# Patient Record
Sex: FEMALE | Race: WHITE | NOT HISPANIC OR LATINO | Employment: OTHER | URBAN - METROPOLITAN AREA
[De-identification: names, ages, dates, MRNs, and addresses within clinical notes are randomized per-mention and may not be internally consistent; named-entity substitution may affect disease eponyms.]

---

## 2012-04-03 LAB
HPV ABSTRACT: NORMAL
PAP-ABSTRACT: NORMAL

## 2014-05-19 LAB
HPV ABSTRACT: NORMAL
PAP-ABSTRACT: NORMAL

## 2017-03-31 ENCOUNTER — TRANSFERRED RECORDS (OUTPATIENT)
Dept: HEALTH INFORMATION MANAGEMENT | Facility: CLINIC | Age: 52
End: 2017-03-31

## 2017-04-10 ENCOUNTER — TELEPHONE (OUTPATIENT)
Dept: FAMILY MEDICINE | Facility: CLINIC | Age: 52
End: 2017-04-10

## 2017-04-10 NOTE — TELEPHONE ENCOUNTER
Reason for Call:  Orders    Detailed comments: Patient wants Order in for Labs, then Please Call  To Schedule & discuss    Phone Number Patient can be reached at: Cell number on file:    Telephone Information:   Mobile [phone]       Best Time: anytime    Can we leave a detailed message on this number? YES    Call taken on 4/10/2017 at 12:00 PM by Ruperto Hurd

## 2017-04-11 NOTE — TELEPHONE ENCOUNTER
Patient called back.  Will be scheduling her physical. Will need to be after 6/28.  Last year 6/28/16.    Wants labs done before appointment.  Patient advised to call back end of June regarding labs.  LS may want to see her first. Will check with LS then  Mikaela Niño RN

## 2017-04-12 ENCOUNTER — MYC MEDICAL ADVICE (OUTPATIENT)
Dept: FAMILY MEDICINE | Facility: CLINIC | Age: 52
End: 2017-04-12

## 2017-04-12 NOTE — TELEPHONE ENCOUNTER
Pt is scheduled for preop in June  Sent [employer] message to further see what she is wanting.  Caroline COLLINS RN

## 2017-05-02 ENCOUNTER — TELEPHONE (OUTPATIENT)
Dept: FAMILY MEDICINE | Facility: CLINIC | Age: 52
End: 2017-05-02

## 2017-05-02 NOTE — TELEPHONE ENCOUNTER
5/2/2017    Call Regarding Preventive Health Screening Colonoscopy and Cervical/PAP    Attempt 1    Message on voicemail     Comments:       Outreach   WILLARD

## 2017-05-03 NOTE — TELEPHONE ENCOUNTER
"Patient has appointment schedule else where for HM screening  Will have record transfer when complete      Canh \"Rafaela\" Jose  Central Scheduler    "

## 2017-06-21 ENCOUNTER — TRANSFERRED RECORDS (OUTPATIENT)
Dept: HEALTH INFORMATION MANAGEMENT | Facility: CLINIC | Age: 52
End: 2017-06-21

## 2017-06-22 ENCOUNTER — TRANSFERRED RECORDS (OUTPATIENT)
Dept: HEALTH INFORMATION MANAGEMENT | Facility: CLINIC | Age: 52
End: 2017-06-22

## 2017-06-22 LAB
HPV ABSTRACT: NORMAL
PAP SMEAR - HIM PATIENT REPORTED: NEGATIVE

## 2017-06-27 ENCOUNTER — OFFICE VISIT (OUTPATIENT)
Dept: FAMILY MEDICINE | Facility: CLINIC | Age: 52
End: 2017-06-27
Payer: COMMERCIAL

## 2017-06-27 VITALS
BODY MASS INDEX: 36.12 KG/M2 | TEMPERATURE: 98.9 F | HEART RATE: 92 BPM | OXYGEN SATURATION: 94 % | SYSTOLIC BLOOD PRESSURE: 114 MMHG | WEIGHT: 211.6 LBS | DIASTOLIC BLOOD PRESSURE: 78 MMHG | HEIGHT: 64 IN

## 2017-06-27 DIAGNOSIS — H02.403 PTOSIS OF EYELID, BILATERAL: ICD-10-CM

## 2017-06-27 DIAGNOSIS — Z01.818 PREOP GENERAL PHYSICAL EXAM: Primary | ICD-10-CM

## 2017-06-27 PROCEDURE — 99214 OFFICE O/P EST MOD 30 MIN: CPT | Performed by: FAMILY MEDICINE

## 2017-06-27 NOTE — MR AVS SNAPSHOT
After Visit Summary   6/27/2017    Marbella Hendrix    MRN: [id number]           Patient Information     Date Of Birth          1965        Visit Information        Provider Department      6/27/2017 1:00 PM Vanessa Ladd MD [facility]        Today's Diagnoses     Preop general physical exam    -  1    Ptosis of eyelid, bilateral          Care Instructions      Before Your Surgery      Call your surgeon if there is any change in your health. This includes signs of a cold or flu (such as a sore throat, runny nose, cough, rash or fever).    Do not smoke, drink alcohol or take over the counter medicine (unless your surgeon or primary care doctor tells you to) for the 24 hours before and after surgery.    If you take prescribed drugs: Follow your doctor s orders about which medicines to take and which to stop until after surgery.    Eating and drinking prior to surgery: follow the instructions from your surgeon    Take a shower or bath the night before surgery. Use the soap your surgeon gave you to gently clean your skin. If you do not have soap from your surgeon, use your regular soap. Do not shave or scrub the surgery site.  Wear clean pajamas and have clean sheets on your bed.           Follow-ups after your visit        Who to contact     If you have questions or need follow up information about today's clinic visit or your schedule please contact [facility] [place] directly at [phone].  Normal or non-critical lab and imaging results will be communicated to you by MyChart, letter or phone within 4 business days after the clinic has received the results. If you do not hear from us within 7 days, please contact the clinic through MyChart or phone. If you have a critical or abnormal lab result, we will notify you by phone as soon as possible.  Submit refill requests through [employer] or call your pharmacy and they will forward the refill request to us. Please allow 3 business  "days for your refill to be completed.          Additional Information About Your Visit        MyChart Information     [employer] gives you secure access to your electronic health record. If you see a primary care provider, you can also send messages to your care team and make appointments. If you have questions, please call your primary care clinic.  If you do not have a primary care provider, please call [phone] and they will assist you.        Care EveryWhere ID     This is your Care EveryWhere ID. This could be used by other organizations to access your [facility] medical records  [id number]        Your Vitals Were     Pulse Temperature Height Last Period Pulse Oximetry BMI (Body Mass Index)    92 98.9  F (37.2  C) (Oral) 5' 3.5\" (1.613 m) 08/28/2015 94% 36.9 kg/m2       Blood Pressure from Last 3 Encounters:   06/27/17 114/78   06/28/16 114/72   06/12/16 120/80    Weight from Last 3 Encounters:   06/27/17 211 lb 9.6 oz (96 kg)   06/28/16 206 lb (93.4 kg)   06/12/16 203 lb (92.1 kg)              Today, you had the following     No orders found for display       Primary Care Provider Office Phone # Fax #    Vanessa Ladd [phone]       [facility] [address] [address]        Equal Access to Services     [facility]: Hadii aad ku hadasho Soomaali, waaxda luqadaha, qaybta kaalmada adeegyada, luis m sutton haybarbra toth . So [facility] [phone].    ATENCIÓN: Si habla español, tiene a garcia disposición servicios gratuitos de asistencia lingüística. Llame al [phone].    We comply with applicable federal civil rights laws and [place] laws. We do not discriminate on the basis of race, color, national origin, age, disability sex, sexual orientation or gender identity.            Thank you!     Thank you for choosing [facility]  for your care. Our goal is always to provide you with excellent care. Hearing back from our patients is one " way we can continue to improve our services. Please take a few minutes to complete the written survey that you may receive in the mail after your visit with us. Thank you!             Your Updated Medication List - Protect others around you: Learn how to safely use, store and throw away your medicines at www.disposemymeds.org.          This list is accurate as of: 6/27/17  3:08 PM.  Always use your most recent med list.                   Brand Name Dispense Instructions for use Diagnosis    CALCIUM PO      Take 1,000 mg by mouth daily        FLAX SEED OIL PO      Take by mouth daily        levothyroxine 137 MCG tablet    SYNTHROID/LEVOTHROID    90 tablet    Take 1 tablet (137 mcg) by mouth daily    Hypothyroidism, unspecified type       OMEGA-3 FISH OIL PO      Take 1 g by mouth daily        PARoxetine 20 MG tablet    PAXIL    90 tablet    Take 0.5 tablets (10 mg) by mouth 2 times daily    Generalized anxiety disorder

## 2017-06-27 NOTE — PROGRESS NOTES
[facility]  [address] [place]  [facility] [address] [phone]  Dept: [phone]    PRE-OP EVALUATION:  Today's date: 2017    Marbella Hendrix (: 1965) presents for pre-operative evaluation assessment as requested by Dr. Litzy Lanza.  She requires evaluation and anesthesia risk assessment prior to undergoing surgery/procedure for treatment of L eyelid .  Proposed procedure: eyelid surgery    Date of Surgery/ Procedure: 2017  Time of Surgery/ Procedure: [facility]  Hospital/Surgical Facility: [place], [place] [facility]  Fax number for surgical facility: [phone]  Primary Physician: Vanessa Ladd  Type of Anesthesia Anticipated: to be determined    Patient has a Health Care Directive or Living Will:  YES in chart    1. NO - Do you have a history of heart attack, stroke, stent, bypass or surgery on an artery in the head, neck, heart or legs?  2. NO - Do you ever have any pain or discomfort in your chest?  3. NO - Do you have a history of  Heart Failure?  4. NO - Are you troubled by shortness of breath when: walking on the level, up a slight hill or at night?  5. NO - Do you currently have a cold, bronchitis or other respiratory infection?  6. NO - Do you have a cough, shortness of breath or wheezing?  7. NO - Do you sometimes get pains in the calves of your legs when you walk?  8. NO - Do you or anyone in your family have previous history of blood clots?  9. NO - Do you or does anyone in your family have a serious bleeding problem such as prolonged bleeding following surgeries or cuts?  10. NO - Have you ever had problems with anemia or been told to take iron pills?  11. NO - Have you had any abnormal blood loss such as black, tarry or bloody stools, or abnormal vaginal bleeding?  12. NO - Have you ever had a blood transfusion?  13. NO - Have you or any of your relatives ever had problems with anesthesia?  14. NO - Do you have sleep apnea, excessive  snoring or daytime drowsiness?  15. NO - Do you have any prosthetic heart valves?  16. NO - Do you have prosthetic joints?  17. NO - Is there any chance that you may be pregnant?      HPI:                                                      Brief HPI related to upcoming procedure:       See problem list for active medical problems.  Problems all longstanding and stable, except as noted/documented.  See ROS for pertinent symptoms related to these conditions.                                                                                                  .    MEDICAL HISTORY:                                                      Patient Active Problem List    Diagnosis Date Noted     Acute post-operative pain 2015     Priority: Medium     Preventative health care 10/30/2012     Priority: Medium     Last pap NIL ; mammogram nl ; lipids abnl, needs annual f/u       Health Care Home 2012     Priority: Medium     Tier 1    DX V65.8 REPLACED WITH 12775 HEALTH CARE HOME (2013)       Generalized anxiety disorder 2012     Priority: Medium     H/o panic attacks; currently controlled with paxil       Hyperlipidemia LDL goal <160 2012     Priority: Medium     Behavioral measures - avoiding statins while trying to conceive; not a candidate for red yeast rice (interactions with thyroid meds)       Hypothyroidism 2012     Priority: Medium     Overweight 2012     Priority: Medium     On exercise plan, has been in weight watchers  Problem list name updated by automated process. Provider to review        Past Medical History:   Diagnosis Date     Heavy menstrual period      Leiomyoma of uterus     s/p myomectomy     PONV (postoperative nausea and vomiting)      Vesico-ureteral reflux     s/p repair     Past Surgical History:   Procedure Laterality Date     ABDOMEN SURGERY      exploratory after C section for sepsis      SECTION      x 2     HYSTERECTOMY SUPRACERVICAL N/A  "8/28/2015    Procedure: HYSTERECTOMY SUPRACERVICAL;  Surgeon: Kelle Wasserman MD;  Location: [facility] OR     LUMPECTOMY BREAST       MYOMECTOMY UTERUS       Current Outpatient Prescriptions   Medication Sig Dispense Refill     PARoxetine (PAXIL) 20 MG tablet Take 0.5 tablets (10 mg) by mouth 2 times daily 90 tablet 3     levothyroxine (SYNTHROID, LEVOTHROID) 137 MCG tablet Take 1 tablet (137 mcg) by mouth daily 90 tablet 3     Omega-3 Fatty Acids (OMEGA-3 FISH OIL PO) Take 1 g by mouth daily       Flaxseed, Linseed, (FLAX SEED OIL PO) Take by mouth daily       CALCIUM PO Take 1,000 mg by mouth daily       OTC products: None, except as noted above    No Known Allergies   Latex Allergy: NO    Social History   Substance Use Topics     Smoking status: Former Smoker     Types: Cigarettes     Quit date: 1/1/1990     Smokeless tobacco: Never Used     Alcohol use Yes      Comment: 1-2 drinks a month     History   Drug Use No       REVIEW OF SYSTEMS:                                                    C: NEGATIVE for fever, chills, change in weight  EYES: NEGATIVE for vision changes or irritation  E/M: NEGATIVE for ear, mouth and throat problems  R: NEGATIVE for significant cough or SOB  RESP:NEGATIVE for significant cough or SOB  CV: NEGATIVE for chest pain, palpitations or peripheral edema  CV: NEGATIVE for chest pain, palpitations or peripheral edema  MUSCULOSKELETAL: NEGATIVE for significant arthralgias or myalgia  NEURO: NEGATIVE for weakness, dizziness or paresthesias  PSYCHIATRIC: NEGATIVE for changes in mood or affect  ROS otherwise negative    EXAM:                                                    /78  Pulse 92  Temp 98.9  F (37.2  C) (Oral)  Ht 5' 3.5\" (1.613 m)  Wt 211 lb 9.6 oz (96 kg)  LMP 08/28/2015  SpO2 94%  BMI 36.9 kg/m2    GENERAL APPEARANCE: healthy, alert and no distress     EYES: EOMI, PERRL     HENT: ear canals and TM's normal and nose and mouth without ulcers or lesions     NECK: " no adenopathy, no asymmetry, masses, or scars and thyroid normal to palpation     RESP: lungs clear to auscultation - no rales, rhonchi or wheezes     CV: regular rates and rhythm, normal S1 S2, no S3 or S4 and no murmur, click or rub     ABDOMEN:  soft, nontender, no HSM or masses and bowel sounds normal     MS: extremities normal- no gross deformities noted, no evidence of inflammation in joints, FROM in all extremities.     SKIN: no suspicious lesions or rashes     NEURO: Normal strength and tone, sensory exam grossly normal, mentation intact and speech normal     PSYCH: mentation appears normal. and affect normal/bright     LYMPHATICS: No axillary, cervical, or supraclavicular nodes    DIAGNOSTICS:                                                    No labs or EKG required for low risk surgery (cataract, skin procedure, breast biopsy, etc)    Recent Labs   Lab Test  08/29/15   0651  08/28/15   1925  08/28/15   0850   10/22/12   2341   HGB  13.0  14.3  15.1   < >  11.6*   PLT   --    --    --    --   314   NA   --    --    --    --   141   POTASSIUM   --    --    --    --   3.8   CR   --    --   0.65   --   0.77    < > = values in this interval not displayed.        IMPRESSION:                                                    Reason for surgery/procedure: upper eyelids ptosis, surgical correction   Diagnosis/reason for consult: preoperative clearing     The proposed surgical procedure is considered LOW risk.    REVISED CARDIAC RISK INDEX  The patient has the following serious cardiovascular risks for perioperative complications such as (MI, PE, VFib and 3  AV Block):  No serious cardiac risks      The patient has the following additional risks for perioperative complications:  No identified additional risks      ICD-10-CM    1. Preop general physical exam Z01.818        RECOMMENDATIONS:                                                          --Patient is to take all scheduled medications on the day of surgery  EXCEPT for modifications listed below.    APPROVAL GIVEN to proceed with proposed procedure, without further diagnostic evaluation       Signed Electronically by: Vanessa Ladd MD    Copy of this evaluation report is provided to requesting physician.    [facility] Preop Guidelines

## 2017-06-28 ENCOUNTER — TELEPHONE (OUTPATIENT)
Dept: FAMILY MEDICINE | Facility: CLINIC | Age: 52
End: 2017-06-28

## 2017-06-28 DIAGNOSIS — E78.5 HYPERLIPIDEMIA LDL GOAL <160: ICD-10-CM

## 2017-06-28 DIAGNOSIS — Z00.00 PREVENTATIVE HEALTH CARE: Primary | ICD-10-CM

## 2017-06-28 DIAGNOSIS — E03.9 ACQUIRED HYPOTHYROIDISM: ICD-10-CM

## 2017-06-28 NOTE — TELEPHONE ENCOUNTER
Reason for Call: Request for an order or referral:    Order or referral being requested: Labs   Cholesterol and Thyroid    Date needed: before my next appointment  7/21 Annual Physical    Has the patient been seen by the PCP for this problem? YES    Additional comments: The patient would like to get her labs drawn first soi she will be coming in at 8:15  For labs and she would like the above 2 checked and then she said whatever the Physician wants     Phone number Patient can be reached at:  Home number on file [phone] (home)    Best Time:  anytime    Can we leave a detailed message on this number?  YES    Call taken on 6/28/2017 at 9:56 AM by Miguelina Mcdaniel

## 2017-07-08 ENCOUNTER — HEALTH MAINTENANCE LETTER (OUTPATIENT)
Age: 52
End: 2017-07-08

## 2017-08-02 ENCOUNTER — OFFICE VISIT (OUTPATIENT)
Dept: FAMILY MEDICINE | Facility: CLINIC | Age: 52
End: 2017-08-02
Payer: COMMERCIAL

## 2017-08-02 VITALS
SYSTOLIC BLOOD PRESSURE: 126 MMHG | DIASTOLIC BLOOD PRESSURE: 76 MMHG | BODY MASS INDEX: 36.02 KG/M2 | HEIGHT: 64 IN | OXYGEN SATURATION: 98 % | WEIGHT: 211 LBS | HEART RATE: 86 BPM | TEMPERATURE: 96.8 F | RESPIRATION RATE: 16 BRPM

## 2017-08-02 DIAGNOSIS — Z83.3 FAMILY HISTORY OF DIABETES MELLITUS: ICD-10-CM

## 2017-08-02 DIAGNOSIS — Z00.00 ENCOUNTER FOR ROUTINE ADULT HEALTH EXAMINATION WITHOUT ABNORMAL FINDINGS: Primary | ICD-10-CM

## 2017-08-02 DIAGNOSIS — Z90.710 STATUS POST HYSTERECTOMY: ICD-10-CM

## 2017-08-02 DIAGNOSIS — F41.1 GENERALIZED ANXIETY DISORDER: ICD-10-CM

## 2017-08-02 LAB
CHOLEST SERPL-MCNC: 223 MG/DL
GLUCOSE SERPL-MCNC: 96 MG/DL (ref 70–99)
HBA1C MFR BLD: 5.5 % (ref 4.3–6)
HDLC SERPL-MCNC: 36 MG/DL
LDLC SERPL CALC-MCNC: 163 MG/DL
NONHDLC SERPL-MCNC: 187 MG/DL
TRIGL SERPL-MCNC: 118 MG/DL
TSH SERPL DL<=0.005 MIU/L-ACNC: 1.04 MU/L (ref 0.4–4)

## 2017-08-02 PROCEDURE — 80061 LIPID PANEL: CPT | Performed by: FAMILY MEDICINE

## 2017-08-02 PROCEDURE — 36415 COLL VENOUS BLD VENIPUNCTURE: CPT | Performed by: FAMILY MEDICINE

## 2017-08-02 PROCEDURE — 99396 PREV VISIT EST AGE 40-64: CPT | Performed by: FAMILY MEDICINE

## 2017-08-02 PROCEDURE — 83036 HEMOGLOBIN GLYCOSYLATED A1C: CPT | Performed by: FAMILY MEDICINE

## 2017-08-02 PROCEDURE — 84443 ASSAY THYROID STIM HORMONE: CPT | Performed by: FAMILY MEDICINE

## 2017-08-02 PROCEDURE — 82947 ASSAY GLUCOSE BLOOD QUANT: CPT | Performed by: FAMILY MEDICINE

## 2017-08-02 RX ORDER — PAROXETINE 20 MG/1
10 TABLET, FILM COATED ORAL 2 TIMES DAILY
Qty: 90 TABLET | Refills: 3 | Status: SHIPPED | OUTPATIENT
Start: 2017-08-02 | End: 2018-06-20

## 2017-08-02 NOTE — NURSING NOTE
"Chief Complaint   Patient presents with     Physical     /76  Pulse 86  Temp 96.8  F (36  C) (Oral)  Resp 16  Ht 5' 3.5\" (1.613 m)  Wt 211 lb (95.7 kg)  LMP 08/28/2015  SpO2 98%  BMI 36.79 kg/m2 Estimated body mass index is 36.79 kg/(m^2) as calculated from the following:    Height as of this encounter: 5' 3.5\" (1.613 m).    Weight as of this encounter: 211 lb (95.7 kg).  bp completed using cuff size: regular       Health Maintenance addressed:  Pap Smear    Possibly completing today per provider review.    Yovana Freire MA     "

## 2017-08-02 NOTE — PROGRESS NOTES
SUBJECTIVE:   CC: Marbella Hendrix is an 51 year old woman who presents for preventive health visit.     Healthy Habits:    Do you get at least three servings of calcium containing foods daily (dairy, green leafy vegetables, etc.)? yes    Amount of exercise or daily activities, outside of work: 7 day(s) per week    Problems taking medications regularly No    Medication side effects: No    Have you had an eye exam in the past two years? yes    Do you see a dentist twice per year? yes    Do you have sleep apnea, excessive snoring or daytime drowsiness?no              Today's PHQ-2 Score: PHQ-2 (  [employer]) 2017 10/23/2015   Q1: Little interest or pleasure in doing things 0 0   Q2: Feeling down, depressed or hopeless 0 0   PHQ-2 Score 0 0         Abuse: Current or Past(Physical, Sexual or Emotional)- No  Do you feel safe in your environment - Yes  Social History   Substance Use Topics     Smoking status: Former Smoker     Types: Cigarettes     Quit date: 1990     Smokeless tobacco: Never Used     Alcohol use Yes      Comment: 1-2 drinks a month     The patient does not drink >3 drinks per day nor >7 drinks per week.    Reviewed orders with patient.  Reviewed health maintenance and updated orders accordingly - Yes  BP Readings from Last 3 Encounters:   17 126/76   17 114/78   16 114/72    Wt Readings from Last 3 Encounters:   17 211 lb (95.7 kg)   17 211 lb 9.6 oz (96 kg)   16 206 lb (93.4 kg)                  Patient Active Problem List   Diagnosis     Health Care Home     Generalized anxiety disorder     Hyperlipidemia LDL goal <160     Acquired hypothyroidism     Overweight     Preventative health care     Acute post-operative pain     Past Surgical History:   Procedure Laterality Date     ABDOMEN SURGERY      exploratory after C section for sepsis      SECTION      x 2     HYSTERECTOMY SUPRACERVICAL N/A 2015    Procedure: HYSTERECTOMY SUPRACERVICAL;   Surgeon: Kelle Wasserman MD;  Location: [facility] OR     LUMPECTOMY BREAST       MYOMECTOMY UTERUS         Social History   Substance Use Topics     Smoking status: Former Smoker     Types: Cigarettes     Quit date: 1/1/1990     Smokeless tobacco: Never Used     Alcohol use Yes      Comment: 1-2 drinks a month     Family History   Problem Relation Age of Onset     High cholesterol Father      CANCER Father      CLL     Blood Disease Father      CLL     Breast Cancer Maternal Grandmother      in 80s     Depression Mother      DIABETES Mother      Ulcerative Colitis Mother      Lipids Mother      hypertriglyceridemia         Current Outpatient Prescriptions   Medication Sig Dispense Refill     PARoxetine (PAXIL) 20 MG tablet Take 0.5 tablets (10 mg) by mouth 2 times daily 90 tablet 3     levothyroxine (SYNTHROID, LEVOTHROID) 137 MCG tablet Take 1 tablet (137 mcg) by mouth daily 90 tablet 3     Omega-3 Fatty Acids (OMEGA-3 FISH OIL PO) Take 1 g by mouth daily       Flaxseed, Linseed, (FLAX SEED OIL PO) Take by mouth daily       CALCIUM PO Take 1,000 mg by mouth daily       No Known Allergies  Recent Labs   Lab Test  08/02/17   1013  07/13/16   0743  06/22/16   0734  08/28/15   0850   06/24/15   0804  07/23/14   0754   11/20/12   0855  10/22/12   2341  07/11/12   0834   A1C  5.5   --    --    --    --    --    --    --    --    --    --    LDL   --   158*   --    --    --   159*  135*   < >  161.0*   --   190*   HDL   --   33*   --    --    --   38*  29*   < >  33.0*   --   33*   TRIG   --   162*   --    --    --   91  125   < >  143.0   --   118   ALT   --    --    --    --    --    --    --    --   18.0   --   20   CR   --    --    --   0.65   --    --    --    --    --   0.77   --    GFRESTIMATED   --    --    --   >90  Non  GFR Calc     --    --    --    --    --   81   --    GFRESTBLACK   --    --    --   >90   GFR Calc     --    --    --    --    --   >90   --    POTASSIUM    "--    --    --    --    --    --    --    --    --   3.8   --    TSH   --   1.14  1.18   --    < >  5.63*  4.48   < >   --   10.70*  3.13    < > = values in this interval not displayed.              Patient over age 50, mutual decision to screen reflected in health maintenance.      Pertinent mammograms are reviewed under the imaging tab.  History of abnormal Pap smear: NO - age 30- 65 PAP every 3 years recommended    Reviewed and updated as needed this visit by clinical staffTobacco  Allergies  Meds  Med Hx  Surg Hx  Fam Hx  Soc Hx        Reviewed and updated as needed this visit by Provider        Past Medical History:   Diagnosis Date     Heavy menstrual period      Leiomyoma of uterus     s/p myomectomy     PONV (postoperative nausea and vomiting)      Vesico-ureteral reflux     s/p repair      Past Surgical History:   Procedure Laterality Date     ABDOMEN SURGERY      exploratory after C section for sepsis      SECTION      x 2     HYSTERECTOMY SUPRACERVICAL N/A 2015    Procedure: HYSTERECTOMY SUPRACERVICAL;  Surgeon: Kelle Wasserman MD;  Location: [facility] OR     LUMPECTOMY BREAST       MYOMECTOMY UTERUS         ROS:  C: NEGATIVE for fever, chills, change in weight  I: NEGATIVE for worrisome rashes, moles or lesions  E: NEGATIVE for vision changes or irritation  ENT: NEGATIVE for ear, mouth and throat problems  R: NEGATIVE for significant cough or SOB  B: NEGATIVE for masses, tenderness or discharge  CV: NEGATIVE for chest pain, palpitations or peripheral edema  GI: NEGATIVE for nausea, abdominal pain, heartburn, or change in bowel habits  : NEGATIVE for unusual urinary or vaginal symptoms. No vaginal bleeding.  M: NEGATIVE for significant arthralgias or myalgia  N: NEGATIVE for weakness, dizziness or paresthesias  P: NEGATIVE for changes in mood or affect     OBJECTIVE:   /76  Pulse 86  Temp 96.8  F (36  C) (Oral)  Resp 16  Ht 5' 3.5\" (1.613 m)  Wt 211 lb (95.7 kg)  " LMP 08/28/2015  SpO2 98%  BMI 36.79 kg/m2  EXAM:  GENERAL: healthy, alert and no distress  EYES: Eyes grossly normal to inspection, PERRL and conjunctivae and sclerae normal  HENT: ear canals and TM's normal, nose and mouth without ulcers or lesions  NECK: no adenopathy, no asymmetry, masses, or scars and thyroid normal to palpation  RESP: lungs clear to auscultation - no rales, rhonchi or wheezes  BREAST: normal without masses, tenderness or nipple discharge and no palpable axillary masses or adenopathy  CV: regular rate and rhythm, normal S1 S2, no S3 or S4, no murmur, click or rub, no peripheral edema and peripheral pulses strong  ABDOMEN: soft, nontender, no hepatosplenomegaly, no masses and bowel sounds normal  MS: no gross musculoskeletal defects noted, no edema  SKIN: no suspicious lesions or rashes  NEURO: Normal strength and tone, mentation intact and speech normal  PSYCH: mentation appears normal, affect normal/bright    ASSESSMENT/PLAN:   1. Encounter for routine adult health examination without abnormal findings  Discussed diet,calcium,exercise.Went over self breast exam.Thin prep was NOT  done.Eyes and teeth UTD.No immunizations needed today.See orders below for tests ordered and screening needed.    - TSH with free T4 reflex  - Lipid Profile (Chol, Trig, HDL, LDL calc)  - Glucose    2. Family history of diabetes mellitus  Will check hgba 1 c   - Hemoglobin A1c    3. Generalized anxiety disorder  Refilled , doing well on it , for anxiety   - PARoxetine (PAXIL) 20 MG tablet; Take 0.5 tablets (10 mg) by mouth 2 times daily  Dispense: 90 tablet; Refill: 3    COUNSELING:   Reviewed preventive health counseling, as reflected in patient instructions       Regular exercise       Healthy diet/nutrition       Vision screening       Hearing screening       Osteoporosis Prevention/Bone Health         reports that she quit smoking about 27 years ago. Her smoking use included Cigarettes. She has never used  "smokeless tobacco.    Estimated body mass index is 36.79 kg/(m^2) as calculated from the following:    Height as of this encounter: 5' 3.5\" (1.613 m).    Weight as of this encounter: 211 lb (95.7 kg).       Counseling Resources:  ATP IV Guidelines  Pooled Cohorts Equation Calculator  Breast Cancer Risk Calculator  FRAX Risk Assessment  ICSI Preventive Guidelines  Dietary Guidelines for Americans, 2010  USDA's MyPlate  ASA Prophylaxis  Lung CA Screening    Vanessa Ladd MD  [facility]  "

## 2017-08-02 NOTE — MR AVS SNAPSHOT
After Visit Summary   8/2/2017    Marbella Hendrix    MRN: [id number]           Patient Information     Date Of Birth          1965        Visit Information        Provider Department      8/2/2017 9:30 AM Vanessa Ladd MD [facility]        Today's Diagnoses     Encounter for routine adult health examination without abnormal findings    -  1    Family history of diabetes mellitus        Generalized anxiety disorder        Status post hysterectomy          Care Instructions      Preventive Health Recommendations  Female Ages 50 - 64    Yearly exam: See your health care provider every year in order to  o Review health changes.   o Discuss preventive care.    o Review your medicines if your doctor has prescribed any.      Get a Pap test every three years (unless you have an abnormal result and your provider advises testing more often).    If you get Pap tests with HPV test, you only need to test every 5 years, unless you have an abnormal result.     You do not need a Pap test if your uterus was removed (hysterectomy) and you have not had cancer.    You should be tested each year for STDs (sexually transmitted diseases) if you're at risk.     Have a mammogram every 1 to 2 years.    Have a colonoscopy at age 50, or have a yearly FIT test (stool test). These exams screen for colon cancer.      Have a cholesterol test every 5 years, or more often if advised.    Have a diabetes test (fasting glucose) every three years. If you are at risk for diabetes, you should have this test more often.     If you are at risk for osteoporosis (brittle bone disease), think about having a bone density scan (DEXA).    Shots: Get a flu shot each year. Get a tetanus shot every 10 years.    Nutrition:     Eat at least 5 servings of fruits and vegetables each day.    Eat whole-grain bread, whole-wheat pasta and brown rice instead of white grains and rice.    Talk to your provider about Calcium and Vitamin D.  "    Lifestyle    Exercise at least 150 minutes a week (30 minutes a day, 5 days a week). This will help you control your weight and prevent disease.    Limit alcohol to one drink per day.    No smoking.     Wear sunscreen to prevent skin cancer.     See your dentist every six months for an exam and cleaning.    See your eye doctor every 1 to 2 years.            Follow-ups after your visit        Who to contact     If you have questions or need follow up information about today's clinic visit or your schedule please contact [facility] directly at [phone].  Normal or non-critical lab and imaging results will be communicated to you by PeerTraderhart, letter or phone within 4 business days after the clinic has received the results. If you do not hear from us within 7 days, please contact the clinic through Streamezzot or phone. If you have a critical or abnormal lab result, we will notify you by phone as soon as possible.  Submit refill requests through [employer] or call your pharmacy and they will forward the refill request to us. Please allow 3 business days for your refill to be completed.          Additional Information About Your Visit        MyChart Information     [employer] gives you secure access to your electronic health record. If you see a primary care provider, you can also send messages to your care team and make appointments. If you have questions, please call your primary care clinic.  If you do not have a primary care provider, please call [phone] and they will assist you.        Care EveryWhere ID     This is your Care EveryWhere ID. This could be used by other organizations to access your [facility] medical records  [id number]        Your Vitals Were     Pulse Temperature Respirations Height Last Period Pulse Oximetry    86 96.8  F (36  C) (Oral) 16 5' 3.5\" (1.613 m) 08/28/2015 98%    BMI (Body Mass Index)                   36.79 kg/m2            Blood Pressure from Last 3 Encounters:   08/02/17 " 126/76   06/27/17 114/78   06/28/16 114/72    Weight from Last 3 Encounters:   08/02/17 211 lb (95.7 kg)   06/27/17 211 lb 9.6 oz (96 kg)   06/28/16 206 lb (93.4 kg)              We Performed the Following     Glucose     Hemoglobin A1c     Lipid Profile (Chol, Trig, HDL, LDL calc)     TSH with free T4 reflex          Where to get your medicines      These medications were sent to [facility] - [place], [place] - [address]  [address] [address]    Hours:  Mon-Fri 8:30am-5:00pm Toll Free [phone] Phone:  [phone]     PARoxetine 20 MG tablet          Primary Care Provider Office Phone # Fax #    Vanessa Ladd [phone]       [facility] [address]   [facility] [place] [facility] [address]        Equal Access to Services     СЕРГЕЙ SAENZ : Hadii aad ku hadasho Soomaali, waaxda luqadaha, qaybta kaalmada adeegyada, waxay idiin haybarbra toth . So [facility] [phone].    ATENCIÓN: Si habla español, tiene a garcia disposición servicios gratuitos de asistencia lingüística. Chetnaame al [phone].    We comply with applicable federal civil rights laws and [place] laws. We do not discriminate on the basis of race, color, national origin, age, disability sex, sexual orientation or gender identity.            Thank you!     Thank you for choosing [facility]  for your care. Our goal is always to provide you with excellent care. Hearing back from our patients is one way we can continue to improve our services. Please take a few minutes to complete the written survey that you may receive in the mail after your visit with us. Thank you!             Your Updated Medication List - Protect others around you: Learn how to safely use, store and throw away your medicines at www.disposemymeds.org.          This list is accurate as of: 8/2/17  1:05 PM.  Always use your most recent med list.                   Brand Name Dispense  Instructions for use Diagnosis    CALCIUM PO      Take 1,000 mg by mouth daily        FLAX SEED OIL PO      Take by mouth daily        levothyroxine 137 MCG tablet    SYNTHROID/LEVOTHROID    90 tablet    Take 1 tablet (137 mcg) by mouth daily    Hypothyroidism, unspecified type       OMEGA-3 FISH OIL PO      Take 1 g by mouth daily        PARoxetine 20 MG tablet    PAXIL    90 tablet    Take 0.5 tablets (10 mg) by mouth 2 times daily    Generalized anxiety disorder

## 2017-08-28 DIAGNOSIS — E03.9 HYPOTHYROIDISM, UNSPECIFIED TYPE: ICD-10-CM

## 2017-08-28 NOTE — TELEPHONE ENCOUNTER
Pending Prescriptions:                       Disp   Refills    levothyroxine (SYNTHROID/LEVOTHROID) 137 M*90 tab*3        Sig: TAKE ONE TABLET BY MOUTH EVERY DAY         Last Written Prescription Date: 6/28/2017  Last Quantity: 90, # refills: 3  Last Office Visit with [facility], [facility] or [facility] prescribing provider: 8/2/2017        TSH   Date Value Ref Range Status   08/02/2017 1.04 0.40 - 4.00 mU/L Final

## 2017-08-29 DIAGNOSIS — E03.9 HYPOTHYROIDISM, UNSPECIFIED TYPE: ICD-10-CM

## 2017-08-29 RX ORDER — LEVOTHYROXINE SODIUM 137 UG/1
TABLET ORAL
Refills: 0
Start: 2017-08-29

## 2017-08-29 RX ORDER — LEVOTHYROXINE SODIUM 137 UG/1
TABLET ORAL
Qty: 90 TABLET | Refills: 2 | Status: SHIPPED | OUTPATIENT
Start: 2017-08-29 | End: 2018-06-20

## 2017-08-29 NOTE — TELEPHONE ENCOUNTER
Prescription approved per [facility] Refill Protocol.  Mikaela Niño RN

## 2017-08-29 NOTE — TELEPHONE ENCOUNTER
Levothyroxine   (Filled Today)  Last Written Prescription Date: 8-29-17  Last Quantity: 90, # refills: 2  Last Office Visit with [facility], [facility] or [facility] prescribing provider: 8-2-17        TSH   Date Value Ref Range Status   08/02/2017 1.04 0.40 - 4.00 mU/L Final

## 2017-11-02 ENCOUNTER — ALLIED HEALTH/NURSE VISIT (OUTPATIENT)
Dept: NURSING | Facility: CLINIC | Age: 52
End: 2017-11-02
Payer: COMMERCIAL

## 2017-11-02 DIAGNOSIS — Z23 NEED FOR PROPHYLACTIC VACCINATION AND INOCULATION AGAINST INFLUENZA: Primary | ICD-10-CM

## 2017-11-02 PROCEDURE — 90686 IIV4 VACC NO PRSV 0.5 ML IM: CPT

## 2017-11-02 PROCEDURE — 90471 IMMUNIZATION ADMIN: CPT

## 2017-11-02 PROCEDURE — 99207 ZZC NO CHARGE NURSE ONLY: CPT

## 2017-11-02 NOTE — MR AVS SNAPSHOT
After Visit Summary   11/2/2017    Marbella Hendrix    MRN: [id number]           Patient Information     Date Of Birth          1965        Visit Information        Provider Department      11/2/2017 2:30 PM [facility] NURSE [facility]        Today's Diagnoses     Need for prophylactic vaccination and inoculation against influenza    -  1       Follow-ups after your visit        Who to contact     If you have questions or need follow up information about today's clinic visit or your schedule please contact [facility] directly at [phone].  Normal or non-critical lab and imaging results will be communicated to you by Education.comhart, letter or phone within 4 business days after the clinic has received the results. If you do not hear from us within 7 days, please contact the clinic through [employer] Glass[employer] or phone. If you have a critical or abnormal lab result, we will notify you by phone as soon as possible.  Submit refill requests through [employer] or call your pharmacy and they will forward the refill request to us. Please allow 3 business days for your refill to be completed.          Additional Information About Your Visit        MyChart Information     [employer] gives you secure access to your electronic health record. If you see a primary care provider, you can also send messages to your care team and make appointments. If you have questions, please call your primary care clinic.  If you do not have a primary care provider, please call [phone] and they will assist you.        Care EveryWhere ID     This is your Care EveryWhere ID. This could be used by other organizations to access your [facility] medical records  [id number]        Your Vitals Were     Last Period                   08/28/2015            Blood Pressure from Last 3 Encounters:   08/02/17 126/76   06/27/17 114/78   06/28/16 114/72    Weight from Last 3 Encounters:   08/02/17 211 lb (95.7 kg)   06/27/17 211 lb 9.6 oz  (96 kg)   06/28/16 206 lb (93.4 kg)              We Performed the Following     FLU VAC, SPLIT VIRUS IM > 3 YO (QUADRIVALENT) [37190]     Vaccine Administration, Initial [67136]        Primary Care Provider Office Phone # Fax #    Vanessa Ladd [phone] [address]   [place] [address]        Equal Access to Services     BRAYAN SAENZ : Hadii aad ku hadasho Soomaali, waaxda luqadaha, qaybta kaalmada adeegyada, waxay idiin hayaan adeeg isaelorlinselina layuri . So [facility] [phone].    ATENCIÓN: Si zackeryla ethan, tiene a garcia disposición servicios gratuitos de asistencia lingüística. Llame al [phone].    We comply with applicable federal civil rights laws and [place] laws. We do not discriminate on the basis of race, color, national origin, age, disability, sex, sexual orientation, or gender identity.            Thank you!     Thank you for choosing [facility]  for your care. Our goal is always to provide you with excellent care. Hearing back from our patients is one way we can continue to improve our services. Please take a few minutes to complete the written survey that you may receive in the mail after your visit with us. Thank you!             Your Updated Medication List - Protect others around you: Learn how to safely use, store and throw away your medicines at www.disposemymeds.org.          This list is accurate as of: 11/2/17  2:35 PM.  Always use your most recent med list.                   Brand Name Dispense Instructions for use Diagnosis    CALCIUM PO      Take 1,000 mg by mouth daily        FLAX SEED OIL PO      Take by mouth daily        levothyroxine 137 MCG tablet    SYNTHROID/LEVOTHROID    90 tablet    TAKE ONE TABLET BY MOUTH EVERY DAY    Hypothyroidism, unspecified type       OMEGA-3 FISH OIL PO      Take 1 g by mouth daily        PARoxetine 20 MG tablet    PAXIL    90 tablet    Take 0.5 tablets (10 mg) by mouth 2 times daily    Generalized anxiety  disorder

## 2017-11-02 NOTE — PROGRESS NOTES

## 2017-12-07 ENCOUNTER — RADIANT APPOINTMENT (OUTPATIENT)
Dept: MAMMOGRAPHY | Facility: CLINIC | Age: 52
End: 2017-12-07
Attending: FAMILY MEDICINE

## 2017-12-07 DIAGNOSIS — Z12.31 VISIT FOR SCREENING MAMMOGRAM: ICD-10-CM

## 2018-06-03 DIAGNOSIS — E03.9 HYPOTHYROIDISM, UNSPECIFIED TYPE: ICD-10-CM

## 2018-06-04 ENCOUNTER — MYC MEDICAL ADVICE (OUTPATIENT)
Dept: FAMILY MEDICINE | Facility: CLINIC | Age: 53
End: 2018-06-04

## 2018-06-04 RX ORDER — LEVOTHYROXINE SODIUM 137 UG/1
TABLET ORAL
Qty: 90 TABLET | Refills: 0 | Status: SHIPPED | OUTPATIENT
Start: 2018-06-04 | End: 2018-07-02

## 2018-06-04 NOTE — TELEPHONE ENCOUNTER
"Prescription approved per [facility] Refill Protocol.  Due for physical August 2018  Sent MyChart to pt - last physical 8/2/2017 - is scheduled for physical 6/20/2018 though  Told pt might be \"too soon\" per insurance  Caroline COLLINS RN    Requested Prescriptions   Pending Prescriptions Disp Refills     levothyroxine (SYNTHROID/LEVOTHROID) 137 MCG tablet [Pharmacy Med Name: LEVOTHYROXINE SODIUM 137MCG TABS] 90 tablet 2     Sig: TAKE ONE TABLET BY MOUTH EVERY DAY    Thyroid Protocol Passed    6/3/2018  5:38 PM       Passed - Patient is 12 years or older       Passed - Recent (12 mo) or future (30 days) visit within the authorizing provider's specialty    Patient had office visit in the last 12 months or has a visit in the next 30 days with authorizing provider or within the authorizing provider's specialty.  See \"Patient Info\" tab in inbasket, or \"Choose Columns\" in Meds & Orders section of the refill encounter.           Passed - Normal TSH on file in past 12 months    Recent Labs   Lab Test  08/02/17   1013   TSH  1.04             Passed - No active pregnancy on record    If patient is pregnant or has had a positive pregnancy test, please check TSH.         Passed - No positive pregnancy test in past 12 months    If patient is pregnant or has had a positive pregnancy test, please check TSH.          Next 5 appointments (look out 90 days)     Jun 20, 2018 11:30 AM CDT   PHYSICAL with Vanessa Ladd MD   [facility] ([facility])    [address] [id number] [phone]                  "

## 2018-06-20 ENCOUNTER — OFFICE VISIT (OUTPATIENT)
Dept: FAMILY MEDICINE | Facility: CLINIC | Age: 53
End: 2018-06-20
Payer: COMMERCIAL

## 2018-06-20 VITALS
WEIGHT: 213.5 LBS | TEMPERATURE: 98.4 F | OXYGEN SATURATION: 100 % | HEIGHT: 64 IN | HEART RATE: 88 BPM | BODY MASS INDEX: 36.45 KG/M2 | DIASTOLIC BLOOD PRESSURE: 75 MMHG | SYSTOLIC BLOOD PRESSURE: 123 MMHG | RESPIRATION RATE: 16 BRPM

## 2018-06-20 DIAGNOSIS — F41.1 GENERALIZED ANXIETY DISORDER: ICD-10-CM

## 2018-06-20 DIAGNOSIS — E03.9 HYPOTHYROIDISM, UNSPECIFIED TYPE: ICD-10-CM

## 2018-06-20 DIAGNOSIS — M25.551 HIP PAIN, RIGHT: ICD-10-CM

## 2018-06-20 DIAGNOSIS — E66.01 MORBID OBESITY (H): ICD-10-CM

## 2018-06-20 DIAGNOSIS — E78.5 HYPERLIPIDEMIA LDL GOAL <160: ICD-10-CM

## 2018-06-20 DIAGNOSIS — E03.9 ACQUIRED HYPOTHYROIDISM: Primary | ICD-10-CM

## 2018-06-20 PROCEDURE — 99214 OFFICE O/P EST MOD 30 MIN: CPT | Performed by: FAMILY MEDICINE

## 2018-06-20 ASSESSMENT — ANXIETY QUESTIONNAIRES
1. FEELING NERVOUS, ANXIOUS, OR ON EDGE: MORE THAN HALF THE DAYS
7. FEELING AFRAID AS IF SOMETHING AWFUL MIGHT HAPPEN: SEVERAL DAYS
IF YOU CHECKED OFF ANY PROBLEMS ON THIS QUESTIONNAIRE, HOW DIFFICULT HAVE THESE PROBLEMS MADE IT FOR YOU TO DO YOUR WORK, TAKE CARE OF THINGS AT HOME, OR GET ALONG WITH OTHER PEOPLE: NOT DIFFICULT AT ALL
2. NOT BEING ABLE TO STOP OR CONTROL WORRYING: MORE THAN HALF THE DAYS
3. WORRYING TOO MUCH ABOUT DIFFERENT THINGS: MORE THAN HALF THE DAYS
7. FEELING AFRAID AS IF SOMETHING AWFUL MIGHT HAPPEN: SEVERAL DAYS
6. BECOMING EASILY ANNOYED OR IRRITABLE: NEARLY EVERY DAY
6. BECOMING EASILY ANNOYED OR IRRITABLE: NEARLY EVERY DAY
2. NOT BEING ABLE TO STOP OR CONTROL WORRYING: MORE THAN HALF THE DAYS
GAD7 TOTAL SCORE: 13
5. BEING SO RESTLESS THAT IT IS HARD TO SIT STILL: MORE THAN HALF THE DAYS
3. WORRYING TOO MUCH ABOUT DIFFERENT THINGS: MORE THAN HALF THE DAYS
GAD7 TOTAL SCORE: 13
5. BEING SO RESTLESS THAT IT IS HARD TO SIT STILL: MORE THAN HALF THE DAYS
IF YOU CHECKED OFF ANY PROBLEMS ON THIS QUESTIONNAIRE, HOW DIFFICULT HAVE THESE PROBLEMS MADE IT FOR YOU TO DO YOUR WORK, TAKE CARE OF THINGS AT HOME, OR GET ALONG WITH OTHER PEOPLE: NOT DIFFICULT AT ALL
1. FEELING NERVOUS, ANXIOUS, OR ON EDGE: MORE THAN HALF THE DAYS

## 2018-06-20 ASSESSMENT — PATIENT HEALTH QUESTIONNAIRE - PHQ9
5. POOR APPETITE OR OVEREATING: SEVERAL DAYS
5. POOR APPETITE OR OVEREATING: SEVERAL DAYS

## 2018-06-20 NOTE — PROGRESS NOTES
SUBJECTIVE:   Marbella Hendrix is a 52 year old female who presents to clinic today for the following health issues:      Anxiety Follow-Up- she has been on the Paxil for yrs , she takes 10mg twi e a day and seems to be working well for her anxiety , she does not want to get off it now.    Status since last visit: No change    Other associated symptoms:None    Complicating factors:   Significant life event: No   Current substance abuse: None  Depression symptoms: No  BERTHA-7 SCORE 2018   Total Score 13       BERTHA-7    Amount of exercise or physical activity: 6-7 days/week for an average of 30-45 minutes    Problems taking medications regularly: No    Medication side effects: none    Diet: regular (no restrictions)    2) high cholesterol - LDL was over 166 and she will recheck , she has been doing healthy diet and exercise and she will come back fasting to see results   3) right hip pain , recently worse , she does yoga and Pillates but is not able to move in the right hip much without a lot of pain , no injuries , no traumas     Also THYROID FOLLOW UP- she is on 137 mcg of synthroid , will check lab - thyroid today    Problem list and histories reviewed & adjusted, as indicated.  Additional history: as documented    Patient Active Problem List   Diagnosis     Health Care Home     Generalized anxiety disorder     Hyperlipidemia LDL goal <160     Acquired hypothyroidism     Overweight     Preventative health care     Acute post-operative pain     Status post hysterectomy     Morbid obesity (H)     Past Surgical History:   Procedure Laterality Date     ABDOMEN SURGERY      exploratory after C section for sepsis      SECTION      x 2     HYSTERECTOMY RADICAL  2015     HYSTERECTOMY SUPRACERVICAL N/A 2015    Procedure: HYSTERECTOMY SUPRACERVICAL;  Surgeon: Kelle Wasserman MD;  Location: [facility] OR     LUMPECTOMY BREAST       MYOMECTOMY UTERUS         Social History   Substance Use Topics      Smoking status: Former Smoker     Types: Cigarettes     Quit date: 1/1/1990     Smokeless tobacco: Never Used     Alcohol use Yes      Comment: 1-2 drinks a month     Family History   Problem Relation Age of Onset     High cholesterol Father      Cancer Father      CLL     Blood Disease Father      CLL     Breast Cancer Maternal Grandmother      in 80s     Depression Mother      Diabetes Mother      Ulcerative Colitis Mother      Lipids Mother      hypertriglyceridemia         Current Outpatient Prescriptions   Medication Sig Dispense Refill     CALCIUM PO Take 1,000 mg by mouth daily       Flaxseed, Linseed, (FLAX SEED OIL PO) Take by mouth daily       levothyroxine (SYNTHROID/LEVOTHROID) 137 MCG tablet TAKE ONE TABLET BY MOUTH EVERY DAY 90 tablet 0     levothyroxine (SYNTHROID/LEVOTHROID) 137 MCG tablet TAKE ONE TABLET BY MOUTH EVERY DAY 90 tablet 2     Omega-3 Fatty Acids (OMEGA-3 FISH OIL PO) Take 1 g by mouth daily       PARoxetine (PAXIL) 20 MG tablet Take 0.5 tablets (10 mg) by mouth 2 times daily 90 tablet 3     No Known Allergies  Recent Labs   Lab Test  08/02/17   1013  07/13/16   0743   08/28/15   0850   06/24/15   0804   11/20/12   0855  10/22/12   2341  07/11/12   0834   A1C  5.5   --    --    --    --    --    --    --    --    --    LDL  163*  158*   --    --    --   159*   < >  161.0*   --   190*   HDL  36*  33*   --    --    --   38*   < >  33.0*   --   33*   TRIG  118  162*   --    --    --   91   < >  143.0   --   118   ALT   --    --    --    --    --    --    --   18.0   --   20   CR   --    --    --   0.65   --    --    --    --   0.77   --    GFRESTIMATED   --    --    --   >90  Non  GFR Calc     --    --    --    --   81   --    GFRESTBLACK   --    --    --   >90   GFR Calc     --    --    --    --   >90   --    POTASSIUM   --    --    --    --    --    --    --    --   3.8   --    TSH  1.04  1.14   < >   --    < >  5.63*   < >   --   10.70*  3.13    < > =  "values in this interval not displayed.      BP Readings from Last 3 Encounters:   06/20/18 123/75   08/02/17 126/76   06/27/17 114/78    Wt Readings from Last 3 Encounters:   06/20/18 213 lb 8 oz (96.8 kg)   08/02/17 211 lb (95.7 kg)   06/27/17 211 lb 9.6 oz (96 kg)                  Labs reviewed in EPIC    Reviewed and updated as needed this visit by clinical staff  Meds       Reviewed and updated as needed this visit by Provider         ROS:  Constitutional, HEENT, cardiovascular, pulmonary, GI, , musculoskeletal, neuro, skin, endocrine and psych systems are negative, except as otherwise noted.    OBJECTIVE:     /75  Pulse 88  Temp 98.4  F (36.9  C) (Oral)  Resp 16  Ht 5' 3.5\" (1.613 m)  Wt 213 lb 8 oz (96.8 kg)  LMP 08/28/2015  SpO2 100%  Breastfeeding? No  BMI 37.23 kg/m2  Body mass index is 37.23 kg/(m^2).  GENERAL: healthy, alert and no distress  EYES: Eyes grossly normal to inspection, PERRL and conjunctivae and sclerae normal  NECK: no adenopathy, no asymmetry, masses, or scars and thyroid normal to palpation  RESP: lungs clear to auscultation - no rales, rhonchi or wheezes  CV: regular rate and rhythm, normal S1 S2, no S3 or S4, no murmur, click or rub, no peripheral edema and peripheral pulses strong  ABDOMEN: soft, nontender, no hepatosplenomegaly, no masses and bowel sounds normal  MS: no gross musculoskeletal defects noted, no edema EXCEPT there is some pain with internal and external rotation in the right hip , able to bear weight and straight leg is negative   NEURO: Normal strength and tone, mentation intact and speech normal    Diagnostic Test Results:  No results found for this or any previous visit (from the past 24 hour(s)).    ASSESSMENT/PLAN:         1. Acquired hypothyroidism  Will check today and see if she needs any dose adjustments , she denies any weight changes , although it is hard to lose weight and she does exercise a lot .  - TSH with free T4 reflex; Future  - " Glucose; Future  - Hemoglobin A1c; Future    2. Morbid obesity (H)  She is doing pilates , yoga , aerobic exercise , eat healthy foods. RTC if no improving or worsening.      3. Generalized anxiety disorder  We will continue with the same meds and doses for now .  - PARoxetine (PAXIL) 20 MG tablet; Take 0.5 tablets (10 mg) by mouth 2 times daily  Dispense: 90 tablet; Refill: 3    4. Hyperlipidemia LDL goal <160  She will come back fasting for lipids check as LDL has been high in the past   - Lipid Profile (Chol, Trig, HDL, LDL calc); Future  - Hemoglobin A1c; Future    5. Hip pain, right  We discussed NSAIDS, gentle stretches and I have given her a referal for PT   - [facility], HAND, AND CHIROPRACTIC REFERRAL    6. Generalized anxiety disorder  As above   - PARoxetine (PAXIL) 20 MG tablet; Take 0.5 tablets (10 mg) by mouth 2 times daily  Dispense: 90 tablet; Refill: 3    7. Hypothyroidism, unspecified type  Will check TSh and she is on Synthroid   - levothyroxine (SYNTHROID/LEVOTHROID) 137 MCG tablet; Take 1 tablet (137 mcg) by mouth daily  Dispense: 90 tablet; Refill: 2    RTC if no improving or worsening.  Pt is aware  and comfortable with the current plan.      Vanessa Ladd MD  [facility]

## 2018-06-20 NOTE — MR AVS SNAPSHOT
After Visit Summary   6/20/2018    Marbella Hendrix    MRN: [id number]           Patient Information     Date Of Birth          1965        Visit Information        Provider Department      6/20/2018 11:30 AM Vanessa Ladd MD [facility]        Today's Diagnoses     Acquired hypothyroidism    -  1    Morbid obesity (H)        Generalized anxiety disorder        Hyperlipidemia LDL goal <160        Hip pain, right           Follow-ups after your visit        Additional Services     J[facility] PT, HAND, AND CHIROPRACTIC REFERRAL       **This order will print in the [facility] Scheduling Office**    Physical Therapy, Hand Therapy and Chiropractic Care are available through:    *[facility]  *[facility]  *[facility]    Call one number to schedule at any of the above locations: [phone].    Your provider has referred you to: Physical Therapy at [facility] or [facility]    Indication/Reason for Referral: right hip pain   Onset of Illness: few months now worse   Therapy Orders: Evaluate and Treat  Special Programs: None  Special Request: None    Ronald Cardenas      Additional Comments for the Therapist or Chiropractor:     Please be aware that coverage of these services is subject to the terms and limitations of your health insurance plan.  Call member services at your health plan with any benefit or coverage questions.      Please bring the following to your appointment:    *Your personal calendar for scheduling future appointments  *Comfortable clothing                  Future tests that were ordered for you today     Open Future Orders        Priority Expected Expires Ordered    Lipid Profile (Chol, Trig, HDL, LDL calc) Routine  10/20/2018 6/20/2018    TSH with free T4 reflex Routine  10/20/2018 6/20/2018    Glucose Routine  10/20/2018 6/20/2018    Hemoglobin A1c Routine  9/20/2018 6/20/2018            Who to contact     If you have questions or need  "follow up information about today's clinic visit or your schedule please contact [facility] directly at [phone].  Normal or non-critical lab and imaging results will be communicated to you by MyChart, letter or phone within 4 business days after the clinic has received the results. If you do not hear from us within 7 days, please contact the clinic through [employer] or phone. If you have a critical or abnormal lab result, we will notify you by phone as soon as possible.  Submit refill requests through [employer] or call your pharmacy and they will forward the refill request to us. Please allow 3 business days for your refill to be completed.          Additional Information About Your Visit        [employer] Information     [employer] gives you secure access to your electronic health record. If you see a primary care provider, you can also send messages to your care team and make appointments. If you have questions, please call your primary care clinic.  If you do not have a primary care provider, please call [phone] and they will assist you.        Care EveryWhere ID     This is your Care EveryWhere ID. This could be used by other organizations to access your [place] medical records  [id number]        Your Vitals Were     Pulse Temperature Respirations Height Last Period Pulse Oximetry    88 98.4  F (36.9  C) (Oral) 16 5' 3.5\" (1.613 m) 08/28/2015 100%    Breastfeeding? BMI (Body Mass Index)                No 37.23 kg/m2           Blood Pressure from Last 3 Encounters:   06/20/18 123/75   08/02/17 126/76   06/27/17 114/78    Weight from Last 3 Encounters:   06/20/18 213 lb 8 oz (96.8 kg)   08/02/17 211 lb (95.7 kg)   06/27/17 211 lb 9.6 oz (96 kg)              We Performed the Following     JODY PT, HAND, AND CHIROPRACTIC REFERRAL        Primary Care Provider Office Phone # Fax #    Vanessa Ladd [phone] [address] [address]        Equal Access to " Services     [facility] : Hadii aad ku hadmarleeai Stern, wasamantada luqadaha, qaybta kaalmaethel hawthorne, luis m toth . So [facility] [phone].    ATENCIÓN: Si crow long, tiene a garcia disposición servicios gratuitos de asistencia lingüística. Llame al [phone].    We comply with applicable federal civil rights laws and [place] laws. We do not discriminate on the basis of race, color, national origin, age, disability, sex, sexual orientation, or gender identity.            Thank you!     Thank you for choosing [facility]  for your care. Our goal is always to provide you with excellent care. Hearing back from our patients is one way we can continue to improve our services. Please take a few minutes to complete the written survey that you may receive in the mail after your visit with us. Thank you!             Your Updated Medication List - Protect others around you: Learn how to safely use, store and throw away your medicines at www.disposemymeds.org.          This list is accurate as of 6/20/18 12:23 PM.  Always use your most recent med list.                   Brand Name Dispense Instructions for use Diagnosis    CALCIUM PO      Take 1,000 mg by mouth daily        FLAX SEED OIL PO      Take by mouth daily        * levothyroxine 137 MCG tablet    SYNTHROID/LEVOTHROID    90 tablet    TAKE ONE TABLET BY MOUTH EVERY DAY    Hypothyroidism, unspecified type       * levothyroxine 137 MCG tablet    SYNTHROID/LEVOTHROID    90 tablet    TAKE ONE TABLET BY MOUTH EVERY DAY    Hypothyroidism, unspecified type       OMEGA-3 FISH OIL PO      Take 1 g by mouth daily        PARoxetine 20 MG tablet    PAXIL    90 tablet    Take 0.5 tablets (10 mg) by mouth 2 times daily    Generalized anxiety disorder       * Notice:  This list has 2 medication(s) that are the same as other medications prescribed for you. Read the directions carefully, and ask your doctor or other care provider to review  them with you.

## 2018-06-21 RX ORDER — LEVOTHYROXINE SODIUM 137 UG/1
137 TABLET ORAL DAILY
Qty: 90 TABLET | Refills: 2 | Status: SHIPPED | OUTPATIENT
Start: 2018-06-21 | End: 2019-06-02

## 2018-06-21 RX ORDER — PAROXETINE 20 MG/1
10 TABLET, FILM COATED ORAL 2 TIMES DAILY
Qty: 90 TABLET | Refills: 3 | Status: SHIPPED | OUTPATIENT
Start: 2018-06-21 | End: 2019-09-05

## 2018-06-21 ASSESSMENT — ANXIETY QUESTIONNAIRES: GAD7 TOTAL SCORE: 13

## 2018-06-27 DIAGNOSIS — E78.5 HYPERLIPIDEMIA LDL GOAL <160: ICD-10-CM

## 2018-06-27 DIAGNOSIS — E03.9 ACQUIRED HYPOTHYROIDISM: ICD-10-CM

## 2018-06-27 LAB
CHOLEST SERPL-MCNC: 237 MG/DL
GLUCOSE SERPL-MCNC: 95 MG/DL (ref 70–99)
HBA1C MFR BLD: 5.5 % (ref 0–5.6)
HDLC SERPL-MCNC: 29 MG/DL
LDLC SERPL CALC-MCNC: 159 MG/DL
NONHDLC SERPL-MCNC: 208 MG/DL
TRIGL SERPL-MCNC: 243 MG/DL
TSH SERPL DL<=0.005 MIU/L-ACNC: 1.05 MU/L (ref 0.4–4)

## 2018-06-27 PROCEDURE — 36415 COLL VENOUS BLD VENIPUNCTURE: CPT | Performed by: FAMILY MEDICINE

## 2018-06-27 PROCEDURE — 82947 ASSAY GLUCOSE BLOOD QUANT: CPT | Performed by: FAMILY MEDICINE

## 2018-06-27 PROCEDURE — 84443 ASSAY THYROID STIM HORMONE: CPT | Performed by: FAMILY MEDICINE

## 2018-06-27 PROCEDURE — 83036 HEMOGLOBIN GLYCOSYLATED A1C: CPT | Performed by: FAMILY MEDICINE

## 2018-06-27 PROCEDURE — 80061 LIPID PANEL: CPT | Performed by: FAMILY MEDICINE

## 2018-07-02 ENCOUNTER — THERAPY VISIT (OUTPATIENT)
Dept: PHYSICAL THERAPY | Facility: CLINIC | Age: 53
End: 2018-07-02
Payer: COMMERCIAL

## 2018-07-02 ENCOUNTER — OFFICE VISIT (OUTPATIENT)
Dept: FAMILY MEDICINE | Facility: CLINIC | Age: 53
End: 2018-07-02
Payer: COMMERCIAL

## 2018-07-02 VITALS
HEART RATE: 85 BPM | TEMPERATURE: 98.5 F | WEIGHT: 210 LBS | HEIGHT: 64 IN | DIASTOLIC BLOOD PRESSURE: 76 MMHG | BODY MASS INDEX: 35.85 KG/M2 | OXYGEN SATURATION: 99 % | SYSTOLIC BLOOD PRESSURE: 116 MMHG | RESPIRATION RATE: 16 BRPM

## 2018-07-02 DIAGNOSIS — J20.9 ACUTE BRONCHITIS, UNSPECIFIED ORGANISM: Primary | ICD-10-CM

## 2018-07-02 DIAGNOSIS — M25.551 HIP PAIN, RIGHT: Primary | ICD-10-CM

## 2018-07-02 DIAGNOSIS — Z20.818 EXPOSURE TO STREPTOCOCCAL PHARYNGITIS: ICD-10-CM

## 2018-07-02 LAB
DEPRECATED S PYO AG THROAT QL EIA: NORMAL
SPECIMEN SOURCE: NORMAL

## 2018-07-02 PROCEDURE — 97161 PT EVAL LOW COMPLEX 20 MIN: CPT | Mod: GP | Performed by: PHYSICAL THERAPIST

## 2018-07-02 PROCEDURE — 99213 OFFICE O/P EST LOW 20 MIN: CPT | Performed by: FAMILY MEDICINE

## 2018-07-02 PROCEDURE — 87880 STREP A ASSAY W/OPTIC: CPT | Performed by: FAMILY MEDICINE

## 2018-07-02 PROCEDURE — 97112 NEUROMUSCULAR REEDUCATION: CPT | Mod: GP | Performed by: PHYSICAL THERAPIST

## 2018-07-02 PROCEDURE — 97530 THERAPEUTIC ACTIVITIES: CPT | Mod: GP | Performed by: PHYSICAL THERAPIST

## 2018-07-02 PROCEDURE — 87081 CULTURE SCREEN ONLY: CPT | Performed by: FAMILY MEDICINE

## 2018-07-02 RX ORDER — METHYLPREDNISOLONE 4 MG
TABLET, DOSE PACK ORAL
Qty: 21 TABLET | Refills: 0 | Status: SHIPPED | OUTPATIENT
Start: 2018-07-02 | End: 2018-10-10

## 2018-07-02 RX ORDER — AZITHROMYCIN 250 MG/1
TABLET, FILM COATED ORAL
Qty: 6 TABLET | Refills: 0 | Status: SHIPPED | OUTPATIENT
Start: 2018-07-02 | End: 2018-10-10

## 2018-07-02 ASSESSMENT — ACTIVITIES OF DAILY LIVING (ADL)
STEPPING_UP_AND_DOWN_CURBS: NO DIFFICULTY AT ALL
TWISTING/PIVOTING_ON_INVOLVED_LEG: SLIGHT DIFFICULTY
ROLLING_OVER_IN_BED: SLIGHT DIFFICULTY
HOS_ADL_COUNT: 17
GETTING_INTO_AND_OUT_OF_A_BATHTUB: NO DIFFICULTY AT ALL
PUTTING_ON_SOCKS_AND_SHOES: MODERATE DIFFICULTY
HOS_ADL_ITEM_SCORE_TOTAL: 49
WALKING_APPROXIMATELY_10_MINUTES: SLIGHT DIFFICULTY
LIGHT_TO_MODERATE_WORK: MODERATE DIFFICULTY
WALKING_DOWN_STEEP_HILLS: SLIGHT DIFFICULTY
WALKING_UP_STEEP_HILLS: SLIGHT DIFFICULTY
GOING_UP_1_FLIGHT_OF_STAIRS: SLIGHT DIFFICULTY
GETTING_INTO_AND_OUT_OF_AN_AVERAGE_CAR: SLIGHT DIFFICULTY
HOS_ADL_SCORE(%): 72.06
HOW_WOULD_YOU_RATE_YOUR_CURRENT_LEVEL_OF_FUNCTION_DURING_YOUR_USUAL_ACTIVITIES_OF_DAILY_LIVING_FROM_0_TO_100_WITH_100_BEING_YOUR_LEVEL_OF_FUNCTION_PRIOR_TO_YOUR_HIP_PROBLEM_AND_0_BEING_THE_INABILITY_TO_PERFORM_ANY_OF_YOUR_USUAL_DAILY_ACTIVITIES?: 70
RECREATIONAL_ACTIVITIES: MODERATE DIFFICULTY
WALKING_INITIALLY: SLIGHT DIFFICULTY
SITTING_FOR_15_MINUTES: SLIGHT DIFFICULTY
HOS_ADL_HIGHEST_POTENTIAL_SCORE: 68
HEAVY_WORK: MODERATE DIFFICULTY
GOING_DOWN_1_FLIGHT_OF_STAIRS: SLIGHT DIFFICULTY
STANDING_FOR_15_MINUTES: MODERATE DIFFICULTY
WALKING_15_MINUTES_OR_GREATER: SLIGHT DIFFICULTY
DEEP_SQUATTING: SLIGHT DIFFICULTY

## 2018-07-02 NOTE — MR AVS SNAPSHOT
After Visit Summary   7/2/2018    Marbella Hendrix    MRN: [id number]           Patient Information     Date Of Birth          1965        Visit Information        Provider Department      7/2/2018 12:00 PM Oleg Tomlin MD [facility]        Today's Diagnoses     Exposure to Streptococcal pharyngitis    -  1    Acute bronchitis, unspecified organism          Care Instructions      Bronchitis, Antibiotic Treatment (Adult)    Bronchitis is an infection of the air passages (bronchial tubes) in your lungs. It often occurs when you have a cold. This illness is contagious during the first few days and is spread through the air by coughing and sneezing, or by direct contact (touching the sick person and then touching your own eyes, nose, or mouth).  Symptoms of bronchitis include cough with mucus (phlegm) and low-grade fever. Bronchitis usually lasts 7 to 14 days. Mild cases can be treated with simple home remedies. More severe infection is treated with an antibiotic.  Home care  Follow these guidelines when caring for yourself at home:    If your symptoms are severe, rest at home for the first 2 to 3 days. When you go back to your usual activities, don't let yourself get too tired.    Do not smoke. Also avoid being exposed to secondhand smoke.    You may use over-the-counter medicines to control fever or pain, unless another medicine was prescribed. If you have chronic liver or kidney disease or have ever had a stomach ulcer or gastrointestinal bleeding, talk with your healthcare provider before using these medicines. Also talk to your provider if you are taking medicine to prevent blood clots. Aspirin should never be given to anyone younger than 18 years of age who is ill with a viral infection or fever. It may cause severe liver or brain damage.    Your appetite may be poor, so a light diet is fine. Avoid dehydration by drinking 6 to 8 glasses of fluids per day (such as water,  soft drinks, sports drinks, juices, tea, or soup). Extra fluids will help loosen secretions in the nose and lungs.    Over-the-counter cough, cold, and sore-throat medicines will not shorten the length of the illness, but they may be helpful to reduce symptoms. (Note: Do not use decongestants if you have high blood pressure.)    Finish all antibiotic medicine. Do this even if you are feeling better after only a few days.  Follow-up care  Follow up with your healthcare provider, or as advised. If you had an X-ray or ECG (electrocardiogram), a specialist will review it. You will be notified of any new findings that may affect your care.  If you are age 65 or older, or if you have a chronic lung disease or condition that affects your immune system, or you smoke, ask your healthcare provider about getting a pneumococcal vaccine and a yearly flu shot (influenza vaccine).  When to seek medical advice  Call your healthcare provider right away if any of these occur:    Fever of 100.4 F (38 C) or higher, or as directed by your healthcare provider    Coughing up increased amounts of colored sputum    Weakness, drowsiness, headache, facial pain, ear pain, or a stiff neck  Call 911  Call 911 if any of these occur.    Coughing up blood    Worsening weakness, drowsiness, headache, or stiff neck    Trouble breathing, wheezing, or pain with breathing  Date Last Reviewed: 9/13/2015 2000-2017 [employer]. [address] [place] [address]. All rights reserved. This information is not intended as a substitute for professional medical care. Always follow your healthcare professional's instructions.                Follow-ups after your visit        Your next 10 appointments already scheduled     Jul 13, 2018 11:50 AM CDT   JODY Extremity with Shane Lerma Pt, PT   [facility] ([place]  )    [address]  [facility] [address] [phone]         "      Who to contact     If you have questions or need follow up information about today's clinic visit or your schedule please contact [facility] [place] [facility] directly at [phone].  Normal or non-critical lab and imaging results will be communicated to you by MyChart, letter or phone within 4 business days after the clinic has received the results. If you do not hear from us within 7 days, please contact the clinic through [employer] or phone. If you have a critical or abnormal lab result, we will notify you by phone as soon as possible.  Submit refill requests through [employer] or call your pharmacy and they will forward the refill request to us. Please allow 3 business days for your refill to be completed.          Additional Information About Your Visit        [email or web address] Information     [employer] gives you secure access to your electronic health record. If you see a primary care provider, you can also send messages to your care team and make appointments. If you have questions, please call your primary care clinic.  If you do not have a primary care provider, please call [phone] and they will assist you.        Care EveryWhere ID     This is your Care EveryWhere ID. This could be used by other organizations to access your [place] medical records  [id number]        Your Vitals Were     Pulse Temperature Respirations Height Last Period Pulse Oximetry    85 98.5  F (36.9  C) (Oral) 16 5' 3.5\" (1.613 m) 08/28/2015 99%    Breastfeeding? BMI (Body Mass Index)                No 36.62 kg/m2           Blood Pressure from Last 3 Encounters:   07/02/18 116/76   06/20/18 123/75   08/02/17 126/76    Weight from Last 3 Encounters:   07/02/18 210 lb (95.3 kg)   06/20/18 213 lb 8 oz (96.8 kg)   08/02/17 211 lb (95.7 kg)              We Performed the Following     Strep, Rapid Screen          Today's Medication Changes          These changes are accurate as of 7/2/18 12:39 PM.  If you have any questions, ask your nurse or " doctor.               Start taking these medicines.        Dose/Directions    azithromycin 250 MG tablet   Commonly known as:  ZITHROMAX   Used for:  Acute bronchitis, unspecified organism   Started by:  Oleg Tomlin MD        Two tablets first day, then one tablet daily for four days.   Quantity:  6 tablet   Refills:  0       methylPREDNISolone 4 MG tablet   Commonly known as:  MEDROL DOSEPAK   Used for:  Acute bronchitis, unspecified organism   Started by:  Oleg Tomlin MD        Follow package instructions   Quantity:  21 tablet   Refills:  0            Where to get your medicines      These medications were sent to [id number] - [place], [place] - [address] [place] [address]  [address], [place] [address]     Phone:  [phone]     azithromycin 250 MG tablet    methylPREDNISolone 4 MG tablet                Primary Care Provider Office Phone # Fax #    Vanessa Ladd [phone] [address] [id number]        Equal Access to Services     [facility] AH: Hadii vanessa ku hadasho Soomaali, waaxda luqadaha, qaybta kaalmada adeegyada, waxay cleoin hayemekan rebeka toth . So [facility] [phone].    ATENCIÓN: Si habla español, tiene a garcia disposición servicios gratuitos de asistencia lingüística. Llame al [phone].    We comply with applicable federal civil rights laws and [place] laws. We do not discriminate on the basis of race, color, national origin, age, disability, sex, sexual orientation, or gender identity.            Thank you!     Thank you for choosing [facility]  for your care. Our goal is always to provide you with excellent care. Hearing back from our patients is one way we can continue to improve our services. Please take a few minutes to complete the written survey that you may receive in the mail after your visit with us. Thank you!             Your Updated Medication List - Protect others around you: Learn  how to safely use, store and throw away your medicines at www.disposemymeds.org.          This list is accurate as of 7/2/18 12:39 PM.  Always use your most recent med list.                   Brand Name Dispense Instructions for use Diagnosis    azithromycin 250 MG tablet    ZITHROMAX    6 tablet    Two tablets first day, then one tablet daily for four days.    Acute bronchitis, unspecified organism       CALCIUM PO      Take 1,000 mg by mouth daily        FLAX SEED OIL PO      Take by mouth daily        levothyroxine 137 MCG tablet    SYNTHROID/LEVOTHROID    90 tablet    Take 1 tablet (137 mcg) by mouth daily    Hypothyroidism, unspecified type, Acquired hypothyroidism       methylPREDNISolone 4 MG tablet    MEDROL DOSEPAK    21 tablet    Follow package instructions    Acute bronchitis, unspecified organism       OMEGA-3 FISH OIL PO      Take 1 g by mouth daily        PARoxetine 20 MG tablet    PAXIL    90 tablet    Take 0.5 tablets (10 mg) by mouth 2 times daily    Generalized anxiety disorder

## 2018-07-02 NOTE — PROGRESS NOTES
[facility] Initial Evaluation    Precautions/Restrictions/MD instructions  R hip pain, ongoing x 1 year    Therapist Impression:   Marbella is a friendly 52 yr old female with ongoing R hip pain that has gotten worse in the past several months insidiously.  She complains of anterior R groin/hip pain which wraps around to R SIJ.  Glute weakness evident but good control with step and squat noted.  Is quite active with variety of exercises.  Limited R hip rotational mobility with posterior tightness noted.     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  Job duties: standing, computer work, repetitive tasks  Current HEP/exercise regimen:  Cardio, weight lifting free weights, yoga, pilates  Patient's goals are standing for more than a few min without pain, restore mobility    Other pertinent PMH/Red Flags: thyroid, menopausal, elevated cholestrol  Barriers at home/work:   Pertinent Surgical History:  Ob/gyn related  Medications: sleep, thyroid, anxiety  General health as reported by patient: good  Return to MD: prn  HPI                    Objective:  System                                           Hip Evaluation  Hip PROM:      Extension: Left: 20   Right: 15      Internal Rotation: Left: 35    Right: 25  External Rotation: Left: 40    Right: 20                Hip Special Testing:       Right hip positive for the following special tests:  Jakob  and Fadir/LabrumRight hip negative for the following special tests:  Piriformis    Hip Palpation:      Right hip tenderness present at:  PSIS; Gluteus Medius and Bursa  Functional Testing:  Functional test hip: Squat with good form, SLS  and heel raises symmetrical no pain, no incr pain with LB screen into flexion/extension repeated motions.                       General     ROS    Assessment/Plan:    Patient is a 52 year old female with sacral and right side hip complaints.    Patient has the following significant findings with corresponding treatment plan.                Diagnosis 1:  R hip pain, anterior hip impingement (and SI instability?)  Pain -  hot/cold therapy, manual therapy, splint/taping/bracing/orthotics, self management, education, directional preference exercise and home program  Decreased ROM/flexibility - manual therapy and therapeutic exercise  Decreased joint mobility - manual therapy and therapeutic exercise  Decreased strength - therapeutic exercise and therapeutic activities  Decreased proprioception - neuro re-education and therapeutic activities  Impaired muscle performance - neuro re-education  Decreased function - therapeutic activities    Therapy Evaluation Codes:   1) History comprised of:   Personal factors that impact the plan of care:      None.    Comorbidity factors that impact the plan of care are:      Menopausal.     Medications impacting care: Sleep and thyroid meds, anxiety meds.  2) Examination of Body Systems comprised of:   Body structures and functions that impact the plan of care:      Hip and Sacral illiac joint.   Activity limitations that impact the plan of care are:      Jumping, Lifting, Sitting, Sports and Standing.  3) Clinical presentation characteristics are:   Stable/Uncomplicated.  4) Decision-Making    Low complexity using standardized patient assessment instrument and/or measureable assessment of functional outcome.  Cumulative Therapy Evaluation is: Low  complexity.    Previous and current functional limitations:  (See Goal Flow Sheet for this information)    Short term and Long term goals: (See Goal Flow Sheet for this information)     Communication ability:  Patient appears to be able to clearly communicate and understand verbal and written communication and follow directions correctly.  Treatment Explanation - The following has been discussed with the patient:   RX ordered/plan of care  Anticipated outcomes  Possible risks and side effects  This patient would benefit from PT intervention to resume normal activities.   Rehab potential is excellent.    Frequency:  1 X week, once daily  Duration:  for 6 weeks tapering as appropriate  Discharge Plan:  Achieve all LTG.  Independent in home treatment program.  Reach maximal therapeutic benefit.    Please refer to the daily flowsheet for treatment today, total treatment time and time spent performing 1:1 timed codes.

## 2018-07-02 NOTE — NURSING NOTE
"No chief complaint on file.      Initial /76  Pulse 85  Temp 98.5  F (36.9  C) (Oral)  Resp 16  Ht 5' 3.5\" (1.613 m)  Wt 210 lb (95.3 kg)  LMP 08/28/2015  SpO2 99%  Breastfeeding? No  BMI 36.62 kg/m2 Estimated body mass index is 36.62 kg/(m^2) as calculated from the following:    Height as of this encounter: 5' 3.5\" (1.613 m).    Weight as of this encounter: 210 lb (95.3 kg).  BP completed using cuff size: large    Health Maintenance that is potentially due pending provider review:  Health Maintenance Due   Topic Date Due     HIV SCREEN (SYSTEM ASSIGNED)  10/12/1984     PHQ-2 Q1 YR  08/02/2018         Per provider    "

## 2018-07-02 NOTE — PATIENT INSTRUCTIONS

## 2018-07-02 NOTE — MR AVS SNAPSHOT
After Visit Summary   7/2/2018    Marbella Hendrix    MRN: [id number]           Patient Information     Date Of Birth          1965        Visit Information        Provider Department      7/2/2018 11:20 AM Katie Macdonald, PT [facility]        Today's Diagnoses     Hip pain, right    -  1       Follow-ups after your visit        Your next 10 appointments already scheduled     Jul 13, 2018 11:50 AM CDT   JODY Extremity with Shane Lerma Pt, PT   [facility] ([facility] [place]  )    [address]  [facility] [address] [phone]              Who to contact     If you have questions or need follow up information about today's clinic visit or your schedule please contact [place] [facility] THERAP[facility] directly at [phone].  Normal or non-critical lab and imaging results will be communicated to you by Your Office Agenthart, letter or phone within 4 business days after the clinic has received the results. If you do not hear from us within 7 days, please contact the clinic through Your Office Agenthart or phone. If you have a critical or abnormal lab result, we will notify you by phone as soon as possible.  Submit refill requests through MaxVision or call your pharmacy and they will forward the refill request to us. Please allow 3 business days for your refill to be completed.          Additional Information About Your Visit        MyChart Information     MaxVision gives you secure access to your electronic health record. If you see a primary care provider, you can also send messages to your care team and make appointments. If you have questions, please call your primary care clinic.  If you do not have a primary care provider, please call [phone] and they will assist you.        Care EveryWhere ID     This is your Care EveryWhere ID. This could be used by other organizations to access  your [place] medical records  [id number]        Your Vitals Were     Last Period                   08/28/2015            Blood Pressure from Last 3 Encounters:   07/02/18 116/76   06/20/18 123/75   08/02/17 126/76    Weight from Last 3 Encounters:   07/02/18 95.3 kg (210 lb)   06/20/18 96.8 kg (213 lb 8 oz)   08/02/17 95.7 kg (211 lb)              We Performed the Following     HC PT EVAL, LOW COMPLEXITY     JODY INITIAL EVAL REPORT     NEUROMUSCULAR RE-EDUCATION     THERAPEUTIC ACTIVITIES          Today's Medication Changes          These changes are accurate as of 7/2/18  4:25 PM.  If you have any questions, ask your nurse or doctor.               Start taking these medicines.        Dose/Directions    azithromycin 250 MG tablet   Commonly known as:  ZITHROMAX   Used for:  Acute bronchitis, unspecified organism, Exposure to Streptococcal pharyngitis   Started by:  Oleg Tomlin MD        Two tablets first day, then one tablet daily for four days.   Quantity:  6 tablet   Refills:  0       methylPREDNISolone 4 MG tablet   Commonly known as:  MEDROL DOSEPAK   Used for:  Acute bronchitis, unspecified organism, Exposure to Streptococcal pharyngitis   Started by:  Oleg Tomlin MD        Follow package instructions   Quantity:  21 tablet   Refills:  0            Where to get your medicines      These medications were sent to [id number] - [place], [place] - [address], [place] [address]     Phone:  [phone]     azithromycin 250 MG tablet    methylPREDNISolone 4 MG tablet                Primary Care Provider Office Phone # Fax #    Vanessa Ladd [phone]       [facility] [id number]        Equal Access to Services     [facility] Medical CenterTOMASZ : leela Davidson, luis m batista. So [facility] [phone].    ATENCIÓN: Si roxana ramires garcia  disposición servicios gratuitos de asistencia lingüística. Solis vaughn [phone].    We comply with applicable federal civil rights laws and [place] laws. We do not discriminate on the basis of race, color, national origin, age, disability, sex, sexual orientation, or gender identity.            Thank you!     Thank you for choosing [place] [facility] PH[facility] THERAPY  for your care. Our goal is always to provide you with excellent care. Hearing back from our patients is one way we can continue to improve our services. Please take a few minutes to complete the written survey that you may receive in the mail after your visit with us. Thank you!             Your Updated Medication List - Protect others around you: Learn how to safely use, store and throw away your medicines at www.disposemymeds.org.          This list is accurate as of 7/2/18  4:25 PM.  Always use your most recent med list.                   Brand Name Dispense Instructions for use Diagnosis    azithromycin 250 MG tablet    ZITHROMAX    6 tablet    Two tablets first day, then one tablet daily for four days.    Acute bronchitis, unspecified organism, Exposure to Streptococcal pharyngitis       CALCIUM PO      Take 1,000 mg by mouth daily        FLAX SEED OIL PO      Take by mouth daily        levothyroxine 137 MCG tablet    SYNTHROID/LEVOTHROID    90 tablet    Take 1 tablet (137 mcg) by mouth daily    Hypothyroidism, unspecified type, Acquired hypothyroidism       methylPREDNISolone 4 MG tablet    MEDROL DOSEPAK    21 tablet    Follow package instructions    Acute bronchitis, unspecified organism, Exposure to Streptococcal pharyngitis       OMEGA-3 FISH OIL PO      Take 1 g by mouth daily        PARoxetine 20 MG tablet    PAXIL    90 tablet    Take 0.5 tablets (10 mg) by mouth 2 times daily    Generalized anxiety disorder

## 2018-07-02 NOTE — PROGRESS NOTES
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  Denies any facial pressure despite being severely congested.  Reports worsening chest symptoms as well as yellowish thick sputum production.    Problem list and histories reviewed & adjusted, as indicated.  Additional history: as documented    Patient Active Problem List   Diagnosis     Health Care Home     Generalized anxiety disorder     Hyperlipidemia LDL goal <160     Acquired hypothyroidism     Overweight     Preventative health care     Acute post-operative pain     Status post hysterectomy     Morbid obesity (H)     Hip pain, right     Past Surgical History:   Procedure Laterality Date     ABDOMEN SURGERY      exploratory after C section for sepsis      SECTION      x 2     HYSTERECTOMY RADICAL  2015     HYSTERECTOMY SUPRACERVICAL N/A 2015    Procedure: HYSTERECTOMY SUPRACERVICAL;  Surgeon: Kelle Wasserman MD;  Location: [facility] OR     LUMPECTOMY BREAST       MYOMECTOMY UTERUS         Social History   Substance Use Topics     Smoking status: Former Smoker     Types: Cigarettes     Quit date: 1990     Smokeless tobacco: Never Used     Alcohol use Yes      Comment: 1-2 drinks a month     Family History   Problem Relation Age of Onset     High  "cholesterol Father      Cancer Father      CLL     Blood Disease Father      CLL     Breast Cancer Maternal Grandmother      in 80s     Depression Mother      Diabetes Mother      Ulcerative Colitis Mother      Lipids Mother      hypertriglyceridemia           Reviewed and updated as needed this visit by clinical staff       Reviewed and updated as needed this visit by Provider         ROS:  Constitutional, HEENT, cardiovascular, pulmonary, gi and gu systems are negative, except as otherwise noted.    OBJECTIVE:     /76  Pulse 85  Temp 98.5  F (36.9  C) (Oral)  Resp 16  Ht 5' 3.5\" (1.613 m)  Wt 210 lb (95.3 kg)  LMP 08/28/2015  SpO2 99%  Breastfeeding? No  BMI 36.62 kg/m2  Body mass index is 36.62 kg/(m^2).  GENERAL: h ealthy, alert and no distress  HENT: ear canals and TM's normal, nose and mouth without ulcers or lesions. clear bilateral fluid effusion.  NECK: no adenopathy, no asymmetry, masses, or scars and thyroid normal to palpation  RESP: Coarse breath sounds bilaterally.  CV: regular rate and rhythm, normal S1 S2, no S3 or S4, no murmur, click or rub, no peripheral edema and peripheral pulses strong    Diagnostic Test Results:  Results for orders placed or performed in visit on 07/02/18 (from the past 24 hour(s))   Strep, Rapid Screen   Result Value Ref Range    Specimen Description Throat     Rapid Strep A Screen       NEGATIVE: No Group A streptococcal antigen detected by immunoassay, await culture report.       ASSESSMENT/PLAN:   1. Acute bronchitis, unspecified organism  Several days of acute bronchitis symptoms with productive cough and no fevers.  Plan: I have started the patient on meds   - azithromycin (ZITHROMAX) 250 MG tablet; Two tablets first day, then one tablet daily for four days.  Dispense: 6 tablet; Refill: 0  - methylPREDNISolone (MEDROL DOSEPAK) 4 MG tablet; Follow package instructions  Dispense: 21 tablet; Refill: 0  - Beta strep group A culture    2. Exposure to " Streptococcal pharyngitis  - Strep, Rapid Screen    Patient was advised to return to clinic for chest x-ray if symptoms fails to improve or she spikes a fever.    Oleg Tomlin MD  [facility]

## 2018-07-03 LAB
BACTERIA SPEC CULT: NORMAL
SPECIMEN SOURCE: NORMAL

## 2018-07-17 ENCOUNTER — THERAPY VISIT (OUTPATIENT)
Dept: PHYSICAL THERAPY | Facility: CLINIC | Age: 53
End: 2018-07-17
Payer: COMMERCIAL

## 2018-07-17 DIAGNOSIS — M25.551 HIP PAIN, RIGHT: ICD-10-CM

## 2018-07-17 PROCEDURE — 97112 NEUROMUSCULAR REEDUCATION: CPT | Mod: GP | Performed by: PHYSICAL THERAPIST

## 2018-07-17 PROCEDURE — 97110 THERAPEUTIC EXERCISES: CPT | Mod: GP | Performed by: PHYSICAL THERAPIST

## 2018-07-17 PROCEDURE — 97140 MANUAL THERAPY 1/> REGIONS: CPT | Mod: GP | Performed by: PHYSICAL THERAPIST

## 2018-08-01 ENCOUNTER — THERAPY VISIT (OUTPATIENT)
Dept: PHYSICAL THERAPY | Facility: CLINIC | Age: 53
End: 2018-08-01
Payer: COMMERCIAL

## 2018-08-01 DIAGNOSIS — M25.551 HIP PAIN, RIGHT: ICD-10-CM

## 2018-08-01 PROCEDURE — 97110 THERAPEUTIC EXERCISES: CPT | Mod: GP | Performed by: PHYSICAL THERAPIST

## 2018-08-01 PROCEDURE — 97112 NEUROMUSCULAR REEDUCATION: CPT | Mod: GP | Performed by: PHYSICAL THERAPIST

## 2018-08-14 ENCOUNTER — THERAPY VISIT (OUTPATIENT)
Dept: PHYSICAL THERAPY | Facility: CLINIC | Age: 53
End: 2018-08-14
Payer: COMMERCIAL

## 2018-08-14 DIAGNOSIS — M25.551 HIP PAIN, RIGHT: ICD-10-CM

## 2018-08-14 PROCEDURE — 97110 THERAPEUTIC EXERCISES: CPT | Mod: GP | Performed by: PHYSICAL THERAPIST

## 2018-08-14 PROCEDURE — 97112 NEUROMUSCULAR REEDUCATION: CPT | Mod: GP | Performed by: PHYSICAL THERAPIST

## 2018-09-04 ENCOUNTER — THERAPY VISIT (OUTPATIENT)
Dept: PHYSICAL THERAPY | Facility: CLINIC | Age: 53
End: 2018-09-04
Payer: COMMERCIAL

## 2018-09-04 DIAGNOSIS — M25.551 HIP PAIN, RIGHT: ICD-10-CM

## 2018-09-04 PROCEDURE — 97112 NEUROMUSCULAR REEDUCATION: CPT | Mod: GP | Performed by: PHYSICAL THERAPIST

## 2018-09-04 PROCEDURE — 97110 THERAPEUTIC EXERCISES: CPT | Mod: GP | Performed by: PHYSICAL THERAPIST

## 2018-09-25 ENCOUNTER — THERAPY VISIT (OUTPATIENT)
Dept: PHYSICAL THERAPY | Facility: CLINIC | Age: 53
End: 2018-09-25
Payer: COMMERCIAL

## 2018-09-25 DIAGNOSIS — M25.551 HIP PAIN, RIGHT: ICD-10-CM

## 2018-09-25 PROCEDURE — 97112 NEUROMUSCULAR REEDUCATION: CPT | Mod: GP | Performed by: PHYSICAL THERAPIST

## 2018-09-25 PROCEDURE — 97110 THERAPEUTIC EXERCISES: CPT | Mod: GP | Performed by: PHYSICAL THERAPIST

## 2018-09-25 NOTE — MR AVS SNAPSHOT
After Visit Summary   9/25/2018    Marbella Hendrix    MRN: [id number]           Patient Information     Date Of Birth          1965        Visit Information        Provider Department      9/25/2018 10:40 AM Keiko Kraus, PT [facility] - [facility]        Today's Diagnoses     Hip pain, right           Follow-ups after your visit        Your next 10 appointments already scheduled     Oct 17, 2018  9:30 AM CDT   JODY Extremity with Keiko Kraus PT   [place] [facility] ([facility] [place]  )    [address]  [facility] [address] [phone]              Who to contact     If you have questions or need follow up information about today's clinic visit or your schedule please contact [place] [facility] PHYSICAL THERAPY directly at [phone].  Normal or non-critical lab and imaging results will be communicated to you by [employer] Pharmaceuticalshart, letter or phone within 4 business days after the clinic has received the results. If you do not hear from us within 7 days, please contact the clinic through [employer]sh[employer] or phone. If you have a critical or abnormal lab result, we will notify you by phone as soon as possible.  Submit refill requests through Upworthy or call your pharmacy and they will forward the refill request to us. Please allow 3 business days for your refill to be completed.          Additional Information About Your Visit        MyChart Information     Upworthy gives you secure access to your electronic health record. If you see a primary care provider, you can also send messages to your care team and make appointments. If you have questions, please call your primary care clinic.  If you do not have a primary care provider, please call [phone] and they will assist you.        Care EveryWhere ID     This is your Care EveryWhere ID. This could be used by other organizations to access your  [facility] medical records  [id number]        Your Vitals Were     Last Period                   08/28/2015            Blood Pressure from Last 3 Encounters:   07/02/18 116/76   06/20/18 123/75   08/02/17 126/76    Weight from Last 3 Encounters:   07/02/18 95.3 kg (210 lb)   06/20/18 96.8 kg (213 lb 8 oz)   08/02/17 95.7 kg (211 lb)              We Performed the Following     JODY PROGRESS NOTES REPORT     NEUROMUSCULAR RE-EDUCATION     THERAPEUTIC EXERCISES        Primary Care Provider Office Phone # Fax #    Vanessa Ladd [phone]       [address] [facility] [place] [address]        Equal Access to Services     BRAYAN SAENZ : Hadii aad sena hadasho Solila, waaxda luqadaha, qaybta kaalmada adeegyada, luis m toth . So [facility] [phone].    ATENCIÓN: Si habla español, tiene a garcia disposición servicios gratuitos de asistencia lingüística. LlSelect [facility]ty [facility] - [facility] [phone].    We comply with applicable federal civil rights laws and [place] laws. We do not discriminate on the basis of race, color, national origin, age, disability, sex, sexual orientation, or gender identity.            Thank you!     Thank you for choosing [facility] THERAP[facility]  for your care. Our goal is always to provide you with excellent care. Hearing back from our patients is one way we can continue to improve our services. Please take a few minutes to complete the written survey that you may receive in the mail after your visit with us. Thank you!             Your Updated Medication List - Protect others around you: Learn how to safely use, store and throw away your medicines at www.disposemymeds.org.          This list is accurate as of 9/25/18 11:31 AM.  Always use your most recent med list.                   Brand Name Dispense Instructions for use Diagnosis    azithromycin 250 MG tablet    ZITHROMAX    6 tablet    Two tablets first day, then one tablet daily for  four days.    Acute bronchitis, unspecified organism, Exposure to Streptococcal pharyngitis       CALCIUM PO      Take 1,000 mg by mouth daily        FLAX SEED OIL PO      Take by mouth daily        levothyroxine 137 MCG tablet    SYNTHROID/LEVOTHROID    90 tablet    Take 1 tablet (137 mcg) by mouth daily    Hypothyroidism, unspecified type, Acquired hypothyroidism       methylPREDNISolone 4 MG tablet    MEDROL DOSEPAK    21 tablet    Follow package instructions    Acute bronchitis, unspecified organism, Exposure to Streptococcal pharyngitis       OMEGA-3 FISH OIL PO      Take 1 g by mouth daily        PARoxetine 20 MG tablet    PAXIL    90 tablet    Take 0.5 tablets (10 mg) by mouth 2 times daily    Generalized anxiety disorder

## 2018-09-25 NOTE — PROGRESS NOTES
Subjective:  HPI                    Objective:  System    Physical Exam    General     ROS    Assessment/Plan:    PROGRESS  REPORT    Progress reporting period is from 7-2-18 to 9-25-18.       SUBJECTIVE    Subjective: Reports she is feeling a little better     Current Pain level: 3/10.      Initial Pain level: 5/10.   Changes in function:  Yes (See Goal flowsheet attached for changes in current functional level)  Adverse reaction to treatment or activity: None    OBJECTIVE  Changes noted in objective findings:  The objective findings below are from DOS 9--25-18.  Objective: Did eliptical 20-30 minand did get sore. Her  retired and she is taking a more gentle yoga class and thinks she is tolerating. Is taking pilates but not using her R LE so she is tolerating that.Walking is still painful in the posterior hip PROM less pain but still + FADIR LEONA . Added fighur 4 and she will try some hip abd being sure not to hurt. Discussed she is still doing things in her workout class that bother hip such as ball slams possily boxing. She needs to pay attention to all things that hurt and try to dc them. see in 3 weeks     ASSESSMENT/PLAN  Updated problem list and treatment plan: Diagnosis 1:  R hip pain  Pain -  manual therapy  Decreased ROM/flexibility - manual therapy and therapeutic exercise  Decreased joint mobility - manual therapy and therapeutic exercise  Decreased strength - therapeutic exercise and therapeutic activities  Decreased function - therapeutic activities  STG/LTGs have been met or progress has been made towards goals:  Yes (See Goal flow sheet completed today.)  Assessment of Progress: The patient's condition is improving.  Self Management Plans:  Patient has been instructed in a home treatment program.  I have re-evaluated this patient and find that the nature, scope, duration and intensity of the therapy is appropriate for the medical condition of the patient.  Marbella continues to require the  following intervention to meet STG and LTG's:  PT    Recommendations:  This patient would benefit from continued therapy.     Frequency:  1 X week,q 3  once daily  Duration:  for 2 months        Please refer to the daily flowsheet for treatment today, total treatment time and time spent performing 1:1 timed codes.

## 2018-10-01 ENCOUNTER — MYC MEDICAL ADVICE (OUTPATIENT)
Dept: FAMILY MEDICINE | Facility: CLINIC | Age: 53
End: 2018-10-01

## 2018-10-01 NOTE — TELEPHONE ENCOUNTER
LS,   Please see below MyChart message   Ok to do via phone visit?  Please advise.  Thanks,  Caroline COLLINS RN

## 2018-10-09 ENCOUNTER — OFFICE VISIT (OUTPATIENT)
Dept: FAMILY MEDICINE | Facility: CLINIC | Age: 53
End: 2018-10-09
Payer: COMMERCIAL

## 2018-10-09 VITALS
BODY MASS INDEX: 36.02 KG/M2 | HEART RATE: 73 BPM | OXYGEN SATURATION: 98 % | DIASTOLIC BLOOD PRESSURE: 75 MMHG | WEIGHT: 211 LBS | SYSTOLIC BLOOD PRESSURE: 137 MMHG | RESPIRATION RATE: 16 BRPM | TEMPERATURE: 97.8 F | HEIGHT: 64 IN

## 2018-10-09 DIAGNOSIS — F40.243 FLYING PHOBIA: Primary | ICD-10-CM

## 2018-10-09 DIAGNOSIS — Z23 ENCOUNTER FOR IMMUNIZATION: ICD-10-CM

## 2018-10-09 PROCEDURE — 99213 OFFICE O/P EST LOW 20 MIN: CPT | Mod: 25 | Performed by: FAMILY MEDICINE

## 2018-10-09 PROCEDURE — 90686 IIV4 VACC NO PRSV 0.5 ML IM: CPT | Performed by: FAMILY MEDICINE

## 2018-10-09 PROCEDURE — 90471 IMMUNIZATION ADMIN: CPT | Performed by: FAMILY MEDICINE

## 2018-10-09 RX ORDER — LORAZEPAM 0.5 MG/1
TABLET ORAL
Qty: 8 TABLET | Refills: 0 | Status: SHIPPED | OUTPATIENT
Start: 2018-10-09 | End: 2020-02-20

## 2018-10-09 NOTE — NURSING NOTE
"Chief Complaint   Patient presents with     Anxiety     flight anxiety     initial /75 (BP Location: Left arm, Cuff Size: Adult Large)  Pulse 73  Temp 97.8  F (36.6  C) (Tympanic)  Resp 16  Ht 5' 3.5\" (1.613 m)  Wt 211 lb (95.7 kg)  LMP 08/28/2015  SpO2 98%  BMI 36.79 kg/m2 Estimated body mass index is 36.79 kg/(m^2) as calculated from the following:    Height as of this encounter: 5' 3.5\" (1.613 m).    Weight as of this encounter: 211 lb (95.7 kg).  BP completed using cuff size: large.  L   arm      Health Maintenance that is potentially due pending provider review:  NONE    n/a    Anthony Barba ma  "

## 2018-10-09 NOTE — PROGRESS NOTES
SUBJECTIVE:   Marbella Hendrix is a 52 year old female who presents to clinic today for the following health issues:      Flight anxiety- her overall anxiety is controlled on the Paxil and she only has issues when traveling by plane , she does semi ok when the fligt is domestic and short but she will be flying to [place] with her  in November and needs something to calm her down during the flight as she gets anxious .Also in spring they will be flying to [place]   She needs a flu shot today         Problem list and histories reviewed & adjusted, as indicated.  Additional history: as documented    Patient Active Problem List   Diagnosis     Health Care Home     Generalized anxiety disorder     Hyperlipidemia LDL goal <160     Acquired hypothyroidism     Overweight     Preventative health care     Acute post-operative pain     Status post hysterectomy     Morbid obesity (H)     Hip pain, right     Past Surgical History:   Procedure Laterality Date     ABDOMEN SURGERY      exploratory after C section for sepsis      SECTION      x 2     HYSTERECTOMY RADICAL  2015     HYSTERECTOMY SUPRACERVICAL N/A 2015    Procedure: HYSTERECTOMY SUPRACERVICAL;  Surgeon: Kelle Wasserman MD;  Location: [facility] OR     LUMPECTOMY BREAST       MYOMECTOMY UTERUS         Social History   Substance Use Topics     Smoking status: Former Smoker     Types: Cigarettes     Quit date: 1990     Smokeless tobacco: Never Used     Alcohol use Yes      Comment: 1-2 drinks a month     Family History   Problem Relation Age of Onset     High cholesterol Father      Cancer Father      CLL     Blood Disease Father      CLL     Breast Cancer Maternal Grandmother      in 80s     Depression Mother      Diabetes Mother      Ulcerative Colitis Mother      Lipids Mother      hypertriglyceridemia         Current Outpatient Prescriptions   Medication Sig Dispense Refill     LORazepam (ATIVAN) 0.5 MG tablet Use one tablet as  needed for flying 8 tablet 0     CALCIUM PO Take 1,000 mg by mouth daily       levothyroxine (SYNTHROID/LEVOTHROID) 137 MCG tablet Take 1 tablet (137 mcg) by mouth daily 90 tablet 2     Omega-3 Fatty Acids (OMEGA-3 FISH OIL PO) Take 1 g by mouth daily       PARoxetine (PAXIL) 20 MG tablet Take 0.5 tablets (10 mg) by mouth 2 times daily 90 tablet 3     No Known Allergies  Recent Labs   Lab Test  06/27/18   0727  08/02/17   1013  07/13/16   0743   08/28/15   0850   11/20/12   0855  10/22/12   2341  07/11/12   0834   A1C  5.5  5.5   --    --    --    --    --    --    --    LDL  159*  163*  158*   --    --    < >  161.0*   --   190*   HDL  29*  36*  33*   --    --    < >  33.0*   --   33*   TRIG  243*  118  162*   --    --    < >  143.0   --   118   ALT   --    --    --    --    --    --   18.0   --   20   CR   --    --    --    --   0.65   --    --   0.77   --    GFRESTIMATED   --    --    --    --   >90  Non  GFR Calc     --    --   81   --    GFRESTBLACK   --    --    --    --   >90   GFR Calc     --    --   >90   --    POTASSIUM   --    --    --    --    --    --    --   3.8   --    TSH  1.05  1.04  1.14   < >   --    < >   --   10.70*  3.13    < > = values in this interval not displayed.      BP Readings from Last 3 Encounters:   10/09/18 137/75   07/02/18 116/76   06/20/18 123/75    Wt Readings from Last 3 Encounters:   10/09/18 211 lb (95.7 kg)   07/02/18 210 lb (95.3 kg)   06/20/18 213 lb 8 oz (96.8 kg)                  Labs reviewed in EPIC    Reviewed and updated as needed this visit by clinical staff  Tobacco       Reviewed and updated as needed this visit by Provider         ROS:  Constitutional, HEENT, cardiovascular, pulmonary, GI, , musculoskeletal, neuro, skin, endocrine and psych systems are negative, except as otherwise noted.    OBJECTIVE:     /75 (BP Location: Left arm, Cuff Size: Adult Large)  Pulse 73  Temp 97.8  F (36.6  C) (Tympanic)  Resp 16  Ht 5'  "3.5\" (1.613 m)  Wt 211 lb (95.7 kg)  LMP 08/28/2015  SpO2 98%  BMI 36.79 kg/m2  Body mass index is 36.79 kg/(m^2).  GENERAL: healthy, alert and no distress  EYES: Eyes grossly normal to inspection, PERRL and conjunctivae and sclerae normal  NECK: no adenopathy, no asymmetry, masses, or scars and thyroid normal to palpation  RESP: lungs clear to auscultation - no rales, rhonchi or wheezes  CV: regular rate and rhythm, normal S1 S2, no S3 or S4, no murmur, click or rub, no peripheral edema and peripheral pulses strong  MS: no gross musculoskeletal defects noted, no edema  NEURO: Normal strength and tone, mentation intact and speech normal    Diagnostic Test Results:  none     ASSESSMENT/PLAN:             1. Flying phobia  We discussed the side effects and risks with this medication, the sedation , so she cannot drive a car , also she potential for addiction, she is aware , she will be using only one pill each flight , total of 8 given for the two different trips , RTC if no improving or worsening.    - LORazepam (ATIVAN) 0.5 MG tablet; Use one tablet as needed for flying  Dispense: 8 tablet; Refill: 0    2. Encounter for immunization  Got her flu shot today   - FLU VAC PRESRV FREE QUAD SPLIT VIR, IM (3+ YRS)  - ADMIN 1st VACCINE    RTC if no improving or worsening.  Pt is aware  and comfortable with the current plan.      Vanessa Ladd MD  [facility]    "

## 2018-10-09 NOTE — MR AVS SNAPSHOT
After Visit Summary   10/9/2018    Marbella Hendrix    MRN: [id number]           Patient Information     Date Of Birth          1965        Visit Information        Provider Department      10/9/2018 2:20 PM Vanessa Ladd MD [facility]        Today's Diagnoses     Flying phobia    -  1    Encounter for immunization           Follow-ups after your visit        Your next 10 appointments already scheduled     Oct 17, 2018  9:30 AM CDT   JODY Extremity with Keiko Kraus PT   [place] [facility] ([facility]  )    [address]  [facility] [facility] [address] [phone]              Who to contact     If you have questions or need follow up information about today's clinic visit or your schedule please contact [facility] directly at [phone].  Normal or non-critical lab and imaging results will be communicated to you by MyChart, letter or phone within 4 business days after the clinic has received the results. If you do not hear from us within 7 days, please contact the clinic through MyChart or phone. If you have a critical or abnormal lab result, we will notify you by phone as soon as possible.  Submit refill requests through [employer] or call your pharmacy and they will forward the refill request to us. Please allow 3 business days for your refill to be completed.          Additional Information About Your Visit        MyChart Information     [employer] gives you secure access to your electronic health record. If you see a primary care provider, you can also send messages to your care team and make appointments. If you have questions, please call your primary care clinic.  If you do not have a primary care provider, please call [phone] and they will assist you.        Care EveryWhere ID     This is your Care EveryWhere ID. This could be used by other organizations to access your [facility]  "records  [id number]        Your Vitals Were     Pulse Temperature Respirations Height Last Period Pulse Oximetry    73 97.8  F (36.6  C) (Tympanic) 16 5' 3.5\" (1.613 m) 08/28/2015 98%    BMI (Body Mass Index)                   36.79 kg/m2            Blood Pressure from Last 3 Encounters:   10/09/18 137/75   07/02/18 116/76   06/20/18 123/75    Weight from Last 3 Encounters:   10/09/18 211 lb (95.7 kg)   07/02/18 210 lb (95.3 kg)   06/20/18 213 lb 8 oz (96.8 kg)              We Performed the Following     ADMIN 1st VACCINE     FLU VAC PRESRV FREE QUAD SPLIT VIR, IM (3+ YRS)          Today's Medication Changes          These changes are accurate as of 10/9/18 11:59 PM.  If you have any questions, ask your nurse or doctor.               Start taking these medicines.        Dose/Directions    LORazepam 0.5 MG tablet   Commonly known as:  ATIVAN   Used for:  Flying phobia   Started by:  Vanessa Ladd MD        Use one tablet as needed for flying   Quantity:  8 tablet   Refills:  0            Where to get your medicines      Some of these will need a paper prescription and others can be bought over the counter.  Ask your nurse if you have questions.     Bring a paper prescription for each of these medications     LORazepam 0.5 MG tablet                Primary Care Provider Office Phone # Fax #    Vanessa Ladd [phone] [address] [address]        Equal Access to Services     [facility] DANY AH: Hadii vanessa ku hadasho Soomaali, waaxda luqadaha, qaybta kaalmada adeegyada, waxay lesley mendiola. So [facility] [phone].    ATENCIÓN: Si habla español, tiene a garcia disposición servicios gratuitos de asistencia lingüística. Llame al [phone].    We comply with applicable federal civil rights laws and [place] laws. We do not discriminate on the basis of race, color, national origin, age, disability, sex, sexual orientation, or gender identity.            Thank " you!     Thank you for choosing [facility]  for your care. Our goal is always to provide you with excellent care. Hearing back from our patients is one way we can continue to improve our services. Please take a few minutes to complete the written survey that you may receive in the mail after your visit with us. Thank you!             Your Updated Medication List - Protect others around you: Learn how to safely use, store and throw away your medicines at www.disposemymeds.org.          This list is accurate as of 10/9/18 11:59 PM.  Always use your most recent med list.                   Brand Name Dispense Instructions for use Diagnosis    CALCIUM PO      Take 1,000 mg by mouth daily        levothyroxine 137 MCG tablet    SYNTHROID/LEVOTHROID    90 tablet    Take 1 tablet (137 mcg) by mouth daily    Hypothyroidism, unspecified type, Acquired hypothyroidism       LORazepam 0.5 MG tablet    ATIVAN    8 tablet    Use one tablet as needed for flying    Flying phobia       OMEGA-3 FISH OIL PO      Take 1 g by mouth daily        PARoxetine 20 MG tablet    PAXIL    90 tablet    Take 0.5 tablets (10 mg) by mouth 2 times daily    Generalized anxiety disorder

## 2018-10-10 NOTE — TELEPHONE ENCOUNTER
LS,  Please see latest message below- ok to update med list/dc items below?  Please advise.  Thanks,  Analia Banks RN

## 2018-10-17 ENCOUNTER — THERAPY VISIT (OUTPATIENT)
Dept: PHYSICAL THERAPY | Facility: CLINIC | Age: 53
End: 2018-10-17
Payer: COMMERCIAL

## 2018-10-17 DIAGNOSIS — M25.551 HIP PAIN, RIGHT: ICD-10-CM

## 2018-10-17 PROCEDURE — 97110 THERAPEUTIC EXERCISES: CPT | Mod: GP | Performed by: PHYSICAL THERAPIST

## 2018-10-17 ASSESSMENT — ACTIVITIES OF DAILY LIVING (ADL)
HOS_ADL_HIGHEST_POTENTIAL_SCORE: 68
WALKING_UP_STEEP_HILLS: SLIGHT DIFFICULTY
WALKING_DOWN_STEEP_HILLS: SLIGHT DIFFICULTY
HEAVY_WORK: MODERATE DIFFICULTY
STEPPING_UP_AND_DOWN_CURBS: NO DIFFICULTY AT ALL
WALKING_INITIALLY: SLIGHT DIFFICULTY
GETTING_INTO_AND_OUT_OF_AN_AVERAGE_CAR: MODERATE DIFFICULTY
LIGHT_TO_MODERATE_WORK: MODERATE DIFFICULTY
PUTTING_ON_SOCKS_AND_SHOES: MODERATE DIFFICULTY
TWISTING/PIVOTING_ON_INVOLVED_LEG: MODERATE DIFFICULTY
HOS_ADL_SCORE(%): 63.24
DEEP_SQUATTING: SLIGHT DIFFICULTY
GOING_UP_1_FLIGHT_OF_STAIRS: SLIGHT DIFFICULTY
HOW_WOULD_YOU_RATE_YOUR_CURRENT_LEVEL_OF_FUNCTION_DURING_YOUR_USUAL_ACTIVITIES_OF_DAILY_LIVING_FROM_0_TO_100_WITH_100_BEING_YOUR_LEVEL_OF_FUNCTION_PRIOR_TO_YOUR_HIP_PROBLEM_AND_0_BEING_THE_INABILITY_TO_PERFORM_ANY_OF_YOUR_USUAL_DAILY_ACTIVITIES?: 45
GETTING_INTO_AND_OUT_OF_A_BATHTUB: SLIGHT DIFFICULTY
SITTING_FOR_15_MINUTES: SLIGHT DIFFICULTY
RECREATIONAL_ACTIVITIES: MODERATE DIFFICULTY
WALKING_APPROXIMATELY_10_MINUTES: SLIGHT DIFFICULTY
STANDING_FOR_15_MINUTES: EXTREME DIFFICULTY
HOS_ADL_COUNT: 17
HOS_ADL_ITEM_SCORE_TOTAL: 43
GOING_DOWN_1_FLIGHT_OF_STAIRS: SLIGHT DIFFICULTY
ROLLING_OVER_IN_BED: MODERATE DIFFICULTY
WALKING_15_MINUTES_OR_GREATER: MODERATE DIFFICULTY

## 2018-10-19 ENCOUNTER — NURSE TRIAGE (OUTPATIENT)
Dept: NURSING | Facility: CLINIC | Age: 53
End: 2018-10-19

## 2018-10-19 NOTE — TELEPHONE ENCOUNTER
Patient says she got Flu shot on Tuesday and now feels sick.  Patient says she has the following symptoms: sore throat, stuffed up, feel ache, no fever.  Reviewed guideline and care advice with caller.  FNA advised to call back with questions or worsening symptoms.  Caller verbalizes understanding.            Additional Information    Negative: [1] Difficulty with breathing or swallowing AND [2] starts within 2 hours after injection    Negative: Difficult to awaken or acting confused (disoriented, slurred speech)    Negative: Unresponsive, passed out, or very weak    Negative: Sounds like a life-threatening emergency to the triager    Negative: Fever > 104 F (40 C)    Negative: [1] Fever > 101 F (38.3 C) AND [2] age > 60    Negative: [1] Fever > 101 F (38.3 C) AND [2] bedridden (e.g., nursing home patient, CVA, chronic illness, recovering from surgery)    Negative: [1] Fever > 100.5 F (38.1 C) AND [2] diabetes mellitus or weak immune system (e.g., HIV positive, cancer chemo, splenectomy, organ transplant, chronic steroids)    Negative: [1] Measles vaccine rash (onset day 6-12) AND [2] purple or blood-colored    Negative: Sounds like a severe, unusual reaction to the triager    Negative: [1] Redness or red streak around the injection site AND [2] begins > 48 hours after shot AND [3] fever    Negative: [1] Redness or red streak around the injection site AND [2] begins > 48 hours after shot AND [3] no fever  (Exception: red area < 1 inch or 2.5 cm wide)    Negative: Fever present > 3 days (72 hours)    Negative: [1] Over 3 days (72 hours) since shot AND [2] redness, swelling or pain getting worse    Negative: [1] Smallpox vaccine and [2] eye pain, eye redness, or rash on eyelids    Negative: [1] Pain, tenderness, or swelling at the injection site AND [2] persists > 3 days    Negative: [1] Measles vaccine rash (onset day 6-12) AND [2] persists > 3 days    Negative: [1] Deep lump follows (in 2 to 8 weeks) Td or TDaP   shot AND [2] becomes tender to the touch    Negative: Immunization needed, questions about    Negative: Injection site reaction to any vaccine  (all triage questions negative)    Negative: [1] Vaccines for travel, questions about AND [2] no current symptoms    Negative: Anthrax vaccine reactions (all triage questions negative)    Negative: Chickenpox (varicella) vaccine reactions  (all triage questions negative)    Negative: Hepatitis A (HAV) vaccine reactions (all triage questions negative)    Negative: Hepatitis B (HBV) vaccine reactions (all triage questions negative)    Negative: Human Papilloma Virus (HPV) vaccine reactions (all triage questions negative)    Negative: H1N1 Influenza (inactivated) injected vaccine reactions  (all triage questions negative)    Negative: H1N1 Influenza (LAIV) intranasal vaccine reactions (all triage questions negative)    Influenza (TIV; Injection) injected vaccine reactions  (all triage questions negative)    Protocols used: IMMUNIZATION REACTIONS-ADULT-AH

## 2018-11-21 ENCOUNTER — THERAPY VISIT (OUTPATIENT)
Dept: PHYSICAL THERAPY | Facility: CLINIC | Age: 53
End: 2018-11-21
Payer: COMMERCIAL

## 2018-11-21 DIAGNOSIS — M25.551 HIP PAIN, RIGHT: ICD-10-CM

## 2018-11-21 PROCEDURE — 97110 THERAPEUTIC EXERCISES: CPT | Mod: GP | Performed by: PHYSICAL THERAPIST

## 2018-11-21 ASSESSMENT — ACTIVITIES OF DAILY LIVING (ADL)
PUTTING_ON_SOCKS_AND_SHOES: MODERATE DIFFICULTY
GOING_DOWN_1_FLIGHT_OF_STAIRS: SLIGHT DIFFICULTY
WALKING_15_MINUTES_OR_GREATER: SLIGHT DIFFICULTY
HOW_WOULD_YOU_RATE_YOUR_CURRENT_LEVEL_OF_FUNCTION_DURING_YOUR_USUAL_ACTIVITIES_OF_DAILY_LIVING_FROM_0_TO_100_WITH_100_BEING_YOUR_LEVEL_OF_FUNCTION_PRIOR_TO_YOUR_HIP_PROBLEM_AND_0_BEING_THE_INABILITY_TO_PERFORM_ANY_OF_YOUR_USUAL_DAILY_ACTIVITIES?: 60
ROLLING_OVER_IN_BED: SLIGHT DIFFICULTY
TWISTING/PIVOTING_ON_INVOLVED_LEG: SLIGHT DIFFICULTY
DEEP_SQUATTING: MODERATE DIFFICULTY
WALKING_INITIALLY: SLIGHT DIFFICULTY
HEAVY_WORK: SLIGHT DIFFICULTY
HOS_ADL_ITEM_SCORE_TOTAL: 48
HOS_ADL_COUNT: 16
GETTING_INTO_AND_OUT_OF_AN_AVERAGE_CAR: SLIGHT DIFFICULTY
WALKING_APPROXIMATELY_10_MINUTES: SLIGHT DIFFICULTY
WALKING_DOWN_STEEP_HILLS: SLIGHT DIFFICULTY
SITTING_FOR_15_MINUTES: SLIGHT DIFFICULTY
STEPPING_UP_AND_DOWN_CURBS: NO DIFFICULTY AT ALL
RECREATIONAL_ACTIVITIES: SLIGHT DIFFICULTY
HOS_ADL_HIGHEST_POTENTIAL_SCORE: 64
WALKING_UP_STEEP_HILLS: SLIGHT DIFFICULTY
LIGHT_TO_MODERATE_WORK: SLIGHT DIFFICULTY
HOS_ADL_SCORE(%): 75
GOING_UP_1_FLIGHT_OF_STAIRS: SLIGHT DIFFICULTY
STANDING_FOR_15_MINUTES: SLIGHT DIFFICULTY

## 2018-11-21 NOTE — LETTER
[facility] ATHLETIC [facility] PHYSICAL [facility]  [address]  [facility] [id number] [phone]    2018    Re: Marbella Hendrix   :  1965  MRN:  [id number]   REFERRING PHYSICIAN:   Vanessa Ladd    [facility]    Date of Initial Evaluation:  18  Visits:  Rxs Used: 8  Reason for Referral:  Hip pain, right      DISCHARGE REPORT  Progress reporting period is from 18 to 18.     SUBJECTIVE  Subjective: Reports she has been traveling and did not work out and hurts less    Current pain level is 2/10.     Initial Pain level: 4/10.   Changes in function:  Yes (See Goal flowsheet attached for changes in current functional level)  Adverse reaction to treatment or activity: None  OBJECTIVE  Changes noted in objective findings:  The objective findings below are from DOS 18.  Objective: Discussed trying swimming  or water aerobics. Since she has been back she went to Yoga and rode stat bike does not think it makes her sore.Worked out with wts on Monday but did not doing anything that irritates her hip.She thinks the work outs she has done since she has been home have not been hurtful. Hip rom still paingul with flexion ER and IR. Pts exs program is going very well and she is having less pain. Discussed returning to [place] for f/u.She will work on her own for now.   ASSESSMENT/PLAN  Updated problem list and treatment plan: Diagnosis 1:  R hip pain    STG/LTGs have been met or progress has been made towards goals:  Yes (See Goal flow sheet completed today.)  Assessment of Progress: The patient's condition is improving.  Self Management Plans:  Patient has been instructed in a home treatment program.  Marbella continues to require the following intervention to meet STG and LTG's:  PT intervention is no longer required to meet STG/LTG.  Recommendations:  This patient is ready to be discharged from therapy and continue their  home treatment program.    Thank you for your referral.    INQUIRIES  Therapist: Keiko Kraus PT, [facility]   [facility] PHYSICAL THERAPY  [address]  [facility] [address]  Phone: [phone]  Fax: [phone]

## 2018-11-21 NOTE — MR AVS SNAPSHOT
After Visit Summary   11/21/2018    Marbella Hendrix    MRN: [id number]           Patient Information     Date Of Birth          1965        Visit Information        Provider Department      11/21/2018 9:30 AM Keiko Kraus PT [place] [facility]        Today's Diagnoses     Hip pain, right           Follow-ups after your visit        Who to contact     If you have questions or need follow up information about today's clinic visit or your schedule please contact [place] [facility] PHYSICAL THERAPY directly at [phone].  Normal or non-critical lab and imaging results will be communicated to you by [employer]com[employer], letter or phone within 4 business days after the clinic has received the results. If you do not hear from us within 7 days, please contact the clinic through [employer] or phone. If you have a critical or abnormal lab result, we will notify you by phone as soon as possible.  Submit refill requests through [employer] or call your pharmacy and they will forward the refill request to us. Please allow 3 business days for your refill to be completed.          Additional Information About Your Visit        MyChart Information     [employer] gives you secure access to your electronic health record. If you see a primary care provider, you can also send messages to your care team and make appointments. If you have questions, please call your primary care clinic.  If you do not have a primary care provider, please call [phone] and they will assist you.        Care EveryWhere ID     This is your Care EveryWhere ID. This could be used by other organizations to access your [facility] medical records  [id number]        Your Vitals Were     Last Period                   08/28/2015            Blood Pressure from Last 3 Encounters:   10/09/18 137/75   07/02/18 116/76   06/20/18 123/75    Weight from Last 3 Encounters:   10/09/18 95.7 kg (211 lb)    07/02/18 95.3 kg (210 lb)   06/20/18 96.8 kg (213 lb 8 oz)              We Performed the Following     JODY PROGRESS NOTES REPORT     THERAPEUTIC EXERCISES        Primary Care Provider Office Phone # Fax #    Vanessa Ladd [phone] [address]   [facility] [address]        Equal Access to Services     [facility] : Hadii aad ku hadasho Soomaali, waaxda luqadaha, qaybta kaalmada adeegyada, waxay idiin hayaan adeeg khorlinsh la'aan . So [facility] [phone].    ATENCIÓN: Si habla esppriyanka, tiene a garcia disposición servicios gratuitos de asistencia lingüística. Solis al [phone].    We comply with applicable federal civil rights laws and [place] laws. We do not discriminate on the basis of race, color, national origin, age, disability, sex, sexual orientation, or gender identity.            Thank you!     Thank you for [facility] PHYSICAL THERAPY  for your care. Our goal is always to provide you with excellent care. Hearing back from our patients is one way we can continue to improve our services. Please take a few minutes to complete the written survey that you may receive in the mail after your visit with us. Thank you!             Your Updated Medication List - Protect others around you: Learn how to safely use, store and throw away your medicines at www.disposemymeds.org.          This list is accurate as of 11/21/18 12:44 PM.  Always use your most recent med list.                   Brand Name Dispense Instructions for use Diagnosis    CALCIUM PO      Take 1,000 mg by mouth daily        levothyroxine 137 MCG tablet    SYNTHROID/LEVOTHROID    90 tablet    Take 1 tablet (137 mcg) by mouth daily    Hypothyroidism, unspecified type, Acquired hypothyroidism       LORazepam 0.5 MG tablet    ATIVAN    8 tablet    Use one tablet as needed for flying    Flying phobia       OMEGA-3 FISH OIL PO      Take 1 g by mouth daily        PARoxetine 20 MG  tablet    PAXIL    90 tablet    Take 0.5 tablets (10 mg) by mouth 2 times daily    Generalized anxiety disorder

## 2018-11-21 NOTE — PROGRESS NOTES
Subjective:  HPI                    Objective:  System    Physical Exam    General     ROS    Assessment/Plan:    DISCHARGE REPORT    Progress reporting period is from 7-2-18 to 11-21-18.       SUBJECTIVE    Subjective: Reports she has been traveling and did not work out and hurts less    Current pain level is 2/10  .      Initial Pain level: 4/10.   Changes in function:  Yes (See Goal flowsheet attached for changes in current functional level)  Adverse reaction to treatment or activity: None    OBJECTIVE  Changes noted in objective findings:  The objective findings below are from DOS 11-21-18.  Objective: Discussed trying swimming  or water aerobics. Since she has been back she went to Yoga and rode stat bike does not think it makes her sore.Worked out with wts on Monday but did not doing anything that irritates her hip.She thinks the work outs she has done since she has been home have not been hurtful. Hip rom still paingul with flexion ER and IR. Pts exs program is going very well and she is having less pain. Discussed returning to [place] for f/u.She will work on her own for now.     ASSESSMENT/PLAN  Updated problem list and treatment plan: Diagnosis 1:  R hip pain    STG/LTGs have been met or progress has been made towards goals:  Yes (See Goal flow sheet completed today.)  Assessment of Progress: The patient's condition is improving.  Self Management Plans:  Patient has been instructed in a home treatment program.    Marbella continues to require the following intervention to meet STG and LTG's:  PT intervention is no longer required to meet STG/LTG.    Recommendations:  This patient is ready to be discharged from therapy and continue their home treatment program.    Please refer to the daily flowsheet for treatment today, total treatment time and time spent performing 1:1 timed codes.

## 2018-12-10 ENCOUNTER — MYC MEDICAL ADVICE (OUTPATIENT)
Dept: FAMILY MEDICINE | Facility: CLINIC | Age: 53
End: 2018-12-10

## 2018-12-10 DIAGNOSIS — M25.551 HIP PAIN, RIGHT: Primary | ICD-10-CM

## 2018-12-11 ENCOUNTER — MYC MEDICAL ADVICE (OUTPATIENT)
Dept: FAMILY MEDICINE | Facility: CLINIC | Age: 53
End: 2018-12-11

## 2018-12-11 NOTE — TELEPHONE ENCOUNTER
FUTURE VISIT INFORMATION      FUTURE VISIT INFORMATION:    Date: 12/12/18    Time:     Location: [facility]  REFERRAL INFORMATION:    Referring provider:  Dr. Ladd    Referring providers clinic:      Reason for visit/diagnosis  Right hip pain // per pt // Dr. Ladd at  referring // recds internal    RECORDS REQUESTED FROM:       Clinic name Comments Records Status Imaging Status     internal

## 2018-12-11 NOTE — TELEPHONE ENCOUNTER
LS  Please see [employer] message below.  Do you want me to cancel patient's appointment for tomorrow?  Mikaela Niño RN

## 2018-12-12 ENCOUNTER — ANCILLARY PROCEDURE (OUTPATIENT)
Dept: MAMMOGRAPHY | Facility: CLINIC | Age: 53
End: 2018-12-12
Attending: FAMILY MEDICINE
Payer: COMMERCIAL

## 2018-12-12 ENCOUNTER — PRE VISIT (OUTPATIENT)
Dept: ORTHOPEDICS | Facility: CLINIC | Age: 53
End: 2018-12-12

## 2018-12-12 DIAGNOSIS — Z12.31 VISIT FOR SCREENING MAMMOGRAM: ICD-10-CM

## 2018-12-18 ENCOUNTER — ANCILLARY PROCEDURE (OUTPATIENT)
Dept: GENERAL RADIOLOGY | Facility: CLINIC | Age: 53
End: 2018-12-18
Attending: ORTHOPAEDIC SURGERY
Payer: COMMERCIAL

## 2018-12-18 ENCOUNTER — OFFICE VISIT (OUTPATIENT)
Dept: ORTHOPEDICS | Facility: CLINIC | Age: 53
End: 2018-12-18
Payer: COMMERCIAL

## 2018-12-18 VITALS
HEIGHT: 64 IN | BODY MASS INDEX: 36.02 KG/M2 | OXYGEN SATURATION: 98 % | SYSTOLIC BLOOD PRESSURE: 133 MMHG | DIASTOLIC BLOOD PRESSURE: 85 MMHG | WEIGHT: 211 LBS | HEART RATE: 87 BPM

## 2018-12-18 DIAGNOSIS — M25.551 RIGHT HIP PAIN: ICD-10-CM

## 2018-12-18 DIAGNOSIS — M16.11 PRIMARY OSTEOARTHRITIS OF RIGHT HIP: Primary | ICD-10-CM

## 2018-12-18 PROCEDURE — 99203 OFFICE O/P NEW LOW 30 MIN: CPT | Performed by: ORTHOPAEDIC SURGERY

## 2018-12-18 PROCEDURE — 73502 X-RAY EXAM HIP UNI 2-3 VIEWS: CPT

## 2018-12-18 ASSESSMENT — MIFFLIN-ST. JEOR: SCORE: 1544.15

## 2018-12-18 NOTE — LETTER
"    12/18/2018         RE: Marbella Hendrix  [address]  [facility] [address]        Dear Colleague,    Thank you for referring your patient, Marbella Hendrix, to the [facility]. Please see a copy of my visit note below.    SUBJECTIVE:   Marbella Hendrix is a 53 year old female who is seen in consult at the request of Dr. Vanessa Ladd, for evaluation of right hip pain that has been present approximately 1.5 years. No known injury. She has an MRI scheduled for next week.     Present symptoms: Right groin pain, radiating around to the back, difficulty doing figure 4 position.   Symptoms occur when: Constant, worse with walking, exercising, sometimes keeps her awake at night    Treatments tried to this point: NSAIDs, Physical Therapy and accupuncture. PT exercises helped it feel better, \"chill it out\".     Orthopedic PMH: History of back problems    Review of Systems:  Constitutional:  NEGATIVE for fever, chills, change in weight  Integumentary/Skin:  NEGATIVE for worrisome rashes, moles or lesions  Eyes:  NEGATIVE for vision changes or irritation  ENT/Mouth:  NEGATIVE for ear, mouth and throat problems  Resp:  NEGATIVE for significant cough or SOB  Breast:  NEGATIVE for masses, tenderness or discharge  CV:  NEGATIVE for chest pain, palpitations or peripheral edema  GI:  NEGATIVE for nausea, abdominal pain, heartburn, or change in bowel habits  :  Negative   Musculoskeletal:  See HPI above  Neuro:  NEGATIVE for weakness, dizziness or paresthesias  Endocrine:  NEGATIVE for temperature intolerance, skin/hair changes  Heme/allergy/immune:  NEGATIVE for bleeding problems  Psychiatric:  NEGATIVE for changes in mood or affect    Past Medical History:   Past Medical History:   Diagnosis Date     Heavy menstrual period      Leiomyoma of uterus     s/p myomectomy     PONV (postoperative nausea and vomiting)      Vesico-ureteral reflux     s/p repair     Past Surgical History:   Past Surgical " "History:   Procedure Laterality Date     ABDOMEN SURGERY      exploratory after C section for sepsis      SECTION      x 2     HYSTERECTOMY RADICAL  2015     HYSTERECTOMY SUPRACERVICAL N/A 2015    Procedure: HYSTERECTOMY SUPRACERVICAL;  Surgeon: Kelle Wasserman MD;  Location: [facility] OR     LUMPECTOMY BREAST       MYOMECTOMY UTERUS       Family History:   Family History   Problem Relation Age of Onset     High cholesterol Father      Cancer Father         CLL     Blood Disease Father         CLL     Breast Cancer Maternal Grandmother         in 80s     Depression Mother      Diabetes Mother      Ulcerative Colitis Mother      Lipids Mother         hypertriglyceridemia     Social History:   Social History     Tobacco Use     Smoking status: Former Smoker     Types: Cigarettes     Last attempt to quit: 1990     Years since quittin.9     Smokeless tobacco: Never Used   Substance Use Topics     Alcohol use: Yes     Comment: 1-2 drinks a month     OBJECTIVE:  Physical Exam:  /85 (BP Location: Left arm, Patient Position: Sitting, Cuff Size: Adult Regular)   Pulse 87   Ht 1.613 m (5' 3.5\")   Wt 95.7 kg (211 lb)   LMP 2015   SpO2 98%   BMI 36.79 kg/m     General Appearance: healthy, alert and no distress, overweight  Skin: no suspicious lesions or rashes  Neuro: Normal strength and tone, mentation intact and speech normal  Vascular: good pulses, and cappillary refill   Lymph: no lymphadenopathy   Psych:  mentation appears normal and affect normal/bright  Resp: no increased work of breathing     Right Hip Exam:  Lumbar range of motion:    Flexion: to touch the floor   Extension: Full   Side bending: Full  Gait: Normal  Supine SLR: negative     Tender: anterior hip, greater trochanter, sciatic notch without radiation  Hip range of motion:    Flexion: Full degrees, with slight flexion contracture on right side   Internal Rotation: 25* degrees   External Rotation: 25* " degrees   Abduction: 30 degrees  Strength: flexion: good, abduction: good    X-rays:  Obtained 12/18/18 of the RIGHT HIP: 3-views, reviewed in the office with the patient by myself today and show moderate hip joint space narrowing, possible subchondral cyst in the acetabulum.       ASSESSMENT:   1. Moderate OA of RIGHT hip    PLAN:   Discussed conservative measures, steroid injections, stronger anti-inflammatory medication, joint supplements, dietary changes, low impact exercises. Discussed hip arthroplasty as a future option, but her OA is not severe enough at this time to warrant. Possible that she will need in the future.     Return to clinic: as needed.     AYDIN Meyers MD  Dept. Orthopedic Surgery  [facility]     This document serves as a record of the services and decisions personally performed and made by Dr. AYDIN Meyers MD. It was created on his behalf by Michelle Wood, a trained medical scribe. The creation of this record is based on the provider's personal observations and the statements of the patient. This document has been checked and approved by the attending provider.   Michelle Wood December 18, 2018 4:55 PM      Again, thank you for allowing me to participate in the care of your patient.        Sincerely,        Miki Meyers MD

## 2018-12-18 NOTE — PROGRESS NOTES
"SUBJECTIVE:   Marbella Hendrix is a 53 year old female who is seen in consult at the request of Dr. Vanessa Ladd, for evaluation of right hip pain that has been present approximately 1.5 years. No known injury. She has an MRI scheduled for next week.     Present symptoms: Right groin pain, radiating around to the back, difficulty doing figure 4 position.   Symptoms occur when: Constant, worse with walking, exercising, sometimes keeps her awake at night    Treatments tried to this point: NSAIDs, Physical Therapy and accupuncture. PT exercises helped it feel better, \"chill it out\".     Orthopedic PMH: History of back problems    Review of Systems:  Constitutional:  NEGATIVE for fever, chills, change in weight  Integumentary/Skin:  NEGATIVE for worrisome rashes, moles or lesions  Eyes:  NEGATIVE for vision changes or irritation  ENT/Mouth:  NEGATIVE for ear, mouth and throat problems  Resp:  NEGATIVE for significant cough or SOB  Breast:  NEGATIVE for masses, tenderness or discharge  CV:  NEGATIVE for chest pain, palpitations or peripheral edema  GI:  NEGATIVE for nausea, abdominal pain, heartburn, or change in bowel habits  :  Negative   Musculoskeletal:  See HPI above  Neuro:  NEGATIVE for weakness, dizziness or paresthesias  Endocrine:  NEGATIVE for temperature intolerance, skin/hair changes  Heme/allergy/immune:  NEGATIVE for bleeding problems  Psychiatric:  NEGATIVE for changes in mood or affect    Past Medical History:   Past Medical History:   Diagnosis Date     Heavy menstrual period      Leiomyoma of uterus     s/p myomectomy     PONV (postoperative nausea and vomiting)      Vesico-ureteral reflux     s/p repair     Past Surgical History:   Past Surgical History:   Procedure Laterality Date     ABDOMEN SURGERY      exploratory after C section for sepsis      SECTION      x 2     HYSTERECTOMY RADICAL  2015     HYSTERECTOMY SUPRACERVICAL N/A 2015    Procedure: HYSTERECTOMY SUPRACERVICAL;  " "Surgeon: Kelle Wasserman MD;  Location: [facility]     LUMPECTOMY BREAST       MYOMECTOMY UTERUS       Family History:   Family History   Problem Relation Age of Onset     High cholesterol Father      Cancer Father         CLL     Blood Disease Father         CLL     Breast Cancer Maternal Grandmother         in 80s     Depression Mother      Diabetes Mother      Ulcerative Colitis Mother      Lipids Mother         hypertriglyceridemia     Social History:   Social History     Tobacco Use     Smoking status: Former Smoker     Types: Cigarettes     Last attempt to quit: 1990     Years since quittin.9     Smokeless tobacco: Never Used   Substance Use Topics     Alcohol use: Yes     Comment: 1-2 drinks a month     OBJECTIVE:  Physical Exam:  /85 (BP Location: Left arm, Patient Position: Sitting, Cuff Size: Adult Regular)   Pulse 87   Ht 1.613 m (5' 3.5\")   Wt 95.7 kg (211 lb)   LMP 2015   SpO2 98%   BMI 36.79 kg/m    General Appearance: healthy, alert and no distress, overweight  Skin: no suspicious lesions or rashes  Neuro: Normal strength and tone, mentation intact and speech normal  Vascular: good pulses, and cappillary refill   Lymph: no lymphadenopathy   Psych:  mentation appears normal and affect normal/bright  Resp: no increased work of breathing     Right Hip Exam:  Lumbar range of motion:    Flexion: to touch the floor   Extension: Full   Side bending: Full  Gait: Normal  Supine SLR: negative     Tender: anterior hip, greater trochanter, sciatic notch without radiation  Hip range of motion:    Flexion: Full degrees, with slight flexion contracture on right side   Internal Rotation: 25* degrees   External Rotation: 25* degrees   Abduction: 30 degrees  Strength: flexion: good, abduction: good    X-rays:  Obtained 18 of the RIGHT HIP: 3-views, reviewed in the office with the patient by myself today and show moderate hip joint space narrowing, possible subchondral cyst in " the acetabulum.       ASSESSMENT:   1. Moderate OA of RIGHT hip    PLAN:   Discussed conservative measures, steroid injections, stronger anti-inflammatory medication, joint supplements, dietary changes, low impact exercises. Discussed hip arthroplasty as a future option, but her OA is not severe enough at this time to warrant. Possible that she will need in the future.     Return to clinic: as needed.     AYDIN Meyers MD  Dept. Orthopedic Surgery  [facility]     This document serves as a record of the services and decisions personally performed and made by Dr. AYDIN Meyers MD. It was created on his behalf by Michelle Wood, a trained medical scribe. The creation of this record is based on the provider's personal observations and the statements of the patient. This document has been checked and approved by the attending provider.   Michelle Wood December 18, 2018 4:55 PM

## 2018-12-18 NOTE — PATIENT INSTRUCTIONS
Non-surgical treatment for Hip arthritis includes:    * rest.  For acute flares. (Periodic).    * Activity modification - avoid impact activities or activities that aggravate symptoms.   Keeping joints moving may be the most important aspect of joint health.    * Tylenol as needed for pain, consider Tylenol arthritis or similar.  (no more than 3000mg of acetaminophen in a 24 hour period)    * NSAIDS (non-steroidal anti-inflammatory medications; e.g. Aleve, advil, motrin, ibuprofen, etc) - regular use for inflammation ( twice daily or three times daily), with food, as long as there are no contra-indications to using these medications. (if you have stomach ulcers, reflux, or kidney dysfunction, you should talk to your primary care provider to discuss whether these medications are right for your).  Please discuss other concerns with your primary care doctor if needed.    *Glucosamine-Chondroitin (1500 mg per day. Available over the counter)  has not been proven to help arthritis, yet some patients claim that it does help them with their arthritis pain.    * Physical Therapy for strengthening, stretching and range of motion exercises of legs    * Weight loss: Weight loss:  Your body mass index is Body mass index is 36.79 kg/m .. A healthy BMI is below 25.  Weight loss benefits, not only the current pain symptoms, but also overall health. Recommend a good diet plan that works for the patient, with the assistance of a dietician or primary care doctor as needed. Also, a good, low-impact exercise program for at least 20 minutes per day, 3 times per week, such as exercise bike, elliptical , or pool.    *DIET:  Although there is no set diet that will eliminate/cure arthritis, there are some foods that may help inflammation, which is the cause of the pain in arthritis, such as concentrated cherry juice, Tumeric, and Fish Oil.      Check out the Arthritis Foundation website for further diet suggestions to potentially  reduce inflammation and pain:    http://www.arthritis.org/living-with-arthritis/arthritis-diet/best-foods-for-arthritis/    * Exercise: low impact such as stationary bike, elliptical, pool.  Some Knee exercises can be found at the Orthoinfo website:  https://orthoinfo.aaos.org/en/staying-healthy/knee-exercises/     * Injections: cortisone,(steroid)  *  * over the counter supplements such as glucosamine-chondroitin sulfate may help with joint pain.  * topical ointments may help as well with pain    *Accupuncture may be helpful to control the pain.    *Surgical options include arthroscopy, osteotomy (correcting the alignment of the bone by cutting it), biologic resurfacing, cadaver partial transplant, partial or total knee replacement.  This should be discussed with Dr. Meyers.                Non-surgical treatment for knee arthritis includes:    * rest.  For acute flares. (Periodic).    * Activity modification - avoid impact activities or activities that aggravate symptoms.   Keeping joints moving may be the most important aspect of joint health.    * Tylenol as needed for pain, consider Tylenol arthritis or similar.  (no more than 3000mg of acetaminophen in a 24 hour period)    * NSAIDS (non-steroidal anti-inflammatory medications; e.g. Aleve, advil, motrin, ibuprofen, etc) - regular use for inflammation ( twice daily or three times daily), with food, as long as there are no contra-indications to using these medications. (if you have stomach ulcers, reflux, or kidney dysfunction, you should talk to your primary care provider to discuss whether these medications are right for your).  Please discuss other concerns with your primary care doctor if needed.    *Glucosamine-Chondroitin (1500 mg per day. Available over the counter)  has not been proven to help arthritis, yet some patients claim that it does help them with their arthritis pain.    * ice, 15-20 minutes at a time several times a day or as needed if there is  "swelling, or after an acute injury.  * Strengthening of quadriceps muscles  * Physical Therapy for strengthening, stretching and range of motion exercises of legs      * Weight loss: Weight loss:  Your body mass index is Body mass index is 36.79 kg/m .. A healthy BMI is below 25.  Weight loss benefits, not only the current pain symptoms, but also overall health. Recommend a good diet plan that works for the patient, with the assistance of a dietician or primary care doctor as needed. Also, a good, low-impact exercise program for at least 20 minutes per day, 3 times per week, such as exercise bike, elliptical , or pool.    *DIET:  Although there is no set diet that will eliminate/cure arthritis, there are some foods that may help inflammation, which is the cause of the pain in arthritis, such as concentrated cherry juice, Tumeric, and Fish Oil.      Check out the Arthritis Foundation website for further diet suggestions to potentially reduce inflammation and pain:    http://www.arthritis.org/living-with-arthritis/arthritis-diet/best-foods-for-arthritis/    * Exercise: low impact such as stationary bike, elliptical, pool.  Some Knee exercises can be found at the Orthoinfo website:  https://orthoinfo.aaos.org/en/staying-healthy/knee-exercises/     * Injections: cortisone, versus viscosupplementation (hyaluronic acid, \"rooster comb\", \"gel shots\")  * Bracing: bracing the knee may offer some relief of symptoms when worn and provide some stability.  These can be over the counter, or custom-made \"\" braces that take the pressure off of the worn portion of the knee.  * over the counter supplements such as glucosamine-chondroitin sulfate may help with joint pain.  * topical ointments may help as well with pain    *Accupuncture may be helpful to control the pain.    *Surgical options include arthroscopy, osteotomy (correcting the alignment of the bone by cutting it), biologic resurfacing, cadaver partial " transplant, partial or total knee replacement.  This should be discussed with Dr. Meyers.

## 2018-12-26 ENCOUNTER — ANCILLARY PROCEDURE (OUTPATIENT)
Dept: MRI IMAGING | Facility: CLINIC | Age: 53
End: 2018-12-26
Attending: FAMILY MEDICINE
Payer: COMMERCIAL

## 2018-12-26 DIAGNOSIS — M25.551 HIP PAIN, RIGHT: ICD-10-CM

## 2019-04-17 ENCOUNTER — TELEPHONE (OUTPATIENT)
Dept: FAMILY MEDICINE | Facility: CLINIC | Age: 54
End: 2019-04-17

## 2019-04-17 DIAGNOSIS — Z83.3 FAMILY HISTORY OF DIABETES MELLITUS: ICD-10-CM

## 2019-04-17 DIAGNOSIS — Z00.00 ENCOUNTER FOR ROUTINE ADULT HEALTH EXAMINATION WITHOUT ABNORMAL FINDINGS: Primary | ICD-10-CM

## 2019-04-17 NOTE — TELEPHONE ENCOUNTER
I have placed the order for screening labs , can you tell her those are fasting but we might need to add other labs at the time of the appointment   Thanks

## 2019-04-17 NOTE — TELEPHONE ENCOUNTER
Reason for Call: Request for an order or referral:    Order or referral being requested:   Orders for annual lab work    Date needed: before my next appointment    Has the patient been seen by the PCP for this problem? NO    Additional comments: Pt know that since she hasn't has a physical since 2017 that the doctor might not put these in beforehand. She would like a call either way so she is aware.     Phone number Patient can be reached at:  Cell number on file:    Telephone Information:   Mobile [phone]     Best Time:  Any    Can we leave a detailed message on this number?  YES    Call taken on 4/17/2019 at 10:00 AM by Carina Magana

## 2019-04-17 NOTE — TELEPHONE ENCOUNTER
LVM - labs were ordered, come fasting 10-12 hours black coffee and water ok.    [facility] back [facility] lab only appointment.    Nidia Juarez RN

## 2019-04-17 NOTE — TELEPHONE ENCOUNTER
LS,   Patient has future physical scheduled with you  Would like pre appt labs  Please advise  Thanks,  Caroline COLLINS RN    Next 5 appointments (look out 90 days)    Jun 12, 2019  9:40 AM CDT  PHYSICAL with Vanessa Ladd MD  [facility] ([facility]) [address] [address]  [phone]

## 2019-06-02 DIAGNOSIS — E03.9 ACQUIRED HYPOTHYROIDISM: ICD-10-CM

## 2019-06-02 DIAGNOSIS — E03.9 HYPOTHYROIDISM, UNSPECIFIED TYPE: ICD-10-CM

## 2019-06-03 RX ORDER — LEVOTHYROXINE SODIUM 137 UG/1
TABLET ORAL
Qty: 30 TABLET | Refills: 0 | Status: SHIPPED | OUTPATIENT
Start: 2019-06-03 | End: 2019-09-05 | Stop reason: DRUGHIGH

## 2019-06-03 NOTE — TELEPHONE ENCOUNTER
"Medication is being filled for 1 time refill only due to:  upcoming appt   Caroline COLLINS RN    Last Written Prescription Date:  6/21/2018  Last Fill Quantity: 90,  # refills: 2   Last office visit: 10/9/2018 with prescribing provider:     Future Office Visit:   Next 5 appointments (look out 90 days)    Jun 11, 2019 11:00 AM CDT  Return Visit with Miki Meyers MD  [facility] ([facility] [address] [facility] [address]  [phone]   Jun 12, 2019  9:40 AM CDT  PHYSICAL with Vanessa Ladd MD  [facility] ([facility] [address] [facility] [address] [phone]         Requested Prescriptions   Pending Prescriptions Disp Refills     levothyroxine (SYNTHROID/LEVOTHROID) 137 MCG tablet [Pharmacy Med Name: LEVOTHYROXINE SODIUM 137MCG TABS] 90 tablet 2     Sig: TAKE ONE TABLET BY MOUTH EVERY DAY       Thyroid Protocol Passed - 6/2/2019  8:29 AM        Passed - Patient is 12 years or older        Passed - Recent (12 mo) or future (30 days) visit within the authorizing provider's specialty     Patient had office visit in the last 12 months or has a visit in the next 30 days with authorizing provider or within the authorizing provider's specialty.  See \"Patient Info\" tab in inbasket, or \"Choose Columns\" in Meds & Orders section of the refill encounter.              Passed - Medication is active on med list        Passed - Normal TSH on file in past 12 months     Recent Labs   Lab Test 06/27/18  0727   TSH 1.05              Passed - No active pregnancy on record     If patient is pregnant or has had a positive pregnancy test, please check TSH.          Passed - No positive pregnancy test in past 12 months     If patient is pregnant or has had a positive pregnancy test, please check TSH.            "

## 2019-06-11 ENCOUNTER — OFFICE VISIT (OUTPATIENT)
Dept: ORTHOPEDICS | Facility: CLINIC | Age: 54
End: 2019-06-11
Payer: COMMERCIAL

## 2019-06-11 VITALS
HEIGHT: 64 IN | HEART RATE: 92 BPM | SYSTOLIC BLOOD PRESSURE: 120 MMHG | WEIGHT: 211 LBS | BODY MASS INDEX: 36.02 KG/M2 | DIASTOLIC BLOOD PRESSURE: 71 MMHG | OXYGEN SATURATION: 97 %

## 2019-06-11 DIAGNOSIS — M16.11 PRIMARY OSTEOARTHRITIS OF RIGHT HIP: Primary | ICD-10-CM

## 2019-06-11 PROCEDURE — 99214 OFFICE O/P EST MOD 30 MIN: CPT | Performed by: ORTHOPAEDIC SURGERY

## 2019-06-11 ASSESSMENT — MIFFLIN-ST. JEOR: SCORE: 1544.15

## 2019-06-11 NOTE — LETTER
"    6/11/2019         RE: Marbella Hendrix  [address]  [place] [address]        Dear Colleague,    Thank you for referring your patient, Marbella Hendrix, to the [facility]. Please see a copy of my visit note below.    SUBJECTIVE:   Marbella Hendrix is here in follow up of Right Hip Osteoarthritis. She is accompanied by her ,  Stephania. She would like to discuss having a hip replacement. She lives an active lifestyle, and is unable to do many things that she enjoys, such as running, yoga, exercising. By the end of the day she is in a large amount of pain.     Present symptoms: right hip pain radiating into the groin, worse at the end of the day. The pain in the hip wakes her up from sleep a few times a month. Pain is worse now than it was last year.   Treatments tried to this point: physical therapy, NSAIDs, acupuncture.     Review of Systems:  Constitutional/General: Negative for fever, chills, change in weight  Integumentary/Skin: Negative for worrisome rashes, moles, or lesions  Neuro: Negative for weakness, dizziness, or paresthesias   Psychiatric: negative for changes in mood or affect    This document serves as a record of the services and decisions personally performed and made by Dr. AYDIN Meyers MD. It was created on his behalf by Michelle Wood, a trained medical scribe. The creation of this record is based on the provider's personal observations and the statements of the patient.  Michelle Wood June 11, 2019 2:12 PM    OBJECTIVE:  Physical Exam:  /71 (BP Location: Left arm, Patient Position: Sitting, Cuff Size: Adult Regular)   Pulse 92   Ht 1.613 m (5' 3.5\")   Wt 95.7 kg (211 lb)   LMP 08/28/2015   SpO2 97%   BMI 36.79 kg/m     General Appearance: healthy, alert and no distress   Skin: no suspicious lesions or rashes  Neuro: Normal strength and tone, mentation intact and speech normal  Vascular: good pulses, and capillary refill   Lymph: no " lymphadenopathy   Psych:  mentation appears normal and affect normal/bright  Resp: no increased work of breathing    Right Hip Exam:  Tender: right greater trochanter  Hip ranonge of motion:    Flexion: lacking 20 degrees, with no flexion contracture   Internal Rotation: 20* degrees   External Rotation: 40 degrees   Abduction: 45 degrees  Strength: abduction: 5/5  Leg lengths: roughly equal  Special tests:  not done    Xray:  Obtained 12/18/18 of the Right Hip: 2-3-views, reviewed in the office with the patient by myself today and show:   Moderate loss of joint space. Minimal acetabular spurring.There is no acute fracture. No dislocation. There are no worrisome bony lesions.    MRI:  From 12/26/18 of the Right Hip showed:  1. Severe right hip joint osteoarthrosis with extensive subchondral  cystic changes, anterosuperior labral tearing, area of full-thickness  chondral loss and likely reactive/stress edema of the acetabulum.  2. Query trochanteric bursitis.  3. Nonspecific, subcentimeter cystic nodule deep to IT band. This is  of uncertain etiology. Follow up imaging for stability along with IV  contrast to confirm pure cystic nature of this finding may be helpful.    ASSESSMENT:   1. Right Hip osteoarthritis    PLAN:   We talked about the patient's condition and diagnosis. I discussed the treatment options with the patient and her , Dr. Cat, including steroid injection into the hip joint, and total hip arthroplasty.  I've talked in depth about the procedure and the risks, which include, but are not limited to blood loss requiring transfusion, infection, neurovascular injury, DVT, PE, pain, (both perioperative and persistent post-recovery pain), dislocation, leg length discrepancy, intra-operative fracture, potential anesthetic and perioperative medical complications, and the possibility of needing additional procedures. We also talked about recovery time, which differs from patient to patient.  We  discussed the different surgical approaches, and their pros and cons.     Length of visit was 26 minutes with more than 50 percent of that time used for discussing medical concerns and education    Return to clinic: as needed    The information in this document, created by the medical scribe for me, accurately reflects the services I personally performed and the decisions made by me. I have reviewed and approved this document for accuracy.     AYDIN Meyers MD  Dept. Orthopedic Surgery  [facility]       Again, thank you for allowing me to participate in the care of your patient.        Sincerely,        Miki Meyers MD

## 2019-06-11 NOTE — PROGRESS NOTES
"SUBJECTIVE:   Marbella Hendrix is here in follow up of Right Hip Osteoarthritis. She is accompanied by her , Dr. Cat. She would like to discuss having a hip replacement. She lives an active lifestyle, and is unable to do many things that she enjoys, such as running, yoga, exercising. By the end of the day she is in a large amount of pain.     Present symptoms: right hip pain radiating into the groin, worse at the end of the day. The pain in the hip wakes her up from sleep a few times a month. Pain is worse now than it was last year.   Treatments tried to this point: physical therapy, NSAIDs, acupuncture.     Review of Systems:  Constitutional/General: Negative for fever, chills, change in weight  Integumentary/Skin: Negative for worrisome rashes, moles, or lesions  Neuro: Negative for weakness, dizziness, or paresthesias   Psychiatric: negative for changes in mood or affect    This document serves as a record of the services and decisions personally performed and made by Dr. AYDIN Meyers MD. It was created on his behalf by Michelle Wood, a trained medical scribe. The creation of this record is based on the provider's personal observations and the statements of the patient.  Michelle Wood June 11, 2019 2:12 PM    OBJECTIVE:  Physical Exam:  /71 (BP Location: Left arm, Patient Position: Sitting, Cuff Size: Adult Regular)   Pulse 92   Ht 1.613 m (5' 3.5\")   Wt 95.7 kg (211 lb)   LMP 08/28/2015   SpO2 97%   BMI 36.79 kg/m    General Appearance: healthy, alert and no distress   Skin: no suspicious lesions or rashes  Neuro: Normal strength and tone, mentation intact and speech normal  Vascular: good pulses, and capillary refill   Lymph: no lymphadenopathy   Psych:  mentation appears normal and affect normal/bright  Resp: no increased work of breathing    Right Hip Exam:  Tender: right greater trochanter  Hip ranonge of motion:    Flexion: lacking 20 degrees, with no flexion contracture   Internal " Rotation: 20* degrees   External Rotation: 40 degrees   Abduction: 45 degrees  Strength: abduction: 5/5  Leg lengths: roughly equal  Special tests:  not done    Xray:  Obtained 12/18/18 of the Right Hip: 2-3-views, reviewed in the office with the patient by myself today and show:   Moderate loss of joint space. Minimal acetabular spurring.There is no acute fracture. No dislocation. There are no worrisome bony lesions.    MRI:  From 12/26/18 of the Right Hip showed:  1. Severe right hip joint osteoarthrosis with extensive subchondral  cystic changes, anterosuperior labral tearing, area of full-thickness  chondral loss and likely reactive/stress edema of the acetabulum.  2. Query trochanteric bursitis.  3. Nonspecific, subcentimeter cystic nodule deep to IT band. This is  of uncertain etiology. Follow up imaging for stability along with IV  contrast to confirm pure cystic nature of this finding may be helpful.    ASSESSMENT:   1. Right Hip osteoarthritis    PLAN:   We talked about the patient's condition and diagnosis. I discussed the treatment options with the patient and her , Dr. Cat, including steroid injection into the hip joint, and total hip arthroplasty.  I've talked in depth about the procedure and the risks, which include, but are not limited to blood loss requiring transfusion, infection, neurovascular injury, DVT, PE, pain, (both perioperative and persistent post-recovery pain), dislocation, leg length discrepancy, intra-operative fracture, potential anesthetic and perioperative medical complications, and the possibility of needing additional procedures. We also talked about recovery time, which differs from patient to patient.  We discussed the different surgical approaches, and their pros and cons.     Length of visit was 26 minutes with more than 50 percent of that time used for discussing medical concerns and education    Return to clinic: as needed    The information in this document, created  by the medical scribe for me, accurately reflects the services I personally performed and the decisions made by me. I have reviewed and approved this document for accuracy.     AYDIN Meyers MD  Dept. Orthopedic Surgery  [facility]

## 2019-06-12 ENCOUNTER — APPOINTMENT (OUTPATIENT)
Dept: LAB | Facility: CLINIC | Age: 54
End: 2019-06-12
Payer: COMMERCIAL

## 2019-06-12 ENCOUNTER — OFFICE VISIT (OUTPATIENT)
Dept: FAMILY MEDICINE | Facility: CLINIC | Age: 54
End: 2019-06-12
Payer: COMMERCIAL

## 2019-06-12 VITALS
SYSTOLIC BLOOD PRESSURE: 128 MMHG | BODY MASS INDEX: 34.83 KG/M2 | WEIGHT: 204 LBS | RESPIRATION RATE: 16 BRPM | DIASTOLIC BLOOD PRESSURE: 73 MMHG | HEIGHT: 64 IN | HEART RATE: 72 BPM | OXYGEN SATURATION: 98 % | TEMPERATURE: 98.1 F

## 2019-06-12 DIAGNOSIS — Z83.3 FAMILY HISTORY OF DIABETES MELLITUS: ICD-10-CM

## 2019-06-12 DIAGNOSIS — Z00.00 ENCOUNTER FOR ROUTINE ADULT HEALTH EXAMINATION WITHOUT ABNORMAL FINDINGS: Primary | ICD-10-CM

## 2019-06-12 DIAGNOSIS — Z86.2 HISTORY OF ANEMIA: ICD-10-CM

## 2019-06-12 DIAGNOSIS — D22.9 BENIGN MOLE: ICD-10-CM

## 2019-06-12 DIAGNOSIS — E03.9 ACQUIRED HYPOTHYROIDISM: ICD-10-CM

## 2019-06-12 LAB
CHOLEST SERPL-MCNC: 232 MG/DL
ERYTHROCYTE [DISTWIDTH] IN BLOOD BY AUTOMATED COUNT: 13.6 % (ref 10–15)
HBA1C MFR BLD: 5.6 % (ref 0–5.6)
HCT VFR BLD AUTO: 39.9 % (ref 35–47)
HDLC SERPL-MCNC: 35 MG/DL
HGB BLD-MCNC: 13.3 G/DL (ref 11.7–15.7)
LDLC SERPL CALC-MCNC: 169 MG/DL
MCH RBC QN AUTO: 29 PG (ref 26.5–33)
MCHC RBC AUTO-ENTMCNC: 33.3 G/DL (ref 31.5–36.5)
MCV RBC AUTO: 87 FL (ref 78–100)
NONHDLC SERPL-MCNC: 197 MG/DL
PLATELET # BLD AUTO: 271 10E9/L (ref 150–450)
RBC # BLD AUTO: 4.58 10E12/L (ref 3.8–5.2)
T4 FREE SERPL-MCNC: 1.53 NG/DL (ref 0.76–1.46)
TRIGL SERPL-MCNC: 142 MG/DL
TSH SERPL DL<=0.005 MIU/L-ACNC: 0.26 MU/L (ref 0.4–4)
WBC # BLD AUTO: 5.8 10E9/L (ref 4–11)

## 2019-06-12 PROCEDURE — 90471 IMMUNIZATION ADMIN: CPT | Performed by: FAMILY MEDICINE

## 2019-06-12 PROCEDURE — 83036 HEMOGLOBIN GLYCOSYLATED A1C: CPT | Performed by: FAMILY MEDICINE

## 2019-06-12 PROCEDURE — 85027 COMPLETE CBC AUTOMATED: CPT | Performed by: FAMILY MEDICINE

## 2019-06-12 PROCEDURE — 80061 LIPID PANEL: CPT | Performed by: FAMILY MEDICINE

## 2019-06-12 PROCEDURE — 99396 PREV VISIT EST AGE 40-64: CPT | Mod: 25 | Performed by: FAMILY MEDICINE

## 2019-06-12 PROCEDURE — 99213 OFFICE O/P EST LOW 20 MIN: CPT | Mod: 25 | Performed by: FAMILY MEDICINE

## 2019-06-12 PROCEDURE — 84439 ASSAY OF FREE THYROXINE: CPT | Performed by: FAMILY MEDICINE

## 2019-06-12 PROCEDURE — 90750 HZV VACC RECOMBINANT IM: CPT | Performed by: FAMILY MEDICINE

## 2019-06-12 PROCEDURE — 84443 ASSAY THYROID STIM HORMONE: CPT | Performed by: FAMILY MEDICINE

## 2019-06-12 PROCEDURE — 36415 COLL VENOUS BLD VENIPUNCTURE: CPT | Performed by: FAMILY MEDICINE

## 2019-06-12 RX ORDER — LEVOTHYROXINE SODIUM 125 UG/1
125 TABLET ORAL DAILY
Qty: 90 TABLET | Refills: 0 | Status: SHIPPED | OUTPATIENT
Start: 2019-06-12 | End: 2019-09-05

## 2019-06-12 ASSESSMENT — ANXIETY QUESTIONNAIRES
GAD7 TOTAL SCORE: 8
3. WORRYING TOO MUCH ABOUT DIFFERENT THINGS: SEVERAL DAYS
IF YOU CHECKED OFF ANY PROBLEMS ON THIS QUESTIONNAIRE, HOW DIFFICULT HAVE THESE PROBLEMS MADE IT FOR YOU TO DO YOUR WORK, TAKE CARE OF THINGS AT HOME, OR GET ALONG WITH OTHER PEOPLE: NOT DIFFICULT AT ALL
2. NOT BEING ABLE TO STOP OR CONTROL WORRYING: SEVERAL DAYS
7. FEELING AFRAID AS IF SOMETHING AWFUL MIGHT HAPPEN: MORE THAN HALF THE DAYS
6. BECOMING EASILY ANNOYED OR IRRITABLE: SEVERAL DAYS
5. BEING SO RESTLESS THAT IT IS HARD TO SIT STILL: SEVERAL DAYS
1. FEELING NERVOUS, ANXIOUS, OR ON EDGE: SEVERAL DAYS

## 2019-06-12 ASSESSMENT — PATIENT HEALTH QUESTIONNAIRE - PHQ9
5. POOR APPETITE OR OVEREATING: SEVERAL DAYS
SUM OF ALL RESPONSES TO PHQ QUESTIONS 1-9: 2

## 2019-06-12 ASSESSMENT — MIFFLIN-ST. JEOR: SCORE: 1520.34

## 2019-06-12 NOTE — NURSING NOTE
"Chief Complaint   Patient presents with     Physical     initial /73 (BP Location: Left arm, Cuff Size: Adult Large)   Pulse 72   Temp 98.1  F (36.7  C) (Oral)   Resp 16   Ht 1.626 m (5' 4\")   Wt 92.5 kg (204 lb)   LMP 08/28/2015   SpO2 98%   BMI 35.02 kg/m   Estimated body mass index is 35.02 kg/m  as calculated from the following:    Height as of this encounter: 1.626 m (5' 4\").    Weight as of this encounter: 92.5 kg (204 lb).  BP completed using cuff size: regular.  L   arm      Health Maintenance that is potentially due pending provider review:  NONE    n/a    Anthony Barba ma  "

## 2019-06-12 NOTE — PROGRESS NOTES
SUBJECTIVE:   CC: Marbella Hendrix is an 53 year old woman who presents for preventive health visit.     Healthy Habits:     Getting at least 3 servings of Calcium per day:  Yes    Bi-annual eye exam:  NO    Dental care twice a year:  Yes    Sleep apnea or symptoms of sleep apnea:  None    Diet:  Carbohydrate counting and Vegetarian/vegan    Frequency of exercise:  4-5 days/week    Duration of exercise:  Greater than 60 minutes    Taking medications regularly:  No    Barriers to taking medications:  Not applicable    Medication side effects:  None    PHQ-2 Total Score: 1    Additional concerns today:  Yes          PROBLEMS TO ADD ON...  1) hypothyroidism , she is on Synthroid 137mcg daily dose but thinks something is not right with the dose  ,some palpitation , not diarrhea or constipation but sometimes loose stools .  2) family hx of Dm type 2 , will need to check glucose   3) has a lot of moles on her body that would need a check     Today's PHQ-2 Score:   PHQ-2 (  [employer]) 2019   Q1: Little interest or pleasure in doing things 0   Q2: Feeling down, depressed or hopeless 1   PHQ-2 Score 1   Q1: Little interest or pleasure in doing things Not at all   Q2: Feeling down, depressed or hopeless Several days   PHQ-2 Score 1       Abuse: Current or Past(Physical, Sexual or Emotional)- No  Do you feel safe in your environment? Yes    Social History     Tobacco Use     Smoking status: Former Smoker     Types: Cigarettes     Last attempt to quit: 1990     Years since quittin.4     Smokeless tobacco: Never Used   Substance Use Topics     Alcohol use: Yes     Comment: 1-2 drinks a month     If you drink alcohol do you typically have >3 drinks per day or >7 drinks per week? No    Alcohol Use 2019   Prescreen: >3 drinks/day or >7 drinks/week? No   Prescreen: >3 drinks/day or >7 drinks/week? -   No flowsheet data found.    Reviewed orders with patient.  Reviewed health maintenance and updated orders  "accordingly - Yes  Labs reviewed in EPIC    Mammogram not appropriate for this patient based on age.    Pertinent mammograms are reviewed under the imaging tab.  History of abnormal Pap smear: NO - age 30- 65 PAP every 3 years recommended  PAP / HPV 2011   PAP NIL     Reviewed and updated as needed this visit by clinical staff  Tobacco  Allergies  Meds         Reviewed and updated as needed this visit by Provider        Past Medical History:   Diagnosis Date     Heavy menstrual period      Leiomyoma of uterus     s/p myomectomy     PONV (postoperative nausea and vomiting)      Vesico-ureteral reflux     s/p repair      Past Surgical History:   Procedure Laterality Date     ABDOMEN SURGERY      exploratory after C section for sepsis      SECTION      x 2     HYSTERECTOMY RADICAL  2015     HYSTERECTOMY SUPRACERVICAL N/A 2015    Procedure: HYSTERECTOMY SUPRACERVICAL;  Surgeon: Kelle Wasserman MD;  Location: [facility]     LUMPECTOMY BREAST       MYOMECTOMY UTERUS         Review of Systems  CONSTITUTIONAL: NEGATIVE for fever, chills, change in weight  INTEGUMENTARY/SKIN: NEGATIVE for worrisome rashes, moles or lesions  EYES: NEGATIVE for vision changes or irritation  ENT: NEGATIVE for ear, mouth and throat problems  RESP: NEGATIVE for significant cough or SOB  BREAST: NEGATIVE for masses, tenderness or discharge  CV: NEGATIVE for chest pain, palpitations or peripheral edema  GI: NEGATIVE for nausea, abdominal pain, heartburn, or change in bowel habits  : NEGATIVE for unusual urinary or vaginal symptoms. No vaginal bleeding.  MUSCULOSKELETAL: NEGATIVE for significant arthralgias or myalgia  NEURO: NEGATIVE for weakness, dizziness or paresthesias  PSYCHIATRIC: NEGATIVE for changes in mood or affect      OBJECTIVE:   /73 (BP Location: Left arm, Cuff Size: Adult Large)   Pulse 72   Temp 98.1  F (36.7  C) (Oral)   Resp 16   Ht 1.626 m (5' 4\")   Wt 92.5 kg (204 lb)   LMP " 08/28/2015   SpO2 98%   BMI 35.02 kg/m    Physical Exam  GENERAL: healthy, alert and no distress  EYES: Eyes grossly normal to inspection, PERRL and conjunctivae and sclerae normal  HENT: ear canals and TM's normal, nose and mouth without ulcers or lesions  NECK: no adenopathy, no asymmetry, masses, or scars and thyroid normal to palpation  RESP: lungs clear to auscultation - no rales, rhonchi or wheezes  BREAST: normal without masses, tenderness or nipple discharge and no palpable axillary masses or adenopathy  CV: regular rate and rhythm, normal S1 S2, no S3 or S4, no murmur, click or rub, no peripheral edema and peripheral pulses strong  ABDOMEN: soft, nontender, no hepatosplenomegaly, no masses and bowel sounds normal  MS: no gross musculoskeletal defects noted, no edema  SKIN: no suspicious lesions or rashes EXCEPT multiple moles with different size and colors on her trunk , arms , legs   NEURO: Normal strength and tone, mentation intact and speech normal  PSYCH: mentation appears normal, affect normal/bright    Diagnostic Test Results:  Labs reviewed in Epic  Results for orders placed or performed in visit on 06/12/19 (from the past 24 hour(s))   Hemoglobin A1c   Result Value Ref Range    Hemoglobin A1C 5.6 0 - 5.6 %   CBC with platelets   Result Value Ref Range    WBC 5.8 4.0 - 11.0 10e9/L    RBC Count 4.58 3.8 - 5.2 10e12/L    Hemoglobin 13.3 11.7 - 15.7 g/dL    Hematocrit 39.9 35.0 - 47.0 %    MCV 87 78 - 100 fl    MCH 29.0 26.5 - 33.0 pg    MCHC 33.3 31.5 - 36.5 g/dL    RDW 13.6 10.0 - 15.0 %    Platelet Count 271 150 - 450 10e9/L       ASSESSMENT/PLAN:   1. Encounter for routine adult health examination without abnormal findings  Discussed diet,calcium,exercise.Went over self breast exam.Thin prep was NOT  done.Eyes and teeth UTD.No immunizations needed today.See orders below for tests ordered and screening needed.    - Lipid Profile (Chol, Trig, HDL, LDL calc)  - ZOSTER VACCINE RECOMBINANT ADJUVANTED  "IM NJX    2. Family history of diabetes mellitus  Will need to check HGb A 1 c today   - Hemoglobin A1c    3. History of anemia  She has had low iron to the point that they have not allowed her to donate blood and she is scheduled in a couple of days to donate , wants to make sure she is ok with that .  - CBC with platelets      (E03.9) Acquired hypothyroidism  Comment: will check thyroid function today and then decide weather we need to change the dose of the thyroid medicine - actually the test showed she is on too much of the thyroid medicine ( 137 mcg ) so will go down on the dose to   Plan: Pt is aware  and comfortable with the current plan.      (D22.9) Benign mole  Comment: referred to derm for the moles check ,   Plan: DERMATOLOGY REFERRAL        RTC if no improving or worsening.  Pt is aware  and comfortable with the current plan.        COUNSELING:  Reviewed preventive health counseling, as reflected in patient instructions       Regular exercise       Healthy diet/nutrition       Vision screening       Hearing screening       Osteoporosis Prevention/Bone Health    Estimated body mass index is 35.02 kg/m  as calculated from the following:    Height as of this encounter: 1.626 m (5' 4\").    Weight as of this encounter: 92.5 kg (204 lb).         reports that she quit smoking about 29 years ago. Her smoking use included cigarettes. She has never used smokeless tobacco.      Counseling Resources:  ATP IV Guidelines  Pooled Cohorts Equation Calculator  Breast Cancer Risk Calculator  FRAX Risk Assessment  ICSI Preventive Guidelines  Dietary Guidelines for Americans, 2010  USDA's MyPlate  ASA Prophylaxis  Lung CA Screening    Vanessa Ladd MD  [facility]  "

## 2019-06-13 ASSESSMENT — ANXIETY QUESTIONNAIRES: GAD7 TOTAL SCORE: 8

## 2019-08-08 ENCOUNTER — ANCILLARY PROCEDURE (OUTPATIENT)
Dept: GENERAL RADIOLOGY | Facility: CLINIC | Age: 54
End: 2019-08-08
Attending: FAMILY MEDICINE
Payer: COMMERCIAL

## 2019-08-08 ENCOUNTER — OFFICE VISIT (OUTPATIENT)
Dept: URGENT CARE | Facility: URGENT CARE | Age: 54
End: 2019-08-08
Payer: COMMERCIAL

## 2019-08-08 VITALS
OXYGEN SATURATION: 96 % | BODY MASS INDEX: 34.5 KG/M2 | SYSTOLIC BLOOD PRESSURE: 133 MMHG | HEART RATE: 80 BPM | DIASTOLIC BLOOD PRESSURE: 76 MMHG | TEMPERATURE: 98.7 F | WEIGHT: 201 LBS

## 2019-08-08 DIAGNOSIS — S99.922A FOOT INJURY, LEFT, INITIAL ENCOUNTER: Primary | ICD-10-CM

## 2019-08-08 DIAGNOSIS — S92.912A CLOSED NONDISPLACED FRACTURE OF PHALANX OF TOE OF LEFT FOOT, UNSPECIFIED TOE, INITIAL ENCOUNTER: ICD-10-CM

## 2019-08-08 DIAGNOSIS — M79.672 LEFT FOOT PAIN: ICD-10-CM

## 2019-08-08 PROCEDURE — 99214 OFFICE O/P EST MOD 30 MIN: CPT | Performed by: FAMILY MEDICINE

## 2019-08-08 PROCEDURE — 73630 X-RAY EXAM OF FOOT: CPT | Mod: LT

## 2019-08-08 NOTE — PROGRESS NOTES
SUBJECTIVE:  Chief Complaint   Patient presents with     Urgent Care     Toe Injury     left toe injury,hit toe going up steps.     Marbella Hendrix is a 53 year old female presents with a chief complaint of left foot and toe(s) fourth pain, tenderness and decreased range of motion.  The injury occurred 4 hour(s) ago.  This happened while she was using the stairs and she fell down and while doing so her foot stepped against the stairs and since then she has been noticing pain in the foot most of the pain being over the fourth toe area.  The injury happened while at work. How: trauma: immediate pain.  The patient complained of moderate pain  and has had decreased ROM.  Pain exacerbated by walking and weight-bearing.  Relieved by rest.  She treated it initially with ice. This is the first time this type of injury has occurred to this patient.     Past Medical History:   Diagnosis Date     Heavy menstrual period      Leiomyoma of uterus     s/p myomectomy     PONV (postoperative nausea and vomiting)      Vesico-ureteral reflux     s/p repair     Current Outpatient Medications   Medication Sig Dispense Refill     CALCIUM PO Take 1,000 mg by mouth daily       levothyroxine (SYNTHROID/LEVOTHROID) 125 MCG tablet Take 1 tablet (125 mcg) by mouth daily 90 tablet 0     levothyroxine (SYNTHROID/LEVOTHROID) 137 MCG tablet TAKE ONE TABLET BY MOUTH EVERY DAY 30 tablet 0     Omega-3 Fatty Acids (OMEGA-3 FISH OIL PO) Take 1 g by mouth daily       order for DME Equipment being ordered: DME 1 Device 0     PARoxetine (PAXIL) 20 MG tablet Take 0.5 tablets (10 mg) by mouth 2 times daily 90 tablet 3     LORazepam (ATIVAN) 0.5 MG tablet Use one tablet as needed for flying (Patient not taking: Reported on 2019) 8 tablet 0     Social History     Tobacco Use     Smoking status: Former Smoker     Types: Cigarettes     Last attempt to quit: 1990     Years since quittin.6     Smokeless tobacco: Never Used   Substance Use Topics      Alcohol use: Yes     Comment: 1-2 drinks a month       ROS:  10 point ROS of systems including Constitutional, Eyes, Respiratory, Cardiovascular, Gastroenterology, Genitourinary, Integumentary,  Psychiatric were all negative except for pertinent positives noted in my HPI           EXAM:   /76   Pulse 80   Temp 98.7  F (37.1  C) (Oral)   Wt 91.2 kg (201 lb)   LMP 08/28/2015   SpO2 96%   BMI 34.50 kg/m    Gen: healthy,alert,no distress  Extremity: foot and toe(s) fourth has swelling, point tenderness  and decreased ROM along with bruising  With some deviation of the 4th toe laterally   There is not compromise to the distal circulation.  Pulses are +2 and CRT is brisk  GENERAL APPEARANCE: healthy, alert and no distress  EXTREMITIES: peripheral pulses normal  SKIN: no suspicious lesions or rashes  NEURO: Normal strength and tone, sensory exam grossly normal, mentation intact and speech normal    X-RAY was done.showed fracture of the left 4th toe proximal phalanx    ASSESSMENT:   Marbella was seen today for urgent care and toe injury.    Diagnoses and all orders for this visit:    Foot injury, left, initial encounter  -     XR Foot Left G/E 3 Views    Closed nondisplaced fracture of phalanx of toe of left foot, unspecified toe, initial encounter  -     order for DME; Equipment being ordered: DME    Left foot pain          PLAN:  1) Rest, Ice, Compress, Elevate, no exercise  for 3-4 wks and Ibuprofen q 6 hrs for 3-5 days  Follow up with pcp in 2 weeks   As the fracture extended into the fourth metatarsophalangeal joint area I did advise patient to follow-up with Ortho within next 1 to 2 days  Waldo taping of the 3rd and 4th toes done   Discussed about wearing the post op shoes when ambulating   Follow up if  symptoms fail to improve or worsens   Pt understood and agreed with plan     Sharmila Coffey MD

## 2019-09-05 ENCOUNTER — MYC MEDICAL ADVICE (OUTPATIENT)
Dept: FAMILY MEDICINE | Facility: CLINIC | Age: 54
End: 2019-09-05

## 2019-09-05 DIAGNOSIS — E03.9 ACQUIRED HYPOTHYROIDISM: ICD-10-CM

## 2019-09-05 DIAGNOSIS — F41.1 GENERALIZED ANXIETY DISORDER: ICD-10-CM

## 2019-09-05 RX ORDER — PAROXETINE 20 MG/1
TABLET, FILM COATED ORAL
Qty: 90 TABLET | Refills: 1 | Status: SHIPPED | OUTPATIENT
Start: 2019-09-05 | End: 2020-05-11

## 2019-09-05 RX ORDER — LEVOTHYROXINE SODIUM 125 UG/1
125 TABLET ORAL DAILY
Qty: 90 TABLET | Refills: 2 | Status: SHIPPED | OUTPATIENT
Start: 2019-09-05 | End: 2020-05-26

## 2019-09-05 NOTE — TELEPHONE ENCOUNTER
"Requested Prescriptions   Pending Prescriptions Disp Refills     PARoxetine (PAXIL) 20 MG tablet [Pharmacy Med Name: PAROXETINE HCL 20MG TABS] 90 tablet 3     Sig: TAKE ONE-HALF TABLET BY MOUTH TWICE A DAY       SSRIs Protocol Passed - 9/5/2019  3:54 PM        Passed - Recent (12 mo) or future (30 days) visit within the authorizing provider's specialty     Patient had office visit in the last 12 months or has a visit in the next 30 days with authorizing provider or within the authorizing provider's specialty.  See \"Patient Info\" tab in inbasket, or \"Choose Columns\" in Meds & Orders section of the refill encounter.              Passed - Medication is active on med list        Passed - Patient is age 18 or older        Passed - No active pregnancy on record        Passed - No positive pregnancy test in last 12 months        Prescription approved per [facility] Refill Protocol.  "

## 2019-09-05 NOTE — TELEPHONE ENCOUNTER
Requested Prescriptions   Pending Prescriptions Disp Refills     levothyroxine (SYNTHROID/LEVOTHROID) 125 MCG tablet 90 tablet 0     Sig: Take 1 tablet (125 mcg) by mouth daily       There is no refill protocol information for this order        TSH   Date Value Ref Range Status   06/12/2019 0.26 (L) 0.40 - 4.00 mU/L Final

## 2019-09-23 ENCOUNTER — ALLIED HEALTH/NURSE VISIT (OUTPATIENT)
Dept: NURSING | Facility: CLINIC | Age: 54
End: 2019-09-23
Payer: COMMERCIAL

## 2019-09-23 DIAGNOSIS — Z23 NEED FOR SHINGLES VACCINE: Primary | ICD-10-CM

## 2019-09-23 PROCEDURE — 90750 HZV VACC RECOMBINANT IM: CPT

## 2019-09-23 PROCEDURE — 90471 IMMUNIZATION ADMIN: CPT

## 2019-09-23 PROCEDURE — 99207 ZZC NO CHARGE NURSE ONLY: CPT

## 2019-09-23 NOTE — PROGRESS NOTES
Prior to immunization administration, verified patients identity using patient s name and date of birth. Please see Immunization Activity for additional information.     Screening Questionnaire for Adult Immunization    Are you sick today?   No   Do you have allergies to medications, food, a vaccine component or latex?   No   Have you ever had a serious reaction after receiving a vaccination?   No   Do you have a long-term health problem with heart disease, lung disease, asthma, kidney disease, metabolic disease (e.g. diabetes), anemia, or other blood disorder?   Yes   Do you have cancer, leukemia, HIV/AIDS, or any other immune system problem?   No   In the past 3 months, have you taken medications that affect  your immune system, such as prednisone, other steroids, or anticancer drugs; drugs for the treatment of rheumatoid arthritis, Crohn s disease, or psoriasis; or have you had radiation treatments?   No   Have you had a seizure, or a brain or other nervous system problem?   No   During the past year, have you received a transfusion of blood or blood     products, or been given immune (gamma) globulin or antiviral drug?   No   For women: Are you pregnant or is there a chance you could become        pregnant during the next month?   No   Have you received any vaccinations in the past 4 weeks?   No     Immunization questionnaire was positive for at least one answer.  Notified Dr. Navarrete.      Patient instructed to remain in clinic for 15 minutes afterwards, and to report any adverse reaction to me immediately.    Prior to injection verified patient identity using patient's name and date of birth.  Vaccine information supplied for shingrix.  Screening performed by Jenn Sierra MA on 9/23/2019 at 1:58 PM.

## 2019-10-05 ENCOUNTER — HEALTH MAINTENANCE LETTER (OUTPATIENT)
Age: 54
End: 2019-10-05

## 2019-10-05 NOTE — TELEPHONE ENCOUNTER
RECORDS RECEIVED FROM: INTERNAL   DATE RECEIVED: 10/5   NOTES STATUS DETAILS   OFFICE NOTE from referring provider N/A    OFFICE NOTE from other specialist Internal 6/11/19*12/18/18*   DISCHARGE SUMMARY from hospital N/A    DISCHARGE REPORT from the ER N/A    OPERATIVE REPORT N/A    MEDICATION LIST Internal    IMPLANT RECORD/STICKER N/A    LABS     CBC/DIFF N/A    CULTURES N/A    INJECTIONS DONE IN RADIOLOGY N/A    MRI Internal 12/26/18*   CT SCAN N/A    XRAYS (IMAGES & REPORTS) Internal 12/18/18*   TUMOR     PATHOLOGY  Slides & report N/A

## 2019-10-14 ENCOUNTER — ANCILLARY PROCEDURE (OUTPATIENT)
Dept: GENERAL RADIOLOGY | Facility: CLINIC | Age: 54
End: 2019-10-14
Attending: ORTHOPAEDIC SURGERY
Payer: COMMERCIAL

## 2019-10-14 ENCOUNTER — OFFICE VISIT (OUTPATIENT)
Dept: ORTHOPEDICS | Facility: CLINIC | Age: 54
End: 2019-10-14
Attending: FAMILY MEDICINE
Payer: COMMERCIAL

## 2019-10-14 VITALS — BODY MASS INDEX: 34.76 KG/M2 | WEIGHT: 203.6 LBS | HEIGHT: 64 IN

## 2019-10-14 DIAGNOSIS — M16.11 PRIMARY OSTEOARTHRITIS OF RIGHT HIP: Primary | ICD-10-CM

## 2019-10-14 DIAGNOSIS — M16.11 PRIMARY OSTEOARTHRITIS OF RIGHT HIP: ICD-10-CM

## 2019-10-14 ASSESSMENT — MIFFLIN-ST. JEOR: SCORE: 1509.55

## 2019-10-14 ASSESSMENT — ENCOUNTER SYMPTOMS
HOT FLASHES: 1
BACK PAIN: 0
JOINT SWELLING: 0
ARTHRALGIAS: 1
MUSCLE CRAMPS: 0
DECREASED LIBIDO: 1
MUSCLE WEAKNESS: 0
MYALGIAS: 0
NECK PAIN: 0
STIFFNESS: 1

## 2019-10-14 ASSESSMENT — HOOS S4: HOW SEVERE IS YOUR HIP JOINT STIFFNESS AFTER FIRST WAKENING IN THE MORNING?: SEVERE

## 2019-10-14 ASSESSMENT — ACTIVITIES OF DAILY LIVING (ADL)
ADL_MEAN: 2.17
ADL_SUM: 37
ADL_SUBSCALE_SCORE: 45.58

## 2019-10-14 NOTE — PROGRESS NOTES
[facility]  Orthopaedic Surgery, Joint Replacement Consultation  by Filemon Lopez M.D.    Marbella Hendrix MRN# [id number]   Age: 54 year old YOB: 1965     Requesting physician: Vanessa Benjamin John            Assessment and Plan:   Assessment:  54-year-old female with debilitating pain of her right hip due to osteoarthritis changes.     Plan:  We discussed the findings with the patient.  Given her goals of activity and expectations as well as her age we recommended exhausting all nonoperative options before proceeding to surgery.  While we considered total hip arthroplasty, we also discussed an intra-articular injection of the right hip under fluoroscopic guidance.  She requested to proceed with steroid injection at the present time.  At some point near future she will want to consider arthroplasty procedure as well given her radiographic appearance.  All questions were answered and patient  agreed to the treatment plan           History of Present Illness:   54 year old female with complaints of her right hip.  She has previously been evaluated by Dr. Miki Meyers in [place].  She is here to see discuss having a right hip replacement.  Patient states that she has an active lifestyle and currently is unable to do the things that she enjoys such as running yoga and exercising.  Her pain has increased tremendously over the past year to the point that she is in pain continuously.  She describes the pain in her groin and buttock area.  There is night pain and initiation pain.  The radius of action of the patient is unlimited.  Currently patient uses Aleve as needed.  She seen a physical therapist.  She has had acupuncture.  She has not received any intra-articular injection.  No antecedent trauma.  Both her mother and sister are diagnosed with rheumatoid arthritis.  She has not been evaluated for this in the past.  She denies having any other joint  complaints.      Current symptoms:  Problem: Right hip pain  Onset and duration: Multiple years  Awakens from sleep due to sx's:  Yes  Precipitating Injury:  No    Other joints or sites painful:  No      Background history:  DX:  1. Fibroids    TREATMENTS:  1. Multiple fibroid excisions and eventually hysterectomy.               Physical Exam:     EXAMINATION pertinent findings:   VITAL SIGNS: Last menstrual period 08/28/2015, not currently breastfeeding.  There is no height or weight on file to calculate BMI.  RESP: non labored breathing     SKIN: grossly normal   LYMPHATIC: grossly normal   NEURO: grossly normal   VASCULAR: satisfactory perfusion of all extremities   MUSCULOSKELETAL:   Normal gait.  Right hip: pROM 0- 95 degrees.  abduction 45 degrees, no adduction contracture.  Internal rotation 10 degrees external rotation 40 degrees.  The right lower extremity is neurovascularly intact.           Data:   All laboratory data reviewed  All imaging studies reviewed by me    CR pelvis/hip right (October 14, 2019 ): Osteoarthritic changes of the right hip with joint space narrowing and osteophyte formation.  Cyst formation on both the femoral and acetabular side of the joint.    Andrews Barreto MD  [facility] Arthroplasty Fellow      Attending MD (Dr. Filemon Lopez) Attestation :  This patient was seen and evaluated by me including a history, exam, and interpretation of all imaging and/or lab data.  Either a training physician (resident/fellow), who also saw the patient, or scribe has documented the clinic visit in the attached note.    Filemon Lopez MD  [facility] Family Professor  Oncology and Adult Reconstructive Surgery  Dept Orthopaedic Surgery, [facility]  [phone] office, [phone] pager  [email or web address]    Gael FERGUSON, a scribe, prepared the chart for today's encounter.       DATA for DOCUMENTATION:         Past Medical History:     Patient Active Problem List   Diagnosis     Health Care Home      Generalized anxiety disorder     Hyperlipidemia LDL goal <160     Acquired hypothyroidism     Overweight     Preventative health care     Acute post-operative pain     Status post hysterectomy     Morbid obesity (H)     Past Medical History:   Diagnosis Date     Heavy menstrual period      Leiomyoma of uterus     s/p myomectomy     PONV (postoperative nausea and vomiting)      Vesico-ureteral reflux     s/p repair       Also see scanned health assessment forms.       Past Surgical History:     Past Surgical History:   Procedure Laterality Date     ABDOMEN SURGERY      exploratory after C section for sepsis      SECTION      x 2     HYSTERECTOMY RADICAL  2015     HYSTERECTOMY SUPRACERVICAL N/A 2015    Procedure: HYSTERECTOMY SUPRACERVICAL;  Surgeon: Kelle Wasserman MD;  Location: [facility] OR     LUMPECTOMY BREAST       MYOMECTOMY UTERUS              Social History:     Social History     Socioeconomic History     Marital status:      Spouse name: Wally     Number of children: 2     Years of education: Not on file     Highest education level: Not on file   Occupational History     Not on file   Social Needs     Financial resource strain: Not on file     Food insecurity:     Worry: Not on file     Inability: Not on file     Transportation needs:     Medical: Not on file     Non-medical: Not on file   Tobacco Use     Smoking status: Former Smoker     Types: Cigarettes     Last attempt to quit: 1990     Years since quittin.8     Smokeless tobacco: Never Used   Substance and Sexual Activity     Alcohol use: Yes     Comment: 1-2 drinks a month     Drug use: No     Sexual activity: Yes     Partners: Male   Lifestyle     Physical activity:     Days per week: Not on file     Minutes per session: Not on file     Stress: Not on file   Relationships     Social connections:     Talks on phone: Not on file     Gets together: Not on file     Attends [employer] service: Not on file      Active member of club or organization: Not on file     Attends meetings of clubs or organizations: Not on file     Relationship status: Not on file     Intimate partner violence:     Fear of current or ex partner: Not on file     Emotionally abused: Not on file     Physically abused: Not on file     Forced sexual activity: Not on file   Other Topics Concern     Parent/sibling w/ CABG, MI or angioplasty before 65F 55M? No   Social History Narrative     Not on file            Family History:       Family History   Problem Relation Age of Onset     High cholesterol Father      Cancer Father         CLL     Blood Disease Father         CLL     Breast Cancer Maternal Grandmother         in 80s     Depression Mother      Diabetes Mother      Ulcerative Colitis Mother      Lipids Mother         hypertriglyceridemia            Medications:     Current Outpatient Medications   Medication Sig     CALCIUM PO Take 1,000 mg by mouth daily     levothyroxine (SYNTHROID/LEVOTHROID) 125 MCG tablet Take 1 tablet (125 mcg) by mouth daily     LORazepam (ATIVAN) 0.5 MG tablet Use one tablet as needed for flying (Patient not taking: Reported on 6/12/2019)     Omega-3 Fatty Acids (OMEGA-3 FISH OIL PO) Take 1 g by mouth daily     order for DME Equipment being ordered: DME     PARoxetine (PAXIL) 20 MG tablet TAKE ONE-HALF TABLET BY MOUTH TWICE A DAY     No current facility-administered medications for this visit.               Review of Systems:   A comprehensive 10 point review of systems (constitutional, ENT, cardiac, peripheral vascular, lymphatic, respiratory, GI, , Musculoskeletal, skin, Neurological) was performed and found to be negative except as described in this note.     See intake form completed by patient      Answers for HPI/ROS submitted by the patient on 10/14/2019   General Symptoms: No  Skin Symptoms: No  HENT Symptoms: No  EYE SYMPTOMS: No  HEART SYMPTOMS: No  LUNG SYMPTOMS: No  INTESTINAL SYMPTOMS: No  URINARY  SYMPTOMS: No  GYNECOLOGIC SYMPTOMS: Yes  BREAST SYMPTOMS: No  SKELETAL SYMPTOMS: Yes  BLOOD SYMPTOMS: No  NERVOUS SYSTEM SYMPTOMS: No  MENTAL HEALTH SYMPTOMS: No  Back pain: No  Muscle aches: No  Neck pain: No  Swollen joints: No  Joint pain: Yes  Bone pain: Yes  Muscle cramps: No  Muscle weakness: No  Joint stiffness: Yes  Bone fracture: No  Bleeding or spotting between periods: No  Heavy or painful periods: No  Irregular periods: No  Vaginal discharge: No  Hot flashes: Yes  Vaginal dryness: No  Genital ulcers: No  Reduced libido: Yes  Painful intercourse: Yes  Difficulty with sexual arousal: No  Post-menopausal bleeding: No      Attending MD (Dr. Filemon Lopez) Attestation :  This patient was seen and evaluated by me including a history, exam, and interpretation of all imaging and/or lab data.  Either a training physician (resident/fellow), who also saw the patient, or scribe has documented the visit in the attached note.    MD Essence Porras Family Professor  Oncology and Adult Reconstructive Surgery  Dept Orthopaedic Surgery, [facility]  [phone] office, [phone] pager  [email or web address]

## 2019-10-14 NOTE — LETTER
10/14/2019      RE: Marbella Hendrix  [address]  [facility] [address]         [facility]  Orthopaedic Surgery, Joint Replacement Consultation  by Filemon Lopez M.D.    Marbella Hendrix MRN# [id number]   Age: 54 year old YOB: 1965     Requesting physician: Vanessa Ladd, Miki Fuentes            Assessment and Plan:   Assessment:  54-year-old female with debilitating pain of her right hip due to osteoarthritis changes.     Plan:  We discussed the findings with the patient.  Given her goals of activity and expectations as well as her age we recommended exhausting all nonoperative options before proceeding to surgery.  While we considered total hip arthroplasty, we also discussed an intra-articular injection of the right hip under fluoroscopic guidance.  She requested to proceed with steroid injection at the present time.  At some point near future she will want to consider arthroplasty procedure as well given her radiographic appearance.  All questions were answered and patient  agreed to the treatment plan           History of Present Illness:   54 year old female with complaints of her right hip.  She has previously been evaluated by Dr. Miki Meyers in [place].  She is here to see discuss having a right hip replacement.  Patient states that she has an active lifestyle and currently is unable to do the things that she enjoys such as running yoga and exercising.  Her pain has increased tremendously over the past year to the point that she is in pain continuously.  She describes the pain in her groin and buttock area.  There is night pain and initiation pain.  The radius of action of the patient is unlimited.  Currently patient uses Aleve as needed.  She seen a physical therapist.  She has had acupuncture.  She has not received any intra-articular injection.  No antecedent trauma.  Both her mother and sister are diagnosed with rheumatoid arthritis.  She has not been  evaluated for this in the past.  She denies having any other joint complaints.      Current symptoms:  Problem: Right hip pain  Onset and duration: Multiple years  Awakens from sleep due to sx's:  Yes  Precipitating Injury:  No    Other joints or sites painful:  No      Background history:  DX:  1. Fibroids    TREATMENTS:  1. Multiple fibroid excisions and eventually hysterectomy.               Physical Exam:     EXAMINATION pertinent findings:   VITAL SIGNS: Last menstrual period 08/28/2015, not currently breastfeeding.  There is no height or weight on file to calculate BMI.  RESP: non labored breathing     SKIN: grossly normal   LYMPHATIC: grossly normal   NEURO: grossly normal   VASCULAR: satisfactory perfusion of all extremities   MUSCULOSKELETAL:   Normal gait.  Right hip: pROM 0- 95 degrees.  abduction 45 degrees, no adduction contracture.  Internal rotation 10 degrees external rotation 40 degrees.  The right lower extremity is neurovascularly intact.           Data:   All laboratory data reviewed  All imaging studies reviewed by me    CR pelvis/hip right (October 14, 2019 ): Osteoarthritic changes of the right hip with joint space narrowing and osteophyte formation.  Cyst formation on both the femoral and acetabular side of the joint.    Andrews Barreto MD  [facility] Arthroplasty Fellow      Attending MD (Dr. Filemon Lopez) Attestation :  This patient was seen and evaluated by me including a history, exam, and interpretation of all imaging and/or lab data.  Either a training physician (resident/fellow), who also saw the patient, or scribe has documented the clinic visit in the attached note.    MD Essence Porras Family Professor  Oncology and Adult Reconstructive Surgery  Dept Orthopaedic Surgery, [facility]  [phone] office, [phone] pager  [email or web address]    Gael FERGUSON, a scribe, prepared the chart for today's encounter.       DATA for DOCUMENTATION:         Past Medical History:      Patient Active Problem List   Diagnosis     Health Care Home     Generalized anxiety disorder     Hyperlipidemia LDL goal <160     Acquired hypothyroidism     Overweight     Preventative health care     Acute post-operative pain     Status post hysterectomy     Morbid obesity (H)     Past Medical History:   Diagnosis Date     Heavy menstrual period      Leiomyoma of uterus     s/p myomectomy     PONV (postoperative nausea and vomiting)      Vesico-ureteral reflux     s/p repair       Also see scanned health assessment forms.       Past Surgical History:     Past Surgical History:   Procedure Laterality Date     ABDOMEN SURGERY      exploratory after C section for sepsis      SECTION      x 2     HYSTERECTOMY RADICAL  2015     HYSTERECTOMY SUPRACERVICAL N/A 2015    Procedure: HYSTERECTOMY SUPRACERVICAL;  Surgeon: Kelle Wasserman MD;  Location: [facility] OR     LUMPECTOMY BREAST       MYOMECTOMY UTERUS              Social History:     Social History     Socioeconomic History     Marital status:      Spouse name: Wally     Number of children: 2     Years of education: Not on file     Highest education level: Not on file   Occupational History     Not on file   Social Needs     Financial resource strain: Not on file     Food insecurity:     Worry: Not on file     Inability: Not on file     Transportation needs:     Medical: Not on file     Non-medical: Not on file   Tobacco Use     Smoking status: Former Smoker     Types: Cigarettes     Last attempt to quit: 1990     Years since quittin.8     Smokeless tobacco: Never Used   Substance and Sexual Activity     Alcohol use: Yes     Comment: 1-2 drinks a month     Drug use: No     Sexual activity: Yes     Partners: Male   Lifestyle     Physical activity:     Days per week: Not on file     Minutes per session: Not on file     Stress: Not on file   Relationships     Social connections:     Talks on phone: Not on file     Gets  together: Not on file     Attends Taoism service: Not on file     Active member of club or organization: Not on file     Attends meetings of clubs or organizations: Not on file     Relationship status: Not on file     Intimate partner violence:     Fear of current or ex partner: Not on file     Emotionally abused: Not on file     Physically abused: Not on file     Forced sexual activity: Not on file   Other Topics Concern     Parent/sibling w/ CABG, MI or angioplasty before 65F 55M? No   Social History Narrative     Not on file            Family History:       Family History   Problem Relation Age of Onset     High cholesterol Father      Cancer Father         CLL     Blood Disease Father         CLL     Breast Cancer Maternal Grandmother         in 80s     Depression Mother      Diabetes Mother      Ulcerative Colitis Mother      Lipids Mother         hypertriglyceridemia            Medications:     Current Outpatient Medications   Medication Sig     CALCIUM PO Take 1,000 mg by mouth daily     levothyroxine (SYNTHROID/LEVOTHROID) 125 MCG tablet Take 1 tablet (125 mcg) by mouth daily     LORazepam (ATIVAN) 0.5 MG tablet Use one tablet as needed for flying (Patient not taking: Reported on 6/12/2019)     Omega-3 Fatty Acids (OMEGA-3 FISH OIL PO) Take 1 g by mouth daily     order for DME Equipment being ordered: DME     PARoxetine (PAXIL) 20 MG tablet TAKE ONE-HALF TABLET BY MOUTH TWICE A DAY     No current facility-administered medications for this visit.               Review of Systems:   A comprehensive 10 point review of systems (constitutional, ENT, cardiac, peripheral vascular, lymphatic, respiratory, GI, , Musculoskeletal, skin, Neurological) was performed and found to be negative except as described in this note.     See intake form completed by patient      Answers for HPI/ROS submitted by the patient on 10/14/2019   General Symptoms: No  Skin Symptoms: No  HENT Symptoms: No  EYE SYMPTOMS: No  HEART  SYMPTOMS: No  LUNG SYMPTOMS: No  INTESTINAL SYMPTOMS: No  URINARY SYMPTOMS: No  GYNECOLOGIC SYMPTOMS: Yes  BREAST SYMPTOMS: No  SKELETAL SYMPTOMS: Yes  BLOOD SYMPTOMS: No  NERVOUS SYSTEM SYMPTOMS: No  MENTAL HEALTH SYMPTOMS: No  Back pain: No  Muscle aches: No  Neck pain: No  Swollen joints: No  Joint pain: Yes  Bone pain: Yes  Muscle cramps: No  Muscle weakness: No  Joint stiffness: Yes  Bone fracture: No  Bleeding or spotting between periods: No  Heavy or painful periods: No  Irregular periods: No  Vaginal discharge: No  Hot flashes: Yes  Vaginal dryness: No  Genital ulcers: No  Reduced libido: Yes  Painful intercourse: Yes  Difficulty with sexual arousal: No  Post-menopausal bleeding: No      Filemon Lopez MD

## 2019-10-14 NOTE — NURSING NOTE
"Chief Complaint   Patient presents with     Consult     Pt. states that she is here today for Right RENU Consult. Referring: MARY HUFF       54 year old  1965    Ht 1.619 m (5' 3.75\")   Wt 92.4 kg (203 lb 9.6 oz)   LMP 08/28/2015   BMI 35.22 kg/m                  [facility]    [facility] - [place] [address]    No Known Allergies    Current Outpatient Medications   Medication     CALCIUM PO     levothyroxine (SYNTHROID/LEVOTHROID) 125 MCG tablet     melatonin 5 MG tablet     Omega-3 Fatty Acids (OMEGA-3 FISH OIL PO)     PARoxetine (PAXIL) 20 MG tablet     LORazepam (ATIVAN) 0.5 MG tablet     order for DME     No current facility-administered medications for this visit.            Questionnaires:    HOOS Hip Dysfunction & Osteoarthritis Outcome Questionnaire    Hip Dysfunction & Osteoarthritis Outcome Score (HOOS), English Version LK 2.0 (Roderick Dia, [place] MANSFIELD, 2003) 10/14/2019   S1. Do you feel grinding, hear clicking or any other type of noise from your hip? Sometimes   S2. Difficulties spreading legs wide apart Severe   S3. Difficulties to stride out when walking Severe   S4. How severe is your hip joint stiffness after first wakening in the morning? Severe   S5. How severe is your hip stiffness after sitting, lying or resting LATER IN THE DAY? Severe   Symptom Count 5   Symptom Sum 14   Symptom Mean 2.8   Symptom Subscale Score 30   P1. How often is your hip painful? Always   P2. Straightening your hip fully Moderate   P3. Bending your hip FULLY Severe   P4. Walking on a flat surface Moderate   P5. Going up or down stairs Moderate   P6. At night while in bed Moderate   P7. Sitting or lying Moderate   P8. Standing upright Moderate   P9. Walking on a hard surface (asphalt, concrete, etc.) Moderate   P10. Walking on an uneven surface Moderate   Pain Count 10   Pain Sum 23   Pain Mean 2.3   Pain Subscale Score 42.5   A1. Descending " stairs Moderate   A2. Ascending stairs Severe   A3. Rising from sitting Moderate   A4. Standing Moderate   A5. Bending to the floor/ an object Moderate   A6. Walking on a flat surface Moderate   A7. Getting in/out of car Severe   A8. Going shopping Moderate   A9. Putting on socks/stockings Severe   A10. Rising from bed Moderate   A11. Taking off socks/stockings Moderate   A12. Lying in bed (turning over, maintaining hip position) Moderate   A13. Getting in/out of bed Moderate   A14. Sitting Moderate   A15. Getting on/off toilet Moderate   A16. Heavy domestic duties (moving heavy boxes, scrubbing floors, etc.) Moderate   A17. Light domestic duties (cooking, dusting, etc.) Moderate   ADL Count 17   ADL Sum 37   ADL Mean 2.17   ADL Subscale Score 45.58   SP1. Squatting Extreme   SP2. Running Extreme   SP3. Twisting/pivoting on loaded leg Severe   SP4. Walking on uneven surface Moderate   Sports/Rec Count 4   Sports/Rec Sum 13   Sports/Rec Mean 3.25   Sports/Rec Subscale Score 18.75   Q1. How often are you aware of your hip problem? Constantly   Q2. Have you modified you life style to avoid activities potentially damaging to your hip? Severely   Q3. How much are you troubled with lack of confidence in your hip? Severely   Q4. In general, how much difficulty do you have with your hip? Severe   QOL Count 4   QOL Sum 13   QOL Mean 3.25   Quality of Life Subscale Score 18.75                  Promis 10 Assessment    PROMIS 10 10/14/2019   In general, would you say your health is: Very good   In general, would you say your quality of life is: Very good   In general, how would you rate your physical health? Very good   In general, how would you rate your mental health, including your mood and your ability to think? Very good   In general, how would you rate your satisfaction with your social activities and relationships? Very good   In general, please rate how well you carry out your usual social activities and roles  Very good   To what extent are you able to carry out your everyday physical activities such as walking, climbing stairs, carrying groceries, or moving a chair? Mostly   How often have you been bothered by emotional problems such as feeling anxious, depressed or irritable? Sometimes   How would you rate your fatigue on average? Mild   How would you rate your pain on average?   0 = No Pain  to  10 = Worst Imaginable Pain 8   In general, would you say your health is: 4   In general, would you say your quality of life is: 4   In general, how would you rate your physical health? 4   In general, how would you rate your mental health, including your mood and your ability to think? 4   In general, how would you rate your satisfaction with your social activities and relationships? 4   In general, please rate how well you carry out your usual social activities and roles. (This includes activities at home, at work and in your community, and responsibilities as a parent, child, spouse, employee, friend, etc.) 4   To what extent are you able to carry out your everyday physical activities such as walking, climbing stairs, carrying groceries, or moving a chair? 4   In the past 7 days, how often have you been bothered by emotional problems such as feeling anxious, depressed, or irritable? 3   In the past 7 days, how would you rate your fatigue on average? 2   In the past 7 days, how would you rate your pain on average, where 0 means no pain, and 10 means worst imaginable pain? 8   Global Mental Health Score 15   Global Physical Health Score 14   PROMIS TOTAL - SUBSCORES 29   Some recent data might be hidden

## 2019-10-15 ENCOUNTER — ALLIED HEALTH/NURSE VISIT (OUTPATIENT)
Dept: NURSING | Facility: CLINIC | Age: 54
End: 2019-10-15
Payer: COMMERCIAL

## 2019-10-15 DIAGNOSIS — Z23 NEED FOR PROPHYLACTIC VACCINATION AND INOCULATION AGAINST INFLUENZA: Primary | ICD-10-CM

## 2019-10-15 PROCEDURE — 90682 RIV4 VACC RECOMBINANT DNA IM: CPT

## 2019-10-15 PROCEDURE — 99207 ZZC NO CHARGE NURSE ONLY: CPT

## 2019-10-15 PROCEDURE — 90471 IMMUNIZATION ADMIN: CPT

## 2019-10-21 ENCOUNTER — MYC MEDICAL ADVICE (OUTPATIENT)
Dept: FAMILY MEDICINE | Facility: CLINIC | Age: 54
End: 2019-10-21

## 2019-10-28 ENCOUNTER — PRE VISIT (OUTPATIENT)
Dept: ORTHOPEDICS | Facility: CLINIC | Age: 54
End: 2019-10-28

## 2019-11-04 ENCOUNTER — ANCILLARY PROCEDURE (OUTPATIENT)
Dept: GENERAL RADIOLOGY | Facility: CLINIC | Age: 54
End: 2019-11-04
Attending: ORTHOPAEDIC SURGERY
Payer: COMMERCIAL

## 2019-11-04 DIAGNOSIS — M16.11 PRIMARY OSTEOARTHRITIS OF RIGHT HIP: ICD-10-CM

## 2019-11-04 RX ORDER — BUPIVACAINE HYDROCHLORIDE 2.5 MG/ML
10 INJECTION, SOLUTION EPIDURAL; INFILTRATION; INTRACAUDAL ONCE
Status: COMPLETED | OUTPATIENT
Start: 2019-11-04 | End: 2019-11-04

## 2019-11-04 RX ORDER — TRIAMCINOLONE ACETONIDE 40 MG/ML
40 INJECTION, SUSPENSION INTRA-ARTICULAR; INTRAMUSCULAR ONCE
Status: COMPLETED | OUTPATIENT
Start: 2019-11-04 | End: 2019-11-04

## 2019-11-04 RX ORDER — IOPAMIDOL 408 MG/ML
20 INJECTION, SOLUTION INTRATHECAL ONCE
Status: COMPLETED | OUTPATIENT
Start: 2019-11-04 | End: 2019-11-04

## 2019-11-04 RX ORDER — LIDOCAINE HYDROCHLORIDE 10 MG/ML
30 INJECTION, SOLUTION EPIDURAL; INFILTRATION; INTRACAUDAL; PERINEURAL ONCE
Status: COMPLETED | OUTPATIENT
Start: 2019-11-04 | End: 2019-11-04

## 2019-11-04 RX ADMIN — LIDOCAINE HYDROCHLORIDE 5 ML: 10 INJECTION, SOLUTION EPIDURAL; INFILTRATION; INTRACAUDAL; PERINEURAL at 13:42

## 2019-11-04 RX ADMIN — BUPIVACAINE HYDROCHLORIDE 5 MG: 2.5 INJECTION, SOLUTION EPIDURAL; INFILTRATION; INTRACAUDAL at 13:42

## 2019-11-04 RX ADMIN — IOPAMIDOL 2 ML: 408 INJECTION, SOLUTION INTRATHECAL at 13:42

## 2019-11-04 RX ADMIN — TRIAMCINOLONE ACETONIDE 40 MG: 40 INJECTION, SUSPENSION INTRA-ARTICULAR; INTRAMUSCULAR at 13:42

## 2019-11-11 ENCOUNTER — NURSE TRIAGE (OUTPATIENT)
Dept: NURSING | Facility: CLINIC | Age: 54
End: 2019-11-11

## 2019-11-11 NOTE — TELEPHONE ENCOUNTER
Pt had a Cortizone shot last week for the right hip.     The pain in the right hip seems to be increasing.     Pt is having a hard time walking around.   Pt is using a cane to ambulate with for stability.     Pt is not able to use her right leg for pushing the gas pedal or brake pedal when driving.   So, her  has to drive the car.    Pt is not sleeping at night because of the right hip pain.       Pt will be going on vacation in Feb 2020 for a couple of weeks.   Wondering if she needs to have right hip replacement surgery? Or, if there is any other suggestions for relieving the right hip pain.       Plan of care  Call Pt back for further advise and recommendations for right hip pain and Cortizone injection.      [phone]    Lorraine Gilman RN  Central Triage Red Flags/Med Refills    Additional Information    Negative: Looks like a broken bone or dislocated joint (e.g., crooked or deformed)    Negative: Sounds like a life-threatening emergency to the triager    Negative: Followed a hip injury    Negative: Leg pain is main symptom    Negative: Back pain radiating (shooting) into hip    Negative: SEVERE pain (e.g., excruciating, unable to do any normal activities) and fever    Negative: Can't stand (bear weight) or walk    Negative: Fever and red area (or area very tender to touch)    Negative: Patient sounds very sick or weak to the triager    Negative: SEVERE pain (e.g., excruciating, unable to do any normal activities)    Negative: Red area or streak and large (> 2 in. or 5 cm)    Negative: Painful rash with multiple small blisters grouped together (i.e., dermatomal distribution or 'band' or 'stripe')    Negative: Looks like a boil, infected sore, deep ulcer, or other infected rash (spreading redness, pus)    Negative: Localized rash is very painful (no fever)    Negative: Numbness in a leg or foot (i.e., loss of sensation)    Negative: Patient wants to be seen    MODERATE pain (e.g., interferes with normal  activities, limping) and present > 3 days    Protocols used: HIP PAIN-A-OH

## 2019-11-25 ENCOUNTER — OFFICE VISIT (OUTPATIENT)
Dept: ORTHOPEDICS | Facility: CLINIC | Age: 54
End: 2019-11-25
Payer: COMMERCIAL

## 2019-11-25 ENCOUNTER — PREP FOR PROCEDURE (OUTPATIENT)
Dept: ORTHOPEDICS | Facility: CLINIC | Age: 54
End: 2019-11-25

## 2019-11-25 DIAGNOSIS — M16.11 PRIMARY OSTEOARTHRITIS OF RIGHT HIP: Primary | ICD-10-CM

## 2019-11-25 PROBLEM — H93.8X3 SENSATION OF PLUGGED EAR ON BOTH SIDES: Status: ACTIVE | Noted: 2017-07-12

## 2019-11-25 ASSESSMENT — HOOS S4: HOW SEVERE IS YOUR HIP JOINT STIFFNESS AFTER FIRST WAKENING IN THE MORNING?: SEVERE

## 2019-11-25 ASSESSMENT — ACTIVITIES OF DAILY LIVING (ADL)
ADL_MEAN: 2.17
ADL_SUM: 37
ADL_SUBSCALE_SCORE: 45.58

## 2019-11-25 ASSESSMENT — ENCOUNTER SYMPTOMS
MUSCLE WEAKNESS: 1
STIFFNESS: 1
NECK PAIN: 0
MUSCLE CRAMPS: 0
ARTHRALGIAS: 1
BACK PAIN: 1
JOINT SWELLING: 0
MYALGIAS: 1

## 2019-11-25 NOTE — LETTER
11/25/2019       RE: Marbella Hendrix  [address]  [facility] [address]     Dear Colleague,    Thank you for referring your patient, Marbella Hendrix, to the [facility] at [facility]. Please see a copy of my visit note below.    [facility]  Orthopaedic Surgery, Joint Replacement Consultation  by Filemon Lopez M.D.    Marbella Hendrix MRN# [id number]   Age: 54 year old YOB: 1965     Requesting physician: Vanessa Benjamin     Background history:  DX:  1. Fibroids     TREATMENTS:  1. Multiple fibroid excisions and eventually hysterectomy.             History of Present Illness:   54 year old female who is here in follow up for evaluation for total right hip replacement. Today she is very interested in having surgery and would like to know the earliest time she can get her hip replaced. Notes that she does not want to have any more steroid injections and the most recent injection did not give her any relief. No further concerns at this time.           Physical Exam:     EXAMINATION pertinent findings:   MUSCULOSKELETAL:   Examined patient for purposes of hip exposure given abdominal peniculus.           Assessment and Plan:   Assessment:  1. 54-year-old female with debilitating pain of her right hip due to osteoarthritis changes.   2. I discussed the risks, benefits, alternatives regarding the proposed surgery with the patient who both demonstrated understanding and indicated a desire to proceed with the surgery as planned. The biggest risks include dislocation, infection, and blood clots.  3. Recommended that she wait 3 months since her last injection to get the surgery.      Plan:  1. She will have to complete preoperative evaluations from her PCP, anesthesiologist, dentist, and go to a surgical information meeting to get more information on the procedure and recovery process.   2. We will proceed with total  right hip arthroplasty on a date at least 3 months from her most recent hip injection.       Attending MD (Dr. Filemon Lopez) Attestation :  This patient was seen and evaluated by me including a history, exam, and interpretation of all imaging and/or lab data.  Either a training physician (resident/fellow), who also saw the patient, or scribe has documented the clinic visit in the attached note.    MD Essence Porras Family Professor  Oncology and Adult Reconstructive Surgery  Dept Orthopaedic Surgery, [facility]  [phone] office, [phone] pager  [email or web address]    Attending MD (Dr. Filemon Lopez) Attestation :  This patient was seen and evaluated by me including a history, exam, and interpretation of all imaging and/or lab data.  Either a training physician (resident/fellow), who also saw the patient, or scribe has documented the visit in the attached note.    Total Time = 15 min, 50% of which was spent in counseling and coordination of care as documented above.    Scribe Disclosure:  I, Gael Cook, am serving as a scribe to document services personally performed by Filemon Lopez MD at this visit, based upon the provider's statements to me. All documentation has been reviewed by the aforementioned provider prior to being entered into the official medical record.     Again, thank you for allowing me to participate in the care of your patient.      Sincerely,    Filemon Lopez MD

## 2019-11-25 NOTE — NURSING NOTE
Chief Complaint   Patient presents with     Schedule Surgery     Pt. states that she is here to discuss having Right RENU. Pt. states that she had her injection, but that it was not helpful and things seem worse since.        54 year old  1965  [id number] - [place], [place] - [address] [place] [address]    No Known Allergies    Current Outpatient Medications   Medication     CALCIUM PO     levothyroxine (SYNTHROID/LEVOTHROID) 125 MCG tablet     melatonin 5 MG tablet     Omega-3 Fatty Acids (OMEGA-3 FISH OIL PO)     PARoxetine (PAXIL) 20 MG tablet     LORazepam (ATIVAN) 0.5 MG tablet     order for DME     No current facility-administered medications for this visit.            Questionnaires:    HOOS Hip Dysfunction & Osteoarthritis Outcome Questionnaire    Hip Dysfunction & Osteoarthritis Outcome Score (HOOS), English Version LK 2.0 (Roderick Dia, [place] MANSFIELD, 2003) 11/25/2019   S1. Do you feel grinding, hear clicking or any other type of noise from your hip? Often   S2. Difficulties spreading legs wide apart Severe   S3. Difficulties to stride out when walking Severe   S4. How severe is your hip joint stiffness after first wakening in the morning? Severe   S5. How severe is your hip stiffness after sitting, lying or resting LATER IN THE DAY? Extreme   Symptom Count -   Symptom Sum -   Symptom Mean -   Symptom Subscale Score -   P1. How often is your hip painful? Always   P2. Straightening your hip fully -   P3. Bending your hip FULLY -   P4. Walking on a flat surface -   P5. Going up or down stairs -   P6. At night while in bed -   P7. Sitting or lying -   P8. Standing upright -   P9. Walking on a hard surface (asphalt, concrete, etc.) -   P10. Walking on an uneven surface -   Pain Count -   Pain Sum -   Pain Mean -   Pain Subscale Score -   A1. Descending stairs -   A2. Ascending stairs -   A3. Rising from sitting -   A4. Standing -   A5. Bending to the floor/ an object -   A6. Walking on a  flat surface -   A7. Getting in/out of car -   A8. Going shopping -   A9. Putting on socks/stockings -   A10. Rising from bed -   A11. Taking off socks/stockings -   A12. Lying in bed (turning over, maintaining hip position) -   A13. Getting in/out of bed -   A14. Sitting -   A15. Getting on/off toilet -   A16. Heavy domestic duties (moving heavy boxes, scrubbing floors, etc.) -   A17. Light domestic duties (cooking, dusting, etc.) -   ADL Count -   ADL Sum -   ADL Mean -   ADL Subscale Score -   SP1. Squatting -   SP2. Running -   SP3. Twisting/pivoting on loaded leg -   SP4. Walking on uneven surface -   Sports/Rec Count -   Sports/Rec Sum -   Sports/Rec Mean -   Sports/Rec Subscale Score -   Q1. How often are you aware of your hip problem? -   Q2. Have you modified you life style to avoid activities potentially damaging to your hip? -   Q3. How much are you troubled with lack of confidence in your hip? -   Q4. In general, how much difficulty do you have with your hip? -   QOL Count -   QOL Sum -   QOL Mean -   Quality of Life Subscale Score -

## 2019-11-25 NOTE — PROGRESS NOTES
[facility]  Orthopaedic Surgery, Joint Replacement Consultation  by Filemon Lopez M.D.    Marbella Hendrix MRN# [id number]   Age: 54 year old YOB: 1965     Requesting physician: Vanessa Benjamin     Background history:  DX:  1. Fibroids     TREATMENTS:  1. Multiple fibroid excisions and eventually hysterectomy.             History of Present Illness:   54 year old female who is here in follow up for evaluation for total right hip replacement. Today she is very interested in having surgery and would like to know the earliest time she can get her hip replaced. Notes that she does not want to have any more steroid injections and the most recent injection did not give her any relief. No further concerns at this time.           Physical Exam:     EXAMINATION pertinent findings:   MUSCULOSKELETAL:   Examined patient for purposes of hip exposure given abdominal peniculus.           Assessment and Plan:   Assessment:  1. 54-year-old female with debilitating pain of her right hip due to osteoarthritis changes.   2. I discussed the risks, benefits, alternatives regarding the proposed surgery with the patient who both demonstrated understanding and indicated a desire to proceed with the surgery as planned. The biggest risks include dislocation, infection, and blood clots.  3. Recommended that she wait 3 months since her last injection to get the surgery.      Plan:  1. She will have to complete preoperative evaluations from her PCP, anesthesiologist, dentist, and go to a surgical information meeting to get more information on the procedure and recovery process.   2. We will proceed with total right hip arthroplasty on a date at least 3 months from her most recent hip injection.       Attending MD (Dr. Filemon Lopez) Attestation :  This patient was seen and evaluated by me including a history, exam, and interpretation of all imaging and/or lab data.  Either a training physician  (resident/fellow), who also saw the patient, or scribe has documented the clinic visit in the attached note.    MD Essence Porras Family   Oncology and Adult Reconstructive Surgery  Dept Orthopaedic Surgery, [facility]  [phone] office, [phone] pager  [email or web address]    Attending MD (Dr. Filemon Lopez) Attestation :  This patient was seen and evaluated by me including a history, exam, and interpretation of all imaging and/or lab data.  Either a training physician (resident/fellow), who also saw the patient, or scribe has documented the visit in the attached note.    MD Essence Porras Family   Oncology and Adult Reconstructive Surgery  [facility] Orthopaedic Surgery, [facility]  [phone] office, [phone] pager  [email or web address]    Total Time = 15 min, 50% of which was spent in counseling and coordination of care as documented above.    Scribe Disclosure:  I, Gael Cook, am serving as a scribe to document services personally performed by Filemon Lopez MD at this visit, based upon the provider's statements to me. All documentation has been reviewed by the aforementioned provider prior to being entered into the official medical record.       Answers for HPI/ROS submitted by the patient on 11/25/2019   General Symptoms: No  Skin Symptoms: No  HENT Symptoms: No  EYE SYMPTOMS: No  HEART SYMPTOMS: No  LUNG SYMPTOMS: No  INTESTINAL SYMPTOMS: No  URINARY SYMPTOMS: No  GYNECOLOGIC SYMPTOMS: No  BREAST SYMPTOMS: No  SKELETAL SYMPTOMS: Yes  BLOOD SYMPTOMS: No  NERVOUS SYSTEM SYMPTOMS: No  MENTAL HEALTH SYMPTOMS: No  Back pain: Yes  Muscle aches: Yes  Neck pain: No  Swollen joints: No  Joint pain: Yes  Bone pain: Yes  Muscle cramps: No  Muscle weakness: Yes  Joint stiffness: Yes  Bone fracture: No

## 2019-11-25 NOTE — NURSING NOTE
Teaching Flowsheet   Relevant Diagnosis:osteoarthritis   Teaching Topic: Right RENU     Person(s) involved in teaching:   Patient     Motivation Level:  Asks Questions: Yes  Eager to Learn: Yes  Cooperative: Yes  Receptive (willing/able to accept information): Yes  Any cultural factors/Jainism beliefs that may influence understanding or compliance? No       Patient demonstrates understanding of the following:  Reason for the appointment, diagnosis and treatment plan: Yes  Knowledge of proper use of medications and conditions for which they are ordered (with special attention to potential side effects or drug interactions): Yes  Which situations necessitate calling provider and whom to contact: Yes       Teaching Concerns Addressed:  H& P, dental work prior, showering, joint replacement class & booklet, ABX in future, pre-anesthesia consult.        Nutritional needs and diet plan: Yes  Pain management techniques: Yes  Wound Care: Yes  How and/when to access community resources: Yes     Instructional Materials Used/Given: surgery folder, joint replacement booklet. Hibiclens soap.     Time spent with patient: 15 minutes.

## 2019-11-26 ENCOUNTER — TELEPHONE (OUTPATIENT)
Dept: ORTHOPEDICS | Facility: CLINIC | Age: 54
End: 2019-11-26

## 2019-11-26 NOTE — TELEPHONE ENCOUNTER
Patient is scheduled for surgery with Dr. Lopez    Spoke or left message with: Patient    Date of Surgery: 2/21/20    Location: [facility]    Post op: 4 weeks    Pre-op with surgeon (if applicable): Complete    H&P: Patient will schedule with PCP in the first week of February    Additional imaging/appointments: N/A    Surgery packet: Received in clinic     Additional comments: N/A

## 2019-12-02 ENCOUNTER — HOSPITAL ENCOUNTER (INPATIENT)
Facility: CLINIC | Age: 54
Setting detail: SURGERY ADMIT
End: 2019-12-02
Attending: ORTHOPAEDIC SURGERY | Admitting: ORTHOPAEDIC SURGERY
Payer: COMMERCIAL

## 2019-12-02 DIAGNOSIS — M16.11 PRIMARY OSTEOARTHRITIS OF RIGHT HIP: ICD-10-CM

## 2019-12-17 ENCOUNTER — ANCILLARY PROCEDURE (OUTPATIENT)
Dept: MAMMOGRAPHY | Facility: CLINIC | Age: 54
End: 2019-12-17
Attending: FAMILY MEDICINE
Payer: COMMERCIAL

## 2019-12-17 DIAGNOSIS — Z12.31 SCREENING MAMMOGRAM, ENCOUNTER FOR: ICD-10-CM

## 2020-02-04 ENCOUNTER — OFFICE VISIT (OUTPATIENT)
Dept: FAMILY MEDICINE | Facility: CLINIC | Age: 55
End: 2020-02-04
Payer: COMMERCIAL

## 2020-02-04 VITALS
HEIGHT: 64 IN | WEIGHT: 208.19 LBS | SYSTOLIC BLOOD PRESSURE: 110 MMHG | RESPIRATION RATE: 16 BRPM | BODY MASS INDEX: 35.54 KG/M2 | OXYGEN SATURATION: 97 % | TEMPERATURE: 97.3 F | DIASTOLIC BLOOD PRESSURE: 58 MMHG | HEART RATE: 82 BPM

## 2020-02-04 DIAGNOSIS — M16.11 ARTHRITIS OF RIGHT HIP: ICD-10-CM

## 2020-02-04 DIAGNOSIS — Z01.818 PREOP GENERAL PHYSICAL EXAM: Primary | ICD-10-CM

## 2020-02-04 LAB
ERYTHROCYTE [DISTWIDTH] IN BLOOD BY AUTOMATED COUNT: 14 % (ref 10–15)
HCT VFR BLD AUTO: 38.1 % (ref 35–47)
HGB BLD-MCNC: 12.6 G/DL (ref 11.7–15.7)
MCH RBC QN AUTO: 28.2 PG (ref 26.5–33)
MCHC RBC AUTO-ENTMCNC: 33.1 G/DL (ref 31.5–36.5)
MCV RBC AUTO: 85 FL (ref 78–100)
PLATELET # BLD AUTO: 259 10E9/L (ref 150–450)
RBC # BLD AUTO: 4.47 10E12/L (ref 3.8–5.2)
WBC # BLD AUTO: 6.6 10E9/L (ref 4–11)

## 2020-02-04 PROCEDURE — 99214 OFFICE O/P EST MOD 30 MIN: CPT | Performed by: FAMILY MEDICINE

## 2020-02-04 PROCEDURE — 84443 ASSAY THYROID STIM HORMONE: CPT | Performed by: FAMILY MEDICINE

## 2020-02-04 PROCEDURE — 36415 COLL VENOUS BLD VENIPUNCTURE: CPT | Performed by: FAMILY MEDICINE

## 2020-02-04 PROCEDURE — 85027 COMPLETE CBC AUTOMATED: CPT | Performed by: FAMILY MEDICINE

## 2020-02-04 ASSESSMENT — MIFFLIN-ST. JEOR: SCORE: 1530.36

## 2020-02-04 NOTE — NURSING NOTE
"Chief Complaint   Patient presents with     Pre-Op Exam     /58 (BP Location: Left arm, Patient Position: Sitting, Cuff Size: Adult Large)   Pulse 82   Temp 97.3  F (36.3  C) (Oral)   Resp 16   Ht 1.619 m (5' 3.75\")   Wt 94.4 kg (208 lb 3 oz)   LMP 08/28/2015 (Approximate)   SpO2 97%   Breastfeeding No   BMI 36.02 kg/m   Estimated body mass index is 36.02 kg/m  as calculated from the following:    Height as of this encounter: 1.619 m (5' 3.75\").    Weight as of this encounter: 94.4 kg (208 lb 3 oz).  bp completed using cuff size: large      Health Maintenance addressed:  NONE    N/a  America Almanza CMA on 2/4/2020 at 11:56 AM                "

## 2020-02-04 NOTE — PROGRESS NOTES
[facility]  [address]  [facility] [address] [phone]  Dept: [phone]    PRE-OP EVALUATION:  Today's date: 2020    Marbella Hendrix (: 1965) presents for pre-operative evaluation assessment as requested by Dr. Filemon Lopez.  She requires evaluation and anesthesia risk assessment prior to undergoing surgery/procedure for treatment of Right hip Replacement .    Fax number for surgical facility: [facility]  Primary Physician: Vanessa Ladd  Type of Anesthesia Anticipated: General    Patient has a Health Care Directive or Living Will:  NO    Preop Questions 2020   Who is doing your surgery? Dr. Filemon Lopez   What are you having done? Right hip replacement   Date of Surgery/Procedure: 2020   Facility or Hospital where procedure/surgery will be performed: [facility]   1.  Do you have a history of Heart attack, stroke, stent, coronary bypass surgery, or other heart surgery? No   2.  Do you ever have any pain or discomfort in your chest? No   3.  Do you have a history of  Heart Failure? No   4.   Are you troubled by shortness of breath when:  walking on a level surface, or up a slight hill, or at night? No   5.  Do you currently have a cold, bronchitis or other respiratory infection? No   6.  Do you have a cough, shortness of breath, or wheezing? No   7.  Do you sometimes get pains in the calves of your legs when you walk? No   8. Do you or anyone in your family have previous history of blood clots? No   9.  Do you or does anyone in your family have a serious bleeding problem such as prolonged bleeding following surgeries or cuts? No   10. Have you ever had problems with anemia or been told to take iron pills? No   11. Have you had any abnormal blood loss such as black, tarry or bloody stools, or abnormal vaginal bleeding? No   12. Have you ever had a blood transfusion? No   13. Have you or any of your relatives ever had problems with anesthesia? No   14.  Do you have sleep apnea, excessive snoring or daytime drowsiness? No   15. Do you have any prosthetic heart valves? No   16. Do you have prosthetic joints? No   17. Is there any chance that you may be pregnant? No         HPI:     HPI related to upcoming procedure:             MEDICAL HISTORY:     Patient Active Problem List    Diagnosis Date Noted     Primary osteoarthritis of right hip 2019     Priority: Medium     Added automatically from request for surgery [id number]       Morbid obesity (H) 2018     Priority: Medium     Status post hysterectomy 2017     Priority: Medium     Sensation of plugged ear on both sides 2017     Priority: Medium     Acute post-operative pain 2015     Priority: Medium     Preventative health care 10/30/2012     Priority: Medium     Last pap NIL ; mammogram nl ; lipids abnl, needs annual f/u       Health Care Home 2012     Priority: Medium     Tier 1    DX V65.8 REPLACED WITH 14439 HEALTH CARE HOME (2013)       Generalized anxiety disorder 2012     Priority: Medium     H/o panic attacks; currently controlled with paxil       Hyperlipidemia LDL goal <160 2012     Priority: Medium     Behavioral measures - avoiding statins while trying to conceive; not a candidate for red yeast rice (interactions with thyroid meds)       Acquired hypothyroidism 2012     Priority: Medium     Overweight 2012     Priority: Medium     On exercise plan, has been in weight watchers  Problem list name updated by automated process. Provider to review        Past Medical History:   Diagnosis Date     Arthritis     My mom has it, my grandmother had it I have it     Diabetes (H)     My mom and brother have type II     Heavy menstrual period      Leiomyoma of uterus     s/p myomectomy     Mental disorder     anxiety     PONV (postoperative nausea and vomiting)      Surgical complication after   sept     Uncomplicated asthma     My son  has it, since he was born     Vesico-ureteral reflux     s/p repair     Past Surgical History:   Procedure Laterality Date     ABDOMEN SURGERY      exploratory after C section for sepsis      SECTION      x 2     HYSTERECTOMY RADICAL  2015     HYSTERECTOMY SUPRACERVICAL N/A 2015    Procedure: HYSTERECTOMY SUPRACERVICAL;  Surgeon: Kelle Wasserman MD;  Location: [facility] OR     LUMPECTOMY BREAST       MYOMECTOMY UTERUS       Current Outpatient Medications   Medication Sig Dispense Refill     levothyroxine (SYNTHROID/LEVOTHROID) 125 MCG tablet Take 1 tablet (125 mcg) by mouth daily 90 tablet 2     melatonin 5 MG tablet Take 5 mg by mouth nightly as needed for sleep       Multiple Vitamins-Minerals (MULTIVITAMIN ADULT PO)        Omega-3 Fatty Acids (OMEGA-3 FISH OIL PO) Take 1 g by mouth daily       PARoxetine (PAXIL) 20 MG tablet TAKE ONE-HALF TABLET BY MOUTH TWICE A DAY 90 tablet 1     LORazepam (ATIVAN) 0.5 MG tablet Use one tablet as needed for flying (Patient not taking: Reported on 2019) 8 tablet 0     order for DME Equipment being ordered: DME (Patient not taking: Reported on 10/14/2019) 1 Device 0     OTC products: None, except as noted above    Not on File   Latex Allergy: NO    Social History     Tobacco Use     Smoking status: Former Smoker     Packs/day: 0.00     Years: 0.00     Pack years: 0.00     Types: Cigarettes     Last attempt to quit: 1990     Years since quittin.1     Smokeless tobacco: Never Used   Substance Use Topics     Alcohol use: Yes     Comment: 1-2 drinks a month     History   Drug Use Unknown       REVIEW OF SYSTEMS:   Constitutional, neuro, ENT, endocrine, pulmonary, cardiac, gastrointestinal, genitourinary, musculoskeletal, integument and psychiatric systems are negative, except as otherwise noted.    EXAM:   /58 (BP Location: Left arm, Patient Position: Sitting, Cuff Size: Adult Large)   Pulse 82   Temp 97.3  F (36.3  C) (Oral)   Resp 16  "   1.619 m (5' 3.75\")   Wt 94.4 kg (208 lb 3 oz)   LMP 08/28/2015 (Approximate)   SpO2 97%   Breastfeeding No   BMI 36.02 kg/m      GENERAL APPEARANCE: healthy, alert and no distress     EYES: EOMI, PERRL     HENT: ear canals and TM's normal and nose and mouth without ulcers or lesions     NECK: no adenopathy, no asymmetry, masses, or scars and thyroid normal to palpation     RESP: lungs clear to auscultation - no rales, rhonchi or wheezes     CV: regular rates and rhythm, normal S1 S2, no S3 or S4 and no murmur, click or rub     ABDOMEN:  soft, nontender, no HSM or masses and bowel sounds normal     MS: extremities normal- no gross deformities noted, no evidence of inflammation in joints, FROM in all extremities.     SKIN: no suspicious lesions or rashes     NEURO: Normal strength and tone, sensory exam grossly normal, mentation intact and speech normal     PSYCH: mentation appears normal. and affect normal/bright     LYMPHATICS: No cervical adenopathy    DIAGNOSTICS:     EKG: Not indicated due to non-vascular surgery and low risk of event (age <65 and without cardiac risk factors)  Labs Drawn and in Process:   Unresulted Labs Ordered in the Past 30 Days of this Admission     No orders found from 1/5/2020 to 2/5/2020.          Recent Labs   Lab Test 06/12/19  1024 06/12/19  0921 06/27/18  0727  08/29/15  0651  08/28/15  0850  10/22/12  2341   HGB 13.3  --   --   --  13.0   < > 15.1   < > 11.6*     --   --   --   --   --   --   --  314   NA  --   --   --   --   --   --   --   --  141   POTASSIUM  --   --   --   --   --   --   --   --  3.8   CR  --   --   --   --   --   --  0.65  --  0.77   A1C  --  5.6 5.5   < >  --   --   --   --   --     < > = values in this interval not displayed.        IMPRESSION:   Reason for surgery/procedure: Right hip arthritis ,Total Hip Replacement surgery   Diagnosis/reason for consult: Preoperative clearing     The proposed surgical procedure is considered INTERMEDIATE " risk.    REVISED CARDIAC RISK INDEX  The patient has the following serious cardiovascular risks for perioperative complications such as (MI, PE, VFib and 3  AV Block):  No serious cardiac risks      The patient has the following additional risks for perioperative complications:  No identified additional risks      ICD-10-CM    1. Preop general physical exam Z01.818 TSH with free T4 reflex     CBC with platelets   2. Arthritis of right hip M16.11        RECOMMENDATIONS:     --Consult hospital rounder / IM to assist post-op medical management    --Patient is to take all scheduled medications on the day of surgery EXCEPT for modifications listed below.    APPROVAL GIVEN to proceed with proposed procedure, without further diagnostic evaluation       Signed Electronically by: Vanessa Ladd MD    Copy of this evaluation report is provided to requesting physician.    [facility] Preop Guidelines    Revised Cardiac Risk Index

## 2020-02-05 ENCOUNTER — MYC MEDICAL ADVICE (OUTPATIENT)
Dept: FAMILY MEDICINE | Facility: CLINIC | Age: 55
End: 2020-02-05

## 2020-02-05 LAB — TSH SERPL DL<=0.005 MIU/L-ACNC: 1.77 MU/L (ref 0.4–4)

## 2020-02-06 ENCOUNTER — NURSE TRIAGE (OUTPATIENT)
Dept: NURSING | Facility: CLINIC | Age: 55
End: 2020-02-06

## 2020-02-06 NOTE — TELEPHONE ENCOUNTER
LS,    Please see [employer] message below.  We have applications here.    Ok to do this?  If so can put application on your desk.    Thanks,  Mikaela Niño RN

## 2020-02-06 NOTE — TELEPHONE ENCOUNTER
Patient asking about status of form.  FNA advised form place on her desk and message routed to her.  Caller verbalizes understanding.    Reason for Disposition    [1] Follow-up call to recent contact AND [2] information only call, no triage required    Additional Information    Negative: [1] Caller is not with the adult (patient) AND [2] reporting urgent symptoms    Negative: Lab result questions    Negative: Medication questions    Negative: Caller can't be reached by phone    Negative: Caller has already spoken to PCP or another triager    Negative: RN needs further essential information from caller in order to complete triage    Negative: Requesting regular office appointment    Negative: [1] Caller requesting NON-URGENT health information AND [2] PCP's office is the best resource    Negative: [1] Caller is not with the adult (patient) AND [2] probable NON-URGENT symptoms    Negative: Question about upcoming scheduled test, no triage required and triager able to answer question    Negative: General information question, no triage required and triager able to answer question    Negative: Health Information question, no triage required and triager able to answer question    Protocols used: INFORMATION ONLY CALL-A-

## 2020-02-06 NOTE — TELEPHONE ENCOUNTER
Form placed at your desk     Please complete - We can then contact pt for .    Thanks!    Carine Frey MA

## 2020-02-06 NOTE — TELEPHONE ENCOUNTER
Team,   Please see below - can you start Handicap application for LS and place on her desk?  Thanks,  Caroline COLLINS RN

## 2020-02-10 NOTE — PROGRESS NOTES
SURGERY PLAN (PRE-OP PLAN)     Patient Position (indicated by x):  X  Lateral decubitus, Wixson hip positioner   x  Regular OR table   x  Davison catheter   X  Revision RENU drape with plastic side bags for leg   X  Blue U drape x2   Blue and white stockinet   Coban   Ioban      RENU Requests (indicated by x):    Echo BiMetric stems   X  Biomet TAPERLOC ingrowth stem      Biomet Integral cemented stem   X  Biomet RB cup, 32 heads     G7 cup      Biomet Bipolar cup      Specimens and cultures (indicated by x):      Tissue cultures, aerobic and anaerobic without gram stain      Frozen section      pathology specimens - fresh      pathology specimens - formalin      Summary:   54-year-old female with debilitating pain of her right hip due to osteoarthritis changes    Plan:   RENU right.      Andrews Barreto MD  N Arthroplasty Fellow

## 2020-02-14 RX ORDER — CEFAZOLIN SODIUM 1 G/50ML
2 SOLUTION INTRAVENOUS
Status: CANCELLED | OUTPATIENT
Start: 2020-02-21

## 2020-02-20 ENCOUNTER — OFFICE VISIT (OUTPATIENT)
Dept: FAMILY MEDICINE | Facility: CLINIC | Age: 55
End: 2020-02-20
Payer: COMMERCIAL

## 2020-02-20 ENCOUNTER — TELEPHONE (OUTPATIENT)
Dept: ORTHOPEDICS | Facility: CLINIC | Age: 55
End: 2020-02-20

## 2020-02-20 VITALS
WEIGHT: 199 LBS | BODY MASS INDEX: 34.43 KG/M2 | HEART RATE: 72 BPM | DIASTOLIC BLOOD PRESSURE: 63 MMHG | OXYGEN SATURATION: 97 % | SYSTOLIC BLOOD PRESSURE: 100 MMHG | TEMPERATURE: 99 F

## 2020-02-20 DIAGNOSIS — J40 BRONCHITIS: ICD-10-CM

## 2020-02-20 DIAGNOSIS — J32.9 SINUSITIS, UNSPECIFIED CHRONICITY, UNSPECIFIED LOCATION: ICD-10-CM

## 2020-02-20 DIAGNOSIS — M79.10 MYALGIA: Primary | ICD-10-CM

## 2020-02-20 LAB
FLUAV+FLUBV AG SPEC QL: NEGATIVE
FLUAV+FLUBV AG SPEC QL: NEGATIVE
SPECIMEN SOURCE: NORMAL

## 2020-02-20 PROCEDURE — 99214 OFFICE O/P EST MOD 30 MIN: CPT | Performed by: NURSE PRACTITIONER

## 2020-02-20 PROCEDURE — 87804 INFLUENZA ASSAY W/OPTIC: CPT | Performed by: NURSE PRACTITIONER

## 2020-02-20 RX ORDER — AZITHROMYCIN 250 MG/1
TABLET, FILM COATED ORAL
Qty: 6 TABLET | Refills: 0 | Status: SHIPPED | OUTPATIENT
Start: 2020-02-20 | End: 2020-04-21

## 2020-02-20 NOTE — OR NURSING
"Spoke to patient this afternoon regarding today's visit to [place] to have her URI symptoms assessed and pre-op clearance for tomorrow's 2/21/20 scheduled procedure with Dr Lopez. Patient stated she \"was not cleared for tomorrow's procedure and will have to reschedule\". She will be calling Dr Lopez's 's today to verbalize her cancelling of tomorrow's procedure; she has their contact information. Dr Lopez's office has been made aware that patient will be contacting them to cancel and reschedule.  "

## 2020-02-20 NOTE — PROGRESS NOTES
Subjective     Marbella Hendrix is a 54 year old female who presents to clinic today for the following health issues:    HPI   RESPIRATORY SYMPTOMS      Duration: 2 weeks ago developed respiratory infection , has been very stressed and traveling and very run down and just hasn't been able get well.  Has hip arthroplasty scheduled tomorrow    Description  nasal congestion, rhinorrhea, sore throat, facial pain/pressure, right ear pain,  cough, chills, ear pain right, headache, fatigue/malaise, hoarse voice, myalgias and diarrhea    Severity: moderate    Accompanying signs and symptoms: low grade fever 2 hours after taking NSAID aleve    History (predisposing factors):  none    Precipitating or alleviating factors: None    Therapies tried and outcome:  rest and fluids antihistamine acetaminophen OTC NSAID    Patient Active Problem List   Diagnosis     Health Care Home     Generalized anxiety disorder     Hyperlipidemia LDL goal <160     Acquired hypothyroidism     Overweight     Preventative health care     Acute post-operative pain     Status post hysterectomy     Morbid obesity (H)     Sensation of plugged ear on both sides     Primary osteoarthritis of right hip     Past Surgical History:   Procedure Laterality Date     ABDOMEN SURGERY      exploratory after C section for sepsis      SECTION      x 2     HYSTERECTOMY RADICAL  2015     HYSTERECTOMY SUPRACERVICAL N/A 2015    Procedure: HYSTERECTOMY SUPRACERVICAL;  Surgeon: Kelle Wasserman MD;  Location: [facility] OR     LUMPECTOMY BREAST       MYOMECTOMY UTERUS         Social History     Tobacco Use     Smoking status: Former Smoker     Packs/day: 0.00     Years: 0.00     Pack years: 0.00     Types: Cigarettes     Last attempt to quit: 1990     Years since quittin.1     Smokeless tobacco: Never Used   Substance Use Topics     Alcohol use: Yes     Comment: 1-2 drinks a month     Family History   Problem Relation Age of Onset      High cholesterol Father      Cancer Father         CLL     Blood Disease Father         CLL     Breast Cancer Maternal Grandmother         in 80s     Cancer Maternal Grandmother      Depression Mother      Diabetes Mother      Ulcerative Colitis Mother      Lipids Mother         hypertriglyceridemia     Diabetes Brother          Current Outpatient Medications   Medication Sig Dispense Refill     levothyroxine (SYNTHROID/LEVOTHROID) 125 MCG tablet Take 1 tablet (125 mcg) by mouth daily 90 tablet 2     melatonin 5 MG tablet Take 5 mg by mouth nightly as needed for sleep       Multiple Vitamins-Minerals (MULTIVITAMIN ADULT PO)        Omega-3 Fatty Acids (OMEGA-3 FISH OIL PO) Take 1 g by mouth daily       PARoxetine (PAXIL) 20 MG tablet TAKE ONE-HALF TABLET BY MOUTH TWICE A DAY 90 tablet 1     No Known Allergies    Reviewed and updated as needed this visit by Provider         Review of Systems   ROS COMP: Constitutional, HEENT, cardiovascular, pulmonary, GI, , musculoskeletal, neuro, skin, endocrine and psych systems are negative, except as otherwise noted.      Objective    /63 (BP Location: Right arm, Cuff Size: Adult Large)   Pulse 72   Temp 99  F (37.2  C) (Oral)   Wt 90.3 kg (199 lb)   LMP 08/28/2015 (Approximate)   SpO2 97%   BMI 34.43 kg/m      Physical Exam   GENERAL:  alert and mild to mod distress  EYES: Eyes grossly normal to inspection, PERRL and conjunctivae and sclerae normal  HENT: ear canals and TM's normal, nose and mouth without ulcers or lesions  NECK: no adenopathy, no asymmetry, masses, or scars and thyroid normal to palpation  RESP: lungs clear to auscultation - no rales, rhonchi or wheezes  CV: regular rate and rhythm, normal S1 S2, no S3 or S4, no murmur, click or rub,   PSYCH: mentation appears normal, affect normal/bright    Diagnostic Test Results:  Labs reviewed in Epic        Assessment & Plan       ICD-10-CM    1. Myalgia M79.10 Influenza A/B antigen   2. Bronchitis J40  azithromycin 250 MG PO tablet   3. Sinusitis, unspecified chronicity, unspecified location J32.9      I've advised Marbella to postpone her surgery for a week to 10 days due to low grade temp 2 weeks after onset of respiratory illness     Patient Instructions   Take plain mucinex 1200mg twice a day  Reschedule surgery for 10 days out  Take the antibiotic as prescribed       MCKENNA Bass [facility]

## 2020-02-20 NOTE — PATIENT INSTRUCTIONS
Take plain mucinex 1200mg twice a day  Reschedule surgery for 10 days out  Take the antibiotic as prescribed

## 2020-02-20 NOTE — TELEPHONE ENCOUNTER
"Received call from patient requesting to know her arrival time for surgery with Dr Lopez on 2/21/20.    Patient was phoned back, told she will receive a call from pre-admissions to go over her prep instructions, NPO, and arrival time later today. Patient reported that she has been sick with flu symptoms for the past two weeks, but today feels like she could be \"well enough\" to have surgery. She reports to be afebrile. I told Marbella that legally I am not able to tell her one way or the other if she can have surgery, and that she should discuss it with the nurse when she calls her today. Patient agreed. Stated she can be reached on either number, home or cell.  "

## 2020-03-23 ENCOUNTER — TELEPHONE (OUTPATIENT)
Dept: ORTHOPEDICS | Facility: CLINIC | Age: 55
End: 2020-03-23

## 2020-04-16 ENCOUNTER — MYC MEDICAL ADVICE (OUTPATIENT)
Dept: FAMILY MEDICINE | Facility: CLINIC | Age: 55
End: 2020-04-16

## 2020-04-16 NOTE — TELEPHONE ENCOUNTER
LS    Please see [employer] message below.  Do you want to do a telephone visit?    Mikaela Niño RN

## 2020-04-21 ENCOUNTER — VIRTUAL VISIT (OUTPATIENT)
Dept: FAMILY MEDICINE | Facility: CLINIC | Age: 55
End: 2020-04-21
Payer: COMMERCIAL

## 2020-04-21 DIAGNOSIS — E66.01 MORBID OBESITY (H): ICD-10-CM

## 2020-04-21 DIAGNOSIS — M16.11 ARTHRITIS OF RIGHT HIP: Primary | ICD-10-CM

## 2020-04-21 PROCEDURE — 99213 OFFICE O/P EST LOW 20 MIN: CPT | Mod: TEL | Performed by: FAMILY MEDICINE

## 2020-04-21 NOTE — PROGRESS NOTES
"Marbella Hendrix is a 54 year old female who is being evaluated via a billable telephone visit.      The patient has been notified of following:     \"This telephone visit will be conducted via a call between you and your physician/provider. We have found that certain health care needs can be provided without the need for a physical exam.  This service lets us provide the care you need with a short phone conversation.  If a prescription is necessary we can send it directly to your pharmacy.  If lab work is needed we can place an order for that and you can then stop by our lab to have the test done at a later time.    Telephone visits are billed at different rates depending on your insurance coverage. During this emergency period, for some insurers they may be billed the same as an in-person visit.  Please reach out to your insurance provider with any questions.    If during the course of the call the physician/provider feels a telephone visit is not appropriate, you will not be charged for this service.\"    Patient has given verbal consent for Telephone visit?  Yes    How would you like to obtain your AVS? Ch[employer]al[employer]    Subjective     Marbella Hendrix is a 54 year old female who presents to clinic today for the following health issues:    Patient would like to discuss forms       Pt was supposed to have total hip replacement surgery but because of the COVID 19 pandemic , surgery has been cancelled twice as it is not an urgent one , she is suffering with the hip pain zander with standing and walking , so spends most of her days sitting and laying down with the hip elevated that is the only position that provides relief for her . Also, I had written for her to get handicap parking until after the surgery but this , she does not know when the surgery will be but is thinking she would need to have the handicap sticker even after the surgery when she is in rehab and after unti l she recovers. She wants me to fill " out the handicap parking forms again and extend the timeframe for her as she is awaiting surgery     Patient Active Problem List   Diagnosis     Health Care Home     Generalized anxiety disorder     Hyperlipidemia LDL goal <160     Acquired hypothyroidism     Overweight     Preventative health care     Acute post-operative pain     Status post hysterectomy     Morbid obesity (H)     Sensation of plugged ear on both sides     Primary osteoarthritis of right hip     Past Surgical History:   Procedure Laterality Date     ABDOMEN SURGERY      exploratory after C section for sepsis      SECTION      x 2     HYSTERECTOMY RADICAL  2015     HYSTERECTOMY SUPRACERVICAL N/A 2015    Procedure: HYSTERECTOMY SUPRACERVICAL;  Surgeon: Kelle Wasserman MD;  Location: [facility] OR     LUMPECTOMY BREAST       MYOMECTOMY UTERUS         Social History     Tobacco Use     Smoking status: Former Smoker     Packs/day: 0.00     Years: 0.00     Pack years: 0.00     Types: Cigarettes     Last attempt to quit: 1990     Years since quittin.3     Smokeless tobacco: Never Used   Substance Use Topics     Alcohol use: Yes     Comment: 1-2 drinks a month     Family History   Problem Relation Age of Onset     High cholesterol Father      Cancer Father         CLL     Blood Disease Father         CLL     Breast Cancer Maternal Grandmother         in 80s     Cancer Maternal Grandmother      Depression Mother      Diabetes Mother      Ulcerative Colitis Mother      Lipids Mother         hypertriglyceridemia     Diabetes Brother          Current Outpatient Medications   Medication Sig Dispense Refill     levothyroxine (SYNTHROID/LEVOTHROID) 125 MCG tablet Take 1 tablet (125 mcg) by mouth daily 90 tablet 2     melatonin 5 MG tablet Take 5 mg by mouth nightly as needed for sleep       Omega-3 Fatty Acids (OMEGA-3 FISH OIL PO) Take 1 g by mouth daily       PARoxetine (PAXIL) 20 MG tablet TAKE ONE-HALF TABLET BY MOUTH TWICE  A DAY 90 tablet 1     No Known Allergies  Recent Labs   Lab Test 02/04/20  1214 06/12/19  0921 06/27/18  0727 08/02/17  1013  08/28/15  0850  11/20/12  0855 10/22/12  2341 07/11/12  0834   A1C  --  5.6 5.5 5.5  --   --   --   --   --   --    LDL  --  169* 159* 163*   < >  --    < > 161.0*  --  190*   HDL  --  35* 29* 36*   < >  --    < > 33.0*  --  33*   TRIG  --  142 243* 118   < >  --    < > 143.0  --  118   ALT  --   --   --   --   --   --   --  18.0  --  20   CR  --   --   --   --   --  0.65  --   --  0.77  --    GFRESTIMATED  --   --   --   --   --  >90  Non  GFR Calc    --   --  81  --    GFRESTBLACK  --   --   --   --   --  >90  African American GFR Calc    --   --  >90  --    POTASSIUM  --   --   --   --   --   --   --   --  3.8  --    TSH 1.77 0.26* 1.05 1.04   < >  --    < >  --  10.70* 3.13    < > = values in this interval not displayed.      BP Readings from Last 3 Encounters:   02/20/20 100/63   02/04/20 110/58   08/08/19 133/76    Wt Readings from Last 3 Encounters:   02/20/20 90.3 kg (199 lb)   02/04/20 94.4 kg (208 lb 3 oz)   10/14/19 92.4 kg (203 lb 9.6 oz)                    Reviewed and updated as needed this visit by Provider         Review of Systems   ROS COMP: Constitutional, HEENT, cardiovascular, pulmonary, GI, , musculoskeletal, neuro, skin, endocrine and psych systems are negative, except as otherwise noted.       Objective   Reported vitals:  LMP 08/28/2015 (Approximate)    healthy, alert and no distress  PSYCH: Alert and oriented times 3; coherent speech, normal   rate and volume, able to articulate logical thoughts, able   to abstract reason, no tangential thoughts, no hallucinations   or delusions  Her affect is normal  RESP: No cough, no audible wheezing, able to talk in full sentences  Remainder of exam unable to be completed due to telephone visits    Diagnostic Test Results:  Labs reviewed in Epic  none         Assessment/Plan:  1. Morbid obesity (H)  Currently is  limited in physical activities because of her hip apin , hip arthritis     2. Arthritis of right hip  Still does not know when her surgery will be scheduled for , hoping to be in the next month or os , I have filled out the forms for the handicap parking sticker until the end of this year as she would need to recover and do some PT afte rand would need it .  RTC if no improving or worsening.  Pt is aware  and comfortable with the current plan.        No follow-ups on file.      Phone call duration:  8 minutes    Vanessa Ladd MD

## 2020-04-27 ENCOUNTER — MYC MEDICAL ADVICE (OUTPATIENT)
Dept: FAMILY MEDICINE | Facility: CLINIC | Age: 55
End: 2020-04-27

## 2020-05-08 DIAGNOSIS — F41.1 GENERALIZED ANXIETY DISORDER: ICD-10-CM

## 2020-05-11 RX ORDER — PAROXETINE 20 MG/1
TABLET, FILM COATED ORAL
Qty: 30 TABLET | Refills: 0 | Status: SHIPPED | OUTPATIENT
Start: 2020-05-11 | End: 2020-07-06

## 2020-05-11 NOTE — TELEPHONE ENCOUNTER
"Last Written Prescription Date:  9/5/2019  Last Fill Quantity: 90,  # refills: 1   Last office visit: 2/4/2020 with prescribing provider:  Yes, Virtual visit   Future Office Visit:      Medication is being filled for 1 time refill only due to:  due for anxiety f/u/physical in June. Last 6/12/19     Mikaela Niño RN    Requested Prescriptions   Signed Prescriptions Disp Refills    PARoxetine (PAXIL) 20 MG tablet 30 tablet 0     Sig: TAKE ONE-HALF TABLET BY MOUTH TWICE A DAY       SSRIs Protocol Passed - 5/8/2020 12:09 PM        Passed - Recent (12 mo) or future (30 days) visit within the authorizing provider's specialty     Patient has had an office visit with the authorizing provider or a provider within the authorizing providers department within the previous 12 mos or has a future within next 30 days. See \"Patient Info\" tab in inbasket, or \"Choose Columns\" in Meds & Orders section of the refill encounter.              Passed - Medication is active on med list        Passed - Patient is age 18 or older        Passed - No active pregnancy on record        Passed - No positive pregnancy test in last 12 months                   "

## 2020-05-19 ENCOUNTER — TRANSFERRED RECORDS (OUTPATIENT)
Dept: HEALTH INFORMATION MANAGEMENT | Facility: CLINIC | Age: 55
End: 2020-05-19

## 2020-05-27 ENCOUNTER — OFFICE VISIT (OUTPATIENT)
Dept: FAMILY MEDICINE | Facility: CLINIC | Age: 55
End: 2020-05-27
Payer: COMMERCIAL

## 2020-05-27 VITALS
TEMPERATURE: 98.5 F | BODY MASS INDEX: 34.04 KG/M2 | OXYGEN SATURATION: 97 % | DIASTOLIC BLOOD PRESSURE: 63 MMHG | HEIGHT: 64 IN | SYSTOLIC BLOOD PRESSURE: 109 MMHG | HEART RATE: 72 BPM | WEIGHT: 199.4 LBS

## 2020-05-27 DIAGNOSIS — Z01.818 PREOP GENERAL PHYSICAL EXAM: Primary | ICD-10-CM

## 2020-05-27 DIAGNOSIS — M16.11 ARTHRITIS OF RIGHT HIP: ICD-10-CM

## 2020-05-27 PROCEDURE — 99214 OFFICE O/P EST MOD 30 MIN: CPT | Performed by: FAMILY MEDICINE

## 2020-05-27 RX ORDER — MULTIPLE VITAMINS W/ MINERALS TAB 9MG-400MCG
1 TAB ORAL DAILY
COMMUNITY
End: 2020-07-06

## 2020-05-27 ASSESSMENT — MIFFLIN-ST. JEOR: SCORE: 1490.5

## 2020-05-27 NOTE — PROGRESS NOTES
[facility]  [address]  [facility] [address] [phone]  Dept: [phone]    PRE-OP EVALUATION:  Today's date: 2020    Marbella Hendrix (: 1965) presents for pre-operative evaluation assessment as requested by Dr. Aleksey Villafana.  She requires evaluation and anesthesia risk assessment prior to undergoing surgery/procedure for treatment of right hip replacement .    Proposed Surgery/ Procedure: hip replacement  Date of Surgery/ Procedure: 2020  Time of Surgery/ Procedure: 11:40  Hospital/Surgical Facility: [facility] in [place]  Fax number for surgical facility: internal  Primary Physician: Vanessa Ladd  Type of Anesthesia Anticipated: General    Patient has a Health Care Directive or Living Will:  YES     1. NO - Do you have a history of heart attack, stroke, stent, bypass or surgery on an artery in the head, neck, heart or legs?  2. NO - Do you ever have any pain or discomfort in your chest?  3. NO - Do you have a history of  Heart Failure?  4. YES - ARE YOUR TROUBLED BY SHORTNESS OF BREATH WHEN WALKING ON THE LEVEL, UP A SLIGHT HILL OR AT NIGHT? yes  5. NO - Do you currently have a cold, bronchitis or other respiratory infection?  6. NO - Do you have a cough, shortness of breath or wheezing?  7. NO - Do you sometimes get pains in the calves of your legs when you walk?  8. NO - Do you or anyone in your family have previous history of blood clots?  9. NO - Do you or does anyone in your family have a serious bleeding problem such as prolonged bleeding following surgeries or cuts?  10. NO - Have you ever had problems with anemia or been told to take iron pills?  11. NO - Have you had any abnormal blood loss such as black, tarry or bloody stools, or abnormal vaginal bleeding?  12. NO - Have you ever had a blood transfusion?  13. NO - Have you or any of your relatives ever had problems with anesthesia?  14. NO - Do you have sleep apnea, excessive snoring or daytime  drowsiness?  15. NO - Do you have any prosthetic heart valves?  16. NO - Do you have prosthetic joints?  17. NO - Is there any chance that you may be pregnant?      HPI:     HPI related to upcoming procedure:       See problem list for active medical problems.  Problems all longstanding and stable, except as noted/documented.  See ROS for pertinent symptoms related to these conditions.      MEDICAL HISTORY:     Patient Active Problem List    Diagnosis Date Noted     Primary osteoarthritis of right hip 12/02/2019     Priority: Medium     Added automatically from request for surgery [id number]       Morbid obesity (H) 06/20/2018     Priority: Medium     Status post hysterectomy 08/02/2017     Priority: Medium     Sensation of plugged ear on both sides 07/12/2017     Priority: Medium     Acute post-operative pain 08/28/2015     Priority: Medium     Preventative health care 10/30/2012     Priority: Medium     Last pap NIL 2011; mammogram nl 2011; lipids abnl, needs annual f/u       Health Care Home 09/26/2012     Priority: Medium     Tier 1    DX V65.8 REPLACED WITH [id number] HEALTH CARE HOME (04/08/2013)       Generalized anxiety disorder 09/26/2012     Priority: Medium     H/o panic attacks; currently controlled with paxil       Hyperlipidemia LDL goal <160 09/26/2012     Priority: Medium     Behavioral measures - avoiding statins while trying to conceive; not a candidate for red yeast rice (interactions with thyroid meds)       Acquired hypothyroidism 09/26/2012     Priority: Medium     Overweight 09/26/2012     Priority: Medium     On exercise plan, has been in weight watchers  Problem list name updated by automated process. Provider to review        Past Medical History:   Diagnosis Date     Arthritis     My mom has it, my grandmother had it I have it     Diabetes (H)     My mom and brother have type II     Heavy menstrual period      Leiomyoma of uterus     s/p myomectomy     Mental disorder     anxiety     PONV  "(postoperative nausea and vomiting)      Surgical complication after   sept     Uncomplicated asthma     My son has it, since he was born     Vesico-ureteral reflux     s/p repair     Past Surgical History:   Procedure Laterality Date     ABDOMEN SURGERY      exploratory after C section for sepsis      SECTION      x 2     HYSTERECTOMY RADICAL  2015     HYSTERECTOMY SUPRACERVICAL N/A 2015    Procedure: HYSTERECTOMY SUPRACERVICAL;  Surgeon: Kelle Wasserman MD;  Location: [facility] OR     LUMPECTOMY BREAST       MYOMECTOMY UTERUS       Current Outpatient Medications   Medication Sig Dispense Refill     levothyroxine (SYNTHROID/LEVOTHROID) 125 MCG tablet TAKE 1 TABLET BY MOUTH ONCE DAILY 90 tablet 2     melatonin 5 MG tablet Take 5 mg by mouth nightly as needed for sleep       multivitamin w/minerals (MULTI-VITAMIN) tablet Take 1 tablet by mouth daily       Omega-3 Fatty Acids (OMEGA-3 FISH OIL PO) Take 1 g by mouth daily       PARoxetine (PAXIL) 20 MG tablet TAKE ONE-HALF TABLET BY MOUTH TWICE A DAY 30 tablet 0     OTC products: None, except as noted above    No Known Allergies   Latex Allergy: NO    Social History     Tobacco Use     Smoking status: Former Smoker     Packs/day: 0.00     Years: 0.00     Pack years: 0.00     Types: Cigarettes     Last attempt to quit: 1990     Years since quittin.4     Smokeless tobacco: Never Used   Substance Use Topics     Alcohol use: Yes     Comment: 1-2 drinks a month     History   Drug Use Unknown       REVIEW OF SYSTEMS:   Constitutional, neuro, ENT, endocrine, pulmonary, cardiac, gastrointestinal, genitourinary, musculoskeletal, integument and psychiatric systems are negative, except as otherwise noted.    EXAM:   /63   Pulse 72   Temp 98.5  F (36.9  C) (Tympanic)   Ht 1.619 m (5' 3.75\")   Wt 90.4 kg (199 lb 6.4 oz)   LMP 2015 (Approximate)   SpO2 97%   BMI 34.50 kg/m      GENERAL APPEARANCE: healthy, alert and " no distress     EYES: EOMI, PERRL     HENT: ear canals and TM's normal and nose and mouth without ulcers or lesions     NECK: no adenopathy, no asymmetry, masses, or scars and thyroid normal to palpation     RESP: lungs clear to auscultation - no rales, rhonchi or wheezes     CV: regular rates and rhythm, normal S1 S2, no S3 or S4 and no murmur, click or rub     ABDOMEN:  soft, nontender, no HSM or masses and bowel sounds normal     MS: extremities normal- no gross deformities noted, no evidence of inflammation in joints, FROM in all extremities.     SKIN: no suspicious lesions or rashes     NEURO: Normal strength and tone, sensory exam grossly normal, mentation intact and speech normal     PSYCH: mentation appears normal. and affect normal/bright     LYMPHATICS: No cervical adenopathy    DIAGNOSTICS:   No labs or EKG required for low risk surgery (cataract, skin procedure, breast biopsy, etc)    Recent Labs   Lab Test 02/04/20  1214 06/12/19  1024 06/12/19  0921 06/27/18  0727  08/28/15  0850  10/22/12  2341   HGB 12.6 13.3  --   --    < > 15.1   < > 11.6*    271  --   --   --   --   --  314   NA  --   --   --   --   --   --   --  141   POTASSIUM  --   --   --   --   --   --   --  3.8   CR  --   --   --   --   --  0.65  --  0.77   A1C  --   --  5.6 5.5   < >  --   --   --     < > = values in this interval not displayed.        IMPRESSION:   Reason for surgery/procedure: Right hip arthritis , Total hip replacement   Diagnosis/reason for consult: Preoperative clearing     The proposed surgical procedure is considered INTERMEDIATE risk.    REVISED CARDIAC RISK INDEX  The patient has the following serious cardiovascular risks for perioperative complications such as (MI, PE, VFib and 3  AV Block):  No serious cardiac risks  INTERPRETATION:     The patient has the following additional risks for perioperative complications:  No identified additional risks      ICD-10-CM    1. Preop general physical exam  Z01.818     2. Arthritis of right hip  M16.11        RECOMMENDATIONS:         --Patient is to take all scheduled medications on the day of surgery EXCEPT for modifications listed below.    APPROVAL GIVEN to proceed with proposed procedure, without further diagnostic evaluation       Signed Electronically by: Vanessa Ladd MD    Copy of this evaluation report is provided to requesting physician.    [facility] Preop Guidelines    Revised Cardiac Risk Index

## 2020-06-04 ENCOUNTER — TRANSFERRED RECORDS (OUTPATIENT)
Dept: HEALTH INFORMATION MANAGEMENT | Facility: CLINIC | Age: 55
End: 2020-06-04

## 2020-06-16 ENCOUNTER — TRANSFERRED RECORDS (OUTPATIENT)
Dept: HEALTH INFORMATION MANAGEMENT | Facility: CLINIC | Age: 55
End: 2020-06-16

## 2020-06-17 ENCOUNTER — MYC MEDICAL ADVICE (OUTPATIENT)
Dept: FAMILY MEDICINE | Facility: CLINIC | Age: 55
End: 2020-06-17

## 2020-06-17 DIAGNOSIS — Z00.00 ROUTINE HISTORY AND PHYSICAL EXAMINATION OF ADULT: Primary | ICD-10-CM

## 2020-06-18 NOTE — TELEPHONE ENCOUNTER
Would prefer to do at least a virtual visit but if she cannot come will order the fasting labs   I will renew her Paxil, she should not run out of this   Thanks

## 2020-06-23 DIAGNOSIS — Z76.89 HEALTH CARE HOME: Primary | ICD-10-CM

## 2020-06-24 ENCOUNTER — PATIENT OUTREACH (OUTPATIENT)
Dept: CARE COORDINATION | Facility: CLINIC | Age: 55
End: 2020-06-24

## 2020-06-24 NOTE — PROGRESS NOTES
Clinic Care Coordination Contact  [facility]/Voicemail       Clinical Data: Care Coordinator Outreach  Outreach attempted x 1.  Left message on patient's voicemail with call back information and requested return call.  Plan:Care Coordinator will try to reach patient again in 1-2 business days.

## 2020-06-24 NOTE — LETTER
[place] CARE COORDINATION  [facility]  [address] [place]  [address]  [phone]  June 25, 2020    Marbella Hendrix  [address] [facility] [address]      Dear Marbella,    I am a clinic community health worker who works with Vanessa Ladd MD at [facility]. I have been trying to reach you recently to introduce Clinic Care Coordination and to see if there was anything I could assist you with.  Below is a description of clinic care coordination and how I can further assist you.      The clinic care coordination team is made up of a registered nurse,  and community health worker who understand the health care system. The goal of clinic care coordination is to help you manage your health and improve access to the health care system in the most efficient manner. The team can assist you in meeting your health care goals by providing education, coordinating services, strengthening the communication among your providers and supporting you with any resource needs.    Please feel free to contact the Community Health Worker at [phone] with any questions or concerns. We are focused on providing you with the highest-quality healthcare experience possible and that all starts with you.     Sincerely,     ADAMA Wilde

## 2020-07-01 DIAGNOSIS — Z00.00 ROUTINE HISTORY AND PHYSICAL EXAMINATION OF ADULT: ICD-10-CM

## 2020-07-01 LAB
CHOLEST SERPL-MCNC: 202 MG/DL
GLUCOSE SERPL-MCNC: 88 MG/DL (ref 70–99)
HDLC SERPL-MCNC: 33 MG/DL
LDLC SERPL CALC-MCNC: 144 MG/DL
NONHDLC SERPL-MCNC: 169 MG/DL
TRIGL SERPL-MCNC: 126 MG/DL

## 2020-07-01 PROCEDURE — 80061 LIPID PANEL: CPT | Performed by: FAMILY MEDICINE

## 2020-07-01 PROCEDURE — 82947 ASSAY GLUCOSE BLOOD QUANT: CPT | Performed by: FAMILY MEDICINE

## 2020-07-01 PROCEDURE — 36415 COLL VENOUS BLD VENIPUNCTURE: CPT | Performed by: FAMILY MEDICINE

## 2020-07-06 ENCOUNTER — VIRTUAL VISIT (OUTPATIENT)
Dept: FAMILY MEDICINE | Facility: CLINIC | Age: 55
End: 2020-07-06
Payer: COMMERCIAL

## 2020-07-06 DIAGNOSIS — F41.1 GENERALIZED ANXIETY DISORDER: ICD-10-CM

## 2020-07-06 PROCEDURE — 96127 BRIEF EMOTIONAL/BEHAV ASSMT: CPT | Performed by: FAMILY MEDICINE

## 2020-07-06 PROCEDURE — 99213 OFFICE O/P EST LOW 20 MIN: CPT | Mod: GT | Performed by: FAMILY MEDICINE

## 2020-07-06 RX ORDER — CALCIUM CARBONATE 500(1250)
1 TABLET ORAL 2 TIMES DAILY
COMMUNITY

## 2020-07-06 RX ORDER — PAROXETINE 20 MG/1
TABLET, FILM COATED ORAL
Qty: 90 TABLET | Refills: 3 | Status: SHIPPED | OUTPATIENT
Start: 2020-07-06 | End: 2021-12-15

## 2020-07-06 ASSESSMENT — ANXIETY QUESTIONNAIRES
1. FEELING NERVOUS, ANXIOUS, OR ON EDGE: SEVERAL DAYS
2. NOT BEING ABLE TO STOP OR CONTROL WORRYING: SEVERAL DAYS
IF YOU CHECKED OFF ANY PROBLEMS ON THIS QUESTIONNAIRE, HOW DIFFICULT HAVE THESE PROBLEMS MADE IT FOR YOU TO DO YOUR WORK, TAKE CARE OF THINGS AT HOME, OR GET ALONG WITH OTHER PEOPLE: NOT DIFFICULT AT ALL
5. BEING SO RESTLESS THAT IT IS HARD TO SIT STILL: NOT AT ALL
6. BECOMING EASILY ANNOYED OR IRRITABLE: SEVERAL DAYS
3. WORRYING TOO MUCH ABOUT DIFFERENT THINGS: NOT AT ALL
GAD7 TOTAL SCORE: 4
7. FEELING AFRAID AS IF SOMETHING AWFUL MIGHT HAPPEN: SEVERAL DAYS

## 2020-07-06 ASSESSMENT — PATIENT HEALTH QUESTIONNAIRE - PHQ9
5. POOR APPETITE OR OVEREATING: NOT AT ALL
SUM OF ALL RESPONSES TO PHQ QUESTIONS 1-9: 2

## 2020-07-06 NOTE — PROGRESS NOTES
"Marbella Hendrix is a 54 year old female who is being evaluated via a billable video visit.      The patient has been notified of following:     \"This video visit will be conducted via a call between you and your physician/provider. We have found that certain health care needs can be provided without the need for an in-person physical exam.  This service lets us provide the care you need with a video conversation.  If a prescription is necessary we can send it directly to your pharmacy.  If lab work is needed we can place an order for that and you can then stop by our lab to have the test done at a later time.    Video visits are billed at different rates depending on your insurance coverage.  Please reach out to your insurance provider with any questions.    If during the course of the call the physician/provider feels a video visit is not appropriate, you will not be charged for this service.\"    Patient has given verbal consent for Video visit? Yes  How would you like to obtain your AVS? Freda  Patient would like the video invitation sent by: Text to cell phone: Freda  Will anyone else be joining your video visit? No    Subjective     Marbella Hendrix is a 54 year old female who presents today via video visit for the following health issues:    HPI  Anxiety Follow-Up    Has been on paxil daily for about 25 years  splits dose to avoid headaches  Working well      How are you doing with your anxiety since your last visit? No change    Are you having other symptoms that might be associated with anxiety? No    Have you had a significant life event? No     Are you feeling depressed? No    Do you have any concerns with your use of alcohol or other drugs? No    Social History     Tobacco Use     Smoking status: Former Smoker     Packs/day: 0.00     Years: 0.00     Pack years: 0.00     Types: Cigarettes     Last attempt to quit: 1990     Years since quittin.5     Smokeless tobacco: Never Used " "  Substance Use Topics     Alcohol use: Yes     Comment: 1-2 drinks a month     Drug use: Not Currently     Types: Cocaine, Marijuana, Methamphetamines, Opiates, Amphetamines, Barbiturates, \"Crack\" cocaine, Codeine, Hashish, Hydrocodone, LSD, Morphine, Opium, Oxycodone     BERTHA-7 SCORE 6/20/2018 6/20/2018 6/12/2019   Total Score 13 13 8     PHQ 6/12/2019 6/19/2020   PHQ-9 Total Score 2 2   Q9: Thoughts of better off dead/self-harm past 2 weeks Not at all Not at all     Last PHQ-9 7/6/2020   1.  Little interest or pleasure in doing things 0   2.  Feeling down, depressed, or hopeless 1   3.  Trouble falling or staying asleep, or sleeping too much 1   4.  Feeling tired or having little energy 0   5.  Poor appetite or overeating 0   6.  Feeling bad about yourself 0   7.  Trouble concentrating 0   8.  Moving slowly or restless 0   Q9: Thoughts of better off dead/self-harm past 2 weeks 0   PHQ-9 Total Score 2   Difficulty at work, home, or with people Not difficult at all     BERTHA-7  7/6/2020   1. Feeling nervous, anxious, or on edge 1   2. Not being able to stop or control worrying 1   3. Worrying too much about different things 0   4. Trouble relaxing 0   5. Being so restless that it is hard to sit still 0   6. Becoming easily annoyed or irritable 1   7. Feeling afraid, as if something awful might happen 1   BERTHA-7 Total Score 4   If you checked any problems, how difficult have they made it for you to do your work, take care of things at home, or get along with other people? Not difficult at all       Hypothyroidism Follow-up      Since last visit, patient describes the following symptoms: Weight stable, no hair loss, no skin changes, no constipation, no loose stools      How many servings of fruits and vegetables do you eat daily?  2-3    On average, how many sweetened beverages do you drink each day (Examples: soda, juice, sweet tea, etc.  Do NOT count diet or artificially sweetened beverages)?   1 water and " coffee    How many days per week do you exercise enough to make your heart beat faster? 7    How many minutes a day do you exercise enough to make your heart beat faster? 30 - 60    How many days per week do you miss taking your medication? 0         Video Start Time: 1:13        Patient Active Problem List   Diagnosis     Health Care Home     Generalized anxiety disorder     Hyperlipidemia LDL goal <160     Acquired hypothyroidism     Overweight     Preventative health care     Acute post-operative pain     Status post hysterectomy     Morbid obesity (H)     Sensation of plugged ear on both sides     Primary osteoarthritis of right hip     Past Surgical History:   Procedure Laterality Date     ABDOMEN SURGERY      exploratory after C section for sepsis      SECTION      x 2     HYSTERECTOMY RADICAL  2015     HYSTERECTOMY SUPRACERVICAL N/A 2015    Procedure: HYSTERECTOMY SUPRACERVICAL;  Surgeon: Kelle Wasserman MD;  Location: [facility] OR     LUMPECTOMY BREAST       MYOMECTOMY UTERUS         Social History     Tobacco Use     Smoking status: Former Smoker     Packs/day: 0.00     Years: 0.00     Pack years: 0.00     Types: Cigarettes     Last attempt to quit: 1990     Years since quittin.5     Smokeless tobacco: Never Used   Substance Use Topics     Alcohol use: Yes     Comment: 1-2 drinks a month     Family History   Problem Relation Age of Onset     High cholesterol Father      Cancer Father         CLL     Blood Disease Father         CLL     Breast Cancer Maternal Grandmother         in 80s     Cancer Maternal Grandmother      Depression Mother      Diabetes Mother      Ulcerative Colitis Mother      Lipids Mother         hypertriglyceridemia     Diabetes Brother          Current Outpatient Medications   Medication Sig Dispense Refill     calcium carbonate (OS-ARA) 500 MG tablet Take 1 tablet by mouth 2 times daily       levothyroxine (SYNTHROID/LEVOTHROID) 125 MCG tablet TAKE 1  TABLET BY MOUTH ONCE DAILY 90 tablet 2     melatonin 5 MG tablet Take 5 mg by mouth nightly as needed for sleep       Omega-3 Fatty Acids (OMEGA-3 FISH OIL PO) Take 1 g by mouth daily       PARoxetine (PAXIL) 20 MG tablet TAKE ONE-HALF TABLET BY MOUTH TWICE A DAY 90 tablet 3     Recent Labs   Lab Test 07/01/20  0929 02/04/20  1214 06/12/19  0921 06/27/18  0727 08/02/17  1013  08/28/15  0850  11/20/12  0855 10/22/12  2341 07/11/12  0834   A1C  --   --  5.6 5.5 5.5  --   --   --   --   --   --    *  --  169* 159* 163*   < >  --    < > 161.0*  --  190*   HDL 33*  --  35* 29* 36*   < >  --    < > 33.0*  --  33*   TRIG 126  --  142 243* 118   < >  --    < > 143.0  --  118   ALT  --   --   --   --   --   --   --   --  18.0  --  20   CR  --   --   --   --   --   --  0.65  --   --  0.77  --    GFRESTIMATED  --   --   --   --   --   --  >90  Non  GFR Calc    --   --  81  --    GFRESTBLACK  --   --   --   --   --   --  >90  African American GFR Calc    --   --  >90  --    POTASSIUM  --   --   --   --   --   --   --   --   --  3.8  --    TSH  --  1.77 0.26* 1.05 1.04   < >  --    < >  --  10.70* 3.13    < > = values in this interval not displayed.      BP Readings from Last 3 Encounters:   05/27/20 109/63   02/20/20 100/63   02/04/20 110/58    Wt Readings from Last 3 Encounters:   05/27/20 90.4 kg (199 lb 6.4 oz)   02/20/20 90.3 kg (199 lb)   02/04/20 94.4 kg (208 lb 3 oz)                    Reviewed and updated as needed this visit by Provider         Review of Systems   Constitutional, HEENT, cardiovascular, pulmonary, gi and gu systems are negative, except as otherwise noted.      Objective             Physical Exam     GENERAL: Healthy, alert and no distress  EYES: Eyes grossly normal to inspection.  No discharge or erythema, or obvious scleral/conjunctival abnormalities.  RESP: No audible wheeze, cough, or visible cyanosis.  No visible retractions or increased work of breathing.    SKIN: Visible  "skin clear. No significant rash, abnormal pigmentation or lesions.  NEURO: Cranial nerves grossly intact.  Mentation and speech appropriate for age.  PSYCH: Mentation appears normal, affect normal/bright, judgement and insight intact, normal speech and appearance well-groomed.      Diagnostic Test Results:  Labs reviewed in Epic        Assessment & Plan     1. Generalized anxiety disorder  Great control no changes to meds  - EMOTIONAL / BEHAVIORAL ASSESSMENT  - PARoxetine (PAXIL) 20 MG tablet; TAKE ONE-HALF TABLET BY MOUTH TWICE A DAY  Dispense: 90 tablet; Refill: 3     2.  Her last TSH was 5 months ago - she has meds for thyroid condition along with refills    She is seeing her GYN in a few weeks for pelvic exam and will get breast exam and mammogram  discussed that her only family member with breast cancer was her grandmother aged 88.  She could go every 1-2 years in between mammograms however if she has concerns it is OK to stick with yearly screens    BMI:   Estimated body mass index is 34.5 kg/m  as calculated from the following:    Height as of 5/27/20: 1.619 m (5' 3.75\").    Weight as of 5/27/20: 90.4 kg (199 lb 6.4 oz).   Weight management plan: Discussed healthy diet and exercise guidelines        See Patient Instructions    No follow-ups on file.    Divine Harding MD  [facility]      Video-Visit Details    Type of service:  Video Visit    Video End Time:1:22    Originating Location (pt. Location): Home    Distant Location (provider location):  [facility]     Platform used for Video Visit: [facility]    No follow-ups on file.       Divine Harding MD      "

## 2020-07-07 ENCOUNTER — TRANSFERRED RECORDS (OUTPATIENT)
Dept: HEALTH INFORMATION MANAGEMENT | Facility: CLINIC | Age: 55
End: 2020-07-07

## 2020-07-07 ASSESSMENT — ANXIETY QUESTIONNAIRES: GAD7 TOTAL SCORE: 4

## 2020-07-09 ENCOUNTER — THERAPY VISIT (OUTPATIENT)
Dept: PHYSICAL THERAPY | Facility: CLINIC | Age: 55
End: 2020-07-09
Payer: COMMERCIAL

## 2020-07-09 DIAGNOSIS — R29.898 WEAKNESS OF RIGHT HIP: ICD-10-CM

## 2020-07-09 DIAGNOSIS — Z96.641 HISTORY OF TOTAL HIP ARTHROPLASTY, RIGHT: ICD-10-CM

## 2020-07-09 PROCEDURE — 97161 PT EVAL LOW COMPLEX 20 MIN: CPT | Mod: GP | Performed by: PHYSICAL THERAPIST

## 2020-07-09 PROCEDURE — 97110 THERAPEUTIC EXERCISES: CPT | Mod: GP | Performed by: PHYSICAL THERAPIST

## 2020-07-09 ASSESSMENT — ACTIVITIES OF DAILY LIVING (ADL)
GOING_DOWN_1_FLIGHT_OF_STAIRS: SLIGHT DIFFICULTY
WALKING_INITIALLY: SLIGHT DIFFICULTY
DEEP_SQUATTING: UNABLE TO DO
WALKING_UP_STEEP_HILLS: SLIGHT DIFFICULTY
STEPPING_UP_AND_DOWN_CURBS: NO DIFFICULTY AT ALL
WALKING_15_MINUTES_OR_GREATER: SLIGHT DIFFICULTY
GETTING_INTO_AND_OUT_OF_AN_AVERAGE_CAR: SLIGHT DIFFICULTY
HOS_ADL_COUNT: 16
HEAVY_WORK: SLIGHT DIFFICULTY
LIGHT_TO_MODERATE_WORK: SLIGHT DIFFICULTY
HOS_ADL_SCORE(%): 71.88
RECREATIONAL_ACTIVITIES: SLIGHT DIFFICULTY
WALKING_DOWN_STEEP_HILLS: SLIGHT DIFFICULTY
ROLLING_OVER_IN_BED: SLIGHT DIFFICULTY
STANDING_FOR_15_MINUTES: SLIGHT DIFFICULTY
HOS_ADL_ITEM_SCORE_TOTAL: 46
TWISTING/PIVOTING_ON_INVOLVED_LEG: SLIGHT DIFFICULTY
WALKING_APPROXIMATELY_10_MINUTES: SLIGHT DIFFICULTY
HOS_ADL_HIGHEST_POTENTIAL_SCORE: 64
GOING_UP_1_FLIGHT_OF_STAIRS: SLIGHT DIFFICULTY
SITTING_FOR_15_MINUTES: SLIGHT DIFFICULTY

## 2020-07-09 NOTE — PROGRESS NOTES
[facility] Initial Evaluation  Subjective:  The history is provided by the patient. No  was used.   Therapist Generated HPI Evaluation  Problem details: Had RENU on 6/4/2020.  Yesterday overstretched it and feels some pain today in the gluteal region.  Longest walk has been a few blocks.  [place]  Other days does some UE lifting.  No pain sitting.  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  Has a 14 and 17 year old at home.  Has stairs at home. At times has to take steps one at a time.  .         Type of problem:  Right hip.    This is a new condition.  Occurance: RENU surgery.    Patient reports pain:  Lateral.    Pain radiates to:  Thigh.     Associated symptoms:  Loss of strength.                                Objective:    Gait:    Gait Type:  Antalgic     Deviations:  Hip:  Decr dynamic control R and Trendelenberg R                                                 Hip Evaluation  HIP AROM:  AROM:   Left Hip:     Normal    Right Hip:    Flexion: Left:   Right:  95 with groin pain      Abduction: Left:    Right:  No pain        External Rotation: Left:   Right: no pain          Hip Special Testing:   Not Assessed        Hip Palpation:      Right hip tenderness present at:  Gluteus Medius  Functional Testing:  Functional test hip: sit to stand without hands.                       General     ROS    Assessment/Plan:    Patient is a 54 year old female with right side hip complaints.    Patient has the following significant findings with corresponding treatment plan.                Diagnosis 1:  Right RENU  Pain -  self management, education and home program  Decreased ROM/flexibility - manual therapy, therapeutic exercise and home program  Decreased strength - therapeutic exercise, therapeutic activities and home program  Decreased function - therapeutic activities    Therapy Evaluation Codes:   1) History comprised of:   Personal  factors that impact the plan of care:      None.    Comorbidity factors that impact the plan of care are:      Overweight.     Medications impacting care: None.  2) Examination of Body Systems comprised of:   Body structures and functions that impact the plan of care:      Hip.   Activity limitations that impact the plan of care are:      Cooking, Stairs and Walking.  3) Clinical presentation characteristics are:   Stable/Uncomplicated.  4) Decision-Making    Low complexity using standardized patient assessment instrument and/or measureable assessment of functional outcome.  Cumulative Therapy Evaluation is: Low complexity.    Previous and current functional limitations:  (See Goal Flow Sheet for this information)    Short term and Long term goals: (See Goal Flow Sheet for this information)     Communication ability:  Patient appears to be able to clearly communicate and understand verbal and written communication and follow directions correctly.  Treatment Explanation - The following has been discussed with the patient:   RX ordered/plan of care  Anticipated outcomes  Possible risks and side effects  This patient would benefit from PT intervention to resume normal activities.   Rehab potential is excellent.    Frequency:  2 X a month, once daily  Duration:  for 4 weeks tapering to 1 X a month over 8 weeks  Discharge Plan:  Achieve all LTG.  Independent in home treatment program.  Reach maximal therapeutic benefit.    Please refer to the daily flowsheet for treatment today, total treatment time and time spent performing 1:1 timed codes.

## 2020-07-09 NOTE — LETTER
[facility] PHYSICAL THERAPY  [address]  [facility] [address] [phone]    July 15, 2020  Re: Marbella Hendrix   :   1965  MRN:  [id number]   REFERRING PHYSICIAN:   Broderick Sparrow Asp    [facility] PHYSICAL THERAPY  Date of Initial Evaluation:  2020  Visits:  Rxs Used: 1  Reason for Referral:     History of total hip arthroplasty, right  Weakness of right hip  EVALUATION SUMMARY  [facility] Initial Evaluation  Subjective:  The history is provided by the patient. No  was used.   Therapist Generated HPI Evaluation  Problem details: Had RENU on 2020.  Yesterday overstretched it and feels some pain today in the gluteal region.  Longest walk has been a few blocks.  [place]  Other days does some UE lifting.  No pain sitting.  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  Has a 14 and 17 year old at home.  Has stairs at home. At times has to take steps one at a time.  .         Type of problem:  Right hip.  This is a new condition.  Occurance: RENU surgery.  Patient reports pain:  Lateral.  Pain radiates to:  Thigh.   Associated symptoms:  Loss of strength.    General health as reported by patient is excellent.  Pertinent medical history includes: thyroid problems and numbness/tingling.   Red flags:  None as reported by patient.  Medical allergies: none.   Surgeries include:  Orthopedic surgery and other. Other surgery history details: R hip replacement 2020, gyn related.    Current medications:  Thyroid medication. Other medications details: anxiety, melatomin, fish oil, and calcium.    Current occupation is Not at the moment.   Primary job tasks include:  Computer work, lifting/carrying, driving, prolonged standing, pushing/pulling and repetitive tasks.   Objective:  Gait:    Gait Type:  Antalgic     Deviations:  Hip:  Decr dynamic  control R and Trendelenberg R  Hip Evaluation  HIP AROM:  AROM:   Left Hip:     Normal    Right Hip:    Flexion: Left:   Right:  95 with groin pain  Abduction: Left:    Right:  No pain  External Rotation: Left:   Right: no pain  Hip Special Testing:   Not Assessed  Hip Palpation:    Right hip tenderness present at:  Gluteus Medius  Functional Testing:  Functional test hip: sit to stand without hands.  Assessment/Plan:    Patient is a 54 year old female with right side hip complaints.    Patient has the following significant findings with corresponding treatment plan.                Diagnosis 1:  Right RENU  Pain -  self management, education and home program  Decreased ROM/flexibility - manual therapy, therapeutic exercise and home program  Decreased strength - therapeutic exercise, therapeutic activities and home program  Decreased function - therapeutic activities  Therapy Evaluation Codes:   1) History comprised of:   Personal factors that impact the plan of care:      None.    Comorbidity factors that impact the plan of care are:      Overweight.     Medications impacting care: None.  2) Examination of Body Systems comprised of:   Body structures and functions that impact the plan of care:      Hip.   Activity limitations that impact the plan of care are:      Cooking, Stairs and Walking.  3) Clinical presentation characteristics are:   Stable/Uncomplicated.  4) Decision-Making    Low complexity using standardized patient assessment instrument and/or measureable assessment of functional outcome.  Cumulative Therapy Evaluation is: Low complexity.  Previous and current functional limitations:  (See Goal Flow Sheet for this information)    Short term and Long term goals: (See Goal Flow Sheet for this information)   Communication ability:  Patient appears to be able to clearly communicate and understand verbal and written communication and follow directions correctly.  Treatment Explanation - The following has been  discussed with the patient:   RX ordered/plan of care  Anticipated outcomes  Possible risks and side effects  This patient would benefit from PT intervention to resume normal activities.   Rehab potential is excellent.  Frequency:  2 X a month, once daily  Duration:  for 4 weeks tapering to 1 X a month over 8 weeks  Discharge Plan:  Achieve all LTG.  Independent in home treatment program.  Reach maximal therapeutic benefit.    Thank you for your referral.  INQUIRIES  Therapist: Jeni Jesus PT, [facility]  [facility] PHYSICAL THERAP[facility]  [address] [place] [address] [address]  [facility] [address]  Phone: [phone]  Fax: [phone]

## 2020-07-15 NOTE — PROGRESS NOTES
[facility] Initial Evaluation  Subjective:    Patient Health History           General health as reported by patient is excellent.  Pertinent medical history includes: thyroid problems and numbness/tingling.   Red flags:  None as reported by patient.  Medical allergies: none.   Surgeries include:  Orthopedic surgery and other. Other surgery history details: R hip replacement 6/4/2020, gyn related.    Current medications:  Thyroid medication. Other medications details: anxiety, melatomin, fish oil, and calcium.    Current occupation is Not at the moment.   Primary job tasks include:  Computer work, lifting/carrying, driving, prolonged standing, pushing/pulling and repetitive tasks.                                    Objective:  System    Physical Exam    General     ROS    Assessment/Plan:

## 2020-08-03 ENCOUNTER — THERAPY VISIT (OUTPATIENT)
Dept: PHYSICAL THERAPY | Facility: CLINIC | Age: 55
End: 2020-08-03
Payer: COMMERCIAL

## 2020-08-03 DIAGNOSIS — Z96.641 HISTORY OF TOTAL HIP ARTHROPLASTY, RIGHT: ICD-10-CM

## 2020-08-03 DIAGNOSIS — R29.898 WEAKNESS OF RIGHT HIP: ICD-10-CM

## 2020-08-03 PROCEDURE — 97110 THERAPEUTIC EXERCISES: CPT | Mod: GP | Performed by: PHYSICAL THERAPIST

## 2020-08-28 ENCOUNTER — MYC MEDICAL ADVICE (OUTPATIENT)
Dept: FAMILY MEDICINE | Facility: CLINIC | Age: 55
End: 2020-08-28

## 2020-08-28 ENCOUNTER — THERAPY VISIT (OUTPATIENT)
Dept: PHYSICAL THERAPY | Facility: CLINIC | Age: 55
End: 2020-08-28
Payer: COMMERCIAL

## 2020-08-28 DIAGNOSIS — R29.898 WEAKNESS OF RIGHT HIP: ICD-10-CM

## 2020-08-28 DIAGNOSIS — Z96.641 HISTORY OF TOTAL HIP ARTHROPLASTY, RIGHT: ICD-10-CM

## 2020-08-28 PROCEDURE — 97110 THERAPEUTIC EXERCISES: CPT | Mod: GP | Performed by: PHYSICAL THERAPIST

## 2020-09-17 ENCOUNTER — MEDICAL CORRESPONDENCE (OUTPATIENT)
Dept: HEALTH INFORMATION MANAGEMENT | Facility: CLINIC | Age: 55
End: 2020-09-17

## 2020-09-17 ENCOUNTER — TRANSFERRED RECORDS (OUTPATIENT)
Dept: HEALTH INFORMATION MANAGEMENT | Facility: CLINIC | Age: 55
End: 2020-09-17

## 2020-10-12 ENCOUNTER — HOSPITAL ENCOUNTER (OUTPATIENT)
Facility: CLINIC | Age: 55
End: 2020-10-12
Attending: OPHTHALMOLOGY | Admitting: OPHTHALMOLOGY
Payer: COMMERCIAL

## 2020-10-13 DIAGNOSIS — Z11.59 ENCOUNTER FOR SCREENING FOR OTHER VIRAL DISEASES: Primary | ICD-10-CM

## 2020-10-14 ENCOUNTER — OFFICE VISIT (OUTPATIENT)
Dept: FAMILY MEDICINE | Facility: CLINIC | Age: 55
End: 2020-10-14
Payer: COMMERCIAL

## 2020-10-14 VITALS
BODY MASS INDEX: 34.31 KG/M2 | TEMPERATURE: 98 F | HEART RATE: 56 BPM | DIASTOLIC BLOOD PRESSURE: 82 MMHG | SYSTOLIC BLOOD PRESSURE: 137 MMHG | HEIGHT: 64 IN | RESPIRATION RATE: 16 BRPM | WEIGHT: 201 LBS

## 2020-10-14 DIAGNOSIS — Z01.818 PREOP GENERAL PHYSICAL EXAM: Primary | ICD-10-CM

## 2020-10-14 DIAGNOSIS — Z23 ENCOUNTER FOR IMMUNIZATION: ICD-10-CM

## 2020-10-14 PROCEDURE — 90471 IMMUNIZATION ADMIN: CPT | Performed by: FAMILY MEDICINE

## 2020-10-14 PROCEDURE — 90682 RIV4 VACC RECOMBINANT DNA IM: CPT | Performed by: FAMILY MEDICINE

## 2020-10-14 PROCEDURE — 99214 OFFICE O/P EST MOD 30 MIN: CPT | Mod: 25 | Performed by: FAMILY MEDICINE

## 2020-10-14 ASSESSMENT — MIFFLIN-ST. JEOR: SCORE: 1492.76

## 2020-10-14 NOTE — PROGRESS NOTES
[facility]  [address]  [facility] [address]  Phone: [phone]  Primary Provider: Vanessa Ladd  {FV AMB Performing Provider (Optional):187735}    PREOPERATIVE EVALUATION:  Today's date: 10/14/2020    Marbella Hendrix is a 55 year old female who presents for a preoperative evaluation.    Surgical Information:  Surgery/Procedure: ***  Surgery Location: ***  Surgeon: ***  Surgery Date: ***  Time of Surgery: ***  Where patient plans to recover: {Preop post recovery plans :050258}  Fax number for surgical facility: {SURGERY FAX NUMBER:[id number]}    Type of Anesthesia Anticipated: {ANESTHESIA:395389}    Subjective     HPI related to upcoming procedure: ***    Preop Questions 10/13/2020   1. Have you ever had a heart attack or stroke? No   2. Have you ever had surgery on your heart or blood vessels, such as a stent placement, a coronary artery bypass, or surgery on an artery in your head, neck, heart, or legs? No   3. Do you have chest pain with activity? No   4. Do you have a history of  heart failure? No   5. Do you currently have a cold, bronchitis or symptoms of other infection? No   6. Do you have a cough, shortness of breath, or wheezing? No   7. Do you or anyone in your family have previous history of blood clots? No   8. Do you or does anyone in your family have a serious bleeding problem such as prolonged bleeding following surgeries or cuts? No   9. Have you ever had problems with anemia or been told to take iron pills? No   10. Have you had any abnormal blood loss such as black, tarry or bloody stools, or abnormal vaginal bleeding? No   11. Have you ever had a blood transfusion? No   12. Are you willing to have a blood transfusion if it is medically needed before, during, or after your surgery? Yes   13. Have you or any of your relatives ever had problems with anesthesia? No   14. Do you have sleep apnea, excessive snoring or daytime drowsiness? No   15. Do you have  any artifical heart valves or other implanted medical devices like a pacemaker, defibrillator, or continuous glucose monitor? No   16. Do you have artificial joints? YES - ***   17. Are you allergic to latex? No   18. Is there any chance that you may be pregnant? No       Health Care Directive:  Patient does not have a Health Care Directive or Living Will: {ADVANCE_DIRECTIVE_STATUS:887852}    Preoperative Review of :  {Mnpmpreport:924821}  {Review MNPMP for all patients per [employer]: [email or web address]}    {Chronic problem details (Optional) :027620}    Review of Systems  {ROS Preop Choices:465267}    Patient Active Problem List    Diagnosis Date Noted     History of total hip arthroplasty, right 07/09/2020     Priority: Medium     Weakness of right hip 07/09/2020     Priority: Medium     Primary osteoarthritis of right hip 12/02/2019     Priority: Medium     Added automatically from request for surgery [id number]       Morbid obesity (H) 06/20/2018     Priority: Medium     Status post hysterectomy 08/02/2017     Priority: Medium     Sensation of plugged ear on both sides 07/12/2017     Priority: Medium     Acute post-operative pain 08/28/2015     Priority: Medium     Preventative health care 10/30/2012     Priority: Medium     Last pap NIL 2011; mammogram nl 2011; lipids abnl, needs annual f/u       Health Care Home 09/26/2012     Priority: Medium     Tier 1    DX V65.8 REPLACED WITH 02753 HEALTH CARE HOME (04/08/2013)       Generalized anxiety disorder 09/26/2012     Priority: Medium     H/o panic attacks; currently controlled with paxil       Hyperlipidemia LDL goal <160 09/26/2012     Priority: Medium     Behavioral measures - avoiding statins while trying to conceive; not a candidate for red yeast rice (interactions with thyroid meds)       Acquired hypothyroidism 09/26/2012     Priority: Medium     Overweight 09/26/2012     Priority: Medium     On exercise plan, has been in weight  watchers  Problem list name updated by automated process. Provider to review        Past Medical History:   Diagnosis Date     Arthritis     My mom has it, my grandmother had it I have it     Diabetes (H)     My mom and brother have type II     Heavy menstrual period      Leiomyoma of uterus     s/p myomectomy     Mental disorder     anxiety     PONV (postoperative nausea and vomiting)      Surgical complication after   sept     Uncomplicated asthma     My son has it, since he was born     Vesico-ureteral reflux     s/p repair     Past Surgical History:   Procedure Laterality Date     ABDOMEN SURGERY      exploratory after C section for sepsis      SECTION      x 2     HYSTERECTOMY RADICAL  2015     HYSTERECTOMY SUPRACERVICAL N/A 2015    Procedure: HYSTERECTOMY SUPRACERVICAL;  Surgeon: Kelle Wasserman MD;  Location: [facility] OR     LUMPECTOMY BREAST       MYOMECTOMY UTERUS       Current Outpatient Medications   Medication Sig Dispense Refill     calcium carbonate (OS-ARA) 500 MG tablet Take 1 tablet by mouth 2 times daily       levothyroxine (SYNTHROID/LEVOTHROID) 125 MCG tablet TAKE 1 TABLET BY MOUTH ONCE DAILY 90 tablet 2     melatonin 5 MG tablet Take 5 mg by mouth nightly as needed for sleep       Omega-3 Fatty Acids (OMEGA-3 FISH OIL PO) Take 1 g by mouth daily       PARoxetine (PAXIL) 20 MG tablet TAKE ONE-HALF TABLET BY MOUTH TWICE A DAY 90 tablet 3       Not on File     Social History     Tobacco Use     Smoking status: Former Smoker     Packs/day: 0.00     Years: 0.00     Pack years: 0.00     Types: Cigarettes     Quit date: 1990     Years since quittin.8     Smokeless tobacco: Never Used   Substance Use Topics     Alcohol use: Yes     Comment: 1-2 drinks a month     {FAMILY HISTORY (Optional):892735496}  History   Drug Use Unknown         Objective     LMP 2015 (Approximate)     Physical Exam  {EXAM Preop Choices:181945}    Recent Labs   Lab Test  "20  1214 19  1024 19  0921   HGB 12.6 13.3  --     271  --    A1C  --   --  5.6        Diagnostics:  {LABS:680141}   {EK}    Revised Cardiac Risk Index (RCRI):  The patient has the following serious cardiovascular risks for perioperative complications:  {PREOP REVISED CARDIAC RISK INDEX (RCRI) :362377::\" - No serious cardiac risks = 0 points\"}     RCRI Interpretation: {REVISED CARDIAC RISK INTERPRETATION :439233}    {Provider  Link to PREOP SmartSet  Includes common orders; guidelines for anemia, warfarin, additional testing; patient instructions  :186090}     Assessment & Plan   The proposed surgical procedure is considered {HIGH=major cardiovascular or procedures requiring prolonged anesthesia >4 hours or large fluid shifts;    INTERMEDIATE=abdominal, most orthopedic and intrathoracic surgery; LOW= endoscopy, cataract and breast surgery:184970} risk.    {Diag Picklist :930219}  Possible Sleep Apnea: *** {Provider consider completing risk assessment  Link to STOP-Bang Flowsheet :810260}  {STOP-Bang Score and Risk Stratification (Optional):647167}   Implanted Device:  {PREOP IMPLANTED DEVICE:818511::\" - Type of device: *** Patient advised to bring device information on day of surgery.\"}      {Risks and Recommendations (Optional):619643}    {Medication HOLD Times for PREOP (Optional):940991}    RECOMMENDATION:  {IMPORTANT - Approval:363352::\"APPROVAL GIVEN to proceed with proposed procedure, without further diagnostic evaluation.\"}    Signed Electronically by: Vanessa Ladd MD    Copy of this evaluation report is provided to requesting physician.    Preop SmartSet    [facility] Preop Guidelines    Revised Cardiac Risk Index  "

## 2020-10-14 NOTE — PROGRESS NOTES
[facility]  [address]  [facility] [address]  Phone: [phone]  Primary Provider: Mary Huff  Pre-op Performing Provider: MARY HUFF    PREOPERATIVE EVALUATION:  Today's date: 10/14/2020    Marbella Hendrix is a 55 year old female who presents for a preoperative evaluation.    Surgical Information:  Surgery/Procedure: Right eye plugged tear duct correction and blepharoplasty   Surgery Location: [facility]   Surgeon: Dr. Duncan   Surgery Date: 11/03/2020  Time of Surgery: TBD  Where patient plans to recover: At home with family  Fax number for surgical facility: Note does not need to be faxed, will be available electronically in Epic.    Type of Anesthesia Anticipated: to be determined    Subjective     HPI related to upcoming procedure:     Preop Questions 10/13/2020   1. Have you ever had a heart attack or stroke? No   2. Have you ever had surgery on your heart or blood vessels, such as a stent placement, a coronary artery bypass, or surgery on an artery in your head, neck, heart, or legs? No   3. Do you have chest pain with activity? No   4. Do you have a history of  heart failure? No   5. Do you currently have a cold, bronchitis or symptoms of other infection? No   6. Do you have a cough, shortness of breath, or wheezing? No   7. Do you or anyone in your family have previous history of blood clots? No   8. Do you or does anyone in your family have a serious bleeding problem such as prolonged bleeding following surgeries or cuts? No   9. Have you ever had problems with anemia or been told to take iron pills? No   10. Have you had any abnormal blood loss such as black, tarry or bloody stools, or abnormal vaginal bleeding? No   11. Have you ever had a blood transfusion? No   12. Are you willing to have a blood transfusion if it is medically needed before, during, or after your surgery? Yes   13. Have you or any of your relatives ever had problems  with anesthesia? No   14. Do you have sleep apnea, excessive snoring or daytime drowsiness? No   15. Do you have any artifical heart valves or other implanted medical devices like a pacemaker, defibrillator, or continuous glucose monitor? No   16. Do you have artificial joints? YES - hip replaced a few months ago    17. Are you allergic to latex? No   18. Is there any chance that you may be pregnant? No       Health Care Directive:    0956    Review of Systems  Constitutional, neuro, ENT, endocrine, pulmonary, cardiac, gastrointestinal, genitourinary, musculoskeletal, integument and psychiatric systems are negative, except as otherwise noted.    Patient Active Problem List    Diagnosis Date Noted     History of total hip arthroplasty, right 07/09/2020     Priority: Medium     Weakness of right hip 07/09/2020     Priority: Medium     Primary osteoarthritis of right hip 12/02/2019     Priority: Medium     Added automatically from request for surgery [id number]       Morbid obesity (H) 06/20/2018     Priority: Medium     Status post hysterectomy 08/02/2017     Priority: Medium     Sensation of plugged ear on both sides 07/12/2017     Priority: Medium     Acute post-operative pain 08/28/2015     Priority: Medium     Preventative health care 10/30/2012     Priority: Medium     Last pap NIL 2011; mammogram nl 2011; lipids abnl, needs annual f/u       Health Care Home 09/26/2012     Priority: Medium     Tier 1    DX V65.8 REPLACED WITH 05755 HEALTH CARE HOME (04/08/2013)       Generalized anxiety disorder 09/26/2012     Priority: Medium     H/o panic attacks; currently controlled with paxil       Hyperlipidemia LDL goal <160 09/26/2012     Priority: Medium     Behavioral measures - avoiding statins while trying to conceive; not a candidate for red yeast rice (interactions with thyroid meds)       Acquired hypothyroidism 09/26/2012     Priority: Medium     Overweight 09/26/2012     Priority: Medium     On exercise plan, has  been in weight watchers  Problem list name updated by automated process. Provider to review        Past Medical History:   Diagnosis Date     Arthritis     My mom has it, my grandmother had it I have it     Diabetes (H)     My mom and brother have type II     Heavy menstrual period      Leiomyoma of uterus     s/p myomectomy     Mental disorder     anxiety     PONV (postoperative nausea and vomiting)      Surgical complication after   sept     Uncomplicated asthma     My son has it, since he was born     Vesico-ureteral reflux     s/p repair     Past Surgical History:   Procedure Laterality Date     ABDOMEN SURGERY      exploratory after C section for sepsis      SECTION      x 2     HYSTERECTOMY RADICAL  2015     HYSTERECTOMY SUPRACERVICAL N/A 2015    Procedure: HYSTERECTOMY SUPRACERVICAL;  Surgeon: Kelle Wasserman MD;  Location: [facility] OR     LUMPECTOMY BREAST       MYOMECTOMY UTERUS       Current Outpatient Medications   Medication Sig Dispense Refill     calcium carbonate (OS-ARA) 500 MG tablet Take 1 tablet by mouth 2 times daily       levothyroxine (SYNTHROID/LEVOTHROID) 125 MCG tablet TAKE 1 TABLET BY MOUTH ONCE DAILY 90 tablet 2     melatonin 5 MG tablet Take 5 mg by mouth nightly as needed for sleep       Omega-3 Fatty Acids (OMEGA-3 FISH OIL PO) Take 1 g by mouth daily       PARoxetine (PAXIL) 20 MG tablet TAKE ONE-HALF TABLET BY MOUTH TWICE A DAY 90 tablet 3       Not on File     Social History     Tobacco Use     Smoking status: Former Smoker     Packs/day: 0.00     Years: 0.00     Pack years: 0.00     Types: Cigarettes     Quit date: 1990     Years since quittin.8     Smokeless tobacco: Never Used   Substance Use Topics     Alcohol use: Yes     Comment: 1-2 drinks a month     Family History   Problem Relation Age of Onset     High cholesterol Father      Cancer Father         CLL     Blood Disease Father         CLL     Breast Cancer Maternal  Grandmother         in 80s     Cancer Maternal Grandmother      Depression Mother      Diabetes Mother      Ulcerative Colitis Mother      Lipids Mother         hypertriglyceridemia     Anxiety Disorder Mother      Diabetes Brother      History   Drug Use Unknown         Objective     LMP 08/28/2015 (Approximate)     Physical Exam    GENERAL APPEARANCE: healthy, alert and no distress     EYES: EOMI, PERRL     HENT: ear canals and TM's normal and nose and mouth without ulcers or lesions     NECK: no adenopathy, no asymmetry, masses, or scars and thyroid normal to palpation     RESP: lungs clear to auscultation - no rales, rhonchi or wheezes     CV: regular rates and rhythm, normal S1 S2, no S3 or S4 and no murmur, click or rub     ABDOMEN:  soft, nontender, no HSM or masses and bowel sounds normal     MS: extremities normal- no gross deformities noted, no evidence of inflammation in joints, FROM in all extremities.     SKIN: no suspicious lesions or rashes     NEURO: Normal strength and tone, sensory exam grossly normal, mentation intact and speech normal     PSYCH: mentation appears normal. and affect normal/bright     LYMPHATICS: No cervical adenopathy    Recent Labs   Lab Test 02/04/20  1214 06/12/19  1024 06/12/19  0921   HGB 12.6 13.3  --     271  --    A1C  --   --  5.6        Diagnostics:  No labs were ordered during this visit.   No EKG required for low risk surgery (cataract, skin procedure, breast biopsy, etc).    Revised Cardiac Risk Index (RCRI):  The patient has the following serious cardiovascular risks for perioperative complications:   - No serious cardiac risks = 0 points     RCRI Interpretation: 0 points: Class I (very low risk - 0.4% complication rate)           Assessment & Plan   The proposed surgical procedure is considered LOW risk.    1. Preop general physical exam  Cleared for surgery     2. Encounter for immunization  Got her flu vaccine today   - FLU VAC, QUADRIVALENT (RIV4)  RECOMBINANT DNA, IM  Possible Sleep Apnea: unknown    Status post total hip replacement   Status       Risks and Recommendations:  The patient has the following additional risks and recommendations for perioperative complications:   - No identified additional risk factors other than previously addressed    Medication Instructions:  Patient is to take all scheduled medications on the day of surgery    RECOMMENDATION:  APPROVAL GIVEN to proceed with proposed procedure, without further diagnostic evaluation.    Signed Electronically by: Vanessa Ladd MD    Copy of this evaluation report is provided to requesting physician.    Preop [facility] Preop Guidelines    Revised Cardiac Risk Index

## 2020-10-14 NOTE — PATIENT INSTRUCTIONS

## 2020-10-16 ENCOUNTER — MYC MEDICAL ADVICE (OUTPATIENT)
Dept: FAMILY MEDICINE | Facility: CLINIC | Age: 55
End: 2020-10-16

## 2020-10-16 DIAGNOSIS — Z01.818 PREOP GENERAL PHYSICAL EXAM: Primary | ICD-10-CM

## 2020-10-16 NOTE — TELEPHONE ENCOUNTER
LS,    Please see [employer] message below.  Do you want short visit with patient so you can review EKG and it is complete or just lab and nurse only?    Mikaela Niño RN

## 2020-10-16 NOTE — TELEPHONE ENCOUNTER
I have signed the orders for labs and EKG, can you let her know ? She will have to schedule with lab and also call us to have someone available to do the EKG   Romy

## 2020-10-21 ENCOUNTER — ALLIED HEALTH/NURSE VISIT (OUTPATIENT)
Dept: NURSING | Facility: CLINIC | Age: 55
End: 2020-10-21
Payer: COMMERCIAL

## 2020-10-21 DIAGNOSIS — Z01.818 PREOP GENERAL PHYSICAL EXAM: ICD-10-CM

## 2020-10-21 DIAGNOSIS — Z01.818 PREOP GENERAL PHYSICAL EXAM: Primary | ICD-10-CM

## 2020-10-21 LAB
HGB BLD-MCNC: 12.5 G/DL (ref 11.7–15.7)
PLATELET # BLD AUTO: 264 10E9/L (ref 150–450)

## 2020-10-21 PROCEDURE — 93000 ELECTROCARDIOGRAM COMPLETE: CPT

## 2020-10-21 PROCEDURE — 36415 COLL VENOUS BLD VENIPUNCTURE: CPT | Performed by: FAMILY MEDICINE

## 2020-10-21 PROCEDURE — 99207 PR NO CHARGE NURSE ONLY: CPT

## 2020-10-21 PROCEDURE — 85018 HEMOGLOBIN: CPT | Performed by: FAMILY MEDICINE

## 2020-10-21 PROCEDURE — 85049 AUTOMATED PLATELET COUNT: CPT | Performed by: FAMILY MEDICINE

## 2020-10-22 ENCOUNTER — THERAPY VISIT (OUTPATIENT)
Dept: PHYSICAL THERAPY | Facility: CLINIC | Age: 55
End: 2020-10-22
Payer: COMMERCIAL

## 2020-10-22 DIAGNOSIS — Z96.641 HISTORY OF TOTAL HIP ARTHROPLASTY, RIGHT: ICD-10-CM

## 2020-10-22 DIAGNOSIS — R29.898 WEAKNESS OF RIGHT HIP: ICD-10-CM

## 2020-10-22 PROCEDURE — 97110 THERAPEUTIC EXERCISES: CPT | Mod: GP | Performed by: PHYSICAL THERAPIST

## 2020-10-22 PROCEDURE — 97112 NEUROMUSCULAR REEDUCATION: CPT | Mod: GP | Performed by: PHYSICAL THERAPIST

## 2020-10-22 ASSESSMENT — ACTIVITIES OF DAILY LIVING (ADL)
HEAVY_WORK: SLIGHT DIFFICULTY
DEEP_SQUATTING: UNABLE TO DO
HOS_ADL_ITEM_SCORE_TOTAL: 53
TWISTING/PIVOTING_ON_INVOLVED_LEG: NO DIFFICULTY AT ALL
HOS_ADL_HIGHEST_POTENTIAL_SCORE: 64
HOS_ADL_COUNT: 16
WALKING_UP_STEEP_HILLS: SLIGHT DIFFICULTY
WALKING_DOWN_STEEP_HILLS: SLIGHT DIFFICULTY
WALKING_15_MINUTES_OR_GREATER: NO DIFFICULTY AT ALL
WALKING_INITIALLY: SLIGHT DIFFICULTY
GETTING_INTO_AND_OUT_OF_AN_AVERAGE_CAR: SLIGHT DIFFICULTY
RECREATIONAL_ACTIVITIES: SLIGHT DIFFICULTY
LIGHT_TO_MODERATE_WORK: NO DIFFICULTY AT ALL
PUTTING_ON_SOCKS_AND_SHOES: SLIGHT DIFFICULTY
WALKING_APPROXIMATELY_10_MINUTES: NO DIFFICULTY AT ALL
STANDING_FOR_15_MINUTES: NO DIFFICULTY AT ALL
GOING_UP_1_FLIGHT_OF_STAIRS: NO DIFFICULTY AT ALL
GOING_DOWN_1_FLIGHT_OF_STAIRS: SLIGHT DIFFICULTY
ROLLING_OVER_IN_BED: NO DIFFICULTY AT ALL
HOS_ADL_SCORE(%): 82.81
SITTING_FOR_15_MINUTES: NO DIFFICULTY AT ALL
STEPPING_UP_AND_DOWN_CURBS: NO DIFFICULTY AT ALL

## 2020-10-22 NOTE — PROGRESS NOTES
Subjective:  HPI  Physical Exam                    Objective:  System    Physical Exam    General     ROS    Assessment/Plan:    DISCHARGE REPORT    Progress reporting period is from 7/9/2020 to 10/22/2020.       SUBJECTIVE  Subjective changes noted by patient:  .  Subjective: Has some stiffness in the right hip.  Stiffness in morning and difficulty walking down stairs initially in the morning.  Doing yoga and lifting.  Going to the gym on Tuesday to do mat Pilates.  Going to see a hand specialist.    Current pain level is NA  .     Previous pain level was   Initial Pain level: 3/10.   Changes in function:  Yes (See Goal flowsheet attached for changes in current functional level)  Adverse reaction to treatment or activity: None    OBJECTIVE  Changes noted in objective findings:  Yes,   Objective: Able to get off the floor in a lunge position with use of support of the table.  Mild trendelenberg but patient able to correct this if she focuses on her gait.  Grade 4+/5 strength with hip abduction     ASSESSMENT/PLAN  Updated problem list and treatment plan: Diagnosis 1:  Right RENU  Pain -  self management, education and home program  Decreased joint mobility - manual therapy, therapeutic exercise and home program  Decreased strength - therapeutic exercise, therapeutic activities and home program  Impaired gait - gait training and home program  Decreased function - therapeutic activities and home program  STG/LTGs have been met or progress has been made towards goals:  Yes (See Goal flow sheet completed today.)  Assessment of Progress: The patient's condition is improving.  The patient's condition has potential to improve.  Self Management Plans:  Patient has been instructed in a home treatment program.    Marbella continues to require the following intervention to meet STG and LTG's:  Patient needs to continue to work on the home exercise program.      Recommendations:  This patient is ready to be discharged from therapy  and continue their home treatment program.    Please refer to the daily flowsheet for treatment today, total treatment time and time spent performing 1:1 timed codes.

## 2020-10-22 NOTE — LETTER
[facility] ATHLETIC [facility] PHYSICAL [facility]  [address]  [place] [address] [phone]    2020    Re: Marbella Hendrix   :   1965  MRN:  [id number]   REFERRING PHYSICIAN:   Broderick Sparrow Asp    [facility] ATHLETIC [facility] PHYSICAL [facility]    Date of Initial Evaluation:  2020  Visits:  Rxs Used: 4  Reason for Referral:     History of total hip arthroplasty, right  Weakness of right hip    Assessment/Plan:    DISCHARGE REPORT    Progress reporting period is from 2020 to 10/22/2020.       SUBJECTIVE  Subjective changes noted by patient:  .  Subjective: Has some stiffness in the right hip.  Stiffness in morning and difficulty walking down stairs initially in the morning.  Doing yoga and lifting.  Going to the gym on Tuesday to do mat Pilates.  Going to see a hand specialist.    Current pain level is NA  .     Previous pain level was   Initial Pain level: 3/10.   Changes in function:  Yes (See Goal flowsheet attached for changes in current functional level)  Adverse reaction to treatment or activity: None    OBJECTIVE  Changes noted in objective findings:  Yes,   Objective: Able to get off the floor in a lunge position with use of support of the table.  Mild trendelenberg but patient able to correct this if she focuses on her gait.  Grade 4+/5 strength with hip abduction     ASSESSMENT/PLAN  Updated problem list and treatment plan: Diagnosis 1:  Right RENU  Pain -  self management, education and home program  Decreased joint mobility - manual therapy, therapeutic exercise and home program  Decreased strength - therapeutic exercise, therapeutic activities and home program  Impaired gait - gait training and home program  Decreased function - therapeutic activities and home program  STG/LTGs have been met or progress has been made towards goals:  Yes (See Goal flow sheet completed today.)  Assessment of Progress: The patient's condition is  improving.  The patient's condition has potential to improve.  Self Management Plans:  Patient has been instructed in a home treatment program.    Marbella continues to require the following intervention to meet STG and LTG's:  Patient needs to continue to work on the home exercise program.      Recommendations:  This patient is ready to be discharged from therapy and continue their home treatment program.          Thank you for your referral.    INQUIRIES  Therapist: Jeni Jesus PT, [facility]  [facility] PHYSICAL THERAPY  [address] [place] [address] [phone]  [facility] [address]  Phone: [phone]  Fax: [phone]

## 2020-10-28 ENCOUNTER — OFFICE VISIT (OUTPATIENT)
Dept: ORTHOPEDICS | Facility: CLINIC | Age: 55
End: 2020-10-28
Payer: COMMERCIAL

## 2020-10-28 ENCOUNTER — TRANSFERRED RECORDS (OUTPATIENT)
Dept: HEALTH INFORMATION MANAGEMENT | Facility: CLINIC | Age: 55
End: 2020-10-28

## 2020-10-28 ENCOUNTER — ANCILLARY PROCEDURE (OUTPATIENT)
Dept: GENERAL RADIOLOGY | Facility: CLINIC | Age: 55
End: 2020-10-28
Attending: FAMILY MEDICINE
Payer: COMMERCIAL

## 2020-10-28 VITALS
WEIGHT: 201 LBS | HEIGHT: 64 IN | SYSTOLIC BLOOD PRESSURE: 134 MMHG | DIASTOLIC BLOOD PRESSURE: 90 MMHG | BODY MASS INDEX: 34.31 KG/M2

## 2020-10-28 DIAGNOSIS — M79.642 LEFT HAND PAIN: ICD-10-CM

## 2020-10-28 DIAGNOSIS — M19.032 CMC DJD(CARPOMETACARPAL DEGENERATIVE JOINT DISEASE), LOCALIZED PRIMARY, LEFT: Primary | ICD-10-CM

## 2020-10-28 PROCEDURE — 99214 OFFICE O/P EST MOD 30 MIN: CPT | Performed by: FAMILY MEDICINE

## 2020-10-28 PROCEDURE — 73130 X-RAY EXAM OF HAND: CPT | Mod: LT | Performed by: FAMILY MEDICINE

## 2020-10-28 ASSESSMENT — MIFFLIN-ST. JEOR: SCORE: 1492.76

## 2020-10-28 NOTE — PATIENT INSTRUCTIONS
Marbella to follow up with Primary Care provider regarding elevated blood pressure.      Here is the brace we discussed:    [email or web address]      If you opt to consider an injection:    Ultrasound Procedure scheduling instructions    Call [facility] central scheduling:  - [phone]    Tell them you need a 20-minute procedure appointment with Dr. Moon at the [facility].     Specify which joint is being injected.    At the present time, these are only done at the [facility], next to [facility]:  [address], [facility]      If you are having any trouble getting to your appointment on time, please call the  of the [facility]: [phone]

## 2020-10-28 NOTE — LETTER
10/28/2020         RE: Marbella Hendrix  [address] [address]        Dear Colleague,    Thank you for referring your patient, Marbella Hendrix, to the [facility] [place]. Please see a copy of my visit note below.    [facility]   Clinic Visit s Oct 28, 2020    PCP: Vanessa Ladd    ASSESSMENT/PLAN    ICD-10-CM    1. CMC DJD(carpometacarpal degenerative joint disease), localized primary, left  M19.032 [facility] PT, HAND, AND CHIROPRACTIC REFERRAL       Insidious onset left thumb pain, worse with activity and associated with some functional weakness.  Symptoms, examination, and radiographic findings are consistent with degenerative arthritis at the first CMC joint.  There is no evidence of acute tendinitis or tenosynovitis, no evidence of carpal tunnel syndrome.      We reviewed the etiology and natural history of this condition, as well as management options including activity modification, medication management, bracing/splinting, hand therapy, corticosteroid injection, and surgery.  Today she opted to proceed with hand therapy, and will look into the Comfort Cool restriction brace.            Today's Visit:  Marbella is a 55 year old female who is seen as self referral for   Chief Complaint   Patient presents with     Left Hand - Pain       Injury: Reports insidious onset without acute precipitating event.     Right hand dominant    Location of Pain: left thumb, radiating to pinky and wrist   Duration of Pain: acute, 2 month(s),   Rating of Pain at worst: 10/10  Rating of Pain Currently: 1/10  Pain is better with: activity avoidance and ice   Pain is worse with: using it, ADL's  Treatment so far consists of: brace and acupuncture  Associated symptoms: no distal numbness or tingling; denies swelling or warmth  Recent imaging completed: X-rays completed 10/28/20.  Prior History of related problems: none    Social History: is unemployed      Past Medical History:   Diagnosis Date     Arthritis     My mom has it, my grandmother had it I have it     Diabetes (H)     My mom and brother have type II     Heavy menstrual period      Leiomyoma of uterus     s/p myomectomy     Mental disorder     anxiety     PONV (postoperative nausea and vomiting)      Surgical complication after   sept     Thyroid disease     Hypothroidism, 2nd half of      Uncomplicated asthma     My son has it, since he was born     Vesico-ureteral reflux     s/p repair       Patient Active Problem List    Diagnosis Date Noted     Weakness of right hip 2020     Priority: Medium     Primary osteoarthritis of right hip 2019     Priority: Medium     Added automatically from request for surgery [id number]       Morbid obesity (H) 2018     Priority: Medium     Status post hysterectomy 2017     Priority: Medium     Sensation of plugged ear on both sides 2017     Priority: Medium     Acute post-operative pain 2015     Priority: Medium     Preventative health care 10/30/2012     Priority: Medium     Last pap NIL ; mammogram nl ; lipids abnl, needs annual f/u       Health Care Home 2012     Priority: Medium     Tier 1    DX V65.8 REPLACED WITH 27083 HEALTH CARE HOME (2013)       Generalized anxiety disorder 2012     Priority: Medium     H/o panic attacks; currently controlled with paxil       Hyperlipidemia LDL goal <160 2012     Priority: Medium     Behavioral measures - avoiding statins while trying to conceive; not a candidate for red yeast rice (interactions with thyroid meds)       Acquired hypothyroidism 2012     Priority: Medium     Overweight 2012     Priority: Medium     On exercise plan, has been in weight watchers  Problem list name updated by automated process. Provider to review         Family History   Problem Relation Age of Onset     High cholesterol Father      Cancer Father         CLL  "    Blood Disease Father         CLL     Breast Cancer Maternal Grandmother         in 80s     Cancer Maternal Grandmother      Depression Mother      Diabetes Mother      Ulcerative Colitis Mother      Lipids Mother         hypertriglyceridemia     Anxiety Disorder Mother      Diabetes Brother        Social History     Socioeconomic History     Marital status:      Spouse name: Wally     Number of children: 2     Years of education: None     Highest education level: None   Occupational History     None   Social Needs     Financial resource strain: None     Food insecurity     Worry: None     Inability: None     Transportation needs     Medical: None     Non-medical: None   Tobacco Use     Smoking status: Former Smoker     Packs/day: 0.00     Years: 0.00     Pack years: 0.00     Types: Cigarettes     Quit date: 1990     Years since quittin.8     Smokeless tobacco: Never Used   Substance and Sexual Activity     Alcohol use: Yes     Comment: 1-2 drinks a month     Drug use: Not Currently     Types: Cocaine, Marijuana, Methamphetamines, Opiates, Amphetamines, Barbiturates, \"Crack\" cocaine, Codeine, Hashish, Hydrocodone, LSD, Morphine, Opium, Oxycodone       Past Surgical History:   Procedure Laterality Date     ABDOMEN SURGERY      exploratory after C section for sepsis      SECTION      x 2     HYSTERECTOMY RADICAL  2015     HYSTERECTOMY SUPRACERVICAL N/A 2015    Procedure: HYSTERECTOMY SUPRACERVICAL;  Surgeon: Kelle Wasserman MD;  Location: [facility] OR     LUMPECTOMY BREAST       MYOMECTOMY UTERUS             Review of Systems   Musculoskeletal: Positive for joint pain.   All other systems reviewed and are negative.        Physical Exam  BP (!) 134/90   Ht 1.619 m (5' 3.75\")   Wt 91.2 kg (201 lb)   LMP 2015 (Approximate)   BMI 34.77 kg/m    Constitutional:well-developed, well-nourished, and in no distress.   Cardiovascular: Intact distal pulses.    Neurological: " alert. Gait Normal:   Gait, station, stance, and balance appear normal for age  Skin: Skin is warm and dry.   Psychiatric: Mood and affect normal.   Respiratory: unlabored, speaks in full sentences  Lymph: no LAD, no lymphangitis      Left Hand Exam     Tenderness   Left hand tenderness location: Basilar joint of thumb.     Range of Motion   Wrist   Extension: normal   Flexion: normal   Pronation: normal   Supination: normal   Hand   MP Thumb: normal   DIP Thumb: normal     Muscle Strength   The patient has normal left wrist strength.    Tests   Finkelstein's test: negative    Other   Erythema: absent  Scars: absent  Sensation: normal  Pulse: present    Comments:  Pain with pinch                  X-ray images Ordered and independently reviewed by me in the office today with the patient. X-ray shows:   Recent Results (from the past 48 hour(s))   XR Hand Left G/E 3 Views    Narrative    10/28/2020    Subtle subluxation and mild degenerative change at the basal joint of the   thumb, perhaps mild diffuse IP joint narrowing as well, otherwise no acute   fractures.           Again, thank you for allowing me to participate in the care of your patient.        Sincerely,        Antolin Moon MD

## 2020-10-28 NOTE — PROGRESS NOTES
[facility]   Clinic Visit s Oct 28, 2020    PCP: Vanessa Ladd    ASSESSMENT/PLAN    ICD-10-CM    1. CMC DJD(carpometacarpal degenerative joint disease), localized primary, left  M19.032 [facility] PT, HAND, AND CHIROPRACTIC REFERRAL       Insidious onset left thumb pain, worse with activity and associated with some functional weakness.  Symptoms, examination, and radiographic findings are consistent with degenerative arthritis at the first CMC joint.  There is no evidence of acute tendinitis or tenosynovitis, no evidence of carpal tunnel syndrome.      We reviewed the etiology and natural history of this condition, as well as management options including activity modification, medication management, bracing/splinting, hand therapy, corticosteroid injection, and surgery.  Today she opted to proceed with hand therapy, and will look into the Comfort Cool restriction brace.            Today's Visit:  Marbella is a 55 year old female who is seen as self referral for   Chief Complaint   Patient presents with     Left Hand - Pain       Injury: Reports insidious onset without acute precipitating event.     Right hand dominant    Location of Pain: left thumb, radiating to pinky and wrist   Duration of Pain: acute, 2 month(s),   Rating of Pain at worst: 10/10  Rating of Pain Currently: 1/10  Pain is better with: activity avoidance and ice   Pain is worse with: using it, ADL's  Treatment so far consists of: brace and acupuncture  Associated symptoms: no distal numbness or tingling; denies swelling or warmth  Recent imaging completed: X-rays completed 10/28/20.  Prior History of related problems: none    Social History: is unemployed     Past Medical History:   Diagnosis Date     Arthritis     My mom has it, my grandmother had it I have it     Diabetes (H)     My mom and brother have type II     Heavy menstrual period      Leiomyoma of uterus     s/p myomectomy     Mental disorder     anxiety     PONV  (postoperative nausea and vomiting)      Surgical complication after   sept     Thyroid disease     Hypothroidism, 2nd half of      Uncomplicated asthma     My son has it, since he was born     Vesico-ureteral reflux     s/p repair       Patient Active Problem List    Diagnosis Date Noted     Weakness of right hip 2020     Priority: Medium     Primary osteoarthritis of right hip 2019     Priority: Medium     Added automatically from request for surgery [id number]       Morbid obesity (H) 2018     Priority: Medium     Status post hysterectomy 2017     Priority: Medium     Sensation of plugged ear on both sides 2017     Priority: Medium     Acute post-operative pain 2015     Priority: Medium     Preventative health care 10/30/2012     Priority: Medium     Last pap NIL ; mammogram nl ; lipids abnl, needs annual f/u       Health Care Home 2012     Priority: Medium     Tier 1    DX V65.8 REPLACED WITH 48494 HEALTH CARE HOME (2013)       Generalized anxiety disorder 2012     Priority: Medium     H/o panic attacks; currently controlled with paxil       Hyperlipidemia LDL goal <160 2012     Priority: Medium     Behavioral measures - avoiding statins while trying to conceive; not a candidate for red yeast rice (interactions with thyroid meds)       Acquired hypothyroidism 2012     Priority: Medium     Overweight 2012     Priority: Medium     On exercise plan, has been in weight watchers  Problem list name updated by automated process. Provider to review         Family History   Problem Relation Age of Onset     High cholesterol Father      Cancer Father         CLL     Blood Disease Father         CLL     Breast Cancer Maternal Grandmother         in 80s     Cancer Maternal Grandmother      Depression Mother      Diabetes Mother      Ulcerative Colitis Mother      Lipids Mother         hypertriglyceridemia     Anxiety Disorder  "Mother      Diabetes Brother        Social History     Socioeconomic History     Marital status:      Spouse name: Wally     Number of children: 2     Years of education: None     Highest education level: None   Occupational History     None   Social Needs     Financial resource strain: None     Food insecurity     Worry: None     Inability: None     Transportation needs     Medical: None     Non-medical: None   Tobacco Use     Smoking status: Former Smoker     Packs/day: 0.00     Years: 0.00     Pack years: 0.00     Types: Cigarettes     Quit date: 1990     Years since quittin.8     Smokeless tobacco: Never Used   Substance and Sexual Activity     Alcohol use: Yes     Comment: 1-2 drinks a month     Drug use: Not Currently     Types: Cocaine, Marijuana, Methamphetamines, Opiates, Amphetamines, Barbiturates, \"Crack\" cocaine, Codeine, Hashish, Hydrocodone, LSD, Morphine, Opium, Oxycodone       Past Surgical History:   Procedure Laterality Date     ABDOMEN SURGERY      exploratory after C section for sepsis      SECTION      x 2     HYSTERECTOMY RADICAL  2015     HYSTERECTOMY SUPRACERVICAL N/A 2015    Procedure: HYSTERECTOMY SUPRACERVICAL;  Surgeon: Kelle Wasserman MD;  Location: [facility] OR     LUMPECTOMY BREAST       MYOMECTOMY UTERUS             Review of Systems   Musculoskeletal: Positive for joint pain.   All other systems reviewed and are negative.        Physical Exam  BP (!) 134/90   Ht 1.619 m (5' 3.75\")   Wt 91.2 kg (201 lb)   LMP 2015 (Approximate)   BMI 34.77 kg/m    Constitutional:well-developed, well-nourished, and in no distress.   Cardiovascular: Intact distal pulses.    Neurological: alert. Gait Normal:   Gait, station, stance, and balance appear normal for age  Skin: Skin is warm and dry.   Psychiatric: Mood and affect normal.   Respiratory: unlabored, speaks in full sentences  Lymph: no LAD, no lymphangitis      Left Hand Exam     Tenderness "   Left hand tenderness location: Basilar joint of thumb.     Range of Motion   Wrist   Extension: normal   Flexion: normal   Pronation: normal   Supination: normal   Hand   MP Thumb: normal   DIP Thumb: normal     Muscle Strength   The patient has normal left wrist strength.    Tests   Finkelstein's test: negative    Other   Erythema: absent  Scars: absent  Sensation: normal  Pulse: present    Comments:  Pain with pinch                  X-ray images Ordered and independently reviewed by me in the office today with the patient. X-ray shows:   Recent Results (from the past 48 hour(s))   XR Hand Left G/E 3 Views    Narrative    10/28/2020    Subtle subluxation and mild degenerative change at the basal joint of the   thumb, perhaps mild diffuse IP joint narrowing as well, otherwise no acute   fractures.

## 2020-11-09 NOTE — TELEPHONE ENCOUNTER
FUTURE VISIT INFORMATION      FUTURE VISIT INFORMATION:    Date: 11/17/20    Time: 2:00pm    Location[place] [facility]  REFERRAL INFORMATION:    Referring provider:  self    Referring providers clinic:  N/A    Reason for visit/diagnosis  Upper eyelids need to be reduced, blocked tear ducts. Hx of duct surgery    RECORDS REQUESTED FROM:       Clinic name Comments Records Status Imaging Status   [facility] Request for recs sent 11/9- recs received and sent to scanning EPIC

## 2020-11-14 ENCOUNTER — HEALTH MAINTENANCE LETTER (OUTPATIENT)
Age: 55
End: 2020-11-14

## 2020-11-17 ENCOUNTER — TELEPHONE (OUTPATIENT)
Dept: OPHTHALMOLOGY | Facility: CLINIC | Age: 55
End: 2020-11-17

## 2020-11-17 ENCOUNTER — OFFICE VISIT (OUTPATIENT)
Dept: OPHTHALMOLOGY | Facility: CLINIC | Age: 55
End: 2020-11-17
Payer: COMMERCIAL

## 2020-11-17 ENCOUNTER — PRE VISIT (OUTPATIENT)
Dept: OPHTHALMOLOGY | Facility: CLINIC | Age: 55
End: 2020-11-17

## 2020-11-17 DIAGNOSIS — H02.834 DERMATOCHALASIS OF BOTH UPPER EYELIDS: Primary | ICD-10-CM

## 2020-11-17 DIAGNOSIS — H04.223 EPIPHORA DUE TO INSUFFICIENT DRAINAGE OF BOTH SIDES: ICD-10-CM

## 2020-11-17 DIAGNOSIS — H02.403 INVOLUTIONAL PTOSIS, ACQUIRED, BILATERAL: ICD-10-CM

## 2020-11-17 DIAGNOSIS — H02.831 DERMATOCHALASIS OF BOTH UPPER EYELIDS: Primary | ICD-10-CM

## 2020-11-17 PROCEDURE — 92285 EXTERNAL OCULAR PHOTOGRAPHY: CPT | Performed by: OPHTHALMOLOGY

## 2020-11-17 PROCEDURE — 99203 OFFICE O/P NEW LOW 30 MIN: CPT | Performed by: OPHTHALMOLOGY

## 2020-11-17 PROCEDURE — 92082 INTERMEDIATE VISUAL FIELD XM: CPT | Performed by: OPHTHALMOLOGY

## 2020-11-17 ASSESSMENT — VISUAL ACUITY
OD_SC: 20/20
OD_SC+: -1
METHOD: SNELLEN - LINEAR
OS_SC+: -2
OS_SC: 20/20

## 2020-11-17 ASSESSMENT — EXTERNAL EXAM - RIGHT EYE: OD_EXAM: BROW PTOSIS, WITH FRONTALIS RELAXED, BROW IS BELOW SUPERIOR ORBITAL RIM AND LATERALLY INLINE WITH UPPER LASHES

## 2020-11-17 ASSESSMENT — SLIT LAMP EXAM - LIDS
COMMENTS: HEAVY DERMATOCHALASIS RESTING ON LASHES, TRUE PTOSIS
COMMENTS: HEAVY DERMATOCHALASIS RESTING ON LASHES, TRUE PTOSIS

## 2020-11-17 ASSESSMENT — EXTERNAL EXAM - LEFT EYE: OS_EXAM: BROW PTOSIS, WITH FRONTALIS RELAXED, BROW IS BELOW SUPERIOR ORBITAL RIM AND LATERALLY INLINE WITH UPPER LASHES

## 2020-11-17 ASSESSMENT — CONF VISUAL FIELD
METHOD: COUNTING FINGERS
OD_NORMAL: 1
OS_NORMAL: 1

## 2020-11-17 ASSESSMENT — TONOMETRY
OS_IOP_MMHG: 11
IOP_METHOD: ICARE
OD_IOP_MMHG: 13

## 2020-11-17 NOTE — TELEPHONE ENCOUNTER
Spoke with patient to schedule surgery with Dr. Sosa    Surgery was scheduled on 12/18 at [facility] OR  Patient will have H&P at [facility]     Patient is aware a COVID-19 test is needed before their procedure. The test should be with-in 4 days of their procedure.   Test Details: Date 12/14 [facility]    Post-Op visit was scheduled on 1/12  Patient is aware a / is needed day of surgery.   Surgery packet was mailed 11/17, patient has my direct contact information for any further questions.

## 2020-11-17 NOTE — NURSING NOTE
Chief Complaints and History of Present Illnesses   Patient presents with     Consult For     Chief Complaint(s) and History of Present Illness(es)     Consult For     Laterality: both eyes    Associated symptoms: tearing and dryness.  Negative for redness and eye pain    Treatments tried: no treatments    Pain scale: 0/10              Comments     Pt is here for a consult for drooping of both upper lids. She reports that right eye surgery 2002 for tear duct with reconstruction. Waking up with tearing spilling out of each eye and crusting.  left eye tearing more than right eye. Both upper lids have been drooping over the past few years. Worse by days end. Feels like at days end each eye are too tired to function. Does not like to night drive. Feel the need to manually hold lids up when trying to read.   KARRI Gilmore 2:05 PM November 17, 2020

## 2020-11-17 NOTE — PROGRESS NOTES
[facility] New Patient    Patient: Marbella Hendrix MRN# [id number]   YOB: 1965 Age: 55 year old   Date of Visit: Nov 17, 2020    CC: Droopy eyelids obstructing vision.              HPI:     Chief Complaint(s) and History of Present Illness(es)     Consult For     Laterality: both eyes    Associated symptoms: tearing and dryness.  Negative for redness and eye   pain    Treatments tried: no treatments    Pain scale: 0/10         Comments     Pt is here for a consult for drooping of both upper lids. She reports   that right eye surgery 2002 for tear duct with reconstruction. Waking up   with tearing spilling out of each eye and crusting.  left eye tearing more than right eye. Both upper lids have been drooping   over the past few years. Worse by days end. Feels like at days end each   eye are too tired to function. Does not like to night drive. Feel the need   to manually hold lids up when trying to read.   Sakina Lerma, COT 2:05 PM November 17, 2020     Had tear duct surgery 2002 right eye ?Dr. Dawson and 2015 left eye with Dr. Lanza.       Marbella Hendrix is a 55 year old female who has noted gradual onset of droopy eyelids over the past years. The droopy eyelid is interfering with activities of daily living including driving, and reading. The patient denies double vision, variability of the eyelid position, or dry eye symptoms.     EXAM:     MRD1: 1 mm both eyes   Dermatochalasis with excess skin touching eyelashes   Brow ptosis with brow resting below superior orbital rim   Aponeurotic ptosis    VISUAL FIELD:  Right eye untaped:10 degrees Right eye taped:30 degrees  Left eye untaped:10 degrees Left eye taped:30 degrees    Assessment & Plan     Marbella Hendrix is a 55 year old female with the following diagnoses:   1. Dermatochalasis of both upper eyelids    2. Involutional ptosis, acquired, bilateral    3. Epiphora due to insufficient drainage of both sides       Bilateral  upper lids blepharoplasty (skin + N&CF) and levator advancement ptosis repair 92997  Addendum: She sent a message requesting evaluation of nasolacrimal system while under sedation rather than doing it in clinic. Can irrigate bilaterally while she is under mac anesthesia. This will not fix any tearing issues, but help with diagnosis.     We discussed I would evaluate the epiphora later as I would not address at the same time.     ANTICOAGULATION:    Fish oil         PHOTOS DEMONSTRATE:    Significant dermatochalasis with lids resting on eyelashes and obstructing visual axis  Blepharoptosis  Brow ptosis with thicker brow skin and hairs below the lateral superior orbital rim    Attending Physician Attestation: Complete documentation of historical and exam elements from today's encounter can be found in the full encounter summary report (not reduplicated in this progress note). I personally obtained the chief complaint(s) and history of present illness. I confirmed and edited as necessary the review of systems, past medical/surgical history, family history, social history, and examination findings as documented by others; and I examined the patient myself. I personally reviewed the relevant tests, images, and reports as documented above. I formulated and edited as necessary the assessment and plan and discussed the findings and management plan with the patient. Pako Sosa MD      Today with Marbella Hendrix I reviewed the indications, risks, benefits, and alternatives of the proposed surgical procedure including, but not limited to, failure obtain the desired result  and need for additional surgery, bleeding, infection, loss of vision, loss of the eye, and the remote possibility of permanent damage to any organ system or death with the use of anesthesia.  I provided multiple opportunities for the questions, answered all questions to the best of my ability, and confirmed that my answers and my discussion were  understood.

## 2020-11-18 ENCOUNTER — THERAPY VISIT (OUTPATIENT)
Dept: OCCUPATIONAL THERAPY | Facility: CLINIC | Age: 55
End: 2020-11-18
Payer: COMMERCIAL

## 2020-11-18 DIAGNOSIS — M79.645 PAIN OF FINGER OF LEFT HAND: Primary | ICD-10-CM

## 2020-11-18 DIAGNOSIS — M19.032 CMC DJD(CARPOMETACARPAL DEGENERATIVE JOINT DISEASE), LOCALIZED PRIMARY, LEFT: ICD-10-CM

## 2020-11-18 PROCEDURE — 97026 INFRARED THERAPY: CPT | Mod: GO | Performed by: OCCUPATIONAL THERAPIST

## 2020-11-18 PROCEDURE — 97140 MANUAL THERAPY 1/> REGIONS: CPT | Mod: GO | Performed by: OCCUPATIONAL THERAPIST

## 2020-11-18 PROCEDURE — 97760 ORTHOTIC MGMT&TRAING 1ST ENC: CPT | Mod: GO | Performed by: OCCUPATIONAL THERAPIST

## 2020-11-18 PROCEDURE — 97110 THERAPEUTIC EXERCISES: CPT | Mod: GO | Performed by: OCCUPATIONAL THERAPIST

## 2020-11-18 PROCEDURE — 97165 OT EVAL LOW COMPLEX 30 MIN: CPT | Mod: GO | Performed by: OCCUPATIONAL THERAPIST

## 2020-11-18 NOTE — PROGRESS NOTES
Hand Therapy Initial Evaluation    Current Date:  2020    Diagnosis: Left CMC/hand pain  DOI: 2010    Subjective:  Marbella Hendrix is a 55 year old female.    Patient reports symptoms of the left thumb which occurred due to unknown. Since onset symptoms are Gradually getting worse.      Answers for HPI/ROS submitted by the patient on 2020   History Reported by Patient  Reason for Visit:: Painful L hand at wrist and thumb joint  When problem began:: 7/15/2020  How problem occurred:: organically!  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    Previous treatment: comfort cool from [employer] per Dr. Moon  Barriers include:none  Mobility: No difficulty  Transportation: drives  Currently working in normal job without restrictions  Leisure activities/hobbies: read; lift weights; yoga; pilates  Other: cooking; therapy for hip  Functional Outcome Measure:   Upper Extremity Functional Index Score:  SCORE:   Column Totals: /80: (P) 24   (A lower score indicates greater disability.)    Objective:  Pain Level (Scale 0-10)   2020   At Rest 6    With Use 6-10     Pain Description  Date 11/18/2020   Location thumb   Pain Quality Aching and Shooting   Frequency constant     Pain is worst  daytime   Exacerbated by  use   Relieved by Comfort cool   Progression worsening     ROM  Thumb 11/18/2020 11/18/2020   AROM  (PROM) Right Left   MP /65 /45 +   IP /80 /75+   RABD 50 46++   PABD 50 46++   Retropulsion (cm) 4 2   [place] Opposition Scale (0-10/10) 10 10++     Thumb Observation/Appearance   - none  + mild    ++ moderate    +++ severe     11/18/2020   Shoulder deformity present over CMC R: -  L: -   Edema over the CMC joint R: -  L: ++   Noted collapse of MP into hyperextension during pinch R: +  L: +      Provocative Tests  Pain Report:  - none    + mild    ++ moderate    +++ severe     11/18/2020   CMC Grind test R: -  L: -   Crepitus present R: -  L: -   CMC Adduction Stress Test R: -  L: -   CMC Extension Stress Test R: -  L: -   Finkelstein's R: -  L: -       Strength   (Measured in pounds)  Pain Report: - none  + mild    ++ moderate    +++ severe   NT    Palpation   Pain Report:  - none    + mild    ++ moderate    +++ severe    11/18/2020   CMC Joint Line   L: +   CMC AP Joint Laxity   L: -   Thenar [place]   L: ++   Web Space   L: ++   1st DC   L: +   Radial Styloid   L: +   FCR   L: ++     Assessment:  Patient presents with symptoms consistent with diagnosis of left thumb CMC thumb arthritis, with conservative intervention.    Patient's limitations or Problem List includes:  Pain, Decreased ROM/motion, Weakness, Decreased , Decreased pinch and Tightness in musculature of the left thumb which interferes with the patient's ability to perform Self Care Tasks (dressing, eating, bathing, hygiene/toileting), Work Tasks, Sleep Patterns, Recreational Activities, Household Chores and Driving  as compared to previous level of function.    Rehab Potential:  Excellent - Return to full activity, no limitations    Patient will benefit from skilled  Occupational Therapy to increase ROM, overall strength,  strength, pinch strength and stability of thumb and decrease pain and edema to return to previous activity level and resume normal daily tasks and to reach their rehab potential.    Barriers to Learning:  No barrier    Communication Issues:  Patient appears to be able to clearly communicate and understand verbal and written communication and follow directions correctly.    Chart Review: Simple history review with patient    Identified Performance Deficits: bathing/showering, dressing, hygiene and grooming, care of pets, child rearing, driving and community mobility, health management and maintenance, home establishment and management, meal preparation and cleanup, shopping, sleep and work    Assessment of Occupational Performance:  5 or more Performance Deficits    Clinical Decision Making (Complexity): Low complexity    Treatment Explanation:  The following has been discussed with the patient:    RX ordered/plan of care  Anticipated outcomes  Possible risks and side effects    Plan:  Frequency:  1 X week, once daily  Duration:  for 6-8 visits    Treatment Plan:    Modalities:    Laser Light   Therapeutic Exercise:    AROM, Isometrics and Stabilization  Therapeutic Activities:   Functional activities   Neuromuscular re-ed:   Nerve Gliding, Posture, Kinesiotaping and Stabilization  Manual Techniques:   Joint mobilization and Myofascial release  Orthotic Fabrication:    Static orthosis  Self Care:    Self Care Tasks and Ergonomic Considerations    Discharge Plan:  Achieve all LTG  [place] in home treatment program.  Reach maximal therapeutic benefit.    Home Program:  [facility]    CMC neoprene orthosis during the day with ADL s per symptoms    Hand based Thumb Spica Orthosis     Exercise Name: [place] Massage to Thumb - Reps: 1 - Sessions: 1, Notes: Hold on tender spot for 30-60 seconds.  Move to the next spot. Search for tender areas in the entire thumb  pad.  Exercise Name: Thumb Stabilization Web Space Release Method 1 with Clip - Reps: 1 - Sessions: 1, Notes: 2-3 minutes  Exercise Name: Thumb Stabilization, Repositioning, Method 1(on chest) - Reps: 1 - Sessions: 1-2, EMR Notes: no pain    Next Visit:  Advance thumb stabilization program  Place and hold pinch  Incorporate joint protection into daily functional activities  Adaptive equipment as needed

## 2020-11-22 DIAGNOSIS — Z11.59 ENCOUNTER FOR SCREENING FOR OTHER VIRAL DISEASES: Primary | ICD-10-CM

## 2020-11-24 ENCOUNTER — THERAPY VISIT (OUTPATIENT)
Dept: OCCUPATIONAL THERAPY | Facility: CLINIC | Age: 55
End: 2020-11-24
Payer: COMMERCIAL

## 2020-11-24 DIAGNOSIS — M19.032 CMC DJD(CARPOMETACARPAL DEGENERATIVE JOINT DISEASE), LOCALIZED PRIMARY, LEFT: Primary | ICD-10-CM

## 2020-11-24 DIAGNOSIS — M79.645 PAIN OF FINGER OF LEFT HAND: ICD-10-CM

## 2020-11-24 PROCEDURE — 97026 INFRARED THERAPY: CPT | Mod: GO | Performed by: OCCUPATIONAL THERAPIST

## 2020-11-24 PROCEDURE — 97110 THERAPEUTIC EXERCISES: CPT | Mod: GO | Performed by: OCCUPATIONAL THERAPIST

## 2020-11-24 PROCEDURE — 97140 MANUAL THERAPY 1/> REGIONS: CPT | Mod: GO | Performed by: OCCUPATIONAL THERAPIST

## 2020-11-24 PROCEDURE — 97112 NEUROMUSCULAR REEDUCATION: CPT | Mod: GO | Performed by: OCCUPATIONAL THERAPIST

## 2020-11-24 NOTE — PROGRESS NOTES
SOAP note objective information for 11/24/2020.    Please refer to the daily flowsheet for treatment today, total treatment time and time spent performing 1:1 timed codes.       Objective:  Pain Level (Scale 0-10)   11/18/20 11/24/20   At Rest 6 1-3   With Use 6-10 6     Pain Description  Date 11/18/2020 11/24/2020   Location thumb thumb   Pain Quality Aching and Shooting    Frequency constant      Pain is worst  daytime    Exacerbated by  use    Relieved by Comfort cool    Progression worsening improving     ROM  Thumb 11/18/2020 11/18/2020   AROM  (PROM) Right Left   MP /65 /45 +   IP /80 /75+   RABD 50 46++   PABD 50 46++   Retropulsion (cm) 4 2   [place] Opposition Scale (0-10/10) 10 10++     Thumb Observation/Appearance   - none  + mild    ++ moderate    +++ severe     11/18/20 11/24/20   Shoulder deformity present over CMC R: -  L: -    Edema over the CMC joint R: -  L: ++ R: -  L: ++   Noted collapse of MP into hyperextension during pinch R: +  L: + R: +  L: +      Provocative Tests  Pain Report:  - none    + mild    ++ moderate    +++ severe     11/18/20    CMC Grind test R: -  L: -    Crepitus present R: -  L: -    CMC Adduction Stress Test R: -  L: -    CMC Extension Stress Test R: -  L: -    Finkelstein's R: -  L: -        Strength   (Measured in pounds)  Pain Report: - none  + mild    ++ moderate    +++ severe   NT    Palpation   Pain Report:  - none    + mild    ++ moderate    +++ severe    11/18020 11/24/20   CMC Joint Line   L: +    CMC AP Joint Laxity   L: -    Thenar [place]   L: ++ L: ++   Web Space   L: ++ L: ++   1st DC   L: +    Radial Styloid   L: +    FCR   L: ++      Home Program:  [facility]    CMC neoprene orthosis during the day with ADL s per symptoms    Hand based Thumb Spica Orthosis     Exercise Name: [place] Massage to Thumb - Reps: 1 - Sessions: 1, Notes: Hold on tender spot for 30-60 seconds.  Move to the next spot. Search for tender areas in the entire thumb pad.  Exercise Name: Thumb  Stabilization Web Space Release Method 1 with Clip - Reps: 1 - Sessions: 1, Notes: 2-3 minutes  Exercise Name: Thumb Stabilization, Repositioning, Method 1(on chest) - Reps: 1 - Sessions: 1-2, EMR Notes: no pain    Next Visit:  Advance thumb stabilization program  Place and hold pinch  Incorporate joint protection into daily functional activities  Adaptive equipment as needed

## 2020-12-04 ENCOUNTER — OFFICE VISIT (OUTPATIENT)
Dept: FAMILY MEDICINE | Facility: CLINIC | Age: 55
End: 2020-12-04
Payer: COMMERCIAL

## 2020-12-04 VITALS
HEART RATE: 77 BPM | WEIGHT: 201 LBS | SYSTOLIC BLOOD PRESSURE: 120 MMHG | DIASTOLIC BLOOD PRESSURE: 79 MMHG | TEMPERATURE: 96.8 F | RESPIRATION RATE: 20 BRPM | HEIGHT: 64 IN | OXYGEN SATURATION: 96 % | BODY MASS INDEX: 34.31 KG/M2

## 2020-12-04 DIAGNOSIS — H02.403 PTOSIS OF BOTH EYELIDS: ICD-10-CM

## 2020-12-04 DIAGNOSIS — Z01.818 PREOP GENERAL PHYSICAL EXAM: Primary | ICD-10-CM

## 2020-12-04 LAB
HGB BLD-MCNC: 11.6 G/DL (ref 11.7–15.7)
PLATELET # BLD AUTO: 281 10E9/L (ref 150–450)

## 2020-12-04 PROCEDURE — 36415 COLL VENOUS BLD VENIPUNCTURE: CPT | Performed by: FAMILY MEDICINE

## 2020-12-04 PROCEDURE — 93000 ELECTROCARDIOGRAM COMPLETE: CPT | Performed by: FAMILY MEDICINE

## 2020-12-04 PROCEDURE — 85018 HEMOGLOBIN: CPT | Performed by: FAMILY MEDICINE

## 2020-12-04 PROCEDURE — 85049 AUTOMATED PLATELET COUNT: CPT | Performed by: FAMILY MEDICINE

## 2020-12-04 PROCEDURE — 99214 OFFICE O/P EST MOD 30 MIN: CPT | Performed by: FAMILY MEDICINE

## 2020-12-04 ASSESSMENT — MIFFLIN-ST. JEOR: SCORE: 1492.76

## 2020-12-04 NOTE — PROGRESS NOTES
[facility]  [address]  [facility] [address]  Phone: [phone]  Primary Provider: Mary Huff  Pre-op Performing Provider: MARY HUFF    PREOPERATIVE EVALUATION:  Today's date: 12/4/2020    Marbella Hendrix is a 55 year old female who presents for a preoperative evaluation.    Surgical Information:  Surgery/Procedure: bilateral ptosis repair  Surgery Location: [facility]  Surgeon: Dr. Sosa  Surgery Date: 12/18/2020  Time of Surgery: 10:45 AM  Where patient plans to recover: At home with family  Fax number for surgical facility: Note does not need to be faxed, will be available electronically in Epic.    Type of Anesthesia Anticipated: Local with MAC    Subjective     HPI related to upcoming procedure:     Preop Questions 12/3/2020   1. Have you ever had a heart attack or stroke? No   2. Have you ever had surgery on your heart or blood vessels, such as a stent placement, a coronary artery bypass, or surgery on an artery in your head, neck, heart, or legs? No   3. Do you have chest pain with activity? No   4. Do you have a history of  heart failure? No   5. Do you currently have a cold, bronchitis or symptoms of other infection? No   6. Do you have a cough, shortness of breath, or wheezing? No   7. Do you or anyone in your family have previous history of blood clots? No   8. Do you or does anyone in your family have a serious bleeding problem such as prolonged bleeding following surgeries or cuts? No   9. Have you ever had problems with anemia or been told to take iron pills? No   10. Have you had any abnormal blood loss such as black, tarry or bloody stools, or abnormal vaginal bleeding? No   11. Have you ever had a blood transfusion? No   12. Are you willing to have a blood transfusion if it is medically needed before, during, or after your surgery? Yes   13. Have you or any of your relatives ever had problems with anesthesia? No   14. Do  you have sleep apnea, excessive snoring or daytime drowsiness? No   15. Do you have any artifical heart valves or other implanted medical devices like a pacemaker, defibrillator, or continuous glucose monitor? No   16. Do you have artificial joints? YES - right hip    17. Are you allergic to latex? No   18. Is there any chance that you may be pregnant? No       Health Care Directive:  Patient does not have a Health Care Directive or Living Will: Discussed advance care planning with patient; however, patient declined at this time.    Preoperative Review of :        Review of Systems  Constitutional, neuro, ENT, endocrine, pulmonary, cardiac, gastrointestinal, genitourinary, musculoskeletal, integument and psychiatric systems are negative, except as otherwise noted.    Patient Active Problem List    Diagnosis Date Noted     CMC DJD(carpometacarpal degenerative joint disease), localized primary, left 11/18/2020     Priority: Medium     Pain of finger of left hand 11/18/2020     Priority: Medium     Dermatochalasis of both upper eyelids 11/17/2020     Priority: Medium     Added automatically from request for surgery [id number]       Involutional ptosis, acquired, bilateral 11/17/2020     Priority: Medium     Added automatically from request for surgery [id number]       Weakness of right hip 07/09/2020     Priority: Medium     Primary osteoarthritis of right hip 12/02/2019     Priority: Medium     Added automatically from request for surgery [id number]       Morbid obesity (H) 06/20/2018     Priority: Medium     Status post hysterectomy 08/02/2017     Priority: Medium     Sensation of plugged ear on both sides 07/12/2017     Priority: Medium     Acute post-operative pain 08/28/2015     Priority: Medium     Preventative health care 10/30/2012     Priority: Medium     Last pap NIL 2011; mammogram nl 2011; lipids abnl, needs annual f/u       Health Care Home 09/26/2012     Priority: Medium     Tier 1    DX V65.8 REPLACED WITH  [facility] (2013)       Generalized anxiety disorder 2012     Priority: Medium     H/o panic attacks; currently controlled with paxil       Hyperlipidemia LDL goal <160 2012     Priority: Medium     Behavioral measures - avoiding statins while trying to conceive; not a candidate for red yeast rice (interactions with thyroid meds)       Acquired hypothyroidism 2012     Priority: Medium     Overweight 2012     Priority: Medium     On exercise plan, has been in weight watchers  Problem list name updated by automated process. Provider to review        Past Medical History:   Diagnosis Date     Arthritis     My mom has it, my grandmother had it I have it     Diabetes (H)     My mom and brother have type II     Heavy menstrual period      Leiomyoma of uterus     s/p myomectomy     Mental disorder     anxiety     PONV (postoperative nausea and vomiting)      Surgical complication after   sept     Thyroid disease     Hypothroidism, 2nd half of      Uncomplicated asthma     My son has it, since he was born     Vesico-ureteral reflux     s/p repair     Past Surgical History:   Procedure Laterality Date     ABDOMEN SURGERY      exploratory after C section for sepsis      SECTION      x 2     HYSTERECTOMY RADICAL  2015     HYSTERECTOMY SUPRACERVICAL N/A 2015    Procedure: HYSTERECTOMY SUPRACERVICAL;  Surgeon: Kelle Wasserman MD;  Location: [facility] OR     LUMPECTOMY BREAST       MYOMECTOMY UTERUS       REPAIR PTOSIS Right 2002    reconstruction of tear duct     Current Outpatient Medications   Medication Sig Dispense Refill     calcium carbonate (OS-ARA) 500 MG tablet Take 1 tablet by mouth 2 times daily       levothyroxine (SYNTHROID/LEVOTHROID) 125 MCG tablet TAKE 1 TABLET BY MOUTH ONCE DAILY 90 tablet 2     melatonin 5 MG tablet Take 5 mg by mouth nightly as needed for sleep       Omega-3 Fatty Acids (OMEGA-3 FISH OIL PO) Take 1 g by mouth  "daily       PARoxetine (PAXIL) 20 MG tablet TAKE ONE-HALF TABLET BY MOUTH TWICE A DAY 90 tablet 3       No Known Allergies     Social History     Tobacco Use     Smoking status: Former Smoker     Packs/day: 0.00     Years: 0.00     Pack years: 0.00     Types: Cigarettes     Quit date: 1990     Years since quittin.9     Smokeless tobacco: Never Used   Substance Use Topics     Alcohol use: Yes     Comment: 1-2 drinks a month     Family History   Problem Relation Age of Onset     High cholesterol Father      Cancer Father         CLL     Blood Disease Father         CLL     Glaucoma Father      Macular Degeneration Father      Breast Cancer Maternal Grandmother         in 80s     Cancer Maternal Grandmother      Depression Mother      Diabetes Mother      Ulcerative Colitis Mother      Lipids Mother         hypertriglyceridemia     Anxiety Disorder Mother      Diabetes Brother      History   Drug Use Unknown         Objective     /79 (Patient Position: Sitting, Cuff Size: Adult Regular)   Pulse 77   Temp 96.8  F (36  C) (Tympanic)   Resp 20   Ht 1.619 m (5' 3.75\")   Wt 91.2 kg (201 lb)   LMP 2015 (Approximate)   SpO2 96%   BMI 34.77 kg/m      Physical Exam    GENERAL APPEARANCE: healthy, alert and no distress     EYES: EOMI, PERRL     HENT: ear canals and TM's normal and nose and mouth without ulcers or lesions     NECK: no adenopathy, no asymmetry, masses, or scars and thyroid normal to palpation     RESP: lungs clear to auscultation - no rales, rhonchi or wheezes     CV: regular rates and rhythm, normal S1 S2, no S3 or S4 and no murmur, click or rub     ABDOMEN:  soft, nontender, no HSM or masses and bowel sounds normal     MS: extremities normal- no gross deformities noted, no evidence of inflammation in joints, FROM in all extremities.     SKIN: no suspicious lesions or rashes     NEURO: Normal strength and tone, sensory exam grossly normal, mentation intact and speech normal     PSYCH: " mentation appears normal. and affect normal/bright     LYMPHATICS: No cervical adenopathy    Recent Labs   Lab Test 10/21/20  1311 02/04/20  1214 06/12/19  0921 06/12/19  0921   HGB 12.5 12.6   < >  --     259   < >  --    A1C  --   --   --  5.6    < > = values in this interval not displayed.        Diagnostics:  No labs were ordered during this visit.   EKG: appears normal, NSR, normal axis, normal intervals, no acute ST/T changes c/w ischemia, no LVH by voltage criteria, unchanged from previous tracings    Revised Cardiac Risk Index (RCRI):  The patient has the following serious cardiovascular risks for perioperative complications:   - No serious cardiac risks = 0 points     RCRI Interpretation: 0 points: Class I (very low risk - 0.4% complication rate)           Assessment & Plan   The proposed surgical procedure is considered LOW risk.    Preop general physical exam  Will check labs as per surgeon's request   - Hemoglobin  - Platelet count  - EKG 12-lead complete w/read - Clinics- unchanged from prior tracings     Ptosis of both eyelids  As above surgical correction scheduled          RECOMMENDATION:  APPROVAL GIVEN to proceed with proposed procedure, without further diagnostic evaluation.    Signed Electronically by: Vanessa Ladd MD    Copy of this evaluation report is provided to requesting physician.    Preop [facility] Preop Guidelines    Revised Cardiac Risk Index

## 2020-12-04 NOTE — PATIENT INSTRUCTIONS

## 2020-12-07 ENCOUNTER — THERAPY VISIT (OUTPATIENT)
Dept: OCCUPATIONAL THERAPY | Facility: CLINIC | Age: 55
End: 2020-12-07
Payer: COMMERCIAL

## 2020-12-07 DIAGNOSIS — M79.645 PAIN OF FINGER OF LEFT HAND: ICD-10-CM

## 2020-12-07 DIAGNOSIS — M19.032 CMC DJD(CARPOMETACARPAL DEGENERATIVE JOINT DISEASE), LOCALIZED PRIMARY, LEFT: Primary | ICD-10-CM

## 2020-12-07 PROCEDURE — 97110 THERAPEUTIC EXERCISES: CPT | Mod: GO | Performed by: OCCUPATIONAL THERAPIST

## 2020-12-07 PROCEDURE — 97026 INFRARED THERAPY: CPT | Mod: GO | Performed by: OCCUPATIONAL THERAPIST

## 2020-12-07 PROCEDURE — 97140 MANUAL THERAPY 1/> REGIONS: CPT | Mod: GO | Performed by: OCCUPATIONAL THERAPIST

## 2020-12-07 PROCEDURE — 97112 NEUROMUSCULAR REEDUCATION: CPT | Mod: GO | Performed by: OCCUPATIONAL THERAPIST

## 2020-12-07 NOTE — PROGRESS NOTES
SOAP note objective information for 12/7/2020.    Please refer to the daily flowsheet for treatment today, total treatment time and time spent performing 1:1 timed codes.       Objective:  Pain Level (Scale 0-10)   11/18/20 11/24/20 12/7/20   At Rest 6 1-3 8-9   With Use 6-10 6 8-9     Pain Description  Date 11/18/2020 11/24/2020   Location thumb thumb   Pain Quality Aching and Shooting    Frequency constant      Pain is worst  daytime    Exacerbated by  use    Relieved by Comfort cool    Progression worsening improving     ROM  Thumb 11/18/2020 11/18/2020   AROM  (PROM) Right Left   MP /65 /45 +   IP /80 /75+   RABD 50 46++   PABD 50 46++   Retropulsion (cm) 4 2   [place] Opposition Scale (0-10/10) 10 10++     Thumb Observation/Appearance   - none  + mild    ++ moderate    +++ severe     11/18/20 11/24/20 12/7/20   Shoulder deformity present over CMC R: -  L: -     Edema over the CMC joint R: -  L: ++ R: -  L: ++ R: -  L: +   Noted collapse of MP into hyperextension during pinch R: +  L: + R: +  L: + R: +  L: +      Provocative Tests  Pain Report:  - none    + mild    ++ moderate    +++ severe     11/18/20    CMC Grind test R: -  L: -    Crepitus present R: -  L: -    CMC Adduction Stress Test R: -  L: -    CMC Extension Stress Test R: -  L: -    Finkelstein's R: -  L: -        Strength   (Measured in pounds)  Pain Report: - none  + mild    ++ moderate    +++ severe   NT    Palpation   Pain Report:  - none    + mild    ++ moderate    +++ severe    11/18020 11/24/20 12/7/20   CMC Joint Line   L: +     CMC AP Joint Laxity   L: -     Thenar Eminence   L: ++ L: ++ L: ++   Web Space   L: ++ L: ++ L: ++   1st DC   L: +     Radial Styloid   L: +     FCR   L: ++       Home Program:  [employer]arm[employer]    CMC neoprene orthosis during the day with ADL s per symptoms    Hand based Thumb Spica Orthosis     Exercise Name: [place] Massage to Thumb - Reps: 1 - Sessions: 1, Notes: Hold on tender spot for 30-60 seconds.  Move to the next spot.  Search for tender areas in the entire thumb pad.  Exercise Name: Thumb Stabilization Web Space Release Method 1 with Clip - Reps: 1 - Sessions: 1, Notes: 2-3 minutes  Exercise Name: Thumb Stabilization, Repositioning, Method 1(on chest) - Reps: 1 - Sessions: 1-2, EMR Notes: no pain    Next Visit:  Advance thumb stabilization program  Place and hold pinch  Incorporate joint protection into daily functional activities  Adaptive equipment as needed

## 2020-12-09 ENCOUNTER — THERAPY VISIT (OUTPATIENT)
Dept: OCCUPATIONAL THERAPY | Facility: CLINIC | Age: 55
End: 2020-12-09
Payer: COMMERCIAL

## 2020-12-09 DIAGNOSIS — M19.032 CMC DJD(CARPOMETACARPAL DEGENERATIVE JOINT DISEASE), LOCALIZED PRIMARY, LEFT: Primary | ICD-10-CM

## 2020-12-09 DIAGNOSIS — M79.645 PAIN OF FINGER OF LEFT HAND: ICD-10-CM

## 2020-12-09 PROCEDURE — 97140 MANUAL THERAPY 1/> REGIONS: CPT | Mod: GO | Performed by: OCCUPATIONAL THERAPIST

## 2020-12-09 PROCEDURE — 97026 INFRARED THERAPY: CPT | Mod: GO | Performed by: OCCUPATIONAL THERAPIST

## 2020-12-09 PROCEDURE — 97763 ORTHC/PROSTC MGMT SBSQ ENC: CPT | Mod: GO | Performed by: OCCUPATIONAL THERAPIST

## 2020-12-09 PROCEDURE — 97112 NEUROMUSCULAR REEDUCATION: CPT | Mod: GO | Performed by: OCCUPATIONAL THERAPIST

## 2020-12-09 PROCEDURE — 97110 THERAPEUTIC EXERCISES: CPT | Mod: GO | Performed by: OCCUPATIONAL THERAPIST

## 2020-12-14 ENCOUNTER — THERAPY VISIT (OUTPATIENT)
Dept: OCCUPATIONAL THERAPY | Facility: CLINIC | Age: 55
End: 2020-12-14
Payer: COMMERCIAL

## 2020-12-14 DIAGNOSIS — Z11.59 ENCOUNTER FOR SCREENING FOR OTHER VIRAL DISEASES: ICD-10-CM

## 2020-12-14 DIAGNOSIS — M19.032 CMC DJD(CARPOMETACARPAL DEGENERATIVE JOINT DISEASE), LOCALIZED PRIMARY, LEFT: Primary | ICD-10-CM

## 2020-12-14 DIAGNOSIS — M79.645 PAIN OF FINGER OF LEFT HAND: ICD-10-CM

## 2020-12-14 PROCEDURE — 97112 NEUROMUSCULAR REEDUCATION: CPT | Mod: GO | Performed by: OCCUPATIONAL THERAPIST

## 2020-12-14 PROCEDURE — 97140 MANUAL THERAPY 1/> REGIONS: CPT | Mod: GO | Performed by: OCCUPATIONAL THERAPIST

## 2020-12-14 PROCEDURE — 97026 INFRARED THERAPY: CPT | Mod: GO | Performed by: OCCUPATIONAL THERAPIST

## 2020-12-14 PROCEDURE — U0003 INFECTIOUS AGENT DETECTION BY NUCLEIC ACID (DNA OR RNA); SEVERE ACUTE RESPIRATORY SYNDROME CORONAVIRUS 2 (SARS-COV-2) (CORONAVIRUS DISEASE [COVID-19]), AMPLIFIED PROBE TECHNIQUE, MAKING USE OF HIGH THROUGHPUT TECHNOLOGIES AS DESCRIBED BY CMS-2020-01-R: HCPCS | Performed by: OPHTHALMOLOGY

## 2020-12-14 PROCEDURE — 97110 THERAPEUTIC EXERCISES: CPT | Mod: GO | Performed by: OCCUPATIONAL THERAPIST

## 2020-12-14 NOTE — PROGRESS NOTES
SOAP note objective information for 12/14/2020.    Please refer to the daily flowsheet for treatment today, total treatment time and time spent performing 1:1 timed codes.       Objective:  Pain Level (Scale 0-10)   11/18/20 11/24/20 12/7/20 12/14/20   At Rest 6 1-3 8-9 3   With Use 6-10 6 8-9 8-9     Pain Description  Date 11/18/2020 11/24/2020   Location thumb thumb   Pain Quality Aching and Shooting    Frequency constant      Pain is worst  daytime    Exacerbated by  use    Relieved by Comfort cool    Progression worsening improving     ROM  Thumb 11/18/2020 11/18/2020   AROM  (PROM) Right Left   MP /65 /45 +   IP /80 /75+   RABD 50 46++   PABD 50 46++   Retropulsion (cm) 4 2   Kapandji Opposition Scale (0-10/10) 10 10++     Thumb Observation/Appearance   - none  + mild    ++ moderate    +++ severe     11/18/20 11/24/20 12/7/20    Shoulder deformity present over CMC R: -  L: -      Edema over the CMC joint R: -  L: ++ R: -  L: ++ R: -  L: +    Noted collapse of MP into hyperextension during pinch R: +  L: + R: +  L: + R: +  L: +       Provocative Tests  Pain Report:  - none    + mild    ++ moderate    +++ severe     11/18/20    CMC Grind test R: -  L: -    Crepitus present R: -  L: -    CMC Adduction Stress Test R: -  L: -    CMC Extension Stress Test R: -  L: -    Finkelstein's R: -  L: -        Strength   (Measured in pounds)  Pain Report: - none  + mild    ++ moderate    +++ severe   NT    Palpation   Pain Report:  - none    + mild    ++ moderate    +++ severe    11/18020 11/24/20 12/7/20 12/14/20   CMC Joint Line   L: +      CMC AP Joint Laxity   L: -      Thenar Eminence   L: ++ L: ++ L: ++    Web Space   L: ++ L: ++ L: ++    1st DC   L: +  L: ++    Radial Styloid   L: +      FCR   L: ++   L: ++   Extensor wad    L: ++   Flexor wad    L: ++     Home Program:  [employer]    CMC neoprene orthosis during the day with ADL s per symptoms    Hand based Thumb Spica Orthosis     Exercise Name: [place] Massage to Thumb -  Reps: 1 - Sessions: 1, Notes: Hold on tender spot for 30-60 seconds.  Move to the next spot. Search for tender areas in the entire thumb pad.  Exercise Name: Thumb Stabilization Web Space Release Method 1 with Clip - Reps: 1 - Sessions: 1, Notes: 2-3 minutes  Exercise Name: Thumb Stabilization, Repositioning, Method 1(on chest) - Reps: 1 - Sessions: 1-2, EMR Notes: no pain  FM 1st discontinue  Spiky ball extensors and flexors    Next Visit:  Advance thumb stabilization program  Place and hold pinch  Incorporate joint protection into daily functional activities  Adaptive equipment as needed

## 2020-12-15 LAB
LABORATORY COMMENT REPORT: NORMAL
SARS-COV-2 RNA SPEC QL NAA+PROBE: NEGATIVE
SARS-COV-2 RNA SPEC QL NAA+PROBE: NORMAL
SPECIMEN SOURCE: NORMAL
SPECIMEN SOURCE: NORMAL

## 2020-12-16 ENCOUNTER — THERAPY VISIT (OUTPATIENT)
Dept: OCCUPATIONAL THERAPY | Facility: CLINIC | Age: 55
End: 2020-12-16
Payer: COMMERCIAL

## 2020-12-16 DIAGNOSIS — M79.645 PAIN OF FINGER OF LEFT HAND: Primary | ICD-10-CM

## 2020-12-16 DIAGNOSIS — M19.032 CMC DJD(CARPOMETACARPAL DEGENERATIVE JOINT DISEASE), LOCALIZED PRIMARY, LEFT: ICD-10-CM

## 2020-12-16 PROCEDURE — 97140 MANUAL THERAPY 1/> REGIONS: CPT | Mod: GO | Performed by: OCCUPATIONAL THERAPIST

## 2020-12-16 PROCEDURE — 97110 THERAPEUTIC EXERCISES: CPT | Mod: GO | Performed by: OCCUPATIONAL THERAPIST

## 2020-12-16 PROCEDURE — 97026 INFRARED THERAPY: CPT | Mod: GO | Performed by: OCCUPATIONAL THERAPIST

## 2020-12-16 PROCEDURE — 97112 NEUROMUSCULAR REEDUCATION: CPT | Mod: GO | Performed by: OCCUPATIONAL THERAPIST

## 2020-12-16 NOTE — PROGRESS NOTES
SOAP note objective information for 12/16/2020.    Please refer to the daily flowsheet for treatment today, total treatment time and time spent performing 1:1 timed codes.       Objective:  Pain Level (Scale 0-10)   11/18/20 11/24/20 12/7/20 12/14/20 12/16/20   At Rest 6 1-3 8-9 3 3   With Use 6-10 6 8-9 8-9 6     Pain Description  Date 11/18/2020 11/24/2020   Location thumb thumb   Pain Quality Aching and Shooting    Frequency constant      Pain is worst  daytime    Exacerbated by  use    Relieved by Comfort cool    Progression worsening improving     ROM  Thumb 11/18/2020 11/18/2020   AROM  (PROM) Right Left   MP /65 /45 +   IP /80 /75+   RABD 50 46++   PABD 50 46++   Retropulsion (cm) 4 2   Kapandji Opposition Scale (0-10/10) 10 10++     Thumb Observation/Appearance   - none  + mild    ++ moderate    +++ severe     11/18/20 11/24/20 12/7/20    Shoulder deformity present over CMC R: -  L: -      Edema over the CMC joint R: -  L: ++ R: -  L: ++ R: -  L: +    Noted collapse of MP into hyperextension during pinch R: +  L: + R: +  L: + R: +  L: +       Provocative Tests  Pain Report:  - none    + mild    ++ moderate    +++ severe     11/18/20    CMC Grind test R: -  L: -    Crepitus present R: -  L: -    CMC Adduction Stress Test R: -  L: -    CMC Extension Stress Test R: -  L: -    Finkelstein's R: -  L: -        Strength   (Measured in pounds)  Pain Report: - none  + mild    ++ moderate    +++ severe   NT    Palpation   Pain Report:  - none    + mild    ++ moderate    +++ severe    11/18020 11/24/20 12/7/20 12/14/20   CMC Joint Line   L: +      CMC AP Joint Laxity   L: -      Thenar Eminence   L: ++ L: ++ L: ++ L: +   Web Space   L: ++ L: ++ L: ++ L: ++ pre  L: minimal post   1st DC   L: +  L: ++    Radial Styloid   L: +   L: ++   FCR   L: ++   L: ++   Extensor wad    L: ++   Flexor wad    L: ++   Tricep    L: ++   Triangular Interval    L: ++   Quadrangular Space    L: ++            Home Program:  [employer]    CMC  neoprene orthosis during the day with ADL s per symptoms    Hand based Thumb Spica Orthosis     Exercise Name: [place] Massage to Thumb - Reps: 1 - Sessions: 1, Notes: Hold on tender spot for 30-60 seconds.  Move to the next spot. Search for tender areas in the entire thumb pad.  Exercise Name: Thumb Stabilization Web Space Release Method 1 with Clip - Reps: 1 - Sessions: 1, Notes: 2-3 minutes  Exercise Name: Thumb Stabilization, Repositioning, Method 1(on chest) - Reps: 1 - Sessions: 1-2, EMR Notes: no pain  FM 1st discontinue  Spiky ball extensors and flexors    Next Visit:  Instruct in foam roller UE when able  Advance thumb stabilization program  Place and hold pinch  Incorporate joint protection into daily functional activities  Adaptive equipment as needed

## 2020-12-17 ENCOUNTER — ANESTHESIA EVENT (OUTPATIENT)
Dept: SURGERY | Facility: CLINIC | Age: 55
End: 2020-12-17
Payer: COMMERCIAL

## 2020-12-17 ENCOUNTER — PREP FOR PROCEDURE (OUTPATIENT)
Dept: OPHTHALMOLOGY | Facility: CLINIC | Age: 55
End: 2020-12-17

## 2020-12-17 NOTE — ANESTHESIA PREPROCEDURE EVALUATION
Anesthesia Pre-Procedure Evaluation    Patient: Marbella Hendrix   MRN: [id number] : 1965          Preoperative Diagnosis: Dermatochalasis of both upper eyelids [H02.831, H02.834]  Involutional ptosis, acquired, bilateral [H02.403]    Procedure(s):  BILATERAL PTOSIS REPAIR    Past Medical History:   Diagnosis Date     Arthritis     My mom has it, my grandmother had it I have it     Diabetes (H)     My mom and brother have type II     Heavy menstrual period      Leiomyoma of uterus     s/p myomectomy     Mental disorder     anxiety     PONV (postoperative nausea and vomiting)      Surgical complication after   sept     Thyroid disease     Hypothroidism, 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    -RSR(V1) -nondiagnostic.     ABNORMAL  Anesthesia Evaluation     . Pt has had prior anesthetic.     History of anesthetic complications   - PONV        ROS/MED HX    ENT/Pulmonary: Comment: Lid ptosis      Neurologic:       Cardiovascular:         METS/Exercise Tolerance:     Hematologic:         Musculoskeletal:         GI/Hepatic:         Renal/Genitourinary:         Endo:     (+) thyroid problem hypothyroidism, Obesity, .      Psychiatric:         Infectious Disease:         Malignancy:         Other:                          Physical Exam  Normal systems: cardiovascular, pulmonary and dental    Airway   Mallampati: II  TM  "distance: >3 FB  Neck ROM: full    Dental     Cardiovascular       Pulmonary             Lab Results   Component Value Date    WBC 6.6 02/04/2020    HGB 11.6 (L) 12/04/2020    HCT 38.1 02/04/2020     12/04/2020     10/22/2012    POTASSIUM 3.8 10/22/2012    CHLORIDE 105 10/22/2012    CO2 25 10/22/2012    BUN 25 (H) 10/22/2012    CR 0.65 08/28/2015    GLC 88 07/01/2020    ARA 9.2 10/22/2012    MAG 2.1 10/22/2012    ALT 18.0 11/20/2012    AST 25.0 11/20/2012    TSH 1.77 02/04/2020    T4 1.53 (H) 06/12/2019    HCG Negative 08/28/2015       Preop Vitals  BP Readings from Last 3 Encounters:   12/04/20 120/79   10/28/20 (!) 134/90   10/14/20 137/82    Pulse Readings from Last 3 Encounters:   12/04/20 77   10/14/20 56   05/27/20 72      Resp Readings from Last 3 Encounters:   12/04/20 20   10/14/20 16   02/04/20 16    SpO2 Readings from Last 3 Encounters:   12/04/20 96%   05/27/20 97%   02/20/20 97%      Temp Readings from Last 1 Encounters:   12/04/20 36  C (96.8  F) (Tympanic)    Ht Readings from Last 1 Encounters:   12/04/20 1.619 m (5' 3.75\")      Wt Readings from Last 1 Encounters:   12/04/20 91.2 kg (201 lb)    Estimated body mass index is 34.77 kg/m  as calculated from the following:    Height as of 12/4/20: 1.619 m (5' 3.75\").    Weight as of 12/4/20: 91.2 kg (201 lb).       Anesthesia Plan      History & Physical Review  History and physical reviewed and following examination; no interval change.    ASA Status:  2 .    NPO Status:  > 8 hours    Plan for MAC Reason for MAC:  Procedure to face, neck, head or breast  PONV prophylaxis:  Ondansetron (or other 5HT-3)         Postoperative Care  Postoperative pain management:  IV analgesics.      Consents                 Moris Patterson MD  "

## 2020-12-18 ENCOUNTER — HOSPITAL ENCOUNTER (OUTPATIENT)
Facility: CLINIC | Age: 55
Discharge: HOME | End: 2020-12-18
Attending: OPHTHALMOLOGY | Admitting: OPHTHALMOLOGY
Payer: COMMERCIAL

## 2020-12-18 ENCOUNTER — ANESTHESIA (OUTPATIENT)
Dept: SURGERY | Facility: CLINIC | Age: 55
End: 2020-12-18
Payer: COMMERCIAL

## 2020-12-18 VITALS
TEMPERATURE: 97.4 F | OXYGEN SATURATION: 99 % | HEART RATE: 60 BPM | WEIGHT: 204 LBS | BODY MASS INDEX: 34.83 KG/M2 | HEIGHT: 64 IN | SYSTOLIC BLOOD PRESSURE: 148 MMHG | DIASTOLIC BLOOD PRESSURE: 80 MMHG | RESPIRATION RATE: 18 BRPM

## 2020-12-18 DIAGNOSIS — H02.831 DERMATOCHALASIS OF BOTH UPPER EYELIDS: ICD-10-CM

## 2020-12-18 DIAGNOSIS — H02.834 DERMATOCHALASIS OF BOTH UPPER EYELIDS: ICD-10-CM

## 2020-12-18 DIAGNOSIS — H02.403 INVOLUTIONAL PTOSIS, ACQUIRED, BILATERAL: ICD-10-CM

## 2020-12-18 PROCEDURE — 258N000003 HC RX IP 258 OP 636: Performed by: NURSE ANESTHETIST, CERTIFIED REGISTERED

## 2020-12-18 PROCEDURE — 761N000001 HC RECOVERY PHASE 1 LEVEL 1 FIRST HR: Performed by: OPHTHALMOLOGY

## 2020-12-18 PROCEDURE — 250N000011 HC RX IP 250 OP 636: Performed by: NURSE ANESTHETIST, CERTIFIED REGISTERED

## 2020-12-18 PROCEDURE — 250N000009 HC RX 250: Performed by: NURSE ANESTHETIST, CERTIFIED REGISTERED

## 2020-12-18 PROCEDURE — 999N000138 HC STATISTIC PRE-PROCEDURE ASSESSMENT I: Performed by: OPHTHALMOLOGY

## 2020-12-18 PROCEDURE — 250N000011 HC RX IP 250 OP 636: Performed by: ANESTHESIOLOGY

## 2020-12-18 PROCEDURE — 370N000002 HC ANESTHESIA TECHNICAL FEE, EACH ADDTL 15 MIN: Performed by: OPHTHALMOLOGY

## 2020-12-18 PROCEDURE — 370N000001 HC ANESTHESIA TECHNICAL FEE, 1ST 30 MIN: Performed by: OPHTHALMOLOGY

## 2020-12-18 PROCEDURE — 272N000001 HC OR GENERAL SUPPLY STERILE: Performed by: OPHTHALMOLOGY

## 2020-12-18 PROCEDURE — 67904 REPAIR EYELID DEFECT: CPT | Mod: 50 | Performed by: OPHTHALMOLOGY

## 2020-12-18 PROCEDURE — 250N000013 HC RX MED GY IP 250 OP 250 PS 637: Performed by: ANESTHESIOLOGY

## 2020-12-18 PROCEDURE — 360N000020 HC SURGERY LEVEL 3 1ST 30 MIN: Performed by: OPHTHALMOLOGY

## 2020-12-18 PROCEDURE — 761N000007 HC RECOVERY PHASE 2 EACH 15 MINS: Performed by: OPHTHALMOLOGY

## 2020-12-18 PROCEDURE — 68810 PROBE NASOLACRIMAL DUCT: CPT | Mod: 51 | Performed by: OPHTHALMOLOGY

## 2020-12-18 PROCEDURE — 360N000021 HC SURGERY LEVEL 3 EA 15 ADDTL MIN: Performed by: OPHTHALMOLOGY

## 2020-12-18 PROCEDURE — 250N000009 HC RX 250: Performed by: OPHTHALMOLOGY

## 2020-12-18 RX ORDER — PROPOFOL 10 MG/ML
INJECTION, EMULSION INTRAVENOUS PRN
Status: DISCONTINUED | OUTPATIENT
Start: 2020-12-18 | End: 2020-12-18

## 2020-12-18 RX ORDER — TETRACAINE HYDROCHLORIDE 5 MG/ML
SOLUTION OPHTHALMIC
Status: DISCONTINUED
Start: 2020-12-18 | End: 2020-12-18 | Stop reason: HOSPADM

## 2020-12-18 RX ORDER — MAGNESIUM HYDROXIDE 1200 MG/15ML
LIQUID ORAL PRN
Status: DISCONTINUED | OUTPATIENT
Start: 2020-12-18 | End: 2020-12-18 | Stop reason: HOSPADM

## 2020-12-18 RX ORDER — FENTANYL CITRATE 50 UG/ML
INJECTION, SOLUTION INTRAMUSCULAR; INTRAVENOUS PRN
Status: DISCONTINUED | OUTPATIENT
Start: 2020-12-18 | End: 2020-12-18

## 2020-12-18 RX ORDER — ONDANSETRON 2 MG/ML
4 INJECTION INTRAMUSCULAR; INTRAVENOUS EVERY 30 MIN PRN
Status: DISCONTINUED | OUTPATIENT
Start: 2020-12-18 | End: 2020-12-18 | Stop reason: HOSPADM

## 2020-12-18 RX ORDER — NALOXONE HYDROCHLORIDE 0.4 MG/ML
0.2 INJECTION, SOLUTION INTRAMUSCULAR; INTRAVENOUS; SUBCUTANEOUS
Status: DISCONTINUED | OUTPATIENT
Start: 2020-12-18 | End: 2020-12-18 | Stop reason: HOSPADM

## 2020-12-18 RX ORDER — ONDANSETRON 4 MG/1
4 TABLET, ORALLY DISINTEGRATING ORAL EVERY 30 MIN PRN
Status: DISCONTINUED | OUTPATIENT
Start: 2020-12-18 | End: 2020-12-18 | Stop reason: HOSPADM

## 2020-12-18 RX ORDER — NALOXONE HYDROCHLORIDE 0.4 MG/ML
0.4 INJECTION, SOLUTION INTRAMUSCULAR; INTRAVENOUS; SUBCUTANEOUS
Status: DISCONTINUED | OUTPATIENT
Start: 2020-12-18 | End: 2020-12-18 | Stop reason: HOSPADM

## 2020-12-18 RX ORDER — ERYTHROMYCIN 5 MG/G
OINTMENT OPHTHALMIC
Status: DISCONTINUED
Start: 2020-12-18 | End: 2020-12-18 | Stop reason: HOSPADM

## 2020-12-18 RX ORDER — ERYTHROMYCIN 5 MG/G
OINTMENT OPHTHALMIC PRN
Status: DISCONTINUED | OUTPATIENT
Start: 2020-12-18 | End: 2020-12-18 | Stop reason: HOSPADM

## 2020-12-18 RX ORDER — FENTANYL CITRATE 0.05 MG/ML
25-50 INJECTION, SOLUTION INTRAMUSCULAR; INTRAVENOUS
Status: DISCONTINUED | OUTPATIENT
Start: 2020-12-18 | End: 2020-12-18 | Stop reason: HOSPADM

## 2020-12-18 RX ORDER — SODIUM CHLORIDE, SODIUM LACTATE, POTASSIUM CHLORIDE, CALCIUM CHLORIDE 600; 310; 30; 20 MG/100ML; MG/100ML; MG/100ML; MG/100ML
INJECTION, SOLUTION INTRAVENOUS CONTINUOUS
Status: DISCONTINUED | OUTPATIENT
Start: 2020-12-18 | End: 2020-12-18 | Stop reason: HOSPADM

## 2020-12-18 RX ORDER — ONDANSETRON 2 MG/ML
INJECTION INTRAMUSCULAR; INTRAVENOUS PRN
Status: DISCONTINUED | OUTPATIENT
Start: 2020-12-18 | End: 2020-12-18

## 2020-12-18 RX ORDER — HYDROMORPHONE HYDROCHLORIDE 1 MG/ML
.3-.5 INJECTION, SOLUTION INTRAMUSCULAR; INTRAVENOUS; SUBCUTANEOUS EVERY 10 MIN PRN
Status: DISCONTINUED | OUTPATIENT
Start: 2020-12-18 | End: 2020-12-18 | Stop reason: HOSPADM

## 2020-12-18 RX ORDER — BUPIVACAINE HYDROCHLORIDE 5 MG/ML
INJECTION, SOLUTION EPIDURAL; INTRACAUDAL
Status: DISCONTINUED
Start: 2020-12-18 | End: 2020-12-18 | Stop reason: HOSPADM

## 2020-12-18 RX ORDER — OXYCODONE HYDROCHLORIDE 5 MG/1
5 TABLET ORAL EVERY 6 HOURS PRN
Qty: 12 TABLET | Refills: 0 | Status: SHIPPED | OUTPATIENT
Start: 2020-12-18 | End: 2020-12-21

## 2020-12-18 RX ORDER — SODIUM CHLORIDE, SODIUM LACTATE, POTASSIUM CHLORIDE, CALCIUM CHLORIDE 600; 310; 30; 20 MG/100ML; MG/100ML; MG/100ML; MG/100ML
INJECTION, SOLUTION INTRAVENOUS CONTINUOUS PRN
Status: DISCONTINUED | OUTPATIENT
Start: 2020-12-18 | End: 2020-12-18

## 2020-12-18 RX ORDER — ERYTHROMYCIN 5 MG/G
OINTMENT OPHTHALMIC
Qty: 3.5 G | Refills: 0 | Status: SHIPPED | OUTPATIENT
Start: 2020-12-18 | End: 2021-02-25

## 2020-12-18 RX ORDER — TETRACAINE HYDROCHLORIDE 5 MG/ML
SOLUTION OPHTHALMIC PRN
Status: DISCONTINUED | OUTPATIENT
Start: 2020-12-18 | End: 2020-12-18 | Stop reason: HOSPADM

## 2020-12-18 RX ORDER — OXYCODONE HYDROCHLORIDE 5 MG/1
5 TABLET ORAL ONCE
Status: COMPLETED | OUTPATIENT
Start: 2020-12-18 | End: 2020-12-18

## 2020-12-18 RX ORDER — LIDOCAINE HYDROCHLORIDE 20 MG/ML
INJECTION, SOLUTION INFILTRATION; PERINEURAL PRN
Status: DISCONTINUED | OUTPATIENT
Start: 2020-12-18 | End: 2020-12-18

## 2020-12-18 RX ADMIN — PROPOFOL 20 MG: 10 INJECTION, EMULSION INTRAVENOUS at 12:05

## 2020-12-18 RX ADMIN — ONDANSETRON 4 MG: 2 INJECTION INTRAMUSCULAR; INTRAVENOUS at 12:10

## 2020-12-18 RX ADMIN — PROPOFOL 30 MG: 10 INJECTION, EMULSION INTRAVENOUS at 12:38

## 2020-12-18 RX ADMIN — PROPOFOL 30 MG: 10 INJECTION, EMULSION INTRAVENOUS at 12:41

## 2020-12-18 RX ADMIN — PROPOFOL 20 MG: 10 INJECTION, EMULSION INTRAVENOUS at 12:06

## 2020-12-18 RX ADMIN — FENTANYL CITRATE 25 MCG: 50 INJECTION, SOLUTION INTRAMUSCULAR; INTRAVENOUS at 11:56

## 2020-12-18 RX ADMIN — PROPOFOL 20 MG: 10 INJECTION, EMULSION INTRAVENOUS at 12:18

## 2020-12-18 RX ADMIN — PROPOFOL 30 MG: 10 INJECTION, EMULSION INTRAVENOUS at 12:46

## 2020-12-18 RX ADMIN — SODIUM CHLORIDE, POTASSIUM CHLORIDE, SODIUM LACTATE AND CALCIUM CHLORIDE: 600; 310; 30; 20 INJECTION, SOLUTION INTRAVENOUS at 11:54

## 2020-12-18 RX ADMIN — MIDAZOLAM HYDROCHLORIDE 1 MG: 1 INJECTION, SOLUTION INTRAMUSCULAR; INTRAVENOUS at 12:46

## 2020-12-18 RX ADMIN — FENTANYL CITRATE 50 MCG: 0.05 INJECTION, SOLUTION INTRAMUSCULAR; INTRAVENOUS at 13:05

## 2020-12-18 RX ADMIN — MIDAZOLAM HYDROCHLORIDE 2 MG: 1 INJECTION, SOLUTION INTRAMUSCULAR; INTRAVENOUS at 11:54

## 2020-12-18 RX ADMIN — PROPOFOL 20 MG: 10 INJECTION, EMULSION INTRAVENOUS at 12:23

## 2020-12-18 RX ADMIN — ONDANSETRON 4 MG: 2 INJECTION INTRAMUSCULAR; INTRAVENOUS at 13:05

## 2020-12-18 RX ADMIN — PROPOFOL 10 MG: 10 INJECTION, EMULSION INTRAVENOUS at 12:07

## 2020-12-18 RX ADMIN — PROPOFOL 30 MG: 10 INJECTION, EMULSION INTRAVENOUS at 12:33

## 2020-12-18 RX ADMIN — PROPOFOL 10 MG: 10 INJECTION, EMULSION INTRAVENOUS at 12:09

## 2020-12-18 RX ADMIN — LIDOCAINE HYDROCHLORIDE 40 MG: 20 INJECTION, SOLUTION INFILTRATION; PERINEURAL at 11:56

## 2020-12-18 RX ADMIN — PROPOFOL 50 MG: 10 INJECTION, EMULSION INTRAVENOUS at 11:57

## 2020-12-18 RX ADMIN — PROPOFOL 10 MG: 10 INJECTION, EMULSION INTRAVENOUS at 12:08

## 2020-12-18 RX ADMIN — HYDROMORPHONE HYDROCHLORIDE 0.5 MG: 1 INJECTION, SOLUTION INTRAMUSCULAR; INTRAVENOUS; SUBCUTANEOUS at 13:18

## 2020-12-18 RX ADMIN — OXYCODONE HYDROCHLORIDE 5 MG: 5 TABLET ORAL at 13:28

## 2020-12-18 RX ADMIN — FENTANYL CITRATE 25 MCG: 50 INJECTION, SOLUTION INTRAMUSCULAR; INTRAVENOUS at 12:00

## 2020-12-18 ASSESSMENT — MIFFLIN-ST. JEOR: SCORE: 1510.34

## 2020-12-18 NOTE — ANESTHESIA CARE TRANSFER NOTE
Patient: Marbella Hendrix    Procedure(s):  BILATERAL PTOSIS REPAIR, IRRIGATION OF LACRIMAL SYSTEM BILATERAL    Diagnosis: Dermatochalasis of both upper eyelids [H02.831, H02.834]  Involutional ptosis, acquired, bilateral [H02.403]  Diagnosis Additional Information: No value filed.    Anesthesia Type:   MAC     Note:  Airway :Room Air  Patient transferred to:PACU  Comments: At end of procedure, spontaneous respirations, patient alert to voice, able to follow commands. Patient breathing room air at room air to PACU. SpO2, NiBP, and EKG monitors and alarms on and functioning, report on patient's clinical status given to PACU RN, RN questions answered.Handoff Report: Identifed the Patient, Identified the Reponsible Provider, Reviewed the pertinent medical history, Discussed the surgical course, Reviewed Intra-OP anesthesia mangement and issues during anesthesia, Set expectations for post-procedure period and Allowed opportunity for questions and acknowledgement of understanding      Vitals: (Last set prior to Anesthesia Care Transfer)    CRNA VITALS  12/18/2020 1226 - 12/18/2020 1302      12/18/2020             Resp Rate (set):  10                Electronically Signed By: MCKENNA Blount CRNA  December 18, 2020  1:02 PM

## 2020-12-18 NOTE — BRIEF OP NOTE
[facility] Brief Operative Note    Pre-operative diagnosis: Dermatochalasis of both upper eyelids [H02.831, H02.834]  Involutional ptosis, acquired, bilateral [H02.403]   Post-operative diagnosis Same   Procedure: Procedure(s):  BILATERAL PTOSIS REPAIR, IRRIGATION OF LACRIMAL SYSTEM BILATERAL   Surgeon(s): Surgeon(s) and Role:     * Pako Sosa MD - Primary   Estimated blood loss: 3 mL    Specimens: * No specimens in log *   Findings: As expected

## 2020-12-18 NOTE — DISCHARGE INSTRUCTIONS
Same Day Surgery Discharge Instructions for  Sedation and General Anesthesia       It's not unusual to feel dizzy, light-headed or faint for up to 24 hours after surgery or while taking pain medication.  If you have these symptoms: sit for a few minutes before standing and have someone assist you when you get up to walk or use the bathroom.      You should rest and relax for the next 24 hours. We recommend you make arrangements to have an adult stay with you for at least 24 hours after your discharge.  Avoid hazardous and strenuous activity.      DO NOT DRIVE any vehicle or operate mechanical equipment for 24 hours following the end of your surgery.  Even though you may feel normal, your reactions may be affected by the medication you have received.      Do not drink alcoholic beverages for 24 hours following surgery.       Slowly progress to your regular diet as you feel able. It's not unusual to feel nauseated and/or vomit after receiving anesthesia.  If you develop these symptoms, drink clear liquids (apple juice, ginger ale, broth, 7-up, etc. ) until you feel better.  If your nausea and vomiting persists for 24 hours, please notify your surgeon.        All narcotic pain medications, along with inactivity and anesthesia, can cause constipation. Drinking plenty of liquids and increasing fiber intake will help.      For any questions of a medical nature, call your surgeon.      Do not make important decisions for 24 hours.      If you had general anesthesia, you may have a sore throat for a couple of days related to the breathing tube used during surgery.  You may use Cepacol lozenges to help with this discomfort.  If it worsens or if you develop a fever, contact your surgeon.       If you feel your pain is not well managed with the pain medications prescribed by your surgeon, please contact your surgeon's office to let them know so they can address your concerns.       CoVid 19 Information    We want to give you  information regarding Covid. Please consult your primary care provider with any questions you might have.     Patient who have symptoms (cough, fever, or shortness of breath), need to isolate for 7 days from when symptoms started OR 72 hours after fever resolves (without fever reducing medications) AND improvement of respiratory symptoms (whichever is longer).      Isolate yourself at home (in own room/own bathroom if possible)    Do Not allow any visitors    Do Not go to work or school    Do Not go to [employer],  centers, shopping, or other public places.    Do Not shake hands.    Avoid close and intimate contact with others (hugging, kissing).    Follow CDC recommendations for household cleaning of frequently touched services.     After the initial 7 days, continue to isolate yourself from household members as much as possible. To continue decrease the risk of community spread and exposure, you and any members of your household should limit activities in public for 14 days after starting home isolation.     You can reference the following CDC link for helpful home isolation/care tips:  https://www.cdc.gov/coronavirus/2019-ncov/downloads/10Things.pdf    Protect Others:    Cover Your Mouth and Nose with a mask, disposable tissue or wash cloth to avoid spreading germs to others.    Wash your hands and face frequently with soap and water    Call Your Primary Doctor If: Breathing difficulty develops or you become worse.    For more information about COVID19 and options for caring for yourself at home, please visit the CDC website at https://www.cdc.gov/coronavirus/2019-ncov/about/steps-when-sick.html  For more options for care at [facility], please visit our website at [email or web address]        Post-operative Instructions  Ophthalmic Plastic and Reconstructive Surgery    Pako Sosa M.D.     All instructions apply to the operated eye(s) or eyelid(s).    Wound care and  personal care  ? If a patch or bandage has been placed, please leave this in place until seen by your physician. Ensure that the bandage does not get wet when you take a shower.  ? Apply ice compresses 15 minutes of every hour while awake for 2 days. As long as there is no further bleeding, after two days, switch to warm water compresses for five minutes, four times a day until seen by your physician. Instead of warm water compresses, you can use a cup of dry uncooked rice in a clean cotton sock. Place sock in microwave for 30 seconds to one minute. Next place the warm sock in a plastic bag, and place it over a clean warm wet washcloth over operated eye.    ? You may shower or wash your hair the day after surgery. Do not go swimming for at least 2 weeks to prevent contamination of your wounds.  ? Do not apply make-up to the eyes or eyelids for 2 weeks after surgery.  ? Expect some swelling, bruising, black eye (even into the lower eyelids and cheeks). Also expect serum caking, crusting and discharge from the eye and/or incisions. A small amount of surface bleeding, and depending on the type of surgery, bleeding from the inside of the eyelid, is normal for the first 48 hours.  ? Avoid straining, bending at the waist, or lifting more than 15 pounds for 10 days. Activities that raise your blood pressure can worsen swelling, cause bleeding, and breaking of sutures. Like wise, sleeping with your head slightly elevated for the first several days can help swelling resolve more quickly.   ? Do continue to ambulate (walk) as you normally would - being sedentary after surgery can cause blood clots.   ? Your eye(s) and eyelid(s) may be painful and tender. This is normal after surgery.      Contact information and follow-up  ? Return to the Eye Clinic for a follow-up appointment with your physician as scheduled. If no appointment has been scheduled:   - [facility]: [phone] for an appointment with  Dr. Sosa within 1 to 2 weeks from your date of surgery.   -  [facility] eye clinic: [phone] for an appointment with Dr. Sosa within 1 to 2 weeks from your date of surgery.   Please email a few photos of your eye(s) or other operative site(s) to [email or web address] prior to your follow up visit.    ? For severe pain, bleeding, or loss of vision, call the [facility] at [phone] or [facility] at [phone].     After hours or on weekends and holidays, call [phone] and ask to speak with the ophthalmologist on call.    An on call person can be reached after hours for concerns. The on call doctor should not call in medication refill requests after hours or on weekends, so please plan accordingly. An effort has been made to provide adequate pain medications following every surgery, and refills will not be provided in most instances. Narcotic pain medications cannot be called in.     Activity restrictions and driving  ? Avoid heavy lifting, bending, exercise or strenuous activity for 1 week after surgery.  You may resume other activities and return to work as tolerated.  ? You may not resume driving if you are using narcotic pain medications (such as Norco, Percocet, Tylenol #3).    Medications  ? Restart all regular home medications and eye drops. If you take Plavix or  Aspirin on a regular basis, wait for 72 hours after your surgery before restarting these in order to decrease the risk of bleeding complications.  ? Avoid aspirin and aspirin-like medications (Motrin, Aleve, Ibuprofen, R[place] etc) for 72 hours to reduce the risk of bleeding. You may take Tylenol (acetaminophen) for pain.  ? In addition to your home medications, take the following post-operative medications as prescribed by your physician.    ? Apply antibiotic ointment to all sutures three times a day, and into the operated eye(s) at night.  ? In many cases,  postoperative discomfort can be managed with Tylenol alone. If narcotic pain medication was prescribed, take pain pills at prescribed frequency as needed for pain.    ? WARNING: Most prescription pain medications contain Tylenol  (acetaminophen). You must not take more than 4,000 mg of acetaminophen per  24-hour period. This is equivalent to 6 tablets of Darvocet, 12 tablets of Norco, Percocet or Tylenol #3. If you take other over-the-counter medications containing acetaminophen, you must take the amount of acetaminophen into account and reduce the number of prescribed pain pills accordingly.  ? Prescribed narcotic medications may make you drowsy. You must not drive a car, operate heavy machinery or drink alcohol while taking them.  ? Prescribed narcotic pain medications may cause constipation and nausea. Take them with some food to prevent a stomach upset.

## 2020-12-18 NOTE — OP NOTE
PREOPERATIVE DIAGNOSES:   1. Bilateral upper eyelid dermatochalasis.   2. Bilateral upper eyelid ptosis.   3. Bilateral epiphora.  POSTOPERATIVE DIAGNOSES:   1. Bilateral upper eyelid dermatochalasis.   2. Bilateral upper eyelid ptosis.   3. Bilateral epiphora.  PROCEDURES:  1. Bilateral upper eyelid ptosis repair by external levator resection.  2. Bilateral upper eyelid blepharoplasty.  3. Bilateral nasolacrimal duct probe and irrigation.   SURGEON: Pako Sosa MD.  ASSISTANT: None  ANESTHESIA: Monitored with local infiltration of a 50/50 mixture of 2% lidocaine with epinephrine and 0.5% Marcaine.   COMPLICATIONS: None.   ESTIMATED BLOOD LOSS: 3 mL.   SPECIMEN: * No specimens in log *  HISTORY: Marbella Hendrix presented with upper eyelid drooping secondary to dermatochalasis and ptosis of the upper eyelids leading to blockage of the superior visual field and interfering with activities of daily living. Additionally she had epiphora, causing irritation, and she requested her lacrimal system be evaluated in the operating room rather than in clinic. After the risks, benefits and alternatives to the proposed procedure were explained, informed consent was obtained.   PROCEDURE: The patient was brought to the operating room and placed supine on the operating table. IV sedation was given. Each upper lid crease and the excess upper eyelid skin  was marked in a blepharoplasty ellipse with a marking pen.  These areas were all infiltrated with local anesthetic and  was prepped and draped in the typical sterile fashion for oculoplastic surgery. First attention was directed to the lacrimal system. The right upper punctum was dilated and a lacrimal cannula was advanced easily to the nasolacrimal sac. Irrigation was widely patent. The same was performed through the left upper punctum and this was also widely patent. Attention was directed to the right upper eyelid. The skin was incised following the marked lines. The  skin flap was excised with a high temperature cautery. Hemostasis was obtained with high temperature cautery. The orbicularis and orbital septum were opened horizontally and levator aponeurosis identified and dissected from the superior tarsal border. A strip of pretarsal orbicularis was removed. A 5-0 Mersilene suture was placed in a horizontal mattress fashion taking a partial thickness bite of the superior tarsal border, bringing each end of the double armed suture underneath the levator aponeurosis and tied in a temporary fashion. Attention was directed to the left side where the same procedure was performed. The sutures were adjusted for symmetry. She was anxious when lightening sedation thus the height could not be checked, but contour looked good, and the levator was advanced the area of the musculoaponeurotic junction bilaterally. The Mersilene was then tied in a permanent fashion.  Each skin incision was closed with a running 6-0 plain gut suture. Ophthalmic antibiotic ointment was applied to the incisions and into both eyes. The patient tolerated the procedure well and left the operating room in stable condition.   Pako Sosa MD

## 2020-12-18 NOTE — ANESTHESIA POSTPROCEDURE EVALUATION
Patient: Marbella Hendrix    Procedure(s):  BILATERAL PTOSIS REPAIR, IRRIGATION OF LACRIMAL SYSTEM BILATERAL    Diagnosis:Dermatochalasis of both upper eyelids [H02.831, H02.834]  Involutional ptosis, acquired, bilateral [H02.403]  Diagnosis Additional Information: No value filed.    Anesthesia Type:  MAC    Note:  Anesthesia Post Evaluation    Patient location during evaluation: PACU  Patient participation: Able to fully participate in evaluation  Level of consciousness: awake  Pain management: adequate  Airway patency: patent  Cardiovascular status: acceptable  Respiratory status: acceptable  Hydration status: acceptable  PONV: none     Anesthetic complications: None          Last vitals:  Vitals:    12/18/20 1300 12/18/20 1315 12/18/20 1330   BP: 132/78 136/87 (!) 145/84   Pulse: 67 62 60   Resp: 12 18 19   Temp: 36.8  C (98.2  F)  36  C (96.8  F)   SpO2: 100% 99% 98%         Electronically Signed By: Nika Mcgrath  December 18, 2020  2:58 PM

## 2020-12-18 NOTE — OR NURSING
Unable to reach  to review discharge instructions.  Patient resting.  Will attempt contact again  In 10-15 minutes.

## 2020-12-20 ENCOUNTER — NURSE TRIAGE (OUTPATIENT)
Dept: NURSING | Facility: CLINIC | Age: 55
End: 2020-12-20

## 2020-12-20 ENCOUNTER — TELEPHONE (OUTPATIENT)
Dept: OPHTHALMOLOGY | Facility: CLINIC | Age: 55
End: 2020-12-20

## 2020-12-20 DIAGNOSIS — Z98.890 POSTOPERATIVE EYE STATE: Primary | ICD-10-CM

## 2020-12-20 RX ORDER — BACITRACIN 500 [USP'U]/G
OINTMENT OPHTHALMIC
Qty: 1 TUBE | Refills: 1 | Status: SHIPPED | OUTPATIENT
Start: 2020-12-20 | End: 2021-03-24

## 2020-12-20 NOTE — TELEPHONE ENCOUNTER
Clinic Action Needed:No  Reason for Call: Marbella had eye surgery on Thursday and was advised that she'd be able to drive within 48 hours.  Bother her upper and bottom eyelids are swollen and her vision is impaired, only has vision out of left eye.    Paged on call provider for [facility] to speak to caller @ [phone].  Dr. Blackmon is on call page, sent @ 10:11 am via [facility] page .    Caller advised to call back if they have not heard back from on call provider within 20 minutes.  Caller appears to understand directives and agrees with plan.      Routed to: Not routed.    Elo Mccormick RN  [facility]

## 2020-12-20 NOTE — TELEPHONE ENCOUNTER
Paged on call regarding patient.  55F underwent bilateral upper bleph and ptosis repair plus lacrimal irrigation.  Worsening soft edema/erythema both upper and lower eyelids after applying EES staci.  No calor.  No fevers.  No discharge.  No pain but both eyes are feeling very scratchy and irritated.  Vision is okay in both eyes, just through a narrow slit opening.  Reviewed cell phone photo.  It is possible/likely based upon symptoms and appearance that she is having a mild allergic response to EES staci.  We discussed switching to bacitracin ophthalmic ointment (prescription sent in).  She will also begin AT's QID for eye irritation.  She took a zyrtec and will try benadryl OTC later if unimproved.  We discussed expected course and reviewed early return precautions.  Patient and  understand and agree with plan.    Trav Blackmon, PGY3  Ophthalmology Resident

## 2020-12-23 ENCOUNTER — THERAPY VISIT (OUTPATIENT)
Dept: OCCUPATIONAL THERAPY | Facility: CLINIC | Age: 55
End: 2020-12-23
Payer: COMMERCIAL

## 2020-12-23 DIAGNOSIS — M19.032 CMC DJD(CARPOMETACARPAL DEGENERATIVE JOINT DISEASE), LOCALIZED PRIMARY, LEFT: ICD-10-CM

## 2020-12-23 DIAGNOSIS — M79.645 PAIN OF FINGER OF LEFT HAND: Primary | ICD-10-CM

## 2020-12-23 PROCEDURE — 97112 NEUROMUSCULAR REEDUCATION: CPT | Mod: GO | Performed by: OCCUPATIONAL THERAPIST

## 2020-12-23 PROCEDURE — 97110 THERAPEUTIC EXERCISES: CPT | Mod: GO | Performed by: OCCUPATIONAL THERAPIST

## 2020-12-23 PROCEDURE — 97140 MANUAL THERAPY 1/> REGIONS: CPT | Mod: GO | Performed by: OCCUPATIONAL THERAPIST

## 2020-12-23 NOTE — PROGRESS NOTES
SOAP note objective information for 12/23/2020.    Please refer to the daily flowsheet for treatment today, total treatment time and time spent performing 1:1 timed codes.       Objective:  Pain Level (Scale 0-10)   11/18/20 11/24/20 12/7/20 12/14/20 12/16/20 12/23/20   At Rest 6 1-3 8-9 3 3 0   With Use 6-10 6 8-9 8-9 6 3     Pain Description  Date 11/18/2020 11/24/2020 12/23/20   Location thumb thumb thumb   Pain Quality Aching and Shooting     Frequency constant       Pain is worst  daytime     Exacerbated by  use     Relieved by Comfort cool     Progression worsening improving improving     ROM  Thumb 11/18/2020 11/18/2020   AROM  (PROM) Right Left   MP /65 /45 +   IP /80 /75+   RABD 50 46++   PABD 50 46++   Retropulsion (cm) 4 2   Kapandji Opposition Scale (0-10/10) 10 10++     Thumb Observation/Appearance   - none  + mild    ++ moderate    +++ severe     11/18/20 11/24/20 12/7/20 12/23/20   Shoulder deformity present over CMC R: -  L: -      Edema over the CMC joint R: -  L: ++ R: -  L: ++ R: -  L: + R: -  L: +   Noted collapse of MP into hyperextension during pinch R: +  L: + R: +  L: + R: +  L: + R: +  L: +      Provocative Tests  Pain Report:  - none    + mild    ++ moderate    +++ severe     11/18/20    CMC Grind test R: -  L: -    Crepitus present R: -  L: -    CMC Adduction Stress Test R: -  L: -    CMC Extension Stress Test R: -  L: -    Finkelstein's R: -  L: -        Strength   (Measured in pounds)  Pain Report: - none  + mild    ++ moderate    +++ severe   NT    Palpation   Pain Report:  - none    + mild    ++ moderate    +++ severe    11/18020 11/24/20 12/7/20 12/14/20 12/23/2020   CMC Joint Line   L: +       CMC AP Joint Laxity   L: -       Thenar Eminence   L: ++ L: ++ L: ++ L: + L: +   Web Space   L: ++ L: ++ L: ++ L: ++ pre  L: minimal post L: +   1st DC   L: +  L: ++  NT   Radial Styloid   L: +   L: ++ NT   FCR   L: ++   L: ++ NT   Extensor wad    L: ++ L: ++   Flexor wad    L: ++ L: ++    Tricep    L: ++    Triangular Interval    L: ++    Quadrangular Space    L: ++              Home Program:  [employer]    CMC neoprene orthosis during the day with ADL s per symptoms    Hand based Thumb Spica Orthosis     Exercise Name: [place] Massage to Thumb - Reps: 1 - Sessions: 1, Notes: Hold on tender spot for 30-60 seconds.  Move to the next spot. Search for tender areas in the entire thumb pad.  Exercise Name: Thumb Stabilization Web Space Release Method 1 with Clip - Reps: 1 - Sessions: 1, Notes: 2-3 minutes  Exercise Name: Thumb Stabilization, Repositioning, Method 1(on chest) - Reps: 1 - Sessions: 1-2, EMR Notes: no pain  FM 1st discontinue  Spiky ball extensors and flexors    Next Visit:  Instruct in foam roller UE when able  Advance thumb stabilization program  Place and hold pinch  Incorporate joint protection into daily functional activities  Adaptive equipment as needed

## 2020-12-28 ENCOUNTER — THERAPY VISIT (OUTPATIENT)
Dept: OCCUPATIONAL THERAPY | Facility: CLINIC | Age: 55
End: 2020-12-28
Payer: COMMERCIAL

## 2020-12-28 DIAGNOSIS — M79.645 PAIN OF FINGER OF LEFT HAND: Primary | ICD-10-CM

## 2020-12-28 DIAGNOSIS — M19.032 CMC DJD(CARPOMETACARPAL DEGENERATIVE JOINT DISEASE), LOCALIZED PRIMARY, LEFT: ICD-10-CM

## 2020-12-28 PROCEDURE — 97112 NEUROMUSCULAR REEDUCATION: CPT | Mod: GO | Performed by: OCCUPATIONAL THERAPIST

## 2020-12-28 PROCEDURE — 97110 THERAPEUTIC EXERCISES: CPT | Mod: GO | Performed by: OCCUPATIONAL THERAPIST

## 2020-12-28 PROCEDURE — 97140 MANUAL THERAPY 1/> REGIONS: CPT | Mod: GO | Performed by: OCCUPATIONAL THERAPIST

## 2020-12-28 PROCEDURE — 97026 INFRARED THERAPY: CPT | Mod: GO | Performed by: OCCUPATIONAL THERAPIST

## 2020-12-28 NOTE — PROGRESS NOTES
Hand Therapy Progress Note    Current Date:  12/28/2020    Diagnosis: Left CMC/hand pain  DOI: July 2010    Reporting period is 11/18/2020 to 12/28/2020    Subjective:   Subjective changes noted by patient:  Thumb is getting better overall.  Nerve irritation irritation in the hand.  Functional changes noted by patient:  Improvement in Self Care Tasks (dressing, eating, bathing, hygiene/toileting), Sleep Patterns and Household Chores  Patient has noted adverse reaction to:  None    Functional Outcome Measure:  Upper Extremity Functional Index Score:  SCORE:   Column Totals: /80: 45   (A lower score indicates greater disability.)    Objective:  Pain Level (Scale 0-10)   11/18/20 11/24/20 12/7/20 12/14/20 12/16/20 12/23/20   At Rest 6 1-3 8-9 3 3 0   With Use 6-10 6 8-9 8-9 6 3      12/28/20        At Rest 3        With Use 3            Pain Description  Date 11/18/2020 11/24/2020 12/23/20   Location thumb thumb thumb   Pain Quality Aching and Shooting     Frequency constant       Pain is worst  daytime     Exacerbated by  use     Relieved by Comfort cool     Progression worsening improving improving     ROM  Thumb 11/18/2020 11/18/2020 12/28/2020   AROM  (PROM) Right Left Left   MP /65 /45 + /50 +   IP /80 /75+ /80 +   RABD 50 46++ 46++   PABD 50 46++ 58+   Retropulsion (cm) 4 2 3   Kapandji Opposition Scale (0-10/10) 10 10++ 10++     Thumb Observation/Appearance   - none  + mild    ++ moderate    +++ severe     11/18/20 11/24/20 12/7/20 12/23/20   Shoulder deformity present over CMC R: -  L: -      Edema over the CMC joint R: -  L: ++ R: -  L: ++ R: -  L: + R: -  L: +   Noted collapse of MP into hyperextension during pinch R: +  L: + R: +  L: + R: +  L: + R: +  L: +      Provocative Tests  Pain Report:  - none    + mild    ++ moderate    +++ severe     11/18/20    CMC Grind test R: -  L: -    Crepitus present R: -  L: -    CMC Adduction Stress Test R: -  L: -    CMC Extension Stress Test R: -  L: -    Finkelstein's  R: -  L: -        Strength   (Measured in pounds)  Pain Report: - none  + mild    ++ moderate    +++ severe   NT    Palpation   Pain Report:  - none    + mild    ++ moderate    +++ severe    20   CMC Joint Line   L: +       CMC AP Joint Laxity   L: -       Thenar Eminence   L: ++ L: ++ L: ++ L: + L: +   Web Space   L: ++ L: ++ L: ++ L: ++ pre  L: minimal post L: +   1st DC   L: +  L: ++  NT   Radial Styloid   L: +   L: ++ NT   FCR   L: ++   L: ++ NT   Extensor wad    L: ++ L: ++   Flexor wad    L: ++ L: ++   Tricep    L: ++    Triangular Interval    L: ++    Quadrangular Space    L: ++               20       CMC Joint Line   L: +       CMC AP Joint Laxity   L: -       Thenar Eminence   L: ++       Web Space   L: +       1st DC   L: +       Radial Styloid   L: +       FCR   L: +       Extensor wad L: ++       Flexor wad L: ++       Tricep L: +       Triangular Interval L: ++       Quadrangular Space L: ++                 STRENGTH:   (Measured in pounds)     2020      Trials Right Left Right Left Right Left  Right Left   1  2  3  Av#       40#++ to +++           3 pt Pinch 2020      Trials Right Left Right Left Right Left  Right Left   1  2  3  Av#       10#++           Lateral Pinch 2020      Trials Right Left Right Left Right Left  Right Left   1  2  3  Avg:       15#       13#+           Assessment:  Response to therapy has been improvement to:  Strength:   and pinch  Pain:  frequency is less, intensity of pain is decreased, duration of pain is decreased and less tender over affected area    Overall Assessment:  Patient's symptoms are resolving.  STG/LTG:  STGoals have been reviewed and progress or achievement has occurred;  see goal sheet for details and updates.    Plan:  Frequency/Duration:  Recommend continuing to see patient  1 X week, once daily  for 4-6 visits  Appropriateness of Rx I have re-evaluated this  "patient and find that the nature, scope, duration and intensity of the therapy is appropriate for the medical condition of the patient.  Recommendations for Continued Therapy  Additions to Treatment Plan -  \"C\" rotation ; Foam roller massage and stretching       Home Program:  [facility]    CMC neoprene orthosis during the day with ADL s per symptoms    Hand based Thumb Spica Orthosis     Exercise Name: [place] Massage to Thumb - Reps: 1 - Sessions: 1, Notes: Hold on tender spot for 30-60 seconds.  Move to the next spot. Search for tender areas in the entire thumb pad.  Exercise Name: Thumb Stabilization Web Space Release Method 1 with Clip - Reps: 1 - Sessions: 1, Notes: 2-3 minutes  Exercise Name: Thumb Stabilization, Repositioning, Method 1(on chest) - Reps: 1 - Sessions: 1-2, EMR Notes: no pain  FM 1st discontinue  Spiky ball extensors and flexors  \"C\" AROM thumb    Exercise Name: Pectoralis Major Trigger Release, Sets: 1 - Sessions: 1x every other day  Exercise Name: Pectoralis Minor Trigger Release, Sets: 1 - Sessions: 1x every other day, Notes: Also roll to the side and do side muscles.  Exercise Name: Latissimus/Teres/Serratus/Subscapularis Trigger Release, Sets: 1 - Sessions: 1x every other day, Notes: Make sure you go into tricep a bit too.  Exercise Name: Rolling upper back, Sets: 1 - Sessions: 1x every other day  Exercise Name: Spinal extensions, Sets: 1 - Sessions: 1x every other day  Exercise Name: Foam Roller Stretch: Pectoralis Major, Sets: 1 - Sessions: 1x every other day  Exercise Name: Foam Roller stretch: Pectoralis Minor  Foam roller massage and stretch    Next Visit:  Advance thumb stabilization program  Place and hold pinch  Incorporate joint protection into daily functional activities  Adaptive equipment as needed          "

## 2020-12-29 ENCOUNTER — ANCILLARY PROCEDURE (OUTPATIENT)
Dept: MAMMOGRAPHY | Facility: CLINIC | Age: 55
End: 2020-12-29
Attending: FAMILY MEDICINE
Payer: COMMERCIAL

## 2020-12-29 DIAGNOSIS — Z12.31 VISIT FOR SCREENING MAMMOGRAM: ICD-10-CM

## 2020-12-29 PROCEDURE — 77067 SCR MAMMO BI INCL CAD: CPT | Performed by: RADIOLOGY

## 2020-12-29 PROCEDURE — 77063 BREAST TOMOSYNTHESIS BI: CPT | Performed by: RADIOLOGY

## 2020-12-30 ENCOUNTER — THERAPY VISIT (OUTPATIENT)
Dept: OCCUPATIONAL THERAPY | Facility: CLINIC | Age: 55
End: 2020-12-30
Payer: COMMERCIAL

## 2020-12-30 DIAGNOSIS — M19.032 CMC DJD(CARPOMETACARPAL DEGENERATIVE JOINT DISEASE), LOCALIZED PRIMARY, LEFT: ICD-10-CM

## 2020-12-30 DIAGNOSIS — M79.645 PAIN OF FINGER OF LEFT HAND: Primary | ICD-10-CM

## 2020-12-30 PROCEDURE — 97026 INFRARED THERAPY: CPT | Mod: GO | Performed by: OCCUPATIONAL THERAPIST

## 2020-12-30 PROCEDURE — 97110 THERAPEUTIC EXERCISES: CPT | Mod: GO | Performed by: OCCUPATIONAL THERAPIST

## 2020-12-30 PROCEDURE — 97140 MANUAL THERAPY 1/> REGIONS: CPT | Mod: GO | Performed by: OCCUPATIONAL THERAPIST

## 2021-01-06 ENCOUNTER — THERAPY VISIT (OUTPATIENT)
Dept: OCCUPATIONAL THERAPY | Facility: CLINIC | Age: 56
End: 2021-01-06
Payer: COMMERCIAL

## 2021-01-06 DIAGNOSIS — M19.032 CMC DJD(CARPOMETACARPAL DEGENERATIVE JOINT DISEASE), LOCALIZED PRIMARY, LEFT: ICD-10-CM

## 2021-01-06 DIAGNOSIS — M79.645 PAIN OF FINGER OF LEFT HAND: Primary | ICD-10-CM

## 2021-01-06 PROCEDURE — 97140 MANUAL THERAPY 1/> REGIONS: CPT | Mod: GO | Performed by: OCCUPATIONAL THERAPIST

## 2021-01-06 PROCEDURE — 97110 THERAPEUTIC EXERCISES: CPT | Mod: GO | Performed by: OCCUPATIONAL THERAPIST

## 2021-01-06 PROCEDURE — 97026 INFRARED THERAPY: CPT | Mod: GO | Performed by: OCCUPATIONAL THERAPIST

## 2021-01-06 PROCEDURE — 97112 NEUROMUSCULAR REEDUCATION: CPT | Mod: GO | Performed by: OCCUPATIONAL THERAPIST

## 2021-01-06 NOTE — PROGRESS NOTES
SOAP note objective information for 1/6/2021.    Please refer to the daily flowsheet for treatment today, total treatment time and time spent performing 1:1 timed codes.         Objective:  Pain Level (Scale 0-10)   11/18/20 11/24/20 12/7/20 12/14/20 12/16/20 12/23/20   At Rest 6 1-3 8-9 3 3 0   With Use 6-10 6 8-9 8-9 6 3      12/28/20 1/6/2021       At Rest 3 1-2       With Use 3 2-4           Pain Description  Date 11/18/2020 11/24/2020 12/23/20   Location thumb thumb thumb   Pain Quality Aching and Shooting     Frequency constant       Pain is worst  daytime     Exacerbated by  use     Relieved by Comfort cool     Progression worsening improving improving     ROM  Thumb 11/18/2020 11/18/2020 12/28/2020   AROM  (PROM) Right Left Left   MP /65 /45 + /50 +   IP /80 /75+ /80 +   RABD 50 46++ 46++   PABD 50 46++ 58+   Retropulsion (cm) 4 2 3   Kapandji Opposition Scale (0-10/10) 10 10++ 10++     Thumb Observation/Appearance   - none  + mild    ++ moderate    +++ severe     11/18/20 11/24/20 12/7/20 12/23/20    Shoulder deformity present over CMC R: -  L: -       Edema over the CMC joint R: -  L: ++ R: -  L: ++ R: -  L: + R: -  L: + R: -  L: min   Noted collapse of MP into hyperextension during pinch R: +  L: + R: +  L: + R: +  L: + R: +  L: +       Provocative Tests  Pain Report:  - none    + mild    ++ moderate    +++ severe     11/18/20    CMC Grind test R: -  L: -    Crepitus present R: -  L: -    CMC Adduction Stress Test R: -  L: -    CMC Extension Stress Test R: -  L: -    Finkelstein's R: -  L: -        Strength   (Measured in pounds)  Pain Report: - none  + mild    ++ moderate    +++ severe   NT    Palpation   Pain Report:  - none    + mild    ++ moderate    +++ severe    11/18020 11/24/20 12/7/20 12/14/20 12/23/2020   CMC Joint Line   L: +       CMC AP Joint Laxity   L: -       Thenar Eminence   L: ++ L: ++ L: ++ L: + L: +   Web Space   L: ++ L: ++ L: ++ L: ++ pre  L: minimal post L: +   1st DC   L: +  L: ++  " NT   Radial Styloid   L: +   L: ++ NT   FCR   L: ++   L: ++ NT   Extensor wad    L: ++ L: ++   Flexor wad    L: ++ L: ++   Tricep    L: ++    Triangular Interval    L: ++    Quadrangular Space    L: ++               20      CMC Joint Line   L: + L: -      CMC AP Joint Laxity   L: - L: -      Thenar Eminence   L: ++ L: +      Web Space   L: + L: +      1st DC   L: + L: -      Radial Styloid   L: + L: -      FCR   L: + L: -      Extensor wad L: ++ L: +      Flexor wad L: ++ L: +      Tricep L: + L: +      Triangular Interval L: ++ L: +      Quadrangular Space L: ++ L: +                STRENGTH:   (Measured in pounds)     2020      Trials Right Left Right Left Right Left  Right Left   1  2  3  Av#       40#++ to +++           3 pt Pinch 2020      Trials Right Left Right Left Right Left  Right Left   1  2  3  Av#       10#++           Lateral Pinch 2020      Trials Right Left Right Left Right Left  Right Left   1  2  3  Avg:       15#       13#+             Home Program:  [employer]    CMC neoprene orthosis during the day with ADL s per symptoms    Hand based Thumb Spica Orthosis     Exercise Name: [place] Massage to Thumb - Reps: 1 - Sessions: 1, Notes: Hold on tender spot for 30-60 seconds.  Move to the next spot. Search for tender areas in the entire thumb pad.  Exercise Name: Thumb Stabilization Web Space Release Method 1 with Clip - Reps: 1 - Sessions: 1, Notes: 2-3 minutes  Exercise Name: Thumb Stabilization, Repositioning, Method 1(on chest) - Reps: 1 - Sessions: 1-2, EMR Notes: no pain  FM 1st discontinue  Spiky ball extensors and flexors  \"C\" AROM thumb  Exercise Name: Thumb Stabilization C with ball - Reps: 5-10 - Sessions: 1, Notes: Keep index and thumb level.  Exercise Name: Thumb Stabilization Strengthening Isometric C - Reps: 5-10 - Sessions: 1, Notes: Contract isometrically   Exercise Name: Thumb Stabilization Isometric 1st Dorsal " Interosseous        Exercise Name: Ball Massage to Extensors  Exercise Name: Self Elbow Mobilization, Trigger Point Massage Over Radial Head  Exercise Name: Pectoralis Major Trigger Release, Sets: 1 - Sessions: 1x every other day  Exercise Name: Pectoralis Minor Trigger Release, Sets: 1 - Sessions: 1x every other day, Notes: Also roll to the side and do side muscles.  Exercise Name: Latissimus/Teres/Serratus/Subscapularis Trigger Release, Sets: 1 - Sessions: 1x every other day, Notes: Make sure you go into tricep a bit too.  Exercise Name: Rolling upper back, Sets: 1 - Sessions: 1x every other day  Exercise Name: Spinal extensions, Sets: 1 - Sessions: 1x every other day  Exercise Name: Foam Roller Stretch: Pectoralis Major, Sets: 1 - Sessions: 1x every other day  Exercise Name: Foam Roller stretch: Pectoralis Minor    Foam roller massage and stretch    Next Visit:  Advance thumb stabilization program  Place and hold pinch  Incorporate joint protection into daily functional activities  Adaptive equipment as needed

## 2021-01-12 ENCOUNTER — OFFICE VISIT (OUTPATIENT)
Dept: OPHTHALMOLOGY | Facility: CLINIC | Age: 56
End: 2021-01-12
Payer: COMMERCIAL

## 2021-01-12 DIAGNOSIS — H02.403 INVOLUTIONAL PTOSIS, ACQUIRED, BILATERAL: ICD-10-CM

## 2021-01-12 DIAGNOSIS — H02.831 DERMATOCHALASIS OF BOTH UPPER EYELIDS: Primary | ICD-10-CM

## 2021-01-12 DIAGNOSIS — H02.834 DERMATOCHALASIS OF BOTH UPPER EYELIDS: Primary | ICD-10-CM

## 2021-01-12 PROCEDURE — 99024 POSTOP FOLLOW-UP VISIT: CPT | Performed by: OPHTHALMOLOGY

## 2021-01-12 ASSESSMENT — VISUAL ACUITY
OS_SC: 20/20
METHOD: SNELLEN - LINEAR
OD_SC: 20/20

## 2021-01-12 ASSESSMENT — CONF VISUAL FIELD
OS_NORMAL: 1
OD_NORMAL: 1

## 2021-01-12 ASSESSMENT — TONOMETRY
OD_IOP_MMHG: 18
OS_IOP_MMHG: 18
IOP_METHOD: ICARE

## 2021-01-12 NOTE — NURSING NOTE
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               Comments     Upper lids continue to be red. Swelling and itching with allergic reaction to medication initially used but improved after changing medication. Feels like lids are very tight like rubber band are holding them down but not painful. Left eye constantly watering. Vision remains stable with each eye.    Vaseline ointment used BUL BID each eye. ( Stopped using Bacitracin two days ago)     KARRI Gilmore COT 1:55 PM January 12, 2021

## 2021-01-13 ENCOUNTER — THERAPY VISIT (OUTPATIENT)
Dept: OCCUPATIONAL THERAPY | Facility: CLINIC | Age: 56
End: 2021-01-13
Payer: COMMERCIAL

## 2021-01-13 DIAGNOSIS — M19.032 CMC DJD(CARPOMETACARPAL DEGENERATIVE JOINT DISEASE), LOCALIZED PRIMARY, LEFT: ICD-10-CM

## 2021-01-13 DIAGNOSIS — M79.645 PAIN OF FINGER OF LEFT HAND: Primary | ICD-10-CM

## 2021-01-13 PROCEDURE — 97112 NEUROMUSCULAR REEDUCATION: CPT | Mod: GO | Performed by: OCCUPATIONAL THERAPIST

## 2021-01-13 PROCEDURE — 97140 MANUAL THERAPY 1/> REGIONS: CPT | Mod: GO | Performed by: OCCUPATIONAL THERAPIST

## 2021-01-13 PROCEDURE — 97026 INFRARED THERAPY: CPT | Mod: GO | Performed by: OCCUPATIONAL THERAPIST

## 2021-01-13 PROCEDURE — 97110 THERAPEUTIC EXERCISES: CPT | Mod: GO | Performed by: OCCUPATIONAL THERAPIST

## 2021-01-13 NOTE — PROGRESS NOTES
SOAP note objective information for 1/13/2021.    Please refer to the daily flowsheet for treatment today, total treatment time and time spent performing 1:1 timed codes.         Objective:  Pain Level (Scale 0-10)   11/18/20 11/24/20 12/7/20 12/14/20 12/16/20 12/23/20   At Rest 6 1-3 8-9 3 3 0   With Use 6-10 6 8-9 8-9 6 3      12/28/20 1/6/2021 1/13/21      At Rest 3 1-2 4      With Use 3 2-4 4          Pain Description  Date 11/18/2020 11/24/2020 12/23/20   Location thumb thumb thumb   Pain Quality Aching and Shooting     Frequency constant       Pain is worst  daytime     Exacerbated by  use     Relieved by Comfort cool     Progression worsening improving improving     ROM  Thumb 11/18/2020 11/18/2020 12/28/2020    AROM  (PROM) Right Left Left    MP /65 /45 + /50 +    IP /80 /75+ /80 +    RABD 50 46++ 46++    PABD 50 46++ 58+    Retropulsion (cm) 4 2 3    Kapandji Opposition Scale (0-10/10) 10 10++ 10++      Thumb Observation/Appearance   - none  + mild    ++ moderate    +++ severe     11/18/20 11/24/20 12/7/20 12/23/20 12/28/20   Shoulder deformity present over CMC R: -  L: -       Edema over the CMC joint R: -  L: ++ R: -  L: ++ R: -  L: + R: -  L: + R: -  L: min   Noted collapse of MP into hyperextension during pinch R: +  L: + R: +  L: + R: +  L: + R: +  L: +       Provocative Tests  Pain Report:  - none    + mild    ++ moderate    +++ severe     11/18/20    CMC Grind test R: -  L: -    Crepitus present R: -  L: -    CMC Adduction Stress Test R: -  L: -    CMC Extension Stress Test R: -  L: -    Finkelstein's R: -  L: -        Strength   (Measured in pounds)  Pain Report: - none  + mild    ++ moderate    +++ severe   NT    Palpation   Pain Report:  - none    + mild    ++ moderate    +++ severe    11/18020 11/24/20 12/7/20 12/14/20 12/23/2020   CMC Joint Line   L: +       CMC AP Joint Laxity   L: -       Thenar Eminence   L: ++ L: ++ L: ++ L: + L: +   Web Space   L: ++ L: ++ L: ++ L: ++ pre  L: minimal post  "L: +   1st DC   L: +  L: ++  NT   Radial Styloid   L: +   L: ++ NT   FCR   L: ++   L: ++ NT   Extensor wad    L: ++ L: ++   Flexor wad    L: ++ L: ++   Tricep    L: ++    Triangular Interval    L: ++    Quadrangular Space    L: ++               20     CMC Joint Line   L: + L: -      CMC AP Joint Laxity   L: - L: -      Thenar Eminence   L: ++ L: + L: +     Web Space   L: + L: + L: +     1st DC   L: + L: -      Radial Styloid   L: + L: -      FCR   L: + L: -      Extensor wad L: ++ L: +      Flexor wad L: ++ L: +      Tricep L: + L: +      Triangular Interval L: ++ L: +      Quadrangular Space L: ++ L: +                STRENGTH:   (Measured in pounds)     2020      Trials Right Left Right Left Right Left  Right Left   1  2  3  Av#       40#++ to +++           3 pt Pinch 2020      Trials Right Left Right Left Right Left  Right Left   1  2  3  Av#       10#++           Lateral Pinch 2020      Trials Right Left Right Left Right Left  Right Left   1  2  3  Avg:       15#       13#+             Home Program:  [facility]    CMC neoprene orthosis during the day with ADL s per symptoms    Hand based Thumb Spica Orthosis     Exercise Name: [place] Massage to Thumb - Reps: 1 - Sessions: 1, Notes: Hold on tender spot for 30-60 seconds.  Move to the next spot. Search for tender areas in the entire thumb pad.  Exercise Name: Thumb Stabilization Web Space Release Method 1 with Clip - Reps: 1 - Sessions: 1, Notes: 2-3 minutes  Exercise Name: Thumb Stabilization, Repositioning, Method 1(on chest) - Reps: 1 - Sessions: 1-2, EMR Notes: no pain  FM 1st discontinue  Spiky ball extensors and flexors  \"C\" AROM thumb  Exercise Name: Thumb Stabilization C with ball - Reps: 5-10 - Sessions: 1, Notes: Keep index and thumb level.  Exercise Name: Thumb Stabilization Strengthening Isometric C - Reps: 5-10 - Sessions: 1, Notes: Contract isometrically   Exercise Name: Thumb " Stabilization Isometric 1st Dorsal Interosseous        Exercise Name: Ball Massage to Extensors  Exercise Name: Self Elbow Mobilization, Trigger Point Massage Over Radial Head  Exercise Name: Pectoralis Major Trigger Release, Sets: 1 - Sessions: 1x every other day  Exercise Name: Pectoralis Minor Trigger Release, Sets: 1 - Sessions: 1x every other day, Notes: Also roll to the side and do side muscles.  Exercise Name: Latissimus/Teres/Serratus/Subscapularis Trigger Release, Sets: 1 - Sessions: 1x every other day, Notes: Make sure you go into tricep a bit too.  Exercise Name: Rolling upper back, Sets: 1 - Sessions: 1x every other day  Exercise Name: Spinal extensions, Sets: 1 - Sessions: 1x every other day  Exercise Name: Foam Roller Stretch: Pectoralis Major, Sets: 1 - Sessions: 1x every other day  Exercise Name: Foam Roller stretch: Pectoralis Minor    Foam roller massage and stretch    Next Visit:  Advance thumb stabilization program  Place and hold pinch  Incorporate joint protection into daily functional activities  Adaptive equipment as needed

## 2021-01-26 NOTE — PROGRESS NOTES
SOAP note objective information for 1/27/2021.    Please refer to the daily flowsheet for treatment today, total treatment time and time spent performing 1:1 timed codes.         Objective:  Pain Level (Scale 0-10)   11/18/20 11/24/20 12/7/20 12/14/20 12/16/20 12/23/20   At Rest 6 1-3 8-9 3 3 0   With Use 6-10 6 8-9 8-9 6 3      12/28/20 1/6/2021 1/13/21 1/27/21     At Rest 3 1-2 4 3     With Use 3 2-4 4 5         Pain Description  Date 11/18/2020 11/24/2020 12/23/20   Location thumb thumb thumb   Pain Quality Aching and Shooting     Frequency constant       Pain is worst  daytime     Exacerbated by  use     Relieved by Comfort cool     Progression worsening improving improving     ROM  Thumb 11/18/2020 11/18/2020 12/28/2020    AROM  (PROM) Right Left Left    MP /65 /45 + /50 +    IP /80 /75+ /80 +    RABD 50 46++ 46++    PABD 50 46++ 58+    Retropulsion (cm) 4 2 3    Kapandji Opposition Scale (0-10/10) 10 10++ 10++      Thumb Observation/Appearance   - none  + mild    ++ moderate    +++ severe     11/18/20 11/24/20 12/7/20 12/23/20 12/28/20   Shoulder deformity present over CMC R: -  L: -       Edema over the CMC joint R: -  L: ++ R: -  L: ++ R: -  L: + R: -  L: + R: -  L: min   Noted collapse of MP into hyperextension during pinch R: +  L: + R: +  L: + R: +  L: + R: +  L: +       Provocative Tests  Pain Report:  - none    + mild    ++ moderate    +++ severe     11/18/20    CMC Grind test R: -  L: -    Crepitus present R: -  L: -    CMC Adduction Stress Test R: -  L: -    CMC Extension Stress Test R: -  L: -    Finkelstein's R: -  L: -        Strength   (Measured in pounds)  Pain Report: - none  + mild    ++ moderate    +++ severe   NT    Palpation   Pain Report:  - none    + mild    ++ moderate    +++ severe    11/18020 11/24/20 12/7/20 12/14/20 12/23/2020   CMC Joint Line   L: +       CMC AP Joint Laxity   L: -       Thenar Eminence   L: ++ L: ++ L: ++ L: + L: +   Web Space   L: ++ L: ++ L: ++ L: ++ pre  L:  "minimal post L: +   1st DC   L: +  L: ++  NT   Radial Styloid   L: +   L: ++ NT   FCR   L: ++   L: ++ NT   Extensor wad    L: ++ L: ++   Flexor wad    L: ++ L: ++   Tricep    L: ++    Triangular Interval    L: ++    Quadrangular Space    L: ++               20    CMC Joint Line   L: + L: -      CMC AP Joint Laxity   L: - L: -      Thenar Eminence   L: ++ L: + L: + L: +    Web Space   L: + L: + L: + L: +    1st DC   L: + L: -      Radial Styloid   L: + L: -      FCR   L: + L: -      Extensor wad L: ++ L: +      Flexor wad L: ++ L: +      Tricep L: + L: +      Triangular Interval L: ++ L: +      Quadrangular Space L: ++ L: +                STRENGTH:   (Measured in pounds)     2020      Trials Right Left Right Left Right Left  Right Left   1  2  3  Av#       40#++ to +++           3 pt Pinch 2020      Trials Right Left Right Left Right Left  Right Left   1  2  3  Av#       10#++           Lateral Pinch 2020      Trials Right Left Right Left Right Left  Right Left   1  2  3  Avg:       15#       13#+             Home Program:  [facility]    CMC neoprene orthosis during the day with ADL s per symptoms    Hand based Thumb Spica Orthosis     Exercise Name: [place] Massage to Thumb - Reps: 1 - Sessions: 1, Notes: Hold on tender spot for 30-60 seconds.  Move to the next spot. Search for tender areas in the entire thumb pad.  Exercise Name: Thumb Stabilization Web Space Release Method 1 with Clip - Reps: 1 - Sessions: 1, Notes: 2-3 minutes  Exercise Name: Thumb Stabilization, Repositioning, Method 1(on chest) - Reps: 1 - Sessions: 1-2, EMR Notes: no pain  FM 1st discontinue  Spiky ball extensors and flexors  \"C\" AROM thumb  Exercise Name: Thumb Stabilization C with ball - Reps: 5-10 - Sessions: 1, Notes: Keep index and thumb level.  Exercise Name: Thumb Stabilization Strengthening Isometric C - Reps: 5-10 - Sessions: 1, Notes: Contract isometrically "   Exercise Name: Thumb Stabilization Isometric 1st Dorsal Interosseous        Exercise Name: Ball Massage to Extensors  Exercise Name: Self Elbow Mobilization, Trigger Point Massage Over Radial Head  Exercise Name: Pectoralis Major Trigger Release, Sets: 1 - Sessions: 1x every other day  Exercise Name: Pectoralis Minor Trigger Release, Sets: 1 - Sessions: 1x every other day, Notes: Also roll to the side and do side muscles.  Exercise Name: Latissimus/Teres/Serratus/Subscapularis Trigger Release, Sets: 1 - Sessions: 1x every other day, Notes: Make sure you go into tricep a bit too.  Exercise Name: Rolling upper back, Sets: 1 - Sessions: 1x every other day  Exercise Name: Spinal extensions, Sets: 1 - Sessions: 1x every other day  Exercise Name: Foam Roller Stretch: Pectoralis Major, Sets: 1 - Sessions: 1x every other day  Exercise Name: Foam Roller stretch: Pectoralis Minor    Foam roller massage and stretch    Exercise Name: Thumb Stabilization Place and Hold Pinch Muscle Re-Education    Next Visit:  Advance thumb stabilization program  Place and hold pinch  Incorporate joint protection into daily functional activities  Adaptive equipment as needed

## 2021-01-27 ENCOUNTER — THERAPY VISIT (OUTPATIENT)
Dept: OCCUPATIONAL THERAPY | Facility: CLINIC | Age: 56
End: 2021-01-27
Payer: COMMERCIAL

## 2021-01-27 DIAGNOSIS — M79.645 PAIN OF FINGER OF LEFT HAND: Primary | ICD-10-CM

## 2021-01-27 DIAGNOSIS — M19.032 CMC DJD(CARPOMETACARPAL DEGENERATIVE JOINT DISEASE), LOCALIZED PRIMARY, LEFT: ICD-10-CM

## 2021-01-27 PROCEDURE — 97112 NEUROMUSCULAR REEDUCATION: CPT | Mod: GO | Performed by: OCCUPATIONAL THERAPIST

## 2021-01-27 PROCEDURE — 97140 MANUAL THERAPY 1/> REGIONS: CPT | Mod: GO | Performed by: OCCUPATIONAL THERAPIST

## 2021-01-27 PROCEDURE — 97110 THERAPEUTIC EXERCISES: CPT | Mod: GO | Performed by: OCCUPATIONAL THERAPIST

## 2021-01-27 PROCEDURE — 97026 INFRARED THERAPY: CPT | Mod: GO | Performed by: OCCUPATIONAL THERAPIST

## 2021-02-04 DIAGNOSIS — E03.9 ACQUIRED HYPOTHYROIDISM: ICD-10-CM

## 2021-02-04 RX ORDER — LEVOTHYROXINE SODIUM 125 UG/1
TABLET ORAL
Qty: 30 TABLET | Refills: 0 | Status: SHIPPED | OUTPATIENT
Start: 2021-02-04 | End: 2021-04-09

## 2021-02-04 NOTE — TELEPHONE ENCOUNTER
Levothyroxine:    Medication is being filled for 1 time refill only due to:  Patient needs to be seen because Due for physical and labs. Last 2/4/2020.     Mikaela Niño RN

## 2021-02-09 NOTE — PROGRESS NOTES
Hand Therapy Progress Note    Current Date:  2/10/2021    Diagnosis: Left CMC/hand pain  DOI: July 2010    Reporting period is 12/28/2020 to 2/10/2021    Subjective:   Subjective changes noted by patient:  Pain is about the same.  I think the remaining pain is my arthritis.  Functional changes noted by patient:  Improvement in Self Care Tasks (dressing, eating, hygiene/toileting), Recreational Activities and Household Chores  Patient has noted adverse reaction to:  None    Functional Outcome Measure:  Upper Extremity Functional Index Score:  SCORE:   Column Totals: /80: 61   (A lower score indicates greater disability.)    Objective:    Pain Level (Scale 0-10)   11/18/20 11/24/20 12/7/20 12/14/20 12/16/20 12/23/20   At Rest 6 1-3 8-9 3 3 0   With Use 6-10 6 8-9 8-9 6 3      12/28/20 1/6/2021 1/13/21 1/27/21 2/10/21    At Rest 3 1-2 4 3 1-2    With Use 3 2-4 4 5 3-4        Pain Description  Date 11/18/2020 11/24/2020 12/23/20   Location thumb thumb thumb   Pain Quality Aching and Shooting     Frequency constant       Pain is worst  daytime     Exacerbated by  use     Relieved by Comfort cool     Progression worsening improving improving     ROM  Thumb 11/18/2020 11/18/2020 12/28/2020 2/10/2021   AROM  (PROM) Right Left Left Left   MP /65 /45 + /50 + /55+   IP /80 /75+ /80 + /80-   RABD 50 46++ 46++ 46+   PABD 50 46++ 58+ 60+   Retropulsion (cm) 4 2 3 3   Kapandji Opposition Scale (0-10/10) 10 10++ 10++ 10+     Thumb Observation/Appearance   - none  + mild    ++ moderate    +++ severe     11/18/20 11/24/20 12/7/20 12/23/20 12/28/20 2/10/21   Shoulder deformity present over CMC R: -  L: -        Edema over the CMC joint R: -  L: ++ R: -  L: ++ R: -  L: + R: -  L: + R: -  L: min R: -  L: min   Noted collapse of MP into hyperextension during pinch R: +  L: + R: +  L: + R: +  L: + R: +  L: +        Provocative Tests  Pain Report:  - none    + mild    ++ moderate    +++ severe     11/18/20    CMC Grind test R: -  L: -     Crepitus present R: -  L: -    CMC Adduction Stress Test R: -  L: -    CMC Extension Stress Test R: -  L: -    Finkelstein's R: -  L: -        Strength   (Measured in pounds)  Pain Report: - none  + mild    ++ moderate    +++ severe   NT    Palpation   Pain Report:  - none    + mild    ++ moderate    +++ severe    20   CMC Joint Line   L: +       CMC AP Joint Laxity   L: -       Thenar Eminence   L: ++ L: ++ L: ++ L: + L: +   Web Space   L: ++ L: ++ L: ++ L: ++ pre  L: minimal post L: +   1st DC   L: +  L: ++  NT   Radial Styloid   L: +   L: ++ NT   FCR   L: ++   L: ++ NT   Extensor wad    L: ++ L: ++   Flexor wad    L: ++ L: ++   Tricep    L: ++    Triangular Interval    L: ++    Quadrangular Space    L: ++               12/28/20 2021 2021 2021 2/10/2021   CMC Joint Line   L: + L: -      CMC AP Joint Laxity   L: - L: -      Thenar Eminence   L: ++ L: + L: + L: + L: +   Web Space   L: + L: + L: + L: + -   1st DC   L: + L: -      Radial Styloid   L: + L: -      FCR   L: + L: -      Extensor wad L: ++ L: +      Flexor wad L: ++ L: +      Tricep L: + L: +      Triangular Interval L: ++ L: +      Quadrangular Space L: ++ L: +                STRENGTH:   (Measured in pounds)     2020 2/10/2021     Trials Right Left Right Left Right Left  Right Left   1  2  3  Av#       40#++ to +++        45+         3 pt Pinch 2020 2/10/2021     Trials Right Left Right Left Right Left  Right Left   1  2  3  Av#       10#++        10#+         Lateral Pinch 2020 2/10/2021     Trials Right Left Right Left Right Left  Right Left   1  2  3  Avg:       15#       13#+        13#+         Assessment:  Response to therapy has been improvement to:  ROM of Thumb:  All Planes  Pain:  intensity of pain is decreased    Overall Assessment:  Patient's symptoms are resolving.  STG/LTG:  STGoals have been reviewed and progress or achievement has  "occurred;  see goal sheet for details and updates.    Plan:  Frequency/Duration:  Patient to continue on own with HEP.  Discharge [facility].      Home Program:  [facility]    CMC neoprene orthosis during the day with ADL s per symptoms    Hand based Thumb Spica Orthosis     Exercise Name: [place] Massage to Thumb - Reps: 1 - Sessions: 1, Notes: Hold on tender spot for 30-60 seconds.  Move to the next spot. Search for tender areas in the entire thumb pad.  Exercise Name: Thumb Stabilization Web Space Release Method 1 with Clip - Reps: 1 - Sessions: 1, Notes: 2-3 minutes  Exercise Name: Thumb Stabilization, Repositioning, Method 1(on chest) - Reps: 1 - Sessions: 1-2, EMR Notes: no pain  FM 1st discontinue  Spiky ball extensors and flexors  \"C\" AROM thumb  Exercise Name: Thumb Stabilization C with ball - Reps: 5-10 - Sessions: 1, Notes: Keep index and thumb level.  Exercise Name: Thumb Stabilization Strengthening Isometric C - Reps: 5-10 - Sessions: 1, Notes: Contract isometrically   Exercise Name: Thumb Stabilization Isometric 1st Dorsal Interosseous        Exercise Name: Ball Massage to Extensors  Exercise Name: Self Elbow Mobilization, Trigger Point Massage Over Radial Head  Exercise Name: Pectoralis Major Trigger Release, Sets: 1 - Sessions: 1x every other day  Exercise Name: Pectoralis Minor Trigger Release, Sets: 1 - Sessions: 1x every other day, Notes: Also roll to the side and do side muscles.  Exercise Name: Latissimus/Teres/Serratus/Subscapularis Trigger Release, Sets: 1 - Sessions: 1x every other day, Notes: Make sure you go into tricep a bit too.  Exercise Name: Rolling upper back, Sets: 1 - Sessions: 1x every other day  Exercise Name: Spinal extensions, Sets: 1 - Sessions: 1x every other day  Exercise Name: Foam Roller Stretch: Pectoralis Major, Sets: 1 - Sessions: 1x every other day  Exercise Name: Foam Roller stretch: Pectoralis Minor    Foam roller massage and stretch    Exercise Name: Thumb Stabilization Place " and Hold Pinch Muscle Re-Education    2/10/2021  Paraffin bath for Home use

## 2021-02-10 ENCOUNTER — THERAPY VISIT (OUTPATIENT)
Dept: OCCUPATIONAL THERAPY | Facility: CLINIC | Age: 56
End: 2021-02-10
Payer: COMMERCIAL

## 2021-02-10 DIAGNOSIS — M19.032 CMC DJD(CARPOMETACARPAL DEGENERATIVE JOINT DISEASE), LOCALIZED PRIMARY, LEFT: ICD-10-CM

## 2021-02-10 DIAGNOSIS — M79.645 PAIN OF FINGER OF LEFT HAND: Primary | ICD-10-CM

## 2021-02-10 PROCEDURE — 97140 MANUAL THERAPY 1/> REGIONS: CPT | Mod: GO | Performed by: OCCUPATIONAL THERAPIST

## 2021-02-10 PROCEDURE — 97018 PARAFFIN BATH THERAPY: CPT | Mod: GO | Performed by: OCCUPATIONAL THERAPIST

## 2021-02-25 ENCOUNTER — E-VISIT (OUTPATIENT)
Dept: URGENT CARE | Facility: CLINIC | Age: 56
End: 2021-02-25
Payer: COMMERCIAL

## 2021-02-25 ENCOUNTER — TELEPHONE (OUTPATIENT)
Dept: URGENT CARE | Facility: URGENT CARE | Age: 56
End: 2021-02-25

## 2021-02-25 DIAGNOSIS — Z20.822 CLOSE EXPOSURE TO 2019 NOVEL CORONAVIRUS: Primary | ICD-10-CM

## 2021-02-25 DIAGNOSIS — Z20.822 CLOSE EXPOSURE TO 2019 NOVEL CORONAVIRUS: ICD-10-CM

## 2021-02-25 PROCEDURE — 99421 OL DIG E/M SVC 5-10 MIN: CPT | Performed by: NURSE PRACTITIONER

## 2021-02-25 PROCEDURE — 99207 PR NO CHARGE LOS: CPT

## 2021-02-25 PROCEDURE — U0005 INFEC AGEN DETEC AMPLI PROBE: HCPCS | Performed by: NURSE PRACTITIONER

## 2021-02-25 PROCEDURE — U0003 INFECTIOUS AGENT DETECTION BY NUCLEIC ACID (DNA OR RNA); SEVERE ACUTE RESPIRATORY SYNDROME CORONAVIRUS 2 (SARS-COV-2) (CORONAVIRUS DISEASE [COVID-19]), AMPLIFIED PROBE TECHNIQUE, MAKING USE OF HIGH THROUGHPUT TECHNOLOGIES AS DESCRIBED BY CMS-2020-01-R: HCPCS | Performed by: NURSE PRACTITIONER

## 2021-02-25 NOTE — PATIENT INSTRUCTIONS
"  Dear Marbella Hendrix,    Based on your exposure to COVID-19 (coronavirus), we would like to test you for this virus. I have placed an order for this test. The best time for testing is 5-7 days after the exposure.    How to schedule:  For all employees or close contacts (except [facility] and [facility] - see below), go to your [employer] home page and scroll down to the section that says  You have an appointment that needs to be scheduled  and click the large green button that says  Schedule Now  and follow the steps to find the next available opening.     If you are unable to complete these steps or if you cannot find any available times, please call [phone] to schedule employee testing.     [facility] employees or close contacts, please call [phone].   [facility] ([facility]) employees or close contacts call [phone].    Return to work/school/ guidance:   For people with high risk exposures outside the home    Please let your workplace manager and staffing office know when your quarantine ends.     We can not give you an exact date as it depends on the information below. You can calculate this on your own or work with your manager/staffing office to calculate this. (For example if you were exposed on 10/4, you would have to quarantine for 14 full days. That would be through 10/18. You could return on 10/19.)    Quarantine Guidelines:  Patients (\"contacts\") who have been in close prolonged contact of an infected person(s) (within six feet for at least 15 minutes within a 24 hour period), and remain asymptomatic should enter quarantine based on the following options:    14-day quarantine period (this remains the CDC recommendation for the greatest protection against spread of COVID-19) OR    Minimum 7-day quarantine with negative RT-PCR test collected on day 5 or later OR    10-day quarantine with no test  Quarantine Guideline exceptions are as follows:  ? People whose high-risk exposure was a " household member should always quarantine for 14 days from their last date of exposure  ? Residents of congregate care and congregate living settings should always quarantine for 14 days from their last date of exposure  ? Individuals who work in health care, congregate care, or congregate living should be off work for 14 days from their last date of exposure. Community activities for this group can be resumed based on options above.  ? For people with high risk exposure to an individual they live with it is still recommended to quarantine for 14 days the last date of exposure even if the covid test is negative.     Note: If you have ongoing exposure to the covid positive person, this quarantine period may be more than 14 days. (For example, if you are continued to be exposed to your child who tested positive and cannot isolate from them, then the quarantine of 7-14 days can't start until your child is no longer contagious. This is typically 10 days from onset of the child's symptoms. So the total duration may be 17-24 days in this case.)    You should continue symptom monitoring until day 14 post-exposure. If you develop signs or symptoms of COVID-19, isolate and get tested (even if you have been tested already).    How to quarantine:   Stay home and away from others. Don't go to school or anywhere else. Generally quarantine means staying home from work but there are some exceptions to this. Please contact your workplace.  No hugging, kissing or shaking hands.  Don't let anyone visit.  Cover your mouth and nose with a mask, tissue or washcloth to avoid spreading germs.  Wash your hands and face often. Use soap and water.    What are the symptoms of COVID-19?  The most common symptoms are cough, fever and trouble breathing. Less common symptoms include headache, body aches, fatigue (feeling very tired), chills, sore throat, stuffy or runny nose, diarrhea (loose poop), loss of taste or smell, belly pain, and nausea  or vomiting (feeling sick to your stomach or throwing up).  After 14 days, if you have still don't have symptoms, you likely don't have this virus.  If you develop symptoms, follow these guidelines.  If you're normally healthy: Please start another eVisit.  If you have a serious health problem (like cancer, heart failure, an organ transplant or kidney disease): Call your specialty clinic. Let them know that you might have COVID-19.    Where can I get more information?  [facility] - About COVID-19: [email or web address]  CDC - What to Do If You're Sick: www.cdc.gov/coronavirus/2019-ncov/about/steps-when-sick.html  CDC - Ending Home Isolation: www.cdc.gov/coronavirus/2019-ncov/hcp/disposition-in-home-patients.html  CDC - Caring for Someone: www.cdc.gov/coronavirus/2019-ncov/if-you-are-sick/care-for-someone.html  [facility] clinical trials (COVID-19 research studies): [email or web address]-clinical-trials  Below are the COVID-19 hotlines at the [facility] ([facility]). Interpreters are available.  For health questions: Call 252-101-9302 or 1-422.949.1386 (7 a.m. to 7 p.m.)  For questions about schools and childcare: Call [phone] or [phone] (7 a.m. to 7 p.m.)

## 2021-02-25 NOTE — CONFIDENTIAL NOTE
Called to speak with patient because she is scheduled for a COVID test at the [facility] later today, but we do not have a current order in place for her to have this done. She reports that she did not realize she needed to have an order from a doctor, and that she just needs to be tested because there is COVID going around at her work and she was told she can't come back until she gets tested.   Advised her to go to [email or web address] and do a visit on there prior to her appointment if she wants to still be able to get tested today. She reports she is not sure if she will be able to get that done today--she understands it will not take long to do the [facility] visit, but she reports she is not any good doing that type of thing on her phone and she is not sure when she will be back home (is driving her kids to several things of their own). She will still try to get this done, but she is aware that if she does not get this done, then there would be no need for her to come to her appointment today because they will not be able to test her at the testing site without a current order.

## 2021-02-26 LAB
SARS-COV-2 RNA RESP QL NAA+PROBE: NOT DETECTED
SPECIMEN SOURCE: NORMAL

## 2021-03-02 NOTE — CONFIDENTIAL NOTE
Patient was able to complete a virtual visit with a provider and was able to have her testing on 2/25/21.

## 2021-03-22 ENCOUNTER — MYC MEDICAL ADVICE (OUTPATIENT)
Dept: FAMILY MEDICINE | Facility: CLINIC | Age: 56
End: 2021-03-22

## 2021-03-24 ENCOUNTER — OFFICE VISIT (OUTPATIENT)
Dept: OPHTHALMOLOGY | Facility: CLINIC | Age: 56
End: 2021-03-24
Payer: COMMERCIAL

## 2021-03-24 DIAGNOSIS — H02.403 INVOLUTIONAL PTOSIS, ACQUIRED, BILATERAL: ICD-10-CM

## 2021-03-24 DIAGNOSIS — H02.831 DERMATOCHALASIS OF BOTH UPPER EYELIDS: Primary | ICD-10-CM

## 2021-03-24 DIAGNOSIS — H02.834 DERMATOCHALASIS OF BOTH UPPER EYELIDS: Primary | ICD-10-CM

## 2021-03-24 PROCEDURE — 99024 POSTOP FOLLOW-UP VISIT: CPT | Performed by: OPHTHALMOLOGY

## 2021-03-24 ASSESSMENT — VISUAL ACUITY
METHOD: SNELLEN - LINEAR
OS_SC: 20/15
OD_SC: 20/15

## 2021-03-24 ASSESSMENT — TONOMETRY
OD_IOP_MMHG: 10
IOP_METHOD: ICARE
OS_IOP_MMHG: 11

## 2021-03-24 NOTE — PROGRESS NOTES
Chief Complaint(s) and History of Present Illness(es)     Post Op (Ophthalmology) Both Eyes     Laterality: both eyes              Comments     S/p bilateral upper eyelid ptosis repair, bilateral upper eyelid   blepharoplasty and bilateral nasolacrimal duct probe and irrigation   12/18/2020. Pt states she notices eyelashes, both eyes, upper and lower   lids seem to be growing straight down in some areas. Unsure if this is new   since surgery or not. Occasional Vaseline use on suture site.    Happy with improvement in peripheral vision and aesthetic improvement as well.     Marbella Hendrix is about 2 months status post op.  She is doing well. She has several misdirected eyelashes laterally, probably not related to surgery. No trichiasis. Discussed options - epilation, RFE, or observation. Plan: Observation for now, she will let me know if she becomes more symptomatic.    She will follow up with me on an as needed basis.    Complete documentation of historical and exam elements from today's encounter can be found in the full encounter summary report (not reduplicated in this progress note). I personally obtained the chief complaint(s) and history of present illness.  I confirmed and edited as necessary the review of systems, past medical/surgical history, family history, social history, and examination findings as documented by others; and I examined the patient myself. I personally reviewed the relevant tests, images, and reports as documented above. I formulated and edited as necessary the assessment and plan and discussed the findings and management plan with the patient and family. - Pako Sosa MD

## 2021-03-24 NOTE — NURSING NOTE
Chief Complaints and History of Present Illnesses   Patient presents with     Post Op (Ophthalmology) Both Eyes       Chief Complaint(s) and History of Present Illness(es)     Post Op (Ophthalmology) Both Eyes     Laterality: both eyes              Comments     S/p bilateral upper eyelid ptosis repair, bilateral upper eyelid blepharoplasty and bilateral nasolacrimal duct probe and irrigation 12/18/2020. Pt states she notices eyelashes, both eyes, upper and lower lids seem to be growing straight down in some areas. Unsure if this is new since surgery or not. Occasional Vaseline use on suture site.                 Martha Pereyra, COA

## 2021-03-25 ENCOUNTER — TRANSFERRED RECORDS (OUTPATIENT)
Dept: HEALTH INFORMATION MANAGEMENT | Facility: CLINIC | Age: 56
End: 2021-03-25

## 2021-03-29 ENCOUNTER — TRANSFERRED RECORDS (OUTPATIENT)
Dept: HEALTH INFORMATION MANAGEMENT | Facility: CLINIC | Age: 56
End: 2021-03-29

## 2021-04-08 ENCOUNTER — MYC MEDICAL ADVICE (OUTPATIENT)
Dept: FAMILY MEDICINE | Facility: CLINIC | Age: 56
End: 2021-04-08

## 2021-04-19 ENCOUNTER — TRANSFERRED RECORDS (OUTPATIENT)
Dept: HEALTH INFORMATION MANAGEMENT | Facility: CLINIC | Age: 56
End: 2021-04-19

## 2021-04-20 ENCOUNTER — TRANSFERRED RECORDS (OUTPATIENT)
Dept: HEALTH INFORMATION MANAGEMENT | Facility: CLINIC | Age: 56
End: 2021-04-20

## 2021-04-21 ENCOUNTER — OFFICE VISIT (OUTPATIENT)
Dept: FAMILY MEDICINE | Facility: CLINIC | Age: 56
End: 2021-04-21
Payer: COMMERCIAL

## 2021-04-21 VITALS
OXYGEN SATURATION: 98 % | HEART RATE: 78 BPM | TEMPERATURE: 97.9 F | BODY MASS INDEX: 35.68 KG/M2 | HEIGHT: 64 IN | SYSTOLIC BLOOD PRESSURE: 116 MMHG | DIASTOLIC BLOOD PRESSURE: 81 MMHG | WEIGHT: 209 LBS

## 2021-04-21 DIAGNOSIS — Z01.818 PRE-OPERATIVE EXAMINATION: Primary | ICD-10-CM

## 2021-04-21 DIAGNOSIS — Z87.828 HISTORY OF TORN MENISCUS OF LEFT KNEE: ICD-10-CM

## 2021-04-21 LAB
HGB BLD-MCNC: 12 G/DL (ref 11.7–15.7)
PLATELET # BLD AUTO: 243 10E9/L (ref 150–450)

## 2021-04-21 PROCEDURE — 36415 COLL VENOUS BLD VENIPUNCTURE: CPT | Performed by: FAMILY MEDICINE

## 2021-04-21 PROCEDURE — 99214 OFFICE O/P EST MOD 30 MIN: CPT | Performed by: FAMILY MEDICINE

## 2021-04-21 PROCEDURE — 85018 HEMOGLOBIN: CPT | Performed by: FAMILY MEDICINE

## 2021-04-21 PROCEDURE — 93000 ELECTROCARDIOGRAM COMPLETE: CPT | Performed by: FAMILY MEDICINE

## 2021-04-21 PROCEDURE — 85049 AUTOMATED PLATELET COUNT: CPT | Performed by: FAMILY MEDICINE

## 2021-04-21 ASSESSMENT — MIFFLIN-ST. JEOR: SCORE: 1533.02

## 2021-04-21 NOTE — PROGRESS NOTES
[facility]  [address]  [facility] [address]  Phone: [phone]  Primary Provider: Vanessa Huff  Pre-op Performing Provider: VANESSA HUFF      PREOPERATIVE EVALUATION:  Today's date: 4/21/2021    Marbella Hendrix is a 55 year old female who presents for a preoperative evaluation.    Surgical Information:  Surgery/Procedure: left knee- repair torn mensicus , arthroscopic   Surgery Location: [facility]  Surgeon:   Surgery Date: 4/26/21  Time of Surgery:   Where patient plans to recover: At home with family  Fax number for surgical facility:     Type of Anesthesia Anticipated: to be determined    Assessment & Plan     The proposed surgical procedure is considered LOW risk.    Pre-operative examination  Will check labs , normal EKG , pt cleared for surgery   - Hemoglobin  - Platelet count  - EKG 12-lead complete w/read - Clinics    History of torn meniscus of left knee  Will have arthroscopic meniscus repair            Risks and Recommendations:  The patient has the following additional risks and recommendations for perioperative complications:   - No identified additional risk factors other than previously addressed    Medication Instructions:  Patient is to take all scheduled medications on the day of surgery    RECOMMENDATION:  APPROVAL GIVEN to proceed with proposed procedure, without further diagnostic evaluation.    RTC if no improving or worsening.  Pt is aware  and comfortable with the current plan.          Subjective     HPI related to upcoming procedure: she will have left knee meniscus repair because of adryan meniscus     Preop Questions 4/21/2021   1. Have you ever had a heart attack or stroke? No   2. Have you ever had surgery on your heart or blood vessels, such as a stent placement, a coronary artery bypass, or surgery on an artery in your head, neck, heart, or legs? No   3. Do you have chest pain with activity? No   4. Do you have a history of   heart failure? No   5. Do you currently have a cold, bronchitis or symptoms of other infection? No   6. Do you have a cough, shortness of breath, or wheezing? No   7. Do you or anyone in your family have previous history of blood clots? No   8. Do you or does anyone in your family have a serious bleeding problem such as prolonged bleeding following surgeries or cuts? No   9. Have you ever had problems with anemia or been told to take iron pills? No   10. Have you had any abnormal blood loss such as black, tarry or bloody stools, or abnormal vaginal bleeding? No   11. Have you ever had a blood transfusion? No   12. Are you willing to have a blood transfusion if it is medically needed before, during, or after your surgery? Yes    13. Have you or any of your relatives ever had problems with anesthesia? No   14. Do you have sleep apnea, excessive snoring or daytime drowsiness? No   15. Do you have any artifical heart valves or other implanted medical devices like a pacemaker, defibrillator, or continuous glucose monitor? No   16. Do you have artificial joints? YES - right hip replaced June 4th , 2020    17. Are you allergic to latex? No   18. Is there any chance that you may be pregnant? No       Health Care Directive:  Patient does not have a Health Care Directive or Living Will:     Preoperative Review of :        Status of Chronic Conditions:  See problem list for active medical problems.  Problems all longstanding and stable, except as noted/documented.  See ROS for pertinent symptoms related to these conditions.      Review of Systems  Constitutional, neuro, ENT, endocrine, pulmonary, cardiac, gastrointestinal, genitourinary, musculoskeletal, integument and psychiatric systems are negative, except as otherwise noted.    Patient Active Problem List    Diagnosis Date Noted     CMC DJD(carpometacarpal degenerative joint disease), localized primary, left 11/18/2020     Priority: Medium     Pain of finger of left  hand 2020     Priority: Medium     Dermatochalasis of both upper eyelids 2020     Priority: Medium     Added automatically from request for surgery [id number]       Involutional ptosis, acquired, bilateral 2020     Priority: Medium     Added automatically from request for surgery [id number]       Weakness of right hip 2020     Priority: Medium     Primary osteoarthritis of right hip 2019     Priority: Medium     Added automatically from request for surgery [id number]       Morbid obesity (H) 2018     Priority: Medium     Status post hysterectomy 2017     Priority: Medium     Sensation of plugged ear on both sides 2017     Priority: Medium     Acute post-operative pain 2015     Priority: Medium     Preventative health care 10/30/2012     Priority: Medium     Last pap NIL ; mammogram nl ; lipids abnl, needs annual f/u       Health Care Home 2012     Priority: Medium     Tier 1    DX V65.8 REPLACED WITH 29308 HEALTH CARE HOME (2013)       Generalized anxiety disorder 2012     Priority: Medium     H/o panic attacks; currently controlled with paxil       Hyperlipidemia LDL goal <160 2012     Priority: Medium     Behavioral measures - avoiding statins while trying to conceive; not a candidate for red yeast rice (interactions with thyroid meds)       Acquired hypothyroidism 2012     Priority: Medium     Overweight 2012     Priority: Medium     On exercise plan, has been in weight watchers  Problem list name updated by automated process. Provider to review        Past Medical History:   Diagnosis Date     Arthritis     My mom has it, my grandmother had it I have it     Diabetes (H)     My mom and brother have type II     Heavy menstrual period      Leiomyoma of uterus     s/p myomectomy     Mental disorder     anxiety     PONV (postoperative nausea and vomiting)      Surgical complication after   sept     Thyroid disease      Hypothroidism, 2nd half of      Uncomplicated asthma     My son has it, since he was born     Vesico-ureteral reflux     s/p repair     Past Surgical History:   Procedure Laterality Date     ABDOMEN SURGERY      exploratory after C section for sepsis      SECTION      x 2     HYSTERECTOMY RADICAL  2015     HYSTERECTOMY SUPRACERVICAL N/A 2015    Procedure: HYSTERECTOMY SUPRACERVICAL;  Surgeon: Kelle Wasserman MD;  Location:  OR     LUMPECTOMY BREAST       MYOMECTOMY UTERUS       REPAIR PTOSIS Right     reconstruction of tear duct     REPAIR PTOSIS BILATERAL Bilateral 2020    Procedure: BILATERAL PTOSIS REPAIR, IRRIGATION OF LACRIMAL SYSTEM BILATERAL;  Surgeon: Pako Sosa MD;  Location:  OR     Current Outpatient Medications   Medication Sig Dispense Refill     calcium carbonate (OS-ARA) 500 MG tablet Take 1 tablet by mouth 2 times daily       levothyroxine (SYNTHROID/LEVOTHROID) 125 MCG tablet TAKE ONE TABLET BY MOUTH ONCE DAILY (DUE FOR PHYSCIAL AND LABS) 30 tablet 0     melatonin 5 MG tablet Take 5 mg by mouth nightly as needed for sleep       Omega-3 Fatty Acids (OMEGA-3 FISH OIL PO) Take 1 g by mouth daily       PARoxetine (PAXIL) 20 MG tablet TAKE ONE-HALF TABLET BY MOUTH TWICE A DAY 90 tablet 3       Allergies   Allergen Reactions     Erythromycin      Itching and swelling of lids      Seasonal Allergies Itching and Other (See Comments)        Social History     Tobacco Use     Smoking status: Former Smoker     Packs/day: 0.00     Years: 0.00     Pack years: 0.00     Types: Cigarettes     Quit date: 1990     Years since quittin.3     Smokeless tobacco: Never Used   Substance Use Topics     Alcohol use: Yes     Comment: 1-2 drinks a month     Family History   Problem Relation Age of Onset     High cholesterol Father      Cancer Father         CLL     Blood Disease Father         CLL     Glaucoma Father      Macular Degeneration Father       "Breast Cancer Maternal Grandmother         in 80s     Cancer Maternal Grandmother      Depression Mother      Diabetes Mother      Ulcerative Colitis Mother      Lipids Mother         hypertriglyceridemia     Anxiety Disorder Mother      Diabetes Brother      History   Drug Use Unknown         Objective     /81 (BP Location: Left arm, Cuff Size: Adult Large)   Pulse 78   Temp 97.9  F (36.6  C) (Tympanic)   Ht 1.626 m (5' 4\")   Wt 94.8 kg (209 lb)   LMP 08/28/2015 (Approximate)   SpO2 98%   BMI 35.87 kg/m      Physical Exam    GENERAL APPEARANCE: healthy, alert and no distress     EYES: EOMI, PERRL     HENT: ear canals and TM's normal and nose and mouth without ulcers or lesions     NECK: no adenopathy, no asymmetry, masses, or scars and thyroid normal to palpation     RESP: lungs clear to auscultation - no rales, rhonchi or wheezes     CV: regular rates and rhythm, normal S1 S2, no S3 or S4 and no murmur, click or rub     ABDOMEN:  soft, nontender, no HSM or masses and bowel sounds normal     MS: extremities normal- no gross deformities noted, no evidence of inflammation in joints, FROM in all extremities.     SKIN: no suspicious lesions or rashes     NEURO: Normal strength and tone, sensory exam grossly normal, mentation intact and speech normal     PSYCH: mentation appears normal. and affect normal/bright     LYMPHATICS: No cervical adenopathy    Recent Labs   Lab Test 12/04/20  1417 10/21/20  1311 06/12/19  0921 06/12/19  0921   HGB 11.6* 12.5   < >  --     264   < >  --    A1C  --   --   --  5.6    < > = values in this interval not displayed.        Diagnostics:  Labs pending at this time.  Results will be reviewed when available.   EKG: appears normal, NSR, normal axis, normal intervals, no acute ST/T changes c/w ischemia, no LVH by voltage criteria, unchanged from previous tracings    Revised Cardiac Risk Index (RCRI):  The patient has the following serious cardiovascular risks for " perioperative complications:   - No serious cardiac risks = 0 points            Signed Electronically by: Vanessa Ladd MD  Copy of this evaluation report is provided to requesting physician.

## 2021-04-21 NOTE — NURSING NOTE
"Chief Complaint   Patient presents with     Pre-Op Exam     4/26/21 [place] [facility] left knee     initial /81 (BP Location: Left arm, Cuff Size: Adult Large)   Pulse 78   Temp 97.9  F (36.6  C) (Tympanic)   Ht 1.626 m (5' 4\")   Wt 94.8 kg (209 lb)   LMP 08/28/2015 (Approximate)   SpO2 98%   BMI 35.87 kg/m   Estimated body mass index is 35.87 kg/m  as calculated from the following:    Height as of this encounter: 1.626 m (5' 4\").    Weight as of this encounter: 94.8 kg (209 lb).  BP completed using cuff size: large.  L  arm      Health Maintenance that is potentially due pending provider review:  NONE    n/a    Anthony Barba ma  "

## 2021-04-23 ENCOUNTER — TELEPHONE (OUTPATIENT)
Dept: FAMILY MEDICINE | Facility: CLINIC | Age: 56
End: 2021-04-23

## 2021-04-23 NOTE — TELEPHONE ENCOUNTER
Reason for Call:  Form, our goal is to have forms completed with 72 hours, however, some forms may require a visit or additional information.    Type of letter, form or note:  [facility] ambulatory HX and PX     Who is the form from?: [facility] abulatory surgery (if other please explain)    Where did the form come from: form was faxed in    What clinic location was the form placed at?: [facility]    Where the form was placed: [employer] Box/Folder    What number is listed as a contact on the form?:  Fax [phone]       Additional comments:       Call taken on 4/23/2021 at 2:26 PM by Darrick Muirllo

## 2021-05-04 ENCOUNTER — TRANSFERRED RECORDS (OUTPATIENT)
Dept: HEALTH INFORMATION MANAGEMENT | Facility: CLINIC | Age: 56
End: 2021-05-04

## 2021-05-04 ENCOUNTER — MYC MEDICAL ADVICE (OUTPATIENT)
Dept: FAMILY MEDICINE | Facility: CLINIC | Age: 56
End: 2021-05-04

## 2021-05-04 DIAGNOSIS — E03.9 ACQUIRED HYPOTHYROIDISM: ICD-10-CM

## 2021-05-04 NOTE — TELEPHONE ENCOUNTER
Pt is asking for a refill for levothyroxine 112 mcg for 90 days I did not see this dose  on the med list

## 2021-05-04 NOTE — TELEPHONE ENCOUNTER
"LS,   Called pt -   Requesting refill for 125mcg (not what's listed below)  Overdue for TSH   States she always sees you in the summer   Has a \"child that's failing in life right now\" and she needs to get them through the school year  Requesting 90 day refill until can see you in July 2021  Please advise  Thanks,  Caroline COLLINS RN    "

## 2021-05-05 RX ORDER — LEVOTHYROXINE SODIUM 125 UG/1
TABLET ORAL
Qty: 90 TABLET | Refills: 0 | Status: SHIPPED | OUTPATIENT
Start: 2021-05-05 | End: 2021-07-12

## 2021-05-05 NOTE — TELEPHONE ENCOUNTER
LS,    Please see [employer] message below.  Patient has only had Preops in past 1.5 years.    Do you want to see her for physical?  Last 6/12/2019.    Mikaela Niño RN

## 2021-05-25 ENCOUNTER — TRANSFERRED RECORDS (OUTPATIENT)
Dept: HEALTH INFORMATION MANAGEMENT | Facility: CLINIC | Age: 56
End: 2021-05-25

## 2021-06-22 NOTE — PROGRESS NOTES
SUBJECTIVE:   CC: Marbella Hendrix is an 55 year old woman who presents for preventive health visit.     Patient has been advised of split billing requirements and indicates understanding: Yes  Healthy Habits:     Getting at least 3 servings of Calcium per day:  Yes    Bi-annual eye exam:  Yes    Dental care twice a year:  Yes    Sleep apnea or symptoms of sleep apnea:  None    Diet:  Vegetarian/vegan    Frequency of exercise:  6-7 days/week    Duration of exercise:  Greater than 60 minutes    Taking medications regularly:  Yes    Medication side effects:  None    PHQ-2 Total Score: 2    Additional concerns today:  No    1) hypothyroidism she is on synthroid 125 mcg daily and is due for the thyroid recheck   2) anxiety , she used to be on Paxil 20 mg but she wanted to get off and has tapered slowly down to 10 mg and wants to get off , but meanwhile she is having a lot of stress , marital problems they are going through and seems that she is having some more anxiety lately , wondering what to take     Today's PHQ-2 Score:   PHQ-2 (  [employer]) 2021   Q1: Little interest or pleasure in doing things 1   Q2: Feeling down, depressed or hopeless 1   PHQ-2 Score 2   Q1: Little interest or pleasure in doing things Several days   Q2: Feeling down, depressed or hopeless Several days   PHQ-2 Score 2       Abuse: Current or Past (Physical, Sexual or Emotional) - No  Do you feel safe in your environment? Yes    Have you ever done Advance Care Planning? (For example, a Health Directive, POLST, or a discussion with a medical provider or your loved ones about your wishes): Yes, patient states has an Advance Care Planning document and will bring a copy to the clinic.    Social History     Tobacco Use     Smoking status: Former Smoker     Packs/day: 0.00     Years: 0.00     Pack years: 0.00     Types: Cigarettes     Quit date: 1990     Years since quittin.4     Smokeless tobacco: Never Used   Substance Use Topics      Alcohol use: Yes     Comment: 1-2 drinks a month     If you drink alcohol do you typically have >3 drinks per day or >7 drinks per week? No    No flowsheet data found.    Reviewed orders with patient.  Reviewed health maintenance and updated orders accordingly - Yes  Lab work is in process  Labs reviewed in EPIC  BP Readings from Last 3 Encounters:   21 122/77   21 116/81   20 (!) 148/80    Wt Readings from Last 3 Encounters:   21 92.9 kg (204 lb 14.4 oz)   21 94.8 kg (209 lb)   20 92.5 kg (204 lb)                  Patient Active Problem List   Diagnosis     Health Care Home     Generalized anxiety disorder     Hyperlipidemia LDL goal <160     Acquired hypothyroidism     Overweight     Preventative health care     Acute post-operative pain     Status post hysterectomy     Morbid obesity (H)     Sensation of plugged ear on both sides     Primary osteoarthritis of right hip     Weakness of right hip     Dermatochalasis of both upper eyelids     Involutional ptosis, acquired, bilateral     CMC DJD(carpometacarpal degenerative joint disease), localized primary, left     Pain of finger of left hand     Adjustment disorder with anxious mood     Family history of diabetes mellitus     Past Surgical History:   Procedure Laterality Date     ABDOMEN SURGERY      exploratory after C section for sepsis      SECTION      x 2     HYSTERECTOMY RADICAL  2015     HYSTERECTOMY SUPRACERVICAL N/A 2015    Procedure: HYSTERECTOMY SUPRACERVICAL;  Surgeon: Kelle Wasserman MD;  Location:  OR     LUMPECTOMY BREAST       MYOMECTOMY UTERUS       ORTHOPEDIC SURGERY  2020    3-18-1, Nancy date R hip replacement, March L meniscus repair     REPAIR PTOSIS Right     reconstruction of tear duct     REPAIR PTOSIS BILATERAL Bilateral 2020    Procedure: BILATERAL PTOSIS REPAIR, IRRIGATION OF LACRIMAL SYSTEM BILATERAL;  Surgeon: Pako Sosa MD;  Location:   OR       Social History     Tobacco Use     Smoking status: Former Smoker     Packs/day: 0.00     Years: 0.00     Pack years: 0.00     Types: Cigarettes     Quit date: 1990     Years since quittin.5     Smokeless tobacco: Never Used   Substance Use Topics     Alcohol use: Yes     Comment: One or two drinks a month     Family History   Problem Relation Age of Onset     High cholesterol Father      Cancer Father         CLL     Blood Disease Father         CLL     Glaucoma Father      Macular Degeneration Father      Breast Cancer Maternal Grandmother         in 80s     Cancer Maternal Grandmother      Depression Mother      Diabetes Mother      Ulcerative Colitis Mother      Lipids Mother         hypertriglyceridemia     Anxiety Disorder Mother      Diabetes Brother          Current Outpatient Medications   Medication Sig Dispense Refill     calcium carbonate (OS-ARA) 500 MG tablet Take 1 tablet by mouth 2 times daily       levothyroxine (SYNTHROID/LEVOTHROID) 125 MCG tablet TAKE ONE TABLET BY MOUTH ONCE DAILY 90 tablet 0     melatonin 5 MG tablet Take 5 mg by mouth nightly as needed for sleep       Omega-3 Fatty Acids (OMEGA-3 FISH OIL PO) Take 1 g by mouth daily       PARoxetine (PAXIL) 20 MG tablet TAKE ONE-HALF TABLET BY MOUTH TWICE A DAY 90 tablet 3     Allergies   Allergen Reactions     Erythromycin      Itching and swelling of lids      Seasonal Allergies Itching and Other (See Comments)     Recent Labs   Lab Test 21  1240 20  0929 20  1214 19  0921 18  0727 08/28/15  0850 08/28/15  0850   A1C 5.7*  --   --  5.6 5.5   < >  --    LDL  --  144*  --  169* 159*   < >  --    HDL  --  33*  --  35* 29*   < >  --    TRIG  --  126  --  142 243*   < >  --    CR  --   --   --   --   --   --  0.65   GFRESTIMATED  --   --   --   --   --   --  >90  Non  GFR Calc     GFRESTBLACK  --   --   --   --   --   --  >90  African American GFR Calc     TSH 0.78  --  1.77 0.26*  1.05   < >  --     < > = values in this interval not displayed.        Breast Cancer Screening:    FSH-7:   Breast CA Risk Assessment (FHS-7) 2021   Did any of your first-degree relatives have breast or ovarian cancer? No   Did any of your relatives have bilateral breast cancer? No   Did any man in your family have breast cancer? No   Did any woman in your family have breast and ovarian cancer? Yes   Did any woman in your family have breast cancer before age 50 y? No   Do you have 2 or more relatives with breast and/or ovarian cancer? No   Do you have 2 or more relatives with breast and/or bowel cancer? No       Mammogram Screening: Recommended annual mammography  Pertinent mammograms are reviewed under the imaging tab.    History of abnormal Pap smear: NO - age 30- 65 PAP every 3 years recommended  PAP / HPV 2011   PAP NIL     Reviewed and updated as needed this visit by clinical staff  Tobacco                Reviewed and updated as needed this visit by Provider                Past Medical History:   Diagnosis Date     Arthritis     My mom has it, my grandmother had it I have it     Diabetes (H)     My mom and brother have type II     Heavy menstrual period      Leiomyoma of uterus     s/p myomectomy     Mental disorder     anxiety     PONV (postoperative nausea and vomiting)      Surgical complication after   sept     Thyroid disease     Hypothroidism, 2nd half of      Uncomplicated asthma     My son has it, since he was born     Vesico-ureteral reflux     s/p repair      Past Surgical History:   Procedure Laterality Date     ABDOMEN SURGERY      exploratory after C section for sepsis      SECTION      x 2     HYSTERECTOMY RADICAL  2015     HYSTERECTOMY SUPRACERVICAL N/A 2015    Procedure: HYSTERECTOMY SUPRACERVICAL;  Surgeon: Kelle Wasserman MD;  Location: [facility]     LUMPECTOMY BREAST       MYOMECTOMY UTERUS       ORTHOPEDIC SURGERY  2020     "3-18-1, Nancy date R hip replacement, March L meniscus repair     REPAIR PTOSIS Right 2002    reconstruction of tear duct     REPAIR PTOSIS BILATERAL Bilateral 12/18/2020    Procedure: BILATERAL PTOSIS REPAIR, IRRIGATION OF LACRIMAL SYSTEM BILATERAL;  Surgeon: Pako Sosa MD;  Location:  OR       Review of Systems  CONSTITUTIONAL: NEGATIVE for fever, chills, change in weight  INTEGUMENTARY/SKIN: NEGATIVE for worrisome rashes, moles or lesions  EYES: NEGATIVE for vision changes or irritation  ENT: NEGATIVE for ear, mouth and throat problems  RESP: NEGATIVE for significant cough or SOB  BREAST: NEGATIVE for masses, tenderness or discharge  CV: NEGATIVE for chest pain, palpitations or peripheral edema  GI: NEGATIVE for nausea, abdominal pain, heartburn, or change in bowel habits  : NEGATIVE for unusual urinary or vaginal symptoms. No vaginal bleeding.  MUSCULOSKELETAL: NEGATIVE for significant arthralgias or myalgia  NEURO: NEGATIVE for weakness, dizziness or paresthesias  PSYCHIATRIC: NEGATIVE for changes in mood or affect      OBJECTIVE:   /77   Pulse 70   Ht 1.626 m (5' 4\")   Wt 92.9 kg (204 lb 14.4 oz)   LMP 08/28/2015 (Approximate)   SpO2 98%   BMI 35.17 kg/m    Physical Exam  GENERAL: healthy, alert and no distress  EYES: Eyes grossly normal to inspection, PERRL and conjunctivae and sclerae normal  HENT: ear canals and TM's normal, nose and mouth without ulcers or lesions  NECK: no adenopathy, no asymmetry, masses, or scars and thyroid normal to palpation  RESP: lungs clear to auscultation - no rales, rhonchi or wheezes  BREAST: normal without masses, tenderness or nipple discharge and no palpable axillary masses or adenopathy  CV: regular rate and rhythm, normal S1 S2, no S3 or S4, no murmur, click or rub, no peripheral edema and peripheral pulses strong  ABDOMEN: soft, nontender, no hepatosplenomegaly, no masses and bowel sounds normal  MS: no gross musculoskeletal defects noted, no " edema  SKIN: no suspicious lesions or rashes  NEURO: Normal strength and tone, mentation intact and speech normal  PSYCH: mentation appears normal, affect normal/bright    Diagnostic Test Results:  Labs reviewed in Epic  Results for orders placed or performed in visit on 06/23/21   Hepatitis C antibody     Status: None   Result Value Ref Range    Hepatitis C Antibody Nonreactive NR^Nonreactive   TSH with free T4 reflex     Status: None   Result Value Ref Range    TSH 0.78 0.40 - 4.00 mU/L   Hemoglobin A1c     Status: Abnormal   Result Value Ref Range    Hemoglobin A1C 5.7 (H) 0 - 5.6 %       ASSESSMENT/PLAN:       ICD-10-CM    1. Routine general medical examination at a health care facility  Z00.00 Hepatitis C antibody     Lipid panel reflex to direct LDL Fasting   2. Acquired hypothyroidism  E03.9 TSH with free T4 reflex   3. Adjustment disorder with anxious mood  F43.22 MENTAL HEALTH REFERRAL  - Adult; Psychiatry; Psychiatry; [facility]: Collaborative Care Psychiatry Service/Bridge to Long-Term Psychiatry as indicated ([phone]); Yes; Several Medications tried and failed; Yes; We will contact you to schedule the a...   4. Family history of diabetes mellitus  Z83.3 Hemoglobin A1c     (Z00.00) Routine general medical examination at a health care facility  (primary encounter diagnosis)  Comment: will do screening labs , she will come back for the lipids check fasting   Plan: Hepatitis C antibody, Lipid panel reflex to         direct LDL Fasting        As above     (E03.9) Acquired hypothyroidism  Comment: is due for the TSH recheck , currently is on the 125 mcg of the thyroid medication   Plan: TSH with free T4 reflex        Will check     (F43.22) Adjustment disorder with anxious mood  Comment: we discussed increasing the dose of the Paxil to 20 mg since she is having more anxiety , also discussed seeing a psychiatrist if the increase does not work, she is seeing a therapist already , referral given   Plan: MENTAL  "HEALTH REFERRAL  - Adult; Psychiatry;         Psychiatry; [facility]: Collaborative Care Psychiatry         Service/Bridge to Long-Term Psychiatry as         indicated ([phone]); Yes; Several         Medications tried and failed; Yes; We will         contact you to schedule the a...        Would recommend a f/u in a month sooner f any concerns     (Z83.3) Family history of diabetes mellitus  Comment: will check hgb a1 c   Plan: Hemoglobin A1c              Patient has been advised of split billing requirements and indicates understanding: Yes  COUNSELING:  Reviewed preventive health counseling, as reflected in patient instructions       Regular exercise       Healthy diet/nutrition       Vision screening       Osteoporosis prevention/bone health    Estimated body mass index is 35.17 kg/m  as calculated from the following:    Height as of this encounter: 1.626 m (5' 4\").    Weight as of this encounter: 92.9 kg (204 lb 14.4 oz).        She reports that she quit smoking about 31 years ago. Her smoking use included cigarettes. She smoked 0.00 packs per day for 0.00 years. She has never used smokeless tobacco.      Counseling Resources:  ATP IV Guidelines  Pooled Cohorts Equation Calculator  Breast Cancer Risk Calculator  BRCA-Related Cancer Risk Assessment: FHS-7 Tool  FRAX Risk Assessment  ICSI Preventive Guidelines  Dietary Guidelines for Americans, 2010  [employer] MyPlate  ASA Prophylaxis  Lung CA Screening    Vanessa Ladd MD  [facility]  "

## 2021-06-23 ENCOUNTER — OFFICE VISIT (OUTPATIENT)
Dept: FAMILY MEDICINE | Facility: CLINIC | Age: 56
End: 2021-06-23
Payer: COMMERCIAL

## 2021-06-23 VITALS
OXYGEN SATURATION: 98 % | WEIGHT: 204.9 LBS | HEIGHT: 64 IN | SYSTOLIC BLOOD PRESSURE: 122 MMHG | HEART RATE: 70 BPM | DIASTOLIC BLOOD PRESSURE: 77 MMHG | BODY MASS INDEX: 34.98 KG/M2

## 2021-06-23 DIAGNOSIS — Z00.00 ROUTINE GENERAL MEDICAL EXAMINATION AT A HEALTH CARE FACILITY: Primary | ICD-10-CM

## 2021-06-23 DIAGNOSIS — E03.9 ACQUIRED HYPOTHYROIDISM: ICD-10-CM

## 2021-06-23 DIAGNOSIS — F43.22 ADJUSTMENT DISORDER WITH ANXIOUS MOOD: ICD-10-CM

## 2021-06-23 DIAGNOSIS — Z83.3 FAMILY HISTORY OF DIABETES MELLITUS: ICD-10-CM

## 2021-06-23 LAB — HBA1C MFR BLD: 5.7 % (ref 0–5.6)

## 2021-06-23 PROCEDURE — 99396 PREV VISIT EST AGE 40-64: CPT | Performed by: FAMILY MEDICINE

## 2021-06-23 PROCEDURE — 84443 ASSAY THYROID STIM HORMONE: CPT | Performed by: FAMILY MEDICINE

## 2021-06-23 PROCEDURE — 83036 HEMOGLOBIN GLYCOSYLATED A1C: CPT | Performed by: FAMILY MEDICINE

## 2021-06-23 PROCEDURE — 86803 HEPATITIS C AB TEST: CPT | Performed by: FAMILY MEDICINE

## 2021-06-23 PROCEDURE — 99214 OFFICE O/P EST MOD 30 MIN: CPT | Mod: 25 | Performed by: FAMILY MEDICINE

## 2021-06-23 PROCEDURE — 36415 COLL VENOUS BLD VENIPUNCTURE: CPT | Performed by: FAMILY MEDICINE

## 2021-06-23 ASSESSMENT — MIFFLIN-ST. JEOR: SCORE: 1514.42

## 2021-06-24 LAB
HCV AB SERPL QL IA: NONREACTIVE
TSH SERPL DL<=0.005 MIU/L-ACNC: 0.78 MU/L (ref 0.4–4)

## 2021-06-27 PROBLEM — Z83.3 FAMILY HISTORY OF DIABETES MELLITUS: Status: ACTIVE | Noted: 2021-06-27

## 2021-06-27 PROBLEM — F43.22 ADJUSTMENT DISORDER WITH ANXIOUS MOOD: Status: ACTIVE | Noted: 2021-06-27

## 2021-07-01 DIAGNOSIS — Z00.00 ROUTINE GENERAL MEDICAL EXAMINATION AT A HEALTH CARE FACILITY: ICD-10-CM

## 2021-07-01 PROCEDURE — 80061 LIPID PANEL: CPT | Performed by: FAMILY MEDICINE

## 2021-07-01 PROCEDURE — 36415 COLL VENOUS BLD VENIPUNCTURE: CPT | Performed by: FAMILY MEDICINE

## 2021-07-02 ENCOUNTER — NURSE TRIAGE (OUTPATIENT)
Dept: NURSING | Facility: CLINIC | Age: 56
End: 2021-07-02

## 2021-07-02 LAB
CHOLEST SERPL-MCNC: 266 MG/DL
HDLC SERPL-MCNC: 41 MG/DL
LDLC SERPL CALC-MCNC: 199 MG/DL
NONHDLC SERPL-MCNC: 225 MG/DL
TRIGL SERPL-MCNC: 128 MG/DL

## 2021-07-02 NOTE — TELEPHONE ENCOUNTER
Marbella calls due to on and off stabbing pain on her left eye.  Happens about 2-3 times per month  Rated 8/10 when present, lasts about 5-10 seconds  At times has clear/whtish eye discharge.  Reports photosensitivity    No eye pain or discharge at the time of call.  Had eyelid reduction in December 2020.    Per protocol, advised clinic visit today, pt states she does not think it is urgent so would prefer to be seen sometime later. Care advice reviewed. Patient verbalizes understanding, warm transferred to eye clinic scheduling. Advised to call back with further questions/concerns.     Enma Ramirez RN/[facility] Nurse Advisor          Reason for Disposition    Looking at light causes severe pain (i.e., photophobia)    Additional Information    Negative: Severe eye pain    Negative: Complete loss of vision in one or both eyes    Negative: Eyelids are very swollen (shut or almost) and fever    Negative: Eyelid (outer) is very red and fever    Negative: Foreign body sensation ('feels like something is in there') and irrigation didn't help    Negative: Vomiting    Negative: Ulcer or sore seen on the cornea (clear center part of the eye)    Negative: Recent eye surgery and increasing eye pain    Negative: Blurred vision and new or worsening    Negative: Patient sounds very sick or weak to the triager    Negative: Eye pain/discomfort and more than mild    Negative: Eyelids are very swollen (shut or almost) and no fever    Negative: Painful rash near eye and multiple small blisters grouped together    Negative: Pain followed bright light exposure from welding    Protocols used: EYE PAIN-A-[place]

## 2021-07-05 ENCOUNTER — MYC MEDICAL ADVICE (OUTPATIENT)
Dept: FAMILY MEDICINE | Facility: CLINIC | Age: 56
End: 2021-07-05

## 2021-07-05 NOTE — TELEPHONE ENCOUNTER
I would recommend an appointment to discuss , it can be virtual if she does not want to come   Thanks

## 2021-07-06 NOTE — PROGRESS NOTES
"Marbella is a 55 year old who is being evaluated via a billable video visit.      How would you like to obtain your AVS? Freda  If the video visit is dropped, the invitation should be resent by: Freda or else Text to cell phone: [phone]  Will anyone else be joining your video visit? No    Video Start Time: 1:57 pm     Assessment & Plan     Hyperlipidemia LDL goal <160  We went over her lipids results , her LDl is very high at 199 and was in the 144 last year   We went over her cardiovascular risk and we discussed pros and cons with statin medication and indications for treatment , will start at 10 mg of Lipitor for 6 weeks and have her come back for fasting lipids and lfts   - Hepatic panel (Albumin, ALT, AST, Bili, Alk Phos, TP); Future  - atorvastatin (LIPITOR) 10 MG tablet; Take 1 tablet (10 mg) by mouth daily  - Lipid panel reflex to direct LDL Fasting; Future      BMI:   Estimated body mass index is 35.17 kg/m  as calculated from the following:    Height as of 6/23/21: 1.626 m (5' 4\").    Weight as of 6/23/21: 92.9 kg (204 lb 14.4 oz).     RTC if no improving or worsening.  Follow up in 6 to 8 weeks sooner if any concerns   Pt is aware  and comfortable with the current plan.      Vanessa Ladd MD  [facility]   Marbella is a 55 year old who presents for the following health issues     HPI     Pt here to discuss 7/1/21 lipids lab results and discuss tx plan for high cholesterol.           Review of Systems   Constitutional, HEENT, cardiovascular, pulmonary, GI, , musculoskeletal, neuro, skin, endocrine and psych systems are negative, except as otherwise noted.      Objective           Vitals:  No vitals were obtained today due to virtual visit.    Physical Exam   GENERAL: Healthy, alert and no distress  EYES: Eyes grossly normal to inspection.  No discharge or erythema, or obvious scleral/conjunctival abnormalities.  RESP: No audible wheeze, cough, or visible cyanosis.  No " visible retractions or increased work of breathing.    SKIN: Visible skin clear. No significant rash, abnormal pigmentation or lesions.  NEURO: Cranial nerves grossly intact.  Mentation and speech appropriate for age.  PSYCH: Mentation appears normal, affect normal/bright, judgement and insight intact, normal speech and appearance well-groomed.    No results found for any visits on 07/07/21.            Video-Visit Details    Type of service:  Video Visit    Video End Time:2:09 pm     Originating Location (pt. Location): Home    Distant Location (provider location):  [facility]     Platform used for Video Visit: [employer]Around

## 2021-07-07 ENCOUNTER — VIRTUAL VISIT (OUTPATIENT)
Dept: FAMILY MEDICINE | Facility: CLINIC | Age: 56
End: 2021-07-07
Payer: COMMERCIAL

## 2021-07-07 DIAGNOSIS — E78.5 HYPERLIPIDEMIA LDL GOAL <160: Primary | ICD-10-CM

## 2021-07-07 PROCEDURE — 99214 OFFICE O/P EST MOD 30 MIN: CPT | Mod: 95 | Performed by: FAMILY MEDICINE

## 2021-07-07 RX ORDER — ATORVASTATIN CALCIUM 10 MG/1
10 TABLET, FILM COATED ORAL DAILY
Qty: 90 TABLET | Refills: 0 | Status: SHIPPED | OUTPATIENT
Start: 2021-07-07 | End: 2021-10-07

## 2021-07-09 ENCOUNTER — OFFICE VISIT (OUTPATIENT)
Dept: OPTOMETRY | Facility: CLINIC | Age: 56
End: 2021-07-09
Payer: COMMERCIAL

## 2021-07-09 DIAGNOSIS — Z98.890 STATUS POST BLEPHAROPLASTY OF BOTH EYES: ICD-10-CM

## 2021-07-09 DIAGNOSIS — H52.4 PRESBYOPIA: ICD-10-CM

## 2021-07-09 DIAGNOSIS — Z01.01 ENCOUNTER FOR EXAMINATION OF EYES AND VISION WITH ABNORMAL FINDINGS: Primary | ICD-10-CM

## 2021-07-09 DIAGNOSIS — H52.223 REGULAR ASTIGMATISM OF BOTH EYES: ICD-10-CM

## 2021-07-09 PROCEDURE — 92004 COMPRE OPH EXAM NEW PT 1/>: CPT | Performed by: OPTOMETRIST

## 2021-07-09 ASSESSMENT — VISUAL ACUITY
METHOD: SNELLEN - LINEAR
OD_CC: 20/20
OS_CC: 20/20
OD_SC: 20/80
OS_SC: 20/120
OD_SC: 20/20
CORRECTION_TYPE: GLASSES
OS_SC: 20/20

## 2021-07-09 ASSESSMENT — REFRACTION_MANIFEST
OD_CYLINDER: +0.50
OD_SPHERE: +0.50
OS_AXIS: 034
METHOD_AUTOREFRACTION: 1
OS_ADD: +2.25
OS_CYLINDER: +0.50
OS_CYLINDER: +0.75
OS_SPHERE: PLANO
OD_ADD: +2.25
OD_AXIS: 133
OD_CYLINDER: +0.75
OD_AXIS: 128
OS_SPHERE: +0.25
OS_AXIS: 032
OD_SPHERE: PLANO

## 2021-07-09 ASSESSMENT — SLIT LAMP EXAM - LIDS: COMMENTS: NORMAL, S/P BLEPHAROPLASTY

## 2021-07-09 ASSESSMENT — REFRACTION_WEARINGRX
SPECS_TYPE: OTC CHEATERS
OD_CYLINDER: SPHERE
OD_SPHERE: +2.00
OS_CYLINDER: SPHERE
OS_SPHERE: +2.00

## 2021-07-09 ASSESSMENT — TONOMETRY
IOP_METHOD: APPLANATION
OD_IOP_MMHG: 14
OS_IOP_MMHG: 14

## 2021-07-09 ASSESSMENT — EXTERNAL EXAM - LEFT EYE: OS_EXAM: NORMAL

## 2021-07-09 ASSESSMENT — CUP TO DISC RATIO
OD_RATIO: 0.35
OS_RATIO: 0.3

## 2021-07-09 ASSESSMENT — CONF VISUAL FIELD
OS_NORMAL: 1
METHOD: COUNTING FINGERS
OD_NORMAL: 1

## 2021-07-09 ASSESSMENT — EXTERNAL EXAM - RIGHT EYE: OD_EXAM: NORMAL

## 2021-07-09 NOTE — LETTER
"    7/9/2021         RE: Marbella Hendrix  [address]  [facility] [address]        Dear Colleague,    Thank you for referring your patient, Marbella Hendrix, to the [facility]. Please see a copy of my visit note below.    Chief Complaint   Patient presents with     Annual Eye Exam         Last Eye Exam: 2+ years  Dilated Previously: Yes, side effects of dilation explained today    What are you currently using to see?  readers       Distance Vision Acuity: Satisfied with vision    Near Vision Acuity: Satisfied with vision while reading  with readers    Eye Comfort: Pain in left eye - burning/stabbing pain in inner corner intermittently. Happens a few times per month, goes away after she holds her eye shut for 10+ seconds.  Do you use eye drops? : No    Leticia Dempsey        Medical, surgical and family histories reviewed and updated 7/9/2021.       OBJECTIVE: See Ophthalmology exam    ASSESSMENT:    ICD-10-CM    1. Encounter for examination of eyes and vision with abnormal findings  Z01.01 EYE EXAM (SIMPLE-NONBILLABLE)   2. Status post blepharoplasty of both eyes  Z98.890 EYE EXAM (SIMPLE-NONBILLABLE)   3. Regular astigmatism of both eyes  H52.223 EYE EXAM (SIMPLE-NONBILLABLE)     Refraction   4. Presbyopia  H52.4 EYE EXAM (SIMPLE-NONBILLABLE)     Refraction      PLAN:     Patient Instructions   Occasional brief eye pain may be related to dryness. There are over the counter drops that work well and may be used up to 4x daily (Systane , Refresh, Thera Tears)- avoid \"get the red out\" drops.     No prescription glasses necessary at this time.   Continue use of over the counter reading glasses as needed.     Return in 1 year for a comprehensive eye exam, or sooner if needed.     The effects of the dilating drops last for 4- 6 hours.  You will be more sensitive to light and vision will be blurry up close.  Mydriatic sunglasses were given if needed.    Dion Sanches, OD  [facility] " [facility] [place]  [address]  [place]  [address]    [phone]             Again, thank you for allowing me to participate in the care of your patient.        Sincerely,        Dion Sanches, OD

## 2021-07-09 NOTE — PROGRESS NOTES
"Chief Complaint   Patient presents with     Annual Eye Exam         Last Eye Exam: 2+ years  Dilated Previously: Yes, side effects of dilation explained today    What are you currently using to see?  readers       Distance Vision Acuity: Satisfied with vision    Near Vision Acuity: Satisfied with vision while reading  with readers    Eye Comfort: Pain in left eye - burning/stabbing pain in inner corner intermittently. Happens a few times per month, goes away after she holds her eye shut for 10+ seconds.  Do you use eye drops? : No    Leticia Dempsey        Medical, surgical and family histories reviewed and updated 7/9/2021.       OBJECTIVE: See Ophthalmology exam    ASSESSMENT:    ICD-10-CM    1. Encounter for examination of eyes and vision with abnormal findings  Z01.01 EYE EXAM (SIMPLE-NONBILLABLE)   2. Status post blepharoplasty of both eyes  Z98.890 EYE EXAM (SIMPLE-NONBILLABLE)   3. Regular astigmatism of both eyes  H52.223 EYE EXAM (SIMPLE-NONBILLABLE)     Refraction   4. Presbyopia  H52.4 EYE EXAM (SIMPLE-NONBILLABLE)     Refraction      PLAN:     Patient Instructions   Occasional brief eye pain may be related to dryness. There are over the counter drops that work well and may be used up to 4x daily (Systane , Refresh, Thera Tears)- avoid \"get the red out\" drops.     No prescription glasses necessary at this time.   Continue use of over the counter reading glasses as needed.     Return in 1 year for a comprehensive eye exam, or sooner if needed.     The effects of the dilating drops last for 4- 6 hours.  You will be more sensitive to light and vision will be blurry up close.  Mydriatic sunglasses were given if needed.    Dion Sanches, OD  [address] [place]  [address]    [phone]         "

## 2021-07-09 NOTE — PATIENT INSTRUCTIONS
"Occasional brief eye pain may be related to dryness. There are over the counter drops that work well and may be used up to 4x daily (Systane , Refresh, Thera Tears)- avoid \"get the red out\" drops.     No prescription glasses necessary at this time.   Continue use of over the counter reading glasses as needed.     Return in 1 year for a comprehensive eye exam, or sooner if needed.     The effects of the dilating drops last for 4- 6 hours.  You will be more sensitive to light and vision will be blurry up close.  Mydriatic sunglasses were given if needed.    Dion Sanches, OD  [address]  [place]  [address] [phone]    "

## 2021-07-12 ENCOUNTER — MYC MEDICAL ADVICE (OUTPATIENT)
Dept: FAMILY MEDICINE | Facility: CLINIC | Age: 56
End: 2021-07-12

## 2021-07-12 DIAGNOSIS — F40.243 FLYING PHOBIA: Primary | ICD-10-CM

## 2021-07-12 RX ORDER — LORAZEPAM 0.5 MG/1
TABLET ORAL
Qty: 7 TABLET | Refills: 0 | Status: SHIPPED | OUTPATIENT
Start: 2021-07-12 | End: 2022-04-15

## 2021-08-01 ENCOUNTER — TRANSFERRED RECORDS (OUTPATIENT)
Dept: MULTI SPECIALTY CLINIC | Facility: CLINIC | Age: 56
End: 2021-08-01
Payer: COMMERCIAL

## 2021-08-01 LAB — PAP SMEAR - HIM PATIENT REPORTED: NEGATIVE

## 2021-09-12 ENCOUNTER — HEALTH MAINTENANCE LETTER (OUTPATIENT)
Age: 56
End: 2021-09-12

## 2021-09-14 ENCOUNTER — TELEPHONE (OUTPATIENT)
Dept: FAMILY MEDICINE | Facility: CLINIC | Age: 56
End: 2021-09-14

## 2021-09-14 DIAGNOSIS — Z13.820 SCREENING FOR OSTEOPOROSIS: Primary | ICD-10-CM

## 2021-09-14 DIAGNOSIS — Z23 ENCOUNTER FOR IMMUNIZATION: ICD-10-CM

## 2021-09-14 NOTE — TELEPHONE ENCOUNTER
I placed the orders for both the DEXA scan and flu shot but she would need a follow up virtual visit after the DEXA scan to discuss results   Can you let her know this ?  Thanks

## 2021-09-28 ENCOUNTER — MYC MEDICAL ADVICE (OUTPATIENT)
Dept: FAMILY MEDICINE | Facility: CLINIC | Age: 56
End: 2021-09-28

## 2021-09-28 ENCOUNTER — HOSPITAL ENCOUNTER (OUTPATIENT)
Dept: BONE DENSITY | Facility: CLINIC | Age: 56
Discharge: HOME | End: 2021-09-28
Attending: FAMILY MEDICINE | Admitting: FAMILY MEDICINE
Payer: COMMERCIAL

## 2021-09-28 DIAGNOSIS — Z13.820 SCREENING FOR OSTEOPOROSIS: ICD-10-CM

## 2021-09-28 PROCEDURE — 77080 DXA BONE DENSITY AXIAL: CPT

## 2021-10-05 ENCOUNTER — OFFICE VISIT (OUTPATIENT)
Dept: FAMILY MEDICINE | Facility: CLINIC | Age: 56
End: 2021-10-05
Payer: COMMERCIAL

## 2021-10-05 VITALS
TEMPERATURE: 97.3 F | DIASTOLIC BLOOD PRESSURE: 80 MMHG | WEIGHT: 203.2 LBS | RESPIRATION RATE: 22 BRPM | HEART RATE: 77 BPM | BODY MASS INDEX: 34.69 KG/M2 | SYSTOLIC BLOOD PRESSURE: 124 MMHG | OXYGEN SATURATION: 99 % | HEIGHT: 64 IN

## 2021-10-05 DIAGNOSIS — Z23 ENCOUNTER FOR IMMUNIZATION: ICD-10-CM

## 2021-10-05 DIAGNOSIS — E78.00 HYPERCHOLESTEROLEMIA: Primary | ICD-10-CM

## 2021-10-05 PROCEDURE — 99213 OFFICE O/P EST LOW 20 MIN: CPT | Mod: 25 | Performed by: FAMILY MEDICINE

## 2021-10-05 PROCEDURE — 90471 IMMUNIZATION ADMIN: CPT | Performed by: FAMILY MEDICINE

## 2021-10-05 PROCEDURE — 90682 RIV4 VACC RECOMBINANT DNA IM: CPT | Performed by: FAMILY MEDICINE

## 2021-10-05 ASSESSMENT — MIFFLIN-ST. JEOR: SCORE: 1506.71

## 2021-10-05 NOTE — NURSING NOTE
Prior to immunization administration, verified patients identity using patient s name and date of birth. Please see Immunization Activity for additional information.     Screening Questionnaire for Adult Immunization    Are you sick today?   No   Do you have allergies to medications, food, a vaccine component or latex?   No   Have you ever had a serious reaction after receiving a vaccination?   No   Do you have a long-term health problem with heart, lung, kidney, or metabolic disease (e.g., diabetes), asthma, a blood disorder, no spleen, complement component deficiency, a cochlear implant, or a spinal fluid leak?  Are you on long-term aspirin therapy?   No   Do you have cancer, leukemia, HIV/AIDS, or any other immune system problem?   No   Do you have a parent, brother, or sister with an immune system problem?   No   In the past 3 months, have you taken medications that affect  your immune system, such as prednisone, other steroids, or anticancer drugs; drugs for the treatment of rheumatoid arthritis, Crohn s disease, or psoriasis; or have you had radiation treatments?   No   Have you had a seizure, or a brain or other nervous system problem?   No   During the past year, have you received a transfusion of blood or blood    products, or been given immune (gamma) globulin or antiviral drug?   No   For women: Are you pregnant or is there a chance you could become       pregnant during the next month?   No   Have you received any vaccinations in the past 4 weeks?   No     Immunization questionnaire answers were all negative.        Per orders of Dr. Ladd, injection of Flublok given by Janee Fernando MA. Patient instructed to remain in clinic for 15 minutes afterwards, and to report any adverse reaction to me immediately.       Screening performed by Janee Fernando MA on 10/5/2021 at 2:19 PM.  Janee Fernando MA on 10/5/2021 at 2:19 PM

## 2021-10-05 NOTE — PROGRESS NOTES
"    Assessment & Plan     Hypercholesterolemia  She is her to recheck her lipids,  she has been on the lipitor 10mg for at least 6 weeks now and fasting today   - Lipid panel reflex to direct LDL Fasting; Future  We also went over her DEXA scan results which were normal but she can benefit from taking daily calcium and Vit D     Encounter for immunization  Got her flu shot   - INFLUENZA QUAD, PF (RIV4) (FLUBLOK)      BMI:   Estimated body mass index is 34.88 kg/m  as calculated from the following:    Height as of this encounter: 1.626 m (5' 4\").    Weight as of this encounter: 92.2 kg (203 lb 3.2 oz).     RTC if no improving or worsening.  Pt is aware  and comfortable with the current plan.      Vanessa Ladd MD  [facility]   Marbella is a 55 year old who presents for the following health issues     HPI     Pt here to discuss 9/28/21 DEXA results and for lipids check.  Hypercholesterolemia , she started the Lipitor 10 mg daily a couple of months ago and doing well on it , no side effects   Is fasting for recheck   Wants to get her flu shot today as well   Discuss DEXA scan     Review of Systems   Constitutional, HEENT, cardiovascular, pulmonary, GI, , musculoskeletal, neuro, skin, endocrine and psych systems are negative, except as otherwise noted.      Objective    LMP 08/28/2015 (Approximate)   There is no height or weight on file to calculate BMI.  Physical Exam   GENERAL: healthy, alert and no distress  EYES: Eyes grossly normal to inspection, PERRL and conjunctivae and sclerae normal  NECK: no adenopathy, no asymmetry, masses, or scars and thyroid normal to palpation  RESP: lungs clear to auscultation - no rales, rhonchi or wheezes  MS: no gross musculoskeletal defects noted, no edema  NEURO: Normal strength and tone, mentation intact and speech normal    No results found for any visits on 10/05/21.            "

## 2021-10-08 ENCOUNTER — MYC MEDICAL ADVICE (OUTPATIENT)
Dept: FAMILY MEDICINE | Facility: CLINIC | Age: 56
End: 2021-10-08

## 2021-10-08 ENCOUNTER — LAB (OUTPATIENT)
Dept: LAB | Facility: CLINIC | Age: 56
End: 2021-10-08
Payer: COMMERCIAL

## 2021-10-08 DIAGNOSIS — E78.00 HYPERCHOLESTEROLEMIA: ICD-10-CM

## 2021-10-08 DIAGNOSIS — E78.5 HYPERLIPIDEMIA LDL GOAL <160: ICD-10-CM

## 2021-10-08 DIAGNOSIS — Z11.4 SCREENING FOR HIV (HUMAN IMMUNODEFICIENCY VIRUS): Primary | ICD-10-CM

## 2021-10-08 LAB — HIV 1+2 AB+HIV1 P24 AG SERPL QL IA: NONREACTIVE

## 2021-10-08 PROCEDURE — 80061 LIPID PANEL: CPT

## 2021-10-08 PROCEDURE — 80076 HEPATIC FUNCTION PANEL: CPT

## 2021-10-08 PROCEDURE — 87389 HIV-1 AG W/HIV-1&-2 AB AG IA: CPT

## 2021-10-08 PROCEDURE — 36415 COLL VENOUS BLD VENIPUNCTURE: CPT

## 2021-10-09 LAB
ALBUMIN SERPL-MCNC: 4.1 G/DL (ref 3.4–5)
ALP SERPL-CCNC: 57 U/L (ref 40–150)
ALT SERPL W P-5'-P-CCNC: 31 U/L (ref 0–50)
AST SERPL W P-5'-P-CCNC: 24 U/L (ref 0–45)
BILIRUB DIRECT SERPL-MCNC: 0.1 MG/DL (ref 0–0.2)
BILIRUB SERPL-MCNC: 0.5 MG/DL (ref 0.2–1.3)
CHOLEST SERPL-MCNC: 178 MG/DL
FASTING STATUS PATIENT QL REPORTED: YES
HDLC SERPL-MCNC: 37 MG/DL
LDLC SERPL CALC-MCNC: 115 MG/DL
NONHDLC SERPL-MCNC: 141 MG/DL
PROT SERPL-MCNC: 7.2 G/DL (ref 6.8–8.8)
TRIGL SERPL-MCNC: 130 MG/DL

## 2021-10-21 ENCOUNTER — MYC MEDICAL ADVICE (OUTPATIENT)
Dept: FAMILY MEDICINE | Facility: CLINIC | Age: 56
End: 2021-10-21

## 2021-10-21 DIAGNOSIS — E78.5 HYPERLIPIDEMIA LDL GOAL <130: Primary | ICD-10-CM

## 2021-10-21 RX ORDER — ATORVASTATIN CALCIUM 20 MG/1
20 TABLET, FILM COATED ORAL DAILY
Qty: 90 TABLET | Refills: 1 | Status: SHIPPED | OUTPATIENT
Start: 2021-10-21 | End: 2022-06-14

## 2021-11-03 ENCOUNTER — THERAPY VISIT (OUTPATIENT)
Dept: OCCUPATIONAL THERAPY | Facility: CLINIC | Age: 56
End: 2021-11-03
Payer: COMMERCIAL

## 2021-11-03 DIAGNOSIS — M19.032 CMC DJD(CARPOMETACARPAL DEGENERATIVE JOINT DISEASE), LOCALIZED PRIMARY, LEFT: Primary | ICD-10-CM

## 2021-11-03 PROCEDURE — 97110 THERAPEUTIC EXERCISES: CPT | Mod: GO | Performed by: OCCUPATIONAL THERAPIST

## 2021-11-03 PROCEDURE — 97026 INFRARED THERAPY: CPT | Mod: GO | Performed by: OCCUPATIONAL THERAPIST

## 2021-11-03 PROCEDURE — 97165 OT EVAL LOW COMPLEX 30 MIN: CPT | Mod: GO | Performed by: OCCUPATIONAL THERAPIST

## 2021-11-03 PROCEDURE — 97140 MANUAL THERAPY 1/> REGIONS: CPT | Mod: GO | Performed by: OCCUPATIONAL THERAPIST

## 2021-11-03 NOTE — PROGRESS NOTES
Hand Therapy Initial Evaluation    Current Date:  11/3/2021    Diagnosis: Left CMC/hand pain  DOI: 2010    Subjective:  Marbella Hendrix is a 56 year old female.    Patient reports symptoms of the left thumb which occurred due to unknown. Since onset symptoms are Gradually getting worse.      History Reported by Patient  Reason for Visit:: Painful L hand at wrist and thumb joint  When problem began:: 7/15/2020  How problem occurred:: organically!  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    Previous treatment: Hand Therapy 2020  Barriers include:none  Mobility: No difficulty  Transportation: drives  Currently working in normal job without restrictions  Leisure activities/hobbies: read; lift weights; yoga; pilates  Other: cooking; therapy for hip    Functional Outcome Measure:   Upper Extremity Functional Index Score:  SCORE:   Column Totals: /80: 40   (A lower score indicates greater disability.)    Objective:  Pain Level (Scale 0-10)   11/3/2021   At Rest 6   With Use 6-10     Pain Description  Date 2020   Location thumb   Pain Quality Aching and Shooting    Frequency constant     Pain is worst  daytime   Exacerbated by  use   Relieved by Comfort cool   Progression worsening     ROM  Thumb 11/3/2021   AROM  (PROM) Left   MP /50+   IP /90+   RABD 50+   PABD 53+   Retropulsion (cm) 1++   Kapandji Opposition Scale (0-10/10) 10++     Thumb Observation/Appearance   - none  + mild    ++ moderate    +++ severe     11/3/2021   Shoulder deformity present over CMC R: -  L: -   Edema over the CMC joint R: -  L: ++   Noted collapse of MP into hyperextension during pinch R: +  L: +      Provocative Tests  Pain Report:  - none    + mild    ++ moderate    +++ severe     11/3/2021   CMC Grind test R: -  L: -   Crepitus present R: -  L: -   CMC Adduction Stress Test R: -  L: -   CMC Extension Stress Test R: -  L: -   Finkelstein's R: -  L: -       Strength   (Measured in pounds)  Pain Report: - none  + mild    ++ moderate    +++ severe   NT    Palpation   Pain Report:  - none    + mild    ++ moderate    +++ severe    11/3/2021   CMC Joint Line   L: +   CMC AP Joint Laxity   L: -   Thenar [place]   L: ++   Web Space   L: ++   1st DC   L: -   Radial Styloid   L: -   FCR   L:-     Assessment:  Patient presents with symptoms consistent with diagnosis of left thumb CMC thumb arthritis, with conservative intervention.    Patient's limitations or Problem List includes:  Pain, Decreased ROM/motion, Weakness, Decreased , Decreased pinch and Tightness in musculature of the left thumb which interferes with the patient's ability to perform Self Care Tasks (dressing, eating, bathing, hygiene/toileting), Work Tasks, Sleep Patterns, Recreational Activities, Household Chores and Driving  as compared to previous level of function.    Rehab Potential:  Excellent - Return to full activity, no limitations    Patient will benefit from skilled Occupational Therapy to increase ROM, overall strength,  strength, pinch strength and stability of thumb and decrease pain and edema to return to previous  activity level and resume normal daily tasks and to reach their rehab potential.    Barriers to Learning:  No barrier    Communication Issues:  Patient appears to be able to clearly communicate and understand verbal and written communication and follow directions correctly.    Chart Review: Simple history review with patient    Identified Performance Deficits: bathing/showering, dressing, hygiene and grooming, care of pets, child rearing, driving and community mobility, health management and maintenance, home establishment and management, meal preparation and cleanup, shopping, sleep and work    Assessment of Occupational Performance:  5 or more Performance Deficits    Clinical Decision Making (Complexity): Low complexity    Treatment Explanation:  The following has been discussed with the patient:    RX ordered/plan of care  Anticipated outcomes  Possible risks and side effects    Plan:  Frequency:  1 X week, once daily  Duration:  for 6-8 visits    Treatment Plan:    Modalities:    Laser Light   Therapeutic Exercise:    AROM, Isometrics and Stabilization  Therapeutic Activities:   Functional activities   Neuromuscular re-ed:   Nerve Gliding, Posture, Kinesiotaping and Stabilization  Manual Techniques:   Joint mobilization and Myofascial release  Orthotic Fabrication:    Static orthosis  Self Care:    Self Care Tasks and Ergonomic Considerations    Discharge Plan:  Achieve all LTG  [place] in home treatment program.  Reach maximal therapeutic benefit.    Home Program:  Exercise Name: [place] Massage to Thumb - Reps: 1 - Sessions: 1, Notes: Hold on tender spot for 30-60 seconds.  Move to the next spot. Search for tender areas in the entire thumb pad.  Exercise Name: Thumb Stabilization Web Space Release Method 1 with Clip - Reps: 1 - Sessions: 1, Notes: 2-3 minutes  Exercise Name: Thumb Stabilization, Repositioning, Method 1(on chest) - Reps: 1 - Sessions: 1-2, EMR Notes: no pain  Exercise Name: Thumb  Stabilization C with ball - Reps: 5-10 - Sessions: 1, Notes: Keep index and thumb level.  Exercise Name: Thumb Stabilization Strengthening Isometric C - Reps: 5-10 - Sessions: 1, Notes: Contract isometrically   Exercise Name: Thumb Stabilization 1st Dorsal Interosseous  Exercise Name: Ball Massage to Extensors  Exercise Name: Self Elbow Mobilization, Trigger Point Massage Over Radial Head  Exercise Name: Pectoralis Major Trigger Release, Sets: 1 - Sessions: 1x every other day  Exercise Name: Pectoralis Minor Trigger Release, Sets: 1 - Sessions: 1x every other day, Notes: Also roll to the side and do side muscles.  Exercise Name: Latissimus/Teres/Serratus/Subscapularis Trigger Release, Sets: 1 - Sessions: 1x every other day, Notes: Make sure you go into tricep a bit too.  Exercise Name: Rolling upper back, Sets: 1 - Sessions: 1x every other day  Exercise Name: Spinal extensions, Sets: 1 - Sessions: 1x every other day  Exercise Name: Foam Roller Stretch: Pectoralis Major, Sets: 1 - Sessions: 1x every other day  Exercise Name: Foam Roller stretch: Pectoralis Minor  Exercise Name: Thumb Stabilization Place and Hold Pinch Muscle Re-Education    Next Visit:  Advance thumb stabilization program  Place and hold pinch  Incorporate joint protection into daily functional activities  Adaptive equipment as needed

## 2021-11-08 NOTE — PROGRESS NOTES
SOAP note objective information for 11/10/2021.    Please refer to the daily flowsheet for treatment today, total treatment time and time spent performing 1:1 timed codes.         Objective:  Pain Level (Scale 0-10)   11/3/2021 11/10/2021   At Rest 6 5   With Use 6-10 5-10     Pain Description  Date 11/18/2020 11/10/2021   Location thumb thumb   Pain Quality Aching and Shooting    Frequency constant      Pain is worst  daytime    Exacerbated by  use    Relieved by Comfort cool    Progression worsening improving     ROM  Thumb 11/3/2021   AROM  (PROM) Left   MP /50+   IP /90+   RABD 50+   PABD 53+   Retropulsion (cm) 1++   Kapandji Opposition Scale (0-10/10) 10++     Thumb Observation/Appearance   - none  + mild    ++ moderate    +++ severe     11/3/2021 11/10/2021   Shoulder deformity present over CMC R: -  L: -    Edema over the CMC joint R: -  L: ++ R: -  L: +   Noted collapse of MP into hyperextension during pinch R: +  L: +       Provocative Tests  Pain Report:  - none    + mild    ++ moderate    +++ severe     11/3/2021   CMC Grind test R: -  L: -   Crepitus present R: -  L: -   CMC Adduction Stress Test R: -  L: -   CMC Extension Stress Test R: -  L: -   Finkelstein's R: -  L: -       Strength   (Measured in pounds)  Pain Report: - none  + mild    ++ moderate    +++ severe   NT    Palpation   Pain Report:  - none    + mild    ++ moderate    +++ severe    11/3/2021 11/10/2021   CMC Joint Line   L: +    CMC AP Joint Laxity   L: -    Thenar [place]   L: ++   L: ++   Web Space   L: ++   L: ++   1st DC   L: -    Radial Styloid   L: -    FCR   L:-        Home Program:  Exercise Name: [place] Massage to Thumb - Reps: 1 - Sessions: 1, Notes: Hold on tender spot for 30-60 seconds.  Move to the next spot. Search for tender areas in the entire thumb pad.  Exercise Name: Thumb Stabilization Web Space Release Method 1 with Clip - Reps: 1 - Sessions: 1, Notes: 2-3 minutes  Exercise Name: Thumb Stabilization, Repositioning,  Method 1(on chest) - Reps: 1 - Sessions: 1-2, EMR Notes: no pain  Exercise Name: Thumb Stabilization C with ball - Reps: 5-10 - Sessions: 1, Notes: Keep index and thumb level.  Exercise Name: Thumb Stabilization Strengthening Isometric C - Reps: 5-10 - Sessions: 1, Notes: Contract isometrically   Exercise Name: Thumb Stabilization 1st Dorsal Interosseous  Exercise Name: Ball Massage to Extensors  Exercise Name: Self Elbow Mobilization, Trigger Point Massage Over Radial Head  Exercise Name: Pectoralis Major Trigger Release, Sets: 1 - Sessions: 1x every other day  Exercise Name: Pectoralis Minor Trigger Release, Sets: 1 - Sessions: 1x every other day, Notes: Also roll to the side and do side muscles.  Exercise Name: Latissimus/Teres/Serratus/Subscapularis Trigger Release, Sets: 1 - Sessions: 1x every other day, Notes: Make sure you go into tricep a bit too.  Exercise Name: Rolling upper back, Sets: 1 - Sessions: 1x every other day  Exercise Name: Spinal extensions, Sets: 1 - Sessions: 1x every other day  Exercise Name: Foam Roller Stretch: Pectoralis Major, Sets: 1 - Sessions: 1x every other day  Exercise Name: Foam Roller stretch: Pectoralis Minor  Exercise Name: Thumb Stabilization Place and Hold Pinch Muscle Re-Education    Next Visit:  Advance thumb stabilization program  Place and hold pinch  Incorporate joint protection into daily functional activities  Adaptive equipment as needed

## 2021-11-10 ENCOUNTER — THERAPY VISIT (OUTPATIENT)
Dept: OCCUPATIONAL THERAPY | Facility: CLINIC | Age: 56
End: 2021-11-10
Payer: COMMERCIAL

## 2021-11-10 DIAGNOSIS — M79.645 PAIN OF FINGER OF LEFT HAND: Primary | ICD-10-CM

## 2021-11-10 DIAGNOSIS — M19.032 CMC DJD(CARPOMETACARPAL DEGENERATIVE JOINT DISEASE), LOCALIZED PRIMARY, LEFT: ICD-10-CM

## 2021-11-10 PROCEDURE — 97026 INFRARED THERAPY: CPT | Mod: GO | Performed by: OCCUPATIONAL THERAPIST

## 2021-11-10 PROCEDURE — 97140 MANUAL THERAPY 1/> REGIONS: CPT | Mod: GO | Performed by: OCCUPATIONAL THERAPIST

## 2021-11-14 ENCOUNTER — E-VISIT (OUTPATIENT)
Dept: URGENT CARE | Facility: URGENT CARE | Age: 56
End: 2021-11-14
Payer: COMMERCIAL

## 2021-11-14 DIAGNOSIS — Z20.822 SUSPECTED COVID-19 VIRUS INFECTION: Primary | ICD-10-CM

## 2021-11-14 PROCEDURE — 99421 OL DIG E/M SVC 5-10 MIN: CPT | Performed by: NURSE PRACTITIONER

## 2021-11-14 NOTE — PATIENT INSTRUCTIONS
Dear Marbella Hendrix,    Your symptoms show that you may have coronavirus (COVID-19). This illness can cause fever, cough and trouble breathing. Many people get a mild case and get better on their own. Some people can get very sick.    Will I be tested for COVID-19?  We would like to test you for Covid-19 virus. I have placed orders for this test.     For all employees or close contacts (except [facility] and [facility] - see below), go to your [employer] home page and scroll down to the section that says  You have an appointment that needs to be scheduled  and click the large green button that says  Schedule Now  and follow the steps to find the next available opening.     If you are unable to complete these steps or if you cannot find any available times, please call [phone] to schedule employee testing.     [facility] employees or close contacts, please call [phone].   [place] ([facility]) employees or close contacts call [phone].    Return to work/school/ guidance:  Please let your workplace manager and staffing office know when your quarantine ends     We can t give you an exact date as it depends on the above. You can calculate this on your own or work with your manager/staffing office to calculate this. (For example if you were exposed on 10/4, you would have to quarantine for 14 full days. That would be through 10/18. You could return on 10/19.)      If you receive a positive COVID-19 test result, follow the guidance of the those who are giving you the results. Usually the return to work is 10 (or in some cases 20 days from symptom onset.) If you work at [employer], you must also be cleared by Employee Occupational Health and Safety to return to work.        If you receive a negative COVID-19 test result and did not have a high risk exposure to someone with a known positive COVID-19 test, you can return to work once you're free of fever for 24 hours without fever-reducing medication  and your symptoms are improving or resolved.      If you receive a negative COVID-19 test and If you had a high risk exposure to someone who has tested positive for COVID-19 then you can return to work 14 days after your last contact with the positive individual    Note: If you have ongoing exposure to the covid positive person, this quarantine period may be more than 14 days. (For example, if you are continued to be exposed to your child who tested positive and cannot isolate from them, then the quarantine of 7-14 days can't start until your child is no longer contagious. This is typically 10 days from onset of the child's symptoms. So the total duration may be 17-24 days in this case.)    Sign up for [email or web address].   We know it's scary to hear that you might have COVID-19. We want to track your symptoms to make sure you're okay over the next 2 weeks. Please look for an email from [email or web address]--this is a free, online program that we'll use to keep in touch. To sign up, follow the link in the email you will receive. Learn more at [email or web address]    How can I take care of myself?    Get lots of rest. Drink extra fluids (unless a doctor has told you not to)    Take Tylenol (acetaminophen) or ibuprofen for fever or pain. If you have liver or kidney problems, ask your family doctor if it's okay to take Tylenol o ibuprofen    If you have other health problems (like cancer, heart failure, an organ transplant or severe kidney disease): Call your specialty clinic if you don't feel better in the next 2 days.    Know when to call 911. Emergency warning signs include:  o Trouble breathing or shortness of breath  o Pain or pressure in the chest that doesn't go away  o Feeling confused like you haven't felt before, or not being able to wake up  o Bluish-colored lips or face    Where can I get more information?   [facility] - About COVID-19:   [email or web address]    CDC - What to Do If You're  Sick:   www.cdc.gov/coronavirus/2019-ncov/about/steps-when-sick.html

## 2021-11-15 ENCOUNTER — LAB (OUTPATIENT)
Dept: URGENT CARE | Facility: URGENT CARE | Age: 56
End: 2021-11-15
Attending: NURSE PRACTITIONER
Payer: COMMERCIAL

## 2021-11-15 DIAGNOSIS — Z20.822 SUSPECTED COVID-19 VIRUS INFECTION: ICD-10-CM

## 2021-11-15 LAB — SARS-COV-2 RNA RESP QL NAA+PROBE: NEGATIVE

## 2021-11-15 PROCEDURE — U0005 INFEC AGEN DETEC AMPLI PROBE: HCPCS

## 2021-11-15 PROCEDURE — U0003 INFECTIOUS AGENT DETECTION BY NUCLEIC ACID (DNA OR RNA); SEVERE ACUTE RESPIRATORY SYNDROME CORONAVIRUS 2 (SARS-COV-2) (CORONAVIRUS DISEASE [COVID-19]), AMPLIFIED PROBE TECHNIQUE, MAKING USE OF HIGH THROUGHPUT TECHNOLOGIES AS DESCRIBED BY CMS-2020-01-R: HCPCS

## 2021-11-17 ENCOUNTER — THERAPY VISIT (OUTPATIENT)
Dept: OCCUPATIONAL THERAPY | Facility: CLINIC | Age: 56
End: 2021-11-17
Payer: COMMERCIAL

## 2021-11-17 DIAGNOSIS — M19.032 CMC DJD(CARPOMETACARPAL DEGENERATIVE JOINT DISEASE), LOCALIZED PRIMARY, LEFT: Primary | ICD-10-CM

## 2021-11-17 PROCEDURE — 97026 INFRARED THERAPY: CPT | Mod: GO | Performed by: OCCUPATIONAL THERAPIST

## 2021-11-17 PROCEDURE — 97140 MANUAL THERAPY 1/> REGIONS: CPT | Mod: GO | Performed by: OCCUPATIONAL THERAPIST

## 2021-11-17 NOTE — PROGRESS NOTES
SOAP note objective information for 11/17/2021.    Please refer to the daily flowsheet for treatment today, total treatment time and time spent performing 1:1 timed codes.         Objective:  Pain Level (Scale 0-10)   11/3/2021 11/10/2021 11/17/2021   At Rest 6 5 4-5   With Use 6-10 5-10 4-5     Pain Description  Date 11/18/2020 11/10/2021   Location thumb thumb   Pain Quality Aching and Shooting    Frequency constant      Pain is worst  daytime    Exacerbated by  use    Relieved by Comfort cool    Progression worsening improving     ROM  Thumb 11/3/2021    AROM  (PROM) Left    MP /50+    IP /90+    RABD 50+    PABD 53+    Retropulsion (cm) 1++    Kapandji Opposition Scale (0-10/10) 10++      Thumb Observation/Appearance   - none  + mild    ++ moderate    +++ severe     11/3/2021 11/10/2021 11/17/2021     Shoulder deformity present over CMC R: -  L: -     Edema over the CMC joint R: -  L: ++ R: -  L: + R: -  L: +      Noted collapse of MP into hyperextension during pinch R: +  L: +        Provocative Tests  Pain Report:  - none    + mild    ++ moderate    +++ severe     11/3/2021   CMC Grind test R: -  L: -   Crepitus present R: -  L: -   CMC Adduction Stress Test R: -  L: -   CMC Extension Stress Test R: -  L: -   Finkelstein's R: -  L: -       Strength   (Measured in pounds)  Pain Report: - none  + mild    ++ moderate    +++ severe   NT    Palpation   Pain Report:  - none    + mild    ++ moderate    +++ severe    11/3/2021 11/10/2021 11/17/2021   CMC Joint Line   L: +     CMC AP Joint Laxity   L: -     Thenar [place]   L: ++   L: ++ L: ++   Web Space   L: ++   L: ++ L: ++   1st DC   L: -     Radial Styloid   L: -     FCR   L:-         Home Program:  Exercise Name: [place] Massage to Thumb - Reps: 1 - Sessions: 1, Notes: Hold on tender spot for 30-60 seconds.  Move to the next spot. Search for tender areas in the entire thumb pad.  Exercise Name: Thumb Stabilization Web Space Release Method 1 with Clip - Reps: 1 -  Sessions: 1, Notes: 2-3 minutes  Exercise Name: Thumb Stabilization, Repositioning, Method 1(on chest) - Reps: 1 - Sessions: 1-2, EMR Notes: no pain  Exercise Name: Thumb Stabilization C with ball - Reps: 5-10 - Sessions: 1, Notes: Keep index and thumb level.  Exercise Name: Thumb Stabilization Strengthening Isometric C - Reps: 5-10 - Sessions: 1, Notes: Contract isometrically   Exercise Name: Thumb Stabilization 1st Dorsal Interosseous  Exercise Name: Ball Massage to Extensors  Exercise Name: Self Elbow Mobilization, Trigger Point Massage Over Radial Head  Exercise Name: Pectoralis Major Trigger Release, Sets: 1 - Sessions: 1x every other day  Exercise Name: Pectoralis Minor Trigger Release, Sets: 1 - Sessions: 1x every other day, Notes: Also roll to the side and do side muscles.  Exercise Name: Latissimus/Teres/Serratus/Subscapularis Trigger Release, Sets: 1 - Sessions: 1x every other day, Notes: Make sure you go into tricep a bit too.  Exercise Name: Rolling upper back, Sets: 1 - Sessions: 1x every other day  Exercise Name: Spinal extensions, Sets: 1 - Sessions: 1x every other day  Exercise Name: Foam Roller Stretch: Pectoralis Major, Sets: 1 - Sessions: 1x every other day  Exercise Name: Foam Roller stretch: Pectoralis Minor  Exercise Name: Thumb Stabilization Place and Hold Pinch Muscle Re-Education    Next Visit:  Advance thumb stabilization program  Place and hold pinch  Incorporate joint protection into daily functional activities  Adaptive equipment as needed

## 2021-11-19 ENCOUNTER — OFFICE VISIT (OUTPATIENT)
Dept: INTERNAL MEDICINE | Facility: CLINIC | Age: 56
End: 2021-11-19
Payer: COMMERCIAL

## 2021-11-19 ENCOUNTER — TELEPHONE (OUTPATIENT)
Dept: INTERNAL MEDICINE | Facility: CLINIC | Age: 56
End: 2021-11-19

## 2021-11-19 VITALS
OXYGEN SATURATION: 97 % | WEIGHT: 211.4 LBS | DIASTOLIC BLOOD PRESSURE: 82 MMHG | HEART RATE: 76 BPM | TEMPERATURE: 98.2 F | SYSTOLIC BLOOD PRESSURE: 112 MMHG | BODY MASS INDEX: 36.29 KG/M2

## 2021-11-19 DIAGNOSIS — E66.01 MORBID OBESITY (H): ICD-10-CM

## 2021-11-19 DIAGNOSIS — J18.9 COMMUNITY ACQUIRED PNEUMONIA OF RIGHT LOWER LOBE OF LUNG: Primary | ICD-10-CM

## 2021-11-19 PROCEDURE — 99214 OFFICE O/P EST MOD 30 MIN: CPT | Performed by: INTERNAL MEDICINE

## 2021-11-19 RX ORDER — AZITHROMYCIN 250 MG/1
TABLET, FILM COATED ORAL
Qty: 6 TABLET | Refills: 0 | Status: SHIPPED | OUTPATIENT
Start: 2021-11-19 | End: 2021-11-24

## 2021-11-19 RX ORDER — CEFPODOXIME PROXETIL 200 MG/1
200 TABLET, FILM COATED ORAL 2 TIMES DAILY
Qty: 14 TABLET | Refills: 0 | Status: SHIPPED | OUTPATIENT
Start: 2021-11-19 | End: 2021-11-26

## 2021-11-19 NOTE — PROGRESS NOTES
Assessment & Plan     Community acquired pneumonia of right lower lobe of lung  Diminished sounds in posterior RLL with concurrent dullness to percussion. Offered CXR but she prefers to treat which I think is reasonable. Will treat for CAP.  - cefpodoxime (VANTIN) 200 MG tablet; Take 1 tablet (200 mg) by mouth 2 times daily for 7 days  - azithromycin (ZITHROMAX) 250 MG tablet; Take 2 tablets (500 mg) by mouth daily for 1 day, THEN 1 tablet (250 mg) daily for 4 days. Take 2 tablets (500 mg) by mouth daily for 1 day, THEN 1 tablet (250 mg) daily for 4 days.    Morbid obesity (H)  Known issue that I take into account for their medical decisions, no current exacerbations or new concerns. Weight loss would improve hyperlipidemia.    F/u with regular PCP at regular interval or sooner PRN    Bj Cancino MD  [facility]    Adam Tyson is a 56 year old who presents for an acute care visit. This is the first time I have met Marbella.  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  Shortly thereafter, she variance some muscle aches, cough, sore throat.  She did an e-visit a few days ago for this and subsequently tested negative for COVID.  The symptoms have persisted and her cough has worsened in the last couple of days.  Denies any fevers but does endorse some chills.    Review of Systems   Constitutional, HEENT, cardiovascular, pulmonary, gi systems are negative, except as otherwise noted.      Objective    /82   Pulse 76   Temp 98.2  F (36.8  C) (Tympanic)   Wt 95.9 kg (211 lb 6.4 oz)   LMP 08/28/2015 (Approximate)   SpO2 97%   BMI 36.29 kg/m    Body mass index is 36.29 kg/m .     Physical Exam   GENERAL: alert and in no distress.  EYES: conjunctivae/corneas clear. EOMs grossly intact  HENT: Facies symmetric.  RESP: Diminished sounds in posterior RLL with concurrent dullness to percussion.  CV: RRR, no m/r/g.  GI: NT, ND  SKIN: No  significant ulcers, lesions, or rashes on the visualized portions of the skin  NEURO: Alert. Oriented.

## 2021-11-19 NOTE — TELEPHONE ENCOUNTER
Call received from pharmacist Irasema on [facility]. Pharmacy doesn't carry ordered cefpodoxime. They would like to substitute for another 3rd generation cephalosporin.    Huddled with Dr. Cancino and states okay to substitute to another 3rd generation cephalosporin recommended by the pharmacist.     Pharmacist notified to substitute.     Nikky Adams RN

## 2021-11-23 ENCOUNTER — THERAPY VISIT (OUTPATIENT)
Dept: OCCUPATIONAL THERAPY | Facility: CLINIC | Age: 56
End: 2021-11-23
Payer: COMMERCIAL

## 2021-11-23 DIAGNOSIS — M79.645 PAIN OF FINGER OF LEFT HAND: Primary | ICD-10-CM

## 2021-11-23 PROCEDURE — 97026 INFRARED THERAPY: CPT | Mod: GO | Performed by: OCCUPATIONAL THERAPIST

## 2021-11-23 PROCEDURE — 97140 MANUAL THERAPY 1/> REGIONS: CPT | Mod: GO | Performed by: OCCUPATIONAL THERAPIST

## 2021-11-23 NOTE — PROGRESS NOTES
SOAP note objective information for 11/23/2021.    Please refer to the daily flowsheet for treatment today, total treatment time and time spent performing 1:1 timed codes.         Objective:  Pain Level (Scale 0-10)   11/3/2021 11/10/2021 11/17/2021 11/23/2021   At Rest 6 5 4-5 4-5   With Use 6-10 5-10 4-5 4-5     Pain Description  Date 11/18/2020 11/10/2021   Location thumb thumb   Pain Quality Aching and Shooting    Frequency constant      Pain is worst  daytime    Exacerbated by  use    Relieved by Comfort cool    Progression worsening improving     ROM  Thumb 11/3/2021    AROM  (PROM) Left    MP /50+    IP /90+    RABD 50+    PABD 53+    Retropulsion (cm) 1++    Kapandji Opposition Scale (0-10/10) 10++      Thumb Observation/Appearance   - none  + mild    ++ moderate    +++ severe     11/3/2021 11/10/2021 11/17/2021     Shoulder deformity present over CMC R: -  L: -     Edema over the CMC joint R: -  L: ++ R: -  L: + R: -  L: +      Noted collapse of MP into hyperextension during pinch R: +  L: +        Provocative Tests  Pain Report:  - none    + mild    ++ moderate    +++ severe     11/3/2021   CMC Grind test R: -  L: -   Crepitus present R: -  L: -   CMC Adduction Stress Test R: -  L: -   CMC Extension Stress Test R: -  L: -   Finkelstein's R: -  L: -       Strength   (Measured in pounds)  Pain Report: - none  + mild    ++ moderate    +++ severe   NT    Palpation   Pain Report:  - none    + mild    ++ moderate    +++ severe    11/3/2021 11/10/2021 11/17/2021 11/23/2021   CMC Joint Line   L: +      CMC AP Joint Laxity   L: -      Thenar Eminence   L: ++   L: ++ L: ++ L: +   Web Space   L: ++   L: ++ L: ++ L: +   1st DC   L: -      Radial Styloid   L: -      FCR   L:-          Home Program:  Exercise Name: [place] Massage to Thumb - Reps: 1 - Sessions: 1, Notes: Hold on tender spot for 30-60 seconds.  Move to the next spot. Search for tender areas in the entire thumb pad.  Exercise Name: Thumb Stabilization Web  Space Release Method 1 with Clip - Reps: 1 - Sessions: 1, Notes: 2-3 minutes  Exercise Name: Thumb Stabilization, Repositioning, Method 1(on chest) - Reps: 1 - Sessions: 1-2, EMR Notes: no pain  Exercise Name: Thumb Stabilization C with ball - Reps: 5-10 - Sessions: 1, Notes: Keep index and thumb level.  Exercise Name: Thumb Stabilization Strengthening Isometric C - Reps: 5-10 - Sessions: 1, Notes: Contract isometrically   Exercise Name: Thumb Stabilization 1st Dorsal Interosseous  Exercise Name: Ball Massage to Extensors  Exercise Name: Self Elbow Mobilization, Trigger Point Massage Over Radial Head  Exercise Name: Pectoralis Major Trigger Release, Sets: 1 - Sessions: 1x every other day  Exercise Name: Pectoralis Minor Trigger Release, Sets: 1 - Sessions: 1x every other day, Notes: Also roll to the side and do side muscles.  Exercise Name: Latissimus/Teres/Serratus/Subscapularis Trigger Release, Sets: 1 - Sessions: 1x every other day, Notes: Make sure you go into tricep a bit too.  Exercise Name: Rolling upper back, Sets: 1 - Sessions: 1x every other day  Exercise Name: Spinal extensions, Sets: 1 - Sessions: 1x every other day  Exercise Name: Foam Roller Stretch: Pectoralis Major, Sets: 1 - Sessions: 1x every other day  Exercise Name: Foam Roller stretch: Pectoralis Minor  Exercise Name: Thumb Stabilization Place and Hold Pinch Muscle Re-Education    Next Visit:  Advance thumb stabilization program  Place and hold pinch  Incorporate joint protection into daily functional activities  Adaptive equipment as needed

## 2021-11-28 NOTE — PROGRESS NOTES
Hand Therapy Progress Note    Current Date:  11/29/2021    Diagnosis: Left CMC/hand pain  DOI: July 2010    Reporting period is 11/3/2021 to 11/29/2021    Subjective:   Subjective changes noted by patient:  Pain has gone done a little.  Still very tight.  Functional changes noted by patient:  Improvement in Self Care Tasks   Patient has noted adverse reaction to:  None    Functional Outcome Measure:  Upper Extremity Functional Index Score:  SCORE:   Column Totals: /80: 40   (A lower score indicates greater disability.)    Objective:  Pain Level (Scale 0-10)   11/3/2021 11/10/2021 11/17/2021 11/23/2021 11/29/2021   At Rest 6 5 4-5 4-5 5   With Use 6-10 5-10 4-5 4-5 5     Pain Description  Date 11/18/2020 11/10/2021   Location thumb thumb   Pain Quality Aching and Shooting    Frequency constant      Pain is worst  daytime    Exacerbated by  use    Relieved by Comfort cool    Progression worsening improving     ROM  Thumb 11/3/2021 11/29/2021   AROM  (PROM) Left Left   MP /50+ /50+   IP /90+ /90+   RABD 50+ 50+   PABD 53+ 53+   Retropulsion (cm) 1++ 1+   Kapandji Opposition Scale (0-10/10) 10++ 10+     Thumb Observation/Appearance   - none  + mild    ++ moderate    +++ severe     11/3/2021 11/10/2021 11/17/2021   11/29/2021   Shoulder deformity present over CMC R: -  L: -      Edema over the CMC joint R: -  L: ++ R: -  L: + R: -  L: +    R: -  L: -   Noted collapse of MP into hyperextension during pinch R: +  L: +         Provocative Tests  Pain Report:  - none    + mild    ++ moderate    +++ severe     11/3/2021   CMC Grind test R: -  L: -   Crepitus present R: -  L: -   CMC Adduction Stress Test R: -  L: -   CMC Extension Stress Test R: -  L: -   Finkelstein's R: -  L: -       Strength   (Measured in pounds)  Pain Report: - none  + mild    ++ moderate    +++ severe     Palpation   Pain Report:  - none    + mild    ++ moderate    +++ severe    11/3/21 11/10/21 11/17/21 11/23/21 11/29/21   CMC Joint Line   L: +        CMC AP Joint Laxity   L: -       Thenar Eminence   L: ++   L: ++ L: ++ L: + L: +   Web Space   L: ++   L: ++ L: ++ L: + L: +   1st DC   L: -       Radial Styloid   L: -       FCR   L:-         Assessment:  Response to therapy has been improvement to:  Flexibility:  tendon gliding is improved, improved excursion of involved muscles and less tightness in involved muscles  Edema:  less  Pain:  intensity of pain is decreased    Overall Assessment:  Patient's symptoms are resolving.  STG/LTG:  STGoals have been reviewed and some progress or achievement has occurred;  see goal sheet for details and updates.    Plan:  Frequency/Duration:  Recommend continuing to see patient  1 X week, once daily  for 4 visits  Appropriateness of Rx I have re-evaluated this patient and find that the nature, scope, duration and intensity of the therapy is appropriate for the medical condition of the patient.  Recommendations for Continued Therapy  Additions to Treatment Plan -  Wear orthosis more     Home Program:  Exercise Name: [place] Massage to Thumb - Reps: 1 - Sessions: 1, Notes: Hold on tender spot for 30-60 seconds.  Move to the next spot. Search for tender areas in the entire thumb pad.  Exercise Name: Thumb Stabilization Web Space Release Method 1 with Clip - Reps: 1 - Sessions: 1, Notes: 2-3 minutes  Exercise Name: Thumb Stabilization, Repositioning, Method 1(on chest) - Reps: 1 - Sessions: 1-2, EMR Notes: no pain  Exercise Name: Thumb Stabilization C with ball - Reps: 5-10 - Sessions: 1, Notes: Keep index and thumb level.  Exercise Name: Thumb Stabilization Strengthening Isometric C - Reps: 5-10 - Sessions: 1, Notes: Contract isometrically   Exercise Name: Thumb Stabilization 1st Dorsal Interosseous  Exercise Name: Ball Massage to Extensors  Exercise Name: Self Elbow Mobilization, Trigger Point Massage Over Radial Head  Exercise Name: Pectoralis Major Trigger Release, Sets: 1 - Sessions: 1x every other day  Exercise Name:  Pectoralis Minor Trigger Release, Sets: 1 - Sessions: 1x every other day, Notes: Also roll to the side and do side muscles.  Exercise Name: Latissimus/Teres/Serratus/Subscapularis Trigger Release, Sets: 1 - Sessions: 1x every other day, Notes: Make sure you go into tricep a bit too.  Exercise Name: Rolling upper back, Sets: 1 - Sessions: 1x every other day  Exercise Name: Spinal extensions, Sets: 1 - Sessions: 1x every other day  Exercise Name: Foam Roller Stretch: Pectoralis Major, Sets: 1 - Sessions: 1x every other day  Exercise Name: Foam Roller stretch: Pectoralis Minor  Exercise Name: Thumb Stabilization Place and Hold Pinch Muscle Re-Education    Next Visit:  Advance thumb stabilization program  Place and hold pinch  Incorporate joint protection into daily functional activities  Adaptive equipment as needed

## 2021-11-29 ENCOUNTER — THERAPY VISIT (OUTPATIENT)
Dept: OCCUPATIONAL THERAPY | Facility: CLINIC | Age: 56
End: 2021-11-29
Payer: COMMERCIAL

## 2021-11-29 DIAGNOSIS — M19.032 CMC DJD(CARPOMETACARPAL DEGENERATIVE JOINT DISEASE), LOCALIZED PRIMARY, LEFT: Primary | ICD-10-CM

## 2021-11-29 PROCEDURE — 97026 INFRARED THERAPY: CPT | Mod: GO | Performed by: OCCUPATIONAL THERAPIST

## 2021-11-29 PROCEDURE — 97140 MANUAL THERAPY 1/> REGIONS: CPT | Mod: GO | Performed by: OCCUPATIONAL THERAPIST

## 2021-12-08 ENCOUNTER — THERAPY VISIT (OUTPATIENT)
Dept: OCCUPATIONAL THERAPY | Facility: CLINIC | Age: 56
End: 2021-12-08
Payer: COMMERCIAL

## 2021-12-08 DIAGNOSIS — M79.645 PAIN OF FINGER OF LEFT HAND: Primary | ICD-10-CM

## 2021-12-08 PROCEDURE — 97026 INFRARED THERAPY: CPT | Mod: GO | Performed by: OCCUPATIONAL THERAPIST

## 2021-12-08 PROCEDURE — 97140 MANUAL THERAPY 1/> REGIONS: CPT | Mod: GO | Performed by: OCCUPATIONAL THERAPIST

## 2021-12-08 NOTE — PROGRESS NOTES
SOAP note objective information for 12/8/2021.    Please refer to the daily flowsheet for treatment today, total treatment time and time spent performing 1:1 timed codes.       Objective:  Pain Level (Scale 0-10)   11/3/21 11/10/21 11/17/21 11/23/21 11/29/21 12/8/21   At Rest 6 5 4-5 4-5 5 3   With Use 6-10 5-10 4-5 4-5 5 3     Pain Description  Date 11/18/2020 11/10/2021 12/8/2021   Location thumb thumb thumb   Pain Quality Aching and Shooting     Frequency constant       Pain is worst  daytime     Exacerbated by  use     Relieved by Comfort cool     Progression worsening improving improving     ROM  Thumb 11/3/2021 11/29/2021   AROM  (PROM) Left Left   MP /50+ /50+   IP /90+ /90+   RABD 50+ 50+   PABD 53+ 53+   Retropulsion (cm) 1++ 1+   Kapandji Opposition Scale (0-10/10) 10++ 10+     Thumb Observation/Appearance   - none  + mild    ++ moderate    +++ severe     11/3/2021 11/10/2021 11/17/2021   11/29/2021   Shoulder deformity present over CMC R: -  L: -      Edema over the CMC joint R: -  L: ++ R: -  L: + R: -  L: +    R: -  L: -   Noted collapse of MP into hyperextension during pinch R: +  L: +         Provocative Tests  Pain Report:  - none    + mild    ++ moderate    +++ severe     11/3/2021   CMC Grind test R: -  L: -   Crepitus present R: -  L: -   CMC Adduction Stress Test R: -  L: -   CMC Extension Stress Test R: -  L: -   Finkelstein's R: -  L: -       Strength   (Measured in pounds)  Pain Report: - none  + mild    ++ moderate    +++ severe     Palpation   Pain Report:  - none    + mild    ++ moderate    +++ severe    11/3/21 11/10/21 11/17/21 11/23/21 11/29/21 12/8/2021     CMC Joint Line   L: +        CMC AP Joint Laxity   L: -        Thenar Eminence   L: ++   L: ++ L: ++ L: + L: + L: +   Web Space   L: ++   L: ++ L: ++ L: + L: + L: +   1st DC   L: -        Radial Styloid   L: -        FCR   L:-        EIP      L: ++ to +++       Home Program:  Exercise Name: [place] Massage to Thumb - Reps: 1 -  Sessions: 1, Notes: Hold on tender spot for 30-60 seconds.  Move to the next spot. Search for tender areas in the entire thumb pad.  Exercise Name: Thumb Stabilization Web Space Release Method 1 with Clip - Reps: 1 - Sessions: 1, Notes: 2-3 minutes  Exercise Name: Thumb Stabilization, Repositioning, Method 1(on chest) - Reps: 1 - Sessions: 1-2, EMR Notes: no pain  Exercise Name: Thumb Stabilization C with ball - Reps: 5-10 - Sessions: 1, Notes: Keep index and thumb level.  Exercise Name: Thumb Stabilization Strengthening Isometric C - Reps: 5-10 - Sessions: 1, Notes: Contract isometrically   Exercise Name: Thumb Stabilization 1st Dorsal Interosseous  Exercise Name: Ball Massage to Extensors  Exercise Name: Self Elbow Mobilization, Trigger Point Massage Over Radial Head  Exercise Name: Pectoralis Major Trigger Release, Sets: 1 - Sessions: 1x every other day  Exercise Name: Pectoralis Minor Trigger Release, Sets: 1 - Sessions: 1x every other day, Notes: Also roll to the side and do side muscles.  Exercise Name: Latissimus/Teres/Serratus/Subscapularis Trigger Release, Sets: 1 - Sessions: 1x every other day, Notes: Make sure you go into tricep a bit too.  Exercise Name: Rolling upper back, Sets: 1 - Sessions: 1x every other day  Exercise Name: Spinal extensions, Sets: 1 - Sessions: 1x every other day  Exercise Name: Foam Roller Stretch: Pectoralis Major, Sets: 1 - Sessions: 1x every other day  Exercise Name: Foam Roller stretch: Pectoralis Minor  Exercise Name: Thumb Stabilization Place and Hold Pinch Muscle Re-Education    12/8/2021  SCS to EIP  Massage EIP  icing    Next Visit:  Advance thumb stabilization program  Place and hold pinch  Incorporate joint protection into daily functional activities  Adaptive equipment as needed

## 2021-12-14 NOTE — PROGRESS NOTES
SOAP note objective information for 12/15/2021.    Please refer to the daily flowsheet for treatment today, total treatment time and time spent performing 1:1 timed codes.       Objective:  Pain Level (Scale 0-10)   11/3/21 11/10/21 11/17/21 11/23/21 11/29/21 12/8/21   At Rest 6 5 4-5 4-5 5 3   With Use 6-10 5-10 4-5 4-5 5 3              Pain Description  Date 11/18/2020 11/10/2021 12/8/2021   Location thumb thumb thumb   Pain Quality Aching and Shooting     Frequency constant       Pain is worst  daytime     Exacerbated by  use     Relieved by Comfort cool     Progression worsening improving improving     ROM  Thumb 11/3/2021 11/29/2021   AROM  (PROM) Left Left   MP /50+ /50+   IP /90+ /90+   RABD 50+ 50+   PABD 53+ 53+   Retropulsion (cm) 1++ 1+   Kapandji Opposition Scale (0-10/10) 10++ 10+     Thumb Observation/Appearance   - none  + mild    ++ moderate    +++ severe     11/3/2021 11/10/2021 11/17/2021   11/29/2021   Shoulder deformity present over CMC R: -  L: -      Edema over the CMC joint R: -  L: ++ R: -  L: + R: -  L: +    R: -  L: -   Noted collapse of MP into hyperextension during pinch R: +  L: +         Provocative Tests  Pain Report:  - none    + mild    ++ moderate    +++ severe     11/3/2021   CMC Grind test R: -  L: -   Crepitus present R: -  L: -   CMC Adduction Stress Test R: -  L: -   CMC Extension Stress Test R: -  L: -   Finkelstein's R: -  L: -       Strength   (Measured in pounds)  Pain Report: - none  + mild    ++ moderate    +++ severe     Palpation   Pain Report:  - none    + mild    ++ moderate    +++ severe    11/3/21 11/10/21 11/17/21 11/23/21 11/29/21 12/8/2021   12/15/21   CMC Joint Line   L: +         CMC AP Joint Laxity   L: -         Thenar Eminence   L: ++   L: ++ L: ++ L: + L: + L: + L: ++   Web Space   L: ++   L: ++ L: ++ L: + L: + L: + L: ++   1st DC   L: -         Radial Styloid   L: -         FCR   L:-         EIP      L: ++ to +++        Home Program:  Exercise Name: [place]  Massage to Thumb - Reps: 1 - Sessions: 1, Notes: Hold on tender spot for 30-60 seconds.  Move to the next spot. Search for tender areas in the entire thumb pad.  Exercise Name: Thumb Stabilization Web Space Release Method 1 with Clip - Reps: 1 - Sessions: 1, Notes: 2-3 minutes  Exercise Name: Thumb Stabilization, Repositioning, Method 1(on chest) - Reps: 1 - Sessions: 1-2, EMR Notes: no pain  Exercise Name: Thumb Stabilization C with ball - Reps: 5-10 - Sessions: 1, Notes: Keep index and thumb level.  Exercise Name: Thumb Stabilization Strengthening Isometric C - Reps: 5-10 - Sessions: 1, Notes: Contract isometrically   Exercise Name: Thumb Stabilization 1st Dorsal Interosseous  Exercise Name: Ball Massage to Extensors  Exercise Name: Self Elbow Mobilization, Trigger Point Massage Over Radial Head  Exercise Name: Pectoralis Major Trigger Release, Sets: 1 - Sessions: 1x every other day  Exercise Name: Pectoralis Minor Trigger Release, Sets: 1 - Sessions: 1x every other day, Notes: Also roll to the side and do side muscles.  Exercise Name: Latissimus/Teres/Serratus/Subscapularis Trigger Release, Sets: 1 - Sessions: 1x every other day, Notes: Make sure you go into tricep a bit too.  Exercise Name: Rolling upper back, Sets: 1 - Sessions: 1x every other day  Exercise Name: Spinal extensions, Sets: 1 - Sessions: 1x every other day  Exercise Name: Foam Roller Stretch: Pectoralis Major, Sets: 1 - Sessions: 1x every other day  Exercise Name: Foam Roller stretch: Pectoralis Minor  Exercise Name: Thumb Stabilization Place and Hold Pinch Muscle Re-Education    12/8/2021  SCS to EIP  Massage EIP  icing    Next Visit:  Advance thumb stabilization program  Place and hold pinch  Incorporate joint protection into daily functional activities  Adaptive equipment as needed

## 2021-12-15 ENCOUNTER — THERAPY VISIT (OUTPATIENT)
Dept: OCCUPATIONAL THERAPY | Facility: CLINIC | Age: 56
End: 2021-12-15
Payer: COMMERCIAL

## 2021-12-15 DIAGNOSIS — M19.032 CMC DJD(CARPOMETACARPAL DEGENERATIVE JOINT DISEASE), LOCALIZED PRIMARY, LEFT: ICD-10-CM

## 2021-12-15 DIAGNOSIS — M79.645 PAIN OF FINGER OF LEFT HAND: Primary | ICD-10-CM

## 2021-12-15 PROCEDURE — 97026 INFRARED THERAPY: CPT | Mod: GO | Performed by: OCCUPATIONAL THERAPIST

## 2021-12-15 PROCEDURE — 97140 MANUAL THERAPY 1/> REGIONS: CPT | Mod: GO | Performed by: OCCUPATIONAL THERAPIST

## 2021-12-21 ENCOUNTER — ANCILLARY PROCEDURE (OUTPATIENT)
Dept: MAMMOGRAPHY | Facility: CLINIC | Age: 56
End: 2021-12-21
Attending: FAMILY MEDICINE
Payer: COMMERCIAL

## 2021-12-21 DIAGNOSIS — Z12.31 VISIT FOR SCREENING MAMMOGRAM: ICD-10-CM

## 2021-12-21 PROCEDURE — 77067 SCR MAMMO BI INCL CAD: CPT

## 2021-12-21 PROCEDURE — 77063 BREAST TOMOSYNTHESIS BI: CPT

## 2021-12-21 NOTE — PROGRESS NOTES
SOAP note objective information for 12/22/2021.    Please refer to the daily flowsheet for treatment today, total treatment time and time spent performing 1:1 timed codes.       Objective:  Pain Level (Scale 0-10)   11/3/21 11/10/21 11/17/21 11/23/21 11/29/21 12/8/21 12/22/21   At Rest 6 5 4-5 4-5 5 3 5   With Use 6-10 5-10 4-5 4-5 5 3 5               Pain Description  Date 11/18/2020 11/10/2021 12/8/2021   Location thumb thumb thumb   Pain Quality Aching and Shooting     Frequency constant       Pain is worst  daytime     Exacerbated by  use     Relieved by Comfort cool     Progression worsening improving improving     ROM  Thumb 11/3/2021 11/29/2021   AROM  (PROM) Left Left   MP /50+ /50+   IP /90+ /90+   RABD 50+ 50+   PABD 53+ 53+   Retropulsion (cm) 1++ 1+   Kapandji Opposition Scale (0-10/10) 10++ 10+     Thumb Observation/Appearance   - none  + mild    ++ moderate    +++ severe     11/3/2021 11/10/2021 11/17/2021   11/29/2021   Shoulder deformity present over CMC R: -  L: -      Edema over the CMC joint R: -  L: ++ R: -  L: + R: -  L: +    R: -  L: -   Noted collapse of MP into hyperextension during pinch R: +  L: +         Provocative Tests  Pain Report:  - none    + mild    ++ moderate    +++ severe     11/3/2021   CMC Grind test R: -  L: -   Crepitus present R: -  L: -   CMC Adduction Stress Test R: -  L: -   CMC Extension Stress Test R: -  L: -   Finkelstein's R: -  L: -       Strength   (Measured in pounds)  Pain Report: - none  + mild    ++ moderate    +++ severe     Palpation   Pain Report:  - none    + mild    ++ moderate    +++ severe    11/3/21 11/10/21 11/17/21 11/23/21 11/29/21 12/8/2021   12/15/21   CMC Joint Line   L: +         CMC AP Joint Laxity   L: -         Thenar Eminence   L: ++   L: ++ L: ++ L: + L: + L: + L: ++   Web Space   L: ++   L: ++ L: ++ L: + L: + L: + L: ++   1st DC   L: -         Radial Styloid   L: -         FCR   L:-         EIP      L: ++ to +++       12/22/21         CMC  Joint Line          CMC AP Joint Laxity          Thenar Eminence L: ++         Web Space L: ++         1st DC          Radial Styloid          FCR          EIP            Home Program:  Exercise Name: [place] Massage to Thumb - Reps: 1 - Sessions: 1, Notes: Hold on tender spot for 30-60 seconds.  Move to the next spot. Search for tender areas in the entire thumb pad.  Exercise Name: Thumb Stabilization Web Space Release Method 1 with Clip - Reps: 1 - Sessions: 1, Notes: 2-3 minutes  Exercise Name: Thumb Stabilization, Repositioning, Method 1(on chest) - Reps: 1 - Sessions: 1-2, EMR Notes: no pain  Exercise Name: Thumb Stabilization C with ball - Reps: 5-10 - Sessions: 1, Notes: Keep index and thumb level.  Exercise Name: Thumb Stabilization Strengthening Isometric C - Reps: 5-10 - Sessions: 1, Notes: Contract isometrically   Exercise Name: Thumb Stabilization 1st Dorsal Interosseous  Exercise Name: Ball Massage to Extensors  Exercise Name: Self Elbow Mobilization, Trigger Point Massage Over Radial Head  Exercise Name: Pectoralis Major Trigger Release, Sets: 1 - Sessions: 1x every other day  Exercise Name: Pectoralis Minor Trigger Release, Sets: 1 - Sessions: 1x every other day, Notes: Also roll to the side and do side muscles.  Exercise Name: Latissimus/Teres/Serratus/Subscapularis Trigger Release, Sets: 1 - Sessions: 1x every other day, Notes: Make sure you go into tricep a bit too.  Exercise Name: Rolling upper back, Sets: 1 - Sessions: 1x every other day  Exercise Name: Spinal extensions, Sets: 1 - Sessions: 1x every other day  Exercise Name: Foam Roller Stretch: Pectoralis Major, Sets: 1 - Sessions: 1x every other day  Exercise Name: Foam Roller stretch: Pectoralis Minor  Exercise Name: Thumb Stabilization Place and Hold Pinch Muscle Re-Education    12/8/2021  SCS to EIP  Massage EIP  Icing    12/22/2021  Use embossing round tipped tool to do thenar massage    Next Visit:  Advance thumb stabilization  program  Place and hold pinch  Incorporate joint protection into daily functional activities  Adaptive equipment as needed

## 2021-12-22 ENCOUNTER — THERAPY VISIT (OUTPATIENT)
Dept: OCCUPATIONAL THERAPY | Facility: CLINIC | Age: 56
End: 2021-12-22
Payer: COMMERCIAL

## 2021-12-22 DIAGNOSIS — M19.032 CMC DJD(CARPOMETACARPAL DEGENERATIVE JOINT DISEASE), LOCALIZED PRIMARY, LEFT: ICD-10-CM

## 2021-12-22 DIAGNOSIS — M79.645 PAIN OF FINGER OF LEFT HAND: Primary | ICD-10-CM

## 2021-12-22 PROCEDURE — 97035 APP MDLTY 1+ULTRASOUND EA 15: CPT | Mod: GO | Performed by: OCCUPATIONAL THERAPIST

## 2021-12-22 PROCEDURE — 97140 MANUAL THERAPY 1/> REGIONS: CPT | Mod: GO | Performed by: OCCUPATIONAL THERAPIST

## 2021-12-28 NOTE — PROGRESS NOTES
Hand Therapy Progress Note    Current Date:  12/29/2021    Diagnosis: Left CMC/hand pain  DOI: July 2010    Reporting period is 11/29/2021 to 12/29/2021    Subjective:   Subjective changes noted by patient:  Pain has gone down a bit.  Functional changes noted by patient:  Improvement in Self Care Tasks (dressing, eating, bathing) and Household Chores  Patient has noted adverse reaction to:  None    Functional Outcome Measure:  Upper Extremity Functional Index Score:  SCORE:   Column Totals: /80: 50   (A lower score indicates greater disability.)    Objective:  Pain Level (Scale 0-10)   11/3/21 11/10/21 11/17/21 11/23/21 11/29/21 12/8/21 12/22/21 12/29/21   At Rest 6 5 4-5 4-5 5 3 5 3   With Use 6-10 5-10 4-5 4-5 5 3 5 5-6                Pain Description  Date 11/18/2020 11/10/2021 12/8/2021   Location thumb thumb thumb   Pain Quality Aching and Shooting     Frequency constant       Pain is worst  daytime     Exacerbated by  use     Relieved by Comfort cool     Progression worsening improving improving     ROM  Thumb 11/3/2021 11/29/2021   AROM  (PROM) Left Left   MP /50+ /50+   IP /90+ /90+   RABD 50+ 50+   PABD 53+ 53+   Retropulsion (cm) 1++ 1+   Kapandji Opposition Scale (0-10/10) 10++ 10+     Thumb Observation/Appearance   - none  + mild    ++ moderate    +++ severe     11/3/2021 11/10/2021 11/17/2021   11/29/2021   Shoulder deformity present over CMC R: -  L: -      Edema over the CMC joint R: -  L: ++ R: -  L: + R: -  L: +    R: -  L: -   Noted collapse of MP into hyperextension during pinch R: +  L: +         Provocative Tests  Pain Report:  - none    + mild    ++ moderate    +++ severe     11/3/2021   CMC Grind test R: -  L: -   Crepitus present R: -  L: -   CMC Adduction Stress Test R: -  L: -   CMC Extension Stress Test R: -  L: -   Finkelstein's R: -  L: -       Strength   (Measured in pounds)  Pain Report: - none  + mild    ++ moderate    +++ severe     Palpation   Pain Report:  - none    + mild    ++  moderate    +++ severe    11/3/21 11/10/21 11/17/21 11/23/21 11/29/21 12/8/2021   12/15/21   CMC Joint Line   L: +         CMC AP Joint Laxity   L: -         Thenar Eminence   L: ++   L: ++ L: ++ L: + L: + L: + L: ++   Web Space   L: ++   L: ++ L: ++ L: + L: + L: + L: ++   1st DC   L: -         Radial Styloid   L: -         FCR   L:-         EIP      L: ++ to +++       12/22/21 12/29/21        CMC Joint Line          CMC AP Joint Laxity          Thenar Eminence L: ++ L: + to ++        Web Space L: ++ L: + to ++        1st DC          Radial Styloid          FCR          EIP            Assessment:  Response to therapy has been improvement to:  Flexibility:  tendon gliding is improved, improved excursion of involved muscles and less tightness in involved muscles  Pain:  frequency is less, intensity of pain is decreased and duration of pain is decreased    Overall Assessment:  Patient's symptoms are resolving.  STG/LTG:  STGoals have been reviewed and progress or achievement has occurred;  see goal sheet for details and updates.    Plan:  Frequency/Duration:  Recommend continuing to see patient prn.  Appropriateness of Rx I have re-evaluated this patient and find that the nature, scope, duration and intensity of the therapy is appropriate for the medical condition of the patient.  Recommendations for Continued Therapy      Home Program:  Exercise Name: [place] Massage to Thumb - Reps: 1 - Sessions: 1, Notes: Hold on tender spot for 30-60 seconds.  Move to the next spot. Search for tender areas in the entire thumb pad.  Exercise Name: Thumb Stabilization Web Space Release Method 1 with Clip - Reps: 1 - Sessions: 1, Notes: 2-3 minutes  Exercise Name: Thumb Stabilization, Repositioning, Method 1(on chest) - Reps: 1 - Sessions: 1-2, EMR Notes: no pain  Exercise Name: Thumb Stabilization C with ball - Reps: 5-10 - Sessions: 1, Notes: Keep index and thumb level.  Exercise Name: Thumb Stabilization Strengthening Isometric C -  Reps: 5-10 - Sessions: 1, Notes: Contract isometrically   Exercise Name: Thumb Stabilization 1st Dorsal Interosseous  Exercise Name: Ball Massage to Extensors  Exercise Name: Self Elbow Mobilization, Trigger Point Massage Over Radial Head  Exercise Name: Pectoralis Major Trigger Release, Sets: 1 - Sessions: 1x every other day  Exercise Name: Pectoralis Minor Trigger Release, Sets: 1 - Sessions: 1x every other day, Notes: Also roll to the side and do side muscles.  Exercise Name: Latissimus/Teres/Serratus/Subscapularis Trigger Release, Sets: 1 - Sessions: 1x every other day, Notes: Make sure you go into tricep a bit too.  Exercise Name: Rolling upper back, Sets: 1 - Sessions: 1x every other day  Exercise Name: Spinal extensions, Sets: 1 - Sessions: 1x every other day  Exercise Name: Foam Roller Stretch: Pectoralis Major, Sets: 1 - Sessions: 1x every other day  Exercise Name: Foam Roller stretch: Pectoralis Minor  Exercise Name: Thumb Stabilization Place and Hold Pinch Muscle Re-Education    12/8/2021  SCS to EIP  Massage EIP  Icing    12/22/2021  Use embossing round tipped tool to do thenar massage    Next Visit:  Advance thumb stabilization program  Place and hold pinch  Incorporate joint protection into daily functional activities  Adaptive equipment as needed

## 2021-12-29 ENCOUNTER — THERAPY VISIT (OUTPATIENT)
Dept: OCCUPATIONAL THERAPY | Facility: CLINIC | Age: 56
End: 2021-12-29
Payer: COMMERCIAL

## 2021-12-29 DIAGNOSIS — M19.032 CMC DJD(CARPOMETACARPAL DEGENERATIVE JOINT DISEASE), LOCALIZED PRIMARY, LEFT: Primary | ICD-10-CM

## 2021-12-29 PROCEDURE — 97140 MANUAL THERAPY 1/> REGIONS: CPT | Mod: GO | Performed by: OCCUPATIONAL THERAPIST

## 2021-12-29 PROCEDURE — 97035 APP MDLTY 1+ULTRASOUND EA 15: CPT | Mod: GO | Performed by: OCCUPATIONAL THERAPIST

## 2022-01-14 ENCOUNTER — LAB (OUTPATIENT)
Dept: URGENT CARE | Facility: URGENT CARE | Age: 57
End: 2022-01-14
Attending: FAMILY MEDICINE
Payer: COMMERCIAL

## 2022-01-14 DIAGNOSIS — Z20.822 SUSPECTED 2019 NOVEL CORONAVIRUS INFECTION: ICD-10-CM

## 2022-01-14 LAB — SARS-COV-2 RNA RESP QL NAA+PROBE: NEGATIVE

## 2022-01-14 PROCEDURE — U0003 INFECTIOUS AGENT DETECTION BY NUCLEIC ACID (DNA OR RNA); SEVERE ACUTE RESPIRATORY SYNDROME CORONAVIRUS 2 (SARS-COV-2) (CORONAVIRUS DISEASE [COVID-19]), AMPLIFIED PROBE TECHNIQUE, MAKING USE OF HIGH THROUGHPUT TECHNOLOGIES AS DESCRIBED BY CMS-2020-01-R: HCPCS

## 2022-01-14 PROCEDURE — U0005 INFEC AGEN DETEC AMPLI PROBE: HCPCS

## 2022-03-16 NOTE — PROGRESS NOTES
"  Assessment & Plan     Inattention  We discussed referral for the neuropsychology testing as she thinks ( and her  and therapist too ) she has ADHD  , would need to follow up with me after the testing is done and forward the results to me   - Adult Mental Health  Referral; Future  Pt is aware  and comfortable with the current plan.    Morbid obesity (H)  Would benefit from exercise on a regular basis and also healthy diet     Generalized anxiety disorder  She is currently on Paxil 10 mg , went down from the 20mg a year ago and doing well , no concerns         BMI:   Estimated body mass index is 35.03 kg/m  as calculated from the following:    Height as of 10/5/21: 1.626 m (5' 4\").    Weight as of this encounter: 92.6 kg (204 lb 1.6 oz).           Vanessa Ladd MD  [facility]    Adam Tyson is a 56 year old who presents for the following health issues     History of Present Illness       Reason for visit:  ADHD  Symptom onset:  More than a month  Symptoms include:  Unable to focus, hyper and all over the place.  Symptom intensity:  Moderate  Symptom progression:  Staying the same  Had these symptoms before:  Yes  Has tried/received treatment for these symptoms:  No  What makes it worse:  No  What makes it better:  Exercise    She eats 2-3 servings of fruits and vegetables daily.She consumes 0 sweetened beverage(s) daily.She exercises with enough effort to increase her heart rate 60 or more minutes per day.  She exercises with enough effort to increase her heart rate 6 days per week.   She is taking medications regularly.    Online therapist seeing since June , therapist and  who is a physician think that she has ADD        ADHD Initial    Major concerns: ADHD evaluation,.      Sleep:     Symptom Checklist:  Inattentiveness: often having trouble sustaining attention.  Hyperactivity: often fidgeting or squirming.  Impulsivity: .  These symptoms are observed at " home.  Additional documentation review: None,    Behavioral history obtained:   Co-Morbid Diagnosis: anxiety , on Paxil   Currently in counseling:         Family Cardiac history reviewed                      Review of Systems   Constitutional, HEENT, cardiovascular, pulmonary, GI, , musculoskeletal, neuro, skin, endocrine and psych systems are negative, except as otherwise noted.      Objective    LMP 08/28/2015 (Approximate)   There is no height or weight on file to calculate BMI.  Physical Exam   GENERAL: healthy, alert and no distress  EYES: Eyes grossly normal to inspection, PERRL and conjunctivae and sclerae normal  NECK: no adenopathy, no asymmetry, masses, or scars and thyroid normal to palpation  RESP: lungs clear to auscultation - no rales, rhonchi or wheezes  CV: regular rate and rhythm, normal S1 S2, no S3 or S4, no murmur, click or rub, no peripheral edema and peripheral pulses strong  ABDOMEN: soft, nontender, no hepatosplenomegaly, no masses and bowel sounds normal  MS: no gross musculoskeletal defects noted, no edema  NEURO: Normal strength and tone, mentation intact and speech normal  PSYCH: mentation appears normal, affect normal/bright    No results found for this or any previous visit (from the past 24 hour(s)).

## 2022-03-18 ENCOUNTER — OFFICE VISIT (OUTPATIENT)
Dept: FAMILY MEDICINE | Facility: CLINIC | Age: 57
End: 2022-03-18
Payer: COMMERCIAL

## 2022-03-18 VITALS
WEIGHT: 204.1 LBS | SYSTOLIC BLOOD PRESSURE: 120 MMHG | HEART RATE: 70 BPM | BODY MASS INDEX: 35.03 KG/M2 | TEMPERATURE: 97.3 F | DIASTOLIC BLOOD PRESSURE: 85 MMHG | OXYGEN SATURATION: 96 %

## 2022-03-18 DIAGNOSIS — R41.840 INATTENTION: Primary | ICD-10-CM

## 2022-03-18 DIAGNOSIS — F41.1 GENERALIZED ANXIETY DISORDER: ICD-10-CM

## 2022-03-18 DIAGNOSIS — E66.01 MORBID OBESITY (H): ICD-10-CM

## 2022-03-18 PROCEDURE — 99214 OFFICE O/P EST MOD 30 MIN: CPT | Performed by: FAMILY MEDICINE

## 2022-03-18 RX ORDER — FAMOTIDINE 20 MG
TABLET ORAL
COMMUNITY
End: 2022-04-07

## 2022-04-07 ENCOUNTER — E-VISIT (OUTPATIENT)
Dept: URGENT CARE | Facility: CLINIC | Age: 57
End: 2022-04-07

## 2022-04-07 ENCOUNTER — VIRTUAL VISIT (OUTPATIENT)
Dept: FAMILY MEDICINE | Facility: CLINIC | Age: 57
End: 2022-04-07
Payer: COMMERCIAL

## 2022-04-07 DIAGNOSIS — R05.9 COUGH: ICD-10-CM

## 2022-04-07 DIAGNOSIS — U07.1 COVID-19 VIRUS INFECTION: Primary | ICD-10-CM

## 2022-04-07 DIAGNOSIS — Z20.822 SUSPECTED COVID-19 VIRUS INFECTION: Primary | ICD-10-CM

## 2022-04-07 PROCEDURE — 99213 OFFICE O/P EST LOW 20 MIN: CPT | Mod: CS | Performed by: PHYSICIAN ASSISTANT

## 2022-04-07 PROCEDURE — 99421 OL DIG E/M SVC 5-10 MIN: CPT | Performed by: PHYSICIAN ASSISTANT

## 2022-04-07 RX ORDER — ALBUTEROL SULFATE 90 UG/1
1-2 AEROSOL, METERED RESPIRATORY (INHALATION) EVERY 4 HOURS PRN
Qty: 6.7 G | Refills: 0 | Status: SHIPPED | OUTPATIENT
Start: 2022-04-07 | End: 2024-06-07

## 2022-04-07 RX ORDER — DOXYCYCLINE 100 MG/1
100 CAPSULE ORAL 2 TIMES DAILY
Qty: 14 CAPSULE | Refills: 0 | Status: SHIPPED | OUTPATIENT
Start: 2022-04-07 | End: 2022-04-14

## 2022-04-07 RX ORDER — BENZONATATE 100 MG/1
100 CAPSULE ORAL 3 TIMES DAILY PRN
Qty: 25 CAPSULE | Refills: 0 | Status: SHIPPED | OUTPATIENT
Start: 2022-04-07 | End: 2023-03-03

## 2022-04-07 NOTE — PATIENT INSTRUCTIONS
Román Tyson,    Thank you for allowing [facility] to manage your care.    If you develop worsening/changing symptoms at any time, please call 911 or go to the emergency department for evaluation.    I sent your prescriptions to your pharmacy.    For cough not controlled by other medications, please use benzonatate as prescribed. Do not use this medication while driving, operating machinery, with other sedating medications, or while drinking alcohol as it will make you drowsy.    For your pain, please use Ibuprofen 400mg four times daily with food. Between ibuprofen doses, you may use Tylenol 650mg.     Max acetaminophen (Tylenol) 4,000mg/24 hours  Max ibuprofen 3,200mg/24 hours    If you have any questions or concerns, please feel free to call us at [phone]    Sincerely,    Sedrick Pimentel PA-C    Did you know?      You can schedule a video visit for follow-up appointments as well as future appointments for certain conditions.  Please see the below link.     [email or web address]    If you have not already done so,  I encourage you to sign up for [employer] ([email or web address]).  This will allow you to review your results, securely communicate with a provider, and schedule virtual visits as well.      Patient Education   Discharge Instructions for COVID-19 Patients  You were tested for COVID-19. Your result was positive. This means you do have COVID-19 (coronavirus). Follow the instructions below. If you were tested for an upcoming procedure, you should also contact your provider for next steps.   Is there medicine to treat COVID-19?  Yes, there are two kinds of treatment: Antiviral (virus-killing) medicine and antibody treatment (called monoclonal antibodies). These have been proven safe and effective. They may make you feel better faster, keep you out of the hospital, and prevent death.   It's very important to take them early in your illness before you  "get worse.   Who should take this medicine?   These treatments are for people who are not in the hospital, but they're at risk of getting very sick from COVID. This includes people who:    Are over age 65    Are members of the BIPOC community (black, indigenous and people of color)    Are overweight (body mass index is over 25)    Are inactive (don't exercise)    Are pregnant    Smoke or vape (now or in the past)    Have a disability    Have any of the following health problems: Diabetes, high blood pressure, cancer, heart problems, liver disease, lung disease, kidney disease, sickle cell disease, cystic fibrosis (CF), dementia and other neurological (brain) diseases, HIV, thalassemia or tuberculosis    Have ever had a stroke, organ transplant or blood cell transplant    Have a mental health problem or substance abuse disorder (drugs, alcohol)    Have a weak immune system (are immuno-compromised)  If your risk is high, we strongly urge you to get treated as soon as possible.     If symptoms started in the last 5 days: You may qualify for pills (antiviral medicines like Paxlovid and molnupiravir). To schedule an appointment to discuss COVID-19 treatment, you can:  ? Call your family clinic. Or, dial [phone] ([phone]) and press [phone].  ? Go to [email or web address] (click \"Schedule your appointment\").  ? Request an appointment on CheckiO (select COVID-19 Treatment\").    If your symptoms started in the last 7 days: You may qualify for antibody shots. Fill out a request form at [email or web address]. (If you don't read and write in English, or you need help with the form, call your family clinic.) Not everyone is chosen for this treatment. If you're selected, someone will contact you within 1 to 2 business days.  How can I take care of myself at home?  1. Get lots of rest.  2. Drink extra fluids (unless a doctor has told you not to).  3. Take acetaminophen (Tylenol) for fever " or pain. (If you have liver or kidney problems, first ask your family doctor if it's safe to take acetaminophen.  ? Adults may take either:    650 mg acetaminophen (two 325 mg pills) every 4 to 6 hours as needed (but no more than 10 pills per day), or     1,000 mg acetaminophen (two 500 mg pills) every 6 hours as needed (but no more than 6 pills per day).    Note: Don't take more than 3,000 mg of acetaminophen (Tylenol) in one day. Acetaminophen is found in many medicines (both prescribed and over-the-counter medicines). Read all labels to be sure you don't take too much.  ? For children:    Check the acetaminophen (Tylenol) bottle to find out the right dose (based on their age or weight.)    Don't give children more than1,625 mg of acetaminophen in one day.  4. Know when to call 911. Emergency warning signs include:  ? Trouble breathing or shortness of breath  ? Pain or pressure in the chest that doesn't go away  ? Feeling confused like you haven't felt before, or not being able to wake up  ? Bluish-colored lips or face  If you have other health problems (like cancer, heart failure, an organ transplant or severe kidney disease): Call your specialty clinic if you don't feel better in the next 2 days.  How can I protect others?  If you DO have symptoms:    Stay home and away from others (self-isolate):  ? For at least 5 days after your symptoms started, AND UNTIL  ? You've had no fever for 24 hours--with no medicine that reduces fever, AND  ? Your other symptoms (such as a cough) are better.    Wear a mask or face covering for 10 full days anytime you're around other people.  If you DON'T have symptoms:    Stay at home and away from others   for at least 5 days after your positive test.    Wear a mask or face covering for 10 full days anytime you're around other people.  If you plan to visit a clinic or hospital, please check their visitor guidelines before you arrive--healthcare sites may have different rules. If  you were tested because you're going to have surgery or another procedure, contact your care team for next steps.  If you were severely ill from COVID-19 or have a weak immune system: stay at home and away from others for at least 10 days. Ask your doctor about other actions you should take.   During self-isolation    Stay home until it's safe to be around others.    At home, stay away from other people and pets. Or, wear a well-fitting mask when you need to be around others.    Monitor your symptoms. If you have any emergency warning signs (including trouble breathing), seek emergency medical care right away.    Stay in a separate room from other household members, if possible.    Use a separate bathroom, if possible.    Improve ventilation at home, if possible.    Don't share personal household items, like cups, towels, and utensils.  When can I go back to work?  You should NOT go back to work until you meet the guidelines above for ending your home isolation. You don't need to be re-tested for COVID-19 before going back to work. Studies show that you won't spread the virus if it's been at least   10 days since your symptoms started (or 20 days if you have a weak immune system).  Employers, schools and daycares: This document serves as formal notice of medical guidelines before your employee or student can return to work or school. They must meet the above guidelines before going back in person.   Where can I get more information?    [facility] - About COVID-19:   [email or web address]    CDC - What to Do If You're Sick:   www.cdc.gov/coronavirus/2019-ncov/about/steps-when-sick.html    CDC - Ending Home Isolation:   www.cdc.gov/coronavirus/2019-ncov/your-health/quarantine-isolation.html    [facility] Clinical trials (COVID-19 research studies):  [email or web address]    For informational purposes only. Not to replace the advice of your health care provider.  Clinically reviewed by Dr. Abebe Rodriguez.   Copyright   2020 [facility]. All rights reserved. [employer] 256003 - REV 03/10/22.

## 2022-04-07 NOTE — PROGRESS NOTES
Marbella is a 56 year old who is being evaluated via a billable video visit.      How would you like to obtain your AVS? MyChart  If the video visit is dropped, the invitation should be resent by: Text to cell phone: [phone]  Will anyone else be joining your video visit? No  Video Start Time: 1:48 PM    Assessment & Plan   Problem List Items Addressed This Visit     None      Visit Diagnoses     COVID-19 virus infection    -  Primary    Relevant Medications    albuterol (PROAIR HFA/PROVENTIL HFA/VENTOLIN HFA) 108 (90 Base) MCG/ACT inhaler    benzonatate (TESSALON) 100 MG capsule    Other Relevant Orders    COVID-19 GetWell Loop Referral    Cough        Relevant Medications    doxycycline hyclate (VIBRAMYCIN) 100 MG capsule    albuterol (PROAIR HFA/PROVENTIL HFA/VENTOLIN HFA) 108 (90 Base) MCG/ACT inhaler         Impression is likely mild COVID-19. Vaccinated. Does not qualify for antivirals given she is out of the time frame for this. Appears well and non-toxic and I have low suspicion for impending airway obstruction or respiratory distress.  She will push p.o. fluids, use over-the-counter meds for symptoms, complete a course of doxycycline to cover for bacterial respiratory superinfection, albuterol inhaler and follow-up with us in 1-2 weeks if not improving or urgent care/the ER if symptoms worsen/change at any time.    DDx and Dx discussed with and explained to the pt to their satisfaction.  All questions were answered at this time. Pt expressed understanding of and agreement with this dx, tx, and plan. No further workup warranted and standard medication warnings given. I have given the patient a list of pertinent indications for re-evaluation. Will go to the Emergency Department if symptoms worsen or new concerning symptoms arise. Patient left the call in no apparent distress.      minutes spent on the date of the encounter doing chart review, history and exam, documentation and further activities per the  "note     BMI:   Estimated body mass index is 35.03 kg/m  as calculated from the following:    Height as of 10/5/21: 1.626 m (5' 4\").    Weight as of 3/18/22: 92.6 kg (204 lb 1.6 oz).     See Patient Instructions    Return in about 1 week (around 4/14/2022) for a recheck of your symptoms if not improving, or call 911/go to an ER anytime if worsening.    SAROJ Cox  [facility] Tyson is a 56 year old who presents for the following health issues   HPI     F/U for Covid.  Tested Pos for Covid 3/26/22 with an at home test. First had symptoms on 3/25/22. Still has cough and feeling run down, whereas the others that became ill at the same time have improved. Also when she breathes her ears hurt. Both ears. The air around her ears. Has been puting kleenex in her ears. Has been taking Ibu, Naproxen.     Review of Systems   Constitutional, HEENT, cardiovascular, pulmonary, gi and gu systems are negative, except as otherwise noted.      Objective           Vitals:  No vitals were obtained today due to virtual visit.    Physical Exam   GENERAL: Healthy, alert and no distress  EYES: Eyes grossly normal to inspection.  No discharge or erythema, or obvious scleral/conjunctival abnormalities.  RESP: No audible wheeze, cough, or visible cyanosis.  No visible retractions or increased work of breathing.    SKIN: Visible skin clear. No significant rash, abnormal pigmentation or lesions.  NEURO: Cranial nerves grossly intact.  Mentation and speech appropriate for age.  PSYCH: Mentation appears normal, affect normal/bright, judgement and insight intact, normal speech and appearance well-groomed.     Video-Visit Details    Type of service:  Video Visit    Video End Time: 2:03 PM    Originating Location (pt. Location): Home    Distant Location (provider location):  [facility]     Platform used for Video Visit: Alice"

## 2022-04-07 NOTE — PATIENT INSTRUCTIONS
Dear Marbella,    Based on your responses, you may have coronavirus (COVID-19). This illness can cause fever, cough and trouble breathing. Many people get a mild case and get better on their own. Some people can get very sick.    Will I be tested for COVID-19?  We would like to test you for COVID-19 virus. I have placed orders for this test.     For all employees or close contacts (except [place] and [place] - see below), go to your [employer] home page and scroll down to the section that says  You have an appointment that needs to be scheduled  and click the large green button that says  Schedule Now  and follow the steps to find the next available opening.     If you are unable to complete these steps or if you cannot find any available times, please call [phone] to schedule employee testing.     [place] employees or close contacts, please call [phone].   [place] ([facility]) employees or close contacts call [phone].    Return to work guidance:  Please let your workplace manager and staffing office know when your isolation ends. Note: if you tested through [facility], there is no need to report to [facility]. If you did not test through [facility], send a copy of your results to [email or web address]. [place] call [phone],  [place] call [phone].     Please visit the Employee COVID-19 Testing Information page on the COVID-19 [employer] site. Here you will find return to work and testing guidance, high and low risk exposure definitions, and frequently asked questions.   [employer] URL: [email or web address]/SitePages/Employee-COVID-19-testing.aspx     How can I take care of myself?  Over the counter medications may help with your symptoms such as runny or stuffy nose, cough, chills, or fever.  Talk to your care team about your options.     Some people are at high risk of severe illness (for example, you have a weak immune system, you re 65 years or  older, or you have certain medical problems). If your risk is high and your symptoms started in the last 5 to 7 days, we strongly recommend for you to get COVID treatment as soon as possible. Paxlovid, Molnupiravir and the monoclonal antibody treatments are proven safe and effective, make you feel better faster, and prevent hospitalization and death.       To schedule an appointment to discuss COVID treatment, request an appointment on [facility] (select  COVID-19 Treatment ) or call 1BERTRAND ([phone]), press 7.      Get lots of rest. Drink extra fluids (unless a doctor has told you not to)    Take Tylenol (acetaminophen) or ibuprofen for fever or pain. If you have liver or kidney problems, ask your family doctor if it's okay to take Tylenol o ibuprofen    Take over the counter medications for your symptoms, as directed by your doctor. You may also talk to your pharmacist.      If you have other health problems (like cancer, heart failure, an organ transplant or severe kidney disease): Call your specialty clinic if you don't feel better in the next 2 days.    Know when to call 911. Emergency warning signs include:  o Trouble breathing or shortness of breath  o Pain or pressure in the chest that doesn't go away  o Feeling confused like you haven't felt before, or not being able to wake up  o Bluish-colored lips or face    Where can I get more information?    [facility] - About COVID-19: [email or web address]     CDC - What to Do If You're Sick: www.cdc.gov/coronavirus/2019-ncov/about/steps-when-sick.html    CDC - Ending Home Isolation: https://www.cdc.gov/coronavirus/2019-ncov/your-health/quarantine-isolation.html     [facility] clinical trials (COVID-19 research studies): clinicalaffairs.[facility]/umn-clinical-trials    Below are the COVID-19 hotlines at the [facility] ([facility]). Interpreters are available.  o For health questions: Call 057-727-1052 or  1-489.390.9718 (7 a.m. to 7 p.m.)  o For questions about schools and childcare: Call 135-371-4873 or 1-976.853.9627 (7 a.m. to 7 p.m.)    April 7, 2022  RE:  Marbella Hendrix                                                                                                                  [address] [facility] [place] [address]      To whom it may concern:    I evaluated Marbella Hendrix on April 7, 2022. Marbella Hendrix should be excused from work/school.     They should let their workplace manager and staffing office know when their quarantine ends.    We can not give an exact date as it depends on the information below. They can calculate this on their own or work with their manager/staffing office to calculate this. (For example if they were exposed on 10/04, they would have to quarantine for 14 full days. That would be through 10/18. They could return on 10/19.)    Quarantine Guidelines:      If patient receives a positive COVID-19 test result, they should follow the guidance of those who are giving the results. Usually the return to work is 10 (or in some cases 20 days from symptom onset.) If they work at [employer], they must be cleared by Employee Occupational Health and Safety to return to work.        If patient receives a negative COVID-19 test result and did not have a high risk exposure to someone with a known positive COVID-19 test, they can return to work once they're free of fever for 24 hours without fever-reducing medication and their symptoms are improving or resolved.      If patient receives a negative COVID-19 test and if they had a high risk exposure to someone who has tested positive for COVID-19 then they can return to work 14 days after their last contact with the positive individual    Note: If there is ongoing exposure to the covid positive person, this quarantine period may be longer than 14 days. (For example, if they are continually exposed to their child, who tested  positive and cannot isolate from them, then the quarantine of 7-14 days can't start until their child is no longer contagious. This is typically 10 days from onset to the child's symptoms. So the total duration may be 17-24 days in this case.)     Sincerely,  Arsenio Escoto PA-C

## 2022-04-13 ENCOUNTER — OFFICE VISIT (OUTPATIENT)
Dept: FAMILY MEDICINE | Facility: CLINIC | Age: 57
End: 2022-04-13
Payer: COMMERCIAL

## 2022-04-13 ENCOUNTER — TELEPHONE (OUTPATIENT)
Dept: NURSING | Facility: CLINIC | Age: 57
End: 2022-04-13

## 2022-04-13 VITALS
OXYGEN SATURATION: 96 % | HEIGHT: 64 IN | SYSTOLIC BLOOD PRESSURE: 136 MMHG | BODY MASS INDEX: 34.66 KG/M2 | RESPIRATION RATE: 10 BRPM | TEMPERATURE: 98.5 F | DIASTOLIC BLOOD PRESSURE: 80 MMHG | HEART RATE: 79 BPM | WEIGHT: 203 LBS

## 2022-04-13 DIAGNOSIS — F43.22 ADJUSTMENT DISORDER WITH ANXIOUS MOOD: ICD-10-CM

## 2022-04-13 DIAGNOSIS — R53.83 OTHER FATIGUE: ICD-10-CM

## 2022-04-13 DIAGNOSIS — R05.9 COUGH: Primary | ICD-10-CM

## 2022-04-13 LAB
ANION GAP SERPL CALCULATED.3IONS-SCNC: 7 MMOL/L (ref 3–14)
BUN SERPL-MCNC: 12 MG/DL (ref 7–30)
CALCIUM SERPL-MCNC: 9.6 MG/DL (ref 8.5–10.1)
CHLORIDE BLD-SCNC: 107 MMOL/L (ref 94–109)
CO2 SERPL-SCNC: 25 MMOL/L (ref 20–32)
CREAT SERPL-MCNC: 0.65 MG/DL (ref 0.52–1.04)
ERYTHROCYTE [DISTWIDTH] IN BLOOD BY AUTOMATED COUNT: 14.2 % (ref 10–15)
GFR SERPL CREATININE-BSD FRML MDRD: >90 ML/MIN/1.73M2
GLUCOSE BLD-MCNC: 107 MG/DL (ref 70–99)
HBA1C MFR BLD: 5.7 % (ref 0–5.6)
HCT VFR BLD AUTO: 38.3 % (ref 35–47)
HGB BLD-MCNC: 12.9 G/DL (ref 11.7–15.7)
MCH RBC QN AUTO: 28.2 PG (ref 26.5–33)
MCHC RBC AUTO-ENTMCNC: 33.7 G/DL (ref 31.5–36.5)
MCV RBC AUTO: 84 FL (ref 78–100)
PLATELET # BLD AUTO: 268 10E3/UL (ref 150–450)
POTASSIUM BLD-SCNC: 4 MMOL/L (ref 3.4–5.3)
RBC # BLD AUTO: 4.58 10E6/UL (ref 3.8–5.2)
SARS-COV-2 RNA RESP QL NAA+PROBE: POSITIVE
SODIUM SERPL-SCNC: 139 MMOL/L (ref 133–144)
TSH SERPL DL<=0.005 MIU/L-ACNC: 1.18 MU/L (ref 0.4–4)
WBC # BLD AUTO: 4.9 10E3/UL (ref 4–11)

## 2022-04-13 PROCEDURE — 99214 OFFICE O/P EST MOD 30 MIN: CPT | Performed by: FAMILY MEDICINE

## 2022-04-13 PROCEDURE — U0005 INFEC AGEN DETEC AMPLI PROBE: HCPCS | Performed by: FAMILY MEDICINE

## 2022-04-13 PROCEDURE — 85027 COMPLETE CBC AUTOMATED: CPT | Performed by: FAMILY MEDICINE

## 2022-04-13 PROCEDURE — 83036 HEMOGLOBIN GLYCOSYLATED A1C: CPT | Performed by: FAMILY MEDICINE

## 2022-04-13 PROCEDURE — 84443 ASSAY THYROID STIM HORMONE: CPT | Performed by: FAMILY MEDICINE

## 2022-04-13 PROCEDURE — 80048 BASIC METABOLIC PNL TOTAL CA: CPT | Performed by: FAMILY MEDICINE

## 2022-04-13 PROCEDURE — U0003 INFECTIOUS AGENT DETECTION BY NUCLEIC ACID (DNA OR RNA); SEVERE ACUTE RESPIRATORY SYNDROME CORONAVIRUS 2 (SARS-COV-2) (CORONAVIRUS DISEASE [COVID-19]), AMPLIFIED PROBE TECHNIQUE, MAKING USE OF HIGH THROUGHPUT TECHNOLOGIES AS DESCRIBED BY CMS-2020-01-R: HCPCS | Performed by: FAMILY MEDICINE

## 2022-04-13 PROCEDURE — 36415 COLL VENOUS BLD VENIPUNCTURE: CPT | Performed by: FAMILY MEDICINE

## 2022-04-13 NOTE — TELEPHONE ENCOUNTER
Coronavirus (COVID-19) Notification    Caller Name (Patient, parent, daughter/son, grandparent, etc)  Patient    Reason for call  Notify of Positive Coronavirus (COVID-19) lab results, assess symptoms,  review [facility] recommendations    Lab Result    Lab test:  2019-nCoV rRt-PCR or SARS-CoV-2 PCR    Oropharyngeal AND/OR nasopharyngeal swabs is POSITIVE for 2019-nCoV RNA/SARS-COV-2 PCR (COVID-19 virus)      Gather patient reported symptoms   Assessment   Current Symptoms at time of phone call, reported by patient: (if no symptoms, document: No symptoms] cough   Date of symptom(s) onset (if applicable) 04/06/2022       If at time of call, Patients symptoms have worsened, the Patient should contact 911 or have someone drive them to Emergency Dept promptly:      If Patient calling 911, inform 911 personal that you have tested positive for the Coronavirus (COVID-19).  Place mask on and await 911 to arrive.    If Emergency Dept, If possible, please have another adult drive you to the Emergency Dept but you need to wear mask when in contact with other people.      Treatment Options:   Patient classified as COVID treatment eligible by Epic high risk algorithm: No  You may be eligible to receive a new treatment with a monoclonal antibody for preventing hospitalization in patients at high risk for complications from COVID-19.  This medication is still experimental and available on a limited basis; it is given through an IV and must be given at an infusion center.  Please note that not all people who are eligible will receive the medication since it is in limited supply.   Is the patient symptomatic? No. Patient does not qualify.    Review information with Patient    Your result was positive. This means you have COVID-19 (coronavirus).    How can I protect others?    These guidelines are for isolating before returning to work, school or .    If you DO have symptoms    Stay home and away from others     For at  least 5 days after your symptoms started, AND    You are fever free for 24 hours (with no medicine that reduces fever), AND    Your other symptoms are better    Wear a mask for 10 full days anytime you are around others    If you DON'T have symptoms    Stay home and away from others for at least 5 days after your positive test    Wear a mask for 10 full days anytime you are around others    There may be different guidelines for healthcare facilities.  Please check with the specific sites before arriving.    If you have been told by a doctor that you were severely ill with COVID-19 or are immunocompromised, you should isolate for at least 10 days.    You should not go back to work until you meet the guidelines above for ending your home isolation. You don't need to be retested for COVID-19 before going back to work--studies show that you won't spread the virus if it's been at least 10 days since your symptoms started (or 20 days, if you have a weak immune system).    Employers, schools, and daycares: This is an official notice for this person's medical guidelines for returning in-person.  They must meet the above guidelines before going back to work, school or  in person.    You will receive a positive COVID-19 letter via [employer] or the mail soon with additional self-care information (exception, no letters will be sent to presurgical/preprocedure patients).    Would you like me to review some of that information with you now?  No    If you were tested for an upcoming procedure, please contact your provider for next steps.    Litzy Fleming

## 2022-04-13 NOTE — PROGRESS NOTES
"  Assessment & Plan     Post covid Cough with on going fatigue   56 year old female,  COVID positive March 26th- continues to have coughing, fatigue . Her son is completely recovered .  - XR Chest 2 Views- clear  - CBC with platelets; WNL  - Basic metabolic panel  (Ca, Cl, CO2, Creat, Gluc, K, Na, BUN); Future  - Hemoglobin A1c; 5.7  - Symptomatic; Yes; 3/25/2022 COVID-19 Virus (Coronavirus) by PCR Nose, positive     Encouraged to stay well hydrated & improve nutrition, deep breathing,continue to try small walks.  Follow up if high grade fever, respiratory distress.  Anticipate gradual improvement    Inhalers listed in her medications list &  she was recently prescribed & no diagnoses  asthma and copd     Adjustment disorder with anxious mood  - considered this problem when making today's plans  - no interventions today       -followed by specialist.        BMI:   Estimated body mass index is 34.84 kg/m  as calculated from the following:    Height as of this encounter: 1.626 m (5' 4\").    Weight as of this encounter: 92.1 kg (203 lb).       COVID-19 positive patient.  Encounter for consideration of medication intervention. Patient does not qualify for a prescription. Full discussion with patient including medication options, risks and benefits. Potential drug interactions reviewed with patient.         Estimated body mass index is 34.84 kg/m  as calculated from the following:    Height as of this encounter: 1.626 m (5' 4\").    Weight as of this encounter: 92.1 kg (203 lb).  GFR Estimate   Date Value Ref [place] Status   04/13/2022 >90 >60 mL/min/1.73m2 Final     Comment:     Effective December 21, 2021 eGFRcr in adults is calculated using the 2021 CKD-EPI creatinine equation which includes age and gender (Donavan singh al., NEJM, DOI: 10.1056/NIHJgh2488096)   08/28/2015 >90  Non  GFR Calc   >60 mL/min/1.7m2 Final     Lab Results   Component Value Date    [id number] Positive (A) 04/13/2022       Return in " "about 2 weeks (around 4/27/2022) for concerns,unresolved.    Denise Jon MD  [facility]    Adam Tyson is a 56 year old who presents for the following health issues     History of Present Illness       Reason for visit:  Been sick since March 26th  Symptom onset:  3-4 weeks ago  Symptoms include:  Achy, lung/chest pain, run down, runny and congested nose, cough with mostly white mucus, body aches  Symptom intensity:  Moderate  Symptom progression:  Staying the same  Had these symptoms before:  Yes  Has tried/received treatment for these symptoms:  Yes  Previous treatment was successful:  Yes  Prior treatment description:  Some antibiotic, can't remember which.  What makes it worse:  Not getting at least six hours of sleep.  What makes it better:  Sleep and acupuncture.    She eats 4 or more servings of fruits and vegetables daily.She consumes 0 sweetened beverage(s) daily.She exercises with enough effort to increase her heart rate 60 or more minutes per day.  She exercises with enough effort to increase her heart rate 6 days per week.   She is taking medications regularly.             Review of Systems   Constitutional, HEENT, cardiovascular, pulmonary, GI, , musculoskeletal, neuro, skin, endocrine and psych systems are negative, except as otherwise noted.      Objective    /80   Pulse 79   Temp 98.5  F (36.9  C)   Resp 10   Ht 1.626 m (5' 4\")   Wt 92.1 kg (203 lb)   LMP 08/28/2015 (Approximate)   SpO2 96%   BMI 34.84 kg/m    Body mass index is 34.84 kg/m .  Physical Exam   GENERAL: healthy, alert and no distress  HENT: ear canals and TM's normal, nose and mouth without ulcers or lesions  NECK: no adenopathy, no asymmetry, masses, or scars and thyroid normal to palpation  RESP: lungs clear to auscultation - no rales, rhonchi or wheezes  CV: regular rate and rhythm, normal S1 S2, no S3 or S4, no murmur, click or rub, no peripheral edema and peripheral pulses " strong  ABDOMEN: soft, nontender, no hepatosplenomegaly, no masses and bowel sounds normal  PSYCH: mentation appears normal, affect normal/bright    Results for orders placed or performed in visit on 04/13/22   XR Chest 2 Views     Status: None    Narrative    CHEST TWO VIEWS  4/13/2022 11:07 AM     HISTORY:  Cough.    COMPARISON: 10/22/2012.      Impression    IMPRESSION: Negative chest. Lungs clear.    SCOTT NIÑO MD         SYSTEM ID:  [id number]   TSH with free T4 reflex     Status: Normal   Result Value Ref [place]    TSH 1.18 0.40 - 4.00 mU/L   Symptomatic; Yes; 3/25/2022 COVID-19 Virus (Coronavirus) by PCR Nose     Status: Abnormal    Specimen: Nose; Swab   Result Value Ref [place]    SARS CoV2 PCR Positive (A) Negative, Testing sent to reference lab. Results will be returned via unsolicited result    Narrative    Testing was performed using the Xpert Xpress SARS-CoV-2 Assay on the  Cepheid Gene-Xpert Instrument Systems. Additional information about  this Emergency Use Authorization (EUA) assay can be found via the Lab  Guide. This test should be ordered for the detection of SARS-CoV-2 in  individuals who meet SARS-CoV-2 clinical and/or epidemiological  criteria. Test performance is unknown in asymptomatic patients. This  test is for in vitro diagnostic use under the FDA EUA for  laboratories certified under CLIA to perform high complexity testing.  This test has not been FDA cleared or approved. A negative result  does not rule out the presence of PCR inhibitors in the specimen or  target RNA in concentration below the limit of detection for the  assay. The possibility of a false negative should be considered if  the patient's recent exposure or clinical presentation suggests  COVID-19. This test was validated by the [facility]. This laboratory is certified under  the Clinical Laboratory Improvement Amendments of 1988 (CLIA-88) as  qualified to perform high  complexity laboratory testing.     Hemoglobin A1c     Status: Abnormal   Result Value Ref [place]    Hemoglobin A1C 5.7 (H) 0.0 - 5.6 %   Basic metabolic panel  (Ca, Cl, CO2, Creat, Gluc, K, Na, BUN)     Status: Abnormal   Result Value Ref [place]    Sodium 139 133 - 144 mmol/L    Potassium 4.0 3.4 - 5.3 mmol/L    Chloride 107 94 - 109 mmol/L    Carbon Dioxide (CO2) 25 20 - 32 mmol/L    Anion Gap 7 3 - 14 mmol/L    Urea Nitrogen 12 7 - 30 mg/dL    Creatinine 0.65 0.52 - 1.04 mg/dL    Calcium 9.6 8.5 - 10.1 mg/dL    Glucose 107 (H) 70 - 99 mg/dL    GFR Estimate >90 >60 mL/min/1.73m2   CBC with platelets     Status: Normal   Result Value Ref [place]    WBC Count 4.9 4.0 - 11.0 10e3/uL    RBC Count 4.58 3.80 - 5.20 10e6/uL    Hemoglobin 12.9 11.7 - 15.7 g/dL    Hematocrit 38.3 35.0 - 47.0 %    MCV 84 78 - 100 fL    MCH 28.2 26.5 - 33.0 pg    MCHC 33.7 31.5 - 36.5 g/dL    RDW 14.2 10.0 - 15.0 %    Platelet Count 268 150 - 450 10e3/uL

## 2022-06-23 ENCOUNTER — TRANSFERRED RECORDS (OUTPATIENT)
Dept: HEALTH INFORMATION MANAGEMENT | Facility: CLINIC | Age: 57
End: 2022-06-23

## 2022-06-23 LAB — PAP SMEAR - HIM PATIENT REPORTED: NEGATIVE

## 2022-06-29 NOTE — PATIENT INSTRUCTIONS
Preventive Health Recommendations  Female Ages 50 - 64    Yearly exam: See your health care provider every year in order to  Review health changes.   Discuss preventive care.    Review your medicines if your doctor has prescribed any.    Get a Pap test every three years (unless you have an abnormal result and your provider advises testing more often).  If you get Pap tests with HPV test, you only need to test every 5 years, unless you have an abnormal result.   You do not need a Pap test if your uterus was removed (hysterectomy) and you have not had cancer.  You should be tested each year for STDs (sexually transmitted diseases) if you're at risk.   Have a mammogram every 1 to 2 years.  Have a colonoscopy at age 50, or have a yearly FIT test (stool test). These exams screen for colon cancer.    Have a cholesterol test every 5 years, or more often if advised.  Have a diabetes test (fasting glucose) every three years. If you are at risk for diabetes, you should have this test more often.   If you are at risk for osteoporosis (brittle bone disease), think about having a bone density scan (DEXA).    Shots: Get a flu shot each year. Get a tetanus shot every 10 years.    Nutrition:   Eat at least 5 servings of fruits and vegetables each day.  Eat whole-grain bread, whole-wheat pasta and brown rice instead of white grains and rice.  Get adequate Calcium and Vitamin D.     Lifestyle  Exercise at least 150 minutes a week (30 minutes a day, 5 days a week). This will help you control your weight and prevent disease.  Limit alcohol to one drink per day.  No smoking.   Wear sunscreen to prevent skin cancer.   See your dentist every six months for an exam and cleaning.  See your eye doctor every 1 to 2 years.  PLEASE CALL TO SCHEDULE YOUR MAMMOGRAM  [facility] [phone]  [facility] [phone]  [facility]   [phone]  Central Scheduling (all locations) [phone]    If  you are under/uninsured, we recommend you contact the [facility]. They offer mammograms on a sliding fee charge. You can schedule with them at [phone].
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
## 2022-06-30 ASSESSMENT — ENCOUNTER SYMPTOMS
COUGH: 0
SORE THROAT: 0
DIARRHEA: 0
JOINT SWELLING: 0
CONSTIPATION: 0
WEAKNESS: 0
MYALGIAS: 0
FEVER: 0
NERVOUS/ANXIOUS: 0
NAUSEA: 0
DIZZINESS: 0
BREAST MASS: 0
ABDOMINAL PAIN: 0
HEMATURIA: 0
ARTHRALGIAS: 1
PARESTHESIAS: 1
HEMATOCHEZIA: 0
SHORTNESS OF BREATH: 0
DYSURIA: 1
PALPITATIONS: 0
HEADACHES: 0
HEARTBURN: 0
CHILLS: 0
EYE PAIN: 0
FREQUENCY: 0

## 2022-07-05 ENCOUNTER — OFFICE VISIT (OUTPATIENT)
Dept: FAMILY MEDICINE | Facility: CLINIC | Age: 57
End: 2022-07-05
Payer: COMMERCIAL

## 2022-07-05 VITALS
DIASTOLIC BLOOD PRESSURE: 87 MMHG | OXYGEN SATURATION: 98 % | BODY MASS INDEX: 35.1 KG/M2 | WEIGHT: 205.6 LBS | SYSTOLIC BLOOD PRESSURE: 132 MMHG | HEART RATE: 68 BPM | TEMPERATURE: 97.7 F | RESPIRATION RATE: 16 BRPM | HEIGHT: 64 IN

## 2022-07-05 DIAGNOSIS — Z00.00 ROUTINE GENERAL MEDICAL EXAMINATION AT A HEALTH CARE FACILITY: Primary | ICD-10-CM

## 2022-07-05 DIAGNOSIS — E03.9 ACQUIRED HYPOTHYROIDISM: ICD-10-CM

## 2022-07-05 DIAGNOSIS — E78.5 HYPERLIPIDEMIA LDL GOAL <130: ICD-10-CM

## 2022-07-05 PROCEDURE — 99396 PREV VISIT EST AGE 40-64: CPT | Performed by: FAMILY MEDICINE

## 2022-07-05 PROCEDURE — 99214 OFFICE O/P EST MOD 30 MIN: CPT | Mod: 25 | Performed by: FAMILY MEDICINE

## 2022-07-05 ASSESSMENT — ENCOUNTER SYMPTOMS
MYALGIAS: 0
BREAST MASS: 0
FEVER: 0
COUGH: 0
CONSTIPATION: 0
HEARTBURN: 0
DIARRHEA: 0
HEADACHES: 0
NAUSEA: 0
CHILLS: 0
ABDOMINAL PAIN: 0
PARESTHESIAS: 1
WEAKNESS: 0
HEMATURIA: 0
NERVOUS/ANXIOUS: 0
SHORTNESS OF BREATH: 0
HEMATOCHEZIA: 0
FREQUENCY: 0
EYE PAIN: 0
ARTHRALGIAS: 1
DIZZINESS: 0
DYSURIA: 1
SORE THROAT: 0
PALPITATIONS: 0
JOINT SWELLING: 0

## 2022-07-05 ASSESSMENT — PAIN SCALES - GENERAL: PAINLEVEL: NO PAIN (0)

## 2022-07-05 NOTE — NURSING NOTE
"Chief Complaint   Patient presents with     Physical     /87   Pulse 68   Temp 97.7  F (36.5  C) (Tympanic)   Resp 16   Ht 1.626 m (5' 4\")   Wt 93.3 kg (205 lb 9.6 oz)   LMP 08/28/2015   SpO2 98%   BMI 35.29 kg/m   Estimated body mass index is 35.29 kg/m  as calculated from the following:    Height as of this encounter: 1.626 m (5' 4\").    Weight as of this encounter: 93.3 kg (205 lb 9.6 oz).  bp completed using cuff size: regular      Health Maintenance addressed:  NONE    n/a    Elo Hinojosa, RN, MA     "

## 2022-07-08 ENCOUNTER — LAB (OUTPATIENT)
Dept: LAB | Facility: CLINIC | Age: 57
End: 2022-07-08
Payer: COMMERCIAL

## 2022-07-08 DIAGNOSIS — E03.9 ACQUIRED HYPOTHYROIDISM: ICD-10-CM

## 2022-07-08 DIAGNOSIS — E78.5 HYPERLIPIDEMIA LDL GOAL <130: ICD-10-CM

## 2022-07-08 LAB
ALBUMIN SERPL-MCNC: 4.5 G/DL (ref 3.4–5)
ALP SERPL-CCNC: 63 U/L (ref 40–150)
ALT SERPL W P-5'-P-CCNC: 37 U/L (ref 0–50)
ANION GAP SERPL CALCULATED.3IONS-SCNC: 10 MMOL/L (ref 3–14)
AST SERPL W P-5'-P-CCNC: 29 U/L (ref 0–45)
BILIRUB SERPL-MCNC: 0.4 MG/DL (ref 0.2–1.3)
BUN SERPL-MCNC: 18 MG/DL (ref 7–30)
CALCIUM SERPL-MCNC: 9.4 MG/DL (ref 8.5–10.1)
CHLORIDE BLD-SCNC: 108 MMOL/L (ref 94–109)
CHOLEST SERPL-MCNC: 142 MG/DL
CO2 SERPL-SCNC: 20 MMOL/L (ref 20–32)
CREAT SERPL-MCNC: 0.58 MG/DL (ref 0.52–1.04)
FASTING STATUS PATIENT QL REPORTED: YES
GFR SERPL CREATININE-BSD FRML MDRD: >90 ML/MIN/1.73M2
GLUCOSE BLD-MCNC: 93 MG/DL (ref 70–99)
HDLC SERPL-MCNC: 37 MG/DL
LDLC SERPL CALC-MCNC: 84 MG/DL
NONHDLC SERPL-MCNC: 105 MG/DL
POTASSIUM BLD-SCNC: 4.4 MMOL/L (ref 3.4–5.3)
PROT SERPL-MCNC: 7.3 G/DL (ref 6.8–8.8)
SODIUM SERPL-SCNC: 138 MMOL/L (ref 133–144)
TRIGL SERPL-MCNC: 106 MG/DL
TSH SERPL DL<=0.005 MIU/L-ACNC: 1.49 MU/L (ref 0.4–4)

## 2022-07-08 PROCEDURE — 84443 ASSAY THYROID STIM HORMONE: CPT

## 2022-07-08 PROCEDURE — 36415 COLL VENOUS BLD VENIPUNCTURE: CPT

## 2022-07-08 PROCEDURE — 80053 COMPREHEN METABOLIC PANEL: CPT

## 2022-07-08 PROCEDURE — 80061 LIPID PANEL: CPT

## 2022-07-19 ENCOUNTER — TRANSFERRED RECORDS (OUTPATIENT)
Dept: HEALTH INFORMATION MANAGEMENT | Facility: CLINIC | Age: 57
End: 2022-07-19

## 2022-07-19 DIAGNOSIS — E03.9 ACQUIRED HYPOTHYROIDISM: ICD-10-CM

## 2022-07-20 RX ORDER — LEVOTHYROXINE SODIUM 125 UG/1
125 TABLET ORAL DAILY
Qty: 90 TABLET | Refills: 3 | Status: SHIPPED | OUTPATIENT
Start: 2022-07-20 | End: 2022-10-20

## 2022-07-22 ENCOUNTER — TRANSFERRED RECORDS (OUTPATIENT)
Dept: HEALTH INFORMATION MANAGEMENT | Facility: CLINIC | Age: 57
End: 2022-07-22

## 2022-07-25 DIAGNOSIS — E78.5 HYPERLIPIDEMIA LDL GOAL <130: ICD-10-CM

## 2022-07-26 RX ORDER — ATORVASTATIN CALCIUM 20 MG/1
20 TABLET, FILM COATED ORAL DAILY
Qty: 1 TABLET | Refills: 0 | OUTPATIENT
Start: 2022-07-26

## 2022-07-27 ENCOUNTER — MYC MEDICAL ADVICE (OUTPATIENT)
Dept: FAMILY MEDICINE | Facility: CLINIC | Age: 57
End: 2022-07-27

## 2022-08-06 ENCOUNTER — MYC MEDICAL ADVICE (OUTPATIENT)
Dept: FAMILY MEDICINE | Facility: CLINIC | Age: 57
End: 2022-08-06

## 2022-08-06 DIAGNOSIS — F41.1 GAD (GENERALIZED ANXIETY DISORDER): Primary | ICD-10-CM

## 2022-08-08 NOTE — TELEPHONE ENCOUNTER
LS,      Please see [employer] message.  Patient ok with waiting until you return to the clinic.    Thank you,  Mikaela Niño RN

## 2022-08-19 RX ORDER — PAROXETINE 10 MG/1
10 TABLET, FILM COATED ORAL EVERY MORNING
Qty: 90 TABLET | Refills: 1 | Status: SHIPPED | OUTPATIENT
Start: 2022-08-19 | End: 2022-10-20

## 2022-10-19 ENCOUNTER — E-VISIT (OUTPATIENT)
Dept: FAMILY MEDICINE | Facility: CLINIC | Age: 57
End: 2022-10-19
Payer: COMMERCIAL

## 2022-10-19 DIAGNOSIS — F41.9 ANXIETY: ICD-10-CM

## 2022-10-19 DIAGNOSIS — E03.9 ACQUIRED HYPOTHYROIDISM: Primary | ICD-10-CM

## 2022-10-19 DIAGNOSIS — E78.5 HYPERLIPIDEMIA LDL GOAL <130: ICD-10-CM

## 2022-10-19 PROCEDURE — 99421 OL DIG E/M SVC 5-10 MIN: CPT | Performed by: FAMILY MEDICINE

## 2022-10-20 DIAGNOSIS — E03.9 ACQUIRED HYPOTHYROIDISM: ICD-10-CM

## 2022-10-20 DIAGNOSIS — F41.1 GAD (GENERALIZED ANXIETY DISORDER): ICD-10-CM

## 2022-10-20 RX ORDER — PAROXETINE 10 MG/1
10 TABLET, FILM COATED ORAL EVERY MORNING
Qty: 4 TABLET | Refills: 0 | Status: SHIPPED | OUTPATIENT
Start: 2022-10-20 | End: 2023-03-03

## 2022-10-20 RX ORDER — LEVOTHYROXINE SODIUM 125 UG/1
125 TABLET ORAL DAILY
Qty: 3 TABLET | Refills: 0 | Status: SHIPPED | OUTPATIENT
Start: 2022-10-20 | End: 2023-03-03

## 2022-10-20 RX ORDER — LEVOTHYROXINE SODIUM 125 UG/1
125 TABLET ORAL DAILY
Qty: 4 TABLET | Refills: 0 | Status: SHIPPED | OUTPATIENT
Start: 2022-10-20 | End: 2023-07-20

## 2022-10-20 RX ORDER — ATORVASTATIN CALCIUM 20 MG/1
20 TABLET, FILM COATED ORAL DAILY
Qty: 3 TABLET | Refills: 0 | Status: SHIPPED | OUTPATIENT
Start: 2022-10-20 | End: 2023-03-03

## 2022-10-20 RX ORDER — PAROXETINE 10 MG/1
10 TABLET, FILM COATED ORAL EVERY MORNING
Qty: 3 TABLET | Refills: 0 | Status: SHIPPED | OUTPATIENT
Start: 2022-10-20 | End: 2023-03-03

## 2022-10-20 NOTE — TELEPHONE ENCOUNTER
Patient requested x1 refill of meds ASAP as she is out of town and forget to bring enough to cover her until she returns on Sunday.    Routing to care team to review and send ASAP if appropriate. Pended one week refill if appropriate.     Analilia Pelayo RN

## 2022-10-24 ENCOUNTER — IMMUNIZATION (OUTPATIENT)
Dept: FAMILY MEDICINE | Facility: CLINIC | Age: 57
End: 2022-10-24
Payer: COMMERCIAL

## 2022-10-24 DIAGNOSIS — Z23 ENCOUNTER FOR IMMUNIZATION: Primary | ICD-10-CM

## 2022-10-24 PROCEDURE — 99207 PR NO CHARGE NURSE ONLY: CPT

## 2022-10-24 PROCEDURE — 90471 IMMUNIZATION ADMIN: CPT

## 2022-10-24 PROCEDURE — 0124A COVID-19,PF,PFIZER BOOSTER BIVALENT: CPT

## 2022-10-24 PROCEDURE — 91312 COVID-19,PF,PFIZER BOOSTER BIVALENT: CPT

## 2022-10-24 PROCEDURE — 90682 RIV4 VACC RECOMBINANT DNA IM: CPT

## 2022-10-26 ENCOUNTER — E-VISIT (OUTPATIENT)
Dept: FAMILY MEDICINE | Facility: CLINIC | Age: 57
End: 2022-10-26
Payer: COMMERCIAL

## 2022-10-26 ENCOUNTER — E-VISIT (OUTPATIENT)
Dept: FAMILY MEDICINE | Facility: CLINIC | Age: 57
End: 2022-10-26

## 2022-10-26 DIAGNOSIS — G47.00 INSOMNIA, UNSPECIFIED TYPE: Primary | ICD-10-CM

## 2022-10-26 PROCEDURE — 99207 PR NON-BILLABLE SERV PER CHARTING: CPT | Performed by: FAMILY MEDICINE

## 2022-10-28 ENCOUNTER — VIRTUAL VISIT (OUTPATIENT)
Dept: FAMILY MEDICINE | Facility: CLINIC | Age: 57
End: 2022-10-28
Payer: COMMERCIAL

## 2022-10-28 DIAGNOSIS — M79.645 PAIN OF FINGER OF LEFT HAND: ICD-10-CM

## 2022-10-28 DIAGNOSIS — M79.645 PAIN OF LEFT THUMB: ICD-10-CM

## 2022-10-28 DIAGNOSIS — G47.00 INSOMNIA, UNSPECIFIED TYPE: Primary | ICD-10-CM

## 2022-10-28 PROCEDURE — 99214 OFFICE O/P EST MOD 30 MIN: CPT | Mod: 95 | Performed by: FAMILY MEDICINE

## 2022-10-28 RX ORDER — TRAZODONE HYDROCHLORIDE 50 MG/1
50 TABLET, FILM COATED ORAL AT BEDTIME
Qty: 30 TABLET | Refills: 1 | Status: SHIPPED | OUTPATIENT
Start: 2022-10-28 | End: 2023-03-03 | Stop reason: SINTOL

## 2022-10-28 NOTE — PROGRESS NOTES
"Marbella is a 57 year old who is being evaluated via a billable video visit.      How would you like to obtain your AVS? MyChart  If the video visit is dropped, the invitation should be resent by: Text to cell phone: [phone]  Will anyone else be joining your video visit? No        Assessment & Plan     Insomnia, unspecified type  We discussed starting 25 mg of trazodone at bedtime and if no help she can take 50 mg at HS for insomnia   Also discussed seeing a sleep specialist and possibly doing the sleep study   Referral given   - traZODone (DESYREL) 50 MG tablet; Take 1 tablet (50 mg) by mouth At Bedtime Start with half a tablet at bedtime and can increase to one tablet at bedtime  - Adult Sleep Eval & Management  Referral; Future      BMI:   Estimated body mass index is 35.29 kg/m  as calculated from the following:    Height as of 7/5/22: 1.626 m (5' 4\").    Weight as of 7/5/22: 93.3 kg (205 lb 9.6 oz).     follow up in 4 weeks sooner if any concerns   Pt is aware  and comfortable with the current plan.      Vanessa Ladd MD  [facility]    Subjective   Marbella is a 57 year old, presenting for the following health issues:  No chief complaint on file.      HPI     Insomnia  Onset/Duration: several months to a year   Description:   Frequency of insomnia:  several times a week  Time to fall asleep (sleep latency): 1 hour (after taking sleep medications)   Middle of night awakening:  YES  Early morning awakening:  YES  Progression of Symptoms:  worsening  Accompanying Signs & Symptoms:  Daytime sleepiness/napping: YES (dragging during the day but does not nap)  Excessive snoring/apnea: No  Restless legs: No  Waking to urinate: YES  Chronic pain:  YES - left hand where she has arthritis  Depression symptoms (if yes, do PHQ9): No  Anxiety symptoms (if yes, do BERTHA-7): YES  History:  Prior Insomnia: YES - not this long though   New stressful situation: No  Precipitating factors:   Caffeine " intake: YES - same amount of coffee though the past 20 years - doesn't drink after 4pm   OTC decongestants: No  Any new medications: No  Alleviating factors:  Self medicating (alcohol, etc.):  No  Stress-reduction (exercise, yoga, meditation etc): YES - exercise daily  Therapies tried and outcome: Benadryl -  effective, will take a Melatonin if can't sleep after an hour, meds help to get asleep but not stay asleep, if wakes at 3am is up for the day          Review of Systems   Constitutional, HEENT, cardiovascular, pulmonary, GI, , musculoskeletal, neuro, skin, endocrine and psych systems are negative, except as otherwise noted.      Objective           Vitals:  No vitals were obtained today due to virtual visit.    Physical Exam   GENERAL: Healthy, alert and no distress  EYES: Eyes grossly normal to inspection.  No discharge or erythema, or obvious scleral/conjunctival abnormalities.  RESP: No audible wheeze, cough, or visible cyanosis.  No visible retractions or increased work of breathing.    SKIN: Visible skin clear. No significant rash, abnormal pigmentation or lesions.  NEURO: Cranial nerves grossly intact.  Mentation and speech appropriate for age.  PSYCH: Mentation appears normal, affect normal/bright, judgement and insight intact, normal speech and appearance well-groomed.    No results found for this or any previous visit (from the past 24 hour(s)).            Video-Visit Details    Video Start Time: 4:14 pm     Type of service:  Video Visit    Video End Time:4:26 pm     Originating Location (pt. Location): Home    Distant Location (provider location):  On-site    Platform used for Video Visit: [employer]

## 2022-11-02 ENCOUNTER — THERAPY VISIT (OUTPATIENT)
Dept: OCCUPATIONAL THERAPY | Facility: CLINIC | Age: 57
End: 2022-11-02
Payer: COMMERCIAL

## 2022-11-02 DIAGNOSIS — M79.645 THUMB PAIN, LEFT: Primary | ICD-10-CM

## 2022-11-02 PROCEDURE — 97140 MANUAL THERAPY 1/> REGIONS: CPT | Mod: GO | Performed by: OCCUPATIONAL THERAPIST

## 2022-11-02 PROCEDURE — 97165 OT EVAL LOW COMPLEX 30 MIN: CPT | Mod: GO | Performed by: OCCUPATIONAL THERAPIST

## 2022-11-02 PROCEDURE — 97110 THERAPEUTIC EXERCISES: CPT | Mod: GO | Performed by: OCCUPATIONAL THERAPIST

## 2022-11-02 NOTE — PROGRESS NOTES
Hand Therapy Evaluation    Current Date:  11/2/2022    Diagnosis: Left thumb CMC/hand pain  Onset 1/1/2019  R handed    Subjective:   The thumb has been really sore. I have been doing all the housework since my  had a knee replacement 4 weeks ago.  He has been able to do his self-cares now, but I am taking care of of all other things at home.  The left thumb has been really painful.    Answers for HPI/ROS submitted by the patient on 11/1/2022  Reason for Visit:: Painful L hand at thumb to wrist  How problem occurred:: Arthritis  Number scale: 8/10  General health as reported by patient: good  Please check all that apply to your current or past medical history: menopausal, osteoarthritis, tuberculosis, other  Other Med Hx Detail: R hip replacement, anxiety, history of fibroids-abdominal hysterectomy aug. 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, sepsis, historectomy  Medications you are currently taking: sleep medication, thyroid medication, other  Other Meds Detail: anti anxiety, cholesterol, aleve for pain  Occupation:: None at the present time  What are your primary job tasks: computer work, driving, lifting/carrying, prolonged standing, pushing/pulling, repetitive tasks    Functional Outcome Measure:  Upper Extremity Functional Index Score:  SCORE:   Column Totals: /80: (P) 22   (A lower score indicates greater disability.)    Objective:  Pain Level (Scale 0-10)   12/29/21 11/2/2022     At Rest 3 On vacations, can be down to a 3/10   With Use 8 8          Pain Description  Date 11/18/2020 11/2/2022     Location thumb L thumb, into the IF at times   Pain Quality Aching and Shooting Aching and Shooting, feels like it is on fire   Frequency constant   constant   Pain is worst  daytime Day, night   Exacerbated by  use    Relieved by Comfort cool Ices at night  Has had a lot of acupuncture. Feels better  "afterward.  paraffin has felt good but does not want one for home.   Progression worsening Worsening over time     ROM  Thumb 11/3/2021 11/29/2021 11/2/2022   11/2/2022     AROM  (PROM) Left Left L L   MP /50+ /50+ 35+ 55   IP /90+ /90+ 66+  67 in isolation 60   RABD 50+ 50+ 32+ 45   PABD 53+ 53+ 39+ 45   Retropulsion (cm) 1++ 1+     Kapandji Opposition Scale (0-10/10) 10++ 10+ 10 10+       Thumb Observation/Appearance   - none  + mild    ++ moderate    +++ severe     11/3/2021 11/2/2022     Shoulder deformity present over CMC R: -  L: - B -   Edema over the CMC joint R: -  L: ++ L: ++  puffiness noted at the joint visually and with palpation   Noted collapse of MP into hyperextension during pinch R: +  L: +       Provocative Tests  Pain Report:  - none    + mild    ++ moderate    +++ severe     11/3/2021 11/2/2022     CMC Grind test R: -  L: - deferred   Crepitus present R: -  L: - \"   CMC Adduction Stress Test R: -  L: - \"   CMC Extension Stress Test R: -  L: - \"   Finkelstein's R: -  L: - \"       Strength:  Pain-free /Pinch Test    11/2/2022   Trials R L   1   36 18 ++     Lat Pinch  11/2/2022   Trials R L   1   12.5 + 3.5 went up to where pain was beginning     3 Pt Pinch  11/2/2022   Trials R L   1   8 2       Palpation   Pain Report:  - none    + mild    ++ moderate    +++ severe    12/8/2021   12/15/21 11/2/2022     CMC Joint Line   L+++   CMC AP Joint Laxity      Thenar Eminence L: + L: ++ L+   Web Space L: + L: ++ L+   1st DC      Radial Styloid      FCR      EIP L: ++ to +++         Assessment:  Patient presents with symptoms consistent with condition as noted above.    Patient's limitations or Problem List includes: pain, weakness, edema, decreased ROM of the left hand and thumb which interferes with the patient's ability to perform ADLs and instrumental activities of daily living as compared to previous level of function.    Rehab Potential:  Excellent, expect return to normal " activities.    Patient will benefit from skilled Occupational Therapy to increase ROM and decrease pain, to return to previous activity level and resume normal daily tasks and to reach their rehab potential.    Barriers to Learning:  no barriers    Communication Issues:  Patient appears to be able to clearly communicate and understand verbal and written communication and follow directions correctly.    Chart Review: Brief history including review of medical and/or therapy records relating to the presenting problem and Simple history review with patient    Identified Performance Deficits:  self cares and home establishment and management.  Assessment of Occupational Performance:  3-5 Performance Deficits    Clinical Decision Making (Complexity): Low complexity    Treatment Explanation:  The following has been discussed with the patient:  RX ordered/plan of care  Anticipated outcomes  Possible risks and side effects    Plan:  Frequency:  1 X week, once daily  Duration:  for 8 weeks tapering to 2 X a month over 4 weeks    Treatment Plan:   Modalities:  US, Paraffin and Laser Light  Therapeutic Exercise:  AROM, Contract Relax, Isotonics, Isometrics and Stabilization  Neuromuscular re-education:  Nerve Gliding, Coordination/Dexterity, Proprioceptive Training and Kinesiotaping  Manual Techniques:  Joint mobilization, Myofascial release and Manual edema mobilization  Orthotic Fabrication:  Static, Hand based and Forearm based  Self Care:  Self Care Tasks and Ergonomic Considerations    Discharge Plan:  Achieve all LTG.  Independent in home treatment program.  Reach maximal therapeutic benefit.      Home Program:  12/8/2021  SCS to EIP  Massage EIP  Icing    11/2/2022  Exercises: see ptrx  Tried adductor stretching, but this is difficult for the patient to do on her own  Place and hold pinch is in ptrx, but may need to hold, patient is having a lot of pain    Splints:  L comfort cool, and a FA based OTC thumb spica  L  FABTSO - custom - almost never wears it - too restrictive  L HBTSO - custom - wears all the time    Activities: goes to the gym a lot, works out at home    Spiky ball and embossing round tipped tool to do thenar massage (tray of tools), also uses 2 pencils, has a small vice    11/2/2022  Added gentle in line traction to try. Tried websapce stretch but difficult to get a good stretch on her own.    Next Visit:  STM/MFR, revisit thumb stabilization program  Incorporate joint protection into daily functional activities  Adaptive equipment further discussion  Pain management

## 2022-11-21 ENCOUNTER — THERAPY VISIT (OUTPATIENT)
Dept: OCCUPATIONAL THERAPY | Facility: CLINIC | Age: 57
End: 2022-11-21
Payer: COMMERCIAL

## 2022-11-21 ENCOUNTER — MYC MEDICAL ADVICE (OUTPATIENT)
Dept: FAMILY MEDICINE | Facility: CLINIC | Age: 57
End: 2022-11-21

## 2022-11-21 DIAGNOSIS — M79.645 THUMB PAIN, LEFT: Primary | ICD-10-CM

## 2022-11-21 PROCEDURE — 97140 MANUAL THERAPY 1/> REGIONS: CPT | Mod: GO | Performed by: OCCUPATIONAL THERAPIST

## 2022-11-21 PROCEDURE — 97035 APP MDLTY 1+ULTRASOUND EA 15: CPT | Mod: GO | Performed by: OCCUPATIONAL THERAPIST

## 2022-11-21 NOTE — PROGRESS NOTES
"SOAP note objective information for 11/21/2022.    Please refer to the daily flowsheet for treatment today, total treatment time and time spent performing 1:1 timed codes.         Objective:  Pain Level (Scale 0-10)   12/29/21 11/2/2022 11/21/2022     At Rest 3 On vacations, can be down to a 3/10 8   With Use 8 8 8           Pain Description  Date 11/18/2020 11/2/2022     Location thumb L thumb, into the IF at times   Pain Quality Aching and Shooting Aching and Shooting, feels like it is on fire   Frequency constant   constant   Pain is worst  daytime Day, night   Exacerbated by  use    Relieved by Comfort cool Ices at night  Has had a lot of acupuncture. Feels better afterward.  paraffin has felt good but does not want one for home.   Progression worsening Worsening over time     ROM  Thumb 11/3/2021 11/29/2021 11/2/2022   11/2/2022     AROM  (PROM) Left Left L L   MP /50+ /50+ 35+ 55   IP /90+ /90+ 66+  67 in isolation 60   RABD 50+ 50+ 32+ 45   PABD 53+ 53+ 39+ 45   Retropulsion (cm) 1++ 1+     Kapandji Opposition Scale (0-10/10) 10++ 10+ 10 10+       Thumb Observation/Appearance   - none  + mild    ++ moderate    +++ severe     11/3/2021 11/2/2022     Shoulder deformity present over CMC R: -  L: - B -   Edema over the CMC joint R: -  L: ++ L: ++  puffiness noted at the joint visually and with palpation   Noted collapse of MP into hyperextension during pinch R: +  L: +       Provocative Tests  Pain Report:  - none    + mild    ++ moderate    +++ severe     11/3/2021 11/2/2022     CMC Grind test R: -  L: - deferred   Crepitus present R: -  L: - \"   CMC Adduction Stress Test R: -  L: - \"   CMC Extension Stress Test R: -  L: - \"   Finkelstein's R: -  L: - \"       Strength:  Pain-free /Pinch Test    11/2/2022   Trials R L   1   36 18 ++     Lat Pinch  11/2/2022   Trials R L   1   12.5 + 3.5 went up to where pain was beginning     3 Pt Pinch  11/2/2022   Trials R L   1   8 2       Palpation   Pain Report:  - " none    + mild    ++ moderate    +++ severe    12/8/2021   12/15/21 11/2/2022      CMC Joint Line   L+++    CMC AP Joint Laxity       Thenar Eminence L: + L: ++ L+    Web Space L: + L: ++ L+    1st DC       Radial Styloid       FCR       EIP L: ++ to +++          Treatment Plan:   Modalities:  US, Paraffin and Laser Light  Therapeutic Exercise:  AROM, Contract Relax, Isotonics, Isometrics and Stabilization  Neuromuscular re-education:  Nerve Gliding, Coordination/Dexterity, Proprioceptive Training and Kinesiotaping  Manual Techniques:  Joint mobilization, Myofascial release and Manual edema mobilization  Orthotic Fabrication:  Static, Hand based and Forearm based  Self Care:  Self Care Tasks and Ergonomic Considerations    Discharge Plan:  Achieve all LTG.  Independent in home treatment program.  Reach maximal therapeutic benefit.      Home Program:  12/8/2021  SCS to EIP  Massage EIP  Icing    11/2/2022  Exercises: see ptrx  Tried adductor stretching, but this is difficult for the patient to do on her own  Place and hold pinch is in ptrx, but may need to hold, patient is having a lot of pain    Splints:  L comfort cool, and a FA based OTC thumb spica  L FABTSO - custom - almost never wears it - too restrictive  L HBTSO - custom - wears all the time    Activities: goes to the gym a lot, works out at home    Spiky ball and embossing round tipped tool to do thenar massage (tray of tools), also uses 2 pencils, has a small vice    11/2/2022  Added gentle in line traction to try. Tried websapce stretch but difficult to get a good stretch on her own.    Next Visit:  STM/MFR, revisit thumb stabilization program  Incorporate joint protection into daily functional activities  Adaptive equipment further discussion  Pain management

## 2022-11-22 NOTE — TELEPHONE ENCOUNTER
VV scheduled for 12/2.  Evaluation stat scanned today.  Copy also at [facility] triage desk in case needed.    Mikaela Niño RN

## 2022-11-22 NOTE — TELEPHONE ENCOUNTER
LS,      Please see [employer] message.  Have copy of ADHD evaluation at [facility] triage desk.    Do you want to see patient or do a VV?    Mikaela Niño RN

## 2022-11-27 NOTE — PROGRESS NOTES
"SOAP note objective information for 11/28/2022.    Please refer to the daily flowsheet for treatment today, total treatment time and time spent performing 1:1 timed codes.         Objective:  Pain Level (Scale 0-10)   12/29/21 11/2/2022 11/21/22 11/28/2022     At Rest 3 On vacations, can be down to a 3/10 8 5   With Use 8 8 8 7            Pain Description  Date 11/18/2020 11/2/2022     Location thumb L thumb, into the IF at times   Pain Quality Aching and Shooting Aching and Shooting, feels like it is on fire   Frequency constant   constant   Pain is worst  daytime Day, night   Exacerbated by  use    Relieved by Comfort cool Ices at night  Has had a lot of acupuncture. Feels better afterward.  paraffin has felt good but does not want one for home.   Progression worsening Worsening over time     ROM  Thumb 11/3/2021 11/29/2021 11/2/2022   11/2/2022     AROM  (PROM) Left Left L L   MP /50+ /50+ 35+ 55   IP /90+ /90+ 66+  67 in isolation 60   RABD 50+ 50+ 32+ 45   PABD 53+ 53+ 39+ 45   Retropulsion (cm) 1++ 1+     Kapandji Opposition Scale (0-10/10) 10++ 10+ 10 10+       Thumb Observation/Appearance   - none  + mild    ++ moderate    +++ severe     11/3/2021 11/2/2022     Shoulder deformity present over CMC R: -  L: - B -   Edema over the CMC joint R: -  L: ++ L: ++  puffiness noted at the joint visually and with palpation   Noted collapse of MP into hyperextension during pinch R: +  L: +       Provocative Tests  Pain Report:  - none    + mild    ++ moderate    +++ severe     11/3/2021 11/2/2022     CMC Grind test R: -  L: - deferred   Crepitus present R: -  L: - \"   CMC Adduction Stress Test R: -  L: - \"   CMC Extension Stress Test R: -  L: - \"   Finkelstein's R: -  L: - \"       Strength:  Pain-free /Pinch Test    11/2/2022   Trials R L   1   36 18 ++     Lat Pinch  11/2/2022   Trials R L   1   12.5 + 3.5 went up to where pain was beginning     3 Pt Pinch  11/2/2022   Trials R L   1   8 2       Palpation "   Pain Report:  - none    + mild    ++ moderate    +++ severe    12/8/2021   12/15/21 11/2/2022      CMC Joint Line   L+++    CMC AP Joint Laxity       Thenar Eminence L: + L: ++ L+    Web Space L: + L: ++ L+    1st DC       Radial Styloid       FCR       EIP L: ++ to +++          Treatment Plan:   Modalities:  US, Paraffin and Laser Light  Therapeutic Exercise:  AROM, Contract Relax, Isotonics, Isometrics and Stabilization  Neuromuscular re-education:  Nerve Gliding, Coordination/Dexterity, Proprioceptive Training and Kinesiotaping  Manual Techniques:  Joint mobilization, Myofascial release and Manual edema mobilization  Orthotic Fabrication:  Static, Hand based and Forearm based  Self Care:  Self Care Tasks and Ergonomic Considerations    Discharge Plan:  Achieve all LTG.  Independent in home treatment program.  Reach maximal therapeutic benefit.      Home Program:  12/8/2021  SCS to EIP  Massage EIP  Icing    11/2/2022  Exercises: see ptrx  Tried adductor stretching, but this is difficult for the patient to do on her own  Place and hold pinch is in ptrx, but may need to hold, patient is having a lot of pain    Splints:  L comfort cool, and a FA based OTC thumb spica  L FABTSO - custom - almost never wears it - too restrictive  L HBTSO - custom - wears all the time    Activities: goes to the gym a lot, works out at home    Spiky ball and embossing round tipped tool to do thenar massage (tray of tools), also uses 2 pencils, has a small vice    11/2/2022  Added gentle in line traction to try. Tried websapce stretch but difficult to get a good stretch on her own.    Next Visit:  STM/MFR, revisit thumb stabilization program  Incorporate joint protection into daily functional activities  Adaptive equipment further discussion  Pain management

## 2022-11-28 ENCOUNTER — THERAPY VISIT (OUTPATIENT)
Dept: OCCUPATIONAL THERAPY | Facility: CLINIC | Age: 57
End: 2022-11-28
Payer: COMMERCIAL

## 2022-11-28 DIAGNOSIS — M79.645 THUMB PAIN, LEFT: Primary | ICD-10-CM

## 2022-11-28 PROCEDURE — 97035 APP MDLTY 1+ULTRASOUND EA 15: CPT | Mod: GO | Performed by: OCCUPATIONAL THERAPIST

## 2022-11-28 PROCEDURE — 97140 MANUAL THERAPY 1/> REGIONS: CPT | Mod: GO | Performed by: OCCUPATIONAL THERAPIST

## 2022-12-01 ENCOUNTER — THERAPY VISIT (OUTPATIENT)
Dept: OCCUPATIONAL THERAPY | Facility: CLINIC | Age: 57
End: 2022-12-01
Payer: COMMERCIAL

## 2022-12-01 DIAGNOSIS — M79.645 THUMB PAIN, LEFT: Primary | ICD-10-CM

## 2022-12-01 PROCEDURE — 97140 MANUAL THERAPY 1/> REGIONS: CPT | Mod: GO | Performed by: OCCUPATIONAL THERAPIST

## 2022-12-01 PROCEDURE — 97035 APP MDLTY 1+ULTRASOUND EA 15: CPT | Mod: GO | Performed by: OCCUPATIONAL THERAPIST

## 2022-12-02 ENCOUNTER — VIRTUAL VISIT (OUTPATIENT)
Dept: FAMILY MEDICINE | Facility: CLINIC | Age: 57
End: 2022-12-02
Payer: COMMERCIAL

## 2022-12-02 DIAGNOSIS — G47.00 INSOMNIA, UNSPECIFIED TYPE: ICD-10-CM

## 2022-12-02 DIAGNOSIS — F41.1 GENERALIZED ANXIETY DISORDER: Primary | ICD-10-CM

## 2022-12-02 DIAGNOSIS — F90.9 ATTENTION DEFICIT HYPERACTIVITY DISORDER (ADHD), UNSPECIFIED ADHD TYPE: ICD-10-CM

## 2022-12-02 PROCEDURE — 99214 OFFICE O/P EST MOD 30 MIN: CPT | Mod: 95 | Performed by: FAMILY MEDICINE

## 2022-12-02 RX ORDER — PAROXETINE 20 MG/1
20 TABLET, FILM COATED ORAL 2 TIMES DAILY
Qty: 90 TABLET | Refills: 1 | Status: SHIPPED | OUTPATIENT
Start: 2022-12-02 | End: 2023-03-03

## 2022-12-02 NOTE — PROGRESS NOTES
"Marbella is a 57 year old who is being evaluated via a billable video visit.      How would you like to obtain your AVS? MyChart  If the video visit is dropped, the invitation should be resent by: Text to cell phone: [phone]  Will anyone else be joining your video visit? No        Assessment & Plan     Generalized anxiety disorder  We will maximize the anxiety treatment first before   - PARoxetine (PAXIL) 20 MG tablet; Take 1 tablet (20 mg) by mouth 2 times daily    Insomnia, unspecified type  Has used melatonis 5 mg at      Attention deficit hyperactivity disorder (ADHD), unspecified ADHD type  We went over the recent neuropsychological testing   The first diagnosis on the assessment list is Anxiety disorder   Second is ADHD , ( without evidence of childhood impairment )   So we have to optimize the anxiety treatment first before starting an ADHD medication, discussed with the pt to go back to 20 mg of the Paxil, she had tapered herself down to 10 mg but will restart again on the 20 mg   Follow up in 4 to 5 weeks                BMI:   Estimated body mass index is 35.29 kg/m  as calculated from the following:    Height as of 7/5/22: 1.626 m (5' 4\").    Weight as of 7/5/22: 93.3 kg (205 lb 9.6 oz). follow up in 4 to 5 weeks   Sooner if any concerns   Pt is aware  and comfortable with the current plan.        Vanessa Ladd MD  [facility]    Subjective   Marbella is a 57 year old, presenting for the following health issues:  No chief complaint on file.      HPI     ADHD Follow-Up    Date of last ADHD office visit: N/A  Status since last visit:   Taking controlled (daily) medications as prescribed:                        Parent/Patient Concerns with Medications: None      Sleep: trouble staying asleep  Home/Family Concerns: Stable  Co-Morbid Diagnosis: Anxiety    Currently in counseling: Yes        Medication Benefits:   Controlled symptoms: n/a      Medication side effects:  Side effects noted: " n/a              Review of Systems   Constitutional, HEENT, cardiovascular, pulmonary, GI, , musculoskeletal, neuro, skin, endocrine and psych systems are negative, except as otherwise noted.      Objective           Vitals:  No vitals were obtained today due to virtual visit.    Physical Exam   GENERAL: Healthy, alert and no distress  EYES: Eyes grossly normal to inspection.  No discharge or erythema, or obvious scleral/conjunctival abnormalities.  RESP: No audible wheeze, cough, or visible cyanosis.  No visible retractions or increased work of breathing.    SKIN: Visible skin clear. No significant rash, abnormal pigmentation or lesions.  NEURO: Cranial nerves grossly intact.  Mentation and speech appropriate for age.  PSYCH: Mentation appears normal, affect normal/bright, judgement and insight intact, normal speech and appearance well-groomed.    No results found for this or any previous visit (from the past 24 hour(s)).            Video-Visit Details    Video Start Time: 4:47 pm     Type of service:  Video Visit    Video End Time:5:03 PM    Originating Location (pt. Location): Home    Distant Location (provider location):  On-site    Platform used for Video Visit: Alice

## 2022-12-05 ENCOUNTER — THERAPY VISIT (OUTPATIENT)
Dept: OCCUPATIONAL THERAPY | Facility: CLINIC | Age: 57
End: 2022-12-05
Payer: COMMERCIAL

## 2022-12-05 DIAGNOSIS — M79.645 THUMB PAIN, LEFT: Primary | ICD-10-CM

## 2022-12-05 PROCEDURE — 97018 PARAFFIN BATH THERAPY: CPT | Mod: GO | Performed by: OCCUPATIONAL THERAPIST

## 2022-12-05 PROCEDURE — 97035 APP MDLTY 1+ULTRASOUND EA 15: CPT | Mod: GO | Performed by: OCCUPATIONAL THERAPIST

## 2022-12-05 PROCEDURE — 97140 MANUAL THERAPY 1/> REGIONS: CPT | Mod: GO | Performed by: OCCUPATIONAL THERAPIST

## 2022-12-05 NOTE — PROGRESS NOTES
"SOAP note objective information for 12/5/2022.    Please refer to the daily flowsheet for treatment today, total treatment time and time spent performing 1:1 timed codes.         Objective:  Pain Level (Scale 0-10)   12/29/21 11/2/2022 11/21/22 11/28/2022 12/5/2022   At Rest 3 On vacations, can be down to a 3/10 8 5 3   With Use 8 8 8 7 5             Pain Description  Date 11/18/2020 11/2/2022     Location thumb L thumb, into the IF at times   Pain Quality Aching and Shooting Aching and Shooting, feels like it is on fire   Frequency constant   constant   Pain is worst  daytime Day, night   Exacerbated by  use    Relieved by Comfort cool Ices at night  Has had a lot of acupuncture. Feels better afterward.  paraffin has felt good but does not want one for home.   Progression worsening Worsening over time     ROM  Thumb 11/3/2021 11/29/2021 11/2/2022   11/2/2022     AROM  (PROM) Left Left L L   MP /50+ /50+ 35+ 55   IP /90+ /90+ 66+  67 in isolation 60   RABD 50+ 50+ 32+ 45   PABD 53+ 53+ 39+ 45   Retropulsion (cm) 1++ 1+     Kapandji Opposition Scale (0-10/10) 10++ 10+ 10 10+       Thumb Observation/Appearance   - none  + mild    ++ moderate    +++ severe     11/3/2021 11/2/2022     Shoulder deformity present over CMC R: -  L: - B -   Edema over the CMC joint R: -  L: ++ L: ++  puffiness noted at the joint visually and with palpation   Noted collapse of MP into hyperextension during pinch R: +  L: +       Provocative Tests  Pain Report:  - none    + mild    ++ moderate    +++ severe     11/3/2021 11/2/2022     CMC Grind test R: -  L: - deferred   Crepitus present R: -  L: - \"   CMC Adduction Stress Test R: -  L: - \"   CMC Extension Stress Test R: -  L: - \"   Finkelstein's R: -  L: - \"       Strength:  Pain-free /Pinch Test    11/2/2022   Trials R L   1   36 18 ++     Lat Pinch  11/2/2022   Trials R L   1   12.5 + 3.5 went up to where pain was beginning     3 Pt Pinch  11/2/2022   Trials R L   1   8 2 "       Palpation   Pain Report:  - none    + mild    ++ moderate    +++ severe    12/8/2021   12/15/21 11/2/2022   12/5/2022     CMC Joint Line   L+++    CMC AP Joint Laxity       Thenar Eminence L: + L: ++ L+ L: +   Web Space L: + L: ++ L+ L: +   1st DC       Radial Styloid       FCR       EIP L: ++ to +++          Treatment Plan:   Modalities:  US, Paraffin and Laser Light  Therapeutic Exercise:  AROM, Contract Relax, Isotonics, Isometrics and Stabilization  Neuromuscular re-education:  Nerve Gliding, Coordination/Dexterity, Proprioceptive Training and Kinesiotaping  Manual Techniques:  Joint mobilization, Myofascial release and Manual edema mobilization  Orthotic Fabrication:  Static, Hand based and Forearm based  Self Care:  Self Care Tasks and Ergonomic Considerations    Discharge Plan:  Achieve all LTG.  Independent in home treatment program.  Reach maximal therapeutic benefit.      Home Program:  12/8/2021  SCS to EIP  Massage EIP  Icing    11/2/2022  Exercises: see ptrx  Tried adductor stretching, but this is difficult for the patient to do on her own  Place and hold pinch is in ptrx, but may need to hold, patient is having a lot of pain    Splints:  L comfort cool, and a FA based OTC thumb spica  L FABTSO - custom - almost never wears it - too restrictive  L HBTSO - custom - wears all the time    Activities: goes to the gym a lot, works out at home    Spiky ball and embossing round tipped tool to do thenar massage (tray of tools), also uses 2 pencils, has a small vice    11/2/2022  Added gentle in line traction to try. Tried websapce stretch but difficult to get a good stretch on her own.    Next Visit:  STM/MFR, revisit thumb stabilization program  Incorporate joint protection into daily functional activities  Adaptive equipment further discussion  Pain management

## 2022-12-19 ENCOUNTER — THERAPY VISIT (OUTPATIENT)
Dept: OCCUPATIONAL THERAPY | Facility: CLINIC | Age: 57
End: 2022-12-19
Payer: COMMERCIAL

## 2022-12-19 DIAGNOSIS — M79.645 THUMB PAIN, LEFT: Primary | ICD-10-CM

## 2022-12-19 PROCEDURE — 97140 MANUAL THERAPY 1/> REGIONS: CPT | Mod: GO | Performed by: OCCUPATIONAL THERAPIST

## 2022-12-19 PROCEDURE — 97035 APP MDLTY 1+ULTRASOUND EA 15: CPT | Mod: GO | Performed by: OCCUPATIONAL THERAPIST

## 2022-12-19 NOTE — PROGRESS NOTES
"SOAP note objective information for 12/19/2022.    Please refer to the daily flowsheet for treatment today, total treatment time and time spent performing 1:1 timed codes.         Objective:  Pain Level (Scale 0-10)   12/29/21 11/2/2022 11/21/22 11/28/2022 12/5/2022 12/19/2022   At Rest 3 On vacations, can be down to a 3/10 8 5 3 0-3   With Use 8 8 8 7 5 5              Pain Description  Date 11/18/2020 11/2/2022     Location thumb L thumb, into the IF at times   Pain Quality Aching and Shooting Aching and Shooting, feels like it is on fire   Frequency constant   constant   Pain is worst  daytime Day, night   Exacerbated by  use    Relieved by Comfort cool Ices at night  Has had a lot of acupuncture. Feels better afterward.  paraffin has felt good but does not want one for home.   Progression worsening Worsening over time     ROM  Thumb 11/3/2021 11/29/2021 11/2/2022   11/2/2022     AROM  (PROM) Left Left L L   MP /50+ /50+ 35+ 55   IP /90+ /90+ 66+  67 in isolation 60   RABD 50+ 50+ 32+ 45   PABD 53+ 53+ 39+ 45   Retropulsion (cm) 1++ 1+     Kapandji Opposition Scale (0-10/10) 10++ 10+ 10 10+       Thumb Observation/Appearance   - none  + mild    ++ moderate    +++ severe     11/3/2021 11/2/2022     Shoulder deformity present over CMC R: -  L: - B -   Edema over the CMC joint R: -  L: ++ L: ++  puffiness noted at the joint visually and with palpation   Noted collapse of MP into hyperextension during pinch R: +  L: +       Provocative Tests  Pain Report:  - none    + mild    ++ moderate    +++ severe     11/3/2021 11/2/2022     CMC Grind test R: -  L: - deferred   Crepitus present R: -  L: - \"   CMC Adduction Stress Test R: -  L: - \"   CMC Extension Stress Test R: -  L: - \"   Finkelstein's R: -  L: - \"       Strength:  Pain-free /Pinch Test    11/2/2022   Trials R L   1   36 18 ++     Lat Pinch  11/2/2022   Trials R L   1   12.5 + 3.5 went up to where pain was beginning     3 Pt Pinch  11/2/2022 "   Trials R L   1   8 2       Palpation   Pain Report:  - none    + mild    ++ moderate    +++ severe    12/8/2021   12/15/21 11/2/2022   12/5/2022   12/19/22   CMC Joint Line   L+++     CMC AP Joint Laxity        Thenar Eminence L: + L: ++ L+ L: + L: +   Web Space L: + L: ++ L+ L: + L: -   1st DC        Radial Styloid        FCR        EIP L: ++ to +++           Treatment Plan:   Modalities:  US, Paraffin and Laser Light  Therapeutic Exercise:  AROM, Contract Relax, Isotonics, Isometrics and Stabilization  Neuromuscular re-education:  Nerve Gliding, Coordination/Dexterity, Proprioceptive Training and Kinesiotaping  Manual Techniques:  Joint mobilization, Myofascial release and Manual edema mobilization  Orthotic Fabrication:  Static, Hand based and Forearm based  Self Care:  Self Care Tasks and Ergonomic Considerations    Discharge Plan:  Achieve all LTG.  Independent in home treatment program.  Reach maximal therapeutic benefit.      Home Program:  12/8/2021  SCS to EIP  Massage EIP  Icing    11/2/2022  Exercises: see ptrx  Tried adductor stretching, but this is difficult for the patient to do on her own  Place and hold pinch is in ptrx, but may need to hold, patient is having a lot of pain    Splints:  L comfort cool, and a FA based OTC thumb spica  L FABTSO - custom - almost never wears it - too restrictive  L HBTSO - custom - wears all the time    Activities: goes to the gym a lot, works out at home    Spiky ball and embossing round tipped tool to do thenar massage (tray of tools), also uses 2 pencils, has a small vice    11/2/2022  Added gentle in line traction to try. Tried websapce stretch but difficult to get a good stretch on her own.    Next Visit:  STM/MFR, revisit thumb stabilization program  Incorporate joint protection into daily functional activities  Adaptive equipment further discussion  Pain management

## 2022-12-22 ENCOUNTER — ANCILLARY PROCEDURE (OUTPATIENT)
Dept: MAMMOGRAPHY | Facility: CLINIC | Age: 57
End: 2022-12-22
Attending: FAMILY MEDICINE
Payer: COMMERCIAL

## 2022-12-22 DIAGNOSIS — Z12.31 VISIT FOR SCREENING MAMMOGRAM: ICD-10-CM

## 2022-12-22 PROCEDURE — 77063 BREAST TOMOSYNTHESIS BI: CPT | Performed by: RADIOLOGY

## 2022-12-22 PROCEDURE — 77067 SCR MAMMO BI INCL CAD: CPT | Performed by: RADIOLOGY

## 2022-12-26 ENCOUNTER — THERAPY VISIT (OUTPATIENT)
Dept: OCCUPATIONAL THERAPY | Facility: CLINIC | Age: 57
End: 2022-12-26
Payer: COMMERCIAL

## 2022-12-26 DIAGNOSIS — M79.645 THUMB PAIN, LEFT: Primary | ICD-10-CM

## 2022-12-26 PROCEDURE — 97035 APP MDLTY 1+ULTRASOUND EA 15: CPT | Mod: GO | Performed by: OCCUPATIONAL THERAPIST

## 2022-12-26 PROCEDURE — 97018 PARAFFIN BATH THERAPY: CPT | Mod: GO | Performed by: OCCUPATIONAL THERAPIST

## 2022-12-26 PROCEDURE — 97140 MANUAL THERAPY 1/> REGIONS: CPT | Mod: GO | Performed by: OCCUPATIONAL THERAPIST

## 2022-12-26 NOTE — PROGRESS NOTES
"SOAP note objective information for 12/26/2022.    Please refer to the daily flowsheet for treatment today, total treatment time and time spent performing 1:1 timed codes.         Objective:  Pain Level (Scale 0-10)   12/29/21 11/2/2022 11/21/22 11/28/2022 12/5/2022 12/19/2022 12/26/2022   At Rest 3 On vacations, can be down to a 3/10 8 5 3 0-3 0-3   With Use 8 8 8 7 5 5 5               Pain Description  Date 11/18/2020 11/2/2022     Location thumb L thumb, into the IF at times   Pain Quality Aching and Shooting Aching and Shooting, feels like it is on fire   Frequency constant   constant   Pain is worst  daytime Day, night   Exacerbated by  use    Relieved by Comfort cool Ices at night  Has had a lot of acupuncture. Feels better afterward.  paraffin has felt good but does not want one for home.   Progression worsening Worsening over time     ROM  Thumb 11/3/2021 11/29/2021 11/2/2022   11/2/2022     AROM  (PROM) Left Left L L   MP /50+ /50+ 35+ 55   IP /90+ /90+ 66+  67 in isolation 60   RABD 50+ 50+ 32+ 45   PABD 53+ 53+ 39+ 45   Retropulsion (cm) 1++ 1+     Kapandji Opposition Scale (0-10/10) 10++ 10+ 10 10+       Thumb Observation/Appearance   - none  + mild    ++ moderate    +++ severe     11/3/2021 11/2/2022     Shoulder deformity present over CMC R: -  L: - B -   Edema over the CMC joint R: -  L: ++ L: ++  puffiness noted at the joint visually and with palpation   Noted collapse of MP into hyperextension during pinch R: +  L: +       Provocative Tests  Pain Report:  - none    + mild    ++ moderate    +++ severe     11/3/2021 11/2/2022     CMC Grind test R: -  L: - deferred   Crepitus present R: -  L: - \"   CMC Adduction Stress Test R: -  L: - \"   CMC Extension Stress Test R: -  L: - \"   Finkelstein's R: -  L: - \"       Strength:  Pain-free /Pinch Test    11/2/2022   Trials R L   1   36 18 ++     Lat Pinch  11/2/2022   Trials R L   1   12.5 + 3.5 went up to where pain was beginning     3 Pt Pinch  " 11/2/2022   Trials R L   1   8 2       Palpation   Pain Report:  - none    + mild    ++ moderate    +++ severe    12/8/2021   12/15/21 11/2/2022   12/5/2022   12/19/22 12/26/2022   CMC Joint Line   L+++      CMC AP Joint Laxity         Thenar Eminence L: + L: ++ L+ L: + L: + L: +   Web Space L: + L: ++ L+ L: + L: - L: -   1st DC         Radial Styloid         FCR         EIP L: ++ to +++            Treatment Plan:   Modalities:  US, Paraffin and Laser Light  Therapeutic Exercise:  AROM, Contract Relax, Isotonics, Isometrics and Stabilization  Neuromuscular re-education:  Nerve Gliding, Coordination/Dexterity, Proprioceptive Training and Kinesiotaping  Manual Techniques:  Joint mobilization, Myofascial release and Manual edema mobilization  Orthotic Fabrication:  Static, Hand based and Forearm based  Self Care:  Self Care Tasks and Ergonomic Considerations    Discharge Plan:  Achieve all LTG.  Independent in home treatment program.  Reach maximal therapeutic benefit.      Home Program:  12/8/2021  SCS to EIP  Massage EIP  Icing    11/2/2022  Exercises: see ptrx  Tried adductor stretching, but this is difficult for the patient to do on her own  Place and hold pinch is in ptrx, but may need to hold, patient is having a lot of pain    Splints:  L comfort cool, and a FA based OTC thumb spica  L FABTSO - custom - almost never wears it - too restrictive  L HBTSO - custom - wears all the time    Activities: goes to the gym a lot, works out at home    Spiky ball and embossing round tipped tool to do thenar massage (tray of tools), also uses 2 pencils, has a small vice    11/2/2022  Added gentle in line traction to try. Tried websapce stretch but difficult to get a good stretch on her own.    Next Visit:  STM/MFR, revisit thumb stabilization program  Incorporate joint protection into daily functional activities  Adaptive equipment further discussion  Pain management

## 2022-12-29 ENCOUNTER — THERAPY VISIT (OUTPATIENT)
Dept: OCCUPATIONAL THERAPY | Facility: CLINIC | Age: 57
End: 2022-12-29
Payer: COMMERCIAL

## 2022-12-29 DIAGNOSIS — M79.645 THUMB PAIN, LEFT: Primary | ICD-10-CM

## 2022-12-29 PROCEDURE — 97140 MANUAL THERAPY 1/> REGIONS: CPT | Mod: GO | Performed by: OCCUPATIONAL THERAPIST

## 2022-12-29 PROCEDURE — 97018 PARAFFIN BATH THERAPY: CPT | Mod: GO | Performed by: OCCUPATIONAL THERAPIST

## 2022-12-29 PROCEDURE — 97035 APP MDLTY 1+ULTRASOUND EA 15: CPT | Mod: GO | Performed by: OCCUPATIONAL THERAPIST

## 2023-02-28 ASSESSMENT — SLEEP AND FATIGUE QUESTIONNAIRES
HOW LIKELY ARE YOU TO NOD OFF OR FALL ASLEEP WHEN YOU ARE A PASSENGER IN A CAR FOR AN HOUR WITHOUT A BREAK: SLIGHT CHANCE OF DOZING
HOW LIKELY ARE YOU TO NOD OFF OR FALL ASLEEP IN A CAR, WHILE STOPPED FOR A FEW MINUTES IN TRAFFIC: WOULD NEVER DOZE
HOW LIKELY ARE YOU TO NOD OFF OR FALL ASLEEP WHILE LYING DOWN TO REST IN THE AFTERNOON WHEN CIRCUMSTANCES PERMIT: SLIGHT CHANCE OF DOZING
HOW LIKELY ARE YOU TO NOD OFF OR FALL ASLEEP WHILE WATCHING TV: WOULD NEVER DOZE
HOW LIKELY ARE YOU TO NOD OFF OR FALL ASLEEP WHILE SITTING AND READING: WOULD NEVER DOZE
HOW LIKELY ARE YOU TO NOD OFF OR FALL ASLEEP WHILE SITTING QUIETLY AFTER LUNCH WITHOUT ALCOHOL: WOULD NEVER DOZE
HOW LIKELY ARE YOU TO NOD OFF OR FALL ASLEEP WHILE SITTING INACTIVE IN A PUBLIC PLACE: SLIGHT CHANCE OF DOZING
HOW LIKELY ARE YOU TO NOD OFF OR FALL ASLEEP WHILE SITTING AND TALKING TO SOMEONE: WOULD NEVER DOZE

## 2023-03-03 ENCOUNTER — VIRTUAL VISIT (OUTPATIENT)
Dept: SLEEP MEDICINE | Facility: CLINIC | Age: 58
End: 2023-03-03
Attending: FAMILY MEDICINE
Payer: COMMERCIAL

## 2023-03-03 VITALS — BODY MASS INDEX: 35 KG/M2 | WEIGHT: 205 LBS | HEIGHT: 64 IN

## 2023-03-03 DIAGNOSIS — G47.00 INSOMNIA, UNSPECIFIED TYPE: ICD-10-CM

## 2023-03-03 DIAGNOSIS — F41.1 GENERALIZED ANXIETY DISORDER: ICD-10-CM

## 2023-03-03 PROCEDURE — 99205 OFFICE O/P NEW HI 60 MIN: CPT | Mod: VID | Performed by: PHYSICIAN ASSISTANT

## 2023-03-03 RX ORDER — PAROXETINE 20 MG/1
TABLET, FILM COATED ORAL
Qty: 90 TABLET | Refills: 1 | Status: SHIPPED | OUTPATIENT
Start: 2023-03-03 | End: 2023-06-14

## 2023-03-03 NOTE — PROGRESS NOTES
Video-Visit Details    Type of service:  Video Visit    Video Start Time (time video started): 1:00 PM     Video End Time (time video stopped): 2:08 PM     Originating Location (pt. Location): Home        Distant Location (provider location):  Off-site    Mode of Communication:  Video Conference via [facility]          Outpatient Sleep Medicine Consultation:      Name: Marbella Hendrix MRN# [id number]   Age: 57 year old YOB: 1965     Date of Consultation: March 2, 2023  Consultation is requested by: Vanessa Ladd MD  [address] [place] [address] Vanessa Ladd  Primary care provider: Vanessa Ladd       Reason for Sleep Consult:     Marbella Hendrix is sent by Vanessa Ladd for a sleep consultation regarding insomnia.    Patient s Reason for visit  Marbella Hendrix main reason for visit: I don't sleep enough. Therapist thought I should be seen for it.  Patient states problem(s) started: When I started having kids.  Marbella Hendrix's goals for this visit: Get more information.           Assessment and Plan:     Summary Sleep Diagnoses and Recommendations:  (Z68.35) BMI 35.0-35.9,adult  (primary encounter diagnosis)  Comment: Marbella presents at the request of her primary doctor for management of insomnia and possibly to do a sleep study. It sounds like her therapist also recommended that. It is somewhat difficult to assess her apnea risk as she sleeps in a room by herself because her  makes a lot of noise. He is also hard of hearing and removes his hearing aids at night. She says she feels run down in the day but can't fall asleep for naps because she starts thinking of all of the stressors in her life. Her ESS is 3/24. She does not have HTN. Her STOP BANG is 2: BMI 35, age >50 (57). We do not have a neck measurement.  Plan: HST-Home Sleep Apnea Test - Noxturnal Returnable          (G47.00) Insomnia, unspecified type, (F41.1) Generalized anxiety disorder  Comment:  "Marbella has difficulty falling asleep. It takes about an hour. She struggles to shut her mind off at night. She has a lot of stress raising 2 teens who have mental health issues. Her  works a lot and has physical disabilities, so she bears the brunt of most of the chores in the house. She is also taking care of her mother (with whom she has a very strained relationship) and step-father. She does have some outlets for her stress. She does acupuncture, has a therapist, and a women's group. She also exercises for over an hour in the morning several days per week. Her sleep schedule is 9:30 PM to 5 AM, but she watches videos on her phone for an hour winding down before bed. She sleeps in until 7-8 AM on weekends and does feel better. She tried trazodone once recently and had a paradoxical reaction.    Plan:  We discussed dealing with stress by setting aside a designated \"worry time\" in the early evening and we talked about things to distract her mind at bedtime. She is in bed on her phone for an hour. It would be best if she did some of that wind-down activity outside of bed, avoid screens right before bed, and only go to bed when ready for sleep. We talked briefly about stimulus control.  I think she will benefit the most from further focus on her stress management/anxiety and redirecting her thoughts at night. Continue working with your mental health providers. We could consider gabapentin to help with her arthritis pain, anxiety and mild sedation.      Comorbid Diagnoses:  BERTHA, hypothyroidism, obesity BMI 35    Summary Counseling:    Sleep Testing Reviewed  Obstructive Sleep Apnea Reviewed  Complications of Untreated Sleep Apnea Reviewed      Patient will follow up 2 weeks after sleep study.  Bennett Goltz, PA-C      Total time spent reviewing medical records, history and physical examination, review of previous testing and interpretation as well as documentation on this date: 78 min    CC: Vanessa Ladd          " History of Present Illness:     Marbella presents primarily at the request of her therapist because of concerns she does not get enough sleep. Marbella does not have too much concern of her own sleep. She does feel run down in the day. She gets about 4 good hours of sleep and then 2 interrupted hours. She has a lot of stressors    Past Sleep Evaluations: none    SLEEP-WAKE SCHEDULE:     Work/School Days: Patient goes to school/work: Yes   Usually gets into bed at 9:30 p.m.  Takes patient about an hour to fall asleep. She is usually watching videos on Dunamu at that time. Sometimes she listens to podcasts about the [place] [employer] language. She also sometimes plays a Taasera video with a black screen and static noise.   Has trouble falling asleep Yes nights per week  Wakes up in the middle of the night At least twice.  Wakes up due to Use the bathroom (has had surgery on her ureters age 7 and has had urination issues since then), Anxiety, Other  She has trouble falling back asleep Almost nightly times a week.   It usually takes I usually get up if it's after 4:30 a.m. At least an hour if it's the weekend and I'm trying to sleep more.   Patient is usually up at 5:00 a.m.  Uses alarm: No  She gets up at 5:15 AM to exercise for about an hour and a half. She also does acupuncture, and a women's group and talk therapy.    Weekends/Non-work Days/All Other Days:  Usually gets into bed at 10:30 p.m.   Takes patient about An hour to fall asleep, sometimes less  Patient is usually up at 7-8 a.m. feels she is sleeping soundly.  Uses alarm: No  She describes herself as a morning person. She does have better energy and attitude/outlook when she sleeps in.    Sleep Need  Patient gets  5 or 6 hours a night. sleep on average. On weekends she may get 6-7 hours.  Patient thinks she needs about 7 or 8 hours a night. sleep    Marbella Hendrix prefers to sleep in this position(s): Side   Patient states they do the following activities in bed: Use  phone, computer, or tablet    Naps  Patient takes a purposeful nap Never times a week and naps are usually   in duration. She can't nap unless she is sick. If she tries to sleep, her mind races about all of the issues with everyone in her life, particularly her kids).  She feels better after a nap:    She dozes off unintentionally Never. days per week  Patient has had a driving accident or near-miss due to sleepiness/drowsiness: No      SLEEP DISRUPTIONS:    Breathing/Snoring  Patient snores:Not that she knows of. Her  is deaf and uses CPAP. He makes a lot of noise at night.  Other people complain about her snoring: No. They sleep in different rooms.  Patient has been told she stops breathing in her sleep: No  She has issues with the following: Getting up to urinate more than once    Movement:  Patient gets pain, discomfort, with an urge to move:  Yes- she says it is hard to sit still. She has arthritis pain, hands, knees, had a hip replacement. She has a heating pad for her hand. She denies restless legs.  It happens when she is resting:  Yes  It happens more at night:  No  Patient has been told she kicks her legs at night:  No     Behaviors in Sleep:  Marbella Hendrix has experienced the following behaviors while sleeping:    Pt denies bruxism, sleep talking, sleep walking, and dream enactment behavior. Pt denies sleep paralysis, hypnagogue and cataplexy.       Is there anything else you would like your sleep provider to know: Arthritic pain through the night in L hand and knee.        CAFFEINE AND OTHER SUBSTANCES:    Patient consumes caffeinated beverages per day:  3  Last caffeine use is usually: 4 p.m.  List of any prescribed or over the counter stimulants that patient takes: None  List of any prescribed or over the counter sleep medication patient takes: 1/2 of a 25 mg diphenhydramine, 5 mg melatonin  List of previous sleep medications that patient has tried: Can't remember the name. Trazodone was  recently prescribed. She tried it once and couldn't fall asleep, she felt amped up at night and felt groggy and hung over the next day.   Patient drinks alcohol to help them sleep: No  Patient drinks alcohol near bedtime: No    Family History:  Patient has a family member been diagnosed with a sleep disorder: Yes  Spouse has apnea.     Social History:  She works for [employer], 20 hours per week.  She takes can of her mother who is in a nursing home and addicted to opioids. She is a very difficult person to deal with. She also takes care of her mother's  who is blind and in a nursing home as well.  Her  has a physical disability, so all of the physical chores are her responsibility.  She has a 20 year old in autism spectrum and is not very independent. She has a 16 year old who is also having social issues and ADHD.     SCALES:    EPWORTH SLEEPINESS SCALE      [place] Sleepiness Scale ( ANNABEL Pizarro  7727-3178<br>ESS - [place]/English - Final version - 21 Nov 07 - [facility] [place].) 2/28/2023   Sitting and reading Would never doze   Watching TV Would never doze   Sitting, inactive in a public place (e.g. a theatre or a meeting) Slight chance of dozing   As a passenger in a car for an hour without a break Slight chance of dozing   Lying down to rest in the afternoon when circumstances permit Slight chance of dozing   Sitting and talking to someone Would never doze   Sitting quietly after a lunch without alcohol Would never doze   In a car, while stopped for a few minutes in traffic Would never doze   [place] Score (MC) 3   [place] Score (Sleep) 3         INSOMNIA SEVERITY INDEX (YEVGENIY)      Insomnia Severity Index (YEVGENIY) 2/28/2023   Difficulty falling asleep 3   Difficulty staying asleep 3   Problems waking up too early 3   How SATISFIED/DISSATISFIED are you with your CURRENT sleep pattern? 3   How NOTICEABLE to others do you think your sleep problem is in terms of impairing the quality of your  life? 3   How WORRIED/DISTRESSED are you about your current sleep problem? 3   To what extent do you consider your sleep problem to INTERFERE with your daily functioning (e.g. daytime fatigue, mood, ability to function at work/daily chores, concentration, memory, mood, etc.) CURRENTLY? 3   YEVGENIY Total Score 21       Guidelines for Scoring/Interpretation:  Total score categories:  0-7 = No clinically significant insomnia   8-14 = Subthreshold insomnia   15-21 = Clinical insomnia (moderate severity)  22-28 = Clinical insomnia (severe)  Used via courtesy of www.JayCutth.va.gov with permission from Osiel Reilly PhD., [facility]      STOP BANG     STOP BANG Questionnaire (  2008, the American Society of Anesthesiologists, Inc. [employer] 2/28/2023   1. Snoring - Do you snore loudly (louder than talking or loud enough to be heard through closed doors)? No   2. Tired - Do you often feel tired, fatigued, or sleepy during daytime? Yes   3. Observed - Has anyone observed you stop breathing during your sleep? No   4. Blood pressure - Do you have or are you being treated for high blood pressure? No   5. BMI - BMI more than 35 kg/m2? Yes   6. Age - Age over 50 yr old? Yes   7. Neck circumference - Neck circumference greater than 40 cm? No   8. Gender - Gender male? No   STOP BANG Score (MC): 4 (High risk of EVER)   B/P Clinic: -   BMI Clinic: -         GAD7    BERTHA-7  7/6/2020   1. Feeling nervous, anxious, or on edge 1   2. Not being able to stop or control worrying 1   3. Worrying too much about different things 0   4. Trouble relaxing 0   5. Being so restless that it is hard to sit still 0   6. Becoming easily annoyed or irritable 1   7. Feeling afraid, as if something awful might happen 1   BERTHA-7 Total Score 4   If you checked any problems, how difficult have they made it for you to do your work, take care of things at home, or get along with other people? Not difficult at all  "        CAGE-AID    No flowsheet data found.    CAGE-AID reprinted with permission from the [facility] Journal, PRANAV Shin. and TRIP Gunter, \"Conjoint screening questionnaires for alcohol and drug abuse\" [place] Medical Journal 94: 135-140, 1995.      PATIENT HEALTH QUESTIONNAIRE-9 (PHQ - 9)    PHQ-9 ([employer]) 7/6/2020   1.  Little interest or pleasure in doing things 0   2.  Feeling down, depressed, or hopeless 1   3.  Trouble falling or staying asleep, or sleeping too much 1   4.  Feeling tired or having little energy 0   5.  Poor appetite or overeating 0   6.  Feeling bad about yourself 0   7.  Trouble concentrating 0   8.  Moving slowly or restless 0   9.  Suicidal or self-harm thoughts 0   PHQ-9 Total Score 2   Difficulty at work, home, or with people Not difficult at all   1.  Little interest or pleasure in doing things -   2.  Feeling down, depressed, or hopeless -   3.  Trouble falling or staying asleep, or sleeping too much -   4.  Feeling tired or having little energy -   5.  Poor appetite or overeating -   6.  Feeling bad about yourself -   7.  Trouble concentrating -   8.  Moving slowly or restless -   9.  Suicidal or self-harm thoughts -   PHQ-9 via [employer] TOTAL SCORE-----> -   Difficulty at work, home, or with people -       Developed by Yulissa Landon, Selena Kang, Jaden Lord and colleagues, with an educational michele from [employer] Inc. No permission required to reproduce, translate, display or distribute.        Allergies:    Allergies   Allergen Reactions     Erythromycin      Itching and swelling of lids      Seasonal Allergies Itching and Other (See Comments)       Medications:    Current Outpatient Medications   Medication Sig Dispense Refill     albuterol (PROAIR HFA/PROVENTIL HFA/VENTOLIN HFA) 108 (90 Base) MCG/ACT inhaler Inhale 1-2 puffs into the lungs every 4 hours as needed for shortness of breath / dyspnea or wheezing 6.7 g 0     atorvastatin (LIPITOR) 20 MG tablet " Take 1 tablet (20 mg) by mouth daily 3 tablet 0     atorvastatin (LIPITOR) 20 MG tablet Take 1 tablet (20 mg) by mouth daily 90 tablet 2     benzonatate (TESSALON) 100 MG capsule Take 1 capsule (100 mg) by mouth 3 times daily as needed for cough 25 capsule 0     calcium carbonate (OS-ARA) 500 MG tablet Take 1 tablet by mouth 2 times daily       levothyroxine (SYNTHROID/LEVOTHROID) 125 MCG tablet Take 1 tablet (125 mcg) by mouth daily 4 tablet 0     levothyroxine (SYNTHROID/LEVOTHROID) 125 MCG tablet Take 1 tablet (125 mcg) by mouth daily 3 tablet 0     LORazepam (ATIVAN) 0.5 MG tablet Take one tablet as needed for flying 7 tablet 0     melatonin 5 MG tablet Take 5 mg by mouth nightly as needed for sleep       Omega-3 Fatty Acids (OMEGA-3 FISH OIL PO) Take 1 g by mouth daily       PARoxetine (PAXIL) 10 MG tablet Take 1 tablet (10 mg) by mouth every morning 4 tablet 0     PARoxetine (PAXIL) 10 MG tablet Take 1 tablet (10 mg) by mouth every morning 3 tablet 0     PARoxetine (PAXIL) 20 MG tablet Take 1 tablet (20 mg) by mouth 2 times daily 90 tablet 1     traZODone (DESYREL) 50 MG tablet Take 1 tablet (50 mg) by mouth At Bedtime Start with half a tablet at bedtime and can increase to one tablet at bedtime 30 tablet 1       Problem List:  Patient Active Problem List    Diagnosis Date Noted     Adjustment disorder with anxious mood 06/27/2021     Priority: Medium     Family history of diabetes mellitus 06/27/2021     Priority: Medium     CMC DJD(carpometacarpal degenerative joint disease), localized primary, left 11/18/2020     Priority: Medium     Thumb pain, left 11/18/2020     Priority: Medium     Dermatochalasis of both upper eyelids 11/17/2020     Priority: Medium     Added automatically from request for surgery [id number]       Involutional ptosis, acquired, bilateral 11/17/2020     Priority: Medium     Added automatically from request for surgery [id number]       Weakness of right hip 07/09/2020     Priority: Medium      Primary osteoarthritis of right hip 2019     Priority: Medium     Added automatically from request for surgery [id number]       Morbid obesity (H) 2018     Priority: Medium     Status post hysterectomy 2017     Priority: Medium     Sensation of plugged ear on both sides 2017     Priority: Medium     Acute post-operative pain 2015     Priority: Medium     Preventative health care 10/30/2012     Priority: Medium     Last pap NIL ; mammogram nl ; lipids abnl, needs annual f/u       Health Care Home 2012     Priority: Medium     Tier 1    DX V65.8 REPLACED WITH 37425 HEALTH CARE HOME (2013)       Generalized anxiety disorder 2012     Priority: Medium     H/o panic attacks; currently controlled with paxil       Hypercholesterolemia 2012     Priority: Medium     Behavioral measures - avoiding statins while trying to conceive; not a candidate for red yeast rice (interactions with thyroid meds)       Acquired hypothyroidism 2012     Priority: Medium     Overweight 2012     Priority: Medium     On exercise plan, has been in weight watchers  Problem list name updated by automated process. Provider to review          Past Medical/Surgical History:  Past Medical History:   Diagnosis Date     Arthritis     My mom has it, my grandmother had it I have it     Diabetes (H)     My mom and brother have type II     Heavy menstrual period      Leiomyoma of uterus     s/p myomectomy     Mental disorder     anxiety     PONV (postoperative nausea and vomiting)      Surgical complication after   sept     Thyroid disease     Hypothroidism, 2nd half of      Uncomplicated asthma     My son has it, since he was born     Vesico-ureteral reflux     s/p repair     Past Surgical History:   Procedure Laterality Date     ABDOMEN SURGERY      exploratory after C section for sepsis      SECTION      x 2     HYSTERECTOMY RADICAL  2015     HYSTERECTOMY  "SUPRACERVICAL N/A 2015    Procedure: HYSTERECTOMY SUPRACERVICAL;  Surgeon: Kelle Wasserman MD;  Location:  OR     LUMPECTOMY BREAST       MYOMECTOMY UTERUS       ORTHOPEDIC SURGERY  2020    3-18-1, Nancy date R hip replacement, March L meniscus repair     REPAIR PTOSIS Right 2002    reconstruction of tear duct     REPAIR PTOSIS BILATERAL Bilateral 2020    Procedure: BILATERAL PTOSIS REPAIR, IRRIGATION OF LACRIMAL SYSTEM BILATERAL;  Surgeon: Pako Sosa MD;  Location:  OR     SOFT TISSUE SURGERY  2021    L meniscus tear repair       Social History:  Social History     Socioeconomic History     Marital status:      Spouse name: Wally     Number of children: 2     Years of education: Not on file     Highest education level: Not on file   Occupational History     Not on file   Tobacco Use     Smoking status: Former     Packs/day: 0.00     Years: 0.00     Pack years: 0.00     Types: Cigarettes     Quit date: 1990     Years since quittin.1     Smokeless tobacco: Never   Vaping Use     Vaping Use: Never used   Substance and Sexual Activity     Alcohol use: Not Currently     Comment: One or two drinks a month     Drug use: Not Currently     Types: Cocaine, Marijuana, Methamphetamines, Opiates, Amphetamines, Barbiturates, \"Crack\" cocaine, Codeine, Hashish, Hydrocodone, LSD, Morphine, Opium, Oxycodone     Sexual activity: Yes     Partners: Male     Birth control/protection: None     Comment: No uterus, same partner 20 years   Other Topics Concern     Parent/sibling w/ CABG, MI or angioplasty before 65F 55M? No   Social History Narrative     Not on file     Social Determinants of Health     Financial Resource Strain: Not on file   Food Insecurity: Not on file   Transportation Needs: Not on file   Physical Activity: Not on file   Stress: Not on file   Social Connections: Not on file   Intimate Partner Violence: Not on file   Housing Stability: Not on file " "      Family History:  Family History   Problem Relation Age of Onset     High cholesterol Father      Cancer Father         CLL     Blood Disease Father         CLL     Glaucoma Father      Macular Degeneration Father      Breast Cancer Maternal Grandmother         in 80s     Cancer Maternal Grandmother      Osteoporosis Maternal Grandmother      Depression Mother      Diabetes Mother      Ulcerative Colitis Mother      Lipids Mother         hypertriglyceridemia     Anxiety Disorder Mother      Osteoporosis Mother      Diabetes Brother        Review of Systems:  A complete review of systems reviewed by me is negative with the exeption of what has been mentioned in the history of present illness.  In the last TWO WEEKS have you experienced any of the following symptoms?  Fevers: No  Night Sweats: Yes  Weight Gain: No  Pain at Night: Yes  Double Vision: No  Changes in Vision: No  Difficulty Breathing through Nose: No  Sore Throat in Morning: Yes  Dry Mouth in the Morning: Yes  Shortness of Breath Lying Flat: Yes  Shortness of Breath With Activity: No  Awakening with Shortness of Breath: No  Increased Cough: No  Heart Racing at Night: Yes  Swelling in Feet or Legs: No  Diarrhea at Night: No  Heartburn at Night: No  Urinating More than Once at Night: Yes  Losing Control of Urine at Night: No  Joint Pains at Night: Yes  Headaches in Morning: No  Weakness in Arms or Legs: Yes  Depressed Mood: Yes  Anxiety: Yes     Physical Examination:  Vitals: Ht 1.626 m (5' 4\")   Wt 93 kg (205 lb)   LMP 08/28/2015   BMI 35.19 kg/m             GENERAL APPEARANCE: healthy, alert, no distress and cooperative  EYES: Eyes grossly normal to inspection and wearing glasses  HENT: oropharynx small crowded and tongue base enlarged  NECK: no asymmetry, masses, or scars  RESP: no respiratory distress, cough or wheeze  Mallampati Class: IV.  Tonsillar Stage: poor video quality         Data: All pertinent previous laboratory data reviewed "     Recent Labs   Lab Test 07/08/22  0838 04/13/22  1051    139   POTASSIUM 4.4 4.0   CHLORIDE 108 107   CO2 20 25   ANIONGAP 10 7   GLC 93 107*   BUN 18 12   CR 0.58 0.65   ARA 9.4 9.6       Recent Labs   Lab Test 04/13/22  1051   WBC 4.9   RBC 4.58   HGB 12.9   HCT 38.3   MCV 84   MCH 28.2   MCHC 33.7   RDW 14.2          Recent Labs   Lab Test 07/08/22  0838   PROTTOTAL 7.3   ALBUMIN 4.5   BILITOTAL 0.4   ALKPHOS 63   AST 29   ALT 37       TSH (mU/L)   Date Value   07/08/2022 1.49   04/13/2022 1.18   06/23/2021 0.78   02/04/2020 1.77       No results found for: UAMP, UBARB, BENZODIAZEUR, UCANN, UCOC, OPIT, UPCP    Ferritin   Date/Time Value Ref Range Status   06/05/2013 08:35 AM 6 (L) 10 - 300 ng/mL Final       No results found for: PH, PHARTERIAL, PO2, [id number], SAT, PCO2, HCO3, BASEEXCESS, JACQUELIN, BEB    @LABRCNTIPR(phv:4,pco2v:4,po2v:4,hco3v:4,alayna:4,o2per:4)@    Echocardiology: No results found for this or any previous visit (from the past 4320 hour(s)).    Chest x-ray: XR Chest 2 Views 04/13/2022    Narrative  CHEST TWO VIEWS  4/13/2022 11:07 AM    HISTORY:  Cough.    COMPARISON: 10/22/2012.    Impression  IMPRESSION: Negative chest. Lungs clear.    SCOTT NIÑO MD      SYSTEM ID:  [id number]      Chest CT: No results found for this or any previous visit from the past 365 days.      PFT: Most Recent [facility] Pulmonary Function Testing    No results found for: 20001      Bennett Ezra Goltz, PA-C, [place] 3/2/2023

## 2023-03-03 NOTE — PATIENT INSTRUCTIONS
"      MY TREATMENT INFORMATION FOR SLEEP APNEA-  Marbella Hendrix    DOCTOR : Bennett Ezra Goltz, PA-C, [facility]    Am I having a sleep study at a sleep center?  --->Due to normal delays, you will be contacted within 2-4 weeks to schedule    Am I having a home sleep study?  --->Watch the video for the device you are using:    -/drop off device-   https://www.Colizer.com/watch?v=yGGFBdELGhk    -Disposable device sent out require phone/computer application-   https://www.Colizer.com/watch?v=BCce_vbiwxE      Frequently asked questions:  1. What is Obstructive Sleep Apnea (EVER)? EVER is the most common type of sleep apnea. Apnea means, \"without breath.\"  Apnea is most often caused by narrowing or collapse of the upper airway as muscles relax during sleep.   Almost everyone has occasional apneas. Most people with sleep apnea have had brief interruptions at night frequently for many years.  The severity of sleep apnea is related to how frequent and severe the events are.   2. What are the consequences of EVER? Symptoms include: feeling sleepy during the day, snoring loudly, gasping or stopping of breathing, trouble sleeping, and occasionally morning headaches or heartburn at night.  Sleepiness can be serious and even increase the risk of falling asleep while driving. Other health consequences may include development of high blood pressure and other cardiovascular disease in persons who are susceptible. Untreated EVER  can contribute to heart disease, stroke and diabetes.   3. What are the treatment options? In most situations, sleep apnea is a lifelong disease that must be managed with daily therapy. Medications are not effective for sleep apnea and surgery is generally not considered until other therapies have been tried. Your treatment is your choice . Continuous Positive Airway (CPAP) works right away and is the therapy that is effective in nearly everyone. An oral device to hold your jaw forward is usually the next " most reliable option. Other options include postioning devices (to keep you off your back), weight loss, and surgery including a tongue pacing device. There is more detail about some of these options below.  4. Are my sleep studies covered by insurance? Although we will request verification of coverage, we advise you also check in advance of the study to ensure there is coverage.    Important tips for those choosing CPAP and similar devices   Know your equipment:  CPAP is continuous positive airway pressure that prevents obstructive sleep apnea by keeping the throat from collapsing while you are sleeping. In most cases, the device is  smart  and can slowly self-adjusts if your throat collapses and keeps a record every day of how well you are treated-this information is available to you and your care team.  BPAP is bilevel positive airway pressure that keeps your throat open and also assists each breath with a pressure boost to maintain adequate breathing.  Special kinds of BPAP are used in patients who have inadequate breathing from lung or heart disease. In most cases, the device is  smart  and can slowly self-adjusts to assist breathing. Like CPAP, the device keeps a record of how well you are treated.  Your mask is your connection to the device. You get to choose what feels most comfortable and the staff will help to make sure if fits. Here: are some examples of the different masks that are available:       Key points to remember on your journey with sleep apnea:  Sleep study.  PAP devices often need to be adjusted during a sleep study to show that they are effective and adjusted right.  Good tips to remember: Try wearing just the mask during a quiet time during the day so your body adapts to wearing it. A humidifier is recommended for comfort in most cases to prevent drying of your nose and throat. Allergy medication from your provider may help you if you are having nasal congestion.  Getting settled-in. It  takes more than one night for most of us to get used to wearing a mask. Try wearing just the mask during a quiet time during the day so your body adapts to wearing it. A humidifier is recommended for comfort in most cases. Our team will work with you carefully on the first day and will be in contact within 4 days and again at 2 and 4 weeks for advice and remote device adjustments. Your therapy is evaluated by the device each day.   Use it every night. The more you are able to sleep naturally for 7-8 hours, the more likely you will have good sleep and to prevent health risks or symptoms from sleep apnea. Even if you use it 4 hours it helps. Occasionally all of us are unable to use a medical therapy, in sleep apnea, it is not dangerous to miss one night.   Communicate. Call our skilled team on the number provided on the first day if your visit for problems that make it difficult to wear the device. Over 2 out of 3 patients can learn to wear the device long-term with help from our team. Remember to call our team or your sleep providers if you are unable to wear the device as we may have other solutions for those who cannot adapt to mask CPAP therapy. It is recommended that you sleep your sleep provider within the first 3 months and yearly after that if you are not having problems.   Use it for your health. We encourage use of CPAP masks during daytime quiet periods to allow your face and brain to adapt to the sensation of CPAP so that it will be a more natural sensation to awaken to at night or during naps. This can be very useful during the first few weeks or months of adapting to CPAP though it does not help medically to wear CPAP during wakefulness and  should not be used as a strategy just to meet guidelines.  Take care of your equipment. Make sure you clean your mask and tubing using directions every day and that your filter and mask are replaced as recommended or if they are not working.     BESIDES CPAP, WHAT  OTHER THERAPIES ARE THERE?    Positioning Device  Positioning devices are generally used when sleep apnea is mild and only occurs on your back.This example shows a pillow that straps around the waist. It may be appropriate for those whose sleep study shows milder sleep apnea that occurs primarily when lying flat on one's back. Preliminary studies have shown benefit but effectiveness at home may need to be verified by a home sleep test. These devices are generally not covered by medical insurance.  Examples of devices that maintain sleeping on the back to prevent snoring and mild sleep apnea.    Belt type body positioner  http://Sounday.foc.us/    Electronic reminder  http://nightshifttherapy.com/            Oral Appliance  What is oral appliance therapy?  An oral appliance device fits on your teeth at night like a retainer used after having braces. The device is made by a specialized dentist and requires several visits over 1-2 months before a manufactured device is made to fit your teeth and is adjusted to prevent your sleep apnea. Once an oral device is working properly, snoring should be improved. A home sleep test may be recommended at that time if to determine whether the sleep apnea is adequately treated.       Some things to remember:  -Oral devices are often, but not always, covered by your medical insurance. Be sure to check with your insurance provider.   -If you are referred for oral therapy, you will be given a list of specialized dentists to consider or you may choose to visit the Web site of the American Academy of Dental Sleep Medicine  -Oral devices are less likely to work if you have severe sleep apnea or are extremely overweight.     More detailed information  An oral appliance is a small acrylic device that fits over the upper and lower teeth  (similar to a retainer or a mouth guard). This device slightly moves jaw forward, which moves the base of the tongue forward, opens the airway, improves  breathing for effective treat snoring and obstructive sleep apnea in perhaps 7 out of 10 people .  The best working devices are custom-made by a dental device  after a mold is made of the teeth 1, 2, 3.  When is an oral appliance indicated?  Oral appliance therapy is recommended as a first-line treatment for patients with primary snoring, mild sleep apnea, and for patients with moderate sleep apnea who prefer appliance therapy to use of CPAP4, 5. Severity of sleep apnea is determined by sleep testing and is based on the number of respiratory events per hour of sleep.   How successful is oral appliance therapy?  The success rate of oral appliance therapy in patients with mild sleep apnea is 75-80% while in patients with moderate sleep apnea it is 50-70%. The chance of success in patients with severe sleep apnea is 40-50%. The research also shows that oral appliances have a beneficial effect on the cardiovascular health of EVER patients at the same magnitude as CPAP therapy7.  Oral appliances should be a second-line treatment in cases of severe sleep apnea, but if not completely successful then a combination therapy utilizing CPAP plus oral appliance therapy may be effective. Oral appliances tend to be effective in a broad range of patients although studies show that the patients who have the highest success are females, younger patients, those with milder disease, and less severe obesity. 3, 6.   Finding a dentist that practices dental sleep medicine  Specific training is available through the American Academy of Dental Sleep Medicine for dentists interested in working in the field of sleep. To find a dentist who is educated in the field of sleep and the use of oral appliances, near you, visit the Web site of the American Academy of Dental Sleep Medicine.    References  1. Velasquez et al. Objectively measured vs self-reported compliance during oral appliance therapy for sleep-disordered breathing. Chest  2013; 144(5): 1375-5612.  2. John, et al. Objective measurement of compliance during oral appliance therapy for sleep-disordered breathing. Thorax 2013; 68(1): 91-96.  3. Marlen et al. Mandibular advancement devices in 620 men and women with EVER and snoring: tolerability and predictors of treatment success. Chest 2004; 125: 6924-2682.  4. Yolette et al. Oral appliances for snoring and EVER: a review. Sleep 2006; 29: 244-262.  5. Trent et al. Oral appliance treatment for EVER: an update. J Clin Sleep Med 2014; 10(2): 215-227.  6. Leon et al. Predictors of OSAH treatment outcome. J Dent Res 2007; 86: 3188-5031.      Weight Loss:    Weight loss is a long-term strategy that may improve sleep apnea in some patients.    Weight management is a personal decision and the decision should be based on your interest and the potential benefits.  If you are interested in exploring weight loss strategies, the following discussion covers the impact on weight loss on sleep apnea and the approaches that may be successful.    Being overweight does not necessarily mean you will have health consequences.  Those who have BMI over 35 or over 27 with existing medical conditions carries greater risk.   Weight loss decreases severity of sleep apnea in most people with obesity. For those with mild obesity who have developed snoring with weight gain, even 15-30 pound weight loss can improve and occasionally eliminate sleep apnea.  Structured and life-long dietary and health habits are necessary to lose weight and keep healthier weight levels.     Though there may be significant health benefits from weight loss, long-term weight loss is very difficult to achieve- studies show success with dietary management in less than 10% of people. In addition, substantial weight loss may require years of dietary control and may be difficult if patients have severe obesity. In these cases, surgical management may be considered.  Finally,  older individuals who have tolerated obesity without health complications may be less likely to benefit from weight loss strategies.      BMI 35    Surgery:    Surgery for obstructive sleep apnea is considered generally only when other therapies fail to work. Surgery may be discussed with you if you are having a difficult time tolerating CPAP and or when there is an abnormal structure that requires surgical correction.  Nose and throat surgeries often enlarge the airway to prevent collapse.  Most of these surgeries create pain for 1-2 weeks and up to half of the most common surgeries are not effective throughout life.  You should carefully discuss the benefits and drawbacks to surgery with your sleep provider and surgeon to determine if it is the best solution for you.   More information  Surgery for EVER is directed at areas that are responsible for narrowing or complete obstruction of the airway during sleep.  There are a wide range of procedures available to enlarge and/or stabilize the airway to prevent blockage of breathing in the three major areas where it can occur: the palate, tongue, and nasal regions.  Successful surgical treatment depends on the accurate identification of the factors responsible for obstructive sleep apnea in each person.  A personalized approach is required because there is no single treatment that works well for everyone.  Because of anatomic variation, consultation with an examination by a sleep surgeon is a critical first step in determining what surgical options are best for each patient.  In some cases, examination during sedation may be recommended in order to guide the selection of procedures.  Patients will be counseled about risks and benefits as well as the typical recovery course after surgery. Surgery is typically not a cure for a person s EVER.  However, surgery will often significantly improve one s EVER severity (termed  success rate ).  Even in the absence of a cure, surgery  will decrease the cardiovascular risk associated with OSA7; improve overall quality of life8 (sleepiness, functionality, sleep quality, etc).      Palate Procedures:  Patients with EVER often have narrowing of their airway in the region of their tonsils and uvula.  The goals of palate procedures are to widen the airway in this region as well as to help the tissues resist collapse.  Modern palate procedure techniques focus on tissue conservation and soft tissue rearrangement, rather than tissue removal.  Often the uvula is preserved in this procedure. Residual sleep apnea is common in patient after pharyngoplasty with an average reduction in sleep apnea events of 33%2.      Tongue Procedures:  ExamWhile patients are awake, the muscles that surround the throat are active and keep this region open for breathing. These muscles relax during sleep, allowing the tongue and other structures to collapse and block breathing.  There are several different tongue procedures available.  Selection of a tongue base procedure depends on characteristics seen on physical exam.  Generally, procedures are aimed at removing bulky tissues in this area or preventing the back of the tongue from falling back during sleep.  Success rates for tongue surgery range from 50-62%3.    Hypoglossal Nerve Stimulation:  Hypoglossal nerve stimulation has recently received approval from the [place] Food and Drug Administration for the treatment of obstructive sleep apnea.  This is based on research showing that the system was safe and effective in treating sleep apnea6.  Results showed that the median AHI score decreased 68%, from 29.3 to 9.0. This therapy uses an implant system that senses breathing patterns and delivers mild stimulation to airway muscles, which keeps the airway open during sleep.  The system consists of three fully implanted components: a small generator (similar in size to a pacemaker), a breathing sensor, and a stimulation lead.   Using a small handheld remote, a patient turns the therapy on before bed and off upon awakening.    Candidates for this device must be greater than 18 years of age, have moderate to severe EVER (AHI between 15-65), BMI less than 35, have tried CPAP/oral appliance for at least 8 weeks without success, and have appropriate upper airway anatomy (determined by a sleep endoscopy performed by Dr. Fer Siddiqui).    Hypoglossal Nerve Stimulation Pathway:    The sleep surgeon s office will work with the patient through the insurance prior-authorization process (including communications and appeals).    Nasal Procedures:  Nasal obstruction can interfere with nasal breathing during the day and night.  Studies have shown that relief of nasal obstruction can improve the ability of some patients to tolerate positive airway pressure therapy for obstructive sleep apnea1.  Treatment options include medications such as nasal saline, topical corticosteroid and antihistamine sprays, and oral medications such as antihistamines or decongestants. Non-surgical treatments can include external nasal dilators for selected patients. If these are not successful by themselves, surgery can improve the nasal airway either alone or in combination with these other options.      Combination Procedures:  Combination of surgical procedures and other treatments may be recommended, particularly if patients have more than one area of narrowing or persistent positional disease.  The success rate of combination surgery ranges from 66-80%2,3.    References  Nima THOMASON. The Role of the Nose in Snoring and Obstructive Sleep Apnoea: An Update.  Eur Arch Otorhinolaryngol. 2011; 268: 1365-73.   Param SM; Arthur JA; Ping JR; Pallanch JF; Ricky MB; Darell SG; Diogenes NINA. Surgical modifications of the upper airway for obstructive sleep apnea in adults: a systematic review and meta-analysis. SLEEP 2010;33(10):2507-4030. Hafsa DAWKINS. Hypopharyngeal surgery in  obstructive sleep apnea: an evidence-based medicine review.  Arch Otolaryngol Head Neck Surg. 2006 Feb;132(2):206-13.  Juan YH1, Krystina Y, Jabari HORTENCIA. The efficacy of anatomically based multilevel surgery for obstructive sleep apnea. Otolaryngol Head Neck Surg. 2003 Oct;129(4):327-35.  Hafsa DAWKINS, Goldberg A. Hypopharyngeal Surgery in Obstructive Sleep Apnea: An Evidence-Based Medicine Review. Arch Otolaryngol Head Neck Surg. 2006 Feb;132(2):206-13.  Shannon GONZALEZ et al. Upper-Airway Stimulation for Obstructive Sleep Apnea.  N Engl J Med. 2014 Jan 9;370(2):139-49.  Emmanuel Y et al. Increased Incidence of Cardiovascular Disease in Middle-aged Men with Obstructive Sleep Apnea. Am J Respir Crit Care Med; 2002 166: 159-165  Stokesbereket FORD et al. Studying Life Effects and Effectiveness of Palatopharyngoplasty (SLEEP) study: Subjective Outcomes of Isolated Uvulopalatopharyngoplasty. Otolaryngol Head Neck Surg. 2011; 144: 623-631.        WHAT IF I ONLY HAVE SNORING?    Mandibular advancement devices, lateral sleep positioning, long-term weight loss and treatment of nasal allergies have been shown to improve snoring.  Exercising tongue muscles with a game (https://Buzz Referrals.[employer]/us/monica/soundly-reduce-snoring/hp1892869799) or stimulating the tongue during the day with a device (https://doi.org/10.3390/nhg39195355) have improved snoring in some individuals.    Remember to Drive Safe... Drive Alive     Sleep health profoundly affects your health, mood, and your safety.  Thirty three percent of the population (one in three of us) is not getting enough sleep and many have a sleep disorder. Not getting enough sleep or having an untreated / undertreated sleep condition may make us sleepy without even knowing it. In fact, our driving could be dramatically impaired due to our sleep health. As your provider, here are some things I would like you to know about driving:     Here are some warning signs for impairment and dangerous drowsy driving:               -Having been awake more than 16 hours               -Looking tired               -Eyelid drooping              -Head nodding (it could be too late at this point)              -Driving for more than 30 minutes     Some things you could do to make the driving safer if you are experiencing some drowsiness:              -Stop driving and rest              -Call for transportation              -Make sure your sleep disorder is adequately treated     Some things that have been shown NOT to work when experiencing drowsiness while driving:              -Turning on the radio              -Opening windows              -Eating any  distracting  /  entertaining  foods (e.g., sunflower seeds, candy, or any other)              -Talking on the phone      Your decision may not only impact your life, but also the life of others. Please, remember to drive safe for yourself and all of us.      General recommendations for sleep problems (Insomnia)  Allow 2-4 weeks to see results     Establish a regular sleep schedule    Most people only need 7-8 hours of sleep.  Don't be in bed longer than you need     to sleep or you will end up spending more time awake in bed. This trains your    brain to think of the bed as a place to not sleep.  Go to bed at same time each night   Get up at same time each day - Set an alarm everyday (even weekends). This is one of    the most important tips. It prevents you from relying on your insomnia to get you    up on time for your day. That actually reinforces insomnia. It also will help your    body get into a pattern where you start feeling tired at a consistent time each    night.  The body functions best when you keep a consistent routine.  Avoid sleeping-in and napping. Anytime you sleep during the day, you will be less tired at    night. You may be tired enough to fall asleep, but you will wake more in the    middle of the night because you will have met your sleep need before the night is     done.   Cut down time in bed (if not asleep, get up)- Use your bed only for sleep and sex    Anytime you spend time in bed doing activities other than sleep (reading,    watching TV, working, playing on the computer or phone, or even just laying in    bed trying to sleep), you are training  your brain to think of the bed as a place to    do activities other than sleep. If you are not falling asleep within 20-30 minutes,    get out of bed. While out of bed, avoid bright lights. Avoid work or chores. Being    productive in the middle of the night reinforces waking up at night. Find relaxing,    not particularly entertaining activities like reading, listening to music, or relaxation    exercises. Go back to bed if you start feeling groggy, or after about 30 minutes,    even if not feeling very tired. Sometimes, just getting out of bed stops the pattern    of getting frustrated about laying in bed not sleeping, and that can help you fall    asleep.   Avoid trying to force yourself to sleep- sleep is not like everything else. The harder you    work at most things, the more you can accomplish. The harder you work at    sleep, the less you will sleep.     Make the bedroom comfortable - quiet, dark and cool are better. Consider ear plugs    (silicon). Use dark blinds or wear an eye mask if needed     Make a relaxing routine prior to bedtime  Relaxation exercises:   Progressive muscle relaxation: Relax each muscle group individually    Begin with your feet, flex, then relax. Try to imagine your feet feeling heavy and sinking into the bed. Move to your calves, do the same thing. Work through each muscle group toward your head.    Relaxing Mental Imagery: Try to imagine a trip that you took and found relaxing, or imagine a day at the beach. Try to walk yourself through the day in your mind as if you were dreaming it. Try to imagine sensing the different experiences, such as feeling sand between your toes, the heat of the  sun on your skin, seeing the waves crashing the shore, the smell of the salt water, etc.     Deal with your worries before bedtime    Set aside a worry time around dinner time for 10-15 minutes. Write down the    things that are on your mind. Plan time in the coming days to address those    issues. Brainstorm ideas on how you will deal with them. Try to identify issues    that are out of your control, and try to let those issues go.  Listen to relaxation tapes   Classical Music or Nature sounds   Back Massage   Get regular exercise each day (at least 1-2 hours before bedtime)   Take medications only as directed   Eat a light bedtime snack or warm drink   Warm milk   Warm herbal tea (non-caffeinated)       Things to avoid   No overstimulating activities just before bed   No competitive games before bedtime   No exciting television programs before bedtime   Avoid caffeine after lunchtime   Avoid chocolate   Do not use alcohol to induce sleep (worsens Insomnia)   Do not take someone else's sleeping pills   Do not look at the clock when awakening   Do not turn on light when getting up to use bathroom, use a nightlight     Online Programs   www.[employer] (pronounced shut eye). There is a fee for this program. Enter the code  Moclips  if you decide to enroll in this program.    www.sleepIO.com (pronounced sleep ee oh). There is a fee for this program. Enter the code  Moclips  if you decide to enroll in this program.     Suggested Resources  Insomnia Treatment Books   Overcoming Insomnia by Medhat Jean and Juliet Messer (2008)  No More Sleepless Nights by Fernandez Allen and Caridad Harris (1996)  Say Star to Insomnia by Paco Herbert (2009)  The Insomnia Workbook by nA Cordero and Osiel Reilly (2009)  The Insomnia Answer by Andrews Doe and Migue Liao (2006)      Stress Management and Relaxation Books  The Relaxation and Stress Reduction Workbook by Yamile Giron, Gracie Morales and  Felix Mayer (2008)  Stress Management Workbook: Techniques and Self-Assessment Procedures by Jasmin Duque and Shane Yuan (1997)  A Mindfulness-Based Stress Reduction Workbook by Aristeo Pierce and Laurie Burgess (2010)  The Complete Stress Management Workbook by Charlie Prasad, Benji Beltrán and Mihai Villalta (1996)  Assert Yourself by Kaylan Garcia and Miki Garcia (1977)    Relaxation Resources for Computer Download   These websites offer resources to help you relax. This list is for information only. [facility] is not responsible for the quality of services or the actions of any person or organization.  Progressive Muscle Relaxation (PMR):   http://www.AkaRx/progressive-muscle-relaxation-exercise.html   http://studentsupport.[facility]/counseling/resources/self-help/relaxation-and-stress-management/   Deep Breathing Exercises:  http://www.AkaRx/breathing-awareness.html     Meditation:   wwwMobile Shareholder  www.Good Start GeneticsguidedCommtimizemeditation-site.com You may have to pay for some of these resources.    Guided Imagery:  http://www.AkaRx/guided-imagery-scripts.html   http://Commtimize/library/iqcstgsekm-bjbrfu-qzqgeni/   Consider phone apps such as: Calm, Headspace or Insight Timer.    Counseling / Behavioral Health  [facility]  Visit www.[place].org or call [phone] to find a clinic close to you.  Or call [phone] for [facility].

## 2023-03-03 NOTE — NURSING NOTE
Is the patient currently in the state of [place]? YES    Visit mode:VIDEO    If the visit is dropped, the patient can be reconnected by: VIDEO VISIT: Send to e-mail at: [email or web address]    Will anyone else be joining the visit? NO      How would you like to obtain your AVS? MyChart    Are changes needed to the allergy or medication list? NO    Reason for visit: insomnia, no CPAP         HTN (hypertension)

## 2023-04-10 NOTE — NURSING NOTE
HST pick-up, HST drop-off, and follow-up appointment with provider have all been scheduled.  Carina Woods, CMA

## 2023-06-05 ENCOUNTER — PATIENT OUTREACH (OUTPATIENT)
Dept: CARE COORDINATION | Facility: CLINIC | Age: 58
End: 2023-06-05
Payer: COMMERCIAL

## 2023-06-12 ASSESSMENT — ANXIETY QUESTIONNAIRES
7. FEELING AFRAID AS IF SOMETHING AWFUL MIGHT HAPPEN: NEARLY EVERY DAY
6. BECOMING EASILY ANNOYED OR IRRITABLE: NEARLY EVERY DAY
4. TROUBLE RELAXING: NEARLY EVERY DAY
5. BEING SO RESTLESS THAT IT IS HARD TO SIT STILL: NEARLY EVERY DAY
GAD7 TOTAL SCORE: 21
1. FEELING NERVOUS, ANXIOUS, OR ON EDGE: NEARLY EVERY DAY
GAD7 TOTAL SCORE: 21
IF YOU CHECKED OFF ANY PROBLEMS ON THIS QUESTIONNAIRE, HOW DIFFICULT HAVE THESE PROBLEMS MADE IT FOR YOU TO DO YOUR WORK, TAKE CARE OF THINGS AT HOME, OR GET ALONG WITH OTHER PEOPLE: VERY DIFFICULT
3. WORRYING TOO MUCH ABOUT DIFFERENT THINGS: NEARLY EVERY DAY
8. IF YOU CHECKED OFF ANY PROBLEMS, HOW DIFFICULT HAVE THESE MADE IT FOR YOU TO DO YOUR WORK, TAKE CARE OF THINGS AT HOME, OR GET ALONG WITH OTHER PEOPLE?: VERY DIFFICULT
2. NOT BEING ABLE TO STOP OR CONTROL WORRYING: NEARLY EVERY DAY
7. FEELING AFRAID AS IF SOMETHING AWFUL MIGHT HAPPEN: NEARLY EVERY DAY

## 2023-06-14 ENCOUNTER — VIRTUAL VISIT (OUTPATIENT)
Dept: FAMILY MEDICINE | Facility: CLINIC | Age: 58
End: 2023-06-14
Payer: COMMERCIAL

## 2023-06-14 ENCOUNTER — MYC MEDICAL ADVICE (OUTPATIENT)
Dept: FAMILY MEDICINE | Facility: CLINIC | Age: 58
End: 2023-06-14

## 2023-06-14 DIAGNOSIS — E66.01 MORBID OBESITY (H): ICD-10-CM

## 2023-06-14 DIAGNOSIS — Z00.00 ENCOUNTER FOR ROUTINE ADULT HEALTH EXAMINATION WITHOUT ABNORMAL FINDINGS: ICD-10-CM

## 2023-06-14 DIAGNOSIS — G47.00 INSOMNIA, UNSPECIFIED TYPE: ICD-10-CM

## 2023-06-14 DIAGNOSIS — G47.00 INSOMNIA, UNSPECIFIED TYPE: Primary | ICD-10-CM

## 2023-06-14 DIAGNOSIS — E03.9 ACQUIRED HYPOTHYROIDISM: ICD-10-CM

## 2023-06-14 DIAGNOSIS — F41.1 GENERALIZED ANXIETY DISORDER: Primary | ICD-10-CM

## 2023-06-14 PROCEDURE — 99214 OFFICE O/P EST MOD 30 MIN: CPT | Mod: VID | Performed by: FAMILY MEDICINE

## 2023-06-14 PROCEDURE — 96127 BRIEF EMOTIONAL/BEHAV ASSMT: CPT | Mod: VID | Performed by: FAMILY MEDICINE

## 2023-06-14 RX ORDER — PAROXETINE 20 MG/1
TABLET, FILM COATED ORAL
Qty: 90 TABLET | Refills: 1 | Status: SHIPPED | OUTPATIENT
Start: 2023-06-14 | End: 2023-08-07

## 2023-06-14 RX ORDER — CLONAZEPAM 0.5 MG/1
0.25 TABLET ORAL AT BEDTIME
Qty: 30 TABLET | Refills: 1 | Status: SHIPPED | OUTPATIENT
Start: 2023-06-14 | End: 2023-06-26

## 2023-06-14 ASSESSMENT — PATIENT HEALTH QUESTIONNAIRE - PHQ9
SUM OF ALL RESPONSES TO PHQ QUESTIONS 1-9: 21
SUM OF ALL RESPONSES TO PHQ QUESTIONS 1-9: 21
10. IF YOU CHECKED OFF ANY PROBLEMS, HOW DIFFICULT HAVE THESE PROBLEMS MADE IT FOR YOU TO DO YOUR WORK, TAKE CARE OF THINGS AT HOME, OR GET ALONG WITH OTHER PEOPLE: SOMEWHAT DIFFICULT

## 2023-06-14 ASSESSMENT — ANXIETY QUESTIONNAIRES: GAD7 TOTAL SCORE: 21

## 2023-06-14 ASSESSMENT — ENCOUNTER SYMPTOMS: NERVOUS/ANXIOUS: 1

## 2023-06-14 NOTE — PROGRESS NOTES
Marbella is a 57 year old who is being evaluated via a billable video visit.      How would you like to obtain your AVS? Farmstrhart  If the video visit is dropped, the invitation should be resent by: Text to cell phone: [phone]  Will anyone else be joining your video visit? Yes,  Wally         Assessment & Plan     Generalized anxiety disorder  We discussed seeing a psychiatrist , I have placed the referral for this   Also will increase the dose of the Paxil to 20 mg as she has been on the 10 mg for a long time and seems not to be working at this point, before switching would be reasonable to increase the dose a bit   Will check labs as well   - Adult Mental Health  Referral; Future  - Comprehensive metabolic panel (BMP + Alb, Alk Phos, ALT, AST, Total. Bili, TP); Future  - CBC with platelets; Future  - PARoxetine (PAXIL) 20 MG tablet; Take one tablet daily    Acquired hypothyroidism  She has been on the 125 mcg of the synthroid and we will recheck tsh   - TSH with free T4 reflex; Future    Encounter for routine adult health examination without abnormal findings  Will screen cholesterol as well as she is on lipitor 20 mg daily   - REVIEW OF HEALTH MAINTENANCE PROTOCOL ORDERS  - Lipid panel reflex to direct LDL Fasting; Future    Insomnia, unspecified type  A trial of clonazepam at bedtime as her insomnia is severe and melatonin not working , also severe anxiety at this point , we discussed starting with half a tablet and can increase to one tablet if half not working   - clonazePAM (KLONOPIN) 0.5 MG tablet; Take 0.5 tablets (0.25 mg) by mouth At Bedtime    After two nights pt sent a [employer] message that it is not working for her insomnia, I have stopped the clonazepam and will try some Ambien at HS , discussed not to use together with the clonazepam and pt understands     Morbid obesity (H)  Healthy diet and regular physical activities are recommended                BMI:   Estimated body mass index is  Pharmcy calling stating that insurance will no longer cover Lamictal ODT 100mg.  They will cover Lamictal regular 100mg.  Can this be changed.  Pharmacy #[phone].   "35.19 kg/m  as calculated from the following:    Height as of 3/3/23: 1.626 m (5' 4\").    Weight as of 3/3/23: 93 kg (205 lb).       Depression Screening Follow Up        6/14/2023     8:18 AM   PHQ   PHQ-9 Total Score 21   Q9: Thoughts of better off dead/self-harm past 2 weeks Several days   F/U: Thoughts of suicide or self-harm No   F/U: Safety concerns No         6/14/2023     8:18 AM   Last PHQ-9   1.  Little interest or pleasure in doing things 3   2.  Feeling down, depressed, or hopeless 3   3.  Trouble falling or staying asleep, or sleeping too much 3   4.  Feeling tired or having little energy 3   5.  Poor appetite or overeating 3   6.  Feeling bad about yourself 1   7.  Trouble concentrating 1   8.  Moving slowly or restless 3   Q9: Thoughts of better off dead/self-harm past 2 weeks 1   PHQ-9 Total Score 21   In the past two weeks have you had thoughts of suicide or self harm? No   Do you have concerns about your personal safety or the safety of others? No               Follow Up      Follow Up Actions Taken  Crisis resource information provided in the After Visit Summary  Patient to follow up with PCP.  Clinic staff to schedule appointment if able.  Mental Health Referral placed    Discussed the following ways the patient can remain in a safe environment:  be around others  Pt denies any suicidal plans or ideas and contracts for safety , her  and family are supportive , if anxiety gets worse meanwhile discussed with the pt and her  going to the ER - the EMPATH program at [facility] for mental health help     Vanessa Ladd MD  [facility]    Adam Tyson is a 57 year old, presenting for the following health issues:  Anxiety        6/14/2023     8:14 AM   Additional Questions   Roomed by Vanessa MARTINEZ     Anxiety    History of Present Illness       Mental Health Follow-up:  Patient presents to follow-up on Depression & Anxiety.Patient's depression since last visit has been:  " Worse  The patient is having other symptoms associated with depression.  Patient's anxiety since last visit has been:  Worse  The patient is having other symptoms associated with anxiety.  Any significant life events: No  Patient is feeling anxious or having panic attacks.  Patient has no concerns about alcohol or drug use.    She eats 4 or more servings of fruits and vegetables daily.She consumes 2 sweetened beverage(s) daily.She exercises with enough effort to increase her heart rate 60 or more minutes per day.  She exercises with enough effort to increase her heart rate 5 days per week.   She is taking medications regularly.  Today's BERTHA-7 Score: 21               Review of Systems   Psychiatric/Behavioral: The patient is nervous/anxious.       Constitutional, HEENT, cardiovascular, pulmonary, GI, , musculoskeletal, neuro, skin, endocrine and psych systems are negative, except as otherwise noted.      Objective    Vitals - Patient Reported  Pain Score: No Pain (0)        Physical Exam   GENERAL: Healthy, alert and no distress  EYES: Eyes grossly normal to inspection.  No discharge or erythema, or obvious scleral/conjunctival abnormalities.  RESP: No audible wheeze, cough, or visible cyanosis.  No visible retractions or increased work of breathing.    SKIN: Visible skin clear. No significant rash, abnormal pigmentation or lesions.  NEURO: Cranial nerves grossly intact.  Mentation and speech appropriate for age.  PSYCH: Mentation appears normal, affect normal/bright, judgement and insight intact, normal speech and appearance well-groomed.    No results found for any visits on 06/14/23.            Video-Visit Details    Type of service:  Video Visit     Originating Location (pt. Location): Home  Distant Location (provider location):  On-site  Platform used for Video Visit: The Rounds

## 2023-06-15 ENCOUNTER — MYC MEDICAL ADVICE (OUTPATIENT)
Dept: FAMILY MEDICINE | Facility: CLINIC | Age: 58
End: 2023-06-15

## 2023-06-15 ENCOUNTER — LAB (OUTPATIENT)
Dept: LAB | Facility: CLINIC | Age: 58
End: 2023-06-15
Payer: COMMERCIAL

## 2023-06-15 DIAGNOSIS — F41.1 GENERALIZED ANXIETY DISORDER: ICD-10-CM

## 2023-06-15 DIAGNOSIS — E03.9 ACQUIRED HYPOTHYROIDISM: ICD-10-CM

## 2023-06-15 DIAGNOSIS — Z00.00 ENCOUNTER FOR ROUTINE ADULT HEALTH EXAMINATION WITHOUT ABNORMAL FINDINGS: ICD-10-CM

## 2023-06-15 LAB
ALBUMIN SERPL BCG-MCNC: 4.7 G/DL (ref 3.5–5.2)
ALP SERPL-CCNC: 55 U/L (ref 35–104)
ALT SERPL W P-5'-P-CCNC: 19 U/L (ref 0–50)
ANION GAP SERPL CALCULATED.3IONS-SCNC: 14 MMOL/L (ref 7–15)
AST SERPL W P-5'-P-CCNC: 23 U/L (ref 0–45)
BILIRUB SERPL-MCNC: 0.5 MG/DL
BUN SERPL-MCNC: 14.3 MG/DL (ref 6–20)
CALCIUM SERPL-MCNC: 9.4 MG/DL (ref 8.6–10)
CHLORIDE SERPL-SCNC: 104 MMOL/L (ref 98–107)
CHOLEST SERPL-MCNC: 153 MG/DL
CREAT SERPL-MCNC: 0.71 MG/DL (ref 0.51–0.95)
DEPRECATED HCO3 PLAS-SCNC: 21 MMOL/L (ref 22–29)
ERYTHROCYTE [DISTWIDTH] IN BLOOD BY AUTOMATED COUNT: 13.5 % (ref 10–15)
GFR SERPL CREATININE-BSD FRML MDRD: >90 ML/MIN/1.73M2
GLUCOSE SERPL-MCNC: 95 MG/DL (ref 70–99)
HCT VFR BLD AUTO: 37.4 % (ref 35–47)
HDLC SERPL-MCNC: 38 MG/DL
HGB BLD-MCNC: 12.5 G/DL (ref 11.7–15.7)
LDLC SERPL CALC-MCNC: 101 MG/DL
MCH RBC QN AUTO: 28.2 PG (ref 26.5–33)
MCHC RBC AUTO-ENTMCNC: 33.4 G/DL (ref 31.5–36.5)
MCV RBC AUTO: 84 FL (ref 78–100)
NONHDLC SERPL-MCNC: 115 MG/DL
PLATELET # BLD AUTO: 285 10E3/UL (ref 150–450)
POTASSIUM SERPL-SCNC: 4.1 MMOL/L (ref 3.4–5.3)
PROT SERPL-MCNC: 7.2 G/DL (ref 6.4–8.3)
RBC # BLD AUTO: 4.43 10E6/UL (ref 3.8–5.2)
SODIUM SERPL-SCNC: 139 MMOL/L (ref 136–145)
TRIGL SERPL-MCNC: 69 MG/DL
TSH SERPL DL<=0.005 MIU/L-ACNC: 2.43 UIU/ML (ref 0.3–4.2)
WBC # BLD AUTO: 5.2 10E3/UL (ref 4–11)

## 2023-06-15 PROCEDURE — 80053 COMPREHEN METABOLIC PANEL: CPT

## 2023-06-15 PROCEDURE — 85027 COMPLETE CBC AUTOMATED: CPT

## 2023-06-15 PROCEDURE — 80061 LIPID PANEL: CPT

## 2023-06-15 PROCEDURE — 84443 ASSAY THYROID STIM HORMONE: CPT

## 2023-06-15 PROCEDURE — 36415 COLL VENOUS BLD VENIPUNCTURE: CPT

## 2023-06-15 NOTE — TELEPHONE ENCOUNTER
Duplicate.  See other [employer] message.    Clonazepam sent in yesterday.    Mikaela Niño RN

## 2023-06-16 RX ORDER — ZOLPIDEM TARTRATE 5 MG/1
5 TABLET ORAL
Qty: 20 TABLET | Refills: 0 | Status: SHIPPED | OUTPATIENT
Start: 2023-06-16 | End: 2023-07-23

## 2023-06-19 DIAGNOSIS — E78.5 HYPERLIPIDEMIA LDL GOAL <130: ICD-10-CM

## 2023-06-19 RX ORDER — ATORVASTATIN CALCIUM 20 MG/1
20 TABLET, FILM COATED ORAL DAILY
Qty: 90 TABLET | Refills: 0 | Status: SHIPPED | OUTPATIENT
Start: 2023-06-19 | End: 2023-07-24

## 2023-06-19 ASSESSMENT — SLEEP AND FATIGUE QUESTIONNAIRES
HOW LIKELY ARE YOU TO NOD OFF OR FALL ASLEEP WHILE WATCHING TV: SLIGHT CHANCE OF DOZING
HOW LIKELY ARE YOU TO NOD OFF OR FALL ASLEEP WHILE SITTING QUIETLY AFTER LUNCH WITHOUT ALCOHOL: SLIGHT CHANCE OF DOZING
HOW LIKELY ARE YOU TO NOD OFF OR FALL ASLEEP WHILE SITTING INACTIVE IN A PUBLIC PLACE: SLIGHT CHANCE OF DOZING
HOW LIKELY ARE YOU TO NOD OFF OR FALL ASLEEP WHILE LYING DOWN TO REST IN THE AFTERNOON WHEN CIRCUMSTANCES PERMIT: SLIGHT CHANCE OF DOZING
HOW LIKELY ARE YOU TO NOD OFF OR FALL ASLEEP WHEN YOU ARE A PASSENGER IN A CAR FOR AN HOUR WITHOUT A BREAK: SLIGHT CHANCE OF DOZING
HOW LIKELY ARE YOU TO NOD OFF OR FALL ASLEEP IN A CAR, WHILE STOPPED FOR A FEW MINUTES IN TRAFFIC: SLIGHT CHANCE OF DOZING
HOW LIKELY ARE YOU TO NOD OFF OR FALL ASLEEP WHILE SITTING AND TALKING TO SOMEONE: WOULD NEVER DOZE
HOW LIKELY ARE YOU TO NOD OFF OR FALL ASLEEP WHILE SITTING AND READING: SLIGHT CHANCE OF DOZING

## 2023-06-19 NOTE — TELEPHONE ENCOUNTER
Prescription approved per [facility] Refill Protocol.      Future Appointments 6/19/2023 - 12/16/2023      Date Visit Type Length Department Provider     6/21/2023 11:00 AM HST  30 min  SLEEP CENTER BED 7  SLEEP    Location Instructions:     Sleep Clinic Appointments: Park in the East parking ramp near the Emergency Room and enter the Physicians Building on Level B, the lowest level. Check in with the  in [address]. &nbsp;  Sleep Study Appointments: Park in the East parking ramp near the Emergency Room and enter the Physicians Building on Level B, the lowest level. Check in with the  in [address].              6/22/2023  8:00 AM HST DROP OFF 15 min  SLEEP CENTER BED 7  SLEEP    Location Instructions:     Sleep Clinic Appointments: Park in the East parking ramp near the Emergency Room and enter the Physicians Building on Level B, the lowest level. Check in with the  in [address]. &nbsp;  Sleep Study Appointments: Park in the East parking ramp near the Emergency Room and enter the Physicians Building on Level B, the lowest level. Check in with the  in [address].              7/17/2023  8:30 AM CCPS BHC NEW 30 min [facility] Iliana Moss, LICSW              7/17/2023  9:00 AM CCPS ADULT PSYCHIATRY NEW 60 min  PSYCHIATRY Christi Lewis DO    Location Instructions:     [facility] has 2 buildings on its campus. Please visit us at [address], [place] and enter the building with the numbers [address] on it.               7/19/2023  2:00 PM ANNUAL WELLNESS 30 min UP [facility]ILY [facility]ICE Vanessa Ladd MD    Location Instructions:     [facility] is in [address] of the [facility] at [address] in [place]. The building is at the intersection with [facility] and along [facility]. This is the large, blue, glass building with a sculpture on the roof; please note there is no [place]  signage on the exterior. Free lot parking is available.              7/21/2023  2:00 PM RETURN SLEEP 30 min  SLEEP CENTER Goltz, Bennett Ezra, PA-C    Location Instructions:     Sleep Clinic Appointments: Park in the East parking ramp near the Emergency Room and enter the Physicians Building on Level B, the lowest level. Check in with the  in [address]. &nbsp;  Sleep Study Appointments: Park in the East parking ramp near the Emergency Room and enter the Physicians Building on Level B, the lowest level. Check in with the  in [address].                 Santiago POLLOCK RN   [facility]

## 2023-06-21 ENCOUNTER — OFFICE VISIT (OUTPATIENT)
Dept: SLEEP MEDICINE | Facility: CLINIC | Age: 58
End: 2023-06-21
Attending: PHYSICIAN ASSISTANT
Payer: COMMERCIAL

## 2023-06-21 NOTE — PROGRESS NOTES
Pt is completing a home sleep test. Pt was instructed on how to put on the Noxturnal T3 device and associated equipment before going to bed and given the opportunity to practice putting it on before leaving the sleep center. Pt was reminded to bring the home sleep test kit back to the center tomorrow, at agreed upon time for download and reporting.   Neck circumference: 38 CM / 15 inches.

## 2023-07-14 ASSESSMENT — ANXIETY QUESTIONNAIRES
5. BEING SO RESTLESS THAT IT IS HARD TO SIT STILL: MORE THAN HALF THE DAYS
2. NOT BEING ABLE TO STOP OR CONTROL WORRYING: MORE THAN HALF THE DAYS
IF YOU CHECKED OFF ANY PROBLEMS ON THIS QUESTIONNAIRE, HOW DIFFICULT HAVE THESE PROBLEMS MADE IT FOR YOU TO DO YOUR WORK, TAKE CARE OF THINGS AT HOME, OR GET ALONG WITH OTHER PEOPLE: SOMEWHAT DIFFICULT
6. BECOMING EASILY ANNOYED OR IRRITABLE: MORE THAN HALF THE DAYS
7. FEELING AFRAID AS IF SOMETHING AWFUL MIGHT HAPPEN: NEARLY EVERY DAY
GAD7 TOTAL SCORE: 16
4. TROUBLE RELAXING: NEARLY EVERY DAY
GAD7 TOTAL SCORE: 16
1. FEELING NERVOUS, ANXIOUS, OR ON EDGE: MORE THAN HALF THE DAYS
3. WORRYING TOO MUCH ABOUT DIFFERENT THINGS: MORE THAN HALF THE DAYS

## 2023-07-17 ENCOUNTER — MYC MEDICAL ADVICE (OUTPATIENT)
Dept: FAMILY MEDICINE | Facility: CLINIC | Age: 58
End: 2023-07-17
Payer: COMMERCIAL

## 2023-07-17 ENCOUNTER — VIRTUAL VISIT (OUTPATIENT)
Dept: PSYCHIATRY | Facility: CLINIC | Age: 58
End: 2023-07-17
Attending: FAMILY MEDICINE
Payer: COMMERCIAL

## 2023-07-17 ENCOUNTER — VIRTUAL VISIT (OUTPATIENT)
Dept: BEHAVIORAL HEALTH | Facility: CLINIC | Age: 58
End: 2023-07-17
Attending: FAMILY MEDICINE
Payer: COMMERCIAL

## 2023-07-17 DIAGNOSIS — F33.0 MILD EPISODE OF RECURRENT MAJOR DEPRESSIVE DISORDER (H): ICD-10-CM

## 2023-07-17 DIAGNOSIS — F90.9 ATTENTION DEFICIT HYPERACTIVITY DISORDER (ADHD), UNSPECIFIED ADHD TYPE: Primary | ICD-10-CM

## 2023-07-17 DIAGNOSIS — F41.1 GENERALIZED ANXIETY DISORDER: ICD-10-CM

## 2023-07-17 DIAGNOSIS — F41.1 GENERALIZED ANXIETY DISORDER: Primary | ICD-10-CM

## 2023-07-17 PROCEDURE — 99204 OFFICE O/P NEW MOD 45 MIN: CPT | Mod: 95 | Performed by: PSYCHIATRY & NEUROLOGY

## 2023-07-17 PROCEDURE — 90791 PSYCH DIAGNOSTIC EVALUATION: CPT | Mod: 95 | Performed by: COUNSELOR

## 2023-07-17 RX ORDER — CLONIDINE HYDROCHLORIDE 0.1 MG/1
TABLET ORAL
Qty: 180 TABLET | Refills: 0 | Status: SHIPPED | OUTPATIENT
Start: 2023-07-17 | End: 2023-08-22 | Stop reason: ALTCHOICE

## 2023-07-17 ASSESSMENT — ENCOUNTER SYMPTOMS
BREAST MASS: 0
COUGH: 0
SORE THROAT: 0
ABDOMINAL PAIN: 0
CONSTIPATION: 0
DIARRHEA: 0
NAUSEA: 1
JOINT SWELLING: 0
HEMATOCHEZIA: 0
FREQUENCY: 0
FEVER: 0
EYE PAIN: 0
DYSURIA: 0
WEAKNESS: 0
PALPITATIONS: 0
CHILLS: 0
ARTHRALGIAS: 0
SHORTNESS OF BREATH: 0
HEARTBURN: 0
NERVOUS/ANXIOUS: 1
DIZZINESS: 1
MYALGIAS: 0
HEMATURIA: 0
PARESTHESIAS: 0
HEADACHES: 0

## 2023-07-17 ASSESSMENT — PATIENT HEALTH QUESTIONNAIRE - PHQ9
SUM OF ALL RESPONSES TO PHQ QUESTIONS 1-9: 12
10. IF YOU CHECKED OFF ANY PROBLEMS, HOW DIFFICULT HAVE THESE PROBLEMS MADE IT FOR YOU TO DO YOUR WORK, TAKE CARE OF THINGS AT HOME, OR GET ALONG WITH OTHER PEOPLE: SOMEWHAT DIFFICULT
10. IF YOU CHECKED OFF ANY PROBLEMS, HOW DIFFICULT HAVE THESE PROBLEMS MADE IT FOR YOU TO DO YOUR WORK, TAKE CARE OF THINGS AT HOME, OR GET ALONG WITH OTHER PEOPLE: SOMEWHAT DIFFICULT
SUM OF ALL RESPONSES TO PHQ QUESTIONS 1-9: 12

## 2023-07-17 ASSESSMENT — COLUMBIA-SUICIDE SEVERITY RATING SCALE - C-SSRS
4. HAVE YOU HAD THESE THOUGHTS AND HAD SOME INTENTION OF ACTING ON THEM?: NO
3. HAVE YOU BEEN THINKING ABOUT HOW YOU MIGHT KILL YOURSELF?: NO
TOTAL  NUMBER OF ABORTED OR SELF INTERRUPTED ATTEMPTS LIFETIME: NO
5. HAVE YOU STARTED TO WORK OUT OR WORKED OUT THE DETAILS OF HOW TO KILL YOURSELF? DO YOU INTEND TO CARRY OUT THIS PLAN?: NO
ATTEMPT LIFETIME: NO
2. HAVE YOU ACTUALLY HAD ANY THOUGHTS OF KILLING YOURSELF?: NO
6. HAVE YOU EVER DONE ANYTHING, STARTED TO DO ANYTHING, OR PREPARED TO DO ANYTHING TO END YOUR LIFE?: NO
1. HAVE YOU WISHED YOU WERE DEAD OR WISHED YOU COULD GO TO SLEEP AND NOT WAKE UP?: YES
2. HAVE YOU ACTUALLY HAD ANY THOUGHTS OF KILLING YOURSELF?: YES
REASONS FOR IDEATION LIFETIME: DOES NOT APPLY
TOTAL  NUMBER OF INTERRUPTED ATTEMPTS LIFETIME: NO

## 2023-07-17 NOTE — PATIENT INSTRUCTIONS
Treatment Plan:  Continue Paxil/paroxetine 10 mg daily for anxiety and mood.  Can take up to 20 mg daily.  Discussed further increasing to 30 or 40 mg daily versus changing to Lexapro or Effexor-XR.  Continue lorazepam/Ativan 0.5 mg daily as needed for flying.  Do not take with Ambien.  Continue Ambien/zolpidem 5 mg nightly as needed for insomnia.  Continue to use sparingly.  Do not take with Ativan/lorazepam.  Start clonidine 0.05-0.1 mg at bedtime as needed for sleep, anxiety, ADHD.  Can take an additional 0.05 mg (half-tab) as needed during the day for anxiety.  Continue therapy as planned.  Continue all other cares per primary care provider.   Continue all other medications as reviewed per electronic medical record today.   Safety plan reviewed. To the Emergency Department as needed or call after hours crisis line at [phone] or [phone]. [place] Crisis Text Line: Text [place] to 630639  or  Suicide LifeLine Chat: suicidepreZaelabline.org/chat  Schedule an appointment with me in 1 month or sooner as needed.  Call [facility] at [phone] to schedule.  Follow up with primary care provider as planned or sooner if needed for acute medical concerns.  Call the psychiatric nurse line with medication questions or concerns at [phone].  BuzzMobhart may be used to communicate with your provider, but this is not intended to be used for emergencies.    Patient Education:  Risks of benzodiazepine (Ativan, Xanax, Klonopin, Valium, etc) use including, but not limited to, sedation, tolerance, risk for addiction/dependence. Do not drink alcohol while taking benzodiazepines due to risk of trouble breathing and potential death. Do not drive or operate heavy machinery until it is known how the drug affects you. Discuss with physician or pharmacist before ever taking a benzodiazepine with a narcotic/opioid pain medication.     Care team has reviewed attendance agreement with patient. Patient advised  "that two failed appointments within 6 months may lead to termination of current episode of care.     Community Resources:    National Suicide Prevention Lifeline: 880.965.4808 (TTY: 149.322.8376). Call anytime for help.  (www.suicidepreventionlifeline.org)  National [place] on Mental Illness (www.abimbola.org): 973.108.6388 or 437-949-4341.   Mental Health Association (www.mentalhealth.org): 922.296.2095 or 472-462-7854.  [place] Crisis Text Line: Text [place] to 912241  Suicide LifeLine Chat: suicideOffersBy.Me.org/chat    Patient Education   Collaborative Care Psychiatry Service  What to Expect  Here's what to expect from your Collaborative Care Psychiatry Service (CCPS).   About CCPS  CCPS means 2 people work together to help you get better. You'll meet with a behavioral health clinician and a psychiatric doctor. A behavioral health clinician helps people with mental health problems by talking with them. A psychiatric doctor helps people by giving them medicine.  How it works  At every visit, you'll see the behavioral health clinician (BHC) first. They'll talk with you about how you're doing and teach you how to feel better.   Then you'll see the psychiatric doctor. This doctor can help you deal with troubling thoughts and feelings by giving you medicine. They'll make sure you know the plan for your care.   CCPS usually takes 3 to 6 visits. If you need more visits, we may have you start seeing a different psychiatric doctor for ongoing care.  If you have any questions or concerns, we'll be glad to talk with you.  About visits  Be open  At your visits, please talk openly about your problems. It may feel hard, but it's the best way for us to help you.  Cancelling visits  If you can't come to your visit, please call us right away at [phone]. If you don't cancel at least 24 hours (1 full day) before your visit, that's \"late cancellation.\"  Being late to visits  Being very late is the same as not showing up. " "You will be a \"no show\" if:  Your appointment starts with a C, and you're more than 15 minutes late for a 30-minute (half hour) visit. This will also cancel your appointment with the psychiatric doctor.  Your appointment is with a psychiatric doctor only, and you're more than 15 minutes late for a 30-minute (half hour) visit.  Your appointment is with a psychiatric doctor only, and you're more than 30 minutes late for a 60-minute (full hour) visit.  If you cancel late or don't show up 2 times within 6 months, we may end your care.   Getting help between visits  If you need help between visits, you can call us Monday to Friday from 8 a.m. to 4:30 p.m. at [phone].  Emergency care  Call 911 or go to the nearest emergency department if your life or someone else's life is in danger.  Call 728 anytime to reach the national Suicide and Crisis hotline.  Medicine refills  To refill your medicine, call your pharmacy. You can also call [facility]'s Behavioral Access at [phone], Monday to Friday, 8 a.m. to 4:30 p.m. It can take 1 to 3 business days to get a refill.   Forms, letters, and tests  You may have papers to fill out, like FMLA, short-term disability, and workability. We can help you with these forms at your visits, but you must have an appointment. You may need more than 1 visit for this, to be in an intensive therapy program, or both.  Before we can give you medicine for ADHD, we may refer you to get tested for it or confirm it another way.  We may not be able to give you an emotional support animal letter.  We don't do mental health checks ordered by the court.   We don't do mental health testing, but we can refer you to get tested.   Thank you for choosing us for your care.  For informational purposes only. Not to replace the advice of your health care provider. Copyright   2022 [facility]. All rights reserved. [employer] [id number] - 12/22.         "

## 2023-07-17 NOTE — NURSING NOTE
Is the patient currently in the state of [place]? YES    Visit mode:VIDEO    If the visit is dropped, the patient can be reconnected by: VIDEO VISIT: Text to cell phone: [phone]    Will anyone else be joining the visit? NO      How would you like to obtain your AVS? MyChart    Are changes needed to the allergy or medication list? NO    Reason for visit: Consult      Care team has reviewed attendance agreement with patient. Patient advised that two failed appointments within 6 months may lead to termination of current episode of care.      Dea Montano VF

## 2023-07-17 NOTE — PROGRESS NOTES
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 opportunity to meet quickly with a behavioral health team for evaluation in a calm and peaceful therapuetic environment. To be evaluated for admission pts are triaged throught the [facility].     The [facility] ([facility]) is located at [facility].The BEC is open to ages and provides a comprehensive behavioral health evaluation to those in crisis. Patients typically stay 24-36 hours.     The following hotlines are for both adults and children. The and are open 24 hours a day, 7 days a week unless noted otherwise.      Crisis Lines      Crisis Text Line  Text 914514  You will be connected with a trained live crisis counselor to provide support.        [place]ambling Hotline  4.328.852.7248 [hope]        línea de crisis española  [phone]        [facility] simplifyMD Helpline  [phone]        National Hope Line  8.609.631.8905 [hope]        National Suicide Prevention Lifeline  Free and confidential support  988 or 1.800.273.TALK [0855]  http://suicidepreventionlifeline.org        [employer] (LGBTQ Youth Crisis Line)   1.358.426.7451  text START to 940-816        's Crisis Line  5.494.120.9769 (Press 1)  or text 692773    [facility] Mental Health Crisis Response  Within [place], call **CRISIS [**767941] to be connected to a mental health professional who can assist you.        [facility] Crisis  [phone]      [facility] Mobile Crisis  [phone]      [facility] Crisis  [phone]      [facility] Mobile Crisis  [phone] (adults)  [phone] (children)      [facility] Mobile Crisis  [phone] (adults)  [phone] (children)      [facility] Mobile Crisis  [phone]      [facility] Mobile Crisis  [phone]    Community Resources      Fast Tracker  Linking people to mental health and substance use disorder resources  [employer]ta Beautyn.org        [place] Mental Health Warmline  Peer to peer support  5 pm to 9 am 7 days/week  3.801.012.6533  https://mnwitw.org/mnwarlucille        National [place] on Mental Illness (PRISCILLA)  221.347.1061 or 1.888.PRISCILLA.HELPS  https://namimn.org/        [facility]  [facility] residents [address]  [phone]        [facility]  Free mental health counseling  https://walkin.org/  612.870.0565 X2    Mental Health Apps      Calm Harm  https://calmharm.co.uk/      My3  https://my3app.org/      Gregory Safety Plan  https://www.mysafetyplan.org/         Report to child / adult protection services was NA.     2. Patient's identified mental health concerns with a cultural influence will be addressed by [facility] staff .     3. Initial Treatment will focus on:    Depressed Mood - feeling down, fatigue, irriability, trouble with sleep  Anxiety - difficulty with sleep, ruminations, social anxiety and worry .     4. Resources/Service Plan:    services are not indicated.   Modifications to assist communication are not indicated.   Additional  disability accommodations are not indicated.      5. Collaboration:   Collaboration / coordination of treatment will be initiated with the following support professionals:      Dr. Lewis.      6.  Referrals:   The following referral(s) will be initiated:  TBD . Next Scheduled Appointment: TBD.      A Release of Information has been obtained for the following:  N/A .     Emergency Contact was obtained.      Clinical Substantiation/medical necessity for the above recommendations:  Pt has a hx of depression and anxiety symptoms that are impacting daily functioning in daily living and social settings. Through [facility] and [facility] [place] checking on use of coping skills and therapy to help combat these symptoms may provide pt with relief. Pt reports that they are struggling to manage depressive and anxiety symptoms and again [facility] model can assist with providing coping skills, following up that pt is using these skills, safety plan or other interventions along with medication to have the best impact to manage symptoms and provide relief. At this time pt's symptoms are able to be managed with OP services and pt will be referred to a higher level of care if there are abrupt changes in presentation or risk of harm.    7. BRAD:    BRAD:  Discussed the general effects of drugs and alcohol on health and well-being. Provider gave patient printed information about the effects of chemical use on their health and well being. Recommendations:  Continue low to no use.     8. Records:   These were reviewed at time of assessment.   Information in this assessment was obtained from the medical record and provided by patient who is a good historian.    Patient will have open access to their mental health medical record.    9.   Interactive Complexity: No      Provider Name/ Credentials:  MADELIN Patel, [facility]  July 17, 2023

## 2023-07-17 NOTE — PROGRESS NOTES
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 with sleep, anxiety, and ADHD symptoms.  Discussed risks and benefits of therapy.  Patient would like to move forward with a clonidine trial to help at bedtime as needed for sleep and a small dose during the day as needed for anxiety.  No acute safety concerns today.  No acute suicidality.  No problematic drug or alcohol use.    Medication side effects and alternatives reviewed. Health promotion activities recommended and reviewed today. All questions addressed. Education and counseling completed regarding risks and benefits of medications and psychotherapy options. Recommend therapy for additional support.     Treatment Plan:    Continue Paxil/paroxetine 10 mg daily for anxiety and mood.  Can take up to 20 mg daily.  Discussed further increasing to 30 or 40 mg daily versus changing to Lexapro or Effexor-XR.    Continue lorazepam/Ativan 0.5 mg daily as needed for flying.  Do not take with Ambien.    Continue Ambien/zolpidem 5 mg nightly as needed for insomnia.  Continue to use sparingly.  Do not take with Ativan/lorazepam.    Start clonidine 0.05-0.1 mg at  bedtime as needed for sleep, anxiety, ADHD.  Can take an additional 0.05 mg (half-tab) as needed during the day for anxiety.    Continue therapy as planned.    Continue all other cares per primary care provider.     Continue all other medications as reviewed per electronic medical record today.     Safety plan reviewed. To the Emergency Department as needed or call after hours crisis line at [phone] or [phone]. [place] Crisis Text Line: Text [place] to 793596  or  Suicide LifeLine Chat: suicidepreventionADC Therapeuticsline.org/chat    Schedule an appointment with me in 1 month or sooner as needed.  Call [facility] at [phone] to schedule.    Follow up with primary care provider as planned or sooner if needed for acute medical concerns.    Call the psychiatric nurse line with medication questions or concerns at [phone].    Baloonrhart may be used to communicate with your provider, but this is not intended to be used for emergencies.    Patient Education:  Risks of benzodiazepine (Ativan, Xanax, Klonopin, Valium, etc) use including, but not limited to, sedation, tolerance, risk for addiction/dependence. Do not drink alcohol while taking benzodiazepines due to risk of trouble breathing and potential death. Do not drive or operate heavy machinery until it is known how the drug affects you. Discuss with physician or pharmacist before ever taking a benzodiazepine with a narcotic/opioid pain medication.     Care team has reviewed attendance agreement with patient. Patient advised that two failed appointments within 6 months may lead to termination of current episode of care.     Community Resources:    National Suicide Prevention Lifeline: 925.413.1786 (TTY: 694.122.3787). Call anytime for help.  (www.suicidepreventionlifeline.org)  National [place] on Mental Illness (www.abimbola.org): 801.616.5187 or 094-986-9553.   Mental Health Association (www.mentalhealth.org): 470.987.1505 or 813-818-1520.  [place]  Crisis Text Line: Text [place] to 928559  Suicide LifeLine Chat: suicidepreventionDashBurstline.org/chat    Administrative Billing:   Phone Call/Video Duration: 31 Minutes  Start: 9:23a  Stop: 9:54a      Patient Status:  Patient will continue to be seen for ongoing consultation and stabilization.    Signed:   Christi Lewis DO  [facility]    Disclaimer: This note consists of symbols derived from keyboarding, dictation and/or voice recognition software. As a result, there may be errors in the script that have gone undetected. Please consider this when interpreting information found in this chart.

## 2023-07-18 DIAGNOSIS — E03.9 ACQUIRED HYPOTHYROIDISM: ICD-10-CM

## 2023-07-18 NOTE — TELEPHONE ENCOUNTER
Routing refill request to provider for review/approval because:  Patient has visit tomorrow- please address refills.  Hanh ZAIDI RN

## 2023-07-19 ENCOUNTER — OFFICE VISIT (OUTPATIENT)
Dept: FAMILY MEDICINE | Facility: CLINIC | Age: 58
End: 2023-07-19
Payer: COMMERCIAL

## 2023-07-19 VITALS
TEMPERATURE: 97.7 F | WEIGHT: 199.5 LBS | HEIGHT: 64 IN | BODY MASS INDEX: 34.06 KG/M2 | DIASTOLIC BLOOD PRESSURE: 79 MMHG | OXYGEN SATURATION: 96 % | HEART RATE: 74 BPM | SYSTOLIC BLOOD PRESSURE: 116 MMHG | RESPIRATION RATE: 14 BRPM

## 2023-07-19 DIAGNOSIS — Z23 ENCOUNTER FOR IMMUNIZATION: ICD-10-CM

## 2023-07-19 DIAGNOSIS — E66.01 MORBID OBESITY (H): ICD-10-CM

## 2023-07-19 DIAGNOSIS — E03.9 ACQUIRED HYPOTHYROIDISM: ICD-10-CM

## 2023-07-19 DIAGNOSIS — F41.9 ANXIETY: ICD-10-CM

## 2023-07-19 DIAGNOSIS — F90.9 ATTENTION DEFICIT HYPERACTIVITY DISORDER (ADHD), UNSPECIFIED ADHD TYPE: ICD-10-CM

## 2023-07-19 DIAGNOSIS — Z00.00 ROUTINE GENERAL MEDICAL EXAMINATION AT A HEALTH CARE FACILITY: Primary | ICD-10-CM

## 2023-07-19 DIAGNOSIS — E78.5 HYPERLIPIDEMIA LDL GOAL <130: ICD-10-CM

## 2023-07-19 DIAGNOSIS — G47.00 INSOMNIA, UNSPECIFIED TYPE: ICD-10-CM

## 2023-07-19 PROCEDURE — 99396 PREV VISIT EST AGE 40-64: CPT | Mod: 25 | Performed by: FAMILY MEDICINE

## 2023-07-19 PROCEDURE — 99214 OFFICE O/P EST MOD 30 MIN: CPT | Mod: 25 | Performed by: FAMILY MEDICINE

## 2023-07-19 PROCEDURE — 90471 IMMUNIZATION ADMIN: CPT | Performed by: FAMILY MEDICINE

## 2023-07-19 PROCEDURE — 90715 TDAP VACCINE 7 YRS/> IM: CPT | Performed by: FAMILY MEDICINE

## 2023-07-19 ASSESSMENT — ENCOUNTER SYMPTOMS
PARESTHESIAS: 0
DIZZINESS: 1
SHORTNESS OF BREATH: 0
MYALGIAS: 0
ARTHRALGIAS: 0
HEMATOCHEZIA: 0
HEADACHES: 0
HEARTBURN: 0
PALPITATIONS: 0
JOINT SWELLING: 0
CHILLS: 0
ABDOMINAL PAIN: 0
NERVOUS/ANXIOUS: 1
DIARRHEA: 0
EYE PAIN: 0
COUGH: 0
WEAKNESS: 0
CONSTIPATION: 0
SORE THROAT: 0
HEMATURIA: 0
DYSURIA: 0
FREQUENCY: 0
FEVER: 0
BREAST MASS: 0
NAUSEA: 1

## 2023-07-19 ASSESSMENT — PAIN SCALES - GENERAL: PAINLEVEL: NO PAIN (0)

## 2023-07-19 NOTE — PROGRESS NOTES
SUBJECTIVE:   CC: Marbella is an 57 year old who presents for preventive health visit.       2023     1:59 PM   Additional Questions   Roomed by Trav THOMASON     Healthy Habits:     Getting at least 3 servings of Calcium per day:  Yes    Bi-annual eye exam:  NO    Dental care twice a year:  Yes    Sleep apnea or symptoms of sleep apnea:  None    Diet:  Carbohydrate counting, Vegetarian/vegan and Other    Frequency of exercise:  6-7 days/week    Duration of exercise:  Greater than 60 minutes    Taking medications regularly:  Yes    Medication side effects:  Lightheadedness    Additional concerns today:  Yes      Today's PHQ-2 Score:       2023     1:56 PM   PHQ-2 (  [employer])   Q1: Little interest or pleasure in doing things 1   Q2: Feeling down, depressed or hopeless 1   PHQ-2 Score 2   Q1: Little interest or pleasure in doing things Several days   Q2: Feeling down, depressed or hopeless Several days   PHQ-2 Score 2                 Hypothyroidism - on synthroid   Hyperlipidemia   Anxiety   Insomnia       Social History     Tobacco Use     Smoking status: Former     Packs/day: 0.00     Years: 0.00     Pack years: 0.00     Types: Cigarettes     Quit date: 1990     Years since quittin.5     Smokeless tobacco: Never   Substance Use Topics     Alcohol use: Not Currently     Comment: One or two drinks a month             2023     4:04 PM   Alcohol Use   Prescreen: >3 drinks/day or >7 drinks/week? Not Applicable     Reviewed orders with patient.  Reviewed health maintenance and updated orders accordingly - Yes  Lab work is in process  Labs reviewed in EPIC  BP Readings from Last 3 Encounters:   23 116/79   22 132/87   22 136/80    Wt Readings from Last 3 Encounters:   23 90.5 kg (199 lb 8 oz)   23 93 kg (205 lb)   22 93.3 kg (205 lb 9.6 oz)                  Patient Active Problem List   Diagnosis     Health Care Home     Generalized anxiety disorder     Hyperlipidemia  LDL goal <130     Acquired hypothyroidism     Overweight     Preventative health care     Acute post-operative pain     Status post hysterectomy     Morbid obesity (H)     Sensation of plugged ear on both sides     Primary osteoarthritis of right hip     Weakness of right hip     Dermatochalasis of both upper eyelids     Involutional ptosis, acquired, bilateral     CMC DJD(carpometacarpal degenerative joint disease), localized primary, left     Thumb pain, left     Adjustment disorder with anxious mood     Family history of diabetes mellitus     Anxiety     Past Surgical History:   Procedure Laterality Date     ABDOMEN SURGERY      exploratory after C section for sepsis      SECTION      x 2     HYSTERECTOMY RADICAL  2015     HYSTERECTOMY SUPRACERVICAL N/A 2015    Procedure: HYSTERECTOMY SUPRACERVICAL;  Surgeon: Kelle Wasserman MD;  Location:  OR     LUMPECTOMY BREAST       MYOMECTOMY UTERUS       ORTHOPEDIC SURGERY  2020    3-18-1, Nancy date R hip replacement, March L meniscus repair     REPAIR PTOSIS Right 2002    reconstruction of tear duct     REPAIR PTOSIS BILATERAL Bilateral 2020    Procedure: BILATERAL PTOSIS REPAIR, IRRIGATION OF LACRIMAL SYSTEM BILATERAL;  Surgeon: Pako Sosa MD;  Location:  OR     SOFT TISSUE SURGERY  2021    L meniscus tear repair       Social History     Tobacco Use     Smoking status: Former     Packs/day: 0.00     Years: 0.00     Pack years: 0.00     Types: Cigarettes     Quit date: 1990     Years since quittin.5     Smokeless tobacco: Never   Substance Use Topics     Alcohol use: Not Currently     Comment: One or two drinks a month     Family History   Problem Relation Age of Onset     High cholesterol Father      Cancer Father         CLL     Blood Disease Father         CLL     Glaucoma Father      Macular Degeneration Father      Breast Cancer Maternal Grandmother         in 80s     Cancer Maternal Grandmother       Osteoporosis Maternal Grandmother      Depression Mother      Diabetes Mother      Ulcerative Colitis Mother      Lipids Mother         hypertriglyceridemia     Anxiety Disorder Mother      Osteoporosis Mother      Diabetes Brother          Current Outpatient Medications   Medication Sig Dispense Refill     albuterol (PROAIR HFA/PROVENTIL HFA/VENTOLIN HFA) 108 (90 Base) MCG/ACT inhaler Inhale 1-2 puffs into the lungs every 4 hours as needed for shortness of breath / dyspnea or wheezing 6.7 g 0     atorvastatin (LIPITOR) 20 MG tablet Take 1 tablet (20 mg) by mouth daily 90 tablet 0     calcium carbonate (OS-ARA) 500 MG tablet Take 1 tablet by mouth 2 times daily       cloNIDine (CATAPRES) 0.1 MG tablet Take half to full tab as needed at bedtime for sleep. Can also take half tab daily as needed for anxiety. 180 tablet 0     levothyroxine (SYNTHROID/LEVOTHROID) 125 MCG tablet Take 1 tablet (125 mcg) by mouth daily 90 tablet 1     LORazepam (ATIVAN) 0.5 MG tablet Take one tablet as needed for flying 7 tablet 0     melatonin 5 MG tablet Take 5 mg by mouth nightly as needed for sleep       Omega-3 Fatty Acids (OMEGA-3 FISH OIL PO) Take 1 g by mouth daily       PARoxetine (PAXIL) 20 MG tablet Take one tablet daily 90 tablet 1     zolpidem (AMBIEN) 5 MG tablet Take 1 tablet (5 mg) by mouth nightly as needed for sleep 20 tablet 0     Allergies   Allergen Reactions     Erythromycin      Itching and swelling of lids      Seasonal Allergies Itching and Other (See Comments)     Recent Labs   Lab Test 06/15/23  0721 07/08/22  0838 04/13/22  1051 04/13/22  1051 10/08/21  0815 07/01/21  0834 06/23/21  1240 02/04/20  1214 06/12/19  0921 06/22/16  0734 08/28/15  0850   A1C  --   --   --  5.7*  --   --  5.7*  --  5.6   < >  --    * 84  --   --  115*   < >  --    < > 169*   < >  --    HDL 38* 37*  --   --  37*   < >  --    < > 35*   < >  --    TRIG 69 106  --   --  130   < >  --    < > 142   < >  --    ALT 19 37  --   --  31   --   --   --   --   --   --    CR 0.71 0.58   < > 0.65  --   --   --   --   --   --  0.65   GFRESTIMATED >90 >90   < > >90  --   --   --   --   --   --  >90  Non  GFR Calc     GFRESTBLACK  --   --   --   --   --   --   --   --   --   --  >90  African American GFR Calc     POTASSIUM 4.1 4.4   < > 4.0  --   --   --   --   --   --   --    TSH 2.43 1.49   < > 1.18  --   --  0.78   < > 0.26*   < >  --     < > = values in this interval not displayed.        Breast Cancer Screenin/20/2021     8:40 AM 2022    11:28 AM 2023     4:05 PM   Breast CA Risk Assessment (FHS-7)   Do you have a family history of breast, colon, or ovarian cancer? Yes Yes No / Unknown         Mammogram Screening: Recommended mammography every 1-2 years with patient discussion and risk factor consideration  Pertinent mammograms are reviewed under the imaging tab.    History of abnormal Pap smear: NO - age 30-65 PAP every 5 years with negative HPV co-testing recommended      2011    12:00 AM   PAP / HPV   PAP (Historical) NIL      Reviewed and updated as needed this visit by clinical staff                  Reviewed and updated as needed this visit by Provider                 Past Medical History:   Diagnosis Date     Arthritis     My mom has it, my grandmother had it I have it     Diabetes (H)     My mom and brother have type II     Heavy menstrual period      Leiomyoma of uterus     s/p myomectomy     Mental disorder     anxiety     PONV (postoperative nausea and vomiting)      Surgical complication after   sept     Thyroid disease     Hypothroidism, 2nd half of      Uncomplicated asthma     My son has it, since he was born     Vesico-ureteral reflux     s/p repair      Past Surgical History:   Procedure Laterality Date     ABDOMEN SURGERY      exploratory after C section for sepsis      SECTION      x 2     HYSTERECTOMY RADICAL  2015     HYSTERECTOMY SUPRACERVICAL N/A 2015     Procedure: HYSTERECTOMY SUPRACERVICAL;  Surgeon: Kelle Wasserman MD;  Location:  OR     LUMPECTOMY BREAST       MYOMECTOMY UTERUS       ORTHOPEDIC SURGERY  06/04/2020    3-18-1, Nancy date R hip replacement, March L meniscus repair     REPAIR PTOSIS Right 2002    reconstruction of tear duct     REPAIR PTOSIS BILATERAL Bilateral 12/18/2020    Procedure: BILATERAL PTOSIS REPAIR, IRRIGATION OF LACRIMAL SYSTEM BILATERAL;  Surgeon: Pako Sosa MD;  Location:  OR     SOFT TISSUE SURGERY  4/1/2021    L meniscus tear repair       Review of Systems   Constitutional: Negative for chills and fever.   HENT: Negative for congestion, ear pain, hearing loss and sore throat.    Eyes: Negative for pain and visual disturbance.   Respiratory: Negative for cough and shortness of breath.    Cardiovascular: Negative for chest pain, palpitations and peripheral edema.   Gastrointestinal: Positive for nausea. Negative for abdominal pain, constipation, diarrhea, heartburn and hematochezia.   Breasts:  Negative for tenderness, breast mass and discharge.   Genitourinary: Negative for dysuria, frequency, genital sores, hematuria, pelvic pain, vaginal bleeding and vaginal discharge.   Musculoskeletal: Negative for arthralgias, joint swelling and myalgias.   Neurological: Positive for dizziness. Negative for weakness, headaches and paresthesias.   Psychiatric/Behavioral: Negative for mood changes. The patient is nervous/anxious.      CONSTITUTIONAL: NEGATIVE for fever, chills, change in weight  INTEGUMENTARY/SKIN: NEGATIVE for worrisome rashes, moles or lesions  EYES: NEGATIVE for vision changes or irritation  ENT: NEGATIVE for ear, mouth and throat problems  RESP: NEGATIVE for significant cough or SOB  BREAST: NEGATIVE for masses, tenderness or discharge  CV: NEGATIVE for chest pain, palpitations or peripheral edema  GI: NEGATIVE for nausea, abdominal pain, heartburn, or change in bowel habits  : NEGATIVE for unusual  urinary or vaginal symptoms. No vaginal bleeding.  MUSCULOSKELETAL: NEGATIVE for significant arthralgias or myalgia  NEURO: NEGATIVE for weakness, dizziness or paresthesias  PSYCHIATRIC: NEGATIVE for changes in mood or affect      OBJECTIVE:   LMP  (LMP Unknown)   Physical Exam  GENERAL: healthy, alert and no distress  EYES: Eyes grossly normal to inspection, PERRL and conjunctivae and sclerae normal  HENT: ear canals and TM's normal, nose and mouth without ulcers or lesions  NECK: no adenopathy, no asymmetry, masses, or scars and thyroid normal to palpation  RESP: lungs clear to auscultation - no rales, rhonchi or wheezes  BREAST: normal without masses, tenderness or nipple discharge and no palpable axillary masses or adenopathy  CV: regular rate and rhythm, normal S1 S2, no S3 or S4, no murmur, click or rub, no peripheral edema and peripheral pulses strong  ABDOMEN: soft, nontender, no hepatosplenomegaly, no masses and bowel sounds normal  MS: no gross musculoskeletal defects noted, no edema  SKIN: no suspicious lesions or rashes  NEURO: Normal strength and tone, mentation intact and speech normal  PSYCH: mentation appears normal, affect normal/bright    Diagnostic Test Results:  Labs reviewed in Epic  No results found for any visits on 07/19/23.    ASSESSMENT/PLAN:   (Z00.00) Routine general medical examination at a health care facility  (primary encounter diagnosis)  Comment: Discussed diet,calcium,exercise.Went over self breast exam.Thin prep was NOT  done.Eyes and teeth UTD.No immunizations needed today.See orders below for tests ordered and screening needed.    Plan: she has done her screening labs and we went over them today     (E03.9) Acquired hypothyroidism  Comment: TSH is normal , so will stay on the current dose of the synthroid   Plan: levothyroxine (SYNTHROID/LEVOTHROID) 125 MCG         tablet        As above     (E78.5) Hyperlipidemia LDL goal <130  Comment: on Lipitor 20 mg daily and refilled her  "medication, doing well on this , no side effects   Plan: as above     (F41.9) Anxiety  Comment: saw psychiatry recently Dr Lewis  Plan: discussed increasing the dose of Paxil to 20 mg , also start clonidin at HS for sleep , she has used Ambien for a while with good results , not using nightly   Will follow up with her psychiatrist and also sees a therapist on a regular basis     (E66.01) Morbid obesity (H)  Comment: regular physical activities and healthy diet recommended   Plan: as above     (Z23) Encounter for immunization  Comment: got her tetanus booster today   Plan: TDAP VACCINE (Adacel, Boostrix)              Patient has been advised of split billing requirements and indicates understanding: Yes      COUNSELING:  Reviewed preventive health counseling, as reflected in patient instructions       Regular exercise       Healthy diet/nutrition       Vision screening       Osteoporosis prevention/bone health      BMI:   Estimated body mass index is 35.19 kg/m  as calculated from the following:    Height as of 3/3/23: 1.626 m (5' 4\").    Weight as of 3/3/23: 93 kg (205 lb).         She reports that she quit smoking about 33 years ago. Her smoking use included cigarettes. She has never used smokeless tobacco.      Vanessa Ladd MD  [facility]  "

## 2023-07-20 RX ORDER — LEVOTHYROXINE SODIUM 125 UG/1
125 TABLET ORAL DAILY
Qty: 4 TABLET | Refills: 0 | Status: CANCELLED | OUTPATIENT
Start: 2023-07-20

## 2023-07-20 RX ORDER — LEVOTHYROXINE SODIUM 125 UG/1
125 TABLET ORAL DAILY
Qty: 90 TABLET | Refills: 1 | Status: SHIPPED | OUTPATIENT
Start: 2023-07-20 | End: 2024-01-22

## 2023-07-22 DIAGNOSIS — G47.00 INSOMNIA, UNSPECIFIED TYPE: ICD-10-CM

## 2023-07-23 RX ORDER — ZOLPIDEM TARTRATE 5 MG/1
5 TABLET ORAL
Qty: 20 TABLET | Refills: 0 | Status: SHIPPED | OUTPATIENT
Start: 2023-07-23 | End: 2024-05-02

## 2023-07-24 RX ORDER — ATORVASTATIN CALCIUM 20 MG/1
20 TABLET, FILM COATED ORAL DAILY
Qty: 90 TABLET | Refills: 0 | Status: SHIPPED | OUTPATIENT
Start: 2023-07-24 | End: 2023-12-26

## 2023-08-01 ENCOUNTER — MYC MEDICAL ADVICE (OUTPATIENT)
Dept: FAMILY MEDICINE | Facility: CLINIC | Age: 58
End: 2023-08-01
Payer: COMMERCIAL

## 2023-08-01 DIAGNOSIS — F41.1 GENERALIZED ANXIETY DISORDER: ICD-10-CM

## 2023-08-03 ENCOUNTER — TRANSFERRED RECORDS (OUTPATIENT)
Dept: HEALTH INFORMATION MANAGEMENT | Facility: CLINIC | Age: 58
End: 2023-08-03
Payer: COMMERCIAL

## 2023-08-04 NOTE — TELEPHONE ENCOUNTER
Called [facility].  [facility] mail order last refilled 6/10.    Called  pharmacy  Refill sent for #45 : directions take 1/2 tab daily        Note from 7/19/23 physical with LS:    F41.9) Anxiety  Comment: saw psychiatry recently Dr Lewis  Plan: discussed increasing the dose of Paxil to 20 mg     Mychart message sent to patient.  Mikaela Niño RN

## 2023-08-07 RX ORDER — PAROXETINE 20 MG/1
TABLET, FILM COATED ORAL
Qty: 90 TABLET | Refills: 1 | Status: SHIPPED | OUTPATIENT
Start: 2023-08-07 | End: 2023-08-23 | Stop reason: ALTCHOICE

## 2023-08-07 NOTE — TELEPHONE ENCOUNTER
Patient was filling old medication Rx written from previous provider  PCP Dr. Ladd sent new Rx with 20mg/day dose to [facility]  Patient prefers to pick medication up at Mail order pharmacy [place]  Rx copied and sent to mail order preferred location    Prescription approved per [facility] Refill Protocol.    She CASTELLANOS RN

## 2023-08-08 DIAGNOSIS — G47.00 INSOMNIA, UNSPECIFIED TYPE: ICD-10-CM

## 2023-08-08 RX ORDER — ZOLPIDEM TARTRATE 5 MG/1
5 TABLET ORAL
Qty: 1 TABLET | Refills: 0 | OUTPATIENT
Start: 2023-08-08

## 2023-08-18 ASSESSMENT — ANXIETY QUESTIONNAIRES
IF YOU CHECKED OFF ANY PROBLEMS ON THIS QUESTIONNAIRE, HOW DIFFICULT HAVE THESE PROBLEMS MADE IT FOR YOU TO DO YOUR WORK, TAKE CARE OF THINGS AT HOME, OR GET ALONG WITH OTHER PEOPLE: SOMEWHAT DIFFICULT
GAD7 TOTAL SCORE: 19
6. BECOMING EASILY ANNOYED OR IRRITABLE: MORE THAN HALF THE DAYS
2. NOT BEING ABLE TO STOP OR CONTROL WORRYING: NEARLY EVERY DAY
7. FEELING AFRAID AS IF SOMETHING AWFUL MIGHT HAPPEN: MORE THAN HALF THE DAYS
3. WORRYING TOO MUCH ABOUT DIFFERENT THINGS: NEARLY EVERY DAY
4. TROUBLE RELAXING: NEARLY EVERY DAY
1. FEELING NERVOUS, ANXIOUS, OR ON EDGE: NEARLY EVERY DAY
5. BEING SO RESTLESS THAT IT IS HARD TO SIT STILL: NEARLY EVERY DAY
GAD7 TOTAL SCORE: 19

## 2023-08-21 ENCOUNTER — TRANSFERRED RECORDS (OUTPATIENT)
Dept: HEALTH INFORMATION MANAGEMENT | Facility: CLINIC | Age: 58
End: 2023-08-21
Payer: COMMERCIAL

## 2023-08-22 ENCOUNTER — VIRTUAL VISIT (OUTPATIENT)
Dept: PSYCHIATRY | Facility: CLINIC | Age: 58
End: 2023-08-22
Payer: COMMERCIAL

## 2023-08-22 DIAGNOSIS — F41.1 GENERALIZED ANXIETY DISORDER: Primary | ICD-10-CM

## 2023-08-22 DIAGNOSIS — F33.0 MILD EPISODE OF RECURRENT MAJOR DEPRESSIVE DISORDER (H): ICD-10-CM

## 2023-08-22 DIAGNOSIS — F90.9 ATTENTION DEFICIT HYPERACTIVITY DISORDER (ADHD), UNSPECIFIED ADHD TYPE: ICD-10-CM

## 2023-08-22 PROCEDURE — 99215 OFFICE O/P EST HI 40 MIN: CPT | Mod: 95 | Performed by: PSYCHIATRY & NEUROLOGY

## 2023-08-22 RX ORDER — VENLAFAXINE HYDROCHLORIDE 37.5 MG/1
37.5 CAPSULE, EXTENDED RELEASE ORAL DAILY
Qty: 10 CAPSULE | Refills: 0 | Status: SHIPPED | OUTPATIENT
Start: 2023-08-22 | End: 2023-10-03 | Stop reason: DRUGHIGH

## 2023-08-22 RX ORDER — LORAZEPAM 0.5 MG/1
0.5 TABLET ORAL EVERY 6 HOURS PRN
Qty: 10 TABLET | Refills: 0 | Status: SHIPPED | OUTPATIENT
Start: 2023-08-22 | End: 2023-11-03

## 2023-08-22 RX ORDER — VENLAFAXINE HYDROCHLORIDE 75 MG/1
75 CAPSULE, EXTENDED RELEASE ORAL DAILY
Qty: 90 CAPSULE | Refills: 0 | Status: SHIPPED | OUTPATIENT
Start: 2023-09-01 | End: 2023-10-03

## 2023-08-22 ASSESSMENT — PATIENT HEALTH QUESTIONNAIRE - PHQ9
SUM OF ALL RESPONSES TO PHQ QUESTIONS 1-9: 15
10. IF YOU CHECKED OFF ANY PROBLEMS, HOW DIFFICULT HAVE THESE PROBLEMS MADE IT FOR YOU TO DO YOUR WORK, TAKE CARE OF THINGS AT HOME, OR GET ALONG WITH OTHER PEOPLE: SOMEWHAT DIFFICULT
SUM OF ALL RESPONSES TO PHQ QUESTIONS 1-9: 15

## 2023-08-22 NOTE — PROGRESS NOTES
"Virtual Visit Details    Type of service:  Video Visit     Originating Location (pt. Location): {video visit patient location:811896::\"Home\"}  {PROVIDER LOCATION On-site should be selected for visits conducted from your clinic location or adjoining [facility] hospital, academic office, or other nearby [facility] building. Off-site should be selected for all other provider locations, including home:[id number]}  Distant Location (provider location):  {virtual location provider:900491}  Platform used for Video Visit: {Virtual Visit Platforms:928037::\"Arkmicro\"}  "

## 2023-08-22 NOTE — PROGRESS NOTES
"Telemedicine Visit: The patient's condition can be safely assessed and treated via synchronous audio and visual telemedicine encounter.      Reason for Telemedicine Visit: Patient has requested telehealth visit    Originating Site (Patient Location): Patient's home    Distant Location (provider location):  On-site    Consent:  The patient/guardian has verbally consented to: the potential risks and benefits of telemedicine (video visit) versus in person care; bill my insurance or make self-payment for services provided; and responsibility for payment of non-covered services.     Mode of Communication:  Video Conference via [email or web address]    As the provider I attest to compliance with applicable laws and regulations related to telemedicine.          Outpatient Psychiatric Progress Note    Name: Marbella Hendrix   : 1965                    Primary Care Provider: Vanessa Ladd MD   Therapist: Vince Moss [facility] note today: \"Saw a therapist all last year until December and pt started a job and saw her again during life events and would meet again after this appointment. Pt really likes her. She understands me and empathizes with me.\"       PHQ-9 scores:      2023     8:18 AM 2023     7:40 AM 2023    11:12 AM   PHQ-9 SCORE   PHQ-9 Total Score MyChart 21 (Severe depression) 12 (Moderate depression) 15 (Moderately severe depression)   PHQ-9 Total Score 21 12    12 15       BERTHA-7 scores:      2023     3:20 PM 2023     4:10 PM 2023     1:58 PM   BERTHA-7 SCORE   Total Score 21 (severe anxiety) 16 (severe anxiety) 19 (severe anxiety)   Total Score 21 16 19       Patient Identification:  Patient is a 57 year old,   White Not  or  female  who presents for return visit with me.  Patient is currently unemployed. Patient attended the phone/video session with , Wally.  Patient prefers to be called: \"Marbella\".    [place]     Interim History:  I last saw Marbella Leighann " Simeon for outpatient psychiatry Consultation on 7/17/2023. During that appointment, we:    Continue Paxil/paroxetine 10 mg daily for anxiety and mood.  Can take up to 20 mg daily.  Discussed further increasing to 30 or 40 mg daily versus changing to Lexapro or Effexor-XR.  Continue lorazepam/Ativan 0.5 mg daily as needed for flying.  Do not take with Ambien.  Continue Ambien/zolpidem 5 mg nightly as needed for insomnia.  Continue to use sparingly.  Do not take with Ativan/lorazepam.  Start clonidine 0.05-0.1 mg at bedtime as needed for sleep, anxiety, ADHD.  Can take an additional 0.05 mg (half-tab) as needed during the day for anxiety.  Continue therapy as planned.    8/22: Patient reports she has not experienced any benefit from clonidine.  No major negative side effects.  Relatively no change in her mental health symptoms.  Still struggling with anxiety and mood.  No acute safety concerns.  No SI.  No problematic drug or alcohol use.    Past Psychiatric Med Trials:  Psych Meds at Intake:  Paxil 20 mg daily - started when 29 years, has been up and down on the dose, on 10 mg dose mostly, last on 20 mg this past spring   Ambien 5 mg for insomnia - used for 4 nights  Ativan 0.5 mg for flying     Past Psych Meds:  Prozac for 1-2 months maybe when 27 yo    Psychiatric ROS:  Marbella Hendrix reports mood has been: See HPI above  Anxiety has been: See HPI above  Sleep has been: Up and down  Rachna sxs: None  Psychosis sxs: None  ADHD/ADD sxs: Up-and-down  PTSD sxs: NA  PHQ9 and GAD7 scores were reviewed today if completed.   Medication side effects: Denies  Current stressors include: And see HPI above  Coping mechanisms and supports include: Family and Hobbies    Current medications include:   Current Outpatient Medications   Medication Sig    albuterol (PROAIR HFA/PROVENTIL HFA/VENTOLIN HFA) 108 (90 Base) MCG/ACT inhaler Inhale 1-2 puffs into the lungs every 4 hours as needed for shortness of breath / dyspnea or  wheezing    atorvastatin (LIPITOR) 20 MG tablet Take 1 tablet (20 mg) by mouth daily    calcium carbonate (OS-ARA) 500 MG tablet Take 1 tablet by mouth 2 times daily    cloNIDine (CATAPRES) 0.1 MG tablet Take half to full tab as needed at bedtime for sleep. Can also take half tab daily as needed for anxiety.    levothyroxine (SYNTHROID/LEVOTHROID) 125 MCG tablet Take 1 tablet (125 mcg) by mouth daily    LORazepam (ATIVAN) 0.5 MG tablet Take one tablet as needed for flying    melatonin 5 MG tablet Take 5 mg by mouth nightly as needed for sleep    Omega-3 Fatty Acids (OMEGA-3 FISH OIL PO) Take 1 g by mouth daily    PARoxetine (PAXIL) 20 MG tablet Take one tablet daily    zolpidem (AMBIEN) 5 MG tablet Take 1 tablet (5 mg) by mouth nightly as needed for sleep     No current facility-administered medications for this visit.       The [place] Prescription Monitoring Program has been reviewed and there are no concerns about diversionary activity for controlled substances at this time.     Past Medical/Surgical History:  Past Medical History:   Diagnosis Date    Arthritis     My mom has it, my grandmother had it I have it    Diabetes (H)     My mom and brother have type II    Heavy menstrual period     Leiomyoma of uterus     s/p myomectomy    Mental disorder     anxiety    PONV (postoperative nausea and vomiting)     Surgical complication after  2002    Thyroid disease     Hypothroidism, 2nd half of     Uncomplicated asthma     My son has it, since he was born    Vesico-ureteral reflux     s/p repair      has a past medical history of Arthritis, Diabetes (H), Heavy menstrual period, Leiomyoma of uterus, Mental disorder, PONV (postoperative nausea and vomiting), Surgical complication (after  2002), Thyroid disease, Uncomplicated asthma, and Vesico-ureteral reflux.    She has no past medical history of Cancer (H), Cerebral infarction (H), Congestive heart failure (H), COPD (chronic  obstructive pulmonary disease) (H), Depressive disorder, Heart disease, History of blood transfusion, or Hypertension.    Social History:  Reviewed. No changes to social history except as noted above in HPI.    Vital Signs:   None. This is phone/video visit.     Labs:  Most recent laboratory results reviewed and no new labs.     Review of Systems:  10 systems (general, cardiovascular, respiratory, eyes, ENT, endocrine, GI, , M/S, neurological) were reviewed. Most pertinent finding(s) is/are: Some chronic pain. The remaining systems are all unremarkable.    Mental Status Examination (limited as this is by phone/video):  Appearance: Awake, alert, appears stated age, no acute distress, well-groomed   Attitude:  cooperative  Motor: No gross abnormalities observed via video, not formally tested   Oriented to:  person, place, time, and situation  Attention Span and Concentration:  normal  Speech:  clear, coherent, regular rate, rhythm, and volume  Language: intact  Mood:  anxious  Affect:  mood congruent, flat  Associations:  no loose associations  Thought Process:  logical, linear and goal oriented  Thought Content:  no evidence of acute suicidality or homicidal ideation, no evidence of psychotic thought, no auditory hallucinations present and no visual hallucinations present  Recent and Remote Memory:  Intact to interview. Not formally assessed. No amnesia.  Fund of Knowledge: appropriate  Insight:  fair  Judgment:  intact, adequate for safety  Impulse Control:  intact    Suicide Risk Assessment:  Today Marbella Hendrix reports passive thoughts of death but no acute suicidality or plan or intent to harm self. Based on all available evidence including the factors cited above, Marbella Hendrix does not appear to be at imminent risk for self-harm, does not meet criteria for a 72-hr hold, and therefore remains appropriate for ongoing outpatient level of care.  A thorough assessment of risk factors related to suicide  and self-harm have been reviewed and are noted above. The patient convincingly denies suicidality on several occasions. Local community safety resources reviewed for patient to use if needed. There was no deceit detected, and the patient presented in a manner that was believable.     DSM5 Diagnosis:  Attention-Deficit/Hyperactivity Disorder  314.01 (F90.9) Unspecified Attention -Deficit / Hyperactivity Disorder  296.31 (F33.0) Major Depressive Disorder, Recurrent Episode, Mild _  300.02 (F41.1) Generalized Anxiety Disorder    Medical comorbidities include:   Patient Active Problem List    Diagnosis Date Noted    Anxiety 07/19/2023     Priority: Medium    Adjustment disorder with anxious mood 06/27/2021     Priority: Medium    Family history of diabetes mellitus 06/27/2021     Priority: Medium    CMC DJD(carpometacarpal degenerative joint disease), localized primary, left 11/18/2020     Priority: Medium    Thumb pain, left 11/18/2020     Priority: Medium    Dermatochalasis of both upper eyelids 11/17/2020     Priority: Medium     Added automatically from request for surgery [id number]      Involutional ptosis, acquired, bilateral 11/17/2020     Priority: Medium     Added automatically from request for surgery [id number]      Weakness of right hip 07/09/2020     Priority: Medium    Primary osteoarthritis of right hip 12/02/2019     Priority: Medium     Added automatically from request for surgery [id number]      Morbid obesity (H) 06/20/2018     Priority: Medium    Status post hysterectomy 08/02/2017     Priority: Medium    Sensation of plugged ear on both sides 07/12/2017     Priority: Medium    Acute post-operative pain 08/28/2015     Priority: Medium    Preventative health care 10/30/2012     Priority: Medium     Last pap NIL 2011; mammogram nl 2011; lipids abnl, needs annual f/u      Health Care Home 09/26/2012     Priority: Medium     Tier 1    DX V65.8 REPLACED WITH 81198 HEALTH CARE HOME (04/08/2013)      Generalized  anxiety disorder 09/26/2012     Priority: Medium     H/o panic attacks; currently controlled with paxil      Hyperlipidemia LDL goal <130 09/26/2012     Priority: Medium     Behavioral measures - avoiding statins while trying to conceive; not a candidate for red yeast rice (interactions with thyroid meds)      Acquired hypothyroidism 09/26/2012     Priority: Medium    Overweight 09/26/2012     Priority: Medium     On exercise plan, has been in weight watchers  Problem list name updated by automated process. Provider to review         Psychosocial & Contextual Factors: see HPI above    Assessment:  From Intake, 7/17/2023:  Marbella Hendrix is a 57-year-old female with past psychiatric history including anxiety, ADHD, depression, remote polysubstance abuse (in remission for decades) who presents today for psychiatric evaluation.  Patient recently with increased psychosocial stressors and acutely worsening anxiety and insomnia.  Patient most recently on Paxil and Ambien to manage symptoms.  Ambien used sparingly and did help break severe insomnia cycle recently.  Patient currently taking 10 mg of Paxil and has only ever been up to 20 mg historically.  Patient may be interested in a trial with something different than Paxil.  We also discussed further optimizing Paxil to 30 or 40 mg daily for more therapeutic trial before replacing the medication.  We did discuss Lexapro and Effexor-XR as possible alternatives.  Patient would like to discuss these options with her  who is an internist and her psychotherapist.  In the meantime, we discussed a trial with clonidine to help with sleep, anxiety, and ADHD symptoms.  Discussed risks and benefits of therapy.  Patient would like to move forward with a clonidine trial to help at bedtime as needed for sleep and a small dose during the day as needed for anxiety.  No acute safety concerns today.  No acute suicidality.  No problematic drug or alcohol use.        8/22/2023:  Patient with ongoing significant anxiety and mood related struggles.  Clonidine has not been helpful.  We will transition patient off paroxetine and onto venlafaxine.  We will continue to consider escitalopram as an option if venlafaxine ineffective or poorly tolerated.  Could also consider use of fluoxetine if paroxetine is difficult to discontinue.  Discussed DBT therapy as a potential option to help improve symptoms.  Resources sent in Counsyl message.  No acute suicidality.  No acute safety concerns.  No problematic drug or alcohol use.    Medication side effects and alternatives were reviewed. Health promotion activities recommended and reviewed today. All questions addressed. Education and counseling completed regarding risks and benefits of medications and psychotherapy options. Recommend therapy for additional support.     Treatment Plan:  Continue Ambien/zolpidem 5 mg nightly as needed for insomnia. Continue to use sparingly. Do not take with Ativan/lorazepam.   Continue lorazepam/Ativan 0.5 mg daily as needed for severe anxiety/panic. Do not take with Ambien.   Discontinue clonidine since not helpful  Discontinue/taper off Paxil/paroxetine: take 10 mg daily for 10 days WITH Effexor-XR 37.5 mg and then discontinue Paxil when you go up on Effexor-XR to 75 mg.   Start Effexor-XR/venlafaxine ER for anxiety/mood: take 37.5 mg daily for 10 days (with Paxil 10 mg) and then increase to 75 mg daily when you stop Paxil.   Continue all other cares per primary care provider.   Continue all other medications as reviewed per electronic medical record today.   Safety plan reviewed. To the Emergency Department as needed or call after hours crisis line at [phone] or [phone]. [place] Crisis Text Line. Text [place] to 868837 or Suicide LifeLine Chat: suicidepreventionlifeline.org/chat  Consider DBT therapy.  Strongly recommend individual psychotherapy for additional support and ongoing development of  nonpharmacologic coping skills and strategies.  Schedule an appointment with me in 6 weeks or sooner as needed. Call [facility] at [phone] to schedule.  Follow up with primary care provider as planned or for acute medical concerns.  Call the psychiatric nurse line with medication questions or concerns at [phone].  MyChart may be used to communicate with your provider, but this is not intended to be used for emergencies.    Risks of benzodiazepine (Ativan, Xanax, Klonopin, Valium, etc) use including, but not limited to, sedation, tolerance, risk for addiction/dependence. Do not drink alcohol while taking benzodiazepines due to risk of trouble breathing and potential death. Do not drive or operate heavy machinery until it is known how the drug affects you. Discuss with physician or pharmacist before ever taking a benzodiazepine with a narcotic/opioid pain medication.     Book:   [facility]  By Ingr[employer] K[facility] DBT resources:  [place]://[place].gov/dhs/partners-and-providers/policies-procedures/adult-mental-health/dialectical-behavior-therapy/dbt-certified-providers/    [facility] DBT resources:  Edith Arango   [phone]   https://[facility]/faqs/     [facility]   [phone]   [email or web address]     [facility]   [phone]   [email or web address]     [facility]   [phone]   [email or web address]       Administrative Billing:   Phone Call/Video Duration: 40 Minutes  Start: 11:30a  Stop: 12:10p    Patient Status:  Patient will continue to be seen for ongoing consultation and stabilization.    Signed:   Christi Lewis DO  [facility] Psychiatry    Disclaimer: This note consists of symbols derived from keyboarding, dictation and/or voice recognition software. As a result, there may be errors in the script that have gone undetected. Please consider this when interpreting information found in this chart.

## 2023-08-22 NOTE — NURSING NOTE
Is the patient currently in the state of [place]? YES    Visit mode:VIDEO    If the visit is dropped, the patient can be reconnected by: VIDEO VISIT: Send to e-mail at: [email or web address]    Will anyone else be joining the visit? NO  (If patient encounters technical issues they should call [phone]    How would you like to obtain your AVS? MyChart    Are changes needed to the allergy or medication list? No    Reason for visit: RECHECK    Jules LOVETT

## 2023-08-23 RX ORDER — PAROXETINE 20 MG/1
10 TABLET, FILM COATED ORAL DAILY
COMMUNITY
Start: 2023-08-23 | End: 2023-09-01

## 2023-08-23 NOTE — PATIENT INSTRUCTIONS
Treatment Plan:  Continue Ambien/zolpidem 5 mg nightly as needed for insomnia. Continue to use sparingly. Do not take with Ativan/lorazepam.   Continue lorazepam/Ativan 0.5 mg daily as needed for severe anxiety/panic. Do not take with Ambien.   Discontinue clonidine since not helpful  Discontinue/taper off Paxil/paroxetine: take 10 mg daily for 10 days WITH Effexor-XR 37.5 mg and then discontinue Paxil when you go up on Effexor-XR to 75 mg.   Start Effexor-XR/venlafaxine ER for anxiety/mood: take 37.5 mg daily for 10 days (with Paxil 10 mg) and then increase to 75 mg daily when you stop Paxil.   Continue all other cares per primary care provider.   Continue all other medications as reviewed per electronic medical record today.   Safety plan reviewed. To the Emergency Department as needed or call after hours crisis line at [phone] or [phone]. [place] Crisis Text Line. Text [place] to 948469 or Suicide LifeLine Chat: suicidepreventionlifeline.org/chat  Consider DBT therapy.  Strongly recommend individual psychotherapy for additional support and ongoing development of nonpharmacologic coping skills and strategies.  Schedule an appointment with me in 6 weeks or sooner as needed. Call [facility] at [phone] to schedule.  Follow up with primary care provider as planned or for acute medical concerns.  Call the psychiatric nurse line with medication questions or concerns at [phone].  MyChart may be used to communicate with your provider, but this is not intended to be used for emergencies.    Risks of benzodiazepine (Ativan, Xanax, Klonopin, Valium, etc) use including, but not limited to, sedation, tolerance, risk for addiction/dependence. Do not drink alcohol while taking benzodiazepines due to risk of trouble breathing and potential death. Do not drive or operate heavy machinery until it is known how the drug affects you. Discuss with physician or pharmacist before ever taking a  benzodiazepine with a narcotic/opioid pain medication.     Book:   [facility]  By [facility]gr[employer] K[facility] DBT resources:  [place]://[place]dhs/partners-and-providers/policies-procedures/adult-mental-health/dialectical-behavior-therapy/dbt-certified-providers/    Some [facility] DBT resources:  [place]   [phone]   https://fitaborate/faqs/     [facility]   [phone]   [email or web address]     [facility]   [phone]   [email or web address]     [facility]   [phone]   [email or web address]

## 2023-09-05 NOTE — PROGRESS NOTES
The HST was returned but did not record properly due to no usable flow signal.  Patient will need to be called to the repeat test. Staff was notified test is a redo and charges were deleted.

## 2023-09-08 ENCOUNTER — MYC MEDICAL ADVICE (OUTPATIENT)
Dept: PSYCHIATRY | Facility: CLINIC | Age: 58
End: 2023-09-08
Payer: COMMERCIAL

## 2023-09-11 NOTE — TELEPHONE ENCOUNTER
Patient is taking paxil every other day. She is wondering for how long she should be on the paxil.     Instructions at last visit on 8/22.   Discontinue/taper off Paxil/paroxetine: take 10 mg daily for 10 days WITH Effexor-XR 37.5 mg and then discontinue Paxil when you go up on Effexor-XR to 75 mg.   Start Effexor-XR/venlafaxine ER for anxiety/mood: take 37.5 mg daily for 10 days (with Paxil 10 mg) and then increase to 75 mg daily when you stop Paxil.

## 2023-09-13 NOTE — TELEPHONE ENCOUNTER
Patient having headaches at night. She thinks it is from stopping the paxil.     Last visit: This was Dr. Lewis's instructions:   Discontinue/taper off Paxil/paroxetine: take 10 mg daily for 10 days WITH Effexor-XR 37.5 mg and then discontinue Paxil when you go up on Effexor-XR to 75 mg.   Start Effexor-XR/venlafaxine ER for anxiety/mood: take 37.5 mg daily for 10 days (with Paxil 10 mg) and then increase to 75 mg daily when you stop Paxil.    She is wondering what she should do, ibuprofen is not working.       Aura Khan RN on 9/13/2023 at 11:31 AM

## 2023-09-26 NOTE — PROGRESS NOTES
Chief Complaint(s) and History of Present Illness(es)     Follow Up     Laterality: both eyes    Treatments tried: ointment    Pain scale: 0/10    Comments: Status post 12/18/20 Bilateral upper eyelid ptosis repair by   external levator resection/ Bilateral upper eyelid blepharoplasty and   Bilateral nasolacrimal duct probe and irrigation.               Comments     Upper lids continue to be red. Swelling and itching with allergic   reaction to medication initially used but improved after changing   medication. Feels like lids are very tight like rubber band are holding   them down but not painful. Left eye constantly watering. Vision remains   stable with each eye.    Vaseline ointment used BUL BID each eye. ( Stopped using Bacitracin two   days ago)     Sakina Florentin, COT COT 1:55 PM January 12, 2021                  Marbella Hendrix is status post bilateral levator advancement ptosis repair. Irrigation was also performed and was wide open both eyes.   Incision(s) healing well.  The lid(s)  are  in excellent position.    I have recommended:  * Continue antibiotic ointment or bland lubricating ointment (eg vaseline or aquaphor) to the incision site at bedtime.   * Warm soaks 3-4x daily until all edema and ecchymoses resolve  * Return to clinic in 2 months     Attending Physician Attestation:  Complete documentation of historical and exam elements from today's encounter can be found in the full encounter summary report (not reduplicated in this progress note).  I personally obtained the chief complaint(s) and history of present illness.  I confirmed and edited as necessary the review of systems, past medical/surgical history, family history, social history, and examination findings as documented by others; and I examined the patient myself.  I personally reviewed the relevant tests, images, and reports as documented above.  I formulated and edited as necessary the assessment and plan and discussed the findings  and management plan with the patient and family. - Pako Sosa MD     82

## 2023-09-28 ASSESSMENT — ANXIETY QUESTIONNAIRES
GAD7 TOTAL SCORE: 14
6. BECOMING EASILY ANNOYED OR IRRITABLE: MORE THAN HALF THE DAYS
3. WORRYING TOO MUCH ABOUT DIFFERENT THINGS: MORE THAN HALF THE DAYS
5. BEING SO RESTLESS THAT IT IS HARD TO SIT STILL: MORE THAN HALF THE DAYS
GAD7 TOTAL SCORE: 14
4. TROUBLE RELAXING: MORE THAN HALF THE DAYS
2. NOT BEING ABLE TO STOP OR CONTROL WORRYING: MORE THAN HALF THE DAYS
7. FEELING AFRAID AS IF SOMETHING AWFUL MIGHT HAPPEN: MORE THAN HALF THE DAYS
IF YOU CHECKED OFF ANY PROBLEMS ON THIS QUESTIONNAIRE, HOW DIFFICULT HAVE THESE PROBLEMS MADE IT FOR YOU TO DO YOUR WORK, TAKE CARE OF THINGS AT HOME, OR GET ALONG WITH OTHER PEOPLE: SOMEWHAT DIFFICULT
1. FEELING NERVOUS, ANXIOUS, OR ON EDGE: MORE THAN HALF THE DAYS

## 2023-09-29 ASSESSMENT — SLEEP AND FATIGUE QUESTIONNAIRES
HOW LIKELY ARE YOU TO NOD OFF OR FALL ASLEEP WHILE SITTING INACTIVE IN A PUBLIC PLACE: SLIGHT CHANCE OF DOZING
HOW LIKELY ARE YOU TO NOD OFF OR FALL ASLEEP WHILE SITTING QUIETLY AFTER LUNCH WITHOUT ALCOHOL: SLIGHT CHANCE OF DOZING
HOW LIKELY ARE YOU TO NOD OFF OR FALL ASLEEP WHILE SITTING AND READING: WOULD NEVER DOZE
HOW LIKELY ARE YOU TO NOD OFF OR FALL ASLEEP WHILE WATCHING TV: SLIGHT CHANCE OF DOZING
HOW LIKELY ARE YOU TO NOD OFF OR FALL ASLEEP IN A CAR, WHILE STOPPED FOR A FEW MINUTES IN TRAFFIC: SLIGHT CHANCE OF DOZING
HOW LIKELY ARE YOU TO NOD OFF OR FALL ASLEEP WHILE SITTING AND TALKING TO SOMEONE: WOULD NEVER DOZE
HOW LIKELY ARE YOU TO NOD OFF OR FALL ASLEEP WHEN YOU ARE A PASSENGER IN A CAR FOR AN HOUR WITHOUT A BREAK: SLIGHT CHANCE OF DOZING
HOW LIKELY ARE YOU TO NOD OFF OR FALL ASLEEP WHILE LYING DOWN TO REST IN THE AFTERNOON WHEN CIRCUMSTANCES PERMIT: SLIGHT CHANCE OF DOZING

## 2023-10-03 ENCOUNTER — VIRTUAL VISIT (OUTPATIENT)
Dept: PSYCHIATRY | Facility: CLINIC | Age: 58
End: 2023-10-03
Payer: COMMERCIAL

## 2023-10-03 DIAGNOSIS — F41.1 GENERALIZED ANXIETY DISORDER: Primary | ICD-10-CM

## 2023-10-03 DIAGNOSIS — G47.09 OTHER INSOMNIA: ICD-10-CM

## 2023-10-03 DIAGNOSIS — F33.0 MILD EPISODE OF RECURRENT MAJOR DEPRESSIVE DISORDER (H): ICD-10-CM

## 2023-10-03 DIAGNOSIS — F90.9 ATTENTION DEFICIT HYPERACTIVITY DISORDER (ADHD), UNSPECIFIED ADHD TYPE: ICD-10-CM

## 2023-10-03 PROCEDURE — 99214 OFFICE O/P EST MOD 30 MIN: CPT | Mod: 95 | Performed by: PSYCHIATRY & NEUROLOGY

## 2023-10-03 RX ORDER — QUETIAPINE FUMARATE 25 MG/1
25-50 TABLET, FILM COATED ORAL
Qty: 180 TABLET | Refills: 0 | Status: SHIPPED | OUTPATIENT
Start: 2023-10-03 | End: 2023-11-14

## 2023-10-03 RX ORDER — VENLAFAXINE HYDROCHLORIDE 150 MG/1
150 CAPSULE, EXTENDED RELEASE ORAL DAILY
Qty: 90 CAPSULE | Refills: 1 | Status: SHIPPED | OUTPATIENT
Start: 2023-10-03 | End: 2024-04-08

## 2023-10-03 NOTE — PROGRESS NOTES
"Virtual Visit Details    Type of service:  Video Visit     Originating Location (pt. Location): {video visit patient location:213680::\"Home\"}  {PROVIDER LOCATION On-site should be selected for visits conducted from your clinic location or adjoining [facility] hospital, academic office, or other nearby [facility] building. Off-site should be selected for all other provider locations, including home:103617}  Distant Location (provider location):  {virtual location provider:868036}  Platform used for Video Visit: {Virtual Visit Platforms:454265::\"[employer]\"}    "

## 2023-10-03 NOTE — NURSING NOTE
Is the patient currently in the state of [place]? YES    Visit mode:VIDEO    If the visit is dropped, the patient can be reconnected by: VIDEO VISIT: Text to cell phone:   Telephone Information:   Mobile [phone]       Will anyone else be joining the visit? No  (If patient encounters technical issues they should call [phone])    How would you like to obtain your AVS? MyChart    Are changes needed to the allergy or medication list? No    Rooming Documentation: Patient will complete qnrs in mychart.    Reason for visit: RACHELL Hernandez

## 2023-10-03 NOTE — PROGRESS NOTES
"Telemedicine Visit: The patient's condition can be safely assessed and treated via synchronous audio and visual telemedicine encounter.      Reason for Telemedicine Visit: Patient has requested telehealth visit    Originating Site (Patient Location): Patient's home    Distant Location (provider location):  On-site    Consent:  The patient/guardian has verbally consented to: the potential risks and benefits of telemedicine (video visit) versus in person care; bill my insurance or make self-payment for services provided; and responsibility for payment of non-covered services.     Mode of Communication:  Video Conference via iFulfillment    As the provider I attest to compliance with applicable laws and regulations related to telemedicine.          Outpatient Psychiatric Progress Note    Name: Marbella Hendrix   : 1965                    Primary Care Provider: Vanessa Ladd MD   Therapist: Vince Moss [facility] note today: \"Saw a therapist all last year until December and pt started a job and saw her again during life events and would meet again after this appointment. Pt really likes her. She understands me and empathizes with me.\"       PHQ-9 scores:      2023     8:18 AM 2023     7:40 AM 2023    11:12 AM   PHQ-9 SCORE   PHQ-9 Total Score MyChart 21 (Severe depression) 12 (Moderate depression) 15 (Moderately severe depression)   PHQ-9 Total Score 21 12    12 15       BERTHA-7 scores:      2023     4:10 PM 2023     1:58 PM 2023     6:51 AM   BERTHA-7 SCORE   Total Score 16 (severe anxiety) 19 (severe anxiety) 14 (moderate anxiety)   Total Score 16 19 14       Patient Identification:  Patient is a 57 year old,   White Not  or  female  who presents for return visit with me.  Patient is currently unemployed. Patient attended the phone/video session with , Wally.  Patient prefers to be called: \"Marbella\".    Interim History:  I last saw Marbella Hendrix " for outpatient psychiatry return visit on 8/22/2023. During that appointment, we:    Continue Ambien/zolpidem 5 mg nightly as needed for insomnia. Continue to use sparingly. Do not take with Ativan/lorazepam.   Continue lorazepam/Ativan 0.5 mg daily as needed for severe anxiety/panic. Do not take with Ambien.   Discontinue clonidine since not helpful  Discontinue/taper off Paxil/paroxetine: take 10 mg daily for 10 days WITH Effexor-XR 37.5 mg and then discontinue Paxil when you go up on Effexor-XR to 75 mg.   Start Effexor-XR/venlafaxine ER for anxiety/mood: take 37.5 mg daily for 10 days (with Paxil 10 mg) and then increase to 75 mg daily when you stop Paxil.     10/3: Patient doing okay with the transition to venlafaxine ER.  Patient is off paroxetine.  Has had some tolerable headaches and dizziness.  We will continue to monitor her symptoms.  No major changes in anxiety or mood.  Anxiety continues to be quite prominent at bedtime.  Patient continues to struggle with insomnia.  No acute safety concerns.  No acute suicidality.  No problematic drug or alcohol use.    Past Psychiatric Med Trials:  Psych Meds at Intake:  Paxil 20 mg daily - started when 29 years, has been up and down on the dose, on 10 mg dose mostly, last on 20 mg this past spring   Ambien 5 mg for insomnia - used for 4 nights  Ativan 0.5 mg for flying     Past Psych Meds:  Prozac for 1-2 months maybe when 27 yo    Psychiatric ROS:  Marbella Hendrix reports mood has been: See HPI above  Anxiety has been: See HPI above  Sleep has been: Up and down  Rachna sxs: None  Psychosis sxs: None  ADHD/ADD sxs: Up-and-down  PTSD sxs: NA  PHQ9 and GAD7 scores were reviewed today if completed.   Medication side effects: Denies  Current stressors include: And see HPI above  Coping mechanisms and supports include: Family and Hobbies    Current medications include:   Current Outpatient Medications   Medication Sig    albuterol (PROAIR HFA/PROVENTIL HFA/VENTOLIN  HFA) 108 (90 Base) MCG/ACT inhaler Inhale 1-2 puffs into the lungs every 4 hours as needed for shortness of breath / dyspnea or wheezing    atorvastatin (LIPITOR) 20 MG tablet Take 1 tablet (20 mg) by mouth daily    calcium carbonate (OS-ARA) 500 MG tablet Take 1 tablet by mouth 2 times daily    levothyroxine (SYNTHROID/LEVOTHROID) 125 MCG tablet Take 1 tablet (125 mcg) by mouth daily    LORazepam (ATIVAN) 0.5 MG tablet Take 1 tablet (0.5 mg) by mouth every 6 hours as needed for anxiety    melatonin 5 MG tablet Take 5 mg by mouth nightly as needed for sleep    Omega-3 Fatty Acids (OMEGA-3 FISH OIL PO) Take 1 g by mouth daily    venlafaxine (EFFEXOR XR) 37.5 MG 24 hr capsule Take 1 capsule (37.5 mg) by mouth daily    venlafaxine (EFFEXOR XR) 75 MG 24 hr capsule Take 1 capsule (75 mg) by mouth daily    zolpidem (AMBIEN) 5 MG tablet Take 1 tablet (5 mg) by mouth nightly as needed for sleep     No current facility-administered medications for this visit.       The [place] Prescription Monitoring Program has been reviewed and there are no concerns about diversionary activity for controlled substances at this time.     Past Medical/Surgical History:  Past Medical History:   Diagnosis Date    Arthritis     My mom has it, my grandmother had it I have it    Diabetes (H)     My mom and brother have type II    Heavy menstrual period     Leiomyoma of uterus     s/p myomectomy    Mental disorder     anxiety    PONV (postoperative nausea and vomiting)     Surgical complication after  2002    Thyroid disease     Hypothroidism, 2nd half of     Uncomplicated asthma     My son has it, since he was born    Vesico-ureteral reflux     s/p repair      has a past medical history of Arthritis, Diabetes (H), Heavy menstrual period, Leiomyoma of uterus, Mental disorder, PONV (postoperative nausea and vomiting), Surgical complication (after  2002), Thyroid disease, Uncomplicated asthma, and Vesico-ureteral  reflux.    She has no past medical history of Cancer (H), Cerebral infarction (H), Congestive heart failure (H), COPD (chronic obstructive pulmonary disease) (H), Depressive disorder, Heart disease, History of blood transfusion, or Hypertension.    Social History:  Reviewed. No changes to social history except as noted above in HPI.    Vital Signs:   None. This is phone/video visit.     Labs:  Most recent laboratory results reviewed and no new labs.     Review of Systems:  10 systems (general, cardiovascular, respiratory, eyes, ENT, endocrine, GI, , M/S, neurological) were reviewed. Most pertinent finding(s) is/are: Some chronic pain. The remaining systems are all unremarkable.    Mental Status Examination (limited as this is by phone/video):  Appearance: Awake, alert, appears stated age, no acute distress, well-groomed   Attitude:  cooperative  Motor: No gross abnormalities observed via video, not formally tested   Oriented to:  person, place, time, and situation  Attention Span and Concentration:  normal  Speech:  clear, coherent, regular rate, rhythm, and volume  Language: intact  Mood:  anxious  Affect:  mood congruent  Associations:  no loose associations  Thought Process:  logical, linear and goal oriented  Thought Content:  no evidence of acute suicidality or homicidal ideation, no evidence of psychotic thought, no auditory hallucinations present and no visual hallucinations present  Recent and Remote Memory:  Intact to interview. Not formally assessed. No amnesia.  Fund of Knowledge: appropriate  Insight:  fair  Judgment:  intact, adequate for safety  Impulse Control:  intact    Suicide Risk Assessment:  Today Marbella Hendrix reports passive thoughts of death but no acute suicidality or plan or intent to harm self. Based on all available evidence including the factors cited above, Marbella Hendrix does not appear to be at imminent risk for self-harm, does not meet criteria for a 72-hr hold, and  therefore remains appropriate for ongoing outpatient level of care.  A thorough assessment of risk factors related to suicide and self-harm have been reviewed and are noted above. The patient convincingly denies suicidality on several occasions. Local community safety resources reviewed for patient to use if needed. There was no deceit detected, and the patient presented in a manner that was believable.     DSM5 Diagnosis:  Attention-Deficit/Hyperactivity Disorder  314.01 (F90.9) Unspecified Attention -Deficit / Hyperactivity Disorder  296.31 (F33.0) Major Depressive Disorder, Recurrent Episode, Mild _  300.02 (F41.1) Generalized Anxiety Disorder    Medical comorbidities include:   Patient Active Problem List    Diagnosis Date Noted    Anxiety 07/19/2023     Priority: Medium    Adjustment disorder with anxious mood 06/27/2021     Priority: Medium    Family history of diabetes mellitus 06/27/2021     Priority: Medium    CMC DJD(carpometacarpal degenerative joint disease), localized primary, left 11/18/2020     Priority: Medium    Thumb pain, left 11/18/2020     Priority: Medium    Dermatochalasis of both upper eyelids 11/17/2020     Priority: Medium     Added automatically from request for surgery [id number]      Involutional ptosis, acquired, bilateral 11/17/2020     Priority: Medium     Added automatically from request for surgery [id number]      Weakness of right hip 07/09/2020     Priority: Medium    Primary osteoarthritis of right hip 12/02/2019     Priority: Medium     Added automatically from request for surgery [id number]      Morbid obesity (H) 06/20/2018     Priority: Medium    Status post hysterectomy 08/02/2017     Priority: Medium    Sensation of plugged ear on both sides 07/12/2017     Priority: Medium    Acute post-operative pain 08/28/2015     Priority: Medium    Preventative health care 10/30/2012     Priority: Medium     Last pap NIL 2011; mammogram nl 2011; lipids abnl, needs annual f/u      Health Care  Home 09/26/2012     Priority: Medium     Tier 1    DX V65.8 REPLACED WITH 48551 HEALTH CARE HOME (04/08/2013)      Generalized anxiety disorder 09/26/2012     Priority: Medium     H/o panic attacks; currently controlled with paxil      Hyperlipidemia LDL goal <130 09/26/2012     Priority: Medium     Behavioral measures - avoiding statins while trying to conceive; not a candidate for red yeast rice (interactions with thyroid meds)      Acquired hypothyroidism 09/26/2012     Priority: Medium    Overweight 09/26/2012     Priority: Medium     On exercise plan, has been in weight watchers  Problem list name updated by automated process. Provider to review         Psychosocial & Contextual Factors: see HPI above    Assessment:  From Intake, 7/17/2023:  Marbella Hendrix is a 57-year-old female with past psychiatric history including anxiety, ADHD, depression, remote polysubstance abuse (in remission for decades) who presents today for psychiatric evaluation.  Patient recently with increased psychosocial stressors and acutely worsening anxiety and insomnia.  Patient most recently on Paxil and Ambien to manage symptoms.  Ambien used sparingly and did help break severe insomnia cycle recently.  Patient currently taking 10 mg of Paxil and has only ever been up to 20 mg historically.  Patient may be interested in a trial with something different than Paxil.  We also discussed further optimizing Paxil to 30 or 40 mg daily for more therapeutic trial before replacing the medication.  We did discuss Lexapro and Effexor-XR as possible alternatives.  Patient would like to discuss these options with her  who is an internist and her psychotherapist.  In the meantime, we discussed a trial with clonidine to help with sleep, anxiety, and ADHD symptoms.  Discussed risks and benefits of therapy.  Patient would like to move forward with a clonidine trial to help at bedtime as needed for sleep and a small dose during the day as  needed for anxiety.  No acute safety concerns today.  No acute suicidality.  No problematic drug or alcohol use.       8/22/2023:  Patient with ongoing significant anxiety and mood related struggles.  Clonidine has not been helpful.  We will transition patient off paroxetine and onto venlafaxine.  We will continue to consider escitalopram as an option if venlafaxine ineffective or poorly tolerated.  Could also consider use of fluoxetine if paroxetine is difficult to discontinue.  Discussed DBT therapy as a potential option to help improve symptoms.  Resources sent in [employer] message.  No acute suicidality.  No acute safety concerns.  No problematic drug or alcohol use.    10/3/2023:  Patient doing okay with transition to venlafaxine ER.  Has had some mild dizziness and headaches.  We will continue to optimize venlafaxine ER and patient will watch for worsening headaches and dizziness.  Her symptoms could be related to discontinuation of paroxetine.  Patient still struggling significantly in the evenings and with insomnia.  We will trial quetiapine at bedtime as needed for sleep.  Discussed risks and benefits of therapy including watching for any abnormal involuntary movements.  If patient continues on the medication, we will make sure to get updated lipid panel and fasting glucose.  Could also consider mirtazapine as an option.  No acute safety concerns.  No acute suicidality.  No problematic drug or alcohol use.    Medication side effects and alternatives were reviewed. Health promotion activities recommended and reviewed today. All questions addressed. Education and counseling completed regarding risks and benefits of medications and psychotherapy options. Recommend therapy for additional support.     Treatment Plan:  Continue Ambien/zolpidem 5 mg nightly as needed for insomnia. Continue to use sparingly. Do not take with Ativan/lorazepam.   Continue lorazepam/Ativan 0.5 mg daily as needed for severe  anxiety/panic. Do not take with Ambien.   Increase Effexor-XR/venlafaxine ER to 150 mg daily for anxiety/mood.  Start quetiapine/Seroquel 25-50 mg at bedtime as needed for sleep.   Continue all other cares per primary care provider.   Continue all other medications as reviewed per electronic medical record today.   Safety plan reviewed. To the Emergency Department as needed or call after hours crisis line at [phone] or [phone]. [place] Crisis Text Line. Text [place] to 906859 or Suicide LifeLine Chat: suicidepreventionlifeline.org/chat  Consider DBT therapy.  Strongly recommend individual psychotherapy for additional support and ongoing development of nonpharmacologic coping skills and strategies.  Schedule an appointment with me in 4-6 weeks or sooner as needed. Call [facility] at [phone] to schedule.  Follow up with primary care provider as planned or for acute medical concerns.  Call the psychiatric nurse line with medication questions or concerns at [phone].  J[employer]jiuweikanghart may be used to communicate with your provider, but this is not intended to be used for emergencies.    Risks of benzodiazepine (Ativan, Xanax, Klonopin, Valium, etc) use including, but not limited to, sedation, tolerance, risk for addiction/dependence. Do not drink alcohol while taking benzodiazepines due to risk of trouble breathing and potential death. Do not drive or operate heavy machinery until it is known how the drug affects you. Discuss with physician or pharmacist before ever taking a benzodiazepine with a narcotic/opioid pain medication.     Book:   [facility] [place] [facility]  By Ingris KIM DBT resources:  [place]://[place].gov[place]dhs/partners-and-providers/policies-procedures/adult-mental-health/dialectical-behavior-therapy/dbt-certified-providers/    Some [facility] DBT resources:  Edith Arango   [phone]   [email or web address]://Cokonnect.Malang Studio/faqs/     Brian   [phone]    [email or web address]     [place]   [facility]   [phone]   [email or web address]://www.[email or web address]-[email or web address]     [place]   [facility]   [phone]   [email or web address]       Administrative Billing:   Phone Call/Video Duration: 17 Minutes  Start: 11:02a  Stop: 11:19a    Patient Status:  Patient will continue to be seen for ongoing consultation and stabilization.    Signed:   Christi Lewis DO  [facility]    Disclaimer: This note consists of symbols derived from keyboarding, dictation and/or voice recognition software. As a result, there may be errors in the script that have gone undetected. Please consider this when interpreting information found in this chart.

## 2023-10-04 ENCOUNTER — OFFICE VISIT (OUTPATIENT)
Dept: SLEEP MEDICINE | Facility: CLINIC | Age: 58
End: 2023-10-04
Payer: COMMERCIAL

## 2023-10-04 DIAGNOSIS — E66.01 MORBID OBESITY (H): Primary | ICD-10-CM

## 2023-10-04 NOTE — PATIENT INSTRUCTIONS
Treatment Plan:  Continue Ambien/zolpidem 5 mg nightly as needed for insomnia. Continue to use sparingly. Do not take with Ativan/lorazepam.   Continue lorazepam/Ativan 0.5 mg daily as needed for severe anxiety/panic. Do not take with Ambien.   Increase Effexor-XR/venlafaxine ER to 150 mg daily for anxiety/mood.  Start quetiapine/Seroquel 25-50 mg at bedtime as needed for sleep.   Continue all other cares per primary care provider.   Continue all other medications as reviewed per electronic medical record today.   Safety plan reviewed. To the Emergency Department as needed or call after hours crisis line at [phone] or [phone]. [place] Crisis Text Line. Text [place] to 904960 or Suicide LifeLine Chat: suicidepreventionlifeline.org/chat  Consider DBT therapy.  Strongly recommend individual psychotherapy for additional support and ongoing development of nonpharmacologic coping skills and strategies.  Schedule an appointment with me in 4-6 weeks or sooner as needed. Call [facility] at [phone] to schedule.  Follow up with primary care provider as planned or for acute medical concerns.  Call the psychiatric nurse line with medication questions or concerns at [phone].  Tradeasi Solutionshart may be used to communicate with your provider, but this is not intended to be used for emergencies.    Risks of benzodiazepine (Ativan, Xanax, Klonopin, Valium, etc) use including, but not limited to, sedation, tolerance, risk for addiction/dependence. Do not drink alcohol while taking benzodiazepines due to risk of trouble breathing and potential death. Do not drive or operate heavy machinery until it is known how the drug affects you. Discuss with physician or pharmacist before ever taking a benzodiazepine with a narcotic/opioid pain medication.     Book:   [facility] [place] [facility]  By Ingris ROBLES  resources:  [place]://[place]dhs/partners-and-providers/policies-procedures/adult-mental-health/dialectical-behavior-therapy/dbt-certified-providers/    Some [facility] DBT resources:  Edith Arango   [phone]   https://Swankdr.FClub/faqs/     Brian   [phone]   [email or web address].com.FClub     Michael   S-DBT   [phone]   https://www.mhs-dbt.com     [facility]   [phone]   https://dbtassociates.com

## 2023-10-04 NOTE — PROGRESS NOTES
Pt is completing a home sleep test. Pt was instructed on how to put on the Noxturnal T3 device and associated equipment before going to bed and given the opportunity to practice putting it on before leaving the sleep center. Pt was reminded to bring the home sleep test kit back to the center tomorrow, at agreed upon time for download and reporting.   Neck circumference: 38 CM / 15 inches.  Merna Concepcion CMA on 10/4/2023 at 12:47 PM

## 2023-10-05 ENCOUNTER — DOCUMENTATION ONLY (OUTPATIENT)
Dept: SLEEP MEDICINE | Facility: CLINIC | Age: 58
End: 2023-10-05
Payer: COMMERCIAL

## 2023-10-05 NOTE — PROGRESS NOTES
HST POST-STUDY QUESTIONNAIRE    What time did you go to bed?  Was in bed around 9:45. Asleep by 10:30 maybe? I don't look at the time once I go to bed.  How long do you think it took to fall asleep?  Not terribly long 20-45 minutes.  What time did you wake up to start the day?  I'm guessing around 5:50-6:00 am. Tried to go back to sleep but couldn't  Did you get up during the night at all?  Yes, always get up to use the bathroom.  If you woke up, do you remember approximately what time(s)? Have no idea what times I got up to use the bathroom. After the third time getting up to use the bathroom, I couldn't get back to sleep. I never look at the time because then I think about it and get myself worked unit(s) about how little sleep I'm getting. I have a very small bladder capacity due to ureter reconstruction surgery I had as a child.  Did you have any difficulty with the equipment?  Yes my biggest complaint is the rubber finger pulse ox thing. My finger was sweaty with it on. The bad aids that held the nasal canula onto my face also were so sticky that pulling them off caused redness and marks on my cheeks that are still there.  Did you us any type of treatment with this study?  None  Was the head of the bed elevated? No  Did you sleep in a recliner?  No  Did you stop using CPAP at least 3 days before this test?  NA  Any other information you'd like us to know? I've been a very light sleeper for most of my adult life and it's gotten worse the last 10 years or so.

## 2023-10-05 NOTE — PROGRESS NOTES
The HST was returned but did not record properly due to inadequate amount of usable study time.  Patient will need to be called to the repeat test. Staff was notified test is a redo and charges were deleted.

## 2023-10-05 NOTE — NURSING NOTE
Pt returned HST device. It was downloaded and forwarded data to the clinical specialist for scoring.  Merna Concepcion CMA on 10/5/2023 at 9:01 AM

## 2023-10-07 ENCOUNTER — MYC MEDICAL ADVICE (OUTPATIENT)
Dept: FAMILY MEDICINE | Facility: CLINIC | Age: 58
End: 2023-10-07
Payer: COMMERCIAL

## 2023-10-14 ENCOUNTER — IMMUNIZATION (OUTPATIENT)
Dept: FAMILY MEDICINE | Facility: CLINIC | Age: 58
End: 2023-10-14
Payer: COMMERCIAL

## 2023-10-14 DIAGNOSIS — Z23 NEED FOR PROPHYLACTIC VACCINATION AND INOCULATION AGAINST INFLUENZA: Primary | ICD-10-CM

## 2023-10-14 PROCEDURE — 90471 IMMUNIZATION ADMIN: CPT

## 2023-10-14 PROCEDURE — 90682 RIV4 VACC RECOMBINANT DNA IM: CPT

## 2023-10-14 PROCEDURE — 99207 PR NO CHARGE NURSE ONLY: CPT

## 2023-10-16 ENCOUNTER — TELEPHONE (OUTPATIENT)
Dept: SLEEP MEDICINE | Facility: CLINIC | Age: 58
End: 2023-10-16
Payer: COMMERCIAL

## 2023-10-16 NOTE — TELEPHONE ENCOUNTER
Contacted patient in regards to home sleep study. Patient will need to redo her study due to having less than 4 hours of usable study time.  Merna Concepcion CMA on 10/16/2023 at 10:29 AM'

## 2023-10-16 NOTE — TELEPHONE ENCOUNTER
Order/Referral Request    Who is requesting: Patient    Orders being requested: sleep study    Reason service is needed/diagnosis: Patient had a message in [employer] to schedule a new sleep study because her previous one didn't have enough usable time.     When are orders needed by: ASAP    Has this been discussed with Provider: Yes    Does patient have a preference on a Group/Provider/Facility? [facility]    Does patient have an appointment scheduled?: No    Where to send orders: Place orders within Epic    Could we send this information to you in [employer] or would you prefer to receive a phone call?:   Patient would prefer a phone call   Okay to leave a detailed message?: Yes at Cell number on file:    Telephone Information:   Mobile [phone]

## 2023-10-17 ENCOUNTER — TELEPHONE (OUTPATIENT)
Dept: SLEEP MEDICINE | Facility: CLINIC | Age: 58
End: 2023-10-17
Payer: COMMERCIAL

## 2023-10-17 NOTE — TELEPHONE ENCOUNTER
Reason for Call:  Other order    Detailed comments: patient called and states that the sleep center called and states needs a new sleep study home.    States only 4 hours recorded.    No order for a 2nd sleep study in chart.    Please contact patient.  Thank you.    Phone Number Patient can be reached at: Cell number on file:    Telephone Information:   Mobile [phone]       Best Time: any    Can we leave a detailed message on this number? YES    Call taken on 10/17/2023 at 2:11 PM by Yesenia Cramer

## 2023-10-19 NOTE — TELEPHONE ENCOUNTER
Order for repeat HST placed. It appears she has been working with psychiatry on her anxiety. She has tried zolpidem, clonazepam and recently was prescribed quetiapine.    STOPBANG was 2; BMI 35, age >50. She is not observed while sleeping to know if she snores or has pauses in breathing. She has high anxiety which causes difficulty sleeping at night as well as in the day, so her ESS is low.     I called and left a message informing her that I placed the order for the repeat sleep study.  I also told I recommended she take something to help her sleep for the night of the test, what ever has been most effective.    She is currently scheduled for a follow-up on 11/8 to review sleep study results.  I told her she can keep that appointment if she would like to discuss her sleep further.  Otherwise, she can reschedule that as we most likely will not be able to repeat the home study before then.  Bennett Goltz, PA-C

## 2023-11-02 ENCOUNTER — MYC MEDICAL ADVICE (OUTPATIENT)
Dept: FAMILY MEDICINE | Facility: CLINIC | Age: 58
End: 2023-11-02
Payer: COMMERCIAL

## 2023-11-02 DIAGNOSIS — M79.642 PAIN OF LEFT HAND: ICD-10-CM

## 2023-11-02 DIAGNOSIS — M79.645 THUMB PAIN, LEFT: Primary | ICD-10-CM

## 2023-11-02 DIAGNOSIS — M79.645 PAIN OF FINGER OF LEFT HAND: ICD-10-CM

## 2023-11-14 ENCOUNTER — VIRTUAL VISIT (OUTPATIENT)
Dept: PSYCHIATRY | Facility: CLINIC | Age: 58
End: 2023-11-14
Payer: COMMERCIAL

## 2023-11-14 ENCOUNTER — OFFICE VISIT (OUTPATIENT)
Dept: OPTOMETRY | Facility: CLINIC | Age: 58
End: 2023-11-14
Payer: COMMERCIAL

## 2023-11-14 ENCOUNTER — MYC MEDICAL ADVICE (OUTPATIENT)
Dept: PSYCHIATRY | Facility: CLINIC | Age: 58
End: 2023-11-14

## 2023-11-14 DIAGNOSIS — F90.9 ATTENTION DEFICIT HYPERACTIVITY DISORDER (ADHD), UNSPECIFIED ADHD TYPE: ICD-10-CM

## 2023-11-14 DIAGNOSIS — H52.223 REGULAR ASTIGMATISM OF BOTH EYES: ICD-10-CM

## 2023-11-14 DIAGNOSIS — Z98.890 HX OF BLEPHAROPLASTY: ICD-10-CM

## 2023-11-14 DIAGNOSIS — G47.09 OTHER INSOMNIA: Primary | ICD-10-CM

## 2023-11-14 DIAGNOSIS — F33.41 RECURRENT MAJOR DEPRESSIVE DISORDER, IN PARTIAL REMISSION (H): ICD-10-CM

## 2023-11-14 DIAGNOSIS — F41.1 GENERALIZED ANXIETY DISORDER: ICD-10-CM

## 2023-11-14 DIAGNOSIS — Z01.00 ENCOUNTER FOR EXAMINATION OF EYES AND VISION WITHOUT ABNORMAL FINDINGS: Primary | ICD-10-CM

## 2023-11-14 DIAGNOSIS — H52.4 PRESBYOPIA: ICD-10-CM

## 2023-11-14 PROCEDURE — 92015 DETERMINE REFRACTIVE STATE: CPT | Performed by: OPTOMETRIST

## 2023-11-14 PROCEDURE — 92014 COMPRE OPH EXAM EST PT 1/>: CPT | Performed by: OPTOMETRIST

## 2023-11-14 PROCEDURE — 99214 OFFICE O/P EST MOD 30 MIN: CPT | Mod: VID | Performed by: PSYCHIATRY & NEUROLOGY

## 2023-11-14 RX ORDER — OLANZAPINE 5 MG/1
2.5-5 TABLET ORAL
Qty: 30 TABLET | Refills: 0 | Status: SHIPPED | OUTPATIENT
Start: 2023-11-14 | End: 2023-11-28

## 2023-11-14 ASSESSMENT — CONF VISUAL FIELD
OS_INFERIOR_TEMPORAL_RESTRICTION: 0
OD_SUPERIOR_TEMPORAL_RESTRICTION: 0
OD_INFERIOR_TEMPORAL_RESTRICTION: 0
METHOD: COUNTING FINGERS
OS_INFERIOR_NASAL_RESTRICTION: 0
OS_SUPERIOR_NASAL_RESTRICTION: 0
OD_INFERIOR_NASAL_RESTRICTION: 0
OS_SUPERIOR_TEMPORAL_RESTRICTION: 0
OS_NORMAL: 1
OD_SUPERIOR_NASAL_RESTRICTION: 0
OD_NORMAL: 1

## 2023-11-14 ASSESSMENT — VISUAL ACUITY
OS_CC: 20/20
METHOD: SNELLEN - LINEAR
OD_SC: 20/20
OS_SC: 20/20
OD_CC: 20/20
CORRECTION_TYPE: GLASSES
OD_SC: 20/60
OD_SC+: -1
OS_SC: 20/60-1

## 2023-11-14 ASSESSMENT — REFRACTION_MANIFEST
OD_SPHERE: PLANO
OS_AXIS: 026
OD_ADD: +2.25
OD_AXIS: 153
OS_ADD: +2.25
OS_SPHERE: PLANO
OS_CYLINDER: +0.50
OD_CYLINDER: +0.50

## 2023-11-14 ASSESSMENT — TONOMETRY
OD_IOP_MMHG: 24
IOP_METHOD: ICARE
OS_IOP_MMHG: 24

## 2023-11-14 ASSESSMENT — SLIT LAMP EXAM - LIDS
COMMENTS: NORMAL, S/P BLEPHAROPLASTY
COMMENTS: NORMAL, S/P BLEPHAROPLASTY

## 2023-11-14 ASSESSMENT — REFRACTION_WEARINGRX
OS_SPHERE: +2.25
OS_CYLINDER: SPHERE
OD_SPHERE: +2.25
SPECS_TYPE: OTC READERS
OD_CYLINDER: SPHERE

## 2023-11-14 ASSESSMENT — CUP TO DISC RATIO
OS_RATIO: 0.3
OD_RATIO: 0.35

## 2023-11-14 ASSESSMENT — EXTERNAL EXAM - LEFT EYE: OS_EXAM: NORMAL

## 2023-11-14 ASSESSMENT — EXTERNAL EXAM - RIGHT EYE: OD_EXAM: NORMAL

## 2023-11-14 NOTE — PROGRESS NOTES
"Telemedicine Visit: The patient's condition can be safely assessed and treated via synchronous audio and visual telemedicine encounter.      Reason for Telemedicine Visit: Patient has requested telehealth visit    Originating Site (Patient Location): Patient's home    Distant Location (provider location):  On-site    Consent:  The patient/guardian has verbally consented to: the potential risks and benefits of telemedicine (video visit) versus in person care; bill my insurance or make self-payment for services provided; and responsibility for payment of non-covered services.     Mode of Communication:  Video Conference via [email or web address]    As the provider I attest to compliance with applicable laws and regulations related to telemedicine.          Outpatient Psychiatric Progress Note    Name: Marbella Hendrix   : 1965                    Primary Care Provider: Vanessa Ladd MD   Therapist: None       PHQ-9 scores:      2023     8:18 AM 2023     7:40 AM 2023    11:12 AM   PHQ-9 SCORE   PHQ-9 Total Score MyChart 21 (Severe depression) 12 (Moderate depression) 15 (Moderately severe depression)   PHQ-9 Total Score 21 12    12 15       BERTHA-7 scores:      2023     4:10 PM 2023     1:58 PM 2023     6:51 AM   BERTHA-7 SCORE   Total Score 16 (severe anxiety) 19 (severe anxiety) 14 (moderate anxiety)   Total Score 16 19 14       Patient Identification:  Patient is a 58 year old,   White Not  or  female  who presents for return visit with me.  Patient is currently unemployed. Patient attended the phone/video session with , Wally.  Patient prefers to be called: \"Marbella\".    Interim History:  I last saw Marbella Hendrix for outpatient psychiatry return visit on 10/3/2023. During that appointment, we:    Continue Ambien/zolpidem 5 mg nightly as needed for insomnia. Continue to use sparingly. Do not take with Ativan/lorazepam.   Continue lorazepam/Ativan 0.5 mg " "daily as needed for severe anxiety/panic. Do not take with Ambien.   Increase Effexor-XR/venlafaxine ER to 150 mg daily for anxiety/mood.  Start quetiapine/Seroquel 25-50 mg at bedtime as needed for sleep.   Continue all other cares per primary care provider.     11/14: Patient overall feeling like some symptoms have been improving on venlafaxine and with quetiapine at bedtime.  Quetiapine the most helpful medication she has trialed for sleep.  Taking 12.5 mg at bedtime.  Does have some fatigue/cognitive clouding the next day due to the quetiapine.  Feels like it is an \"okay trade off\" for sleeping better.  Mood and anxiety symptoms more manageable with improved sleep and on venlafaxine compared to Paxil.  Patient and  will be traveling to [place] in December.  No acute safety concerns.  No SI.  No problematic drug or alcohol use.    Past Psychiatric Med Trials:  Psych Meds at Intake:  Paxil 20 mg daily - started when 29 years, has been up and down on the dose, on 10 mg dose mostly, last on 20 mg this past spring   Ambien 5 mg for insomnia - used for 4 nights  Ativan 0.5 mg for flying     Past Psych Meds:  Prozac for 1-2 months maybe when 27 yo    Psychiatric ROS:  Marbella Hendrix reports mood has been: See HPI above  Anxiety has been: See HPI above  Sleep has been: Improved on quetiapine  Rachna sxs: None  Psychosis sxs: None  ADHD/ADD sxs: Up-and-down  PTSD sxs: NA  PHQ9 and GAD7 scores were reviewed today if completed.   Medication side effects: Some cognitive heaviness/clouding from quetiapine  Current stressors include: Symptoms and see HPI above  Coping mechanisms and supports include: Family and Hobbies    Current medications include:   Current Outpatient Medications   Medication Sig    albuterol (PROAIR HFA/PROVENTIL HFA/VENTOLIN HFA) 108 (90 Base) MCG/ACT inhaler Inhale 1-2 puffs into the lungs every 4 hours as needed for shortness of breath / dyspnea or wheezing    atorvastatin (LIPITOR) 20 MG " tablet Take 1 tablet (20 mg) by mouth daily    calcium carbonate (OS-ARA) 500 MG tablet Take 1 tablet by mouth 2 times daily    levothyroxine (SYNTHROID/LEVOTHROID) 125 MCG tablet Take 1 tablet (125 mcg) by mouth daily    melatonin 5 MG tablet Take 5 mg by mouth nightly as needed for sleep    Omega-3 Fatty Acids (OMEGA-3 FISH OIL PO) Take 1 g by mouth daily    QUEtiapine (SEROQUEL) 25 MG tablet Take 1-2 tablets (25-50 mg) by mouth nightly as needed (sleep)    venlafaxine (EFFEXOR XR) 150 MG 24 hr capsule Take 1 capsule (150 mg) by mouth daily    zolpidem (AMBIEN) 5 MG tablet Take 1 tablet (5 mg) by mouth nightly as needed for sleep     No current facility-administered medications for this visit.       The [place] Prescription Monitoring Program has been reviewed and there are no concerns about diversionary activity for controlled substances at this time.     Past Medical/Surgical History:  Past Medical History:   Diagnosis Date    Arthritis     My mom has it, my grandmother had it I have it    Diabetes (H)     My mom and brother have type II    Heavy menstrual period     Leiomyoma of uterus     s/p myomectomy    Mental disorder     anxiety    PONV (postoperative nausea and vomiting)     Surgical complication after  2002    Thyroid disease     Hypothroidism, 2nd half of     Uncomplicated asthma     My son has it, since he was born    Vesico-ureteral reflux     s/p repair      has a past medical history of Arthritis, Diabetes (H), Heavy menstrual period, Leiomyoma of uterus, Mental disorder, PONV (postoperative nausea and vomiting), Surgical complication (after  2002), Thyroid disease, Uncomplicated asthma, and Vesico-ureteral reflux.    She has no past medical history of Cancer (H), Cerebral infarction (H), Congestive heart failure (H), COPD (chronic obstructive pulmonary disease) (H), Depressive disorder, Heart disease, History of blood transfusion, or Hypertension.    Social  History:  Reviewed. No changes to social history except as noted above in HPI.    Vital Signs:   None. This is phone/video visit.     Labs:  Most recent laboratory results reviewed and no new labs.     Review of Systems:  10 systems (general, cardiovascular, respiratory, eyes, ENT, endocrine, GI, , M/S, neurological) were reviewed. Most pertinent finding(s) is/are: Some chronic pain. The remaining systems are all unremarkable.    Mental Status Examination (limited as this is by phone/video):  Appearance: Awake, alert, appears stated age, no acute distress, well-groomed   Attitude:  cooperative  Motor: No gross abnormalities observed via video, not formally tested   Oriented to:  person, place, time, and situation  Attention Span and Concentration:  normal  Speech:  clear, coherent, regular rate, rhythm, and volume  Language: intact  Mood: Better  Affect:  mood congruent  Associations:  no loose associations  Thought Process:  logical, linear and goal oriented  Thought Content:  no evidence of acute suicidality or homicidal ideation, no evidence of psychotic thought, no auditory hallucinations present and no visual hallucinations present  Recent and Remote Memory:  Intact to interview. Not formally assessed. No amnesia.  Fund of Knowledge: appropriate  Insight: Good  Judgment:  intact, adequate for safety  Impulse Control:  intact    Suicide Risk Assessment:  Today Marbella Hendrix reports passive thoughts of death but no acute suicidality or plan or intent to harm self-improved. Based on all available evidence including the factors cited above, Marbella Hendrix does not appear to be at imminent risk for self-harm, does not meet criteria for a 72-hr hold, and therefore remains appropriate for ongoing outpatient level of care.  A thorough assessment of risk factors related to suicide and self-harm have been reviewed and are noted above. The patient convincingly denies suicidality on several occasions. Local  community safety resources reviewed for patient to use if needed. There was no deceit detected, and the patient presented in a manner that was believable.     DSM5 Diagnosis:  Attention-Deficit/Hyperactivity Disorder  314.01 (F90.9) Unspecified Attention -Deficit / Hyperactivity Disorder  Major Depressive Disorder, Recurrent Episode, In Partial Remission  300.02 (F41.1) Generalized Anxiety Disorder  Insomnia, unspecified    Medical comorbidities include:   Patient Active Problem List    Diagnosis Date Noted    Anxiety 07/19/2023     Priority: Medium    Adjustment disorder with anxious mood 06/27/2021     Priority: Medium    Family history of diabetes mellitus 06/27/2021     Priority: Medium    CMC DJD(carpometacarpal degenerative joint disease), localized primary, left 11/18/2020     Priority: Medium    Thumb pain, left 11/18/2020     Priority: Medium    Dermatochalasis of both upper eyelids 11/17/2020     Priority: Medium     Added automatically from request for surgery [id number]      Involutional ptosis, acquired, bilateral 11/17/2020     Priority: Medium     Added automatically from request for surgery [id number]      Weakness of right hip 07/09/2020     Priority: Medium    Primary osteoarthritis of right hip 12/02/2019     Priority: Medium     Added automatically from request for surgery [id number]      Morbid obesity (H) 06/20/2018     Priority: Medium    Status post hysterectomy 08/02/2017     Priority: Medium    Sensation of plugged ear on both sides 07/12/2017     Priority: Medium    Acute post-operative pain 08/28/2015     Priority: Medium    Preventative health care 10/30/2012     Priority: Medium     Last pap NIL 2011; mammogram nl 2011; lipids abnl, needs annual f/u      Health Care Home 09/26/2012     Priority: Medium     Tier 1    DX V65.8 REPLACED WITH 28819 HEALTH CARE HOME (04/08/2013)      Generalized anxiety disorder 09/26/2012     Priority: Medium     H/o panic attacks; currently controlled with paxil       Hyperlipidemia LDL goal <130 09/26/2012     Priority: Medium     Behavioral measures - avoiding statins while trying to conceive; not a candidate for red yeast rice (interactions with thyroid meds)      Acquired hypothyroidism 09/26/2012     Priority: Medium    Overweight 09/26/2012     Priority: Medium     On exercise plan, has been in weight watchers  Problem list name updated by automated process. Provider to review         Psychosocial & Contextual Factors: see HPI above    Assessment:  From Intake, 7/17/2023:  Marbella Hendrix is a 57-year-old female with past psychiatric history including anxiety, ADHD, depression, remote polysubstance abuse (in remission for decades) who presents today for psychiatric evaluation.  Patient recently with increased psychosocial stressors and acutely worsening anxiety and insomnia.  Patient most recently on Paxil and Ambien to manage symptoms.  Ambien used sparingly and did help break severe insomnia cycle recently.  Patient currently taking 10 mg of Paxil and has only ever been up to 20 mg historically.  Patient may be interested in a trial with something different than Paxil.  We also discussed further optimizing Paxil to 30 or 40 mg daily for more therapeutic trial before replacing the medication.  We did discuss Lexapro and Effexor-XR as possible alternatives.  Patient would like to discuss these options with her  who is an internist and her psychotherapist.  In the meantime, we discussed a trial with clonidine to help with sleep, anxiety, and ADHD symptoms.  Discussed risks and benefits of therapy.  Patient would like to move forward with a clonidine trial to help at bedtime as needed for sleep and a small dose during the day as needed for anxiety.  No acute safety concerns today.  No acute suicidality.  No problematic drug or alcohol use.       8/22/2023:  Patient with ongoing significant anxiety and mood related struggles.  Clonidine has not been helpful.  We  will transition patient off paroxetine and onto venlafaxine.  We will continue to consider escitalopram as an option if venlafaxine ineffective or poorly tolerated.  Could also consider use of fluoxetine if paroxetine is difficult to discontinue.  Discussed DBT therapy as a potential option to help improve symptoms.  Resources sent in [employer] message.  No acute suicidality.  No acute safety concerns.  No problematic drug or alcohol use.    10/3/2023:  Patient doing okay with transition to venlafaxine ER.  Has had some mild dizziness and headaches.  We will continue to optimize venlafaxine ER and patient will watch for worsening headaches and dizziness.  Her symptoms could be related to discontinuation of paroxetine.  Patient still struggling significantly in the evenings and with insomnia.  We will trial quetiapine at bedtime as needed for sleep.  Discussed risks and benefits of therapy including watching for any abnormal involuntary movements.  If patient continues on the medication, we will make sure to get updated lipid panel and fasting glucose.  Could also consider mirtazapine as an option.  No acute safety concerns.  No acute suicidality.  No problematic drug or alcohol use.    11/14/2023:  Patient continuing to work with sleep medicine with pending at home sleep study results.  Quetiapine has been helpful for sleep at just 12.5 mg at bedtime.  Does cause some significant cognitive clouding during the day.  Patient does feel benefits outweigh risks but is willing to trial olanzapine to see if gets similar benefit without such heavy cognitive effects.  Venlafaxine has been helpful for mood and anxiety symptoms.  Still could consider mirtazapine as an option for sleep as needed.  No acute safety concerns.  No SI.  No problematic drug or alcohol use.    Medication side effects and alternatives were reviewed. Health promotion activities recommended and reviewed today. All questions addressed. Education and  counseling completed regarding risks and benefits of medications and psychotherapy options. Recommend therapy for additional support.     Treatment Plan:  Continue Ambien/zolpidem 5 mg nightly as needed for insomnia. Continue to use sparingly. Do not take with Ativan/lorazepam.   Continue Effexor-XR/venlafaxine  mg daily for anxiety/mood.  Discontinue/Pause quetiapine/Seroquel 12.5-50 mg at bedtime as needed for sleep while you trial olanzapine. If you continue with olanzapine, discontinue quetiapine.  Start olanzapine/Zyprexa 2.5-5 mg at bedtime as needed for sleep. If not helpful, can return to quetiapine.     Continue all other cares per primary care provider.   Continue all other medications as reviewed per electronic medical record today.   Safety plan reviewed. To the Emergency Department as needed or call after hours crisis line at [phone] or [phone]. [place] Crisis Text Line. Text [place] to 734286 or Suicide LifeLine Chat: suicidepreventionlifeline.org/chat  Consider DBT therapy.  Strongly recommend individual psychotherapy for additional support and ongoing development of nonpharmacologic coping skills and strategies.  Schedule an appointment with me in 6-8 weeks or sooner as needed. Call [facility] at [phone] to schedule.  Follow up with primary care provider as planned or for acute medical concerns.  Call the psychiatric nurse line with medication questions or concerns at [phone].  [employer] may be used to communicate with your provider, but this is not intended to be used for emergencies.    Risks of benzodiazepine (Ativan, Xanax, Klonopin, Valium, etc) use including, but not limited to, sedation, tolerance, risk for addiction/dependence. Do not drink alcohol while taking benzodiazepines due to risk of trouble breathing and potential death. Do not drive or operate heavy machinery until it is known how the drug affects you. Discuss with physician or pharmacist before  ever taking a benzodiazepine with a narcotic/opioid pain medication.     Book:   [facility]  By Ingris KIM DBT resources:  https://[place].gov/dhs/partners-and-providers/policies-procedures/adult-mental-health/dialectical-behavior-therapy/dbt-certified-providers/    Some [facility] DBT resources:  Edith Arango   [phone]   https://[employer] labs/faqs/     Brian   [phone]   https://Oyster     [facility]   [phone]   https://www.mhs-dbt.[employer]     [place]   [facility]   [phone]   https://Nugg-it     Administrative Billing:   Phone Call/Video Duration: 18 Minutes  Start: 11:03a  Stop: 11:21a    Patient Status:  Patient will continue to be seen for ongoing consultation and stabilization.    Signed:   Christi Lewis DO  [facility]    Disclaimer: This note consists of symbols derived from keyboarding, dictation and/or voice recognition software. As a result, there may be errors in the script that have gone undetected. Please consider this when interpreting information found in this chart.

## 2023-11-14 NOTE — PROGRESS NOTES
"Virtual Visit Details    Type of service:  Video Visit     Originating Location (pt. Location): {video visit patient location:749633::\"Home\"}  {PROVIDER LOCATION On-site should be selected for visits conducted from your clinic location or adjoining [facility] hospital, academic office, or other nearby [facility] building. Off-site should be selected for all other provider locations, including home:861621}  Distant Location (provider location):  {virtual location provider:418258}  Platform used for Video Visit: {Virtual Visit Platforms:302894::\"[employer]\"}    "

## 2023-11-14 NOTE — PATIENT INSTRUCTIONS
Treatment Plan:  Continue Ambien/zolpidem 5 mg nightly as needed for insomnia. Continue to use sparingly. Do not take with Ativan/lorazepam.   Continue Effexor-XR/venlafaxine  mg daily for anxiety/mood.  Discontinue/Pause quetiapine/Seroquel 12.5-50 mg at bedtime as needed for sleep while you trial olanzapine. If you continue with olanzapine, discontinue quetiapine.  Start olanzapine/Zyprexa 2.5-5 mg at bedtime as needed for sleep. If not helpful, can return to quetiapine.     Continue all other cares per primary care provider.   Continue all other medications as reviewed per electronic medical record today.   Safety plan reviewed. To the Emergency Department as needed or call after hours crisis line at [phone] or [phone]. [place] Crisis Text Line. Text [place] to 047149 or Suicide LifeLine Chat: suicidepreVitruvias Therapeuticsline.org/chat  Consider DBT therapy.  Strongly recommend individual psychotherapy for additional support and ongoing development of nonpharmacologic coping skills and strategies.  Schedule an appointment with me in 6-8 weeks or sooner as needed. Call [facility] at [phone] to schedule.  Follow up with primary care provider as planned or for acute medical concerns.  Call the psychiatric nurse line with medication questions or concerns at [phone].  [employer] may be used to communicate with your provider, but this is not intended to be used for emergencies.    Risks of benzodiazepine (Ativan, Xanax, Klonopin, Valium, etc) use including, but not limited to, sedation, tolerance, risk for addiction/dependence. Do not drink alcohol while taking benzodiazepines due to risk of trouble breathing and potential death. Do not drive or operate heavy machinery until it is known how the drug affects you. Discuss with physician or pharmacist before ever taking a benzodiazepine with a narcotic/opioid pain medication.     Book:   [facility] [place] [facility]  By Ingris KIM  "DBT resources:  [place]://[place]dhs/partners-and-providers/policies-procedures/adult-mental-health/dialectical-behavior-therapy/dbt-certified-providers/    Some [facility] DBT resources:  Edith Arango   [phone]   https://[employer]/faqs/     Brian   [phone]   https://[employer]     [facility]   [phone]   https://www.mhs-dbt.com     [place]   [facility]   [phone]   [email or web address].[email or web address]     Patient Education   Collaborative Care Psychiatry Service  What to Expect  Here's what to expect from your Collaborative Care Psychiatry Service (CCPS).   About CCPS  CCPS means 2 people work together to help you get better. You'll meet with a behavioral health clinician and a psychiatric doctor. A behavioral health clinician helps people with mental health problems by talking with them. A psychiatric doctor helps people by giving them medicine.  How it works  At every visit, you'll see the behavioral health clinician (BHC) first. They'll talk with you about how you're doing and teach you how to feel better.   Then you'll see the psychiatric doctor. This doctor can help you deal with troubling thoughts and feelings by giving you medicine. They'll make sure you know the plan for your care.   CCPS usually takes 3 to 6 visits. If you need more visits, we may have you start seeing a different psychiatric doctor for ongoing care.  If you have any questions or concerns, we'll be glad to talk with you.  About visits  Be open  At your visits, please talk openly about your problems. It may feel hard, but it's the best way for us to help you.  Cancelling visits  If you can't come to your visit, please call us right away at [phone]. If you don't cancel at least 24 hours (1 full day) before your visit, that's \"late cancellation.\"  Being late to visits  Being very late is the same as not showing up. You will be a \"no show\" if:  Your appointment starts with a BHC, and " you're more than 15 minutes late for a 30-minute (half hour) visit. This will also cancel your appointment with the psychiatric doctor.  Your appointment is with a psychiatric doctor only, and you're more than 15 minutes late for a 30-minute (half hour) visit.  Your appointment is with a psychiatric doctor only, and you're more than 30 minutes late for a 60-minute (full hour) visit.  If you cancel late or don't show up 2 times within 6 months, we may end your care.   Getting help between visits  If you need help between visits, you can call us Monday to Friday from 8 a.m. to 4:30 p.m. at [phone].  Emergency care  Call 911 or go to the nearest emergency department if your life or someone else's life is in danger.  Call 028 anytime to reach the national Suicide and Crisis hotline.  Medicine refills  To refill your medicine, call your pharmacy. You can also call [facility]'s [facility] at [phone], Monday to Friday, 8 a.m. to 4:30 p.m. It can take 1 to 3 business days to get a refill.   Forms, letters, and tests  You may have papers to fill out, like FMLA, short-term disability, and workability. We can help you with these forms at your visits, but you must have an appointment. You may need more than 1 visit for this, to be in an intensive therapy program, or both.  Before we can give you medicine for ADHD, we may refer you to get tested for it or confirm it another way.  We may not be able to give you an emotional support animal letter.  We don't do mental health checks ordered by the court.   We don't do mental health testing, but we can refer you to get tested.   Thank you for choosing us for your care.  For informational purposes only. Not to replace the advice of your health care provider. Copyright   2022 [facility]. All rights reserved. [employer] 393808 - 12/22.

## 2023-11-14 NOTE — NURSING NOTE
Is the patient currently in the state of [place]? YES    Visit mode:VIDEO    If the visit is dropped, the patient can be reconnected by: VIDEO VISIT: Text to cell phone:   Telephone Information:   Mobile [phone]       Will anyone else be joining the visit? No  (If patient encounters technical issues they should call [phone])    How would you like to obtain your AVS? MyChart    Are changes needed to the allergy or medication list? No    Rooming Documentation: Assigned questionnaire(s) completed .    Reason for visit: RECHECK     RACHELL Gutierrez

## 2023-11-14 NOTE — PATIENT INSTRUCTIONS
No prescription glasses necessary at this time.   Continue use of OTC reading glasses as needed.     Return in 2 years for a comprehensive eye exam, or sooner if needed.      The effects of the dilating drops last for 4- 6 hours.  You will be more sensitive to light and vision will be blurry up close.  Mydriatic sunglasses were given if needed.     Dion Sanches, OD  [facility] [place]  [address] [place] [address]. [place]  [place]  [address]    [phone]

## 2023-11-14 NOTE — PROGRESS NOTES
"Chief Complaint   Patient presents with    Annual Eye Exam     -Family hx of AMD - Father       Last Eye Exam: 7/9/2021  Dilated Previously: Yes, side effects of dilation explained today    What are you currently using to see?  +2.25 readers - uses for everything up close        Distance Vision Acuity: Satisfied with vision    Near Vision Acuity: Satisfied with vision while reading and using computer with readers    Eye Comfort: watery a lot, \"hot sharp needle\" pain in right eye every ~6weeks - lasts for a few minutes, ongoing for a few years - not worsening   Do you use eye drops? : No  Occupation or Hobbies: Currently unemployed    Leticia Dempsey  Optometry Assistant        Medical, surgical and family histories reviewed and updated 11/14/2023.       OBJECTIVE: See Ophthalmology exam    ASSESSMENT:    ICD-10-CM    1. Encounter for examination of eyes and vision without abnormal findings  Z01.00       2. Hx of blepharoplasty  Z98.890       3. Regular astigmatism of both eyes  H52.223       4. Presbyopia  H52.4           PLAN:     Patient Instructions   No prescription glasses necessary at this time.   Continue use of OTC reading glasses as needed.     Return in 2 years for a comprehensive eye exam, or sooner if needed.      The effects of the dilating drops last for 4- 6 hours.  You will be more sensitive to light and vision will be blurry up close.  Mydriatic sunglasses were given if needed.     Dion Sanches, [address]  [place]  [address]    [phone]     "

## 2023-11-14 NOTE — LETTER
"    11/14/2023         RE: Marbella Hendrix  [address] [facility] [id number]        Dear Colleague,    Thank you for referring your patient, Marbella Hendrix, to the [facility]. Please see a copy of my visit note below.    Chief Complaint   Patient presents with     Annual Eye Exam     -Family hx of AMD - Father       Last Eye Exam: 7/9/2021  Dilated Previously: Yes, side effects of dilation explained today    What are you currently using to see?  +2.25 readers - uses for everything up close        Distance Vision Acuity: Satisfied with vision    Near Vision Acuity: Satisfied with vision while reading and using computer with readers    Eye Comfort: watery a lot, \"hot sharp needle\" pain in right eye every ~6weeks - lasts for a few minutes, ongoing for a few years - not worsening   Do you use eye drops? : No  Occupation or Hobbies: Currently unemployed    Leticia Dempsey  Optometry Assistant        Medical, surgical and family histories reviewed and updated 11/14/2023.       OBJECTIVE: See Ophthalmology exam    ASSESSMENT:    ICD-10-CM    1. Encounter for examination of eyes and vision without abnormal findings  Z01.00       2. Hx of blepharoplasty  Z98.890       3. Regular astigmatism of both eyes  H52.223       4. Presbyopia  H52.4           PLAN:     Patient Instructions   No prescription glasses necessary at this time.   Continue use of OTC reading glasses as needed.     Return in 2 years for a comprehensive eye exam, or sooner if needed.      The effects of the dilating drops last for 4- 6 hours.  You will be more sensitive to light and vision will be blurry up close.  Mydriatic sunglasses were given if needed.     Dion Sanches, OD  [facility]  [address]  [address]    [phone]         Again, thank you for allowing me to participate in the care of your patient.        Sincerely,        Dion Sanches, OD  "

## 2023-11-22 ENCOUNTER — PATIENT OUTREACH (OUTPATIENT)
Dept: CARE COORDINATION | Facility: CLINIC | Age: 58
End: 2023-11-22
Payer: COMMERCIAL

## 2023-11-28 ENCOUNTER — MYC REFILL (OUTPATIENT)
Dept: PSYCHIATRY | Facility: CLINIC | Age: 58
End: 2023-11-28
Payer: COMMERCIAL

## 2023-11-28 DIAGNOSIS — G47.09 OTHER INSOMNIA: ICD-10-CM

## 2023-11-29 RX ORDER — OLANZAPINE 5 MG/1
2.5-5 TABLET ORAL
Qty: 90 TABLET | Refills: 1 | Status: SHIPPED | OUTPATIENT
Start: 2023-11-29 | End: 2024-04-08

## 2023-11-29 NOTE — TELEPHONE ENCOUNTER
Date of Last Office Visit: 11/14/23  Date of Next Office Visit: 1/2/2024  No shows since last visit: n/a  Cancellations since last visit: n/a    Medication requested: olanzapine (ZYPREXA) 5 MG tablet  Date last ordered: 11/14/23 Qty: 30 Refills: 0     Review of [place] ?: n/a for this medication     Lapse in medication adherence greater than 5 days?: No  If yes, call patient and gather details: n/a  Medication refill request verified as identical to current order?: Yes  Result of Last DAM, VPA, Li+ Level, CBC, or Carbamazepine Level (at or since last visit): N/A    Last visit treatment plan:   11/14/2023:  Patient continuing to work with sleep medicine with pending at home sleep study results.  Quetiapine has been helpful for sleep at just 12.5 mg at bedtime.  Does cause some significant cognitive clouding during the day.  Patient does feel benefits outweigh risks but is willing to trial olanzapine to see if gets similar benefit without such heavy cognitive effects.  Venlafaxine has been helpful for mood and anxiety symptoms.  Still could consider mirtazapine as an option for sleep as needed.  No acute safety concerns.  No SI.  No problematic drug or alcohol use.     Medication side effects and alternatives were reviewed. Health promotion activities recommended and reviewed today. All questions addressed. Education and counseling completed regarding risks and benefits of medications and psychotherapy options. Recommend therapy for additional support.      Treatment Plan:  Continue Ambien/zolpidem 5 mg nightly as needed for insomnia. Continue to use sparingly. Do not take with Ativan/lorazepam.   Continue Effexor-XR/venlafaxine  mg daily for anxiety/mood.  Discontinue/Pause quetiapine/Seroquel 12.5-50 mg at bedtime as needed for sleep while you trial olanzapine. If you continue with olanzapine, discontinue quetiapine.  Start olanzapine/Zyprexa 2.5-5 mg at bedtime as needed for sleep. If not helpful, can return to  "quetiapine.   Per patient's MyC message dated 11/28/23: \" Hi Dr. Lewis! I really like this new medication. I'm still getting sleep but I'm not so out of it the rest of the day. In fact I'm pretty good.\"  Continue all other cares per primary care provider.   Continue all other medications as reviewed per electronic medical record today.   Safety plan reviewed. To the Emergency Department as needed or call after hours crisis line at [phone] or [phone]. [place] Crisis Text Line. Text [place] to 291085 or Suicide LifeLine Chat: suicidepreElectric Objectsline.org/chat  Consider DBT therapy.  Strongly recommend individual psychotherapy for additional support and ongoing development of nonpharmacologic coping skills and strategies.  Schedule an appointment with me in 6-8 weeks or sooner as needed. Call [facility] at [phone] to schedule.  Follow up with primary care provider as planned or for acute medical concerns.  Call the psychiatric nurse line with medication questions or concerns at [phone].  [employer] may be used to communicate with your provider, but this is not intended to be used for emergencies.    []Medication refilled per  Medication Refill in Ambulatory Care  policy.  [x]Medication unable to be refilled by RN due to criteria not met as indicated below:    []Eligibility - not seen in the last year   []Supervision - no future appointment   []Compliance - no shows, cancellations or lapse in therapy   []Verification - order discrepancy   []Controlled medication   []Medication not included in policy    [x]90-day supply request  - Per 11/28/23 MyC message from the patient - \" I'm requesting more because the spouse and I are leaving on Dec. 14th for his CME for six days and I only have 18 left. Also, I get medications via the mail order pharmacy so it takes a couple days to get to me. Is it possible to get more than 30 tabs? I'd prefer a 3 month supply. \"   []Other    RN pended a 90 day " refill for provider review and approval    Lydia Rollins RN on 11/29/2023 at 9:39 AM

## 2023-12-13 ASSESSMENT — SLEEP AND FATIGUE QUESTIONNAIRES
HOW LIKELY ARE YOU TO NOD OFF OR FALL ASLEEP WHILE WATCHING TV: WOULD NEVER DOZE
HOW LIKELY ARE YOU TO NOD OFF OR FALL ASLEEP WHILE SITTING AND TALKING TO SOMEONE: SLIGHT CHANCE OF DOZING
HOW LIKELY ARE YOU TO NOD OFF OR FALL ASLEEP IN A CAR, WHILE STOPPED FOR A FEW MINUTES IN TRAFFIC: SLIGHT CHANCE OF DOZING
HOW LIKELY ARE YOU TO NOD OFF OR FALL ASLEEP WHILE LYING DOWN TO REST IN THE AFTERNOON WHEN CIRCUMSTANCES PERMIT: SLIGHT CHANCE OF DOZING
HOW LIKELY ARE YOU TO NOD OFF OR FALL ASLEEP WHILE SITTING AND READING: SLIGHT CHANCE OF DOZING
HOW LIKELY ARE YOU TO NOD OFF OR FALL ASLEEP WHEN YOU ARE A PASSENGER IN A CAR FOR AN HOUR WITHOUT A BREAK: SLIGHT CHANCE OF DOZING
HOW LIKELY ARE YOU TO NOD OFF OR FALL ASLEEP WHILE SITTING QUIETLY AFTER LUNCH WITHOUT ALCOHOL: SLIGHT CHANCE OF DOZING
HOW LIKELY ARE YOU TO NOD OFF OR FALL ASLEEP WHILE SITTING INACTIVE IN A PUBLIC PLACE: SLIGHT CHANCE OF DOZING

## 2023-12-20 ENCOUNTER — OFFICE VISIT (OUTPATIENT)
Dept: SLEEP MEDICINE | Facility: CLINIC | Age: 58
End: 2023-12-20
Attending: PHYSICIAN ASSISTANT
Payer: COMMERCIAL

## 2023-12-20 PROCEDURE — G0399 HOME SLEEP TEST/TYPE 3 PORTA: HCPCS | Performed by: INTERNAL MEDICINE

## 2023-12-20 NOTE — PROGRESS NOTES
Pt is completing a home sleep test. Pt was instructed on how to put on the Noxturnal T3 device and associated equipment before going to bed and given the opportunity to practice putting it on before leaving the sleep center. Pt was reminded to bring the home sleep test kit back to the center tomorrow, at agreed upon time for download and reporting.   Merna Concepcion CMA on 12/20/2023 at 11:26 AM

## 2023-12-21 ENCOUNTER — DOCUMENTATION ONLY (OUTPATIENT)
Dept: SLEEP MEDICINE | Facility: CLINIC | Age: 58
End: 2023-12-21
Attending: PHYSICIAN ASSISTANT
Payer: COMMERCIAL

## 2023-12-21 NOTE — PROGRESS NOTES
HST POST-STUDY QUESTIONNAIRE    What time did you go to bed?  Between 2-3 am because I couldn't sleep  How long do you think it took to fall asleep?  3 hours maybe  What time did you wake up to start the day?  6:30 am  Did you get up during the night at all?  Once to luc the bathroom  If you woke up, do you remember approximately what time(s)? No idea  Did you have any difficulty with the equipment?  No  Did you us any type of treatment with this study?  None  Was the head of the bed elevated? No  Did you sleep in a recliner?  No  Did you stop using CPAP at least 3 days before this test?  NA  Any other information you'd like us to know? Unable to sleep. The device was too uncomfortable.

## 2023-12-21 NOTE — NURSING NOTE
Pt returned HST device. It was downloaded and forwarded data to the clinical specialist for scoring.  Merna Concepcion CMA on 12/21/2023 at 9:30 AM

## 2023-12-21 NOTE — PROGRESS NOTES
This HSAT was performed using a Noxturnal T3 device which recorded snore, sound, movement activity, body position, nasal pressure, oronasal thermal airflow, pulse, oximetry and both chest and abdominal respiratory effort. HSAT data was restricted to the time patient states they were in bed.     HSAT was scored using 1B 4% hypopnea rule.     HST AHI (Non-PAT): 9.9  Snoring was reported as mild.  Time with SpO2 below 89% was 2.9 minutes.   Overall signal quality was good     Pt will follow up with sleep provider to determine appropriate therapy.

## 2023-12-23 NOTE — PROCEDURES
"HOME SLEEP STUDY INTERPRETATION        Patient: Marbella Hendrix  MRN: [id number]  YOB: 1965  Study Date: 12/20/2023  PCP/Referring Provider: Vanessa Ladd;   Ordering Provider:   Bennett Goltz, PA-C       Indications for Home Study: Marbella Hendrix is a 58 year old female who presents with symptoms suggestive of obstructive sleep apnea.    Estimated body mass index is 34.69 kg/m  as calculated from the following:    Height as of 7/19/23: 1.615 m (5' 3.58\").    Weight as of 7/19/23: 90.5 kg (199 lb 8 oz).  Total score - [place]: 7 (12/13/2023 10:29 AM)        Data: A full night home sleep study was performed recording the standard physiologic parameters including body position, movement, sound, nasal pressure, thermal oral airflow, chest and abdominal movements with respiratory inductance plethysmography, and oxygen saturation by pulse oximetry. Pulse rate was estimated by oximetry recording. This study was considered adequate based on > 4 hours of quality oximetry and respiratory recording. As specified by the AASM Manual for the Scoring of Sleep and Associated events, version 2.3, Rule VIII.D 1B, 4% oxygen desaturation scoring for hypopneas is used as a standard of care on all home sleep apnea testing.        Analysis Time:  360 minutes        Respiration:   Sleep Associated Hypoxemia: sustained hypoxemia was not present. Baseline oxygen saturation was 98%.  Time with saturation less than or equal to 88% was 2.9 minutes. The lowest oxygen saturation was 82%.   Snoring: Snoring was present.  Respiratory events: The home study revealed a presence of 2 obstructive apneas and 1 mixed and central apneas. There were 31 hypopneas resulting in a combined apnea/hypopnea index [AHI] of 9.9 events per hour.  AHI was 32.2 per hour supine, 5.1 per hour prone, 10.8 per hour on left side, and 3.7 per hour on right side.   Pattern: Excluding events noted above, respiratory rate and pattern was " Normal.      Position: Percent of time spent: supine - 5.7%, prone - 3.3%, on left - 26.3%, on right - 22.2%.      Heart Rate: By pulse oximetry normal rate was noted.       Assessment:   Mild obstructive sleep apnea.  Sleep associated hypoxemia was not present.  Of note, there was limited supine positioning on this test. Frequent sleep apnea events were seen during supine sleep.     Recommendations:  If treatment of mild obstructive sleep apnea is clinically indicated, following therapy options can be considered.   Patient can consider oral appliance therapy through dental sleep medicine for treatment of mild obstructive sleep apnea.   If there is excessive daytime sleepiness or other qualifying medical comorbidity, auto titrating CPAP therapy can be considered for treatment.   Weight management.        Diagnosis Code(s): Obstructive Sleep Apnea G47.33    Electronically signed by: Eugenio Livingston MD, December 23, 2023   Diplomate, American Board of Psychiatry and Neurology, Sleep Medicine

## 2023-12-26 DIAGNOSIS — E78.5 HYPERLIPIDEMIA LDL GOAL <130: ICD-10-CM

## 2023-12-26 RX ORDER — ATORVASTATIN CALCIUM 20 MG/1
20 TABLET, FILM COATED ORAL DAILY
Qty: 90 TABLET | Refills: 1 | Status: SHIPPED | OUTPATIENT
Start: 2023-12-26 | End: 2024-03-25

## 2023-12-28 ENCOUNTER — ANCILLARY PROCEDURE (OUTPATIENT)
Dept: MAMMOGRAPHY | Facility: CLINIC | Age: 58
End: 2023-12-28
Attending: FAMILY MEDICINE
Payer: COMMERCIAL

## 2023-12-28 DIAGNOSIS — Z12.31 VISIT FOR SCREENING MAMMOGRAM: ICD-10-CM

## 2023-12-28 PROCEDURE — 77067 SCR MAMMO BI INCL CAD: CPT | Mod: GC | Performed by: STUDENT IN AN ORGANIZED HEALTH CARE EDUCATION/TRAINING PROGRAM

## 2023-12-28 PROCEDURE — 77063 BREAST TOMOSYNTHESIS BI: CPT | Mod: GC | Performed by: STUDENT IN AN ORGANIZED HEALTH CARE EDUCATION/TRAINING PROGRAM

## 2024-01-01 NOTE — PROGRESS NOTES
[facility] Psychiatry Services [facility]  2024      Behavioral Health Clinician Progress Note    Patient Name: Marbella Hendrix           Service Type:  Individual      Service Location:   [facility] / Email (patient reached)     Session Start Time: 1108AM  Session End Time: 1124AM      Session Length: 16 - 37      Attendees: Client     Service Modality:  Video Visit:      Provider verified identity through the following two step process.  Patient provided:  Patient photo and Patient     Telemedicine Visit: The patient's condition can be safely assessed and treated via synchronous audio and visual telemedicine encounter.      Reason for Telemedicine Visit: Services only offered telehealth    Originating Site (Patient Location): Patient's home    Distant Site (Provider Location): Provider Remote Setting- Home Office    Consent:  The patient/guardian has verbally consented to: the potential risks and benefits of telemedicine (video visit) versus in person care; bill my insurance or make self-payment for services provided; and responsibility for payment of non-covered services.     Patient would like the video invitation sent by:  My Chart    Mode of Communication:  Video Conference via [facility]    Distant Location (Provider):  Off-site    As the provider I attest to compliance with applicable laws and regulations related to telemedicine.    Visit Activities (Refresh list every visit): [facility] Only    Diagnostic Assessment Date: 2023 MADELIN Patel, [facility]   Treatment Plan Review Date: 24  See Flowsheets for today's PHQ-9 and BERTHA-7 results  Previous PHQ-9:       2023     8:18 AM 2023     7:40 AM 2023    11:12 AM   PHQ-9 SCORE   PHQ-9 Total Score [facility] 21 (Severe depression) 12 (Moderate depression) 15 (Moderately severe depression)   PHQ-9 Total Score 21 12    12 15     Previous BERTHA-7:       2023     4:10 PM 2023     1:58 PM 2023     6:51 AM  "  BERTHA-7 SCORE   Total Score 16 (severe anxiety) 19 (severe anxiety) 14 (moderate anxiety)   Total Score 16 19 14       YEVGENIY LEVEL:    DATA  Extended Session (60+ minutes): No  Interactive Complexity: No  Crisis: No  [facility] Patient: No    Treatment Objective(s) Addressed in This Session:  Target Behavior(s): disease management/lifestyle changes reduce sx of anxiety    Anxiety: will experience a reduction in anxiety      Current Stressors / Issues:    First appointment with patient in CCPS and was advised of the short-term, team based structure of the model including role of [facility] and provider. Patient indicated understanding of the model and agreed to proceed with services as described. Care team has reviewed attendance agreement with patient. Patient advised that two failed appointments within 6 months may lead to termination of current episode of care.     MH update: Things are going well.  On the 5th med for anxiety.  Really like it.  Anxiety feels manageable.  Freak out daily but not every moment.  Denies any panic attacks.  Feeling behind the \"8 ball\".  [place] due to feeling uncomfortable from constipation.  Denies any overwhelming feelings of depression, hopelessness, worthlessness, etc. .  Stresses:  Holidays were fun, sad its over.  Got to see a friend from out of town.  Arthritic hand worse in the cold notes she needs to be wearing her hand brace.  Going to [employer], just completed prior to appt.   Unemployed hasn't been looking due to transportation of child.  Would like to work.  Appetite: Denies  Sleep: Feeling pleased.  Sleep much improved. Having dreams.    Outpatient Provider updates: Therapist once a month Dixie Boykin, appt next week.    SI/SIB/HI: Denies  BRAD: Denies  Preg: n/a  Side effects/compliance: constipated, uncomfortable and bloated.  Interventions: [facility] supported on going anxiety reduction skill use  Most important: Constipation side effects     Reason for CCPS: Pt says that they had \"bad anxiety\" " since mid to late 20s. Pt has been on the same medication and dose since then. In April it became crippling so pt wasn't able to function and had heavy breathing. Pt slept for a few hours and then next night was up the whole night. Pt has a lot of things that occurred during this time since bad stuff has been happening. Pt didn't go to bed last night. They changed bedrooms. This sleep concern started in April. Pt went out of town and this helped a bit.   The thoughts were frightening in their head. Pt had intrusive thoughts. Pt doesn't have these thoughts anymore. Pt had a senior taking care of pass away.   Sleep study: pt attempted this in the middle of many life events, pt rescheduled it for fall    Pt denies any restless legs. Pt will have anxiety before bed. It is painful, not sure if I will sleep. Haven't had this for a month.   Depression: feeling overwhelmed with tasks in the day and are unable to see any positivity, not functioning well, making mistakes   Hope to: find something to help with anxiety so they don't have a crippling bout and to be able to manage    Progress on Treatment Objective(s) / Homework:  New Objective established this session - ACTION (Actively working towards change); Intervened by reinforcing change plan / affirming steps taken    Motivational Interviewing    MI Intervention: Co-Developed Goal: reduce on going sx of anxiety, Expressed Empathy/Understanding, Open-ended questions, and Reflections: simple and complex     Change Talk Expressed by the Patient: Desire to change Ability to change Activation Taking steps    Provider Response to Change Talk: A - Affirmed patient's thoughts, decisions, or attempts at behavior change and R - Reflected patient's change talk    Assessments completed prior to visit:  The following assessments were completed by patient for this visit:  PHQ2:       1/1/2024     7:51 AM 8/22/2023    11:12 AM 7/19/2023     1:56 PM 6/14/2023     8:18 AM 6/30/2022     11:27 AM 4/13/2022     8:51 AM 3/13/2022     9:43 AM   PHQ-2 ( 1999 [employer])   Q1: Little interest or pleasure in doing things 0 2 1 3 1 0 0   Q2: Feeling down, depressed or hopeless 1 2 1 3 1 2 1   PHQ-2 Score 1    1 4 2 6 2 2 1   Q1: Little interest or pleasure in doing things Not at all More than half the days Several days Nearly every day Several days Not at all Not at all   Q2: Feeling down, depressed or hopeless Several days More than half the days Several days Nearly every day Several days More than half the days Several days   PHQ-2 Score 1 4 2 6 2 2 1     PHQ9:       6/12/2019     9:37 AM 6/19/2020     3:59 PM 7/6/2020    12:57 PM 6/11/2023    12:06 PM 6/14/2023     8:18 AM 7/17/2023     7:40 AM 8/22/2023    11:12 AM   PHQ-9 SCORE   PHQ-9 Total Score MyChart  2 (Minimal depression)  17 (Moderately severe depression) 21 (Severe depression) 12 (Moderate depression) 15 (Moderately severe depression)   PHQ-9 Total Score 2 2 2 17 21 12    12 15     GAD2:       7/14/2023     4:23 PM 8/18/2023     1:58 PM 9/28/2023     6:51 AM 11/7/2023     4:23 PM 12/26/2023    12:43 PM   BERTHA-2   Feeling nervous, anxious, or on edge 3 2 2 1 1   Not being able to stop or control worrying 3 2 2 1 1   BERTHA-2 Total Score 6 4 4 2 2     GAD7:       6/12/2019     9:37 AM 7/6/2020    12:57 PM 6/11/2023    12:06 PM 6/12/2023     3:20 PM 7/14/2023     4:10 PM 8/18/2023     1:58 PM 9/28/2023     6:51 AM   BERTHA-7 SCORE   Total Score   21 (severe anxiety) 21 (severe anxiety) 16 (severe anxiety) 19 (severe anxiety) 14 (moderate anxiety)   Total Score 8 4 21 21 16 19 14     PROMIS 10-Global Health (all questions and answers displayed):       10/14/2019    12:11 PM 11/25/2019    12:52 PM 7/14/2023     4:22 PM 11/7/2023     4:23 PM 1/2/2024    10:47 AM   PROMIS 10   In general, would you say your health is: Very good Very good Very good Good Very good   In general, would you say your quality of life is: Very good Very good Very good Good Very good    In general, how would you rate your physical health? Very good Very good Very good Very good Very good   In general, how would you rate your mental health, including your mood and your ability to think? Very good Very good Fair Good Very good   In general, how would you rate your satisfaction with your social activities and relationships? Very good Very good Very good Good Very good   In general, please rate how well you carry out your usual social activities and roles Very good Very good Good Very good Very good   To what extent are you able to carry out your everyday physical activities such as walking, climbing stairs, carrying groceries, or moving a chair? Mostly Moderately Completely Mostly Mostly   In the past 7 days, how often have you been bothered by emotional problems such as feeling anxious, depressed, or irritable? Sometimes Sometimes Always Sometimes Often   In the past 7 days, how would you rate your fatigue on average? Mild Moderate Moderate Moderate Moderate   In the past 7 days, how would you rate your pain on average, where 0 means no pain, and 10 means worst imaginable pain? 8 8 8 5 7   In general, would you say your health is: 4 4 4 3 4   In general, would you say your quality of life is: 4 4 4 3 4   In general, how would you rate your physical health? 4 4 4 4 4   In general, how would you rate your mental health, including your mood and your ability to think? 4 4 2 3 4   In general, how would you rate your satisfaction with your social activities and relationships? 4 4 4 3 4   In general, please rate how well you carry out your usual social activities and roles. (This includes activities at home, at work and in your community, and responsibilities as a parent, child, spouse, employee, friend, etc.) 4 4 3 4 4   To what extent are you able to carry out your everyday physical activities such as walking, climbing stairs, carrying groceries, or moving a chair? 4 3 5 4 4   In the past 7 days, how  often have you been bothered by emotional problems such as feeling anxious, depressed, or irritable? 3 3 5 3 4   In the past 7 days, how would you rate your fatigue on average? 2 3 3 3 3   In the past 7 days, how would you rate your pain on average, where 0 means no pain, and 10 means worst imaginable pain? 8 8 8 5 7   Global Mental Health Score 15 15 11    11 12 14   Global Physical Health Score 14 12 14    14 14 13   PROMIS TOTAL - SUBSCORES 29 27 25    25 26 27     PROMIS 10-Global Health (only subscores and total score):       10/14/2019    12:11 PM 11/25/2019    12:52 PM 7/14/2023     4:22 PM 11/7/2023     4:23 PM 1/2/2024    10:47 AM   PROMIS-10 Scores Only   Global Mental Health Score 15 15 11    11 12 14   Global Physical Health Score 14 12 14    14 14 13   PROMIS TOTAL - SUBSCORES 29 27 25    25 26 27     The following assessments were completed by patient for this visit:  [place] Suicide Severity Rating Scale (Lifetime/Recent)      7/17/2023     9:35 AM 1/2/2024    11:33 AM   [place] Suicide Severity Rating (Lifetime/Recent)   Q1 Wish to be Dead (Lifetime) Y N   Wish to be Dead Description (Lifetime) Pt reports having thoughts and never acted on them. They report the last time was May or June 2023.    Q2 Non-Specific Active Suicidal Thoughts (Lifetime) Y N   Non-Specific Active Suicidal Thought Description (Lifetime) Pt reports that they had a very stressful time with many life evetns in April and this impacted these thoughts.    2. Non-Specific Active Suicidal Thoughts (Past 1 Month) N    3. Active Suicidal Ideation with any Methods (Not Plan) Without Intent to Act (Lifetime) N    Q4 Active Suicidal Ideation with Some Intent to Act, Without Specific Plan (Lifetime) N    Q5 Active Suicidal Ideation with Specific Plan and Intent (Lifetime) N    Most Severe Ideation Rating (Lifetime) 2    Description of Most Severe Ideation (Lifetime) Pt reports having intrusive thoughts while driving and reports that they  did not act on these and have their children and will never act on them.    Frequency (Lifetime) 3    Duration (Lifetime) 1    Controllability (Lifetime) 1    Deterrents (Lifetime) 1    Reasons for Ideation (Lifetime) 0    Actual Attempt (Lifetime) N N   Has subject engaged in non-suicidal self-injurious behavior? (Lifetime) N N   Interrupted Attempts (Lifetime) N N   Aborted or Self-Interrupted Attempt (Lifetime) N N   Preparatory Acts or Behavior (Lifetime) N N   Calculated C-SSRS Risk Score (Lifetime/Recent) No Risk Indicated No Risk Indicated       Care Plan review completed: Yes    Medication Review:  Changes to psychiatric medications, see updated Medication List in EPIC.     Medication Compliance:  Yes    Changes in Health Issues:   None reported    Chemical Use Review:   Substance Use: Chemical use reviewed, no active concerns identified      Tobacco Use: No current tobacco use.      Assessment: Current Emotional / Mental Status (status of significant symptoms):  Risk status (Self / Other harm or suicidal ideation)  Patient has had a history of suicidal ideation: reports a hx of ideation without specific planning/intent/action back in 2022  Patient denies current fears or concerns for personal safety.  Patient denies current or recent suicidal ideation or behaviors.  Patient denies current or recent homicidal ideation or behaviors.  Patient denies current or recent self injurious behavior or ideation.  Patient denies other safety concerns.  A safety and risk management plan has not been developed at this time, however patient was encouraged to call [facility] should there be a change in any of these risk factors.    Appearance:   Appropriate   Eye Contact:   Good   Psychomotor Behavior: Normal   Attitude:   Cooperative   Orientation:   All  Speech   Rate / Production: Normal    Volume:  Normal   Mood:    Normal   Affect:    Appropriate   Thought Content:  Clear   Thought Form:  Coherent  Logical    Insight:    Good     Diagnoses:  1. Generalized anxiety disorder    2. Mild episode of recurrent major depressive disorder (H24)        Collateral Reports Completed:  Communicated with: Dr Lewis    Plan: (Homework, other):  Patient was given information about behavioral services and encouraged to schedule a follow up appointment with the clinic [facility] in 1 month.  She was also given information about mental health symptoms and treatment options .  CD Recommendations: No indications of CD issues.     Rafaela Hassan, [facility]    ______________________________________________________________________    Integrated Primary Care Behavioral Health Treatment Plan    Patient's Name: Marbella Hendrix  YOB: 1965    Date of Creation: 1/1/2024  Date Treatment Plan Last Reviewed/Revised: 1/1/2024    DSM5 Diagnoses:   1. Generalized anxiety disorder    2. Mild episode of recurrent major depressive disorder (H24)      Psychosocial / Contextual Factors: unemployed  PROMIS (reviewed every 90 days):     The following assessments were completed by patient for this visit:  PROMIS 10-Global Health (all questions and answers displayed):       10/14/2019    12:11 PM 11/25/2019    12:52 PM 7/14/2023     4:22 PM 11/7/2023     4:23 PM 1/2/2024    10:47 AM   PROMIS 10   In general, would you say your health is: Very good Very good Very good Good Very good   In general, would you say your quality of life is: Very good Very good Very good Good Very good   In general, how would you rate your physical health? Very good Very good Very good Very good Very good   In general, how would you rate your mental health, including your mood and your ability to think? Very good Very good Fair Good Very good   In general, how would you rate your satisfaction with your social activities and relationships? Very good Very good Very good Good Very good   In general, please rate how well you carry out your usual social activities and roles Very good  Very good Good Very good Very good   To what extent are you able to carry out your everyday physical activities such as walking, climbing stairs, carrying groceries, or moving a chair? Mostly Moderately Completely Mostly Mostly   In the past 7 days, how often have you been bothered by emotional problems such as feeling anxious, depressed, or irritable? Sometimes Sometimes Always Sometimes Often   In the past 7 days, how would you rate your fatigue on average? Mild Moderate Moderate Moderate Moderate   In the past 7 days, how would you rate your pain on average, where 0 means no pain, and 10 means worst imaginable pain? 8 8 8 5 7   In general, would you say your health is: 4 4 4 3 4   In general, would you say your quality of life is: 4 4 4 3 4   In general, how would you rate your physical health? 4 4 4 4 4   In general, how would you rate your mental health, including your mood and your ability to think? 4 4 2 3 4   In general, how would you rate your satisfaction with your social activities and relationships? 4 4 4 3 4   In general, please rate how well you carry out your usual social activities and roles. (This includes activities at home, at work and in your community, and responsibilities as a parent, child, spouse, employee, friend, etc.) 4 4 3 4 4   To what extent are you able to carry out your everyday physical activities such as walking, climbing stairs, carrying groceries, or moving a chair? 4 3 5 4 4   In the past 7 days, how often have you been bothered by emotional problems such as feeling anxious, depressed, or irritable? 3 3 5 3 4   In the past 7 days, how would you rate your fatigue on average? 2 3 3 3 3   In the past 7 days, how would you rate your pain on average, where 0 means no pain, and 10 means worst imaginable pain? 8 8 8 5 7   Global Mental Health Score 15 15 11    11 12 14   Global Physical Health Score 14 12 14    14 14 13   PROMIS TOTAL - SUBSCORES 29 27 25    25 26 27     PROMIS  10-Global Health (only subscores and total score):       10/14/2019    12:11 PM 11/25/2019    12:52 PM 7/14/2023     4:22 PM 11/7/2023     4:23 PM 1/2/2024    10:47 AM   PROMIS-10 Scores Only   Global Mental Health Score 15 15 11    11 12 14   Global Physical Health Score 14 12 14    14 14 13   PROMIS TOTAL - SUBSCORES 29 27 25    25 26 27       Referral / Collaboration:  Referral to another professional/service is not indicated at this time..    Anticipated number of session for this episode of care: 5-6  Anticipation frequency of session: Monthly  Anticipated Duration of each session: 16-37 minutes  Treatment plan will be reviewed in 90 days or when goals have been changed.       MeasurableTreatment Goal(s) related to diagnosis / functional impairment(s)  Goal 1: Patient will reduce sx of anxiety    I will know I've met my goal when my anxiety is not present on a daily basis.      Objective #A (Patient Action)    Patient will use cognitive strategies identified in therapy to challenge anxious thoughts.  Status: New - Date: 1/2/24      Intervention(s)  Therapist will provide support through CBT, MI, Acceptance and Commitment Therapy and problem solving model to explore and overcome barriers.      Patient has reviewed and agreed to the above plan.      Rafaela Hassan, MARYC  January 2, 2024

## 2024-01-02 ENCOUNTER — VIRTUAL VISIT (OUTPATIENT)
Dept: PSYCHIATRY | Facility: CLINIC | Age: 59
End: 2024-01-02
Payer: COMMERCIAL

## 2024-01-02 ENCOUNTER — VIRTUAL VISIT (OUTPATIENT)
Dept: BEHAVIORAL HEALTH | Facility: CLINIC | Age: 59
End: 2024-01-02
Payer: COMMERCIAL

## 2024-01-02 DIAGNOSIS — F41.1 GENERALIZED ANXIETY DISORDER: ICD-10-CM

## 2024-01-02 DIAGNOSIS — F33.41 RECURRENT MAJOR DEPRESSIVE DISORDER, IN PARTIAL REMISSION (H): ICD-10-CM

## 2024-01-02 DIAGNOSIS — F90.9 ATTENTION DEFICIT HYPERACTIVITY DISORDER (ADHD), UNSPECIFIED ADHD TYPE: Primary | ICD-10-CM

## 2024-01-02 DIAGNOSIS — G47.09 OTHER INSOMNIA: ICD-10-CM

## 2024-01-02 DIAGNOSIS — F33.0 MILD EPISODE OF RECURRENT MAJOR DEPRESSIVE DISORDER (H): ICD-10-CM

## 2024-01-02 DIAGNOSIS — F41.1 GENERALIZED ANXIETY DISORDER: Primary | ICD-10-CM

## 2024-01-02 PROCEDURE — 99214 OFFICE O/P EST MOD 30 MIN: CPT | Mod: 95 | Performed by: PSYCHIATRY & NEUROLOGY

## 2024-01-02 PROCEDURE — 90832 PSYTX W PT 30 MINUTES: CPT | Mod: 95 | Performed by: COUNSELOR

## 2024-01-02 ASSESSMENT — COLUMBIA-SUICIDE SEVERITY RATING SCALE - C-SSRS
TOTAL  NUMBER OF ABORTED OR SELF INTERRUPTED ATTEMPTS LIFETIME: NO
TOTAL  NUMBER OF INTERRUPTED ATTEMPTS LIFETIME: NO
ATTEMPT LIFETIME: NO
6. HAVE YOU EVER DONE ANYTHING, STARTED TO DO ANYTHING, OR PREPARED TO DO ANYTHING TO END YOUR LIFE?: NO
2. HAVE YOU ACTUALLY HAD ANY THOUGHTS OF KILLING YOURSELF?: NO
1. HAVE YOU WISHED YOU WERE DEAD OR WISHED YOU COULD GO TO SLEEP AND NOT WAKE UP?: NO

## 2024-01-02 NOTE — PROGRESS NOTES
"Virtual Visit Details    Type of service:  Video Visit     Originating Location (pt. Location): {video visit patient location:103154::\"Home\"}  {PROVIDER LOCATION On-site should be selected for visits conducted from your clinic location or adjoining [facility] hospital, academic office, or other nearby [facility] building. Off-site should be selected for all other provider locations, including home:[id number]}  Distant Location (provider location):  {virtual location provider:[id number]}  Platform used for Video Visit: {Virtual Visit Platforms:971721::\"[employer]\"}    "

## 2024-01-02 NOTE — PROGRESS NOTES
"Telemedicine Visit: The patient's condition can be safely assessed and treated via synchronous audio and visual telemedicine encounter.      Reason for Telemedicine Visit: Patient has requested telehealth visit    Originating Site (Patient Location): Patient's home    Distant Location (provider location):  Off-Site    Consent:  The patient/guardian has verbally consented to: the potential risks and benefits of telemedicine (video visit) versus in person care; bill my insurance or make self-payment for services provided; and responsibility for payment of non-covered services.     Mode of Communication:  Video Conference via [employer]    As the provider I attest to compliance with applicable laws and regulations related to telemedicine.          Outpatient Psychiatric Progress Note    Name: Marbella Hendrix   : 1965                    Primary Care Provider: Vanessa Ladd MD   Therapist: Therapist once a month narciso Knox next week       PHQ-9 scores:      2023     8:18 AM 2023     7:40 AM 2023    11:12 AM   PHQ-9 SCORE   PHQ-9 Total Score MyChart 21 (Severe depression) 12 (Moderate depression) 15 (Moderately severe depression)   PHQ-9 Total Score 21 12    12 15       BERTHA-7 scores:      2023     4:10 PM 2023     1:58 PM 2023     6:51 AM   BERTHA-7 SCORE   Total Score 16 (severe anxiety) 19 (severe anxiety) 14 (moderate anxiety)   Total Score 16 19 14       Patient Identification:  Patient is a 58 year old,   White Not  or  female  who presents for return visit with me.  Patient is currently unemployed. Patient attended the phone/video session with , Wally.  Patient prefers to be called: \"Marbella\".    Interim History:  I last saw Marbella Hendrix for outpatient psychiatry return visit on 2023. During that appointment, we:    Continue Ambien/zolpidem 5 mg nightly as needed for insomnia. Continue to use sparingly. Do not take with " "Ativan/lorazepam.   Continue Effexor-XR/venlafaxine  mg daily for anxiety/mood.  Discontinue/Pause quetiapine/Seroquel 12.5-50 mg at bedtime as needed for sleep while you trial olanzapine. If you continue with olanzapine, discontinue quetiapine.  Start olanzapine/Zyprexa 2.5-5 mg at bedtime as needed for sleep. If not helpful, can return to quetiapine.       1/2: Patient overall doing quite well.  Has found olanzapine to be very helpful for anxiety and sleep.  Overall tolerating well except for some constipation.  Had a good holiday season.  Taking 5 mg of olanzapine nightly.  No abnormal involuntary movements noted.  No muscle stiffness.  No acute safety concerns.  No SI.  No problematic drug or alcohol use.    Per Rafaela Hassan [facility], during today's joint visit:   update: Things are going well.  On the 5th med for anxiety.  Really like it.  Anxiety feels manageable.  Freak out daily but not every moment.  Denies any panic attacks.  Feeling behind the \"8 ball\".  [place] due to feeling uncomfortable from constipation.  Denies any overwhelming feelings of depression, hopelessness, worthlessness, etc. .  Stresses:  Holidays were fun, sad its over.  Got to see a friend from out of town.  Arthritic hand worse in the cold notes she needs to be wearing her hand brace.  Going to [employer], just completed prior to appt.   Unemployed hasn't been looking due to transportation of child.  Would like to work.  Appetite: Denies  Sleep: Feeling pleased.  Sleep much improved. Having dreams.    Outpatient Provider updates: Therapist once a month Dixie Boykin, appt next week.    SI/SIB/HI: Denies  BRAD: Denies  Preg: n/a  Side effects/compliance: constipated, uncomfortable and bloated.  Interventions: [facility] supported on going anxiety reduction skill use  Most important: Constipation side effects     Past Psychiatric Med Trials:  Psych Meds at Intake:  Paxil 20 mg daily - started when 29 years, has been up and down on the dose, on 10 " mg dose mostly, last on 20 mg this past spring   Ambien 5 mg for insomnia - used for 4 nights  Ativan 0.5 mg for flying     Past Psych Meds:  Prozac for 1-2 months maybe when 29 yo    Psychiatric ROS:  Marbellaarslan Lawton Simeon reports mood has been: See HPI above  Anxiety has been: See HPI above  Sleep has been: Improved on olanzapine  Rachna sxs: None  Psychosis sxs: None  ADHD/ADD sxs: Up-and-down  PTSD sxs: NA  PHQ9 and GAD7 scores were reviewed today if completed.   Medication side effects: Constipation  Current stressors include: Symptoms and see HPI above  Coping mechanisms and supports include: Family and Hobbies    Current medications include:   Current Outpatient Medications   Medication Sig    albuterol (PROAIR HFA/PROVENTIL HFA/VENTOLIN HFA) 108 (90 Base) MCG/ACT inhaler Inhale 1-2 puffs into the lungs every 4 hours as needed for shortness of breath / dyspnea or wheezing    atorvastatin (LIPITOR) 20 MG tablet TAKE ONE TABLET BY MOUTH ONCE DAILY    calcium carbonate (OS-ARA) 500 MG tablet Take 1 tablet by mouth 2 times daily    levothyroxine (SYNTHROID/LEVOTHROID) 125 MCG tablet Take 1 tablet (125 mcg) by mouth daily    melatonin 5 MG tablet Take 5 mg by mouth nightly as needed for sleep    OLANZapine (ZYPREXA) 5 MG tablet Take 0.5-1 tablets (2.5-5 mg) by mouth nightly as needed (sleep)    Omega-3 Fatty Acids (OMEGA-3 FISH OIL PO) Take 1 g by mouth daily    venlafaxine (EFFEXOR XR) 150 MG 24 hr capsule Take 1 capsule (150 mg) by mouth daily    zolpidem (AMBIEN) 5 MG tablet Take 1 tablet (5 mg) by mouth nightly as needed for sleep     No current facility-administered medications for this visit.       The [place] Prescription Monitoring Program has been reviewed and there are no concerns about diversionary activity for controlled substances at this time.     Past Medical/Surgical History:  Past Medical History:   Diagnosis Date    Arthritis     My mom has it, my grandmother had it I have it    Diabetes (H)      My mom and brother have type II    Heavy menstrual period     Leiomyoma of uterus     s/p myomectomy    Mental disorder     anxiety    PONV (postoperative nausea and vomiting)     Surgical complication after  2002    Thyroid disease     Hypothroidism, 2nd half of     Uncomplicated asthma     My son has it, since he was born    Vesico-ureteral reflux     s/p repair      has a past medical history of Arthritis, Diabetes (H), Heavy menstrual period, Leiomyoma of uterus, Mental disorder, PONV (postoperative nausea and vomiting), Surgical complication (after  2002), Thyroid disease, Uncomplicated asthma, and Vesico-ureteral reflux.    She has no past medical history of Cancer (H), Cerebral infarction (H), Congestive heart failure (H), COPD (chronic obstructive pulmonary disease) (H), Depressive disorder, Heart disease, History of blood transfusion, or Hypertension.    Social History:  Reviewed. No changes to social history except as noted above in HPI.    Vital Signs:   None. This is phone/video visit.     Labs:  Most recent laboratory results reviewed and no new labs.     Review of Systems:  10 systems (general, cardiovascular, respiratory, eyes, ENT, endocrine, GI, , M/S, neurological) were reviewed. Most pertinent finding(s) is/are: Some chronic pain. The remaining systems are all unremarkable.    Mental Status Examination (limited as this is by phone/video):  Appearance: Awake, alert, appears stated age, no acute distress, well-groomed   Attitude:  cooperative  Motor: No gross abnormalities observed via video, not formally tested   Oriented to:  person, place, time, and situation  Attention Span and Concentration:  normal  Speech:  clear, coherent, regular rate, rhythm, and volume  Language: intact  Mood: Better  Affect:  mood congruent  Associations:  no loose associations  Thought Process:  logical, linear and goal oriented  Thought Content:  no evidence of acute suicidality or  homicidal ideation, no evidence of psychotic thought, no auditory hallucinations present and no visual hallucinations present  Recent and Remote Memory:  Intact to interview. Not formally assessed. No amnesia.  Fund of Knowledge: appropriate  Insight: Good  Judgment:  intact, adequate for safety  Impulse Control:  intact    Suicide Risk Assessment:  Today Marbella Hendrix reports passive thoughts of death but no acute suicidality or plan or intent to harm self-improved. Based on all available evidence including the factors cited above, Marbella Hendrix does not appear to be at imminent risk for self-harm, does not meet criteria for a 72-hr hold, and therefore remains appropriate for ongoing outpatient level of care.  A thorough assessment of risk factors related to suicide and self-harm have been reviewed and are noted above. The patient convincingly denies suicidality on several occasions. Local community safety resources reviewed for patient to use if needed. There was no deceit detected, and the patient presented in a manner that was believable.     DSM5 Diagnosis:  Attention-Deficit/Hyperactivity Disorder  314.01 (F90.9) Unspecified Attention -Deficit / Hyperactivity Disorder  Major Depressive Disorder, Recurrent Episode, In Partial Remission  300.02 (F41.1) Generalized Anxiety Disorder  Insomnia, unspecified    Medical comorbidities include:   Patient Active Problem List    Diagnosis Date Noted    Anxiety 07/19/2023     Priority: Medium    Adjustment disorder with anxious mood 06/27/2021     Priority: Medium    Family history of diabetes mellitus 06/27/2021     Priority: Medium    CMC DJD(carpometacarpal degenerative joint disease), localized primary, left 11/18/2020     Priority: Medium    Thumb pain, left 11/18/2020     Priority: Medium    Dermatochalasis of both upper eyelids 11/17/2020     Priority: Medium     Added automatically from request for surgery [id number]      Involutional ptosis, acquired,  bilateral 11/17/2020     Priority: Medium     Added automatically from request for surgery [id number]      Weakness of right hip 07/09/2020     Priority: Medium    Primary osteoarthritis of right hip 12/02/2019     Priority: Medium     Added automatically from request for surgery [id number]      Morbid obesity (H) 06/20/2018     Priority: Medium    Status post hysterectomy 08/02/2017     Priority: Medium    Sensation of plugged ear on both sides 07/12/2017     Priority: Medium    Acute post-operative pain 08/28/2015     Priority: Medium    Preventative health care 10/30/2012     Priority: Medium     Last pap NIL 2011; mammogram nl 2011; lipids abnl, needs annual f/u      Health Care Home 09/26/2012     Priority: Medium     Tier 1    DX V65.8 REPLACED WITH 97372 HEALTH CARE HOME (04/08/2013)      Generalized anxiety disorder 09/26/2012     Priority: Medium     H/o panic attacks; currently controlled with paxil      Hyperlipidemia LDL goal <130 09/26/2012     Priority: Medium     Behavioral measures - avoiding statins while trying to conceive; not a candidate for red yeast rice (interactions with thyroid meds)      Acquired hypothyroidism 09/26/2012     Priority: Medium    Overweight 09/26/2012     Priority: Medium     On exercise plan, has been in weight watchers  Problem list name updated by automated process. Provider to review         Psychosocial & Contextual Factors: see HPI above    Assessment:  From Intake, 7/17/2023:  Marbella Hendrix is a 57-year-old female with past psychiatric history including anxiety, ADHD, depression, remote polysubstance abuse (in remission for decades) who presents today for psychiatric evaluation.  Patient recently with increased psychosocial stressors and acutely worsening anxiety and insomnia.  Patient most recently on Paxil and Ambien to manage symptoms.  Ambien used sparingly and did help break severe insomnia cycle recently.  Patient currently taking 10 mg of Paxil and has only  ever been up to 20 mg historically.  Patient may be interested in a trial with something different than Paxil.  We also discussed further optimizing Paxil to 30 or 40 mg daily for more therapeutic trial before replacing the medication.  We did discuss Lexapro and Effexor-XR as possible alternatives.  Patient would like to discuss these options with her  who is an internist and her psychotherapist.  In the meantime, we discussed a trial with clonidine to help with sleep, anxiety, and ADHD symptoms.  Discussed risks and benefits of therapy.  Patient would like to move forward with a clonidine trial to help at bedtime as needed for sleep and a small dose during the day as needed for anxiety.  No acute safety concerns today.  No acute suicidality.  No problematic drug or alcohol use.       8/22/2023:  Patient with ongoing significant anxiety and mood related struggles.  Clonidine has not been helpful.  We will transition patient off paroxetine and onto venlafaxine.  We will continue to consider escitalopram as an option if venlafaxine ineffective or poorly tolerated.  Could also consider use of fluoxetine if paroxetine is difficult to discontinue.  Discussed DBT therapy as a potential option to help improve symptoms.  Resources sent in [place] message.  No acute suicidality.  No acute safety concerns.  No problematic drug or alcohol use.    10/3/2023:  Patient doing okay with transition to venlafaxine ER.  Has had some mild dizziness and headaches.  We will continue to optimize venlafaxine ER and patient will watch for worsening headaches and dizziness.  Her symptoms could be related to discontinuation of paroxetine.  Patient still struggling significantly in the evenings and with insomnia.  We will trial quetiapine at bedtime as needed for sleep.  Discussed risks and benefits of therapy including watching for any abnormal involuntary movements.  If patient continues on the medication, we will make sure to get  updated lipid panel and fasting glucose.  Could also consider mirtazapine as an option.  No acute safety concerns.  No acute suicidality.  No problematic drug or alcohol use.    11/14/2023:  Patient continuing to work with sleep medicine with pending at home sleep study results.  Quetiapine has been helpful for sleep at just 12.5 mg at bedtime.  Does cause some significant cognitive clouding during the day.  Patient does feel benefits outweigh risks but is willing to trial olanzapine to see if gets similar benefit without such heavy cognitive effects.  Venlafaxine has been helpful for mood and anxiety symptoms.  Still could consider mirtazapine as an option for sleep as needed.  No acute safety concerns.  No SI.  No problematic drug or alcohol use.    1/2/2024:  Patient currently doing quite well on olanzapine.  Finds it more helpful and better tolerated than quetiapine.  Discussed risks and benefits and side effects to watch out for.  Mild constipation-encouraged to utilize MiraLAX and/or Dulcolax.  No acute safety concerns.  No SI.  No problematic drug or alcohol use.  No abnormal involuntary movements.    Medication side effects and alternatives were reviewed. Health promotion activities recommended and reviewed today. All questions addressed. Education and counseling completed regarding risks and benefits of medications and psychotherapy options. Recommend therapy for additional support.     Treatment Plan:  Continue Ambien/zolpidem 5 mg nightly as needed for insomnia. Continue to use sparingly. Do not take with Ativan/lorazepam.   Continue Effexor-XR/venlafaxine  mg daily for anxiety/mood.  Continue olanzapine/Zyprexa 2.5-5 mg at bedtime as needed for sleep. If not helpful, can return to quetiapine.     Have fasting labs completed in about one month at any [facility] lab. Need to call your clinic ahead of time to schedule an appointment for lab due to limited hours and no walk-ins due to Covid-19  restrictions/changes.    Continue all other cares per primary care provider.   Continue all other medications as reviewed per electronic medical record today.   Safety plan reviewed. To the Emergency Department as needed or call after hours crisis line at [phone] or [phone]. [place] Crisis Text Line. Text [place] to 197642 or Suicide LifeLine Chat: suicidepreventionlifeline.org/chat  Consider DBT therapy.  Strongly recommend individual psychotherapy for additional support and ongoing development of nonpharmacologic coping skills and strategies.  Schedule an appointment with me in 3 months or sooner as needed. Call [facility] at [phone] to schedule.  Follow up with primary care provider as planned or for acute medical concerns.  Call the psychiatric nurse line with medication questions or concerns at [phone].  [place] may be used to communicate with your provider, but this is not intended to be used for emergencies.    Risks of benzodiazepine (Ativan, Xanax, Klonopin, Valium, etc) use including, but not limited to, sedation, tolerance, risk for addiction/dependence. Do not drink alcohol while taking benzodiazepines due to risk of trouble breathing and potential death. Do not drive or operate heavy machinery until it is known how the drug affects you. Discuss with physician or pharmacist before ever taking a benzodiazepine with a narcotic/opioid pain medication.     Book:   [facility]  By Ingris KIM DBT resources:  https://[place].gov/dhs/partners-and-providers/policies-procedures/adult-mental-health/dialectical-behavior-therapy/dbt-certified-providers/    Some [facility] DBT resources:  Edith Arango   [phone]   https://MetaNotesemdr.com/faqs/     Brian   [phone]   [email or web address]     [facility]   [phone]   https://www.mhs-dbt.com     [facility]   [phone]   [email or web address]      Administrative Billing:   Phone Call/Video Duration: 8 Minutes  Start: 11:40a  Stop: 11:48a    Patient Status:  Patient will continue to be seen for ongoing consultation and stabilization.    Signed:   Christi Lewis DO  [facility]    Disclaimer: This note consists of symbols derived from keyboarding, dictation and/or voice recognition software. As a result, there may be errors in the script that have gone undetected. Please consider this when interpreting information found in this chart.

## 2024-01-02 NOTE — NURSING NOTE
Is the patient currently in the state of [place]? YES    Visit mode:VIDEO    If the visit is dropped, the patient can be reconnected by: VIDEO VISIT: Text to cell phone:   Telephone Information:   Mobile [phone]       Will anyone else be joining the visit? NO  (If patient encounters technical issues they should call [phone])    How would you like to obtain your AVS? MyChart    Are changes needed to the allergy or medication list? No    Reason for visit: RECHECK    Dea LOVETT

## 2024-01-02 NOTE — NURSING NOTE
Is the patient currently in the state of [place]? YES    Visit mode:VIDEO    If the visit is dropped, the patient can be reconnected by: VIDEO VISIT: Text to cell phone:   Telephone Information:   Mobile [phone]       Will anyone else be joining the visit? NO  (If patient encounters technical issues they should call [phone])    How would you like to obtain your AVS? MyChart    Are changes needed to the allergy or medication list? No    Reason for visit: RECHECK    Dea LOVETT

## 2024-01-02 NOTE — PROGRESS NOTES
"Virtual Visit Details    Type of service:  Video Visit     Originating Location (pt. Location): {video visit patient location:309749::\"Home\"}  {PROVIDER LOCATION On-site should be selected for visits conducted from your clinic location or adjoining [facility] hospital, academic office, or other nearby [facility] building. Off-site should be selected for all other provider locations, including home:974728}  Distant Location (provider location):  {virtual location provider:236945}  Platform used for Video Visit: {Virtual Visit Platforms:197393::\"[employer]\"}  "

## 2024-01-02 NOTE — PATIENT INSTRUCTIONS
Treatment Plan:  Continue Ambien/zolpidem 5 mg nightly as needed for insomnia. Continue to use sparingly. Do not take with Ativan/lorazepam.   Continue Effexor-XR/venlafaxine  mg daily for anxiety/mood.  Continue olanzapine/Zyprexa 2.5-5 mg at bedtime as needed for sleep. If not helpful, can return to quetiapine.     Have fasting labs completed in about one month at any [facility] lab. Need to call your clinic ahead of time to schedule an appointment for lab due to limited hours and no walk-ins due to Covid-19 restrictions/changes.    Continue all other cares per primary care provider.   Continue all other medications as reviewed per electronic medical record today.   Safety plan reviewed. To the Emergency Department as needed or call after hours crisis line at [phone] or [phone]. [place] Crisis Text Line. Text [place] to 648138 or Suicide LifeLine Chat: suicidepreEnsysce Biosciencesline.org/chat  Consider DBT therapy.  Strongly recommend individual psychotherapy for additional support and ongoing development of nonpharmacologic coping skills and strategies.  Schedule an appointment with me in 3 months or sooner as needed. Call [facility] at [phone] to schedule.  Follow up with primary care provider as planned or for acute medical concerns.  Call the psychiatric nurse line with medication questions or concerns at [phone].  [employer] may be used to communicate with your provider, but this is not intended to be used for emergencies.    Risks of benzodiazepine (Ativan, Xanax, Klonopin, Valium, etc) use including, but not limited to, sedation, tolerance, risk for addiction/dependence. Do not drink alcohol while taking benzodiazepines due to risk of trouble breathing and potential death. Do not drive or operate heavy machinery until it is known how the drug affects you. Discuss with physician or pharmacist before ever taking a benzodiazepine with a narcotic/opioid pain medication.  "    Book:   Building a Life [facility]  By Ingr[employer] KIM DBT resources:  https://[place].gov/dhs/partners-and-providers/policies-procedures/adult-mental-health/dialectical-behavior-therapy/dbt-certified-providers/    Some [facility] DBT resources:  Edith Arango   [phone]   https://[employer]/faqs/     Brian   [phone]   https://[employer]     [facility]   [phone]   https://www.mhs-dbt.com     Jaime   [facility]   [phone]   https://dbtassEducabilia.Vdopia     Patient Education   Collaborative Care Psychiatry Service  What to Expect  Here's what to expect from your Collaborative Care Psychiatry Service (CCPS).   About CCPS  CCPS means 2 people work together to help you get better. You'll meet with a behavioral health clinician and a psychiatric doctor. A behavioral health clinician helps people with mental health problems by talking with them. A psychiatric doctor helps people by giving them medicine.  How it works  At every visit, you'll see the behavioral health clinician (BHC) first. They'll talk with you about how you're doing and teach you how to feel better.   Then you'll see the psychiatric doctor. This doctor can help you deal with troubling thoughts and feelings by giving you medicine. They'll make sure you know the plan for your care.   CCPS usually takes 3 to 6 visits. If you need more visits, we may have you start seeing a different psychiatric doctor for ongoing care.  If you have any questions or concerns, we'll be glad to talk with you.  About visits  Be open  At your visits, please talk openly about your problems. It may feel hard, but it's the best way for us to help you.  Cancelling visits  If you can't come to your visit, please call us right away at [phone]. If you don't cancel at least 24 hours (1 full day) before your visit, that's \"late cancellation.\"  Being late to visits  Being very late is the same as not showing up. You " "will be a \"no show\" if:  Your appointment starts with a C, and you're more than 15 minutes late for a 30-minute (half hour) visit. This will also cancel your appointment with the psychiatric doctor.  Your appointment is with a psychiatric doctor only, and you're more than 15 minutes late for a 30-minute (half hour) visit.  Your appointment is with a psychiatric doctor only, and you're more than 30 minutes late for a 60-minute (full hour) visit.  If you cancel late or don't show up 2 times within 6 months, we may end your care.   Getting help between visits  If you need help between visits, you can call us Monday to Friday from 8 a.m. to 4:30 p.m. at [phone].  Emergency care  Call 911 or go to the nearest emergency department if your life or someone else's life is in danger.  Call 988 anytime to reach the national Suicide and Crisis hotline.  Medicine refills  To refill your medicine, call your pharmacy. You can also call [facility]'s [facility] at [phone], Monday to Friday, 8 a.m. to 4:30 p.m. It can take 1 to 3 business days to get a refill.   Forms, letters, and tests  You may have papers to fill out, like FMLA, short-term disability, and workability. We can help you with these forms at your visits, but you must have an appointment. You may need more than 1 visit for this, to be in an intensive therapy program, or both.  Before we can give you medicine for ADHD, we may refer you to get tested for it or confirm it another way.  We may not be able to give you an emotional support animal letter.  We don't do mental health checks ordered by the court.   We don't do mental health testing, but we can refer you to get tested.   Thank you for choosing us for your care.  For informational purposes only. Not to replace the advice of your health care provider. Copyright   2022 [facility]. All rights reserved. [employer] 923846 - 12/22.       "

## 2024-01-22 DIAGNOSIS — E03.9 ACQUIRED HYPOTHYROIDISM: ICD-10-CM

## 2024-01-26 RX ORDER — LEVOTHYROXINE SODIUM 125 UG/1
125 TABLET ORAL DAILY
Qty: 90 TABLET | Refills: 1 | Status: SHIPPED | OUTPATIENT
Start: 2024-01-26 | End: 2024-06-07

## 2024-02-26 ENCOUNTER — MYC MEDICAL ADVICE (OUTPATIENT)
Dept: PSYCHIATRY | Facility: CLINIC | Age: 59
End: 2024-02-26
Payer: COMMERCIAL

## 2024-02-26 NOTE — TELEPHONE ENCOUNTER
Per chart review, last script was for (2) 90-day supplies of OLANZapine (ZYPREXA) 5 MG tablets.     OLANZapine (ZYPREXA) 5 MG tablet 90 tablet 1 11/29/2023 -- No   Sig - Route: Take 0.5-1 tablets (2.5-5 mg) by mouth nightly as needed (sleep) - Oral       Patient should have a refill available through [facility] order pharmacy.     RN notified patient via MyC that she should have a refill on file and that the pharmacy will send as soon as able.     Juliet Aguillon RN on 2/26/2024 at 12:16 PM

## 2024-03-01 ENCOUNTER — TRANSFERRED RECORDS (OUTPATIENT)
Dept: HEALTH INFORMATION MANAGEMENT | Facility: CLINIC | Age: 59
End: 2024-03-01
Payer: COMMERCIAL

## 2024-03-18 ASSESSMENT — SLEEP AND FATIGUE QUESTIONNAIRES
HOW LIKELY ARE YOU TO NOD OFF OR FALL ASLEEP WHILE SITTING AND READING: SLIGHT CHANCE OF DOZING
HOW LIKELY ARE YOU TO NOD OFF OR FALL ASLEEP WHILE SITTING QUIETLY AFTER LUNCH WITHOUT ALCOHOL: SLIGHT CHANCE OF DOZING
HOW LIKELY ARE YOU TO NOD OFF OR FALL ASLEEP IN A CAR, WHILE STOPPED FOR A FEW MINUTES IN TRAFFIC: WOULD NEVER DOZE
HOW LIKELY ARE YOU TO NOD OFF OR FALL ASLEEP WHILE WATCHING TV: SLIGHT CHANCE OF DOZING
HOW LIKELY ARE YOU TO NOD OFF OR FALL ASLEEP WHEN YOU ARE A PASSENGER IN A CAR FOR AN HOUR WITHOUT A BREAK: SLIGHT CHANCE OF DOZING
HOW LIKELY ARE YOU TO NOD OFF OR FALL ASLEEP WHILE LYING DOWN TO REST IN THE AFTERNOON WHEN CIRCUMSTANCES PERMIT: SLIGHT CHANCE OF DOZING
HOW LIKELY ARE YOU TO NOD OFF OR FALL ASLEEP WHILE SITTING INACTIVE IN A PUBLIC PLACE: SLIGHT CHANCE OF DOZING
HOW LIKELY ARE YOU TO NOD OFF OR FALL ASLEEP WHILE SITTING AND TALKING TO SOMEONE: SLIGHT CHANCE OF DOZING

## 2024-03-19 ENCOUNTER — MYC MEDICAL ADVICE (OUTPATIENT)
Dept: PSYCHIATRY | Facility: CLINIC | Age: 59
End: 2024-03-19
Payer: COMMERCIAL

## 2024-03-19 NOTE — TELEPHONE ENCOUNTER
"1) Pt MyC message -  \"Dr. Lewis. I talked with Wally and he thought I should write you. The last week intermittently I've had bad nights with sleep. The last two nights in a row were bad. It was the worst last night with severe anxiety. I've had to use ambien as a breakthrough medicine more than I wish to, 3 times over the last week. For the first time last night I took a second Olanzapine. Not sure what to do. I appreciate your ideas, thanks for your opinion on this when you have time.\"    2) 1/2/2024 treatment plan- (Next appt not until 4/8/2024)  1/2/2024:  Patient currently doing quite well on olanzapine.  Finds it more helpful and better tolerated than quetiapine.  Discussed risks and benefits and side effects to watch out for.  Mild constipation-encouraged to utilize MiraLAX and/or Dulcolax.  No acute safety concerns.  No SI.  No problematic drug or alcohol use.  No abnormal involuntary movements.     Medication side effects and alternatives were reviewed. Health promotion activities recommended and reviewed today. All questions addressed. Education and counseling completed regarding risks and benefits of medications and psychotherapy options. Recommend therapy for additional support.      Treatment Plan:  Continue Ambien/zolpidem 5 mg nightly as needed for insomnia. Continue to use sparingly. Do not take with Ativan/lorazepam.   Continue Effexor-XR/venlafaxine  mg daily for anxiety/mood.  Continue olanzapine/Zyprexa 2.5-5 mg at bedtime as needed for sleep. If not helpful, can return to quetiapine.     Have fasting labs completed in about one month at any [facility] lab. Need to call your clinic ahead of time to schedule an appointment for lab due to limited hours and no walk-ins due to Covid-19 restrictions/changes.    Continue all other cares per primary care provider.   Continue all other medications as reviewed per electronic medical record today.   Safety plan reviewed. To the Emergency " Department as needed or call after hours crisis line at [phone] or [phone]. [place] Crisis Text Line. Text [place] to 341735 or Suicide LifeLine Chat: suicidepreventionlifeline.org/chat  Consider DBT therapy.  Strongly recommend individual psychotherapy for additional support and ongoing development of nonpharmacologic coping skills and strategies.  Schedule an appointment with me in 3 months or sooner as needed. Call [facility] at [phone] to schedule.  Follow up with primary care provider as planned or for acute medical concerns.  Call the psychiatric nurse line with medication questions or concerns at [phone].  [employer] may be used to communicate with your provider, but this is not intended to be used for emergencies.    3) RN sent a MyC message to the pt inquiring about -  Is anything new going on? Any life changes, new stressors, etc that might be triggering your anxiety to increase?     Did taking 2 olanzapine help last night? Did it calm your anxiety and were you able to fall asleep?      4) RN forwarding information to Dr. Lewis for review.    Lydia Rollins RN on 3/19/2024 at 11:59 AM

## 2024-03-19 NOTE — TELEPHONE ENCOUNTER
"1) Pt response to RN follow up questions sent via IGG   \"'I'm not sure. I was crying for about an hour. I took a second Olanzapine and laid on the couch with one of our cats and napped a bit. Then I went back upstairs and slept at least 5 hours. There are  changes coming up over the next 4-6 months and some solo travel for a family event I'm not sure I'm up for (getting on a plane and then driving a couple hours into the [place] It very well could be all the changes on the horizon but for sure it's also being scared about all the antisemitism and hate going on as of late. I feel like climbing into a hermetically sealed cube until the war wraps up, the killings stop and the hostages are released. Thanks for reading this.       2) RN forwarding all information to Dr. Lewis for review and recommendation.    Lydia Rollins RN on 3/19/2024 at 12:43 PM  .        "

## 2024-03-24 NOTE — PROGRESS NOTES
Virtual Visit Details    Type of service:  Video Visit   Start Time: 3:04 PM   End Time: 3:24 PM    Originating Location (pt. Location): Home    Distant Location (provider location):  Off-site  Platform used for Video Visit: [employer]          Sleep Follow-Up Visit:    Date on this visit: 3/25/2024    Marbella Hendrix comes in today for follow-up of her sleep study done on 12/20/23. Marbella Hendrix was initially seen for I don't sleep enough. Therapist thought I should be seen for it.      HST Results:  Weight: 199 pounds  Analysis Time:  360 minutes   She took olanzapine but not zolpidem this night.    Respiration:   Sleep Associated Hypoxemia: sustained hypoxemia was not present. Baseline oxygen saturation was 98%.  Time with saturation less than or equal to 88% was 2.9 minutes. The lowest oxygen saturation was 82%.   Snoring: Snoring was present.  Respiratory events: The home study revealed a presence of 2 obstructive apneas and 1 mixed and central apneas. There were 31 hypopneas resulting in a combined apnea/hypopnea index [AHI] of 9.9 events per hour.  AHI was 32.2 per hour supine, 5.1 per hour prone, 10.8 per hour on left side, and 3.7 per hour on right side.   Pattern: Excluding events noted above, respiratory rate and pattern was Normal.     Position: Percent of time spent: supine - 5.7%, prone - 3.3%, on left - 26.3%, on right - 22.2%.     Heart Rate: By pulse oximetry normal rate was noted.         Assessment:   Mild obstructive sleep apnea.  Sleep associated hypoxemia was not present.  Of note, there was limited supine positioning on this test. Frequent sleep apnea events were seen during supine sleep.       She feels she did not sleep much at all this night.       Past medical/surgical history, family history, social history, medications and allergies were reviewed.      Problem List:  Patient Active Problem List    Diagnosis Date Noted    Anxiety 07/19/2023     Priority: Medium    Adjustment  disorder with anxious mood 06/27/2021     Priority: Medium    Family history of diabetes mellitus 06/27/2021     Priority: Medium    CMC DJD(carpometacarpal degenerative joint disease), localized primary, left 11/18/2020     Priority: Medium    Thumb pain, left 11/18/2020     Priority: Medium    Dermatochalasis of both upper eyelids 11/17/2020     Priority: Medium     Added automatically from request for surgery [id number]      Involutional ptosis, acquired, bilateral 11/17/2020     Priority: Medium     Added automatically from request for surgery [id number]      Weakness of right hip 07/09/2020     Priority: Medium    Primary osteoarthritis of right hip 12/02/2019     Priority: Medium     Added automatically from request for surgery [id number]      Morbid obesity (H) 06/20/2018     Priority: Medium    Status post hysterectomy 08/02/2017     Priority: Medium    Sensation of plugged ear on both sides 07/12/2017     Priority: Medium    Acute post-operative pain 08/28/2015     Priority: Medium    Preventative health care 10/30/2012     Priority: Medium     Last pap NIL 2011; mammogram nl 2011; lipids abnl, needs annual f/u      Health Care Home 09/26/2012     Priority: Medium     Tier 1    DX V65.8 REPLACED WITH 25040 HEALTH CARE HOME (04/08/2013)      Generalized anxiety disorder 09/26/2012     Priority: Medium     H/o panic attacks; currently controlled with paxil      Hyperlipidemia LDL goal <130 09/26/2012     Priority: Medium     Behavioral measures - avoiding statins while trying to conceive; not a candidate for red yeast rice (interactions with thyroid meds)      Acquired hypothyroidism 09/26/2012     Priority: Medium    Overweight 09/26/2012     Priority: Medium     On exercise plan, has been in weight watchers  Problem list name updated by automated process. Provider to review          Impression/Plan:    (G47.33) EVER (obstructive sleep apnea)  (primary encounter diagnosis)  Comment:  This study showed mild EVER but she  feels she did not sleep at all this night. She did not take her zolpidem that night. There was about an hour in the middle of the night where the oximetry recording was clearly not reading correctly. This was her second attempt at a home study and I think the results are not highly reliable.   Plan: Comprehensive Sleep Study        In lab PSG with zolpidem.     She will follow up with me as soon as possible after the study to review results.     24 minutes were spent on the date of the encounter doing chart review, history and exam, documentation and further activities as noted above.     Bennett Goltz, PA-C    CC:

## 2024-03-25 ENCOUNTER — VIRTUAL VISIT (OUTPATIENT)
Dept: SLEEP MEDICINE | Facility: CLINIC | Age: 59
End: 2024-03-25
Payer: COMMERCIAL

## 2024-03-25 ENCOUNTER — MYC REFILL (OUTPATIENT)
Dept: FAMILY MEDICINE | Facility: CLINIC | Age: 59
End: 2024-03-25

## 2024-03-25 VITALS — HEIGHT: 64 IN | BODY MASS INDEX: 37.05 KG/M2 | WEIGHT: 217 LBS

## 2024-03-25 DIAGNOSIS — E78.5 HYPERLIPIDEMIA LDL GOAL <130: ICD-10-CM

## 2024-03-25 DIAGNOSIS — G47.33 OSA (OBSTRUCTIVE SLEEP APNEA): Primary | ICD-10-CM

## 2024-03-25 PROCEDURE — 99213 OFFICE O/P EST LOW 20 MIN: CPT | Mod: 95 | Performed by: PHYSICIAN ASSISTANT

## 2024-03-25 RX ORDER — ATORVASTATIN CALCIUM 20 MG/1
20 TABLET, FILM COATED ORAL DAILY
Qty: 90 TABLET | Refills: 0 | Status: SHIPPED | OUTPATIENT
Start: 2024-03-25 | End: 2024-06-07

## 2024-03-25 NOTE — NURSING NOTE
Ibuprofen, Naproxen as needed as well as vitamin D daily   Is the patient currently in the state of [place]? YES    Visit mode:VIDEO    If the visit is dropped, the patient can be reconnected by: VIDEO VISIT: Text to cell phone:   Telephone Information:   Mobile [phone]       Will anyone else be joining the visit? NO  (If patient encounters technical issues they should call [phone])    How would you like to obtain your AVS? MyChart    Are changes needed to the allergy or medication list? Pt stated no med changes    Reason for visit: RECHECK    Daphnie Lee VVPHILL  Has patient had flu shot for current/most recent flu season? If so, when? Yes: 10.14.23

## 2024-04-02 NOTE — PROGRESS NOTES
[facility]  April 8, 2024      Behavioral Health Clinician Progress Note    Patient Name: Marbella Hendrix           Service Type:  Individual      Service Location:   MyChart / Email (patient reached)     Session Start Time: 1230pm  Session End Time: 1240pm      Session Length: 10min     Attendees: Client     Service Modality:  Video Visit:      Provider verified identity through the following two step process.  Patient provided:  Patient is known previously to provider    Telemedicine Visit: The patient's condition can be safely assessed and treated via synchronous audio and visual telemedicine encounter.      Reason for Telemedicine Visit: Services only offered telehealth    Originating Site (Patient Location): Patient's home    Distant Site (Provider Location): [facility]    Consent:  The patient/guardian has verbally consented to: the potential risks and benefits of telemedicine (video visit) versus in person care; bill my insurance or make self-payment for services provided; and responsibility for payment of non-covered services.     Patient would like the video invitation sent by:  My Chart    Mode of Communication:  Video Conference via [facility]    Distant Location (Provider):  On-site    As the provider I attest to compliance with applicable laws and regulations related to telemedicine.    Visit Activities (Refresh list every visit): [facility] Only    Diagnostic Assessment Date: 7/17/2023 MADELIN Patel, [facility]   Treatment Plan Review Date: N/a  See Flowsheets for today's PHQ-9 and BERTHA-7 results  Previous PHQ-9:       6/14/2023     8:18 AM 7/17/2023     7:40 AM 8/22/2023    11:12 AM   PHQ-9 SCORE   PHQ-9 Total Score Jamest 21 (Severe depression) 12 (Moderate depression) 15 (Moderately severe depression)   PHQ-9 Total Score 21 12    12 15     Previous BERTHA-7:       7/14/2023     4:10 PM 8/18/2023     1:58 PM  9/28/2023     6:51 AM   BERTHA-7 SCORE   Total Score 16 (severe anxiety) 19 (severe anxiety) 14 (moderate anxiety)   Total Score 16 19 14     The following assessments were completed by patient for this visit:  PHQ2:       4/8/2024    11:59 AM 3/25/2024     2:35 PM 1/1/2024     7:51 AM 8/22/2023    11:12 AM 7/19/2023     1:56 PM 6/14/2023     8:18 AM 6/30/2022    11:27 AM   PHQ-2 ( 1999 [employer])   Q1: Little interest or pleasure in doing things 0 1 0 2 1 3 1   Q2: Feeling down, depressed or hopeless 1 1 1 2 1 3 1   PHQ-2 Score 1    1 2 1    1 4 2 6 2   Q1: Little interest or pleasure in doing things Not at all  Not at all More than half the days Several days Nearly every day Several days   Q2: Feeling down, depressed or hopeless Several days  Several days More than half the days Several days Nearly every day Several days   PHQ-2 Score 1  1 4 2 6 2     GAD2:       7/14/2023     4:23 PM 8/18/2023     1:58 PM 9/28/2023     6:51 AM 11/7/2023     4:23 PM 12/26/2023    12:43 PM 4/2/2024     7:20 AM   BERTHA-2   Feeling nervous, anxious, or on edge 3 2 2 1 1 1   Not being able to stop or control worrying 3 2 2 1 1 1   BERTHA-2 Total Score 6 4 4 2 2 2    2       YEVGENIY LEVEL:    DATA  Extended Session (60+ minutes): No  Interactive Complexity: No  Crisis: No  [facility] Patient: No    Treatment Objective(s) Addressed in This Session:  Target Behavior(s): disease management/lifestyle changes reduce sx of anxiety    Anxiety: will experience a reduction in anxiety      Current Stressors / Issues:  MH update:  Sx feel stable.  No acute depression/anxiety/panic.  Stresses:  Lots of stress with kiddos/spouse  Appetite: Weight gain-med side constantly hungry  Sleep: Denies  Outpatient Provider updates:   Dixie Boykin- taking a break.  Got a list of new therapist  SI/SIB/HI: Denies  BRAD: Denies  Side effects/compliance: N/a  Interventions:  [facility] offered space and validation for any concerns  Most important:  Feels stable.      1/2  MH update: Things  "are going well.  On the 5th med for anxiety.  Really like it.  Anxiety feels manageable.  Freak out daily but not every moment.  Denies any panic attacks.  Feeling behind the \"8 ball\".  [place] due to feeling uncomfortable from constipation.  Denies any overwhelming feelings of depression, hopelessness, worthlessness, etc. .  Stresses:  Holidays were fun, sad its over.  Got to see a friend from out of town.  Arthritic hand worse in the cold notes she needs to be wearing her hand brace.  Going to [employer], just completed prior to appt.   Unemployed hasn't been looking due to transportation of child.  Would like to work.  Appetite: Denies  Sleep: Feeling pleased.  Sleep much improved. Having dreams.    Outpatient Provider updates: Therapist once a month Dixie Boykin, appt next week.    SI/SIB/HI: Denies  BRAD: Denies  Preg: n/a  Side effects/compliance: constipated, uncomfortable and bloated.  Interventions: [facility] supported on going anxiety reduction skill use  Most important: Constipation side effects       Reason for CCPS: Pt says that they had \"bad anxiety\" since mid to late 20s. Pt has been on the same medication and dose since then. In April it became crippling so pt wasn't able to function and had heavy breathing. Pt slept for a few hours and then next night was up the whole night. Pt has a lot of things that occurred during this time since bad stuff has been happening. Pt didn't go to bed last night. They changed bedrooms. This sleep concern started in April. Pt went out of town and this helped a bit.   The thoughts were frightening in their head. Pt had intrusive thoughts. Pt doesn't have these thoughts anymore. Pt had a senior taking care of pass away.   Sleep study: pt attempted this in the middle of many life events, pt rescheduled it for fall    Pt denies any restless legs. Pt will have anxiety before bed. It is painful, not sure if I will sleep. Haven't had this for a month.   Depression: feeling overwhelmed " with tasks in the day and are unable to see any positivity, not functioning well, making mistakes   Hope to: find something to help with anxiety so they don't have a crippling bout and to be able to manage    Progress on Treatment Objective(s) / Homework:  New Objective established this session - ACTION (Actively working towards change); Intervened by reinforcing change plan / affirming steps taken    Motivational Interviewing    MI Intervention: Co-Developed Goal: reduce on going sx of anxiety, Expressed Empathy/Understanding, Open-ended questions, and Reflections: simple and complex     Change Talk Expressed by the Patient: Desire to change Ability to change Activation Taking steps    Provider Response to Change Talk: A - Affirmed patient's thoughts, decisions, or attempts at behavior change and R - Reflected patient's change talk    Assessments completed prior to visit:    The following assessments were completed by patient for this visit:  [place] Suicide Severity Rating Scale (Lifetime/Recent)      7/17/2023     9:35 AM 1/2/2024    11:33 AM   [place] Suicide Severity Rating (Lifetime/Recent)   Q1 Wish to be Dead (Lifetime) Y N   Wish to be Dead Description (Lifetime) Pt reports having thoughts and never acted on them. They report the last time was May or June 2023.    Q2 Non-Specific Active Suicidal Thoughts (Lifetime) Y N   Non-Specific Active Suicidal Thought Description (Lifetime) Pt reports that they had a very stressful time with many life evetns in April and this impacted these thoughts.    2. Non-Specific Active Suicidal Thoughts (Past 1 Month) N    3. Active Suicidal Ideation with any Methods (Not Plan) Without Intent to Act (Lifetime) N    Q4 Active Suicidal Ideation with Some Intent to Act, Without Specific Plan (Lifetime) N    Q5 Active Suicidal Ideation with Specific Plan and Intent (Lifetime) N    Most Severe Ideation Rating (Lifetime) 2    Description of Most Severe Ideation (Lifetime) Pt  reports having intrusive thoughts while driving and reports that they did not act on these and have their children and will never act on them.    Frequency (Lifetime) 3    Duration (Lifetime) 1    Controllability (Lifetime) 1    Deterrents (Lifetime) 1    Reasons for Ideation (Lifetime) 0    Actual Attempt (Lifetime) N N   Has subject engaged in non-suicidal self-injurious behavior? (Lifetime) N N   Interrupted Attempts (Lifetime) N N   Aborted or Self-Interrupted Attempt (Lifetime) N N   Preparatory Acts or Behavior (Lifetime) N N   Calculated C-SSRS Risk Score (Lifetime/Recent) No Risk Indicated No Risk Indicated       Care Plan review completed: Yes    Medication Review:  Changes to psychiatric medications, see updated Medication List in EPIC.     Medication Compliance:  Yes    Changes in Health Issues:   None reported    Chemical Use Review:   Substance Use: Chemical use reviewed, no active concerns identified      Tobacco Use: No current tobacco use.      Assessment: Current Emotional / Mental Status (status of significant symptoms):  Risk status (Self / Other harm or suicidal ideation)  Patient has had a history of suicidal ideation: reports a hx of ideation without specific planning/intent/action back in 2022  Patient denies current fears or concerns for personal safety.  Patient denies current or recent suicidal ideation or behaviors.  Patient denies current or recent homicidal ideation or behaviors.  Patient denies current or recent self injurious behavior or ideation.  Patient denies other safety concerns.  A safety and risk management plan has not been developed at this time, however patient was encouraged to call [facility] should there be a change in any of these risk factors.    Appearance:   Appropriate   Eye Contact:   Good   Psychomotor Behavior: Normal   Attitude:   Cooperative   Orientation:   All  Speech   Rate / Production: Normal    Volume:  Normal   Mood:    Normal    Affect:    Appropriate   Thought Content:  Clear   Thought Form:  Coherent  Logical   Insight:    Good     Diagnoses:  1. Generalized anxiety disorder    2. Mild episode of recurrent major depressive disorder (H24)          Collateral Reports Completed:  Communicated with: Dr Lewis    Plan: (Homework, other):  Patient was given information about behavioral services and encouraged to schedule a follow up appointment with the clinic [facility] as needed.  She was also given information about mental health symptoms and treatment options .  CD Recommendations: No indications of CD issues.     Rafaela Hassan [facility]    ______________________________________________________________________

## 2024-04-08 ENCOUNTER — VIRTUAL VISIT (OUTPATIENT)
Dept: PSYCHIATRY | Facility: CLINIC | Age: 59
End: 2024-04-08
Payer: COMMERCIAL

## 2024-04-08 ENCOUNTER — VIRTUAL VISIT (OUTPATIENT)
Dept: BEHAVIORAL HEALTH | Facility: CLINIC | Age: 59
End: 2024-04-08
Payer: COMMERCIAL

## 2024-04-08 DIAGNOSIS — Z79.899 ENCOUNTER FOR LONG-TERM (CURRENT) USE OF MEDICATIONS: Primary | ICD-10-CM

## 2024-04-08 DIAGNOSIS — R63.5 WEIGHT GAIN: ICD-10-CM

## 2024-04-08 DIAGNOSIS — Z91.89 AT RISK FOR ALTERED GLUCOSE METABOLISM: ICD-10-CM

## 2024-04-08 DIAGNOSIS — F41.1 GENERALIZED ANXIETY DISORDER: Primary | ICD-10-CM

## 2024-04-08 DIAGNOSIS — F33.41 RECURRENT MAJOR DEPRESSIVE DISORDER, IN PARTIAL REMISSION (H): ICD-10-CM

## 2024-04-08 DIAGNOSIS — F90.9 ATTENTION DEFICIT HYPERACTIVITY DISORDER (ADHD), UNSPECIFIED ADHD TYPE: ICD-10-CM

## 2024-04-08 DIAGNOSIS — F41.1 GENERALIZED ANXIETY DISORDER: ICD-10-CM

## 2024-04-08 DIAGNOSIS — F33.0 MILD EPISODE OF RECURRENT MAJOR DEPRESSIVE DISORDER (H): ICD-10-CM

## 2024-04-08 DIAGNOSIS — G47.09 OTHER INSOMNIA: ICD-10-CM

## 2024-04-08 DIAGNOSIS — R73.09 ELEVATED HEMOGLOBIN A1C: ICD-10-CM

## 2024-04-08 PROCEDURE — 99214 OFFICE O/P EST MOD 30 MIN: CPT | Mod: 95 | Performed by: PSYCHIATRY & NEUROLOGY

## 2024-04-08 PROCEDURE — 99207 PR NO BILLABLE SERVICE THIS VISIT: CPT | Performed by: COUNSELOR

## 2024-04-08 RX ORDER — VENLAFAXINE HYDROCHLORIDE 150 MG/1
150 CAPSULE, EXTENDED RELEASE ORAL DAILY
Qty: 90 CAPSULE | Refills: 1 | Status: SHIPPED | OUTPATIENT
Start: 2024-04-08 | End: 2024-06-07

## 2024-04-08 RX ORDER — OLANZAPINE 5 MG/1
2.5-5 TABLET ORAL
Qty: 90 TABLET | Refills: 1 | Status: SHIPPED | OUTPATIENT
Start: 2024-04-08 | End: 2024-06-07

## 2024-04-08 RX ORDER — METFORMIN HCL 500 MG
1000 TABLET, EXTENDED RELEASE 24 HR ORAL
Qty: 180 TABLET | Refills: 1 | Status: SHIPPED | OUTPATIENT
Start: 2024-04-08 | End: 2024-06-07

## 2024-04-08 NOTE — PROGRESS NOTES
"Telemedicine Visit: The patient's condition can be safely assessed and treated via synchronous audio and visual telemedicine encounter.      Reason for Telemedicine Visit: Patient has requested telehealth visit    Originating Site (Patient Location): Patient's home    Distant Location (provider location):  On-Site    Consent:  The patient/guardian has verbally consented to: the potential risks and benefits of telemedicine (video visit) versus in person care; bill my insurance or make self-payment for services provided; and responsibility for payment of non-covered services.     Mode of Communication:  Video Conference via [employer]    As the provider I attest to compliance with applicable laws and regulations related to telemedicine.          Outpatient Psychiatric Progress Note    Name: Marbella Hendrix   : 1965                    Primary Care Provider: Vanessa Ladd MD   Therapist: Therapist once a month Dixie Boykin       PHQ-9 scores:      2023     8:18 AM 2023     7:40 AM 2023    11:12 AM   PHQ-9 SCORE   PHQ-9 Total Score MyChart 21 (Severe depression) 12 (Moderate depression) 15 (Moderately severe depression)   PHQ-9 Total Score 21 12    12 15       BERTHA-7 scores:      2023     4:10 PM 2023     1:58 PM 2023     6:51 AM   BERTHA-7 SCORE   Total Score 16 (severe anxiety) 19 (severe anxiety) 14 (moderate anxiety)   Total Score 16 19 14       Patient Identification:  Patient is a 58 year old,   White Not  or  female  who presents for return visit with me.  Patient is currently unemployed. Patient attended the phone/video session with , Wally.  Patient prefers to be called: \"Marbella\".    Interim History:  I last saw Marbella Hendrix for outpatient psychiatry return visit on 2024. During that appointment, we:    Continue Ambien/zolpidem 5 mg nightly as needed for insomnia. Continue to use sparingly. Do not take with Ativan/lorazepam. "   Continue Effexor-XR/venlafaxine  mg daily for anxiety/mood.  Continue olanzapine/Zyprexa 2.5-5 mg at bedtime as needed for sleep. If not helpful, can return to quetiapine.     Have fasting labs completed in about one month at any [facility] lab. Need to call your clinic ahead of time to schedule an appointment for lab due to limited hours and no walk-ins due to Covid-19 restrictions/changes.    Continue all other cares per primary care provider.     4/8: Overall patient reports doing quite well.  Symptoms seem stable.  Continues to sleep well.  Taking 1 tablet (5 mg) of olanzapine nightly for sleep.  Unfortunately has had some weight gain.  Recently 217 pounds and patient reports typically maintaining between 195-205.  Patient does report being a part of [employer] and feels like has typically been able to maintain a fairly stable weight for many years up until more recently.  Has been watching her diet much more carefully since March 1.  Not sure if she has lost weight or not since then since does not own a scale.  No acute safety concerns.  No SI.  No problematic drug or alcohol use.    Per [facility], Rafaela [facility], during today's team-based visit:  MH update:  Sx feel stable.  No acute depression/anxiety/panic.  Stresses:  Lots of stress with kiddos/spouse  Appetite: Weight gain-med side constantly hungry  Sleep: Denies  Outpatient Provider updates:   Dixie Boykin- taking a break.  Got a list of new therapist  SI/SIB/HI: Denies  BRAD: Denies  Side effects/compliance: N/a  Interventions:  [facility] offered space and validation for any concerns  Most important:  Feels stable.    Past Psychiatric Med Trials:  Psych Meds at Intake:  Paxil 20 mg daily - started when 29 years, has been up and down on the dose, on 10 mg dose mostly, last on 20 mg this past spring   Ambien 5 mg for insomnia - used for 4 nights  Ativan 0.5 mg for flying     Past Psych Meds:  Prozac for 1-2 months maybe when 28  yo    Psychiatric ROS:  Marbella Hendrix reports mood has been: See HPI above  Anxiety has been: See HPI above  Sleep has been: Improved on olanzapine  Rachna sxs: None  Psychosis sxs: None  ADHD/ADD sxs: Up-and-down  PTSD sxs: NA  PHQ9 and GAD7 scores were reviewed today if completed.   Medication side effects: Weight gain, increased appetite  Current stressors include: Symptoms and see HPI above  Coping mechanisms and supports include: Family and Hobbies    Current medications include:   Current Outpatient Medications   Medication Sig Dispense Refill    albuterol (PROAIR HFA/PROVENTIL HFA/VENTOLIN HFA) 108 (90 Base) MCG/ACT inhaler Inhale 1-2 puffs into the lungs every 4 hours as needed for shortness of breath / dyspnea or wheezing (Patient not taking: Reported on 3/25/2024) 6.7 g 0    atorvastatin (LIPITOR) 20 MG tablet Take 1 tablet (20 mg) by mouth daily 90 tablet 0    calcium carbonate (OS-ARA) 500 MG tablet Take 1 tablet by mouth 2 times daily      levothyroxine (SYNTHROID/LEVOTHROID) 125 MCG tablet Take 1 tablet (125 mcg) by mouth daily 90 tablet 1    melatonin 5 MG tablet Take 5 mg by mouth nightly as needed for sleep      OLANZapine (ZYPREXA) 5 MG tablet Take 0.5-1 tablets (2.5-5 mg) by mouth nightly as needed (sleep) 90 tablet 1    Omega-3 Fatty Acids (OMEGA-3 FISH OIL PO) Take 1 g by mouth daily      venlafaxine (EFFEXOR XR) 150 MG 24 hr capsule Take 1 capsule (150 mg) by mouth daily 90 capsule 1    zolpidem (AMBIEN) 5 MG tablet Take 1 tablet (5 mg) by mouth nightly as needed for sleep 20 tablet 0     No current facility-administered medications for this visit.       The [place] Prescription Monitoring Program has been reviewed and there are no concerns about diversionary activity for controlled substances at this time.   08/24/2023 08/22/2023 1 Lorazepam 0.5 Mg Tablet 10.00 3 Al Bau [id number] Juan (7932) 0/0 1.67 [place]   07/30/2023 07/23/2023 1 Zolpidem Tartrate 5 Mg Tablet 20.00 20 Li Swe  [id number] Sup (7778) 0/0 0.25 [place]   2023 1 Zolpidem Tartrate 5 Mg Tablet 20.00 20 Li Swe [id number] Sup (7778) 0/0 0.25 [place]       Past Medical/Surgical History:  Past Medical History:   Diagnosis Date    Arthritis     My mom has it, my grandmother had it I have it    Diabetes (H)     My mom and brother have type II    Heavy menstrual period     Leiomyoma of uterus     s/p myomectomy    Mental disorder     anxiety    PONV (postoperative nausea and vomiting)     Surgical complication after  2002    Thyroid disease     Hypothroidism, 2nd half of     Uncomplicated asthma     My son has it, since he was born    Vesico-ureteral reflux     s/p repair      has a past medical history of Arthritis, Diabetes (H), Heavy menstrual period, Leiomyoma of uterus, Mental disorder, PONV (postoperative nausea and vomiting), Surgical complication (after  2002), Thyroid disease, Uncomplicated asthma, and Vesico-ureteral reflux.    She has no past medical history of Cancer (H), Cerebral infarction (H), Congestive heart failure (H), COPD (chronic obstructive pulmonary disease) (H), Depressive disorder, Heart disease, History of blood transfusion, or Hypertension.    Social History:  Reviewed. No changes to social history except as noted above in HPI.    Vital Signs:   None. This is phone/video visit.     Labs:  Most recent laboratory results reviewed and no new labs.     Review of Systems:  10 systems (general, cardiovascular, respiratory, eyes, ENT, endocrine, GI, , M/S, neurological) were reviewed. Most pertinent finding(s) is/are: Some chronic pain. The remaining systems are all unremarkable.    Mental Status Examination (limited as this is by phone/video):  Appearance: Awake, alert, appears stated age, no acute distress, well-groomed   Attitude:  cooperative  Motor: No gross abnormalities observed via video, not formally tested   Oriented to:  person, place, time,  and situation  Attention Span and Concentration:  normal  Speech:  clear, coherent, regular rate, rhythm, and volume  Language: intact  Mood: pretty good  Affect:  mood congruent  Associations:  no loose associations  Thought Process:  logical, linear and goal oriented  Thought Content:  no evidence of acute suicidality or homicidal ideation, no evidence of psychotic thought, no auditory hallucinations present and no visual hallucinations present  Recent and Remote Memory:  Intact to interview. Not formally assessed. No amnesia.  Fund of Knowledge: appropriate  Insight: Good  Judgment:  intact, adequate for safety  Impulse Control:  intact    Suicide Risk Assessment:  Today Marbella Hendrix has recently reported passive thoughts of death but no acute suicidality or plan or intent to harm self- much improved. Based on all available evidence including the factors cited above, Marbella Hendrix does not appear to be at imminent risk for self-harm, does not meet criteria for a 72-hr hold, and therefore remains appropriate for ongoing outpatient level of care.  A thorough assessment of risk factors related to suicide and self-harm have been reviewed and are noted above. The patient convincingly denies suicidality on several occasions. Local community safety resources reviewed for patient to use if needed. There was no deceit detected, and the patient presented in a manner that was believable.     DSM5 Diagnosis:  Attention-Deficit/Hyperactivity Disorder  314.01 (F90.9) Unspecified Attention -Deficit / Hyperactivity Disorder  Major Depressive Disorder, Recurrent Episode, In Partial Remission  300.02 (F41.1) Generalized Anxiety Disorder  Insomnia, unspecified    Medical comorbidities include:   Patient Active Problem List    Diagnosis Date Noted    Anxiety 07/19/2023     Priority: Medium    Adjustment disorder with anxious mood 06/27/2021     Priority: Medium    Family history of diabetes mellitus 06/27/2021      Priority: Medium    CMC DJD(carpometacarpal degenerative joint disease), localized primary, left 11/18/2020     Priority: Medium    Thumb pain, left 11/18/2020     Priority: Medium    Dermatochalasis of both upper eyelids 11/17/2020     Priority: Medium     Added automatically from request for surgery [id number]      Involutional ptosis, acquired, bilateral 11/17/2020     Priority: Medium     Added automatically from request for surgery [id number]      Weakness of right hip 07/09/2020     Priority: Medium    Primary osteoarthritis of right hip 12/02/2019     Priority: Medium     Added automatically from request for surgery [id number]      Morbid obesity (H) 06/20/2018     Priority: Medium    Status post hysterectomy 08/02/2017     Priority: Medium    Sensation of plugged ear on both sides 07/12/2017     Priority: Medium    Acute post-operative pain 08/28/2015     Priority: Medium    Preventative health care 10/30/2012     Priority: Medium     Last pap NIL 2011; mammogram nl 2011; lipids abnl, needs annual f/u      Health Care Home 09/26/2012     Priority: Medium     Tier 1    DX V65.8 REPLACED WITH 95769 HEALTH CARE HOME (04/08/2013)      Generalized anxiety disorder 09/26/2012     Priority: Medium     H/o panic attacks; currently controlled with paxil      Hyperlipidemia LDL goal <130 09/26/2012     Priority: Medium     Behavioral measures - avoiding statins while trying to conceive; not a candidate for red yeast rice (interactions with thyroid meds)      Acquired hypothyroidism 09/26/2012     Priority: Medium    Overweight 09/26/2012     Priority: Medium     On exercise plan, has been in weight watchers  Problem list name updated by automated process. Provider to review         Psychosocial & Contextual Factors: see HPI above    Assessment:  From Intake, 7/17/2023:  Marbella Hendrix is a 57-year-old female with past psychiatric history including anxiety, ADHD, depression, remote polysubstance abuse (in remission for  decades) who presents today for psychiatric evaluation.  Patient recently with increased psychosocial stressors and acutely worsening anxiety and insomnia.  Patient most recently on Paxil and Ambien to manage symptoms.  Ambien used sparingly and did help break severe insomnia cycle recently.  Patient currently taking 10 mg of Paxil and has only ever been up to 20 mg historically.  Patient may be interested in a trial with something different than Paxil.  We also discussed further optimizing Paxil to 30 or 40 mg daily for more therapeutic trial before replacing the medication.  We did discuss Lexapro and Effexor-XR as possible alternatives.  Patient would like to discuss these options with her  who is an internist and her psychotherapist.  In the meantime, we discussed a trial with clonidine to help with sleep, anxiety, and ADHD symptoms.  Discussed risks and benefits of therapy.  Patient would like to move forward with a clonidine trial to help at bedtime as needed for sleep and a small dose during the day as needed for anxiety.  No acute safety concerns today.  No acute suicidality.  No problematic drug or alcohol use.       8/22/2023:  Patient with ongoing significant anxiety and mood related struggles.  Clonidine has not been helpful.  We will transition patient off paroxetine and onto venlafaxine.  We will continue to consider escitalopram as an option if venlafaxine ineffective or poorly tolerated.  Could also consider use of fluoxetine if paroxetine is difficult to discontinue.  Discussed DBT therapy as a potential option to help improve symptoms.  Resources sent in Letao message.  No acute suicidality.  No acute safety concerns.  No problematic drug or alcohol use.    10/3/2023:  Patient doing okay with transition to venlafaxine ER.  Has had some mild dizziness and headaches.  We will continue to optimize venlafaxine ER and patient will watch for worsening headaches and dizziness.  Her symptoms could  be related to discontinuation of paroxetine.  Patient still struggling significantly in the evenings and with insomnia.  We will trial quetiapine at bedtime as needed for sleep.  Discussed risks and benefits of therapy including watching for any abnormal involuntary movements.  If patient continues on the medication, we will make sure to get updated lipid panel and fasting glucose.  Could also consider mirtazapine as an option.  No acute safety concerns.  No acute suicidality.  No problematic drug or alcohol use.    11/14/2023:  Patient continuing to work with sleep medicine with pending at home sleep study results.  Quetiapine has been helpful for sleep at just 12.5 mg at bedtime.  Does cause some significant cognitive clouding during the day.  Patient does feel benefits outweigh risks but is willing to trial olanzapine to see if gets similar benefit without such heavy cognitive effects.  Venlafaxine has been helpful for mood and anxiety symptoms.  Still could consider mirtazapine as an option for sleep as needed.  No acute safety concerns.  No SI.  No problematic drug or alcohol use.    1/2/2024:  Patient currently doing quite well on olanzapine.  Finds it more helpful and better tolerated than quetiapine.  Discussed risks and benefits and side effects to watch out for.  Mild constipation-encouraged to utilize MiraLAX and/or Dulcolax.  No acute safety concerns.  No SI.  No problematic drug or alcohol use.  No abnormal involuntary movements.    4/8/2024:  Apart from some of the metabolic effects of olanzapine, patient otherwise quite stable.  Patient opts to try to address metabolic side effects of olanzapine with metformin.  Discussed there is evidence to support use of metformin to help treat and also to help decrease risk for metabolic syndrome from the medication use.  Discussed risks and benefits of metformin use.  Due to stability of mental health symptoms, psychiatric care be returned back to primary care  provider.  Also encouraged primary care provider to continue to monitor metabolic status and need for metformin.  Patient continues to watch diet and stay active.  Updated labs ordered for primary care provider for baseline since starting metformin XR.  Last liver enzymes looked okay when checked in June 2023.  Patient also denies any abnormal involuntary movements.  No acute safety concerns.  No SI.  No problematic drug or alcohol use.    Medication side effects and alternatives were reviewed. Health promotion activities recommended and reviewed today. All questions addressed. Education and counseling completed regarding risks and benefits of medications and psychotherapy options. Recommend therapy for additional support.     Treatment Plan:  Continue Ambien/zolpidem 5 mg nightly as needed for insomnia. Continue to use sparingly. Do not take with Ativan/lorazepam.   Continue Effexor-XR/venlafaxine  mg daily for anxiety/mood.  Continue olanzapine/Zyprexa 2.5-5 mg at bedtime as needed for sleep.  Start Metformin XR for metabolic side effects of olanzapine (weight gain, increased hunger, and hx of slightly elevated HgbA1c): take 500 mg daily WITH dinner and then can increase to 1000 mg after 1-2 weeks as tolerated.   Have fasting labs completed in about one week at any [facility] lab. Need to call your clinic ahead of time to schedule an appointment for lab due to limited hours and no walk-ins due to Covid-19 restrictions/changes.    Your psychiatric care is being returned back to your primary care provider for ongoing management.  Please reach out to your primary care provider with any questions or concerns about your mental health or mental health medications.  Continue all other cares per primary care provider.   Continue all other medications as reviewed per electronic medical record today.   Safety plan reviewed. To the Emergency Department as needed or call after hours crisis line at [phone] or  [phone]. [place] Crisis Text Line. Text [place] to 450829 or Suicide LifeLine Chat: suicidepreventionlifeline.org/chat  Consider DBT therapy.  Strongly recommend individual psychotherapy for additional support and ongoing development of nonpharmacologic coping skills and strategies.  Follow up with primary care provider as planned or for acute medical concerns.  MyChart may be used to communicate with your provider, but this is not intended to be used for emergencies.    Thank you for our work together in the Psychiatry Collaborative Care Model at [facility]. This is our last visit and I am returning your care back to your Primary Care Provider Vanessa Ladd MD . If you are not doing well, please contact your Primary Care Provider office.      Risks of benzodiazepine (Ativan, Xanax, Klonopin, Valium, etc) use including, but not limited to, sedation, tolerance, risk for addiction/dependence. Do not drink alcohol while taking benzodiazepines due to risk of trouble breathing and potential death. Do not drive or operate heavy machinery until it is known how the drug affects you. Discuss with physician or pharmacist before ever taking a benzodiazepine with a narcotic/opioid pain medication.     Book:   [facility]  By Ingris KIM DBT resources:  https://[place].gov[place]dhs/partners-and-providers/policies-procedures/adult-mental-health/dialectical-behavior-therapy/dbt-certified-providers/    [facility] DBT resources:  Edith Arango   [phone]   [email or web address].[email or web address]/faqs/     Brian   [phone]   [email or web address]     [facility]   [phone]   [email or web address]-[email or web address]     [facility]   [phone]   [email or web address]     Administrative Billing:   Phone Call/Video Duration: 17 Minutes  Start: 12:57p  Stop: 1:14p    Patient Status:  The patient is being returned to the referring provider for ongoing care and medication  prescribing.  The patient can be re-referred back to this service for further consultation if needed in the future.    Episode of Care #: 6    Signed:   Christi Lewis DO  [facility]    Disclaimer: This note consists of symbols derived from keyboarding, dictation and/or voice recognition software. As a result, there may be errors in the script that have gone undetected. Please consider this when interpreting information found in this chart.

## 2024-04-08 NOTE — NURSING NOTE
Is the patient currently in the state of [place]? YES    Visit mode:VIDEO    If the visit is dropped, the patient can be reconnected by: VIDEO VISIT: Send to e-mail at: [email or web address]    Will anyone else be joining the visit? NO  (If patient encounters technical issues they should call [phone])    How would you like to obtain your AVS? MyChart    Are changes needed to the allergy or medication list? No    Are refills needed on medications prescribed by this physician? NO    Reason for visit: RECHECK    Dea LOVETT

## 2024-04-08 NOTE — PROGRESS NOTES
"Virtual Visit Details    Type of service:  Video Visit     Originating Location (pt. Location): {video visit patient location:577600::\"Home\"}  {PROVIDER LOCATION On-site should be selected for visits conducted from your clinic location or adjoining [facility] hospital, academic office, or other nearby [facility] building. Off-site should be selected for all other provider locations, including home:639235}  Distant Location (provider location):  {virtual location provider:057408}  Platform used for Video Visit: {Virtual Visit Platforms:990771::\"[employer]\"}  "

## 2024-04-08 NOTE — PROGRESS NOTES
RETURN TO REFERRING PROVIDER FINAL TREATMENT PLAN  The Psychiatric provider and/or Behavioral health clinician (BHC) have completed consultation with the patient and are returning to referring provider care for continued care. For worsening symptoms/condition recommendations include:    Medication Recommendations   Continue Ambien/zolpidem 5 mg nightly as needed for insomnia.    Continue Effexor-XR/venlafaxine  mg daily for anxiety/mood.  Continue olanzapine/Zyprexa 2.5-5 mg at bedtime as needed for sleep.  Continue Metformin XR (started at 4/8/24 visit) for metabolic side effects of olanzapine (weight gain, increased hunger, and hx of slightly elevated HgbA1c): patient instructed to take 500 mg daily WITH dinner and then can increase to 1000 mg after 1-2 weeks as tolerated.   Patient to have fasting labs completed in about one week at any [facility] lab. Recommend repeat labs in about 3 months.     Have previously discussed (with patient and patient's physician  during visit) risks of olanzapine use including, but not limited to, metabolic disorder and movement disorder, and the need for blood monitoring of sugar and lipids/cholesterol while taking the medication. Once/if blood sugar and lipids stable - can measure yearly. Watch for any abnormal involuntary movements over time - none today.     Behavioral Recommendations  -Patient is looking for a new therapist. Recommend continuing psychotherapy as needed.     Consider pharmacologic and nonpharmacologic recommendations, if the patient has followed through on recommendations/referrals and any other helpful pertinent information (e.g., recent stressors, med adherence, enough time on the medication or high enough dose etc.)      If post consult recommendations fail, use any of the following options   [facility] through Epic staff message for guidance   Order an Adult Behavioral Health eConsult ([id number]) or Pediatric eConsult  ([id number])   Refer for another [facility]PS consultation (after 6 months) or refer to Psychiatry/Long-term management (Mental Health Referral 9030, [facility]/Med management and Long-term management)       Christi Lewis DO  Collaborative Care Psychiatry  [facility]

## 2024-04-08 NOTE — PATIENT INSTRUCTIONS
Treatment Plan:  Continue Ambien/zolpidem 5 mg nightly as needed for insomnia. Continue to use sparingly. Do not take with Ativan/lorazepam.   Continue Effexor-XR/venlafaxine  mg daily for anxiety/mood.  Continue olanzapine/Zyprexa 2.5-5 mg at bedtime as needed for sleep.  Start Metformin XR for metabolic side effects of olanzapine (weight gain, increased hunger, and hx of slightly elevated HgbA1c): take 500 mg daily WITH dinner and then can increase to 1000 mg after 1-2 weeks as tolerated.   Have fasting labs completed in about one week at any [facility] lab. Need to call your clinic ahead of time to schedule an appointment for lab due to limited hours and no walk-ins due to Covid-19 restrictions/changes.    Your psychiatric care is being returned back to your primary care provider for ongoing management.  Please reach out to your primary care provider with any questions or concerns about your mental health or mental health medications.  Continue all other cares per primary care provider.   Continue all other medications as reviewed per electronic medical record today.   Safety plan reviewed. To the Emergency Department as needed or call after hours crisis line at [phone] or [phone]. [place] Crisis Text Line. Text [place] to 100381 or Suicide LifeLine Chat: suicidepreventionlifeline.org/chat  Consider DBT therapy.  Strongly recommend individual psychotherapy for additional support and ongoing development of nonpharmacologic coping skills and strategies.  Follow up with primary care provider as planned or for acute medical concerns.  MyChart may be used to communicate with your provider, but this is not intended to be used for emergencies.    Thank you for our work together in the Psychiatry Collaborative Care Model at [facility]. This is our last visit and I am returning your care back to your Primary Care Provider Vanessa Ladd MD . If you are not doing well, please contact your  Primary Care Provider office.      Risks of benzodiazepine (Ativan, Xanax, Klonopin, Valium, etc) use including, but not limited to, sedation, tolerance, risk for addiction/dependence. Do not drink alcohol while taking benzodiazepines due to risk of trouble breathing and potential death. Do not drive or operate heavy machinery until it is known how the drug affects you. Discuss with physician or pharmacist before ever taking a benzodiazepine with a narcotic/opioid pain medication.     Book:   [facility]  By Ingris ROBLES resources:  https://[place].gov/dhs/partners-and-providers/policies-procedures/adult-mental-health/dialectical-behavior-therapy/dbt-certified-providers/    [facility] DBT resources:  Edith Arango   [phone]   https://[employer]/faqs/     Brian   [phone]   [email or web address]     [facility]   [phone]   [email or web address]     Jaime   [facility]   [phone]   https://[employer]     Moving from: Collaborative Care Psychiatry Service ([facility])    Moving to: Referring Provider        We are returning your care back to your Referring Provider.      We will update your Referring Provider/Clinic that you've completed your care with [facility]. This way, they can help you build on the progress you've made in your mental health.        Here's what happens next:    Within the next 3 months: Please set up a visit with your referring provider. Ask for a mental health check-in.  Stay on your current medications--do not change your doses. Your symptoms could get worse if you quickly stop or decrease your medicine.  We've refilled your mental health medications for the next 3 months (90 days). Future refills or dose changes should come from your Referring Provider Clinic.    If you're in therapy, keep it up! If you're not but would like to start, ask your Referring Provider clinic for a referral to therapy, or call  [facility] ([phone]) to set up a visit with a therapist.        It's been a pleasure to work with you! If you and your clinic decide that you should return to [facility] in the future, we remain ready to serve you. Ask your clinic for a new referral if needed.

## 2024-04-13 ENCOUNTER — MYC MEDICAL ADVICE (OUTPATIENT)
Dept: FAMILY MEDICINE | Facility: CLINIC | Age: 59
End: 2024-04-13
Payer: COMMERCIAL

## 2024-04-15 NOTE — TELEPHONE ENCOUNTER
I would recommend a visit to discuss all this , we can start with a VV - can you put her on my schedule Wednesday ? Id she gets worse meanwhile , she would need to go to [facility]   Thanks

## 2024-04-16 ASSESSMENT — ANXIETY QUESTIONNAIRES
4. TROUBLE RELAXING: MORE THAN HALF THE DAYS
GAD7 TOTAL SCORE: 12
6. BECOMING EASILY ANNOYED OR IRRITABLE: SEVERAL DAYS
GAD7 TOTAL SCORE: 12
GAD7 TOTAL SCORE: 12
IF YOU CHECKED OFF ANY PROBLEMS ON THIS QUESTIONNAIRE, HOW DIFFICULT HAVE THESE PROBLEMS MADE IT FOR YOU TO DO YOUR WORK, TAKE CARE OF THINGS AT HOME, OR GET ALONG WITH OTHER PEOPLE: SOMEWHAT DIFFICULT
8. IF YOU CHECKED OFF ANY PROBLEMS, HOW DIFFICULT HAVE THESE MADE IT FOR YOU TO DO YOUR WORK, TAKE CARE OF THINGS AT HOME, OR GET ALONG WITH OTHER PEOPLE?: SOMEWHAT DIFFICULT
2. NOT BEING ABLE TO STOP OR CONTROL WORRYING: MORE THAN HALF THE DAYS
1. FEELING NERVOUS, ANXIOUS, OR ON EDGE: SEVERAL DAYS
5. BEING SO RESTLESS THAT IT IS HARD TO SIT STILL: MORE THAN HALF THE DAYS
3. WORRYING TOO MUCH ABOUT DIFFERENT THINGS: MORE THAN HALF THE DAYS
7. FEELING AFRAID AS IF SOMETHING AWFUL MIGHT HAPPEN: MORE THAN HALF THE DAYS
7. FEELING AFRAID AS IF SOMETHING AWFUL MIGHT HAPPEN: MORE THAN HALF THE DAYS

## 2024-04-16 ASSESSMENT — PATIENT HEALTH QUESTIONNAIRE - PHQ9
SUM OF ALL RESPONSES TO PHQ QUESTIONS 1-9: 8
SUM OF ALL RESPONSES TO PHQ QUESTIONS 1-9: 8
10. IF YOU CHECKED OFF ANY PROBLEMS, HOW DIFFICULT HAVE THESE PROBLEMS MADE IT FOR YOU TO DO YOUR WORK, TAKE CARE OF THINGS AT HOME, OR GET ALONG WITH OTHER PEOPLE: SOMEWHAT DIFFICULT

## 2024-04-17 ENCOUNTER — VIRTUAL VISIT (OUTPATIENT)
Dept: FAMILY MEDICINE | Facility: CLINIC | Age: 59
End: 2024-04-17
Payer: COMMERCIAL

## 2024-04-17 DIAGNOSIS — F41.1 GENERALIZED ANXIETY DISORDER: ICD-10-CM

## 2024-04-17 DIAGNOSIS — G47.00 INSOMNIA, UNSPECIFIED TYPE: ICD-10-CM

## 2024-04-17 DIAGNOSIS — R73.01 ELEVATED FASTING GLUCOSE: ICD-10-CM

## 2024-04-17 DIAGNOSIS — E03.9 ACQUIRED HYPOTHYROIDISM: ICD-10-CM

## 2024-04-17 DIAGNOSIS — E78.5 HYPERLIPIDEMIA LDL GOAL <130: Primary | ICD-10-CM

## 2024-04-17 DIAGNOSIS — E66.01 MORBID OBESITY (H): ICD-10-CM

## 2024-04-17 PROCEDURE — 99214 OFFICE O/P EST MOD 30 MIN: CPT | Mod: 95 | Performed by: FAMILY MEDICINE

## 2024-04-17 NOTE — PROGRESS NOTES
"Marbella is a 58 year old who is being evaluated via a billable video visit.    How would you like to obtain your AVS? MyChart  If the video visit is dropped, the invitation should be resent by: Text to cell phone: [phone]  Will anyone else be joining your video visit? Yes,  Wally       Assessment & Plan     Hyperlipidemia LDL goal <130  Will be checking fasting lipids and also will add CMP   She is on lipitor 20 mg , no side effects   - Comprehensive metabolic panel (BMP + Alb, Alk Phos, ALT, AST, Total. Bili, TP); Future    Acquired hypothyroidism  Plan to recheck her TSH , she is on synthroid 125 mcg daily   - TSH with free T4 reflex; Future    Generalized anxiety disorder  Zypexa 2.5 to 5 mg at HS and also she is on venlafaxine 150 mg daily , anxiety is better controlled now , she is off the Paxil , doing well     Insomnia, unspecified type  Pt has seen psychiatry Dr Lewis and has been prescribed zyprexa 2.5 to 5 mg to take at HS for her insomnia , which has been working well for her - think she gained about 10 lbs on this and that is why she was started on metformin by her psychiatrist     Morbid obesity (H)  Healthy diet and regular physical activities recommended     Pt took her metformin last Saturday had nausea , vomiting and abdominal cramping and diarrhea , stopped the metformin , not sure if this caused all the symptoms or if this was food poisoning related   We discussed to restart it at a later point and see if any similar symptoms as it seems unusual from only one dose to trigger all these symptoms, more likely related to food poisoning or a viral syndrome         BMI  Estimated body mass index is 37.25 kg/m  as calculated from the following:    Height as of 3/25/24: 1.626 m (5' 4\").    Weight as of 3/25/24: 98.4 kg (217 lb).       Follow up in 3 months sooner if any concerns   Pt is aware  and comfortable with the current plan.      Subjective   Marbella is a 58 year old, presenting for the " following health issues:  Recheck Medication (Metformin)        4/17/2024    11:33 AM   Additional Questions   Roomed by Vanessa MARTINEZ     History of Present Illness       Mental Health Follow-up:  Patient presents to follow-up on Anxiety.    Patient's anxiety since last visit has been:  Good  The patient is not having other symptoms associated with anxiety.  Any significant life events: No, financial concerns and health concerns  Patient is feeling anxious or having panic attacks.  Patient has no concerns about alcohol or drug use.    Diabetes:   She presents for follow up of diabetes.    She is not checking blood glucose.   Blood glucose is never over 200 and never under 70. She is aware of hypoglycemia symptoms including other.    She has no concerns regarding her diabetes at this time.   She is not experiencing numbness or burning in feet, excessive thirst, blurry vision, weight changes or redness, sores or blisters on feet. The patient has had a diabetic eye exam in the last 12 months.          Reason for visit:  Nausea vomiting and diarrhea on Saturday, lower lobes or chest muscle pain up until Tuesday April 16th.  Symptom onset:  1-3 days ago  Symptoms include:  N & V, just soreness and fatigue.  Symptom intensity:  Moderate  Symptom progression:  Improving  Had these symptoms before:  Yes  Has tried/received treatment for these symptoms:  Yes  Previous treatment was successful:  Yes  What makes it worse:  Eating lots. Moving around a lot.  What makes it better:  Acupuncture.    She eats 4 or more servings of fruits and vegetables daily.She consumes 0 sweetened beverage(s) daily.She exercises with enough effort to increase her heart rate 60 or more minutes per day.  She exercises with enough effort to increase her heart rate 7 days per week.   She is taking medications regularly.             Review of Systems  Constitutional, HEENT, cardiovascular, pulmonary, GI, , musculoskeletal, neuro, skin, endocrine and  psych systems are negative, except as otherwise noted.      Objective           Vitals:  No vitals were obtained today due to virtual visit.    Physical Exam   GENERAL: alert and no distress  EYES: Eyes grossly normal to inspection.  No discharge or erythema, or obvious scleral/conjunctival abnormalities.  RESP: No audible wheeze, cough, or visible cyanosis.    SKIN: Visible skin clear. No significant rash, abnormal pigmentation or lesions.  NEURO: Cranial nerves grossly intact.  Mentation and speech appropriate for age.  PSYCH: Appropriate affect, tone, and pace of words    No results found for any visits on 04/17/24.      Video-Visit Details    Type of service:  Video Visit   Originating Location (pt. Location): Home    Distant Location (provider location):  On-site  Platform used for Video Visit: Alice  Signed Electronically by: Vanessa Ladd MD

## 2024-04-23 ENCOUNTER — LAB (OUTPATIENT)
Dept: LAB | Facility: CLINIC | Age: 59
End: 2024-04-23
Payer: COMMERCIAL

## 2024-04-23 ENCOUNTER — MYC MEDICAL ADVICE (OUTPATIENT)
Dept: FAMILY MEDICINE | Facility: CLINIC | Age: 59
End: 2024-04-23

## 2024-04-23 DIAGNOSIS — Z79.899 ENCOUNTER FOR LONG-TERM (CURRENT) USE OF MEDICATIONS: ICD-10-CM

## 2024-04-23 DIAGNOSIS — E03.9 ACQUIRED HYPOTHYROIDISM: ICD-10-CM

## 2024-04-23 DIAGNOSIS — E78.5 HYPERLIPIDEMIA LDL GOAL <130: ICD-10-CM

## 2024-04-23 LAB
ALBUMIN SERPL BCG-MCNC: 4.7 G/DL (ref 3.5–5.2)
ALP SERPL-CCNC: 59 U/L (ref 40–150)
ALT SERPL W P-5'-P-CCNC: 33 U/L (ref 0–50)
ANION GAP SERPL CALCULATED.3IONS-SCNC: 13 MMOL/L (ref 7–15)
AST SERPL W P-5'-P-CCNC: 32 U/L (ref 0–45)
BILIRUB SERPL-MCNC: 0.2 MG/DL
BUN SERPL-MCNC: 16.4 MG/DL (ref 6–20)
CALCIUM SERPL-MCNC: 9.8 MG/DL (ref 8.6–10)
CHLORIDE SERPL-SCNC: 103 MMOL/L (ref 98–107)
CHOLEST SERPL-MCNC: 189 MG/DL
CREAT SERPL-MCNC: 0.76 MG/DL (ref 0.51–0.95)
DEPRECATED HCO3 PLAS-SCNC: 23 MMOL/L (ref 22–29)
EGFRCR SERPLBLD CKD-EPI 2021: >90 ML/MIN/1.73M2
FASTING STATUS PATIENT QL REPORTED: YES
GLUCOSE SERPL-MCNC: 106 MG/DL (ref 70–99)
HBA1C MFR BLD: 5.9 % (ref 0–5.6)
HDLC SERPL-MCNC: 34 MG/DL
LDLC SERPL CALC-MCNC: 121 MG/DL
NONHDLC SERPL-MCNC: 155 MG/DL
POTASSIUM SERPL-SCNC: 4.6 MMOL/L (ref 3.4–5.3)
PROT SERPL-MCNC: 7.3 G/DL (ref 6.4–8.3)
SODIUM SERPL-SCNC: 139 MMOL/L (ref 135–145)
TRIGL SERPL-MCNC: 172 MG/DL
TSH SERPL DL<=0.005 MIU/L-ACNC: 1.69 UIU/ML (ref 0.3–4.2)

## 2024-04-23 PROCEDURE — 80053 COMPREHEN METABOLIC PANEL: CPT

## 2024-04-23 PROCEDURE — 80061 LIPID PANEL: CPT

## 2024-04-23 PROCEDURE — 84443 ASSAY THYROID STIM HORMONE: CPT

## 2024-04-23 PROCEDURE — 36415 COLL VENOUS BLD VENIPUNCTURE: CPT

## 2024-04-23 PROCEDURE — 83036 HEMOGLOBIN GLYCOSYLATED A1C: CPT

## 2024-05-01 PROBLEM — R73.01 ELEVATED FASTING GLUCOSE: Status: ACTIVE | Noted: 2024-05-01

## 2024-05-02 DIAGNOSIS — G47.00 INSOMNIA, UNSPECIFIED TYPE: ICD-10-CM

## 2024-05-06 RX ORDER — ZOLPIDEM TARTRATE 5 MG/1
5 TABLET ORAL
Qty: 20 TABLET | Refills: 0 | Status: SHIPPED | OUTPATIENT
Start: 2024-05-06 | End: 2024-08-19

## 2024-06-04 SDOH — HEALTH STABILITY: PHYSICAL HEALTH: ON AVERAGE, HOW MANY MINUTES DO YOU ENGAGE IN EXERCISE AT THIS LEVEL?: 60 MIN

## 2024-06-04 SDOH — HEALTH STABILITY: PHYSICAL HEALTH: ON AVERAGE, HOW MANY DAYS PER WEEK DO YOU ENGAGE IN MODERATE TO STRENUOUS EXERCISE (LIKE A BRISK WALK)?: 6 DAYS

## 2024-06-04 ASSESSMENT — SOCIAL DETERMINANTS OF HEALTH (SDOH): HOW OFTEN DO YOU GET TOGETHER WITH FRIENDS OR RELATIVES?: ONCE A WEEK

## 2024-06-07 ENCOUNTER — OFFICE VISIT (OUTPATIENT)
Dept: FAMILY MEDICINE | Facility: CLINIC | Age: 59
End: 2024-06-07
Payer: COMMERCIAL

## 2024-06-07 VITALS
HEART RATE: 87 BPM | WEIGHT: 213 LBS | SYSTOLIC BLOOD PRESSURE: 123 MMHG | BODY MASS INDEX: 37.74 KG/M2 | HEIGHT: 63 IN | TEMPERATURE: 97.5 F | DIASTOLIC BLOOD PRESSURE: 77 MMHG | OXYGEN SATURATION: 97 % | RESPIRATION RATE: 16 BRPM

## 2024-06-07 DIAGNOSIS — Z00.00 ENCOUNTER FOR ROUTINE ADULT HEALTH EXAMINATION WITHOUT ABNORMAL FINDINGS: Primary | ICD-10-CM

## 2024-06-07 DIAGNOSIS — E66.01 MORBID OBESITY (H): ICD-10-CM

## 2024-06-07 DIAGNOSIS — E78.5 HYPERLIPIDEMIA LDL GOAL <130: ICD-10-CM

## 2024-06-07 DIAGNOSIS — F41.1 GENERALIZED ANXIETY DISORDER: ICD-10-CM

## 2024-06-07 DIAGNOSIS — E03.9 ACQUIRED HYPOTHYROIDISM: ICD-10-CM

## 2024-06-07 DIAGNOSIS — Z91.89 AT RISK FOR ALTERED GLUCOSE METABOLISM: ICD-10-CM

## 2024-06-07 DIAGNOSIS — R63.5 WEIGHT GAIN: ICD-10-CM

## 2024-06-07 DIAGNOSIS — G47.09 OTHER INSOMNIA: ICD-10-CM

## 2024-06-07 DIAGNOSIS — R73.09 ELEVATED HEMOGLOBIN A1C: ICD-10-CM

## 2024-06-07 PROCEDURE — 99396 PREV VISIT EST AGE 40-64: CPT | Performed by: FAMILY MEDICINE

## 2024-06-07 PROCEDURE — 99214 OFFICE O/P EST MOD 30 MIN: CPT | Mod: 25 | Performed by: FAMILY MEDICINE

## 2024-06-07 RX ORDER — METFORMIN HCL 500 MG
1000 TABLET, EXTENDED RELEASE 24 HR ORAL
Qty: 180 TABLET | Refills: 1 | Status: SHIPPED | OUTPATIENT
Start: 2024-06-07 | End: 2024-08-13

## 2024-06-07 RX ORDER — LEVOTHYROXINE SODIUM 125 UG/1
125 TABLET ORAL DAILY
Qty: 90 TABLET | Refills: 1 | Status: SHIPPED | OUTPATIENT
Start: 2024-06-07

## 2024-06-07 RX ORDER — OLANZAPINE 5 MG/1
2.5-5 TABLET ORAL
Qty: 90 TABLET | Refills: 1 | Status: SHIPPED | OUTPATIENT
Start: 2024-06-07 | End: 2024-09-11 | Stop reason: ALTCHOICE

## 2024-06-07 RX ORDER — VENLAFAXINE HYDROCHLORIDE 150 MG/1
150 CAPSULE, EXTENDED RELEASE ORAL DAILY
Qty: 90 CAPSULE | Refills: 1 | Status: SHIPPED | OUTPATIENT
Start: 2024-06-07

## 2024-06-07 RX ORDER — ATORVASTATIN CALCIUM 20 MG/1
20 TABLET, FILM COATED ORAL DAILY
Qty: 90 TABLET | Refills: 0 | Status: SHIPPED | OUTPATIENT
Start: 2024-06-07 | End: 2024-09-06

## 2024-06-07 ASSESSMENT — ANXIETY QUESTIONNAIRES
GAD7 TOTAL SCORE: 13
7. FEELING AFRAID AS IF SOMETHING AWFUL MIGHT HAPPEN: MORE THAN HALF THE DAYS
6. BECOMING EASILY ANNOYED OR IRRITABLE: MORE THAN HALF THE DAYS
5. BEING SO RESTLESS THAT IT IS HARD TO SIT STILL: SEVERAL DAYS
1. FEELING NERVOUS, ANXIOUS, OR ON EDGE: MORE THAN HALF THE DAYS
GAD7 TOTAL SCORE: 13
2. NOT BEING ABLE TO STOP OR CONTROL WORRYING: MORE THAN HALF THE DAYS
GAD7 TOTAL SCORE: 13
IF YOU CHECKED OFF ANY PROBLEMS ON THIS QUESTIONNAIRE, HOW DIFFICULT HAVE THESE PROBLEMS MADE IT FOR YOU TO DO YOUR WORK, TAKE CARE OF THINGS AT HOME, OR GET ALONG WITH OTHER PEOPLE: SOMEWHAT DIFFICULT
4. TROUBLE RELAXING: MORE THAN HALF THE DAYS
7. FEELING AFRAID AS IF SOMETHING AWFUL MIGHT HAPPEN: MORE THAN HALF THE DAYS
8. IF YOU CHECKED OFF ANY PROBLEMS, HOW DIFFICULT HAVE THESE MADE IT FOR YOU TO DO YOUR WORK, TAKE CARE OF THINGS AT HOME, OR GET ALONG WITH OTHER PEOPLE?: SOMEWHAT DIFFICULT
3. WORRYING TOO MUCH ABOUT DIFFERENT THINGS: MORE THAN HALF THE DAYS

## 2024-06-07 ASSESSMENT — PATIENT HEALTH QUESTIONNAIRE - PHQ9
SUM OF ALL RESPONSES TO PHQ QUESTIONS 1-9: 5
10. IF YOU CHECKED OFF ANY PROBLEMS, HOW DIFFICULT HAVE THESE PROBLEMS MADE IT FOR YOU TO DO YOUR WORK, TAKE CARE OF THINGS AT HOME, OR GET ALONG WITH OTHER PEOPLE: SOMEWHAT DIFFICULT
SUM OF ALL RESPONSES TO PHQ QUESTIONS 1-9: 5

## 2024-06-07 ASSESSMENT — PAIN SCALES - GENERAL: PAINLEVEL: NO PAIN (0)

## 2024-06-07 NOTE — PROGRESS NOTES
Preventive Care Visit  [facility]  Vanessa Ladd MD, Family Medicine  Jun 7, 2024      Assessment & Plan     Encounter for routine adult health examination without abnormal findings  Discussed diet,calcium,exercise.Went over self breast exam.Thin prep was NOT done.Eyes and teeth UTD.No immunizations needed today.See orders below for tests ordered and screening needed.    - REVIEW OF HEALTH MAINTENANCE PROTOCOL ORDERS  - Hepatitis B Surface Antibody; Future  Will check if immune to hep B    Acquired hypothyroidism  Doing well on the Synthroid 125 mcg daily.  Thyroid checked in April was normal at 1.69.TSH.  I have refilled her prescription  - levothyroxine (SYNTHROID/LEVOTHROID) 125 MCG tablet; Take 1 tablet (125 mcg) by mouth daily    Hyperlipidemia LDL goal <130  She is on 20 mg of Lipitor.  No side effects.  I have refilled her prescription.  Last cholesterol check was normal except elevated triglycerides, we discussed taking fish oil supplements 1000 mg daily with meals.    Will recheck cholesterol in 6 months she will come fasting.  - atorvastatin (LIPITOR) 20 MG tablet; Take 1 tablet (20 mg) by mouth daily  - Lipid panel reflex to direct LDL Fasting; Future  - Comprehensive metabolic panel (BMP + Alb, Alk Phos, ALT, AST, Total. Bili, TP); Future    Other insomnia  Olanzapine is working well for her insomnia at 5 mg at bedtime.  Monitoring her blood sugars and started on metformin for that.  - OLANZapine (ZYPREXA) 5 MG tablet; Take 0.5-1 tablets (2.5-5 mg) by mouth nightly as needed (sleep)    Generalized anxiety disorder  Was switched by psychiatry from Paxil to venlafaxine, currently on 150 mg daily, no side effects and doing well as far as anxiety.  Refilled today  - venlafaxine (EFFEXOR XR) 150 MG 24 hr capsule; Take 1 capsule (150 mg) by mouth daily    Elevated hemoglobin A1c  She is taking metformin 500 mg 2 tablets daily and last hemoglobin A1c was 5.9 at the end of April.  We will  "plan to recheck A1c again in 6 months.  - metFORMIN (GLUCOPHAGE XR) 500 MG 24 hr tablet; Take 2 tablets (1,000 mg) by mouth daily (with dinner) Start with one tab for 1-2 weeks.  - Hemoglobin A1c; Future    At risk for altered glucose metabolism  She will continue on metformin 1000 mg daily   - metFORMIN (GLUCOPHAGE XR) 500 MG 24 hr tablet; Take 2 tablets (1,000 mg) by mouth daily (with dinner) Start with one tab for 1-2 weeks.    Weight gain  Was started on metformin by psychiatry because of the olanzapine that they started her on.  She will continue, no side effects so far on the 1000 mg daily.  - metFORMIN (GLUCOPHAGE XR) 500 MG 24 hr tablet; Take 2 tablets (1,000 mg) by mouth daily (with dinner) Start with one tab for 1-2 weeks.    Patient has been advised of split billing requirements and indicates understanding: Yes        BMI  Estimated body mass index is 37.43 kg/m  as calculated from the following:    Height as of this encounter: 1.607 m (5' 3.25\").    Weight as of this encounter: 96.6 kg (213 lb).       Counseling  Appropriate preventive services were discussed with this patient, including applicable screening as appropriate for fall prevention, nutrition, physical activity, Tobacco-use cessation, weight loss and cognition.  Checklist reviewing preventive services available has been given to the patient.  Reviewed patient's diet, addressing concerns and/or questions.   The patient's PHQ-9 score is consistent with mild depression. She was provided with information regarding depression.           Adam Tyson is a 58 year old, presenting for the following:  Physical        6/7/2024     2:07 PM   Additional Questions   Roomed by argelia armstrong        Health Care Directive  Patient does not have a Health Care Directive or Living Will:     Healthy Habits:     Getting at least 3 servings of Calcium per day:  Yes    Bi-annual eye exam:  Yes    Dental care twice a year:  Yes    Sleep apnea or symptoms of sleep " apnea:  None    Diet:  Regular (no restrictions)    Frequency of exercise:  6-7 days/week    Duration of exercise:  45-60 minutes    Taking medications regularly:  Yes    Barriers to taking medications:  Not applicable    Medication side effects:  Not applicable    Additional concerns today:  No                6/4/2024   General Health   How would you rate your overall physical health? Good   Feel stress (tense, anxious, or unable to sleep) Rather much   (!) STRESS CONCERN      6/4/2024   Nutrition   Three or more servings of calcium each day? Yes   Diet: Carbohydrate counting    Vegetarian/vegan    Other   If other, please elaborate: Avoid dairy.   How many servings of fruit and vegetables per day? (!) 2-3   How many sweetened beverages each day? 0-1         6/4/2024   Exercise   Days per week of moderate/strenous exercise 6 days   Average minutes spent exercising at this level 60 min         6/4/2024   Social Factors   Frequency of gathering with friends or relatives Once a week   Worry food won't last until get money to buy more No   Food not last or not have enough money for food? Patient declined   Do you have housing?  Yes   Are you worried about losing your housing? No   Lack of transportation? No   Unable to get utilities (heat,electricity)? No         6/4/2024   Fall Risk   Fallen 2 or more times in the past year? No   Trouble with walking or balance? No          6/4/2024   Dental   Dentist two times every year? Yes         6/4/2024   TB Screening   Were you born outside of the [place]? No       Today's PHQ-9 Score:       6/7/2024     1:56 PM   PHQ-9 SCORE   PHQ-9 Total Score MyChart 5 (Mild depression)   PHQ-9 Total Score 5         6/4/2024   Substance Use   Alcohol more than 3/day or more than 7/wk No   Do you use any other substances recreationally? (!) ALCOHOL     Social History     Tobacco Use    Smoking status: Former     Current packs/day: 0.00     Types: Cigarettes     Quit date: 1/1/1990     Years since  "quittin.4     Passive exposure: Never    Smokeless tobacco: Never   Vaping Use    Vaping status: Never Used   Substance Use Topics    Alcohol use: Not Currently     Comment: One or two drinks a month    Drug use: Not Currently     Types: Cocaine, Marijuana, Methamphetamines, Opiates, Amphetamines, Barbiturates, \"Crack\" cocaine, Codeine, Hashish, Hydrocodone, LSD, Morphine, Opium, Oxycodone     Comment: nothing since 2023   LAST FHS-7 RESULTS   1st degree relative breast or ovarian cancer No   Any relative bilateral breast cancer No   Any male have breast cancer No   Any ONE woman have BOTH breast AND ovarian cancer No   Any woman with breast cancer before 50yrs No   2 or more relatives with breast AND/OR ovarian cancer No   2 or more relatives with breast AND/OR bowel cancer No        Mammogram Screening - Mammogram every 1-2 years updated in Health Maintenance based on mutual decision making        2024   STI Screening   New sexual partner(s) since last STI/HIV test? No     History of abnormal Pap smear: No - age 30-64 HPV with reflex Pap every 5 years recommended        2014    12:00 AM 4/3/2012    12:00 AM 2011    12:00 AM   PAP / HPV   PAP (Historical)   NIL    PAP-ABSTRACT See Scanned Document     See Scanned Document            This result is from an external source.     ASCVD Risk   The 10-year ASCVD risk score (Arsalan HUERTA, et al., 2019) is: 3.2%    Values used to calculate the score:      Age: 58 years      Sex: Female      Is Non- : No      Diabetic: No      Tobacco smoker: No      Systolic Blood Pressure: 123 mmHg      Is BP treated: No      HDL Cholesterol: 34 mg/dL      Total Cholesterol: 189 mg/dL           Reviewed and updated as needed this visit by Provider                    Past Medical History:   Diagnosis Date    Arthritis     My mom has it, my grandmother had it I have it    Diabetes (H)     My mom and brother have type II "    Heavy menstrual period     Leiomyoma of uterus     s/p myomectomy    Mental disorder     anxiety    PONV (postoperative nausea and vomiting)     Surgical complication after   sept    Thyroid disease     Hypothroidism, 2nd half of     Uncomplicated asthma     My son has it, since he was born    Vesico-ureteral reflux     s/p repair     Past Surgical History:   Procedure Laterality Date    ABDOMEN SURGERY      exploratory after C section for sepsis     SECTION      x 2    HYSTERECTOMY RADICAL  2015    HYSTERECTOMY SUPRACERVICAL N/A 2015    Procedure: HYSTERECTOMY SUPRACERVICAL;  Surgeon: Kelle Wasserman MD;  Location:  OR    LUMPECTOMY BREAST      MYOMECTOMY UTERUS      ORTHOPEDIC SURGERY  2020    3-18-1, Nancy date R hip replacement, March L meniscus repair    REPAIR PTOSIS Right     reconstruction of tear duct    REPAIR PTOSIS BILATERAL Bilateral 2020    Procedure: BILATERAL PTOSIS REPAIR, IRRIGATION OF LACRIMAL SYSTEM BILATERAL;  Surgeon: Pako Sosa MD;  Location:  OR    SOFT TISSUE SURGERY  2021    L meniscus tear repair     Lab work is in process  Labs reviewed in EPIC  BP Readings from Last 3 Encounters:   24 123/77   23 116/79   22 132/87    Wt Readings from Last 3 Encounters:   24 96.6 kg (213 lb)   24 98.4 kg (217 lb)   23 90.5 kg (199 lb 8 oz)                  Patient Active Problem List   Diagnosis    Health Care Home    Generalized anxiety disorder    Hyperlipidemia LDL goal <130    Acquired hypothyroidism    Overweight    Preventative health care    Acute post-operative pain    Status post hysterectomy    Morbid obesity (H)    Sensation of plugged ear on both sides    Primary osteoarthritis of right hip    Weakness of right hip    Dermatochalasis of both upper eyelids    Involutional ptosis, acquired, bilateral    CMC DJD(carpometacarpal degenerative joint disease), localized primary,  left    Thumb pain, left    Adjustment disorder with anxious mood    Family history of diabetes mellitus    Anxiety    Elevated fasting glucose     Past Surgical History:   Procedure Laterality Date    ABDOMEN SURGERY      exploratory after C section for sepsis     SECTION      x 2    HYSTERECTOMY RADICAL  2015    HYSTERECTOMY SUPRACERVICAL N/A 2015    Procedure: HYSTERECTOMY SUPRACERVICAL;  Surgeon: Kelle Wasserman MD;  Location:  OR    LUMPECTOMY BREAST      MYOMECTOMY UTERUS      ORTHOPEDIC SURGERY  2020    3-18-1, Nancy date R hip replacement, March L meniscus repair    REPAIR PTOSIS Right     reconstruction of tear duct    REPAIR PTOSIS BILATERAL Bilateral 2020    Procedure: BILATERAL PTOSIS REPAIR, IRRIGATION OF LACRIMAL SYSTEM BILATERAL;  Surgeon: Pako Sosa MD;  Location:  OR    SOFT TISSUE SURGERY  2021    L meniscus tear repair       Social History     Tobacco Use    Smoking status: Former     Current packs/day: 0.00     Types: Cigarettes     Quit date: 1990     Years since quittin.4     Passive exposure: Never    Smokeless tobacco: Never   Substance Use Topics    Alcohol use: Not Currently     Comment: One or two drinks a month     Family History   Problem Relation Age of Onset    High cholesterol Father     Cancer Father         CLL    Blood Disease Father         CLL    Glaucoma Father     Macular Degeneration Father     Breast Cancer Maternal Grandmother         in 80s    Cancer Maternal Grandmother     Osteoporosis Maternal Grandmother     Depression Mother     Diabetes Mother     Ulcerative Colitis Mother     Lipids Mother         hypertriglyceridemia    Anxiety Disorder Mother     Osteoporosis Mother     Diabetes Brother          Current Outpatient Medications   Medication Sig Dispense Refill    atorvastatin (LIPITOR) 20 MG tablet Take 1 tablet (20 mg) by mouth daily 90 tablet 0    levothyroxine (SYNTHROID/LEVOTHROID) 125 MCG  "tablet Take 1 tablet (125 mcg) by mouth daily 90 tablet 1    metFORMIN (GLUCOPHAGE XR) 500 MG 24 hr tablet Take 2 tablets (1,000 mg) by mouth daily (with dinner) Start with one tab for 1-2 weeks. 180 tablet 1    OLANZapine (ZYPREXA) 5 MG tablet Take 0.5-1 tablets (2.5-5 mg) by mouth nightly as needed (sleep) 90 tablet 1    venlafaxine (EFFEXOR XR) 150 MG 24 hr capsule Take 1 capsule (150 mg) by mouth daily 90 capsule 1    calcium carbonate (OS-ARA) 500 MG tablet Take 1 tablet by mouth 2 times daily      melatonin 5 MG tablet Take 5 mg by mouth nightly as needed for sleep      Omega-3 Fatty Acids (OMEGA-3 FISH OIL PO) Take 1 g by mouth daily      zolpidem (AMBIEN) 5 MG tablet Take 1 tablet (5 mg) by mouth nightly as needed for sleep 20 tablet 0     Allergies   Allergen Reactions    Erythromycin      Itching and swelling of lids     Seasonal Allergies Itching and Other (See Comments)     Recent Labs   Lab Test 04/23/24  0738 06/15/23  0721 07/08/22  0838 04/13/22  1051 07/01/21  0834 06/23/21  1240   A1C 5.9*  --   --  5.7*  --  5.7*   * 101* 84  --    < >  --    HDL 34* 38* 37*  --    < >  --    TRIG 172* 69 106  --    < >  --    ALT 33 19 37  --    < >  --    CR 0.76 0.71 0.58 0.65   < >  --    GFRESTIMATED >90 >90 >90 >90   < >  --    POTASSIUM 4.6 4.1 4.4 4.0   < >  --    TSH 1.69 2.43 1.49 1.18   < > 0.78    < > = values in this interval not displayed.          Review of Systems  Constitutional, HEENT, cardiovascular, pulmonary, GI, , musculoskeletal, neuro, skin, endocrine and psych systems are negative, except as otherwise noted.     Objective    Exam  /77   Pulse 87   Temp 97.5  F (36.4  C) (Temporal)   Resp 16   Ht 1.607 m (5' 3.25\")   Wt 96.6 kg (213 lb)   LMP  (LMP Unknown)   SpO2 97%   BMI 37.43 kg/m     Estimated body mass index is 37.43 kg/m  as calculated from the following:    Height as of this encounter: 1.607 m (5' 3.25\").    Weight as of this encounter: 96.6 kg (213 " lb).    Physical Exam  GENERAL: alert and no distress  EYES: Eyes grossly normal to inspection, PERRL and conjunctivae and sclerae normal  HENT: ear canals and TM's normal, nose and mouth without ulcers or lesions  NECK: no adenopathy, no asymmetry, masses, or scars  RESP: lungs clear to auscultation - no rales, rhonchi or wheezes  BREAST: normal without masses, tenderness or nipple discharge and no palpable axillary masses or adenopathy  CV: regular rate and rhythm, normal S1 S2, no S3 or S4, no murmur, click or rub, no peripheral edema  ABDOMEN: soft, nontender, no hepatosplenomegaly, no masses and bowel sounds normal  MS: no gross musculoskeletal defects noted, no edema  SKIN: no suspicious lesions or rashes  NEURO: Normal strength and tone, mentation intact and speech normal  PSYCH: mentation appears normal, affect normal/bright        Signed Electronically by: Vanessa Ladd MD

## 2024-08-05 ENCOUNTER — DOCUMENTATION ONLY (OUTPATIENT)
Dept: OTHER | Facility: CLINIC | Age: 59
End: 2024-08-05
Payer: COMMERCIAL

## 2024-08-08 ENCOUNTER — MYC MEDICAL ADVICE (OUTPATIENT)
Dept: FAMILY MEDICINE | Facility: CLINIC | Age: 59
End: 2024-08-08
Payer: COMMERCIAL

## 2024-08-13 ENCOUNTER — VIRTUAL VISIT (OUTPATIENT)
Dept: FAMILY MEDICINE | Facility: CLINIC | Age: 59
End: 2024-08-13
Payer: COMMERCIAL

## 2024-08-13 DIAGNOSIS — F41.0 PANIC DISORDER WITHOUT AGORAPHOBIA: ICD-10-CM

## 2024-08-13 DIAGNOSIS — F41.1 GENERALIZED ANXIETY DISORDER: Primary | ICD-10-CM

## 2024-08-13 PROCEDURE — 99213 OFFICE O/P EST LOW 20 MIN: CPT | Mod: 95 | Performed by: PHYSICIAN ASSISTANT

## 2024-08-13 ASSESSMENT — ANXIETY QUESTIONNAIRES
7. FEELING AFRAID AS IF SOMETHING AWFUL MIGHT HAPPEN: NEARLY EVERY DAY
GAD7 TOTAL SCORE: 21
1. FEELING NERVOUS, ANXIOUS, OR ON EDGE: NEARLY EVERY DAY
6. BECOMING EASILY ANNOYED OR IRRITABLE: NEARLY EVERY DAY
7. FEELING AFRAID AS IF SOMETHING AWFUL MIGHT HAPPEN: NEARLY EVERY DAY
8. IF YOU CHECKED OFF ANY PROBLEMS, HOW DIFFICULT HAVE THESE MADE IT FOR YOU TO DO YOUR WORK, TAKE CARE OF THINGS AT HOME, OR GET ALONG WITH OTHER PEOPLE?: SOMEWHAT DIFFICULT
5. BEING SO RESTLESS THAT IT IS HARD TO SIT STILL: NEARLY EVERY DAY
3. WORRYING TOO MUCH ABOUT DIFFERENT THINGS: NEARLY EVERY DAY
GAD7 TOTAL SCORE: 21
GAD7 TOTAL SCORE: 21
IF YOU CHECKED OFF ANY PROBLEMS ON THIS QUESTIONNAIRE, HOW DIFFICULT HAVE THESE PROBLEMS MADE IT FOR YOU TO DO YOUR WORK, TAKE CARE OF THINGS AT HOME, OR GET ALONG WITH OTHER PEOPLE: SOMEWHAT DIFFICULT
2. NOT BEING ABLE TO STOP OR CONTROL WORRYING: NEARLY EVERY DAY
4. TROUBLE RELAXING: NEARLY EVERY DAY

## 2024-08-13 ASSESSMENT — PATIENT HEALTH QUESTIONNAIRE - PHQ9
SUM OF ALL RESPONSES TO PHQ QUESTIONS 1-9: 22
10. IF YOU CHECKED OFF ANY PROBLEMS, HOW DIFFICULT HAVE THESE PROBLEMS MADE IT FOR YOU TO DO YOUR WORK, TAKE CARE OF THINGS AT HOME, OR GET ALONG WITH OTHER PEOPLE: SOMEWHAT DIFFICULT
SUM OF ALL RESPONSES TO PHQ QUESTIONS 1-9: 22

## 2024-08-13 NOTE — PROGRESS NOTES
"Marbella is a 58 year old who is being evaluated via a billable video visit.    How would you like to obtain your AVS? MyChart  If the video visit is dropped, the invitation should be resent by: Text to cell phone: [phone]  Will anyone else be joining your video visit? No      Assessment & Plan     Generalized anxiety disorder  Marbella is having quite severe acute symptoms of anxiety, panic, insomnia, that she is really unable to calm down.   is IM provider and we discussed ER/Empath unit at [facility], and we all think that will be the best route to help acutely with her care.   feels safe transporting her    Panic disorder without agoraphobia  As above.  Has worked with Dr. Lewis in the last year and really helped get her into a very stable place.  Will alert her of this acute flare of symptoms.          BMI  Estimated body mass index is 37.43 kg/m  as calculated from the following:    Height as of 6/7/24: 1.607 m (5' 3.25\").    Weight as of 6/7/24: 96.6 kg (213 lb).       Depression Screening Follow Up        8/13/2024    10:48 AM   PHQ   PHQ-9 Total Score 22   Q9: Thoughts of better off dead/self-harm past 2 weeks More than half the days   F/U: Thoughts of suicide or self-harm Yes   F/U: Self harm-plan No   F/U: Self-harm action No   F/U: Safety concerns Yes                     Follow Up Actions Taken  ED visit recommended.  Patient willing to go and has reliable transportation.    Discussed the following ways the patient can remain in a safe environment:  be around others        Subjective   Marbella is a 58 year old, presenting for the following health issues:  No chief complaint on file.        8/13/2024    11:02 AM   Additional Questions   Roomed by Lana     History of Present Illness       Mental Health Follow-up:  Patient presents to follow-up on Depression & Anxiety.Patient's depression since last visit has been:  Worse  The patient is having other symptoms associated with depression.  Patient's " anxiety since last visit has been:  Worse  The patient is having other symptoms associated with anxiety.  Any significant life events: grief or loss and other  Patient is feeling anxious or having panic attacks.  Patient has no concerns about alcohol or drug use.    She eats 2-3 servings of fruits and vegetables daily.She consumes 1 sweetened beverage(s) daily.She exercises with enough effort to increase her heart rate 30 to 60 minutes per day.  She exercises with enough effort to increase her heart rate 6 days per week.   She is taking medications regularly.         59 y/o new to me female with acute flare of long standing anxiety disorder.  She has worked with PCP and recently with [facility] psychiat[facility] Dr Christi Lewis.  Things had been going very well that care was returned to PCP back in April.  Acute symptoms started 5 days ago.  Has not been able to sleep, trouble functioning and , who is IM provider, agrees that she needs more acute treatment.      Review of Systems  Constitutional, HEENT, cardiovascular, pulmonary, gi and gu systems are negative, except as otherwise noted.      Objective           Vitals:  No vitals were obtained today due to virtual visit.    Physical Exam   GENERAL: alert and moderate distress  EYES: Eyes grossly normal to inspection.  No discharge or erythema, or obvious scleral/conjunctival abnormalities.  SKIN: Visible skin clear. No significant rash, abnormal pigmentation or lesions.  NEURO: Cranial nerves grossly intact.  Mentation and speech appropriate for age.  PSYCH: anxious, judgement and insight intact, and appearance disheveled          Video-Visit Details    Type of service:  Video Visit   Originating Location (pt. Location): Home    Distant Location (provider location):  Off-site  Platform used for Video Visit: Alice  Signed Electronically by: Billy Spencer PA-C

## 2024-08-14 ENCOUNTER — HOSPITAL ENCOUNTER (EMERGENCY)
Facility: CLINIC | Age: 59
Discharge: HOME | End: 2024-08-14
Attending: STUDENT IN AN ORGANIZED HEALTH CARE EDUCATION/TRAINING PROGRAM | Admitting: STUDENT IN AN ORGANIZED HEALTH CARE EDUCATION/TRAINING PROGRAM
Payer: COMMERCIAL

## 2024-08-14 VITALS
BODY MASS INDEX: 33.96 KG/M2 | DIASTOLIC BLOOD PRESSURE: 85 MMHG | SYSTOLIC BLOOD PRESSURE: 149 MMHG | HEART RATE: 82 BPM | TEMPERATURE: 97.8 F | RESPIRATION RATE: 16 BRPM | WEIGHT: 198.9 LBS | HEIGHT: 64 IN | OXYGEN SATURATION: 99 %

## 2024-08-14 DIAGNOSIS — F33.0 MAJOR DEPRESSIVE DISORDER, RECURRENT EPISODE, MILD (H): ICD-10-CM

## 2024-08-14 DIAGNOSIS — F43.0 ACUTE REACTION TO SITUATIONAL STRESS: ICD-10-CM

## 2024-08-14 DIAGNOSIS — F41.0 GENERALIZED ANXIETY DISORDER WITH PANIC ATTACKS: ICD-10-CM

## 2024-08-14 DIAGNOSIS — F41.1 GENERALIZED ANXIETY DISORDER WITH PANIC ATTACKS: ICD-10-CM

## 2024-08-14 PROCEDURE — 99204 OFFICE O/P NEW MOD 45 MIN: CPT

## 2024-08-14 PROCEDURE — 250N000013 HC RX MED GY IP 250 OP 250 PS 637

## 2024-08-14 PROCEDURE — 99284 EMERGENCY DEPT VISIT MOD MDM: CPT

## 2024-08-14 RX ORDER — HYDROXYZINE HYDROCHLORIDE 25 MG/1
25 TABLET, FILM COATED ORAL EVERY 6 HOURS PRN
Status: DISCONTINUED | OUTPATIENT
Start: 2024-08-14 | End: 2024-08-14 | Stop reason: HOSPADM

## 2024-08-14 RX ORDER — OLANZAPINE 5 MG/1
5-10 TABLET, ORALLY DISINTEGRATING ORAL 2 TIMES DAILY PRN
Status: DISCONTINUED | OUTPATIENT
Start: 2024-08-14 | End: 2024-08-14 | Stop reason: HOSPADM

## 2024-08-14 RX ORDER — VENLAFAXINE HYDROCHLORIDE 37.5 MG/1
37.5 CAPSULE, EXTENDED RELEASE ORAL DAILY
Qty: 30 CAPSULE | Refills: 0 | Status: SHIPPED | OUTPATIENT
Start: 2024-08-14 | End: 2024-09-09

## 2024-08-14 RX ORDER — HYDROXYZINE HYDROCHLORIDE 25 MG/1
12.5-25 TABLET, FILM COATED ORAL 2 TIMES DAILY PRN
Qty: 30 TABLET | Refills: 0 | Status: SHIPPED | OUTPATIENT
Start: 2024-08-14 | End: 2024-09-11

## 2024-08-14 RX ADMIN — HYDROXYZINE HYDROCHLORIDE 25 MG: 25 TABLET, FILM COATED ORAL at 18:16

## 2024-08-14 ASSESSMENT — ACTIVITIES OF DAILY LIVING (ADL)
ADLS_ACUITY_SCORE: 35

## 2024-08-14 NOTE — PROGRESS NOTES
58 year old female received from ED for psychiatric evaluation due to increased anxiety. Reports she has a lot going on at home. States since Saturday she has been anxious and in a state of panic. States sometimes she feels like she can't catch her breath. Denies chest pain or dizziness at this time. Takes Zyprexa and Effexor, which were effective for her until recently. States sometimes she feels it would be easier if she just didn't wake up, but denies having suicidal thoughts, plan, or intent. States she would never attempt to take her life because she loves her life, and her children. Denies drug or alcohol use. 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 Denies auditory or visual hallucinations.     Nursing and risk assessments completed. Assessments reviewed with LMHP and physician. Admission information reviewed with patient. Patient given a tour of [facility] and instructions on using the facility. Questions regarding [facility] addressed. Pt safety search completed.

## 2024-08-14 NOTE — ED NOTES
[facility]  ED to EMPATH Checklist:      Goal for EMPATH: Anxiety management    Current Behavior: Sad    Safety Concerns: None    Legal Hold Status: Voluntary    Medically Cleared by ED provider: Yes    Patient Therapeutically Searched: Therapeutic search by ED staff (strings, belts, shoes, pockets, electronics, etc.)    Belongings: Remain with patient    Independent Ambulation at Baseline: Yes/No: Yes    Participates in Care/Conversation: Yes/No: Yes    Patient Informed about EMPATH: Yes/No: Yes    DEC: Ordered and pending    Patient Ready to be Transferred to EMPATH? Yes/No: Yes

## 2024-08-14 NOTE — ED PROVIDER NOTES
"  Emergency Department Note      History of Present Illness     Chief Complaint   Mental Health Problem    HPI   Marbella Hendrix is a 58 year old female presenting with mental health problem. The patient's  describes Marbella as a \"zombie\" with increased emotional distress displayed through sighing. She has been sleeping less, as her  reports 1-3 hours per night at most. The patient's generalized anxiety disorder has been managed well with olanzapine and Effexor. Upon examination, she feels anxious and endorses panting, shortness of breath, and increased anxiety since Saturday (8/10/24). Her current symptom feels similar to panic attacks, but notes it is constant. The patient reports increased life stressors. She otherwise reports decreased appetite. She was tachycardic, but was given metoprolol by her . No other medical problems. No history of hospitalization for mental health. No drug or alcohol use.     Independent Historian    as detailed above.    Review of External Notes   I reviewed the psych note from 8/13/24 for anxiety and panic attacks.    Past Medical History     Medical History and Problem List   Arthritis  Diabetes   DJD  BERTHA  Heavy menstrual period  Leiomyoma of uterus  PONV  Panic attacks   Surgical complication  Thyroid disease  Uncomplicated asthma  Vesico-ureteral reflux    Medications   Atorvastatin   Levothyroxine   Paroxetine   Olanzapine   Venlafaxine   Zolpidem     Surgical History   Abdominal surgery   C section   Hysterectomy   Lumpectomy   Myomectomy  Repair ptosis   Left meniscus tear repair   Eye surgery     Physical Exam     Patient Vitals for the past 24 hrs:   BP Temp Temp src Pulse Resp SpO2 Height Weight   08/14/24 1654 (!) 148/88 97  F (36.1  C) Oral 88 16 99 % 1.6 m (5' 3\") 96.6 kg (213 lb)     Physical Exam  GENERAL: Patient well-nourished, appears slightly anxious  HEAD: Atraumatic.  NECK: No rigidity  EYE: PERRL  CV: RRR, no murmurs rubs or " gallops  PULM: CTAB with good aeration; no retractions, rales, rhonchi, or wheezing  ABD: Soft, nontender, nondistended, no guarding  DERM: No rash. Skin warm and dry  EXTREMITY: Moving all extremities without difficulty.  Psych: Cooperative.  Answers questions appropriately.  No active SI or hallucinations.    Diagnostics     Lab Results   Labs Ordered and Resulted from Time of ED Arrival to Time of ED Departure - No data to display    Imaging   No orders to display     Independent Interpretation   None    ED Course      Medications Administered   Medications - No data to display    Procedures   Procedures   None    Discussion of Management   None    ED Course   ED Course as of 08/14/24 1733   Wed Aug 14, 2024   1650 I obtained the history and examined the patient as noted above.        Additional Documentation  None    Medical Decision Making / Diagnosis     CMS Diagnoses: None    MIPS       None    MDM   Marbella Hendrix is a 58 year old female     Patient presents with anxiety and panic attacks, presenting with the same.  No current SI, HI, VH, AH.   DDx considered suicide attempt, psychosis, adjustment disorder.   No acute medical concerns.  Patient here with supportive  and they are in agreement with behavioral health assessment.  Patient is a good candidate for the empath unit so was transferred to that unit.      Disposition   The patient was transferred to [facility].     Diagnosis     ICD-10-CM    1. Acute reaction to situational stress  F43.0       2. Generalized anxiety disorder with panic attacks  F41.1     F41.0         Scribe Disclosure:  I, Gracie Gutierrez, am serving as a scribe at 5:06 PM on 8/14/2024 to document services personally performed by Jae Simmons MD based on my observations and the provider's statements to me.        Jae Simmons MD  08/14/24 8451

## 2024-08-15 ENCOUNTER — TELEPHONE (OUTPATIENT)
Dept: BEHAVIORAL HEALTH | Facility: CLINIC | Age: 59
End: 2024-08-15
Payer: COMMERCIAL

## 2024-08-15 NOTE — PROGRESS NOTES
Patient agreeable to discharge plan. Discharge instructions reviewed with patient including follow-up care plan. Medications: hydroxyzine and Effexor prescribed to patient's preferred outpatient pharmacy. Reviewed safety plan and outpatient resources. Denies SI and HI. All belongings that were brought into the hospital have been returned to patient. Escorted off the unit at 2000 accompanied by Emp[facility] staff. Discharged to home with .
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
## 2024-08-15 NOTE — TELEPHONE ENCOUNTER
Spoke with PT about follow-up care. Offered additional appts/resources and patient accepted and psychiatry was scheduled.  No further follow-up needed. [facility] number provided if they would like additional assistance.

## 2024-08-15 NOTE — DISCHARGE INSTRUCTIONS
"Aftercare Plan  If I am feeling unsafe or I am in a crisis, I will:   Contact my established care providers   Call the National Suicide Prevention Lifeline: 988  Go to the nearest emergency room   Call 911       Crisis Lines  Crisis Text Line  Text 389330  You will be connected with a trained live crisis counselor to provide support.    Por espanol, texto  SUZETTE a 077878 o texto a 442-AYUDAME en WhatsApp    [employer] (LGBTQ Youth Crisis Line)  1.905.848.8450  text START to 032-282      Community Resources  Fast Tracker  Linking people to mental health and substance use disorder resources  [employer]     [place] Mental Health Warm Line  Peer to peer support  Monday thru Saturday, 12 pm to 10 pm  239.916.2381 or 8.990.275.6459  Text \"Support\" to 91402    National [place] on Mental Illness (PRISCILLA)  256.200.6777 or 1.888.PRISCILLA.HELPS      Mental Health Apps  My3  https://[employer]org/    VirtualHopeBox  https://MoneyLionorg/apps/virtual-hope-box/      Additional Information  Today you were seen by a licensed mental health professional through Triage and Transition services, Behavioral Healthcare Providers ([facility])  for a crisis assessment in the Emergency Department at [facility].  It is recommended that you follow up with your established providers (psychiatrist, mental health therapist, and/or primary care doctor - as relevant) as soon as possible. Coordinators from [facility] will be calling you in the next 24-48 hours to ensure that you have the resources you need.  You can also contact [facility] coordinators directly at [phone]. You may have been scheduled for or offered an appointment with a mental health provider. [facility] maintains an extensive network of licensed behavioral health providers to connect patients with the services they need.  We do not charge providers a fee to participate in our referral network.  We match patients with providers based on a patient's specific needs, insurance " coverage, and location.  Our first effort will be to refer you to a provider within your care system, and will utilize providers outside your care system as needed.      [facility] Support Groups/Resources:    Website: https://[facility].org/support/support-groups-for-adults-living-with-a-mental-illness/  Location(s): [place], [place], [place], [place], [place], & [facility].   Phone: ([phone]) [phone]  Email: [email or web address]  About: PRISCILLA [place] is a support group for adults living with mental illness. Groups are free and meet for 90 minutes. All groups are led by trained facilitators who also live with mental illness. Groups provide a welcoming, safe, judgment-free space to share challenges, successes, and learn from one another. There are also groups that focus on LGBTQ+ and BI-POC identities.     DBT Coping Skills  The following DBT skills can assist me when: I want to act on your emotions and acting on them will only make things worse, I am overwhelmed by my emotions, I want to try to be skillful and not act on self destructive behavior.     Reduce Extreme Emotion  QUICKLY:  Changing Your Body Chemistry       T:  Change your body Temperature to change your autonomic nervous system    Use Ice pack to calm yourself down FAST. Place ice pack underneath your eyes for a count of 30 seconds to initiate the divers reflex which will naturally calm down your heart rate and breathing.      I:  Intensely exercise to calm down a body revved up by emotion    Examples: running, walking fast, jumping, playing basketball, weight lifting, swimming, calisthenics, etc.      Engage in exercises that DO NOT include violent behaviors. Exercises that utilize violent behaviors tend to function as  behavioral rehearsal,  and rather than calming the person down, may actually  rev  the person up more, increasing the likelihood of violence, and lessening the likelihood that they will  burn off  energy     P:  Progressively relax  your muscles    Starting with your hands, moving to your forearms, upper arms, shoulders, neck, forehead, eyes, cheeks and lips, tongue and teeth, chest, upper back, stomach, buttocks, thighs, calves, ankles, feet      Tense (10 seconds,   of the way), then relax each muscle (all the way)    Notice the tension    Notice the difference when relaxed (by tensing first, and then relaxing, you are able to get a more thorough relaxation than by simply relaxing)      P: Paced breathing to relax    The standard technique is to begin with counting the number of steps one takes for a typical inhale, then counting the steps one takes for a typical exhale, and then lengthening the amount of steps for the exhalation by one or two steps.  OR repeat this pattern for 1-2 minutes:   Inhale for four (4) seconds    Exhale for six (6) to eight (8) seconds        After using Distress Tolerance TIPP, TRY TO STOP!     S- Stop    Do not just react on your emotion urge. Stop! Freeze! Do not move a muscle! Your emotions may try to make you act without thinking. Stay in control! Take a step back Take a step back from the situation.    T- Take a break    Let go. Take a deep breath. Do not let your feelings make you act impulsively.    O- Observe    Notice what is going on inside and outside you. What is the situation? What are your thoughts and feelings? What are others saying or doing? Does my emotion make sense, is it justified? What is it that my emotions want me to do? Would that be effective?    P- Proceed mindfully    Act with awareness. In deciding what to do, consider your thoughts and feelings, the situation, and other people s thoughts and feelings. Think about your goals. Ask Wise Mind: Which actions will make it better or worse?        If my emotion action urge would not be effective or helpful, practice acting OPPOSITE to the EMOTION ACTION URGE can help reduce the intensity or even change the emotion.   Consider these examples:  with FEAR we have the urge to run away/avoid. OPPOSITE would be to approach it with caution. ANGER we have the urge to attack. OPPOSITE would be to gently avoid or to demonstrate kindness towards it. SADNESS we have the urge to withdraw/isolate. OPPOSITE would be to get self to move and be active physically or socially.      These additional skills may help with self-soothing and distracting you:      Activities   Focus attention on a task you need to get done. Rent movies; watch TV. Clean a room in your house. Find an event to go to. Play computer games. Go walking. Exercise. Surf the Internet. Write e-mails. Play sports. Go out for a meal or eat a favorite food. Call or go out with a friend. Listen to your iPod; download music. Build something. Spend time with your children. Play cards. Read magazines, books, comics. Do crossword puzzles or Sudoku.     Emotions   Read emotional books or stories, old letters. Watch emotional TV shows; go to emotional movies. Listen to emotional music. (Be sure the event creates different emotions.) Ideas: Scary movies, joke books, comedies, funny records, Hindu music, soothing music or music that fires you up, going to a store and reading funny greeting cards.     Thoughts   Count to 10; count colors in a painting or poster or out the window; count anything. Repeat words to a song in your mind. Work puzzles. Watch TV or read.     Sensations   Squeeze a rubber ball very hard. Listen to very loud music. Hold ice in your hand or mouth. Go out in the rain or snow. Take a hot or cold shower.   Remember that you can use your 5 senses as helpful self-soothing tools!       I can help my own emotions by practicing the following to keep my emotional mind healthy and bring positive emotions:     The ABC PLEASE skill is about taking good care of ourselves so that we can take care of others. Also, an important component of DBT is to reduce our vulnerability. When we take good care of  ourselves, we are less likely to be vulnerable to disease and emotional crisis.     ABC   A- Accumulate positive emotions by doing things that are pleasant.   B- Build mastery by doing things we enjoy. Whether it is reading, cooking, cleaning, fixing a car, working a cross word puzzle, or playing a musical instrument. Practice these things to  and in time we feel competent.   C- Ophelia Ahead by rehearsing a plan ahead of time so that we can be prepared to cope skillfully. (Think of what makes situations difficult, and what helps in those situations)      PLEASE   Treat Physical Illness and take medications as prescribed.   Balance eating in order to avoid mood swings.   Avoid mood-Altering substances and have mood control.   Maintain good sleep so you can enjoy your life.   Get exercise to maintain high spirits.

## 2024-08-15 NOTE — ED PROVIDER NOTES
[facility] Unit - Psychiatric Consultation  [facility] Emergency Department    Marbella Hendrix MRN: [id number]   Age: 58 year old YOB: 1965     History     Chief Complaint   Patient presents with    Mental Health Problem     HPI  Marbella Hendrix is a 58 year old female with history notable for anxiety, depression, and ADHD who presented to the emergency department with worsening anxiety further impacted by numerous psychosocial stressors. In the emergency department, this patient was determined to be medically stable and transferred to the EmPATH unit for psychiatric assessment.  Record review indicates patient has been previously followed by Dr. Lewis as part of [facility], last visit was in April 2024. They have currently been in the emergency department for 3 hours. Marbella was sitting in recliner when I approached, she was agreeable to meet with me in a consult room. 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 Her anxiety acutely increased last Friday evening and she has had difficulty sleeping despite taking her medications. At times she feels breathlessness and overwhelmed. She has noticed increasing worries and ruminating thoughts about her stressors. Her appetite has decreased. She denies any substance use, denies symptoms of psychosis and denies SI/HI. She feels as though her medications have been helpful until last week, she has been compliant with scheduled Effexor XR and olanzapine at bedtime. She has a prescription for prn Ativan however she does not want to take this for fear of addiction. She did received a dose of hydroxyzine at [facility] and has found this to be helpful noting she feels calm and relaxed. She does feel a bit tired after taking hydroxyzine and asks to take this at a lower dose. Discussed considerations for increasing Effexor XR to further  target elevated baseline anxiety, she is agreeable to this given past perceived benefit. She has established therapy every other week and has a meeting with her established therapist tomorrow which she is looking forward to. She does not have a current psychiatrist, she previously met with Dr. Lewis however notes this was short-term and care was transitioned back to her PCP. She is interested in a referral for long-term psychiatric medication management. She would like to discharge home this evening.     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     Uncomplicated asthma     My son has it, since he was born    Vesico-ureteral reflux     s/p repair     Past Surgical History:   Procedure Laterality Date    ABDOMEN SURGERY      exploratory after C section for sepsis     SECTION      x 2    HYSTERECTOMY RADICAL  2015    HYSTERECTOMY SUPRACERVICAL N/A 2015    Procedure: HYSTERECTOMY SUPRACERVICAL;  Surgeon: Kelle Wasserman MD;  Location:  OR    LUMPECTOMY BREAST      MYOMECTOMY UTERUS      ORTHOPEDIC SURGERY  2020    3-18-1, Nancy date R hip replacement, March L meniscus repair    REPAIR PTOSIS Right     reconstruction of tear duct    REPAIR PTOSIS BILATERAL Bilateral 2020    Procedure: BILATERAL PTOSIS REPAIR, IRRIGATION OF LACRIMAL SYSTEM BILATERAL;  Surgeon: Pako Sosa MD;  Location:  OR    SOFT TISSUE SURGERY  2021    L meniscus tear repair     atorvastatin (LIPITOR) 20 MG tablet  calcium carbonate (OS-ARA) 500 MG tablet  hydrOXYzine HCl (ATARAX) 25 MG tablet  levothyroxine  "(SYNTHROID/LEVOTHROID) 125 MCG tablet  melatonin 5 MG tablet  OLANZapine (ZYPREXA) 5 MG tablet  Omega-3 Fatty Acids (OMEGA-3 FISH OIL PO)  venlafaxine (EFFEXOR XR) 150 MG 24 hr capsule  venlafaxine (EFFEXOR XR) 37.5 MG 24 hr capsule  zolpidem (AMBIEN) 5 MG tablet      Allergies   Allergen Reactions    Erythromycin Itching     Itching and swelling of lids    Itching, swelling of lids    Seasonal Allergies Itching and Other (See Comments)     Family History  Family History   Problem Relation Age of Onset    High cholesterol Father     Cancer Father         CLL    Blood Disease Father         CLL    Glaucoma Father     Macular Degeneration Father     Breast Cancer Maternal Grandmother         in 80s    Cancer Maternal Grandmother     Osteoporosis Maternal Grandmother     Depression Mother     Diabetes Mother     Ulcerative Colitis Mother     Lipids Mother         hypertriglyceridemia    Anxiety Disorder Mother     Osteoporosis Mother     Diabetes Brother      Social History   Social History     Tobacco Use    Smoking status: Former     Current packs/day: 0.00     Types: Cigarettes     Quit date: 1990     Years since quittin.6     Passive exposure: Never    Smokeless tobacco: Never   Vaping Use    Vaping status: Never Used   Substance Use Topics    Alcohol use: Not Currently     Comment: One or two drinks a month    Drug use: Not Currently     Types: Cocaine, Marijuana, Methamphetamines, Opiates, Amphetamines, Barbiturates, \"Crack\" cocaine, Codeine, Hashish, Hydrocodone, LSD, Morphine, Opium, Oxycodone     Comment: nothing since           Review of Systems  A medically appropriate review of systems was performed with pertinent positives and negatives noted in the HPI, and all other systems negative.    Physical Examination   BP: (!) 148/88  Pulse: 88  Temp: 97  F (36.1  C)  Resp: 16  Height: 160 cm (5' 3\")  Weight: 96.6 kg (213 lb)  SpO2: 99 %    Physical Exam  General: Appears stated age.   Neuro: Alert " and fully oriented. Extremities appear to demonstrate normal strength on visual inspection.   Integumentary/Skin: no rash visualized, normal color    Psychiatric Examination   Appearance: awake, alert, adequately groomed, appeared as age stated, and casually dressed  Attitude:  cooperative  Eye Contact:  good  Mood:  anxious  Affect:  mood congruent  Speech:  clear, coherent and normal prosody  Psychomotor Behavior:  no evidence of tardive dyskinesia, dystonia, or tics and intact station, gait and muscle tone  Thought Process:  logical and linear  Associations:  no loose associations  Thought Content:  no evidence of suicidal ideation or homicidal ideation and no evidence of psychotic thought  Insight:  fair  Judgement:  fair  Oriented to:  time, person, and place  Attention Span and Concentration:  intact  Recent and Remote Memory:  intact  Language: able to name/identify objects without impairment  Fund of Knowledge: intact with awareness of current and past events    ED Course     ED Course as of 08/14/24 1936   Wed Aug 14, 2024   1650 I obtained the history and examined the patient as noted above.          Labs Ordered and Resulted from Time of ED Arrival to Time of ED Departure - No data to display    Assessments & Plan (with Medical Decision Making)   Patient presenting with worsening anxiety despite adherence to medications. Presentation if further impacted by ongoing psychosocial stressors. Patient reports perceived benefit with Effexor XR and olanzapine yet has not found these to be helpful over the past week. Treatment plan focused on further optimization of medications targeting elevated anxiety and connecting patient with additional outpatient mental health supports. Nursing notes reviewed noting no acute issues.     I have reviewed the assessment completed by the [facility].     Preliminary diagnosis:    ICD-10-CM    1. Acute reaction to situational stress  F43.0       2. Generalized anxiety disorder with  panic attacks  F41.1     F41.0       3. Major depressive disorder, recurrent episode, mild (H24)  F33.0            Treatment Plan:  -Increase Effexor XR from 150mg to 187.5mg daily further targeting anxiety, could consider further dose optimization pending tolerability   -Hydroxyzine 12.5-25mg twice daily as needed for anxiety  -Continue all other PTA medications  -Medication education provided this visit including but not limited to: Rationale for medication, importance of medication adherence, medication interactions, common medication side effects, benefits of medications.  -Individual psychotherapy and outpatient psychiatric care recommended for additional support and ongoing development of nonpharmacologic coping skills and strategies.    -Anticipate resuming outpatient psychotherapy, encouraged patient to increase frequency of this from every other week to weekly   -Referral to psychiatric medication management   -Problem focused supportive therapy and education provided today related to patient's current and acute stressors, symptoms, and diagnoses.  -Discharge to home with outpatient and crisis supports       --  MCKENNA Medina CNP   [facility] EMERGENCY DEPT  EmPATH Unit       Michelle Duarte APRN CNP  08/14/24 1954       Michelle Duarte APRN CNP  08/14/24 1954

## 2024-08-16 ENCOUNTER — MYC MEDICAL ADVICE (OUTPATIENT)
Dept: FAMILY MEDICINE | Facility: CLINIC | Age: 59
End: 2024-08-16
Payer: COMMERCIAL

## 2024-08-16 DIAGNOSIS — G47.33 OSA (OBSTRUCTIVE SLEEP APNEA): Primary | ICD-10-CM

## 2024-08-16 NOTE — TELEPHONE ENCOUNTER
LS/DOD,  Please see below [employer] At[employer] message and advise.  Patient aware you are out of office  Pended new sleep referral  Thanks,  Hanh KERN RN

## 2024-08-19 ENCOUNTER — MYC MEDICAL ADVICE (OUTPATIENT)
Dept: FAMILY MEDICINE | Facility: CLINIC | Age: 59
End: 2024-08-19
Payer: COMMERCIAL

## 2024-08-19 ENCOUNTER — MYC REFILL (OUTPATIENT)
Dept: FAMILY MEDICINE | Facility: CLINIC | Age: 59
End: 2024-08-19
Payer: COMMERCIAL

## 2024-08-19 DIAGNOSIS — G47.00 INSOMNIA, UNSPECIFIED TYPE: ICD-10-CM

## 2024-08-20 RX ORDER — ZOLPIDEM TARTRATE 5 MG/1
5 TABLET ORAL
Qty: 20 TABLET | Refills: 0 | Status: SHIPPED | OUTPATIENT
Start: 2024-08-20 | End: 2024-09-11

## 2024-09-03 ENCOUNTER — MYC MEDICAL ADVICE (OUTPATIENT)
Dept: FAMILY MEDICINE | Facility: CLINIC | Age: 59
End: 2024-09-03
Payer: COMMERCIAL

## 2024-09-03 DIAGNOSIS — F41.1 GENERALIZED ANXIETY DISORDER: Primary | ICD-10-CM

## 2024-09-03 NOTE — TELEPHONE ENCOUNTER
LS,  Please see below MyChart message and advise.  Also looks like at last visit with JS it was discussed to loop Dr. Lewis in for awareness- not sure if anything came of that?  Thanks,  Hanh KERN RN

## 2024-09-06 DIAGNOSIS — E78.5 HYPERLIPIDEMIA LDL GOAL <130: ICD-10-CM

## 2024-09-06 RX ORDER — ATORVASTATIN CALCIUM 20 MG/1
20 TABLET, FILM COATED ORAL DAILY
Qty: 90 TABLET | Refills: 2 | Status: SHIPPED | OUTPATIENT
Start: 2024-09-06

## 2024-09-07 ASSESSMENT — ANXIETY QUESTIONNAIRES
GAD7 TOTAL SCORE: 20
GAD7 TOTAL SCORE: 20
7. FEELING AFRAID AS IF SOMETHING AWFUL MIGHT HAPPEN: NEARLY EVERY DAY
7. FEELING AFRAID AS IF SOMETHING AWFUL MIGHT HAPPEN: NEARLY EVERY DAY
8. IF YOU CHECKED OFF ANY PROBLEMS, HOW DIFFICULT HAVE THESE MADE IT FOR YOU TO DO YOUR WORK, TAKE CARE OF THINGS AT HOME, OR GET ALONG WITH OTHER PEOPLE?: VERY DIFFICULT
GAD7 TOTAL SCORE: 20
GAD7 TOTAL SCORE: 20
8. IF YOU CHECKED OFF ANY PROBLEMS, HOW DIFFICULT HAVE THESE MADE IT FOR YOU TO DO YOUR WORK, TAKE CARE OF THINGS AT HOME, OR GET ALONG WITH OTHER PEOPLE?: VERY DIFFICULT

## 2024-09-09 DIAGNOSIS — F41.9 ANXIETY: Primary | ICD-10-CM

## 2024-09-10 RX ORDER — VENLAFAXINE HYDROCHLORIDE 37.5 MG/1
37.5 CAPSULE, EXTENDED RELEASE ORAL DAILY
Qty: 30 CAPSULE | Refills: 1 | Status: SHIPPED | OUTPATIENT
Start: 2024-09-10 | End: 2024-10-02 | Stop reason: DRUGHIGH

## 2024-09-10 NOTE — PROGRESS NOTES
[facility] Psychiatry Services [facility]  September 11, 2024      Behavioral Health Clinician Progress Note    Patient Name: Marbella Hendrix           Service Type:  Individual      Service Location:   [facility] / Email (patient reached)     Session Start Time: 11am  Session End Time: 1122am      Session Length: 16 - 37      Attendees: Client     Service Modality:  Video Visit:      Provider verified identity through the following two step process.  Patient provided:  Patient is known previously to provider    Telemedicine Visit: The patient's condition can be safely assessed and treated via synchronous audio and visual telemedicine encounter.      Reason for Telemedicine Visit: Services only offered telehealth    Originating Site (Patient Location): Patient's home    Distant Site (Provider Location): Provider Remote Setting- Home Office    Consent:  The patient/guardian has verbally consented to: the potential risks and benefits of telemedicine (video visit) versus in person care; bill my insurance or make self-payment for services provided; and responsibility for payment of non-covered services.     Patient would like the video invitation sent by:  My Chart    Mode of Communication:  Video Conference via [facility]    Distant Location (Provider):  Off-site    As the provider I attest to compliance with applicable laws and regulations related to telemedicine.    Visit Activities (Refresh list every visit): [facility] Only    Diagnostic Assessment Date: 7/17/2023 MADELIN Patel, [facility]   Treatment Plan Review Date: 9/11/24  See Flowsheets for today's PHQ-9 and BERTHA-7 results  Previous PHQ-9:       4/16/2024     8:07 PM 6/7/2024     1:56 PM 8/13/2024    10:48 AM   PHQ-9 SCORE   PHQ-9 Total Score [facility] 8 (Mild depression) 5 (Mild depression) 22 (Severe depression)   PHQ-9 Total Score 8 5 22     Previous BERTHA-7:       6/7/2024     1:57 PM 8/13/2024    10:48 AM 9/7/2024    12:16 PM   BERTHA-7 SCORE    Total Score 13 (moderate anxiety) 21 (severe anxiety) 20 (severe anxiety)   Total Score 13 21 20    20     The following assessments were completed by patient for this visit:  PHQ2:       2024    11:59 AM 3/25/2024     2:35 PM 2024     7:51 AM 2023    11:12 AM 2023     1:56 PM 2023     8:18 AM 2022    11:27 AM   PHQ-2 (  [employer])   Q1: Little interest or pleasure in doing things 0 1 0 2 1 3 1   Q2: Feeling down, depressed or hopeless 1 1 1 2 1 3 1   PHQ-2 Score 1    1 2 1    1 4 2 6 2   Q1: Little interest or pleasure in doing things Not at all  Not at all More than half the days Several days Nearly every day Several days   Q2: Feeling down, depressed or hopeless Several days  Several days More than half the days Several days Nearly every day Several days   PHQ-2 Score 1  1 4 2 6 2     GAD2:       2023     4:23 PM 2023     1:58 PM 2023     6:51 AM 2023     4:23 PM 2023    12:43 PM 2024     7:20 AM 2024    12:16 PM   BERTHA-2   Feeling nervous, anxious, or on edge 3 2 2 1 1 1 3   Not being able to stop or control worrying 3 2 2 1 1 1 3   BERTHA-2 Total Score 6 4 4 2 2 2    2 6    6     DATA  Extended Session (60+ minutes): No  Interactive Complexity: No  Crisis: No  [facility] Patient: No    Treatment Objective(s) Addressed in This Session:  Target Behavior(s): disease management/lifestyle changes reduce sx of anxiety    Anxiety: will experience a reduction in anxiety      Current Stressors / Issues:   update:  Depression and anxiety worse.  Panic attacks regularly- taking Vistaril makes her fatigued.  Went to [facility], felt helpful.  Considering going back for support.  Stresses:  Mom  in April.  Youngest son moved away to college ([employer]).  Cat -spent significant money and time trying to save him.  Loss of control, feels like she is always worried about his safety. Applying for jobs.  Other son, is really struggling with life functioning/ASD- tried being  "an .  Appetite:   Sleep: 2 nights a week, Zyprexa works 6-7 hours.  Other nights, only getting 4 hours with Ambien, Melatonin, Zyprexa.  About a month like this.  Outpatient Provider updates: Doing acupuncture. [facility].  SI/SIB/HI: Passive suicidal ideation, no plan/intent.  Fleeting. No previous attempts.  Safety plan reviewed.  Future and goal oriented.  BRAD:  Denies  Side effects/compliance:  Interventions:   [facility] reviewed safety plan and emergency services as needed.  Pt notes understanding and awareness.    Most important:  Not sleeping, not functioning.  Sx much worse.  Passive SI returned.    4/8  MH update:  Sx feel stable.  No acute depression/anxiety/panic.  Stresses:  Lots of stress with kiddos/spouse  Appetite: Weight gain-med side constantly hungry  Sleep: Denies  Outpatient Provider updates:   Dixie Boykin- taking a break.  Got a list of new therapist  SI/SIB/HI: Denies  BRAD: Denies  Side effects/compliance: N/a  Interventions:  [facility] offered space and validation for any concerns  Most important:  Feels stable.    1/2  MH update: Things are going well.  On the 5th med for anxiety.  Really like it.  Anxiety feels manageable.  Freak out daily but not every moment.  Denies any panic attacks.  Feeling behind the \"8 ball\".  [place] due to feeling uncomfortable from constipation.  Denies any overwhelming feelings of depression, hopelessness, worthlessness, etc. .  Stresses:  Holidays were fun, sad its over.  Got to see a friend from out of town.  Arthritic hand worse in the cold notes she needs to be wearing her hand brace.  Going to [employer], just completed prior to appt.   Unemployed hasn't been looking due to transportation of child.  Would like to work.  Appetite: Denies  Sleep: Feeling pleased.  Sleep much improved. Having dreams.    Outpatient Provider updates: Therapist once a month Dixie Boykin, appt next week.    SI/SIB/HI: Denies  BRAD: Denies  Preg: n/a  Side " "effects/compliance: constipated, uncomfortable and bloated.  Interventions: [facility] supported on going anxiety reduction skill use  Most important: Constipation side effects     Reason for CCPS: Pt says that they had \"bad anxiety\" since mid to late 20s. Pt has been on the same medication and dose since then. In April it became crippling so pt wasn't able to function and had heavy breathing. Pt slept for a few hours and then next night was up the whole night. Pt has a lot of things that occurred during this time since bad stuff has been happening. Pt didn't go to bed last night. They changed bedrooms. This sleep concern started in April. Pt went out of town and this helped a bit.   The thoughts were frightening in their head. Pt had intrusive thoughts. Pt doesn't have these thoughts anymore. Pt had a senior taking care of pass away.   Sleep study: pt attempted this in the middle of many life events, pt rescheduled it for fall    Pt denies any restless legs. Pt will have anxiety before bed. It is painful, not sure if I will sleep. Haven't had this for a month.   Depression: feeling overwhelmed with tasks in the day and are unable to see any positivity, not functioning well, making mistakes   Hope to: find something to help with anxiety so they don't have a crippling bout and to be able to manage    Progress on Treatment Objective(s) / Homework:  New Objective established this session - ACTION (Actively working towards change); Intervened by reinforcing change plan / affirming steps taken    Motivational Interviewing    MI Intervention: Co-Developed Goal: reduce on going sx of anxiety, Expressed Empathy/Understanding, Open-ended questions, and Reflections: simple and complex     Change Talk Expressed by the Patient: Desire to change Ability to change Activation Taking steps    Provider Response to Change Talk: A - Affirmed patient's thoughts, decisions, or attempts at behavior change and R - Reflected patient's change " talk    Assessments completed prior to visit:    The following assessments were completed by patient for this visit:  Denhoff Suicide Severity Rating Scale (Lifetime/Recent)      7/17/2023     9:35 AM 1/2/2024    11:33 AM 8/14/2024     4:55 PM 8/14/2024     5:45 PM 8/14/2024     6:49 PM   Denhoff Suicide Severity Rating (Lifetime/Recent)   Q1 Wish to be Dead (Lifetime)     Yes   Q2 Non-Specific Active Suicidal Thoughts (Lifetime)     No   Q1 Wished to be Dead (Past Month)   0-->no 1-->yes    Q2 Suicidal Thoughts (Past Month)   0-->no 0-->no    Q6 Suicide Behavior (Lifetime)   0-->no 0-->no 0-->no   Level of Risk per Screen   no risks indicated low risk    Q1 Wish to be Dead (Lifetime) Y N      Wish to be Dead Description (Lifetime) Pt reports having thoughts and never acted on them. They report the last time was May or June 2023.       Q2 Non-Specific Active Suicidal Thoughts (Lifetime) Y N      Non-Specific Active Suicidal Thought Description (Lifetime) Pt reports that they had a very stressful time with many life evetns in April and this impacted these thoughts.       2. Non-Specific Active Suicidal Thoughts (Past 1 Month) N       3. Active Suicidal Ideation with any Methods (Not Plan) Without Intent to Act (Lifetime) N    N   Q4 Active Suicidal Ideation with Some Intent to Act, Without Specific Plan (Lifetime) N    N   Q5 Active Suicidal Ideation with Specific Plan and Intent (Lifetime) N    N   Most Severe Ideation Rating (Lifetime) 2       Description of Most Severe Ideation (Lifetime) Pt reports having intrusive thoughts while driving and reports that they did not act on these and have their children and will never act on them.       Frequency (Lifetime) 3       Duration (Lifetime) 1       Controllability (Lifetime) 1       Deterrents (Lifetime) 1       Reasons for Ideation (Lifetime) 0       Actual Attempt (Lifetime) N N   N   Has subject engaged in non-suicidal self-injurious behavior? (Lifetime) N N   N    Interrupted Attempts (Lifetime) N N   N   Aborted or Self-Interrupted Attempt (Lifetime) N N   N   Preparatory Acts or Behavior (Lifetime) N N   N   Calculated C-SSRS Risk Score (Lifetime/Recent) No Risk Indicated No Risk Indicated   No Risk Indicated       Care Plan review completed: Yes    Medication Review:  Changes to psychiatric medications, see updated Medication List in EPIC.     Medication Compliance:  Yes    Changes in Health Issues:   None reported    Chemical Use Review:   Substance Use: Chemical use reviewed, no active concerns identified      Tobacco Use: No current tobacco use.      Assessment: Current Emotional / Mental Status (status of significant symptoms):  Risk status (Self / Other harm or suicidal ideation)  Patient has had a history of suicidal ideation: reports a hx of ideation without specific planning/intent/action back in 2022  Patient denies current fears or concerns for personal safety.  Patient denies current or recent suicidal ideation or behaviors.  Patient denies current or recent homicidal ideation or behaviors.  Patient denies current or recent self injurious behavior or ideation.  Patient denies other safety concerns.  A safety and risk management plan has been developed including: Patient consented to co-developed safety plan.  A safety and risk management plan was completed.  Patient agreed to use safety plan should any safety concerns arise.  A copy was given to the patient.    Appearance:   Appropriate   Eye Contact:   Good   Psychomotor Behavior: Normal   Attitude:   Cooperative   Orientation:   All  Speech   Rate / Production: Normal    Volume:  Normal   Mood:    Normal   Affect:    Appropriate   Thought Content:  Clear   Thought Form:  Coherent  Logical   Insight:    Good     Diagnoses:  1. Generalized anxiety disorder    2. Moderate episode of recurrent major depressive disorder (H)            Collateral Reports Completed:  Communicated with: Dr Lewis    Plan: (Homework,  "other):  Patient was given information about behavioral services and encouraged to schedule a follow up appointment with the clinic [facility] as needed.  She was also given information about mental health symptoms and treatment options .  CD Recommendations: No indications of CD issues.     Rafaela Hassan, [facility]        Individual Treatment Plan    Patient's Name: Marbella Hendrix   YOB: 1965  Date of Creation: 9/11/24  Date Treatment Plan Last Reviewed/Revised: 9/11/24    DSM5 Diagnoses: 296.32 (F33.1) Major Depressive Disorder, Recurrent Episode, Moderate _ or 300.02 (F41.1) Generalized Anxiety Disorder  Psychosocial / Contextual Factors: Relationship Concerns, Occupational Issues, and Interpersonal Concerns  PROMIS (reviewed every 90 days):   The following assessments were completed by patient for this visit:  PROMIS 10-Global Health (only subscores and total score):       10/14/2019    12:11 PM 11/25/2019    12:52 PM 7/14/2023     4:22 PM 11/7/2023     4:23 PM 1/2/2024    10:47 AM 4/2/2024     7:21 AM 9/7/2024    12:17 PM   PROMIS-10 Scores Only   Global Mental Health Score 15 15 11    11 12 14 13    13 9    9   Global Physical Health Score 14 12 14    14 14 13 14    14 12    12   PROMIS TOTAL - SUBSCORES 29 27 25    25 26 27 27    27 21    21        Referral / Collaboration:  Referral to another professional/service is not indicated at this time..    Anticipated number of session for this episode of care: 6-9 sessions  Anticipation frequency of session: Monthly  Anticipated Duration of each session: 16-37 minutes  Treatment plan will be reviewed in 90 days or when goals have been changed.       MeasurableTreatment Goal(s) related to diagnosis / functional impairment(s)  Goal 1: Patient will reduce sx of anxiety   \"I will know I've met my goal when I can sleep and clearly think through my thoughts to use my skills.\"     Objective #A (Patient Action)    Patient will identify three distraction and " diversion activities and use those activities to decrease level of anxiety    Status: New - Date: 9/11/24      Intervention(s)  [facility] will provide support through CBT, MI, Acceptance and Commitment Therapy, Dialectic Behavioral Therapy and problem solving model to explore and overcome barriers.    Goal 2: Patient will manage concerns of safety    I will know I've met my goal when I can reduce suicidal ideation .      Objective #A (Patient Action)    Patient will use previously developed safety plan on file.  Status: New - Date: 9/11/24     Intervention(s)  Therapist will provide support through CBT, MI, Acceptance and Commitment Therapy, Dialectic Behavioral Therapy and problem solving model to explore and overcome barriers.      Patient has reviewed and agreed to the above plan.    Written by  Rafaela Hassan LPCC, [facility]

## 2024-09-11 ENCOUNTER — VIRTUAL VISIT (OUTPATIENT)
Dept: PSYCHIATRY | Facility: CLINIC | Age: 59
End: 2024-09-11
Attending: FAMILY MEDICINE
Payer: COMMERCIAL

## 2024-09-11 ENCOUNTER — VIRTUAL VISIT (OUTPATIENT)
Dept: BEHAVIORAL HEALTH | Facility: CLINIC | Age: 59
End: 2024-09-11
Payer: COMMERCIAL

## 2024-09-11 DIAGNOSIS — F33.1 MODERATE EPISODE OF RECURRENT MAJOR DEPRESSIVE DISORDER (H): ICD-10-CM

## 2024-09-11 DIAGNOSIS — F41.1 GENERALIZED ANXIETY DISORDER: Primary | ICD-10-CM

## 2024-09-11 DIAGNOSIS — G47.00 INSOMNIA, UNSPECIFIED TYPE: ICD-10-CM

## 2024-09-11 DIAGNOSIS — F90.9 ATTENTION DEFICIT HYPERACTIVITY DISORDER (ADHD), UNSPECIFIED ADHD TYPE: ICD-10-CM

## 2024-09-11 PROCEDURE — 99214 OFFICE O/P EST MOD 30 MIN: CPT | Mod: 95 | Performed by: PSYCHIATRY & NEUROLOGY

## 2024-09-11 PROCEDURE — 90832 PSYTX W PT 30 MINUTES: CPT | Mod: 95 | Performed by: COUNSELOR

## 2024-09-11 PROCEDURE — G2211 COMPLEX E/M VISIT ADD ON: HCPCS | Mod: 95 | Performed by: PSYCHIATRY & NEUROLOGY

## 2024-09-11 RX ORDER — ZOLPIDEM TARTRATE 5 MG/1
5 TABLET ORAL
Qty: 30 TABLET | Refills: 0 | Status: SHIPPED | OUTPATIENT
Start: 2024-09-11 | End: 2024-09-23 | Stop reason: ALTCHOICE

## 2024-09-11 RX ORDER — HYDROXYZINE HYDROCHLORIDE 25 MG/1
12.5-25 TABLET, FILM COATED ORAL 2 TIMES DAILY PRN
Qty: 120 TABLET | Refills: 0 | Status: SHIPPED | OUTPATIENT
Start: 2024-09-11

## 2024-09-11 RX ORDER — MIRTAZAPINE 15 MG/1
15 TABLET, FILM COATED ORAL AT BEDTIME
Qty: 30 TABLET | Refills: 2 | Status: SHIPPED | OUTPATIENT
Start: 2024-09-11 | End: 2024-10-02 | Stop reason: ALTCHOICE

## 2024-09-11 ASSESSMENT — PATIENT HEALTH QUESTIONNAIRE - PHQ9
10. IF YOU CHECKED OFF ANY PROBLEMS, HOW DIFFICULT HAVE THESE PROBLEMS MADE IT FOR YOU TO DO YOUR WORK, TAKE CARE OF THINGS AT HOME, OR GET ALONG WITH OTHER PEOPLE: VERY DIFFICULT
SUM OF ALL RESPONSES TO PHQ QUESTIONS 1-9: 21
10. IF YOU CHECKED OFF ANY PROBLEMS, HOW DIFFICULT HAVE THESE PROBLEMS MADE IT FOR YOU TO DO YOUR WORK, TAKE CARE OF THINGS AT HOME, OR GET ALONG WITH OTHER PEOPLE: VERY DIFFICULT
SUM OF ALL RESPONSES TO PHQ QUESTIONS 1-9: 21

## 2024-09-11 NOTE — PATIENT INSTRUCTIONS
Treatment Plan:  Continue Ambien/zolpidem 5 mg nightly as needed for insomnia. Continue to use sparingly (discussed ok nightly for 1-2 weeks to re-establish good sleep). If taking, take before you go to bed and not middle of night. Do not take with Ativan/lorazepam.   Continue Effexor-XR/venlafaxine .5 mg daily for anxiety/mood.  Discontinue olanzapine: can take 2.5 mg at bedtime for 3 days then stop.   Start mirtazapine 15 mg at bedtime for sleep and to augment venlafaxine ER.  Strongly recommend psychotherapy.  Follow up with me in 2 weeks.  For scheduling needs you can call [phone].  You could also get a message to the nurses by calling the aforementioned phone number.  Continue all other cares per primary care provider.   Continue all other medications as reviewed per electronic medical record today.   Safety plan reviewed. To the Emergency Department as needed or call after hours crisis line at [phone] or [phone]. [place] Crisis Text Line. Text [place] to 610340 or Suicide LifeLine Chat: suicidepreventionCritical Medialine.org/chat  Consider DBT therapy.  Strongly recommend individual psychotherapy for additional support and ongoing development of nonpharmacologic coping skills and strategies.  Follow up with primary care provider as planned or for acute medical concerns.  Renrendaihart may be used to communicate with your provider, but this is not intended to be used for emergencies.    Risks of benzodiazepine (Ativan, Xanax, Klonopin, Valium, etc) use including, but not limited to, sedation, tolerance, risk for addiction/dependence. Do not drink alcohol while taking benzodiazepines due to risk of trouble breathing and potential death. Do not drive or operate heavy machinery until it is known how the drug affects you. Discuss with physician or pharmacist before ever taking a benzodiazepine with a narcotic/opioid pain medication.     Book:   [facility]  By Ingris ROBLES  "resources:  https://[place].gov/dhs/partners-and-providers/policies-procedures/adult-mental-health/dialectical-behavior-therapy/dbt-certified-providers/    Some [facility] DBT resources:  Edith Arango   [phone]   https://[employer]/faqs/     Brian   [phone]   https://[employer]     [facility]   [phone]   https://www.mhs-dbt.com     [place]   [facility]   [phone]   https://dbtassociates.[employer]     Patient Education   Collaborative Care Psychiatry Service  What to Expect  Here's what to expect from your Collaborative Care Psychiatry Service (CCPS).   About CCPS  CCPS means 2 people work together to help you get better. You'll meet with a behavioral health clinician and a psychiatric doctor. A behavioral health clinician helps people with mental health problems by talking with them. A psychiatric doctor helps people by giving them medicine.  How it works  At every visit, you'll see the behavioral health clinician (BHC) first. They'll talk with you about how you're doing and teach you how to feel better.   Then you'll see the psychiatric doctor. This doctor can help you deal with troubling thoughts and feelings by giving you medicine. They'll make sure you know the plan for your care.   CCPS usually takes 3 to 6 visits. If you need more visits, we may have you start seeing a different psychiatric doctor for ongoing care.  If you have any questions or concerns, we'll be glad to talk with you.  About visits  Be open  At your visits, please talk openly about your problems. It may feel hard, but it's the best way for us to help you.  Cancelling visits  If you can't come to your visit, please call us right away at [phone]. If you don't cancel at least 24 hours (1 full day) before your visit, that's \"late cancellation.\"  Being late to visits  Being very late is the same as not showing up. You will be a \"no show\" if:  Your appointment starts with a BHC, and you're " more than 15 minutes late for a 30-minute (half hour) visit. This will also cancel your appointment with the psychiatric doctor.  Your appointment is with a psychiatric doctor only, and you're more than 15 minutes late for a 30-minute (half hour) visit.  Your appointment is with a psychiatric doctor only, and you're more than 30 minutes late for a 60-minute (full hour) visit.  If you cancel late or don't show up 2 times within 6 months, we may end your care.   Getting help between visits  If you need help between visits, you can call us Monday to Friday from 8 a.m. to 4:30 p.m. at [phone].  Emergency care  Call 911 or go to the nearest emergency department if your life or someone else's life is in danger.  Call 518 anytime to reach the national Suicide and Crisis hotline.  Medicine refills  To refill your medicine, call your pharmacy. You can also call [facility]'s Behavioral Access at [phone], Monday to Friday, 8 a.m. to 4:30 p.m. It can take 1 to 3 business days to get a refill.   Forms, letters, and tests  You may have papers to fill out, like FMLA, short-term disability, and workability. We can help you with these forms at your visits, but you must have an appointment. You may need more than 1 visit for this, to be in an intensive therapy program, or both.  Before we can give you medicine for ADHD, we may refer you to get tested for it or confirm it another way.  We may not be able to give you an emotional support animal letter.  We don't do mental health checks ordered by the court.   We don't do mental health testing, but we can refer you to get tested.   Thank you for choosing us for your care.  For informational purposes only. Not to replace the advice of your health care provider. Copyright   2022 [facility]. All rights reserved. [employer] 060882 - 12/22.

## 2024-09-11 NOTE — PROGRESS NOTES
"Virtual Visit Details    Type of service:  Video Visit     Originating Location (pt. Location): {video visit patient location:644074::\"Home\"}  {PROVIDER LOCATION On-site should be selected for visits conducted from your clinic location or adjoining [facility] hospital, academic office, or other nearby [facility] building. Off-site should be selected for all other provider locations, including home:199740}  Distant Location (provider location):  {virtual location provider:300588}  Platform used for Video Visit: {Virtual Visit Platforms:987566::\"[employer]arm\"}          Answers submitted by the patient for this visit:  Patient Health Questionnaire (G7) (Submitted on 9/7/2024)  BERTHA 7 TOTAL SCORE: 20    "

## 2024-09-11 NOTE — NURSING NOTE
Is the patient currently in the state of [place]? YES    Current patient location:  [address] [facility] [id number]    Visit mode:VIDEO    If the visit is dropped, the patient can be reconnected by: VIDEO VISIT: Text to cell phone:   Telephone Information:   Mobile [phone]       Will anyone else be joining the visit? Yes: How would they like to receive their invite Text to cell phone: [phone]  (If patient encounters technical issues they should call [phone])    How would you like to obtain your AVS? MyChart    Are changes needed to the allergy or medication list? Pt stated no changes to allergies and Pt stated no med changes    Are refills needed on medications prescribed by this physician? YES, zolpidem (AMBIEN) 5 MG tablet.    Rooming Documentation: Questionnaire(s) completed.    Reason for visit: MUNA Finch, JOHNF

## 2024-09-11 NOTE — PROGRESS NOTES
"Telemedicine Visit: The patient's condition can be safely assessed and treated via synchronous audio and visual telemedicine encounter.      Reason for Telemedicine Visit: Patient has requested telehealth visit    Originating Site (Patient Location): Patient's home    Distant Location (provider location):  Off-Site    Consent:  The patient/guardian has verbally consented to: the potential risks and benefits of telemedicine (video visit) versus in person care; bill my insurance or make self-payment for services provided; and responsibility for payment of non-covered services.     Mode of Communication:  Video Conference via [employer]    As the provider I attest to compliance with applicable laws and regulations related to telemedicine.          Outpatient Psychiatric Progress Note    Name: Marbella Hendrix   : 1965                    Primary Care Provider: Vanessa Ladd MD   Therapist: Doing acupuncture. [facility].       PHQ-9 scores:      2024     1:56 PM 2024    10:48 AM 2024    10:50 AM   PHQ-9 SCORE   PHQ-9 Total Score MyChart 5 (Mild depression) 22 (Severe depression) 21 (Severe depression)   PHQ-9 Total Score 5 22 21    21       BERTHA-7 scores:      2024     1:57 PM 2024    10:48 AM 2024    12:16 PM   BERTHA-7 SCORE   Total Score 13 (moderate anxiety) 21 (severe anxiety) 20 (severe anxiety)   Total Score 13 21 20    20       Patient Identification:  Patient is a 58 year old,   White Not  or  female  who presents for return visit with me.  Patient is currently unemployed. Patient attended the phone/video session with , Wally.  Patient prefers to be called: \"Marbella\".    Interim History:  I last saw Marbella Hendrix for outpatient psychiatry return visit on 2024. During that appointment, we:    Continue Ambien/zolpidem 5 mg nightly as needed for insomnia. Continue to use sparingly. Do not take with Ativan/lorazepam. "   Continue Effexor-XR/venlafaxine  mg daily for anxiety/mood.  Continue olanzapine/Zyprexa 2.5-5 mg at bedtime as needed for sleep. If not helpful, can return to quetiapine.     Have fasting labs completed in about one month at any [facility] lab. Need to call your clinic ahead of time to schedule an appointment for lab due to limited hours and no walk-ins due to Covid-19 restrictions/changes.    Continue all other cares per primary care provider.     : Patient with recent severe worsening of insomnia and anxiety in the context of psychosocial stressors.  Had a visit to the emergency room about 1 month ago.  Hydroxyzine was helpful at that time but patient has continued to struggle to sleep.  Patient did have Effexor-XR increased to 187.5 mg while in the emergency room.  Does not feel olanzapine is helpful any longer.  Continues to try to take Ambien sparingly.  Has taken it between 1 AM-4 AM after waking up and being unable to fall back to sleep.  No acute safety concerns.  No problematic drug or alcohol use.  See [facility] note below for additional details.    Per [facility], Rafaela Hassan, [facility], during today's team-based visit:  MH update:  Depression and anxiety worse.  Panic attacks regularly- taking Vistaril makes her fatigued.  Went to [facility], felt helpful.  Considering going back for support.  Stresses:  Mom  in April.  Youngest son moved away to college ([employer]).  Cat -spent significant money and time trying to save him.  Loss of control, feels like she is always worried about his safety. Applying for jobs.  Other son, is really struggling with life functioning/ASD- tried being an .  Appetite:   Sleep: 2 nights a week, Zyprexa works 6-7 hours.  Other nights, only getting 4 hours with Ambien, Melatonin, Zyprexa.  About a month like this.  Outpatient Provider updates: Doing acupuncture. [facility].  SI/SIB/HI: Passive suicidal ideation, no plan/intent.  Fleeting. No  previous attempts.  Safety plan reviewed.  Future and goal oriented.  BRAD:  Denies  Side effects/compliance:  Interventions:   [facility] reviewed safety plan and emergency services as needed.  Pt notes understanding and awareness.    Most important:  Not sleeping, not functioning.  Sx much worse.  Passive SI returned.    Past Psychiatric Med Trials:  Psych Meds at Intake:  Paxil 20 mg daily - started when 29 years, has been up and down on the dose, on 10 mg dose mostly, last on 20 mg this past spring   Ambien 5 mg for insomnia - used for 4 nights  Ativan 0.5 mg for flying     Past Psych Meds:  Prozac for 1-2 months maybe when 27 yo    Psychiatric ROS:  Marbella Hendrix reports mood has been: See HPI above  Anxiety has been: See HPI above  Sleep has been: See HPI above  Rachna sxs: None  Psychosis sxs: None  ADHD/ADD sxs: Up-and-down  PTSD sxs: NA  PHQ9 and GAD7 scores were reviewed today if completed.   Medication side effects: See HPI above  Current stressors include: Symptoms and see HPI above  Coping mechanisms and supports include: Family and Hobbies    Current medications include:   Current Outpatient Medications   Medication Sig Dispense Refill    zolpidem (AMBIEN) 5 MG tablet Take 1 tablet (5 mg) by mouth nightly as needed for sleep 20 tablet 0    atorvastatin (LIPITOR) 20 MG tablet Take 1 tablet (20 mg) by mouth daily. 90 tablet 2    calcium carbonate (OS-ARA) 500 MG tablet Take 1 tablet by mouth 2 times daily      hydrOXYzine HCl (ATARAX) 25 MG tablet Take 0.5-1 tablets (12.5-25 mg) by mouth 2 times daily as needed for anxiety 30 tablet 0    levothyroxine (SYNTHROID/LEVOTHROID) 125 MCG tablet Take 1 tablet (125 mcg) by mouth daily 90 tablet 1    melatonin 5 MG tablet Take 5 mg by mouth nightly as needed for sleep      OLANZapine (ZYPREXA) 5 MG tablet Take 0.5-1 tablets (2.5-5 mg) by mouth nightly as needed (sleep) 90 tablet 1    Omega-3 Fatty Acids (OMEGA-3 FISH OIL PO) Take 1 g by mouth daily      venlafaxine  (EFFEXOR XR) 150 MG 24 hr capsule Take 1 capsule (150 mg) by mouth daily 90 capsule 1    venlafaxine (EFFEXOR XR) 37.5 MG 24 hr capsule Take 1 capsule (37.5 mg) by mouth daily. Take with 150mg for a total of 187.5mg daily 30 capsule 1     No current facility-administered medications for this visit.       The [place] Prescription Monitoring Program has been reviewed and there are no concerns about diversionary activity for controlled substances at this time.     Past Medical/Surgical History:  Past Medical History:   Diagnosis Date    Arthritis     My mom has it, my grandmother had it I have it    Diabetes (H)     My mom and brother have type II    Heavy menstrual period     Leiomyoma of uterus     s/p myomectomy    Mental disorder     anxiety    PONV (postoperative nausea and vomiting)     Surgical complication after  2002    Thyroid disease     Hypothroidism, 2nd half of     Uncomplicated asthma     My son has it, since he was born    Vesico-ureteral reflux     s/p repair      has a past medical history of Arthritis, Diabetes (H), Heavy menstrual period, Leiomyoma of uterus, Mental disorder, PONV (postoperative nausea and vomiting), Surgical complication (after  2002), Thyroid disease, Uncomplicated asthma, and Vesico-ureteral reflux.    She has no past medical history of Cancer (H), Cerebral infarction (H), Congestive heart failure (H), COPD (chronic obstructive pulmonary disease) (H), Depressive disorder, Heart disease, History of blood transfusion, or Hypertension.    Social History:  Reviewed. No changes to social history except as noted above in HPI.    Vital Signs:   None. This is phone/video visit.     Labs:  Most recent laboratory results reviewed and no new labs.     Review of Systems:  10 systems (general, cardiovascular, respiratory, eyes, ENT, endocrine, GI, , M/S, neurological) were reviewed. Most pertinent finding(s) is/are: Some chronic pain. The remaining systems  are all unremarkable.    Mental Status Examination (limited as this is by phone/video):  Appearance: Awake, alert, appears stated age, no acute distress, well-groomed   Attitude:  cooperative  Motor: No gross abnormalities observed via video, not formally tested   Oriented to:  person, place, time, and situation  Attention Span and Concentration:  normal  Speech:  clear, coherent, regular rate, rhythm, and volume  Language: intact  Mood: Worse, anxious  Affect:  mood congruent  Associations:  no loose associations  Thought Process:  logical, linear and goal oriented  Thought Content:  no evidence of acute suicidality or homicidal ideation, no evidence of psychotic thought, no auditory hallucinations present and no visual hallucinations present  Recent and Remote Memory:  Intact to interview. Not formally assessed. No amnesia.  Fund of Knowledge: appropriate  Insight: Good  Judgment:  intact, adequate for safety  Impulse Control:  intact    Suicide Risk Assessment:  Today Marbella Hendrix has recently reported passive thoughts of death but no acute suicidality or plan or intent to harm self.  See safety plan in chart.  Also see thorough assessment by [facility] [place] during today's joint appointment.  Based on all available evidence including the factors cited above, Marbella Hendrix does not appear to be at imminent risk for self-harm, does not meet criteria for a 72-hr hold, and therefore remains appropriate for ongoing outpatient level of care.  A thorough assessment of risk factors related to suicide and self-harm have been reviewed and are noted above. The patient convincingly denies suicidality on several occasions. Local community safety resources reviewed for patient to use if needed. There was no deceit detected, and the patient presented in a manner that was believable.     DSM5 Diagnosis:  Attention-Deficit/Hyperactivity Disorder  314.01 (F90.9) Unspecified Attention -Deficit / Hyperactivity Disorder  Major  Depressive Disorder, Recurrent Episode, moderate  300.02 (F41.1) Generalized Anxiety Disorder  Insomnia, unspecified    Medical comorbidities include:   Patient Active Problem List    Diagnosis Date Noted    Elevated fasting glucose 05/01/2024     Priority: Medium    Anxiety 07/19/2023     Priority: Medium    Adjustment disorder with anxious mood 06/27/2021     Priority: Medium    Family history of diabetes mellitus 06/27/2021     Priority: Medium    CMC DJD(carpometacarpal degenerative joint disease), localized primary, left 11/18/2020     Priority: Medium    Thumb pain, left 11/18/2020     Priority: Medium    Dermatochalasis of both upper eyelids 11/17/2020     Priority: Medium     Added automatically from request for surgery [id number]      Involutional ptosis, acquired, bilateral 11/17/2020     Priority: Medium     Added automatically from request for surgery [id number]      Weakness of right hip 07/09/2020     Priority: Medium    Primary osteoarthritis of right hip 12/02/2019     Priority: Medium     Added automatically from request for surgery [id number]      Morbid obesity (H) 06/20/2018     Priority: Medium    Status post hysterectomy 08/02/2017     Priority: Medium    Sensation of plugged ear on both sides 07/12/2017     Priority: Medium    Acute post-operative pain 08/28/2015     Priority: Medium    Preventative health care 10/30/2012     Priority: Medium     Last pap NIL 2011; mammogram nl 2011; lipids abnl, needs annual f/u      Generalized anxiety disorder 09/26/2012     Priority: Medium     H/o panic attacks; currently controlled with paxil      Hyperlipidemia LDL goal <130 09/26/2012     Priority: Medium     Behavioral measures - avoiding statins while trying to conceive; not a candidate for red yeast rice (interactions with thyroid meds)      Acquired hypothyroidism 09/26/2012     Priority: Medium    Overweight 09/26/2012     Priority: Medium     On exercise plan, has been in weight watchers  Problem list  name updated by automated process. Provider to review         Psychosocial & Contextual Factors: see HPI above    Assessment:  From Intake, 7/17/2023:  Marbella Hendrix is a 57-year-old female with past psychiatric history including anxiety, ADHD, depression, remote polysubstance abuse (in remission for decades) who presents today for psychiatric evaluation.  Patient recently with increased psychosocial stressors and acutely worsening anxiety and insomnia.  Patient most recently on Paxil and Ambien to manage symptoms.  Ambien used sparingly and did help break severe insomnia cycle recently.  Patient currently taking 10 mg of Paxil and has only ever been up to 20 mg historically.  Patient may be interested in a trial with something different than Paxil.  We also discussed further optimizing Paxil to 30 or 40 mg daily for more therapeutic trial before replacing the medication.  We did discuss Lexapro and Effexor-XR as possible alternatives.  Patient would like to discuss these options with her  who is an internist and her psychotherapist.  In the meantime, we discussed a trial with clonidine to help with sleep, anxiety, and ADHD symptoms.  Discussed risks and benefits of therapy.  Patient would like to move forward with a clonidine trial to help at bedtime as needed for sleep and a small dose during the day as needed for anxiety.  No acute safety concerns today.  No acute suicidality.  No problematic drug or alcohol use.     8/22/2023:  Patient with ongoing significant anxiety and mood related struggles.  Clonidine has not been helpful.  We will transition patient off paroxetine and onto venlafaxine.  We will continue to consider escitalopram as an option if venlafaxine ineffective or poorly tolerated.  Could also consider use of fluoxetine if paroxetine is difficult to discontinue.  Discussed DBT therapy as a potential option to help improve symptoms.  Resources sent in Spotlight message.  No acute  suicidality.  No acute safety concerns.  No problematic drug or alcohol use.    10/3/2023:  Patient doing okay with transition to venlafaxine ER.  Has had some mild dizziness and headaches.  We will continue to optimize venlafaxine ER and patient will watch for worsening headaches and dizziness.  Her symptoms could be related to discontinuation of paroxetine.  Patient still struggling significantly in the evenings and with insomnia.  We will trial quetiapine at bedtime as needed for sleep.  Discussed risks and benefits of therapy including watching for any abnormal involuntary movements.  If patient continues on the medication, we will make sure to get updated lipid panel and fasting glucose.  Could also consider mirtazapine as an option.  No acute safety concerns.  No acute suicidality.  No problematic drug or alcohol use.    11/14/2023:  Patient continuing to work with sleep medicine with pending at home sleep study results.  Quetiapine has been helpful for sleep at just 12.5 mg at bedtime.  Does cause some significant cognitive clouding during the day.  Patient does feel benefits outweigh risks but is willing to trial olanzapine to see if gets similar benefit without such heavy cognitive effects.  Venlafaxine has been helpful for mood and anxiety symptoms.  Still could consider mirtazapine as an option for sleep as needed.  No acute safety concerns.  No SI.  No problematic drug or alcohol use.    1/2/2024:  Patient currently doing quite well on olanzapine.  Finds it more helpful and better tolerated than quetiapine.  Discussed risks and benefits and side effects to watch out for.  Mild constipation-encouraged to utilize MiraLAX and/or Dulcolax.  No acute safety concerns.  No SI.  No problematic drug or alcohol use.  No abnormal involuntary movements.    4/8/2024:  Apart from some of the metabolic effects of olanzapine, patient otherwise quite stable.  Patient opts to try to address metabolic side effects of  olanzapine with metformin.  Discussed there is evidence to support use of metformin to help treat and also to help decrease risk for metabolic syndrome from the medication use.  Discussed risks and benefits of metformin use.  Due to stability of mental health symptoms, psychiatric care be returned back to primary care provider.  Also encouraged primary care provider to continue to monitor metabolic status and need for metformin.  Patient continues to watch diet and stay active.  Updated labs ordered for primary care provider for baseline since starting metformin XR.  Last liver enzymes looked okay when checked in June 2023.  Patient also denies any abnormal involuntary movements.  No acute safety concerns.  No SI.  No problematic drug or alcohol use.    9/11/2024 (New episode of care initiated today after bounce-back):  Patient struggling more significantly with anxiety and insomnia.  Even up to 10 mg of olanzapine was not very helpful.  Will discontinue olanzapine and trial mirtazapine.  Ambien is effective and so patient instructed to take before bedtime nightly for 1-2 weeks to allow for reregulation of sleep schedule.  Could consider further optimization of Effexor-XR in the future as well.  No acute safety concerns.  No acute suicidality, although does have some passive fleeting thoughts with no plan or intent to harm self.  No problematic drug or alcohol use.  Psychotherapy encouraged.    Medication side effects and alternatives were reviewed. Health promotion activities recommended and reviewed today. All questions addressed. Education and counseling completed regarding risks and benefits of medications and psychotherapy options. Recommend therapy for additional support.     Treatment Plan:  Continue Ambien/zolpidem 5 mg nightly as needed for insomnia. Continue to use sparingly (discussed ok nightly for 1-2 weeks to re-establish good sleep). If taking, take before you go to bed and not middle of night. Do not  take with Ativan/lorazepam.   Continue Effexor-XR/venlafaxine .5 mg daily for anxiety/mood.  Discontinue olanzapine: can take 2.5 mg at bedtime for 3 days then stop.   Start mirtazapine 15 mg at bedtime for sleep and to augment venlafaxine ER.  Strongly recommend psychotherapy.  Follow up with me in 2 weeks.  For scheduling needs you can call [phone].  You could also get a message to the nurses by calling the aforementioned phone number.  Continue all other cares per primary care provider.   Continue all other medications as reviewed per electronic medical record today.   Safety plan reviewed. To the Emergency Department as needed or call after hours crisis line at [phone] or [phone]. [place] Crisis Text Line. Text [place] to 158301 or Suicide LifeLine Chat: suicidepreventionSt. Louis Spine Centerline.org/chat  Consider DBT therapy.  Strongly recommend individual psychotherapy for additional support and ongoing development of nonpharmacologic coping skills and strategies.  Follow up with primary care provider as planned or for acute medical concerns.  PressPad may be used to communicate with your provider, but this is not intended to be used for emergencies.    Risks of benzodiazepine (Ativan, Xanax, Klonopin, Valium, etc) use including, but not limited to, sedation, tolerance, risk for addiction/dependence. Do not drink alcohol while taking benzodiazepines due to risk of trouble breathing and potential death. Do not drive or operate heavy machinery until it is known how the drug affects you. Discuss with physician or pharmacist before ever taking a benzodiazepine with a narcotic/opioid pain medication.     Book:   [facility]  By Ingris KIM DBT resources:  https://[place].gov/dhs/partners-and-providers/policies-procedures/adult-mental-health/dialectical-behavior-therapy/dbt-certified-providers/    Some [facility] DBT resources:  Edith Arango   [phone]   https://[employer].EXPO/faqs/     [facility] [place]   [phone]   [email or web address]     Michael   [facility]-DBT   [phone]   [email or web address]://www.s-dbt.com     [facility]   [phone]   [email or web address]     Administrative Billing:   Phone Call/Video Duration: 25 Minutes  Start: 11:30p  Stop: 1155a    Patient Status:  Patient will continue to be seen in collaborative care for stabilization.    Episode of Care #: 1 (New episode of care started 9/11/24)    Signed:   Christi Lewis DO  [facility]    Disclaimer: This note consists of symbols derived from keyboarding, dictation and/or voice recognition software. As a result, there may be errors in the script that have gone undetected. Please consider this when interpreting information found in this chart.

## 2024-09-13 ENCOUNTER — MYC MEDICAL ADVICE (OUTPATIENT)
Dept: PSYCHIATRY | Facility: CLINIC | Age: 59
End: 2024-09-13
Payer: COMMERCIAL

## 2024-09-13 DIAGNOSIS — F41.1 GENERALIZED ANXIETY DISORDER: Primary | ICD-10-CM

## 2024-09-13 RX ORDER — LORAZEPAM 0.5 MG/1
0.5 TABLET ORAL 2 TIMES DAILY PRN
Qty: 8 TABLET | Refills: 0 | Status: SHIPPED | OUTPATIENT
Start: 2024-09-13 | End: 2024-09-23 | Stop reason: ALTCHOICE

## 2024-09-13 NOTE — TELEPHONE ENCOUNTER
Date of Last Office Visit: 9/11/2024  Date of Next Office Visit:  9/11/2024  No shows since last visit: No  More than one patient-initiated cancellation (with reschedule) since last seen in clinic? No    []Medication refilled per  Medication Refill in Ambulatory Care  policy.  [x]Medication unable to be refilled by RN due to criteria not met as indicated below:    []Eligibility: has not had a provider visit within last 6 months   []Supervision: no future appointment; < 7 days before next appointment   []Compliance: no shows; cancellations; lapse in therapy   []Verification: order discrepancy; may need modification...   [] > 30-day supply request   []Advanced refill request: > 7 days before refill date   [x]Controlled medication   []Medication not included in policy   []Review: new med; med adjusted ? 30 days; safety alert; requires lab monitoring...   []Scope of Practice: refill request processed by LPN/MA   []Other:      Medication(s) requested:   -  lorazepam (ATIVAN) 0.5 MG tablet    Date last ordered: 8/22/2023  Qty: 10  Refills: 0  Take 1 tablet (0.5 mg) by mouth every 6 hours as needed for anxiety     Appropriate for refill? Provider to review.          Any Controlled Substance(s)? Yes   [place]  checked? Yes         Requested medication(s) verified as identical to current order? Provider to order    Additional action taken? routed encounter to provider for review.      Last visit treatment plan:   9/11/2024 (New episode of care initiated today after bounce-back):  Patient struggling more significantly with anxiety and insomnia.  Even up to 10 mg of olanzapine was not very helpful.  Will discontinue olanzapine and trial mirtazapine.  Ambien is effective and so patient instructed to take before bedtime nightly for 1-2 weeks to allow for reregulation of sleep schedule.  Could consider further optimization of Effexor-XR in the future as well.  No acute safety concerns.  No acute suicidality, although does have some  passive fleeting thoughts with no plan or intent to harm self.  No problematic drug or alcohol use.  Psychotherapy encouraged.     Medication side effects and alternatives were reviewed. Health promotion activities recommended and reviewed today. All questions addressed. Education and counseling completed regarding risks and benefits of medications and psychotherapy options. Recommend therapy for additional support.      Treatment Plan:  Continue Ambien/zolpidem 5 mg nightly as needed for insomnia. Continue to use sparingly (discussed ok nightly for 1-2 weeks to re-establish good sleep). If taking, take before you go to bed and not middle of night. Do not take with Ativan/lorazepam.   Continue Effexor-XR/venlafaxine .5 mg daily for anxiety/mood.  Discontinue olanzapine: can take 2.5 mg at bedtime for 3 days then stop.   Start mirtazapine 15 mg at bedtime for sleep and to augment venlafaxine ER.  Strongly recommend psychotherapy.  Follow up with me in 2 weeks.  For scheduling needs you can call [phone].  You could also get a message to the nurses by calling the aforementioned phone number.  Continue all other cares per primary care provider.   Continue all other medications as reviewed per electronic medical record today.   Safety plan reviewed. To the Emergency Department as needed or call after hours crisis line at [phone] or [phone]. [place] Crisis Text Line. Text [place] to 919070 or Suicide LifeLine Chat: suicidepreventionlifeline.org/chat  Consider DBT therapy.  Strongly recommend individual psychotherapy for additional support and ongoing development of nonpharmacologic coping skills and strategies.  Follow up with primary care provider as planned or for acute medical concerns.  RealSelfhart may be used to communicate with your provider, but this is not intended to be used for emergencies.       Any medication(s) require lab monitoring? No

## 2024-09-22 ENCOUNTER — MYC MEDICAL ADVICE (OUTPATIENT)
Dept: PSYCHIATRY | Facility: CLINIC | Age: 59
End: 2024-09-22
Payer: COMMERCIAL

## 2024-09-22 DIAGNOSIS — G47.00 INSOMNIA, UNSPECIFIED TYPE: ICD-10-CM

## 2024-09-22 DIAGNOSIS — F41.1 GENERALIZED ANXIETY DISORDER: Primary | ICD-10-CM

## 2024-09-23 ENCOUNTER — TELEPHONE (OUTPATIENT)
Dept: BEHAVIORAL HEALTH | Facility: CLINIC | Age: 59
End: 2024-09-23
Payer: COMMERCIAL

## 2024-09-23 RX ORDER — CLONAZEPAM 1 MG/1
TABLET ORAL
Qty: 60 TABLET | Refills: 0 | Status: SHIPPED | OUTPATIENT
Start: 2024-09-23

## 2024-09-23 ASSESSMENT — ANXIETY QUESTIONNAIRES
GAD7 TOTAL SCORE: 21
7. FEELING AFRAID AS IF SOMETHING AWFUL MIGHT HAPPEN: NEARLY EVERY DAY
8. IF YOU CHECKED OFF ANY PROBLEMS, HOW DIFFICULT HAVE THESE MADE IT FOR YOU TO DO YOUR WORK, TAKE CARE OF THINGS AT HOME, OR GET ALONG WITH OTHER PEOPLE?: VERY DIFFICULT
GAD7 TOTAL SCORE: 21
GAD7 TOTAL SCORE: 21

## 2024-09-23 ASSESSMENT — PATIENT HEALTH QUESTIONNAIRE - PHQ9
SUM OF ALL RESPONSES TO PHQ QUESTIONS 1-9: 27
SUM OF ALL RESPONSES TO PHQ QUESTIONS 1-9: 27
10. IF YOU CHECKED OFF ANY PROBLEMS, HOW DIFFICULT HAVE THESE PROBLEMS MADE IT FOR YOU TO DO YOUR WORK, TAKE CARE OF THINGS AT HOME, OR GET ALONG WITH OTHER PEOPLE: VERY DIFFICULT

## 2024-09-23 NOTE — PROGRESS NOTES
[facility]  September 24, 2024      Behavioral Health Clinician Progress Note    Patient Name: Marbella Hendrix           Service Type:  Individual      [facility]   [facility] / Email (patient reached)     Session Start Time: 230pm  Session End Time: 252pm      Session Length: 16 - 37      Attendees: Client     Service Modality:  Video Visit:      Provider verified identity through the following two step process.  Patient provided:  Patient is known previously to provider    Telemedicine Visit: The patient's condition can be safely assessed and treated via synchronous audio and visual telemedicine encounter.      Reason for Telemedicine Visit: Services only offered telehealth    Originating Site (Patient Location): Patient's home    Distant Site (Provider Location): Provider Remote Setting- Home Office    Consent:  The patient/guardian has verbally consented to: the potential risks and benefits of telemedicine (video visit) versus in person care; bill my insurance or make self-payment for services provided; and responsibility for payment of non-covered services.     Patient would like the video invitation sent by:  My Chart    Mode of Communication:  Video Conference via [facility]    Distant Location (Provider):  Off-site    As the provider I attest to compliance with applicable laws and regulations related to telemedicine.    Visit Activities (Refresh list every visit): [facility] Only    Diagnostic Assessment Date: 7/17/2023 MADELIN Patel, [facility]   Treatment Plan Review Date: 9/11/24  See Flowsheets for today's PHQ-9 and BERTHA-7 results  Previous PHQ-9:       8/13/2024    10:48 AM 9/11/2024    10:50 AM 9/23/2024    11:17 AM   PHQ-9 SCORE   PHQ-9 Total Score [facility] 22 (Severe depression) 21 (Severe depression) 27 (Severe depression)   PHQ-9 Total Score 22 21    21 27     Previous BERTHA-7:       8/13/2024    10:48 AM 9/7/2024    12:16 PM 9/23/2024    11:35 AM  "  BERTHA-7 SCORE   Total Score 21 (severe anxiety) 20 (severe anxiety) 21 (severe anxiety)   Total Score 21 20    20 21       DATA  Extended Session (60+ minutes): No  Interactive Complexity: No  Crisis: No  [facility] Patient: No    Treatment Objective(s) Addressed in This Session:  Target Behavior(s): disease management/lifestyle changes reduce sx of anxiety    Anxiety: will experience a reduction in anxiety      Current Stressors / Issues:  MH update:  Klonopin has been helpful, started yesterday.  Slept a whole night last night.  Napped today.  Doesn't have the energy to exercise.  Panic was worsening.  [place], came home early- couldn't sleep in same room, dog overbearing, friend kept trying to \"fix her\".  Feels unsafe driving with the panic.  Feeling down, sadness.   Stresses:  Mom  in April.  Youngest son moved away to college ([employer]).  Cat -spent significant money and time trying to save him.  Loss of control, feels like she is always worried about his safety.  Other son, is really struggling with life functioning/ASD- tried being an .  Appetite: n/a  Sleep: Slept last night  Outpatient Provider updates: [facility]. DA update  for PHP/IOP  SI/SIB/HI:  Passive suicidal ideation, no plan/intent.  Fleeting. No previous attempts.  Safety plan reviewed.  Future and goal oriented.  BRAD:  Denies  Side effects/compliance:  Interventions:  [facility] engaged in discussion about levels of care  Most important:  Klonopin helps!      MH update:  Depression and anxiety worse.  Panic attacks regularly- taking Vistaril makes her fatigued.  Went to [facility], felt helpful.  Considering going back for support.  Stresses:  Mom  in April.  Youngest son moved away to college ([employer]).  Cat -spent significant money and time trying to save him.  Loss of control, feels like she is always worried about his safety. Applying for jobs.  Other son, is really " "struggling with life functioning/ASD- tried being an .  Appetite:   Sleep: 2 nights a week, Zyprexa works 6-7 hours.  Other nights, only getting 4 hours with Ambien, Melatonin, Zyprexa.  About a month like this.  Outpatient Provider updates: Doing acupuncture. [facility].  SI/SIB/HI: Passive suicidal ideation, no plan/intent.  Fleeting. No previous attempts.  Safety plan reviewed.  Future and goal oriented.  BRAD:  Denies  Side effects/compliance:  Interventions:   [facility] reviewed safety plan and emergency services as needed.  Pt notes understanding and awareness.    Most important:  Not sleeping, not functioning.  Sx much worse.  Passive SI returned.    4/8  MH update:  Sx feel stable.  No acute depression/anxiety/panic.  Stresses:  Lots of stress with kiddos/spouse  Appetite: Weight gain-med side constantly hungry  Sleep: Denies  Outpatient Provider updates:   [facility]- taking a break.  Got a list of new therapist  SI/SIB/HI: Denies  BRAD: Denies  Side effects/compliance: N/a  Interventions:  [facility] offered space and validation for any concerns  Most important:  Feels stable.    1/2  MH update: Things are going well.  On the 5th med for anxiety.  Really like it.  Anxiety feels manageable.  Freak out daily but not every moment.  Denies any panic attacks.  Feeling behind the \"8 ball\".  [place] due to feeling uncomfortable from constipation.  Denies any overwhelming feelings of depression, hopelessness, worthlessness, etc. .  Stresses:  Holidays were fun, sad its over.  Got to see a friend from out of town.  Arthritic hand worse in the cold notes she needs to be wearing her hand brace.  Going to [employer], just completed prior to appt.   Unemployed hasn't been looking due to transportation of child.  Would like to work.  Appetite: Denies  Sleep: Feeling pleased.  Sleep much improved. Having dreams.    Outpatient Provider updates: Therapist once a month [facility], appt next week.  " "  SI/SIB/HI: Denies  BRAD: Denies  Preg: n/a  Side effects/compliance: constipated, uncomfortable and bloated.  Interventions: [facility] supported on going anxiety reduction skill use  Most important: Constipation side effects     Reason for CCPS: Pt says that they had \"bad anxiety\" since mid to late 20s. Pt has been on the same medication and dose since then. In April it became crippling so pt wasn't able to function and had heavy breathing. Pt slept for a few hours and then next night was up the whole night. Pt has a lot of things that occurred during this time since bad stuff has been happening. Pt didn't go to bed last night. They changed bedrooms. This sleep concern started in April. Pt went out of town and this helped a bit.   The thoughts were frightening in their head. Pt had intrusive thoughts. Pt doesn't have these thoughts anymore. Pt had a senior taking care of pass away.   Sleep study: pt attempted this in the middle of many life events, pt rescheduled it for fall    Pt denies any restless legs. Pt will have anxiety before bed. It is painful, not sure if I will sleep. Haven't had this for a month.   Depression: feeling overwhelmed with tasks in the day and are unable to see any positivity, not functioning well, making mistakes   Hope to: find something to help with anxiety so they don't have a crippling bout and to be able to manage    Progress on Treatment Objective(s) / Homework:  New Objective established this session - ACTION (Actively working towards change); Intervened by reinforcing change plan / affirming steps taken    Motivational Interviewing    MI Intervention: Co-Developed Goal: reduce on going sx of anxiety, Expressed Empathy/Understanding, Open-ended questions, and Reflections: simple and complex     Change Talk Expressed by the Patient: Desire to change Ability to change Activation Taking steps    Provider Response to Change Talk: A - Affirmed patient's thoughts, decisions, or attempts at " behavior change and R - Reflected patient's change talk    Assessments completed prior to visit:    The following assessments were completed by patient for this visit:  Nichols Suicide Severity Rating Scale (Lifetime/Recent)      7/17/2023     9:35 AM 1/2/2024    11:33 AM 8/14/2024     4:55 PM 8/14/2024     5:45 PM 8/14/2024     6:49 PM   Nichols Suicide Severity Rating (Lifetime/Recent)   Q1 Wish to be Dead (Lifetime)     Yes   Q2 Non-Specific Active Suicidal Thoughts (Lifetime)     No   Q1 Wished to be Dead (Past Month)   0-->no 1-->yes    Q2 Suicidal Thoughts (Past Month)   0-->no 0-->no    Q6 Suicide Behavior (Lifetime)   0-->no 0-->no 0-->no   Level of Risk per Screen   no risks indicated low risk    Q1 Wish to be Dead (Lifetime) Y N      Wish to be Dead Description (Lifetime) Pt reports having thoughts and never acted on them. They report the last time was May or June 2023.       Q2 Non-Specific Active Suicidal Thoughts (Lifetime) Y N      Non-Specific Active Suicidal Thought Description (Lifetime) Pt reports that they had a very stressful time with many life evetns in April and this impacted these thoughts.       2. Non-Specific Active Suicidal Thoughts (Past 1 Month) N       3. Active Suicidal Ideation with any Methods (Not Plan) Without Intent to Act (Lifetime) N    N   Q4 Active Suicidal Ideation with Some Intent to Act, Without Specific Plan (Lifetime) N    N   Q5 Active Suicidal Ideation with Specific Plan and Intent (Lifetime) N    N   Most Severe Ideation Rating (Lifetime) 2       Description of Most Severe Ideation (Lifetime) Pt reports having intrusive thoughts while driving and reports that they did not act on these and have their children and will never act on them.       Frequency (Lifetime) 3       Duration (Lifetime) 1       Controllability (Lifetime) 1       Deterrents (Lifetime) 1       Reasons for Ideation (Lifetime) 0       Actual Attempt (Lifetime) N N   N   Has subject engaged in  non-suicidal self-injurious behavior? (Lifetime) N N   N   Interrupted Attempts (Lifetime) N N   N   Aborted or Self-Interrupted Attempt (Lifetime) N N   N   Preparatory Acts or Behavior (Lifetime) N N   N   Calculated C-SSRS Risk Score (Lifetime/Recent) No Risk Indicated No Risk Indicated   No Risk Indicated       Care Plan review completed: Yes    Medication Review:  Changes to psychiatric medications, see updated Medication List in EPIC.     Medication Compliance:  Yes    Changes in Health Issues:   None reported    Chemical Use Review:   Substance Use: Chemical use reviewed, no active concerns identified      Tobacco Use: No current tobacco use.      Assessment: Current Emotional / Mental Status (status of significant symptoms):  Risk status (Self / Other harm or suicidal ideation)  Patient has had a history of suicidal ideation: reports a hx of ideation without specific planning/intent/action back in 2022  Patient denies current fears or concerns for personal safety.  Patient denies current or recent suicidal ideation or behaviors.  Patient denies current or recent homicidal ideation or behaviors.  Patient denies current or recent self injurious behavior or ideation.  Patient denies other safety concerns.  A safety and risk management plan has been developed including: Patient consented to co-developed safety plan.  A safety and risk management plan was completed.  Patient agreed to use safety plan should any safety concerns arise.  A copy was given to the patient.    Appearance:   Appropriate   Eye Contact:   Good   Psychomotor Behavior: Normal   Attitude:   Cooperative   Orientation:   All  Speech   Rate / Production: Normal    Volume:  Normal   Mood:    Normal   Affect:    Appropriate   Thought Content:  Clear   Thought Form:  Coherent  Logical   Insight:    Good     Diagnoses:  1. Generalized anxiety disorder    2. Moderate episode of recurrent major depressive disorder (H)    3. Attention deficit  hyperactivity disorder (ADHD), unspecified ADHD type              Collateral Reports Completed:  Communicated with: Dr Lewis    Plan: (Homework, other):  Patient was given information about behavioral services and encouraged to schedule a follow up appointment with the clinic [facility] as needed.  She was also given information about mental health symptoms and treatment options .  CD Recommendations: No indications of CD issues.     Rafaela Hassan, [facility]        Individual Treatment Plan    Patient's Name: Marbella Hendrix   YOB: 1965  Date of Creation: 9/11/24  Date Treatment Plan Last Reviewed/Revised: 9/11/24    DSM5 Diagnoses:   1. Generalized anxiety disorder    2. Moderate episode of recurrent major depressive disorder (H)    3. Attention deficit hyperactivity disorder (ADHD), unspecified ADHD type        Psychosocial / Contextual Factors: Relationship Concerns, Occupational Issues, and Interpersonal Concerns  PROMIS (reviewed every 90 days):   The following assessments were completed by patient for this visit:  PROMIS 10-Global Health (only subscores and total score):       10/14/2019    12:11 PM 11/25/2019    12:52 PM 7/14/2023     4:22 PM 11/7/2023     4:23 PM 1/2/2024    10:47 AM 4/2/2024     7:21 AM 9/7/2024    12:17 PM   PROMIS-10 Scores Only   Global Mental Health Score 15 15 11    11 12 14 13    13 9    9   Global Physical Health Score 14 12 14    14 14 13 14    14 12    12   PROMIS TOTAL - SUBSCORES 29 27 25    25 26 27 27    27 21    21        Referral / Collaboration:  Referral to another professional/service is not indicated at this time..    Anticipated number of session for this episode of care: 6-9 sessions  Anticipation frequency of session: Monthly  Anticipated Duration of each session: 16-37 minutes  Treatment plan will be reviewed in 90 days or when goals have been changed.       MeasurableTreatment Goal(s) related to diagnosis / functional impairment(s)  Goal 1: Patient will reduce  "sx of anxiety   \"I will know I've met my goal when I can sleep and clearly think through my thoughts to use my skills.\"     Objective #A (Patient Action)    Patient will identify three distraction and diversion activities and use those activities to decrease level of anxiety    Status: New - Date: 9/11/24      Intervention(s)  [facility] will provide support through CBT, MI, Acceptance and Commitment Therapy, Dialectic Behavioral Therapy and problem solving model to explore and overcome barriers.    Goal 2: Patient will manage concerns of safety    I will know I've met my goal when I can reduce suicidal ideation .      Objective #A (Patient Action)    Patient will use previously developed safety plan on file.  Status: New - Date: 9/11/24     Intervention(s)  Therapist will provide support through CBT, MI, Acceptance and Commitment Therapy, Dialectic Behavioral Therapy and problem solving model to explore and overcome barriers.      Patient has reviewed and agreed to the above plan.    Written by  Rafaela Hassan LPCC, [facility]     "

## 2024-09-23 NOTE — TELEPHONE ENCOUNTER
"Pt is a(n) adult (18+ out of HS) Seeking as eval for Adult Mental Health DA for Programmatic Care - Program Preference? No.  Appointment scheduled by:  Patient.  (self-pay - complete Cost Estimate)     needed?  NO    Contact information verified/updated: Yes    If appt is for adult BRAD program location, confirm you have verified the location and address with the patient referring to the template header.  Yes    Antonietta Fisher    \"We have scheduled your evaluation. In the event that your insurance coverage comes back as out of network, you may receive a call to cancel your appointment and direct you to your insurance company for in-network coverage.\"    Disclaimer regarding insurance read to patient?  Yes  Informed patient [place] are for programming that is in person in the [place] area?  Yes - proceed with scheduling      "

## 2024-09-24 ENCOUNTER — VIRTUAL VISIT (OUTPATIENT)
Dept: PSYCHIATRY | Facility: CLINIC | Age: 59
End: 2024-09-24
Payer: COMMERCIAL

## 2024-09-24 ENCOUNTER — VIRTUAL VISIT (OUTPATIENT)
Dept: BEHAVIORAL HEALTH | Facility: CLINIC | Age: 59
End: 2024-09-24
Payer: COMMERCIAL

## 2024-09-24 DIAGNOSIS — G47.00 INSOMNIA, UNSPECIFIED TYPE: ICD-10-CM

## 2024-09-24 DIAGNOSIS — F90.9 ATTENTION DEFICIT HYPERACTIVITY DISORDER (ADHD), UNSPECIFIED ADHD TYPE: Primary | ICD-10-CM

## 2024-09-24 DIAGNOSIS — F41.1 GENERALIZED ANXIETY DISORDER: ICD-10-CM

## 2024-09-24 DIAGNOSIS — F33.1 MODERATE EPISODE OF RECURRENT MAJOR DEPRESSIVE DISORDER (H): ICD-10-CM

## 2024-09-24 DIAGNOSIS — F41.1 GENERALIZED ANXIETY DISORDER: Primary | ICD-10-CM

## 2024-09-24 DIAGNOSIS — F90.9 ATTENTION DEFICIT HYPERACTIVITY DISORDER (ADHD), UNSPECIFIED ADHD TYPE: ICD-10-CM

## 2024-09-24 PROCEDURE — 99214 OFFICE O/P EST MOD 30 MIN: CPT | Mod: 95 | Performed by: PSYCHIATRY & NEUROLOGY

## 2024-09-24 PROCEDURE — 90832 PSYTX W PT 30 MINUTES: CPT | Mod: 95 | Performed by: COUNSELOR

## 2024-09-24 PROCEDURE — G2211 COMPLEX E/M VISIT ADD ON: HCPCS | Mod: 95 | Performed by: PSYCHIATRY & NEUROLOGY

## 2024-09-24 ASSESSMENT — PATIENT HEALTH QUESTIONNAIRE - PHQ9
10. IF YOU CHECKED OFF ANY PROBLEMS, HOW DIFFICULT HAVE THESE PROBLEMS MADE IT FOR YOU TO DO YOUR WORK, TAKE CARE OF THINGS AT HOME, OR GET ALONG WITH OTHER PEOPLE: VERY DIFFICULT
SUM OF ALL RESPONSES TO PHQ QUESTIONS 1-9: 27
SUM OF ALL RESPONSES TO PHQ QUESTIONS 1-9: 27

## 2024-09-24 NOTE — NURSING NOTE
Is the patient currently in the state of [place]? YES    Current patient location: [address] [place] [address] [facility] [id number]    Visit mode:VIDEO    If the visit is dropped, the patient can be reconnected by: VIDEO VISIT: Text to cell phone:   Telephone Information:   Mobile [phone]       Will anyone else be joining the visit? No  (If patient encounters technical issues they should call [phone])    How would you like to obtain your AVS? MyChart    Are changes needed to the allergy or medication list? No    Are refills needed on medications prescribed by this physician? NO    Rooming Documentation: Questionnaire(s) completed.    Reason for visit: RACHELL Hernandez

## 2024-09-24 NOTE — PROGRESS NOTES
"Telemedicine Visit: The patient's condition can be safely assessed and treated via synchronous audio and visual telemedicine encounter.      Reason for Telemedicine Visit: Patient has requested telehealth visit    Originating Site (Patient Location): Patient's home    Distant Location (provider location):  Off-Site    Consent:  The patient/guardian has verbally consented to: the potential risks and benefits of telemedicine (video visit) versus in person care; bill my insurance or make self-payment for services provided; and responsibility for payment of non-covered services.     Mode of Communication:  Video Conference via [employer]    As the provider I attest to compliance with applicable laws and regulations related to telemedicine.          Outpatient Psychiatric Progress Note    Name: Marbella Hendrix   : 1965                    Primary Care Provider: Vanessa Ladd MD   Therapist: Doing acupuncture. [facility].       PHQ-9 scores:      2024    10:48 AM 2024    10:50 AM 2024    11:17 AM   PHQ-9 SCORE   PHQ-9 Total Score MyChart 22 (Severe depression) 21 (Severe depression) 27 (Severe depression)   PHQ-9 Total Score 22 21    21 27       BERTHA-7 scores:      2024    10:48 AM 2024    12:16 PM 2024    11:35 AM   BERTHA-7 SCORE   Total Score 21 (severe anxiety) 20 (severe anxiety) 21 (severe anxiety)   Total Score 21 20    20 21       Patient Identification:  Patient is a 58 year old,   White Not  or  female  who presents for return visit with me.  Patient is currently unemployed. Patient attended the phone/video session with , Wally.  Patient prefers to be called: \"Marbella\".    Interim History:  I last saw Marbella Hendrix for outpatient psychiatry return visit on 2024. During that appointment, we:    Continue Ambien/zolpidem 5 mg nightly as needed for insomnia. Continue to use sparingly (discussed ok nightly for 1-2 " "weeks to re-establish good sleep). If taking, take before you go to bed and not middle of night. Do not take with Ativan/lorazepam.   Continue Effexor-XR/venlafaxine .5 mg daily for anxiety/mood.  Discontinue olanzapine: can take 2.5 mg at bedtime for 3 days then stop.   Start mirtazapine 15 mg at bedtime for sleep and to augment venlafaxine ER.  Strongly recommend psychotherapy.  Follow up with me in 2 weeks.  For scheduling needs you can call [phone].  You could also get a message to the nurses by calling the aforementioned phone number.  Continue all other cares per primary care provider.     Since last visit pt with ongoing decompensation of mental health symptoms. Scheduled Klonopin prescribed to acutely decrease the anxiety. Also discussed partial hospital recommendation with referral that will likely be placed today.      : Patient with some improvements on clonazepam.  Feeling more calm and got a full night sleep.  Also was able to take a nap today.  Overall tolerating well.  Has intake tomorrow for PHP/IOP.  Discussed ongoing psychosocial stressors today with [facility].  No acute safety concerns.  No acute suicidality.  No problematic drug or alcohol use.  Needed to cancel their trip to [place] scheduled in a couple weeks due to patient's inability to travel in this condition.    Per [facility], [facility], during today's team-based visit:  MH update:  Klonopin has been helpful, started yesterday.  Slept a whole night last night.  Napped today.  Doesn't have the energy to exercise.  Panic was worsening.  Tried to see a friend [place] the cabin, came home early- couldn't sleep in same room, dog overbearing, friend kept trying to \"fix her\".  Feels unsafe driving with the panic.  Feeling down, sadness.   Stresses:  Mom  in April.  Youngest son moved away to college ([employer]).  Cat -spent significant money and time trying to save him.  Loss of control, feels like she is always worried about his " safety.  Other son, is really struggling with life functioning/ASD- tried being an .  Appetite: n/a  Sleep: Slept last night  Outpatient Provider updates: [facility]. DA update 9/25 for PHP/IOP  SI/SIB/HI:  Passive suicidal ideation, no plan/intent.  Fleeting. No previous attempts.  Safety plan reviewed.  Future and goal oriented.  BRAD:  Denies  Side effects/compliance:  Interventions:  [facility] engaged in discussion about levels of care  Most important:  Klonopin helps!    Past Psychiatric Med Trials:  Psych Meds at Intake:  Paxil 20 mg daily - started when 29 years, has been up and down on the dose, on 10 mg dose mostly, last on 20 mg this past spring   Ambien 5 mg for insomnia - used for 4 nights  Ativan 0.5 mg for flying     Past Psych Meds:  Prozac for 1-2 months maybe when 27 yo    Psychiatric ROS:  Marbella Hendrix reports mood has been: See HPI above  Anxiety has been: See HPI above  Sleep has been: See HPI above  Rachna sxs: None  Psychosis sxs: None  ADHD/ADD sxs: see HPI above  PTSD sxs: NA  PHQ9 and GAD7 scores were reviewed today if completed.   Medication side effects: See HPI above  Current stressors include: Symptoms and see HPI above  Coping mechanisms and supports include: Family and Hobbies    Current medications include:   Current Outpatient Medications   Medication Sig Dispense Refill    atorvastatin (LIPITOR) 20 MG tablet Take 1 tablet (20 mg) by mouth daily. 90 tablet 2    calcium carbonate (OS-ARA) 500 MG tablet Take 1 tablet by mouth 2 times daily      clonazePAM (KLONOPIN) 1 MG tablet Take half to full tab by mouth twice daily as tolerated. 60 tablet 0    hydrOXYzine HCl (ATARAX) 25 MG tablet Take 0.5-1 tablets (12.5-25 mg) by mouth 2 times daily as needed for anxiety. 120 tablet 0    levothyroxine (SYNTHROID/LEVOTHROID) 125 MCG tablet Take 1 tablet (125 mcg) by mouth daily 90 tablet 1    melatonin 5 MG tablet Take 5 mg by mouth nightly as needed for sleep       mirtazapine (REMERON) 15 MG tablet Take 1 tablet (15 mg) by mouth at bedtime. 30 tablet 2    Omega-3 Fatty Acids (OMEGA-3 FISH OIL PO) Take 1 g by mouth daily      venlafaxine (EFFEXOR XR) 150 MG 24 hr capsule Take 1 capsule (150 mg) by mouth daily 90 capsule 1    venlafaxine (EFFEXOR XR) 37.5 MG 24 hr capsule Take 1 capsule (37.5 mg) by mouth daily. Take with 150mg for a total of 187.5mg daily 30 capsule 1     No current facility-administered medications for this visit.       The [place] Prescription Monitoring Program has been reviewed and there are no concerns about diversionary activity for controlled substances at this time.     Past Medical/Surgical History:  Past Medical History:   Diagnosis Date    Arthritis     My mom has it, my grandmother had it I have it    Diabetes (H)     My mom and brother have type II    Heavy menstrual period     Leiomyoma of uterus     s/p myomectomy    Mental disorder     anxiety    PONV (postoperative nausea and vomiting)     Surgical complication after  2002    Thyroid disease     Hypothroidism, 2nd half of     Uncomplicated asthma     My son has it, since he was born    Vesico-ureteral reflux     s/p repair      has a past medical history of Arthritis, Diabetes (H), Heavy menstrual period, Leiomyoma of uterus, Mental disorder, PONV (postoperative nausea and vomiting), Surgical complication (after  2002), Thyroid disease, Uncomplicated asthma, and Vesico-ureteral reflux.    She has no past medical history of Cancer (H), Cerebral infarction (H), Congestive heart failure (H), COPD (chronic obstructive pulmonary disease) (H), Depressive disorder, Heart disease, History of blood transfusion, or Hypertension.    Social History:  Reviewed. No changes to social history except as noted above in HPI.    Vital Signs:   None. This is phone/video visit.     Labs:  Most recent laboratory results reviewed and no new labs.     Review of Systems:  10 systems  (general, cardiovascular, respiratory, eyes, ENT, endocrine, GI, , M/S, neurological) were reviewed. Most pertinent finding(s) is/are: Some chronic pain. The remaining systems are all unremarkable.    Mental Status Examination (limited as this is by phone/video):  Appearance: Awake, alert, appears stated age, no acute distress, well-groomed   Attitude:  cooperative  Motor: No gross abnormalities observed via video, not formally tested   Oriented to:  person, place, time, and situation  Attention Span and Concentration:  normal  Speech:  clear, coherent, regular rate, rhythm, and volume  Language: intact  Mood: Worse, anxious  Affect:  mood congruent  Associations:  no loose associations  Thought Process:  logical, linear and goal oriented  Thought Content:  no evidence of acute suicidality or homicidal ideation, no evidence of psychotic thought, no auditory hallucinations present and no visual hallucinations present  Recent and Remote Memory:  Intact to interview. Not formally assessed. No amnesia.  Fund of Knowledge: appropriate  Insight: Good  Judgment:  intact, adequate for safety  Impulse Control:  intact    Suicide Risk Assessment:  Today Marbella Hendrix has recently reported passive thoughts of death but no acute suicidality or plan or intent to harm self.  See safety plan in chart.  Also see thorough assessment by [facility] during today's joint appointment.  Based on all available evidence including the factors cited above, Marbella Hendrix does not appear to be at imminent risk for self-harm, does not meet criteria for a 72-hr hold, and therefore remains appropriate for ongoing outpatient level of care.  A thorough assessment of risk factors related to suicide and self-harm have been reviewed and are noted above. The patient convincingly denies suicidality on several occasions. Local community safety resources reviewed for patient to use if needed. There was no deceit detected, and the patient presented  in a manner that was believable.     DSM5 Diagnosis:  Attention-Deficit/Hyperactivity Disorder  314.01 (F90.9) Unspecified Attention -Deficit / Hyperactivity Disorder  Major Depressive Disorder, Recurrent Episode, moderate  300.02 (F41.1) Generalized Anxiety Disorder  Insomnia, unspecified    Medical comorbidities include:   Patient Active Problem List    Diagnosis Date Noted    Elevated fasting glucose 05/01/2024     Priority: Medium    Anxiety 07/19/2023     Priority: Medium    Adjustment disorder with anxious mood 06/27/2021     Priority: Medium    Family history of diabetes mellitus 06/27/2021     Priority: Medium    CMC DJD(carpometacarpal degenerative joint disease), localized primary, left 11/18/2020     Priority: Medium    Thumb pain, left 11/18/2020     Priority: Medium    Dermatochalasis of both upper eyelids 11/17/2020     Priority: Medium     Added automatically from request for surgery [id number]      Involutional ptosis, acquired, bilateral 11/17/2020     Priority: Medium     Added automatically from request for surgery [id number]      Weakness of right hip 07/09/2020     Priority: Medium    Primary osteoarthritis of right hip 12/02/2019     Priority: Medium     Added automatically from request for surgery [id number]      Morbid obesity (H) 06/20/2018     Priority: Medium    Status post hysterectomy 08/02/2017     Priority: Medium    Sensation of plugged ear on both sides 07/12/2017     Priority: Medium    Acute post-operative pain 08/28/2015     Priority: Medium    Preventative health care 10/30/2012     Priority: Medium     Last pap NIL 2011; mammogram nl 2011; lipids abnl, needs annual f/u      Generalized anxiety disorder 09/26/2012     Priority: Medium     H/o panic attacks; currently controlled with paxil      Hyperlipidemia LDL goal <130 09/26/2012     Priority: Medium     Behavioral measures - avoiding statins while trying to conceive; not a candidate for red yeast rice (interactions with thyroid  meds)      Acquired hypothyroidism 09/26/2012     Priority: Medium    Overweight 09/26/2012     Priority: Medium     On exercise plan, has been in weight watchers  Problem list name updated by automated process. Provider to review         Psychosocial & Contextual Factors: see HPI above    Assessment:  From Intake, 7/17/2023:  Marbella Hendrix is a 57-year-old female with past psychiatric history including anxiety, ADHD, depression, remote polysubstance abuse (in remission for decades) who presents today for psychiatric evaluation.  Patient recently with increased psychosocial stressors and acutely worsening anxiety and insomnia.  Patient most recently on Paxil and Ambien to manage symptoms.  Ambien used sparingly and did help break severe insomnia cycle recently.  Patient currently taking 10 mg of Paxil and has only ever been up to 20 mg historically.  Patient may be interested in a trial with something different than Paxil.  We also discussed further optimizing Paxil to 30 or 40 mg daily for more therapeutic trial before replacing the medication.  We did discuss Lexapro and Effexor-XR as possible alternatives.  Patient would like to discuss these options with her  who is an internist and her psychotherapist.  In the meantime, we discussed a trial with clonidine to help with sleep, anxiety, and ADHD symptoms.  Discussed risks and benefits of therapy.  Patient would like to move forward with a clonidine trial to help at bedtime as needed for sleep and a small dose during the day as needed for anxiety.  No acute safety concerns today.  No acute suicidality.  No problematic drug or alcohol use.     8/22/2023:  Patient with ongoing significant anxiety and mood related struggles.  Clonidine has not been helpful.  We will transition patient off paroxetine and onto venlafaxine.  We will continue to consider escitalopram as an option if venlafaxine ineffective or poorly tolerated.  Could also consider use of  fluoxetine if paroxetine is difficult to discontinue.  Discussed DBT therapy as a potential option to help improve symptoms.  Resources sent in [employer] message.  No acute suicidality.  No acute safety concerns.  No problematic drug or alcohol use.    10/3/2023:  Patient doing okay with transition to venlafaxine ER.  Has had some mild dizziness and headaches.  We will continue to optimize venlafaxine ER and patient will watch for worsening headaches and dizziness.  Her symptoms could be related to discontinuation of paroxetine.  Patient still struggling significantly in the evenings and with insomnia.  We will trial quetiapine at bedtime as needed for sleep.  Discussed risks and benefits of therapy including watching for any abnormal involuntary movements.  If patient continues on the medication, we will make sure to get updated lipid panel and fasting glucose.  Could also consider mirtazapine as an option.  No acute safety concerns.  No acute suicidality.  No problematic drug or alcohol use.    11/14/2023:  Patient continuing to work with sleep medicine with pending at home sleep study results.  Quetiapine has been helpful for sleep at just 12.5 mg at bedtime.  Does cause some significant cognitive clouding during the day.  Patient does feel benefits outweigh risks but is willing to trial olanzapine to see if gets similar benefit without such heavy cognitive effects.  Venlafaxine has been helpful for mood and anxiety symptoms.  Still could consider mirtazapine as an option for sleep as needed.  No acute safety concerns.  No SI.  No problematic drug or alcohol use.    1/2/2024:  Patient currently doing quite well on olanzapine.  Finds it more helpful and better tolerated than quetiapine.  Discussed risks and benefits and side effects to watch out for.  Mild constipation-encouraged to utilize MiraLAX and/or Dulcolax.  No acute safety concerns.  No SI.  No problematic drug or alcohol use.  No abnormal involuntary  movements.    4/8/2024:  Apart from some of the metabolic effects of olanzapine, patient otherwise quite stable.  Patient opts to try to address metabolic side effects of olanzapine with metformin.  Discussed there is evidence to support use of metformin to help treat and also to help decrease risk for metabolic syndrome from the medication use.  Discussed risks and benefits of metformin use.  Due to stability of mental health symptoms, psychiatric care be returned back to primary care provider.  Also encouraged primary care provider to continue to monitor metabolic status and need for metformin.  Patient continues to watch diet and stay active.  Updated labs ordered for primary care provider for baseline since starting metformin XR.  Last liver enzymes looked okay when checked in June 2023.  Patient also denies any abnormal involuntary movements.  No acute safety concerns.  No SI.  No problematic drug or alcohol use.    9/11/2024 (New episode of care initiated today after bounce-back):  Patient struggling more significantly with anxiety and insomnia.  Even up to 10 mg of olanzapine was not very helpful.  Will discontinue olanzapine and trial mirtazapine.  Ambien is effective and so patient instructed to take before bedtime nightly for 1-2 weeks to allow for reregulation of sleep schedule.  Could consider further optimization of Effexor-XR in the future as well.  No acute safety concerns.  No acute suicidality, although does have some passive fleeting thoughts with no plan or intent to harm self.  No problematic drug or alcohol use.  Psychotherapy encouraged.    9/24/2024:  Patient with severely worsening anxiety.  Started clonazepam scheduled since last visit.  This will be continued since to started yesterday.  Venlafaxine will be increased to 225 mg daily.  Patient scheduled for intake for PHP/IOP tomorrow.  Patient should not be traveling in her current condition and also to allow time for intensive outpatient  programming.  Letter was provided due to their upcoming travel plans.  No acute safety concerns.  No acute suicidality.  No problematic drug or alcohol use.    Medication side effects and alternatives were reviewed. Health promotion activities recommended and reviewed today. All questions addressed. Education and counseling completed regarding risks and benefits of medications and psychotherapy options. Recommend therapy for additional support.     Treatment Plan:  Discontinue Ambien/zolpidem while taking Klonopin  Increase Effexor-XR/venlafaxine ER to 225 mg daily for anxiety/mood.  Continue mirtazapine 15 mg at bedtime for sleep and to augment venlafaxine ER. ALSO OK TO STOP WHILE TAKING KLONOPIN.   Continue clonazepam 0.5-1 mg twice daily for severe anxiety.  Okay to decrease the dose if feeling too tired or sedated during the day.  Strongly recommend continuing with the plan for PHP/IOP.  Follow up with me in 1-2 weeks.  For scheduling needs you can call [phone].  You could also get a message to the nurses by calling the aforementioned phone number.  Letter sent to your [employer] advising you not to travel for the next several weeks due to your psychiatric condition and need for intensive treatment.  Continue all other cares per primary care provider.   Continue all other medications as reviewed per electronic medical record today.   Safety plan reviewed. To the Emergency Department as needed or call after hours crisis line at [phone] or [phone]. [place] Crisis Text Line. Text [place] to 253330 or Suicide LifeLine Chat: suicidepreventionlifeline.org/chat  Follow up with primary care provider as planned or for acute medical concerns.  [employer] may be used to communicate with your provider, but this is not intended to be used for emergencies.    Risks of benzodiazepine (Ativan, Xanax, Klonopin, Valium, etc) use including, but not limited to, sedation, tolerance, risk for addiction/dependence. Do not drink  alcohol while taking benzodiazepines due to risk of trouble breathing and potential death. Do not drive or operate heavy machinery until it is known how the drug affects you. Discuss with physician or pharmacist before ever taking a benzodiazepine with a narcotic/opioid pain medication.     Administrative Billing:   Phone Call/Video Duration: 15 Minutes  Start: 3:05p  Stop: 3:25p    Patient Status:  Patient will continue to be seen in collaborative care for stabilization.    The longitudinal plan of care for the diagnosis(es)/condition(s) as documented were addressed during this visit. Due to the added complexity in care, I will continue to support Marbella in the subsequent management and with ongoing continuity of care.    Episode of Care #: 2 (New episode of care started 9/11/24)    Signed:   Christi Lewis DO  [facility]    Disclaimer: This note consists of symbols derived from keyboarding, dictation and/or voice recognition software. As a result, there may be errors in the script that have gone undetected. Please consider this when interpreting information found in this chart.

## 2024-09-24 NOTE — PATIENT INSTRUCTIONS
Treatment Plan:  Discontinue Ambien/zolpidem while taking Klonopin  Increase Effexor-XR/venlafaxine ER to 225 mg daily for anxiety/mood.  Continue mirtazapine 15 mg at bedtime for sleep and to augment venlafaxine ER. ALSO OK TO STOP WHILE TAKING KLONOPIN.   Continue clonazepam 0.5-1 mg twice daily for severe anxiety.  Okay to decrease the dose if feeling too tired or sedated during the day.  Strongly recommend continuing with the plan for PHP/IOP.  Follow up with me in 1-2 weeks.  For scheduling needs you can call [phone].  You could also get a message to the nurses by calling the aforementioned phone number.  Letter sent to your mychart advising you not to travel for the next several weeks due to your psychiatric condition and need for intensive treatment.  Continue all other cares per primary care provider.   Continue all other medications as reviewed per electronic medical record today.   Safety plan reviewed. To the Emergency Department as needed or call after hours crisis line at [phone] or [phone]. [place] Crisis Text Line. Text [place] to 632640 or Suicide LifeLine Chat: suicidepreventionlifeline.org/chat  Follow up with primary care provider as planned or for acute medical concerns.  [employer] may be used to communicate with your provider, but this is not intended to be used for emergencies.    Risks of benzodiazepine (Ativan, Xanax, Klonopin, Valium, etc) use including, but not limited to, sedation, tolerance, risk for addiction/dependence. Do not drink alcohol while taking benzodiazepines due to risk of trouble breathing and potential death. Do not drive or operate heavy machinery until it is known how the drug affects you. Discuss with physician or pharmacist before ever taking a benzodiazepine with a narcotic/opioid pain medication.     Patient Education   Collaborative Care Psychiatry Service  What to Expect  Here's what to expect from your Collaborative Care Psychiatry Service (CCPS).   About  "CCPS  CCPS means 2 people work together to help you get better. You'll meet with a behavioral health clinician and a psychiatric doctor. A behavioral health clinician helps people with mental health problems by talking with them. A psychiatric doctor helps people by giving them medicine.  How it works  At every visit, you'll see the behavioral health clinician (BHC) first. They'll talk with you about how you're doing and teach you how to feel better.   Then you'll see the psychiatric doctor. This doctor can help you deal with troubling thoughts and feelings by giving you medicine. They'll make sure you know the plan for your care.   CCPS usually takes 3 to 6 visits. If you need more visits, we may have you start seeing a different psychiatric doctor for ongoing care.  If you have any questions or concerns, we'll be glad to talk with you.  About visits  Be open  At your visits, please talk openly about your problems. It may feel hard, but it's the best way for us to help you.  Cancelling visits  If you can't come to your visit, please call us right away at [phone]. If you don't cancel at least 24 hours (1 full day) before your visit, that's \"late cancellation.\"  Being late to visits  Being very late is the same as not showing up. You will be a \"no show\" if:  Your appointment starts with a BHC, and you're more than 15 minutes late for a 30-minute (half hour) visit. This will also cancel your appointment with the psychiatric doctor.  Your appointment is with a psychiatric doctor only, and you're more than 15 minutes late for a 30-minute (half hour) visit.  Your appointment is with a psychiatric doctor only, and you're more than 30 minutes late for a 60-minute (full hour) visit.  If you cancel late or don't show up 2 times within 6 months, we may end your care.   Getting help between visits  If you need help between visits, you can call us Monday to Friday from 8 a.m. to 4:30 p.m. at [phone].  Emergency " care  Call 911 or go to the nearest emergency department if your life or someone else's life is in danger.  Call 988 anytime to reach the national Suicide and Crisis hotline.  Medicine refills  To refill your medicine, call your pharmacy. You can also call [facility]'s Behavioral Access at [phone], Monday to Friday, 8 a.m. to 4:30 p.m. It can take 1 to 3 business days to get a refill.   Forms, letters, and tests  You may have papers to fill out, like FMLA, short-term disability, and workability. We can help you with these forms at your visits, but you must have an appointment. You may need more than 1 visit for this, to be in an intensive therapy program, or both.  Before we can give you medicine for ADHD, we may refer you to get tested for it or confirm it another way.  We may not be able to give you an emotional support animal letter.  We don't do mental health checks ordered by the court.   We don't do mental health testing, but we can refer you to get tested.   Thank you for choosing us for your care.  For informational purposes only. Not to replace the advice of your health care provider. Copyright   2022 [facility]. All rights reserved. "[employer] 585058 - 12/22.

## 2024-09-24 NOTE — LETTER
2024        To Whom It May Concern,    I am writing to inform you that my patient, Ms. Marbella Hendrix, : 1965, is currently unable to travel (at least through end of 2024 and pending improvement) due to a significant medical crisis.    Ms. Hendrix has been under my care since 2023 and is currently experiencing a severe exacerbation of their psychiatric condition. Due to the nature and severity of their condition, it is medically inadvisable for them to travel at this time and for at least the next several weeks while undergoing intensive treatment.    We are actively managing their condition with partial psychiatric hospitalization with step-down to intensive outpatient therapy and also medication adjustments, and it is crucial that they remain in close proximity to our medical facility to ensure they receive the necessary care and support.    Please feel free to contact my office at [phone] or via fax at [phone] if you require any further information or documentation regarding Ms. Hendrix's medical condition and travel restrictions.    Thank you for your understanding and cooperation in this matter.    Regards,    Christi Lewis DO on 2024 at 4:48 PM  Collaborative Care Psychiatry  [facility]

## 2024-09-25 ENCOUNTER — HOSPITAL ENCOUNTER (OUTPATIENT)
Dept: BEHAVIORAL HEALTH | Facility: CLINIC | Age: 59
Discharge: HOME | End: 2024-09-25
Attending: PSYCHIATRY & NEUROLOGY | Admitting: PSYCHIATRY & NEUROLOGY
Payer: COMMERCIAL

## 2024-09-25 PROCEDURE — 90791 PSYCH DIAGNOSTIC EVALUATION: CPT

## 2024-09-25 ASSESSMENT — SLEEP AND FATIGUE QUESTIONNAIRES
HOW LIKELY ARE YOU TO NOD OFF OR FALL ASLEEP WHEN YOU ARE A PASSENGER IN A CAR FOR AN HOUR WITHOUT A BREAK: SLIGHT CHANCE OF DOZING
HOW LIKELY ARE YOU TO NOD OFF OR FALL ASLEEP WHILE SITTING AND READING: SLIGHT CHANCE OF DOZING
HOW LIKELY ARE YOU TO NOD OFF OR FALL ASLEEP WHILE SITTING AND TALKING TO SOMEONE: WOULD NEVER DOZE
HOW LIKELY ARE YOU TO NOD OFF OR FALL ASLEEP WHILE WATCHING TV: SLIGHT CHANCE OF DOZING
HOW LIKELY ARE YOU TO NOD OFF OR FALL ASLEEP WHILE LYING DOWN TO REST IN THE AFTERNOON WHEN CIRCUMSTANCES PERMIT: SLIGHT CHANCE OF DOZING
HOW LIKELY ARE YOU TO NOD OFF OR FALL ASLEEP WHILE SITTING INACTIVE IN A PUBLIC PLACE: SLIGHT CHANCE OF DOZING
HOW LIKELY ARE YOU TO NOD OFF OR FALL ASLEEP WHILE SITTING QUIETLY AFTER LUNCH WITHOUT ALCOHOL: SLIGHT CHANCE OF DOZING
HOW LIKELY ARE YOU TO NOD OFF OR FALL ASLEEP IN A CAR, WHILE STOPPED FOR A FEW MINUTES IN TRAFFIC: SLIGHT CHANCE OF DOZING

## 2024-09-25 ASSESSMENT — COLUMBIA-SUICIDE SEVERITY RATING SCALE - C-SSRS
1. IN THE PAST MONTH, HAVE YOU WISHED YOU WERE DEAD OR WISHED YOU COULD GO TO SLEEP AND NOT WAKE UP?: PASSIVE THOUGHTS NO MEAN OR PLAN
2. HAVE YOU ACTUALLY HAD ANY THOUGHTS OF KILLING YOURSELF?: NO
1. HAVE YOU WISHED YOU WERE DEAD OR WISHED YOU COULD GO TO SLEEP AND NOT WAKE UP?: YES
1. IN THE PAST MONTH, HAVE YOU WISHED YOU WERE DEAD OR WISHED YOU COULD GO TO SLEEP AND NOT WAKE UP?: YES

## 2024-09-25 ASSESSMENT — PAIN SCALES - GENERAL: PAINLEVEL: MODERATE PAIN (4)

## 2024-09-25 NOTE — PROGRESS NOTES
LOCUS Worksheet     Name: Marbella Hendrix MRN: [id number]    : 1965      Gender:  female    PMI:  NA   Provider Name: [facility]   Provider NPI:  [id number]    Actual level of Care Provided:  Assessment and Referral     Service(s) receiving or referred to:  IOP/DT Program for Seniors 55+    Reason for Variance: Anxiety and Depression      Rating completed by: Bennie Elliott PhD, LPCC/LADC      I. Risk of Harm:   2      Low Risk of Harm    II. Functional Status:   2      Mild Impairment    III. Co-Morbidity:   2      Minor Co-Morbidity    IV - A. Recovery Environment - Level of Stress:   4      Highly Stress Environment    IV - B. Recovery Environment - Level of Support:   3      Limited Support in Environment    V. Treatment and Recovery History:   3      Moderate to Equivocal Response to Treatment and Recovery Management    VI. Engagement and Recovery Project:   2      Positive Engagement and Recovery       18 Composite Score    Level of Care Recommendation:   17 to 19       High Intensity Community Based Services

## 2024-09-25 NOTE — PROGRESS NOTES
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 mental health care relationships. Patient's strengths are: educated, empathetic, goal-focused, good listener, has a previous history of therapy, intelligent, motivated, open to suggestions / feedback, support of family, friends and providers, willing to ask questions, and willing to relate to others . Patient instructed to present to her nearest emergency room if symptoms deteriorate..    7. BRAD:    BRAD:  Discussed the general effects of drugs and alcohol on health and well-being. Provider gave patient printed information about the effects of chemical use on their health and well being. Recommendations:  Abstinence .     8. Records:   These were reviewed at time of assessment.   Information in this assessment was obtained from the medical record and  provided by patient who is a good historian. Patient will have open access to their mental health medical record.    9.   Interactive Complexity: No    10. Safety Plan:   Gregory Safety Plan      Creation Date: 8/14/24 Last Update Date: 9/11/24      Step 1: Warning signs:    Warning Signs    intrsuive " thoughts, sleeplessness, impaired breathing, tightness in chest,  more breakouts, restlessness      Step 2: Internal coping strategies - Things I can do to take my mind off my problems without contacting another person:    Strategies    deep breathing, exercise, yoga, go to a meeting, call a friend, journal, TIPP skills, take a PRN medication      Step 3: People and social settings that provide distraction:    Name Contact Information    Stephania,  in phone/lives with       Places    working out    12 step group      Step 4: People whom I can ask for help during a crisis:    Name Contact Information    Wally [phone]      Step 5: Professionals or agencies I can contact during a crisis:    Clinician/Agency Name Phone Emergency Contact    [facility] [phone]       Local Emergency Department Emergency Department Address Emergency Department Phone    Corrine Magallanes        Suicide Prevention Lifeline Phone: Call or Text 542  Crisis Text Line: Text HOME to 151272     Step 6: Making the environment safer (plan for lethal means safety):   Did not identify any lethal methods     Optional: What is most important to me and worth living for?:   My family  How fun things can be          Things I am able to do on my own to cope or help me feel better: watching a favorite tv show or movie, listening to music I enjoy, going outside and breathing fresh air, going for a walk or exercising, taking a shower or bath, a cold or hot beverage, a healthy snack, drawing/coloring/painting, journaling, singing or dancing, deep breathing     I can try practicing square breathing when I begin to feel anxious - inhale through the nose for the count of 4 and the first line on the square. Exhale through the mouth for the count of 4 for the second line of the square. Repeat to complete the square. Repeat the square as many times as needed.    I can also use my five senses to practice mindfulness and grounding.  "What are five things I can see, four things I can hear, three things I can feel, two things I can smell, and one thing I can taste.     Things that I am able to do with others to cope or help me feel better: sometimes just talking or spending time with someone else, sharing a meal or having coffee, watching a movie or playing a game, going for a walk or exercising    I can also use community resources including mental health hotlines, [facility] crisis teams, or apps.     Things I can use or do for distraction: movies/tv, music, reading, games, drawing/coloring/painting or other art, essential oils, exercise, cleaning/organizing, puzzles, crossword puzzles, word search, Sudoku       I can also download a meditation or relaxation monica, like Calm, Headspace, or Insight Timer (all three offer a free version)    A great website resource is Change to Chill with active coping skills    Changes I can make to support my mental health and wellness: Attend scheduled mental health therapy and psychiatric appointments. Take my medications as prescribed. Maintain a daily schedule/routine. Abstain from all mood altering substances, including drugs, alcohol, or medications not currently prescribed to me. Implement a self-care routine.      Your [facility] has a mental health crisis team you can call 24/7: [facility], [phone]    Other things that are important when I'm in crisis: to remember that the feelings I am having right now are temporary, and it won't feel like this forever, and that it is okay and important to ask for help    Community Resources  Fast Tracker  Linking people to mental health and substance use disorder resources  ViewCasttrackermn.org     [place] Mental Health Warm Line  Peer to peer support  Monday thru Saturday, 12 pm to 10 pm  635.398.5708 or 3.251.736.4420  Text \"Support\" to 83268    National Raymond on Mental Illness (PRISCILLA)  883.420.5455 or 1.888.PRISCILLA.HELPS      Mental Health Apps  My3  " https://myRollCall (roll.to)pp.org/    VirtualHopeBox  https://[employer]/apps/virtual-hope-box/      Crisis beds are short term community residential facilities that provide 1-10 day stabilization services.  You can self refer via calling them and inquiring into current openings.  This can be an alternative to hospitalization if you are able to be safe within the community.  This is a voluntary stay.  Some options [facility] include:    [facility]  ([place]) [phone]  [facility] ( [facility]]) [phone]  [facility] ([place]) [phone]  [facility] ([facility]]) [phone]  [facility] ([place]) [phone]  [facility] ([place]) [phone]  [facility] ([facility]]) [phone]    If you feel like you are in need of more emergent support for safety concerns you can present to your local emergency room for a mental health evaluation.  Once you met with a mental health clinician and emergency room health care provider they will provide level of care recommendations and next steps.    Local Emergency Rooms can include:  [facility] ([facility]) in [facility] ([facility] ) in [facility] in [place].  [facility] also supports EmPATH   EmPATH (Emergency Psychiatric Assessment, Treatment, and Healing) mental health unit. EmPATH is an innovative approach to emergency mental health care that is designed to guide people safely through a crisis while helping them build coping skills for the future. The unit is designed for acute psychiatric patients to receive assessment and evaluation in a therapeutic and least restrictive setting.  [facility] [facility] ([place]) in [facility]) in [facility] ([facility]) in [place]        Gregory Safety Plan. Ernestina Arizmendi and Abhishek Shin. Used with permission of the authors.           Provider Name/ Credentials:  Bennie Elliott PhD, LPCC, LADC.   [facility]  Health & Addiction Clinical Services  [address] [place] [address], [address], [place] [address]   [email or web address]  Office: [phone] Fax: [phone]  September 25, 2024

## 2024-09-27 ENCOUNTER — MYC MEDICAL ADVICE (OUTPATIENT)
Dept: PSYCHIATRY | Facility: CLINIC | Age: 59
End: 2024-09-27
Payer: COMMERCIAL

## 2024-09-28 ENCOUNTER — IMMUNIZATION (OUTPATIENT)
Dept: FAMILY MEDICINE | Facility: CLINIC | Age: 59
End: 2024-09-28
Payer: COMMERCIAL

## 2024-09-28 DIAGNOSIS — Z23 NEED FOR PROPHYLACTIC VACCINATION AND INOCULATION AGAINST INFLUENZA: Primary | ICD-10-CM

## 2024-09-28 DIAGNOSIS — Z23 HIGH PRIORITY FOR 2019-NCOV VACCINE: ICD-10-CM

## 2024-09-28 PROCEDURE — 90673 RIV3 VACCINE NO PRESERV IM: CPT

## 2024-09-28 PROCEDURE — 99207 PR NO CHARGE NURSE ONLY: CPT

## 2024-09-28 PROCEDURE — 90480 ADMN SARSCOV2 VAC 1/ONLY CMP: CPT

## 2024-09-28 PROCEDURE — 91320 SARSCV2 VAC 30MCG TRS-SUC IM: CPT

## 2024-09-28 PROCEDURE — 90471 IMMUNIZATION ADMIN: CPT

## 2024-09-30 ENCOUNTER — VIRTUAL VISIT (OUTPATIENT)
Dept: ENDOCRINOLOGY | Facility: CLINIC | Age: 59
End: 2024-09-30
Payer: COMMERCIAL

## 2024-09-30 VITALS — WEIGHT: 198 LBS | BODY MASS INDEX: 34.25 KG/M2

## 2024-09-30 DIAGNOSIS — E66.01 MORBID OBESITY (H): ICD-10-CM

## 2024-09-30 PROCEDURE — 99204 OFFICE O/P NEW MOD 45 MIN: CPT | Mod: 95 | Performed by: INTERNAL MEDICINE

## 2024-09-30 ASSESSMENT — PAIN SCALES - GENERAL: PAINLEVEL: MILD PAIN (3)

## 2024-09-30 NOTE — TELEPHONE ENCOUNTER
Received MyC message from patient with an update on  clonazePAM (KLONOPIN) 1 MG tablet utilization.     Medication ordered:  clonazePAM (KLONOPIN) 1 MG tablet 60 tablet 0 9/23/2024 -- No   Sig: Take half to full tab by mouth twice daily as tolerated.     Patient has been taking only at HS 1 tablet. Patient asking if she should take 1.5 tablets at night or if she should follow instructions and take during the day and evening.     RN acknowledged message and requested updates on patients sleep quality.    Routing to provider for review and recommendation.     Last visit: 9/24/24  Next visit: 10/2/24    Juliet Aguillon RN on 9/30/2024 at 9:07 AM

## 2024-09-30 NOTE — LETTER
"2024       RE: Marbella Hendrix  [address]  [facility] [address]     Dear Colleague,    Thank you for referring your patient, Marbella Hendrix, to the [facility] [place] at [facility]. Please see a copy of my visit note below.    Virtual Visit Details    Joined the call at 2024, 10:07:33 am.  Left the call at 2024, 10:25:22 am.    Originating Location (pt. Location): Home    Distant Location (provider location):  Off-site  Platform used for Video Visit: [facility] Medical Weight Management Consult    PATIENT:  Marbella Hendrix  MRN:         [id number]  :         1965  BRAYAN:         2024    Dear Vanessa Ladd MD,    I had the pleasure of seeing your patient, Marbella Hendrix.  Full intake/assessment done to determine barriers to weight loss success and develop a treatment plan.  Marbella Hendrix is a 58 year old female interested in treatment of medical problems associated with weight.  Her weight today is 198 lbs 0 oz, Body mass index is 34.25 kg/m ., and she has the following co-morbidities:      2024    12:33 PM   --   I have the following health issues associated with obesity Pre-Diabetes    High Cholesterol    Hypothyroidism   I have the following symptoms associated with obesity Knee Pain            No data to display                    2024    12:33 PM   Referring Provider   Please name the provider who referred you to Medical Weight Management  If you do not know, please answer \"I Don't Know\" Dr. Vanessa Ladd       Wt Readings from Last 4 Encounters:   24 89.8 kg (198 lb)   24 90.2 kg (198 lb 14.4 oz)   24 96.6 kg (213 lb)   24 98.4 kg (217 lb)           2024    12:33 PM   Weight History   How concerned are you about your weight? Somewhat Concerned   I became overweight After Pregnancy   The following factors have contributed to " my weight gain Mental Health Issues    Started on Medication that Caused Weight Gain    Stress   I have tried the following methods to lose weight Watching Portions or Calories    Exercise    Weight Watchers    Other   Please list the other methods Overeaters Anonymous   My lowest weight since age 19 was 165   My highest weight since age 19 was 213   The most weight I have ever lost was (lbs) 20   I have the following family history of obesity/being overweight My mother is overweight    One or more of my siblings are overweight   How has your weight changed over the last year? Gained   How many pounds? 25           9/25/2024    12:33 PM   Diet Recall   Glass juice/day 0   Glass milk/day 0   Glass sugary drink/day 0   How many cans/bottles of sugar pop/soda/tea/sports drinks do you drink in a day? 0   Diet drink/day 0   Alcohol Monthly or Less   Drinks/day 1 or 2           9/25/2024    12:33 PM   Eating Habits   Generally, my meals include foods like these bread, pasta, rice, potatoes, corn, crackers, sweet dessert, pop, or juice Almost Everyday   Generally, my meals include foods like these fried meats, brats, burgers, french fries, pizza, cheese, chips, or ice cream Never   Eat fast food (like McDonalds, Burger Tarik, Taco Bell) Never   Eat at a buffet or sit-down restaurant Never   Eat most of my meals in front of the TV or computer Almost Everyday   Often skip meals, eat at random times, have no regular eating times Once a Week   Rarely sit down for a meal but snack or graze throughout Less Than Weekly   Eat extra snacks between meals Less Than Weekly   Eat most of my food at the end of the day Never   Eat in the middle of the night or wake up at night to eat Never   Eat extra snacks to prevent or correct low blood sugar Never   Eat to prevent acid reflux or stomach pain Never   Worry about not having enough food to eat Never   I eat when I am depressed Never   I eat when I am stressed Less Than Weekly   I eat  when I am bored Never   I eat when I am anxious Less Than Weekly   I eat when I am happy or as a reward Less Than Weekly   I feel hungry all the time even if I just have eaten Less Than Weekly   Feeling full is important to me A Few Times a Week   I finish all the food on my plate even if I am already full A Few Times a Week   I can't resist eating delicious food or walk past the good food/smell Never   I eat/snack without noticing that I am eating Never   I eat when I am preparing the meal Never   I eat more than usual when I see others eating Never   I have trouble not eating sweets, ice cream, cookies, or chips if they are around the house Never   I think about food all day Almost Everyday   What foods, if any, do you crave? Sweets/Candy/Chocolate   Please list any other foods you crave? Salty foods           2024    12:33 PM   Activity/Exercise History   How much of a typical 12 hour day do you spend sitting? Less Than Half the Day   How much of a typical 12 hour day do you spend lying down? Less Than Half the Day   How much of a typical day do you spend walking/standing? Half the Day   How many hours (not including work) do you spend on the TV/Video Games/Computer/Tablet/Phone? 4-5 Hours   How many times a week are you active for the purpose of exercise? 6-7 Times a Week   What keeps you from being more active? Lack of Time   How many total minutes do you spend doing some activity for the purpose of exercising when you exercise? More Than 30 Minutes       PAST MEDICAL HISTORY:  Past Medical History:   Diagnosis Date     Arthritis     My mom has it, my grandmother had it I have it     Diabetes (H)     My mom and brother have type II     Heavy menstrual period      Leiomyoma of uterus     s/p myomectomy     Mental disorder     anxiety     PONV (postoperative nausea and vomiting)      Surgical complication after   sept     Thyroid disease     Hypothroidism, 2nd half of      Uncomplicated  asthma     My son has it, since he was born     Vesico-ureteral reflux     s/p repair           9/25/2024    12:33 PM   Work/Social History Reviewed With Patient   My employment status is Unemployed   My job is N/A   How much of your job is spent on the computer or phone? Less Than 50%   How many hours do you spend commuting to work daily? 0   What is your marital status? /In a Relationship   If in a relationship, is your significant other overweight? No   If you have children, are they overweight? Yes   Who do you live with? Spouse, older son   Who does the food shopping? Spouse and I           9/25/2024    12:33 PM   Mental Health History Reviewed With Patient   Have you ever been physically or sexually abused? No   How often in the past 2 weeks have you felt little interest or pleasure in doing things? Nearly Everyday   Over the past 2 weeks how often have you felt down, depressed, or hopeless? Nearly Everyday           9/25/2024    12:33 PM   Sleep History Reviewed With Patient   How many hours do you sleep at night? 6       MEDICATIONS:   Current Outpatient Medications   Medication Sig Dispense Refill     atorvastatin (LIPITOR) 20 MG tablet Take 1 tablet (20 mg) by mouth daily. 90 tablet 2     calcium carbonate (OS-ARA) 500 MG tablet Take 1 tablet by mouth 2 times daily       clonazePAM (KLONOPIN) 1 MG tablet Take half to full tab by mouth twice daily as tolerated. 60 tablet 0     hydrOXYzine HCl (ATARAX) 25 MG tablet Take 0.5-1 tablets (12.5-25 mg) by mouth 2 times daily as needed for anxiety. 120 tablet 0     levothyroxine (SYNTHROID/LEVOTHROID) 125 MCG tablet Take 1 tablet (125 mcg) by mouth daily 90 tablet 1     melatonin 5 MG tablet Take 5 mg by mouth nightly as needed for sleep (Patient not taking: Reported on 9/25/2024)       mirtazapine (REMERON) 15 MG tablet Take 1 tablet (15 mg) by mouth at bedtime. (Patient not taking: Reported on 9/25/2024) 30 tablet 2     Omega-3 Fatty Acids (OMEGA-3 FISH  OIL PO) Take 1 g by mouth daily       venlafaxine (EFFEXOR XR) 150 MG 24 hr capsule Take 1 capsule (150 mg) by mouth daily 90 capsule 1     venlafaxine (EFFEXOR XR) 37.5 MG 24 hr capsule Take 1 capsule (37.5 mg) by mouth daily. Take with 150mg for a total of 187.5mg daily 30 capsule 1       ALLERGIES:   Allergies   Allergen Reactions     Erythromycin Itching     Itching and swelling of lids    Itching, swelling of lids     Seasonal Allergies Itching and Other (See Comments)       PHYSICAL EXAM:  Wt 89.8 kg (198 lb)   LMP  (LMP Unknown)   BMI 34.25 kg/m     A & O x 3  HEENT: NCAT, mucous membranes moist  Respirations unlabored  Location of obesity: Mixed Obesity    ASSESSMENT:  Marbella is a patient with mature onset class 1 obesity with significant element of familial/genetic influence and with current health consequences. She does need aggressive weight loss plan due to pre-diabetes and high cholesterol and abdominal skin folds that bother her and have recurrent infections.      Marbella Hendrix eats a high carb diet.    Her problem is complicated by strong craving/reward pathways    Her ability to lose weight is impacted by lack of confidence.    PLAN:    Meal planning - continue OA    Craving/Reward   Ancillary testing:  N/A.  Food Plan:  OA.   Activity Plan:  Activity journal.  Supplementary:  N/A.   Medication:  The patient will begin medication in pursuit of improved medical status as influenced by body weight. She will start Wegovy (backup compounded).  There is a mutual understanding of the goals and risks of this therapy. The patient is in agreement. She is educated on dosage regimen and possible side effects.    RTC:    12 weeks.    Sincerely,  Osiel Laughlin MD            Again, thank you for allowing me to participate in the care of your patient.      Sincerely,    Osiel Laughlin MD

## 2024-09-30 NOTE — PROGRESS NOTES
Virtual Visit Details    Joined the call at 9/30/2024, 10:07:33 am.  Left the call at 9/30/2024, 10:25:22 am.    Originating Location (pt. Location): Home    Distant Location (provider location):  Off-site  Platform used for Video Visit: [place]

## 2024-09-30 NOTE — NURSING NOTE
Is the patient currently in the state of [place]? YES    Location: home    Visit mode:VIDEO    If the visit is dropped, the patient can be reconnected by: VIDEO VISIT: Text to cell phone:   Telephone Information:   Mobile [phone]    and VIDEO VISIT: Send to e-mail at: [email or web address]    Will anyone else be joining the visit? NO  (If patient encounters technical issues they should call [phone]    How would you like to obtain your AVS? MyChart    Are changes needed to the allergy or medication list? No    Are refills needed on medications prescribed by this physician? NO    Reason for visit: Consult    Kelly LOVETT    QNR Status: complete    FYI: pt states they are experiencing a period of high anxiety where eating has been hard and that has affected their weight

## 2024-09-30 NOTE — PROGRESS NOTES
"    New Medical Weight Management Consult    PATIENT:  Marbella Hendrix  MRN:         [id number]  :         1965  BRAYAN:         2024    Dear Vanessa Ladd MD,    I had the pleasure of seeing your patient, Marbella Hendrix.  Full intake/assessment done to determine barriers to weight loss success and develop a treatment plan.  Marbella Hendrix is a 58 year old female interested in treatment of medical problems associated with weight.  Her weight today is 198 lbs 0 oz, Body mass index is 34.25 kg/m ., and she has the following co-morbidities:      2024    12:33 PM   --   I have the following health issues associated with obesity Pre-Diabetes    High Cholesterol    Hypothyroidism   I have the following symptoms associated with obesity Knee Pain            No data to display                    2024    12:33 PM   Referring Provider   Please name the provider who referred you to Medical Weight Management  If you do not know, please answer \"I Don't Know\" Dr. Vanessa Ladd       Wt Readings from Last 4 Encounters:   24 89.8 kg (198 lb)   24 90.2 kg (198 lb 14.4 oz)   24 96.6 kg (213 lb)   24 98.4 kg (217 lb)           2024    12:33 PM   Weight History   How concerned are you about your weight? Somewhat Concerned   I became overweight After Pregnancy   The following factors have contributed to my weight gain Mental Health Issues    Started on Medication that Caused Weight Gain    Stress   I have tried the following methods to lose weight Watching Portions or Calories    Exercise    Weight Watchers    Other   Please list the other methods Overeaters Anonymous   My lowest weight since age 19 was 165   My highest weight since age 19 was 213   The most weight I have ever lost was (lbs) 20   I have the following family history of obesity/being overweight My mother is overweight    One or more of my siblings are overweight   How has your weight changed over the last year? " Gained   How many pounds? 25           9/25/2024    12:33 PM   Diet Recall   Glass juice/day 0   Glass milk/day 0   Glass sugary drink/day 0   How many cans/bottles of sugar pop/soda/tea/sports drinks do you drink in a day? 0   Diet drink/day 0   Alcohol Monthly or Less   Drinks/day 1 or 2           9/25/2024    12:33 PM   Eating Habits   Generally, my meals include foods like these bread, pasta, rice, potatoes, corn, crackers, sweet dessert, pop, or juice Almost Everyday   Generally, my meals include foods like these fried meats, brats, burgers, french fries, pizza, cheese, chips, or ice cream Never   Eat fast food (like [employer], [employer], [employer]) Never   Eat at a buffet or sit-down restaurant Never   Eat most of my meals in front of the TV or computer Almost Everyday   Often skip meals, eat at random times, have no regular eating times Once a Week   Rarely sit down for a meal but snack or graze throughout Less Than Weekly   Eat extra snacks between meals Less Than Weekly   Eat most of my food at the end of the day Never   Eat in the middle of the night or wake up at night to eat Never   Eat extra snacks to prevent or correct low blood sugar Never   Eat to prevent acid reflux or stomach pain Never   Worry about not having enough food to eat Never   I eat when I am depressed Never   I eat when I am stressed Less Than Weekly   I eat when I am bored Never   I eat when I am anxious Less Than Weekly   I eat when I am happy or as a reward Less Than Weekly   I feel hungry all the time even if I just have eaten Less Than Weekly   Feeling full is important to me A Few Times a Week   I finish all the food on my plate even if I am already full A Few Times a Week   I can't resist eating delicious food or walk past the good food/smell Never   I eat/snack without noticing that I am eating Never   I eat when I am preparing the meal Never   I eat more than usual when I see others eating Never   I have trouble not eating  sweets, ice cream, cookies, or chips if they are around the house Never   I think about food all day Almost Everyday   What foods, if any, do you crave? Sweets/Candy/Chocolate   Please list any other foods you crave? Salty foods           2024    12:33 PM   Activity/Exercise History   How much of a typical 12 hour day do you spend sitting? Less Than Half the Day   How much of a typical 12 hour day do you spend lying down? Less Than Half the Day   How much of a typical day do you spend walking/standing? Half the Day   How many hours (not including work) do you spend on the TV/Video Games/Computer/Tablet/Phone? 4-5 Hours   How many times a week are you active for the purpose of exercise? 6-7 Times a Week   What keeps you from being more active? Lack of Time   How many total minutes do you spend doing some activity for the purpose of exercising when you exercise? More Than 30 Minutes       PAST MEDICAL HISTORY:  Past Medical History:   Diagnosis Date    Arthritis     My mom has it, my grandmother had it I have it    Diabetes (H)     My mom and brother have type II    Heavy menstrual period     Leiomyoma of uterus     s/p myomectomy    Mental disorder     anxiety    PONV (postoperative nausea and vomiting)     Surgical complication after   sept    Thyroid disease     Hypothroidism, 2nd half of     Uncomplicated asthma     My son has it, since he was born    Vesico-ureteral reflux     s/p repair           2024    12:33 PM   Work/Social History Reviewed With Patient   My employment status is Unemployed   My job is N/A   How much of your job is spent on the computer or phone? Less Than 50%   How many hours do you spend commuting to work daily? 0   What is your marital status? /In a Relationship   If in a relationship, is your significant other overweight? No   If you have children, are they overweight? Yes   Who do you live with? Spouse, older son   Who does the food shopping? Spouse and I            9/25/2024    12:33 PM   Mental Health History Reviewed With Patient   Have you ever been physically or sexually abused? No   How often in the past 2 weeks have you felt little interest or pleasure in doing things? Nearly Everyday   Over the past 2 weeks how often have you felt down, depressed, or hopeless? Nearly Everyday           9/25/2024    12:33 PM   Sleep History Reviewed With Patient   How many hours do you sleep at night? 6       MEDICATIONS:   Current Outpatient Medications   Medication Sig Dispense Refill    atorvastatin (LIPITOR) 20 MG tablet Take 1 tablet (20 mg) by mouth daily. 90 tablet 2    calcium carbonate (OS-ARA) 500 MG tablet Take 1 tablet by mouth 2 times daily      clonazePAM (KLONOPIN) 1 MG tablet Take half to full tab by mouth twice daily as tolerated. 60 tablet 0    hydrOXYzine HCl (ATARAX) 25 MG tablet Take 0.5-1 tablets (12.5-25 mg) by mouth 2 times daily as needed for anxiety. 120 tablet 0    levothyroxine (SYNTHROID/LEVOTHROID) 125 MCG tablet Take 1 tablet (125 mcg) by mouth daily 90 tablet 1    melatonin 5 MG tablet Take 5 mg by mouth nightly as needed for sleep (Patient not taking: Reported on 9/25/2024)      mirtazapine (REMERON) 15 MG tablet Take 1 tablet (15 mg) by mouth at bedtime. (Patient not taking: Reported on 9/25/2024) 30 tablet 2    Omega-3 Fatty Acids (OMEGA-3 FISH OIL PO) Take 1 g by mouth daily      venlafaxine (EFFEXOR XR) 150 MG 24 hr capsule Take 1 capsule (150 mg) by mouth daily 90 capsule 1    venlafaxine (EFFEXOR XR) 37.5 MG 24 hr capsule Take 1 capsule (37.5 mg) by mouth daily. Take with 150mg for a total of 187.5mg daily 30 capsule 1       ALLERGIES:   Allergies   Allergen Reactions    Erythromycin Itching     Itching and swelling of lids    Itching, swelling of lids    Seasonal Allergies Itching and Other (See Comments)       PHYSICAL EXAM:  Wt 89.8 kg (198 lb)   LMP  (LMP Unknown)   BMI 34.25 kg/m     A & O x 3  HEENT: NCAT, mucous membranes  moist  Respirations unlabored  Location of obesity: Mixed Obesity    ASSESSMENT:  Marbella is a patient with mature onset class 1 obesity with significant element of familial/genetic influence and with current health consequences. She does need aggressive weight loss plan due to pre-diabetes and high cholesterol and abdominal skin folds that bother her and have recurrent infections.      Marbella Hendrxi eats a high carb diet.    Her problem is complicated by strong craving/reward pathways    Her ability to lose weight is impacted by lack of confidence.    PLAN:    Meal planning - continue OA    Craving/Reward   Ancillary testing:  N/A.  Food Plan:  OA.   Activity Plan:  Activity journal.  Supplementary:  N/A.   Medication:  The patient will begin medication in pursuit of improved medical status as influenced by body weight. She will start Wegovy (backup compounded).  There is a mutual understanding of the goals and risks of this therapy. The patient is in agreement. She is educated on dosage regimen and possible side effects.    RTC:    12 weeks.    Sincerely,  Osiel Laughlin MD

## 2024-10-01 NOTE — PROGRESS NOTES
[facility] Psychiatry Services [facility]  October 2, 2024      Behavioral Health Clinician Progress Note    Patient Name: Marbella Hendrix           Service Type:  Individual      Service Location:   [facility] / Email (patient reached)     Session Start Time: 230pm  Session End Time: 250pm      Session Length: 16 - 37      Attendees: Client and Spouse / Significant Other     Service Modality:  Video Visit:      Provider verified identity through the following two step process.  Patient provided:  Patient is known previously to provider    Telemedicine Visit: The patient's condition can be safely assessed and treated via synchronous audio and visual telemedicine encounter.      Reason for Telemedicine Visit: Services only offered telehealth    Originating Site (Patient Location): Patient's home    Distant Site (Provider Location): Provider Remote Setting- Home Office    Consent:  The patient/guardian has verbally consented to: the potential risks and benefits of telemedicine (video visit) versus in person care; bill my insurance or make self-payment for services provided; and responsibility for payment of non-covered services.     Patient would like the video invitation sent by:  My Chart    Mode of Communication:  Video Conference via [facility]    Distant Location (Provider):  Off-site    As the provider I attest to compliance with applicable laws and regulations related to telemedicine.    Visit Activities (Refresh list every visit): [facility] Only    Diagnostic Assessment Date: 9/25/24 Bao MONTANEZ   Treatment Plan Review Date: 9/11/24  See Flowsheets for today's PHQ-9 and BERTHA-7 results  Previous PHQ-9:       9/11/2024    10:50 AM 9/23/2024    11:17 AM 9/24/2024     2:14 PM   PHQ-9 SCORE   PHQ-9 Total Score Freda 21 (Severe depression) 27 (Severe depression) 27 (Severe depression)   PHQ-9 Total Score 21    21 27 27     Previous BERTHA-7:       8/13/2024    10:48 AM 9/7/2024    12:16 PM  "2024    11:35 AM   BERTHA-7 SCORE   Total Score 21 (severe anxiety) 20 (severe anxiety) 21 (severe anxiety)   Total Score 21 20    20 21       DATA  Extended Session (60+ minutes): No  Interactive Complexity: No  Crisis: No  [facility] Patient: No    Treatment Objective(s) Addressed in This Session:  Target Behavior(s): disease management/lifestyle changes reduce sx of anxiety    Anxiety: will experience a reduction in anxiety      Current Stressors / Issues:  MH update: back to working out.  Sadness, overwhelmed.  Feeling alienated.  Maybe going to family dinner.  Watch services online tomorrow.  Wasn't invited to a friends house as before.  Going to future services to build connection.  Anxiety is better.  Klonopin really helps.  More sadness, change of season.  Wants to work, but recognizes she can't due to need to do program.  Stresses: as above  Appetite: n/a  Sleep: sleeping 10-5, full night   Outpatient Provider updates: waitlisted for IOP maybe 2-3 weeks yet.  [facility]  SI/SIB/HI:  Passive suicidal ideation, no plan/intent.  Fleeting. No previous attempts.  Safety plan reviewed.  Future and goal oriented.  BRAD:  Denies  Side effects/compliance:  Interventions:  [facility] discussed sense of building connection and loss along with areas for self care  Most important:  Sleeping better, fearful of Klonopin being taken away.  Anxiety better.  More depression.       update:  Klonopin has been helpful, started yesterday.  Slept a whole night last night.  Napped today.  Doesn't have the energy to exercise.  Panic was worsening.  Tried to see a friend up at the cabin, came home early- couldn't sleep in same room, dog overbearing, friend kept trying to \"fix her\".  Feels unsafe driving with the panic.  Feeling down, sadness.   Stresses:  Mom  in April.  Youngest son moved away to college ([employer]).  Cat -spent significant money and time trying to save him.  Loss of control, feels like she is " always worried about his safety.  Other son, is really struggling with life functioning/ASD- tried being an .  Appetite: n/a  Sleep: Slept last night  Outpatient Provider updates: [facility]. DA update  for PHP/IOP  SI/SIB/HI:  Passive suicidal ideation, no plan/intent.  Fleeting. No previous attempts.  Safety plan reviewed.  Future and goal oriented.  BRAD:  Denies  Side effects/compliance:  Interventions:  [facility] engaged in discussion about levels of care  Most important:  Klonopin helps!      MH update:  Depression and anxiety worse.  Panic attacks regularly- taking Vistaril makes her fatigued.  Went to [facility], felt helpful.  Considering going back for support.  Stresses:  Mom  in April.  Youngest son moved away to college ([employer]).  Cat -spent significant money and time trying to save him.  Loss of control, feels like she is always worried about his safety. Applying for jobs.  Other son, is really struggling with life functioning/ASD- tried being an .  Appetite:   Sleep: 2 nights a week, Zyprexa works 6-7 hours.  Other nights, only getting 4 hours with Ambien, Melatonin, Zyprexa.  About a month like this.  Outpatient Provider updates: Doing acupuncture. [facility].  SI/SIB/HI: Passive suicidal ideation, no plan/intent.  Fleeting. No previous attempts.  Safety plan reviewed.  Future and goal oriented.  BRAD:  Denies  Side effects/compliance:  Interventions:   [facility] reviewed safety plan and emergency services as needed.  Pt notes understanding and awareness.    Most important:  Not sleeping, not functioning.  Sx much worse.  Passive SI returned.      MH update:  Sx feel stable.  No acute depression/anxiety/panic.  Stresses:  Lots of stress with kiddos/spouse  Appetite: Weight gain-med side constantly hungry  Sleep: Denies  Outpatient Provider updates:   [facility]- taking a break.  Got a list of new therapist  SI/SIB/HI:  "Denies  BRAD: Denies  Side effects/compliance: N/a  Interventions:  [facility] offered space and validation for any concerns  Most important:  Feels stable.    1/2  MH update: Things are going well.  On the 5th med for anxiety.  Really like it.  Anxiety feels manageable.  Freak out daily but not every moment.  Denies any panic attacks.  Feeling behind the \"8 ball\".  [place] due to feeling uncomfortable from constipation.  Denies any overwhelming feelings of depression, hopelessness, worthlessness, etc. .  Stresses:  Holidays were fun, sad its over.  Got to see a friend from out of town.  Arthritic hand worse in the cold notes she needs to be wearing her hand brace.  Going to [email or web address], just completed prior to appt.   Unemployed hasn't been looking due to transportation of child.  Would like to work.  Appetite: Denies  Sleep: Feeling pleased.  Sleep much improved. Having dreams.    Outpatient Provider updates: Therapist once a month [facility], appt next week.    SI/SIB/HI: Denies  BRAD: Denies  Preg: n/a  Side effects/compliance: constipated, uncomfortable and bloated.  Interventions: [facility] supported on going anxiety reduction skill use  Most important: Constipation side effects     Reason for CCPS: Pt says that they had \"bad anxiety\" since mid to late 20s. Pt has been on the same medication and dose since then. In April it became crippling so pt wasn't able to function and had heavy breathing. Pt slept for a few hours and then next night was up the whole night. Pt has a lot of things that occurred during this time since bad stuff has been happening. Pt didn't go to bed last night. They changed bedrooms. This sleep concern started in April. Pt went out of town and this helped a bit.   The thoughts were frightening in their head. Pt had intrusive thoughts. Pt doesn't have these thoughts anymore. Pt had a senior taking care of pass away.   Sleep study: pt attempted this in the middle of many life events, pt rescheduled it for " fall    Pt denies any restless legs. Pt will have anxiety before bed. It is painful, not sure if I will sleep. Haven't had this for a month.   Depression: feeling overwhelmed with tasks in the day and are unable to see any positivity, not functioning well, making mistakes   Hope to: find something to help with anxiety so they don't have a crippling bout and to be able to manage    Progress on Treatment Objective(s) / Homework:  New Objective established this session - ACTION (Actively working towards change); Intervened by reinforcing change plan / affirming steps taken    Motivational Interviewing    MI Intervention: Co-Developed Goal: reduce on going sx of anxiety, Expressed Empathy/Understanding, Open-ended questions, and Reflections: simple and complex     Change Talk Expressed by the Patient: Desire to change Ability to change Activation Taking steps    Provider Response to Change Talk: A - Affirmed patient's thoughts, decisions, or attempts at behavior change and R - Reflected patient's change talk    Assessments completed prior to visit:    The following assessments were completed by patient for this visit:  [place] Suicide Severity Rating Scale (Lifetime/Recent)      7/17/2023     9:35 AM 1/2/2024    11:33 AM 8/14/2024     4:55 PM 8/14/2024     5:45 PM 8/14/2024     6:49 PM 9/25/2024     7:00 AM   [place] Suicide Severity Rating (Lifetime/Recent)   Q1 Wish to be Dead (Lifetime)     Yes    Q2 Non-Specific Active Suicidal Thoughts (Lifetime)     No    Q1 Wished to be Dead (Past Month)   0-->no 1-->yes     Q2 Suicidal Thoughts (Past Month)   0-->no 0-->no     Q6 Suicide Behavior (Lifetime)   0-->no 0-->no 0-->no    Level of Risk per Screen   no risks indicated low risk     Q1 Wish to be Dead (Lifetime) Y N    Y   Wish to be Dead Description (Lifetime) Pt reports having thoughts and never acted on them. They report the last time was May or June 2023.     passive thoughts no men or plan   1. Wish to be Dead  (Past 1 Month)      Y   Wish to be Dead Description (Past 1 Month)      passive thoughts no mean or plan   Q2 Non-Specific Active Suicidal Thoughts (Lifetime) Y N    N   Non-Specific Active Suicidal Thought Description (Lifetime) Pt reports that they had a very stressful time with many life evetns in April and this impacted these thoughts.        2. Non-Specific Active Suicidal Thoughts (Past 1 Month) N        3. Active Suicidal Ideation with any Methods (Not Plan) Without Intent to Act (Lifetime) N    N    Q4 Active Suicidal Ideation with Some Intent to Act, Without Specific Plan (Lifetime) N    N    Q5 Active Suicidal Ideation with Specific Plan and Intent (Lifetime) N    N    Most Severe Ideation Rating (Lifetime) 2        Description of Most Severe Ideation (Lifetime) Pt reports having intrusive thoughts while driving and reports that they did not act on these and have their children and will never act on them.        Frequency (Lifetime) 3        Duration (Lifetime) 1        Controllability (Lifetime) 1        Deterrents (Lifetime) 1        Reasons for Ideation (Lifetime) 0        Actual Attempt (Lifetime) N N   N    Has subject engaged in non-suicidal self-injurious behavior? (Lifetime) N N   N    Interrupted Attempts (Lifetime) N N   N    Aborted or Self-Interrupted Attempt (Lifetime) N N   N    Preparatory Acts or Behavior (Lifetime) N N   N    Calculated C-SSRS Risk Score (Lifetime/Recent) No Risk Indicated No Risk Indicated   No Risk Indicated Low Risk       Care Plan review completed: Yes    Medication Review:  Changes to psychiatric medications, see updated Medication List in EPIC.     Medication Compliance:  Yes    Changes in Health Issues:   None reported    Chemical Use Review:   Substance Use: Chemical use reviewed, no active concerns identified      Tobacco Use: No current tobacco use.      Assessment: Current Emotional / Mental Status (status of significant symptoms):  Risk status (Self / Other  harm or suicidal ideation)  Patient has had a history of suicidal ideation: reports a hx of ideation without specific planning/intent/action back in 2022  Patient denies current fears or concerns for personal safety.  Patient denies current or recent suicidal ideation or behaviors.  Patient denies current or recent homicidal ideation or behaviors.  Patient denies current or recent self injurious behavior or ideation.  Patient denies other safety concerns.  A safety and risk management plan has been developed including: Patient consented to co-developed safety plan.  A safety and risk management plan was completed.  Patient agreed to use safety plan should any safety concerns arise.  A copy was given to the patient.    Appearance:   Appropriate   Eye Contact:   Good   Psychomotor Behavior: Normal   Attitude:   Cooperative   Orientation:   All  Speech   Rate / Production: Normal    Volume:  Normal   Mood:    Normal   Affect:    Appropriate   Thought Content:  Clear   Thought Form:  Coherent  Logical   Insight:    Good     Diagnoses:  1. Generalized anxiety disorder    2. Moderate episode of recurrent major depressive disorder (H)    3. Attention deficit hyperactivity disorder (ADHD), unspecified ADHD type                Collateral Reports Completed:  Communicated with: Dr Lewis    Plan: (Homework, other):  Patient was given information about behavioral services and encouraged to schedule a follow up appointment with the clinic [facility] as needed.  She was also given information about mental health symptoms and treatment options .  CD Recommendations: No indications of CD issues.     Rafaela Hassan [facility]        Individual Treatment Plan    Patient's Name: Marbella Hendrix   YOB: 1965  Date of Creation: 9/11/24  Date Treatment Plan Last Reviewed/Revised: 9/11/24    DSM5 Diagnoses:   1. Generalized anxiety disorder    2. Moderate episode of recurrent major depressive disorder (H)    3. Attention deficit  "hyperactivity disorder (ADHD), unspecified ADHD type        Psychosocial / Contextual Factors: Relationship Concerns, Occupational Issues, and Interpersonal Concerns  PROMIS (reviewed every 90 days):   The following assessments were completed by patient for this visit:  PROMIS 10-Global Health (only subscores and total score):       11/25/2019    12:52 PM 7/14/2023     4:22 PM 11/7/2023     4:23 PM 1/2/2024    10:47 AM 4/2/2024     7:21 AM 9/7/2024    12:17 PM 9/23/2024    11:37 AM   PROMIS-10 Scores Only   Global Mental Health Score 15 11    11 12 14 13    13 9    9 8    8   Global Physical Health Score 12 14    14 14 13 14    14 12    12 12    12   PROMIS TOTAL - SUBSCORES 27 25    25 26 27 27    27 21    21 20    20        Referral / Collaboration:  Referral to another professional/service is not indicated at this time..    Anticipated number of session for this episode of care: 6-9 sessions  Anticipation frequency of session: Monthly  Anticipated Duration of each session: 16-37 minutes  Treatment plan will be reviewed in 90 days or when goals have been changed.       MeasurableTreatment Goal(s) related to diagnosis / functional impairment(s)  Goal 1: Patient will reduce sx of anxiety   \"I will know I've met my goal when I can sleep and clearly think through my thoughts to use my skills.\"     Objective #A (Patient Action)    Patient will identify three distraction and diversion activities and use those activities to decrease level of anxiety    Status: New - Date: 9/11/24      Intervention(s)  [facility] will provide support through CBT, MI, Acceptance and Commitment Therapy, Dialectic Behavioral Therapy and problem solving model to explore and overcome barriers.    Goal 2: Patient will manage concerns of safety    I will know I've met my goal when I can reduce suicidal ideation .      Objective #A (Patient Action)    Patient will use previously developed safety plan on file.  Status: New - Date: 9/11/24 "     Intervention(s)  Therapist will provide support through CBT, MI, Acceptance and Commitment Therapy, Dialectic Behavioral Therapy and problem solving model to explore and overcome barriers.      Patient has reviewed and agreed to the above plan.    Written by  Rafaela Hassan [facility], [facility]

## 2024-10-02 ENCOUNTER — VIRTUAL VISIT (OUTPATIENT)
Dept: BEHAVIORAL HEALTH | Facility: CLINIC | Age: 59
End: 2024-10-02
Payer: COMMERCIAL

## 2024-10-02 ENCOUNTER — VIRTUAL VISIT (OUTPATIENT)
Dept: PSYCHIATRY | Facility: CLINIC | Age: 59
End: 2024-10-02
Payer: COMMERCIAL

## 2024-10-02 DIAGNOSIS — F90.9 ATTENTION DEFICIT HYPERACTIVITY DISORDER (ADHD), UNSPECIFIED ADHD TYPE: ICD-10-CM

## 2024-10-02 DIAGNOSIS — F41.1 GENERALIZED ANXIETY DISORDER: Primary | ICD-10-CM

## 2024-10-02 DIAGNOSIS — G47.00 INSOMNIA, UNSPECIFIED TYPE: ICD-10-CM

## 2024-10-02 DIAGNOSIS — F90.9 ATTENTION DEFICIT HYPERACTIVITY DISORDER (ADHD), UNSPECIFIED ADHD TYPE: Primary | ICD-10-CM

## 2024-10-02 DIAGNOSIS — F33.1 MODERATE EPISODE OF RECURRENT MAJOR DEPRESSIVE DISORDER (H): ICD-10-CM

## 2024-10-02 DIAGNOSIS — F41.1 GENERALIZED ANXIETY DISORDER: ICD-10-CM

## 2024-10-02 PROCEDURE — 90832 PSYTX W PT 30 MINUTES: CPT | Mod: 95 | Performed by: COUNSELOR

## 2024-10-02 PROCEDURE — G2211 COMPLEX E/M VISIT ADD ON: HCPCS | Mod: 95 | Performed by: PSYCHIATRY & NEUROLOGY

## 2024-10-02 PROCEDURE — 99214 OFFICE O/P EST MOD 30 MIN: CPT | Mod: 95 | Performed by: PSYCHIATRY & NEUROLOGY

## 2024-10-02 ASSESSMENT — PATIENT HEALTH QUESTIONNAIRE - PHQ9
SUM OF ALL RESPONSES TO PHQ QUESTIONS 1-9: 19
SUM OF ALL RESPONSES TO PHQ QUESTIONS 1-9: 19
10. IF YOU CHECKED OFF ANY PROBLEMS, HOW DIFFICULT HAVE THESE PROBLEMS MADE IT FOR YOU TO DO YOUR WORK, TAKE CARE OF THINGS AT HOME, OR GET ALONG WITH OTHER PEOPLE: SOMEWHAT DIFFICULT

## 2024-10-02 NOTE — PROGRESS NOTES
"Virtual Visit Details    Type of service:  Video Visit     Originating Location (pt. Location): {video visit patient location:117107::\"Home\"}  {PROVIDER LOCATION On-site should be selected for visits conducted from your clinic location or adjoining [facility] hospital, academic office, or other nearby [facility] building. Off-site should be selected for all other provider locations, including home:587479}  Distant Location (provider location):  {virtual location provider:047685}  Platform used for Video Visit: {Virtual Visit Platforms:980466::\"Mithridion\"}  "

## 2024-10-02 NOTE — NURSING NOTE
Is the patient currently in the state of [place]? YES    Current patient location: [address] [place] [address] [facility] [id number]    Visit mode:VIDEO    If the visit is dropped, the patient can be reconnected by: VIDEO VISIT: Text to cell phone:   Telephone Information:   Mobile [phone]       Will anyone else be joining the visit? No  (If patient encounters technical issues they should call [phone])    Are changes needed to the allergy or medication list? No    Are refills needed on medications prescribed by this physician? Discuss with Provider    Rooming Documentation: [facility]    Reason for visit: RACHELL Hernandez

## 2024-10-02 NOTE — PROGRESS NOTES
"Telemedicine Visit: The patient's condition can be safely assessed and treated via synchronous audio and visual telemedicine encounter.      Reason for Telemedicine Visit: Patient has requested telehealth visit    Originating Site (Patient Location): Patient's home    Distant Location (provider location):  Off-Site    Consent:  The patient/guardian has verbally consented to: the potential risks and benefits of telemedicine (video visit) versus in person care; bill my insurance or make self-payment for services provided; and responsibility for payment of non-covered services.     Mode of Communication:  Video Conference via [employer]    As the provider I attest to compliance with applicable laws and regulations related to telemedicine.          Outpatient Psychiatric Progress Note    Name: Marbella Hendrix   : 1965                    Primary Care Provider: Vanessa Ladd MD   Therapist: Doing acupuncture. [facility].       PHQ-9 scores:      2024    11:17 AM 2024     2:14 PM 10/2/2024     2:25 PM   PHQ-9 SCORE   PHQ-9 Total Score MyChart 27 (Severe depression) 27 (Severe depression) 19 (Moderately severe depression)   PHQ-9 Total Score 27 27 19       BERTHA-7 scores:      2024    10:48 AM 2024    12:16 PM 2024    11:35 AM   BERTHA-7 SCORE   Total Score 21 (severe anxiety) 20 (severe anxiety) 21 (severe anxiety)   Total Score 21 20    20 21       Patient Identification:  Patient is a 58 year old,   White Not  or  female  who presents for return visit with me.  Patient is currently unemployed. Patient attended the phone/video session with , Wally.  Patient prefers to be called: \"Marbella\".    Interim History:  I last saw Marbella Hendrix for outpatient psychiatry return visit on 2024. During that appointment, we:    Discontinue Ambien/zolpidem while taking Klonopin  Increase Effexor-XR/venlafaxine ER to 225 mg daily for " anxiety/mood.  Continue mirtazapine 15 mg at bedtime for sleep and to augment venlafaxine ER. ALSO OK TO STOP WHILE TAKING KLONOPIN.   Continue clonazepam 0.5-1 mg twice daily for severe anxiety.  Okay to decrease the dose if feeling too tired or sedated during the day.  Strongly recommend continuing with the plan for PHP/IOP.  Follow up with me in 1-2 weeks.  For scheduling needs you can call [phone].  You could also get a message to the nurses by calling the aforementioned phone number.  Letter sent to your mychart advising you not to travel for the next several weeks due to your psychiatric condition and need for intensive treatment.    10/02: Patient overall with some significant improvements in anxiety and sleep.  Has been sleeping a full night now.  Anxiety much reduced.  Ongoing sadness and some depression.  High psychosocial stressors.  No acute suicidality.  No problematic drug or alcohol use.  Taking clonazepam mostly just at bedtime.  Taking venlafaxine 150 mg daily.  Overall tolerating medication well.    Per [facility] [place], Rafaela Hassan [facility], during today's team-based visit:  Current Stressors / Issues:  MH update: back to working out.  Sadness, overwhelmed.  Feeling alienated.  Maybe going to family dinner.  Watch services online tomorrow.  Wasn't invited to a friends house as before.  Going to future services to build connection.  Anxiety is better.  Klonopin really helps.  More sadness, change of season.  Wants to work, but recognizes she can't due to need to do program.  Stresses: as above  Appetite: n/a  Sleep: sleeping 10-5, full night   Outpatient Provider updates: waitlisted for IOP maybe 2-3 weeks yet.  [facility]  SI/SIB/HI:  Passive suicidal ideation, no plan/intent.  Fleeting. No previous attempts.  Safety plan reviewed.  Future and goal oriented.  BRAD:  Denies  Side effects/compliance:  Interventions:  [facility] [place] discussed sense of building connection and loss along with areas  for self care  Most important:  Sleeping better, fearful of Klonopin being taken away.  Anxiety better.  More depression.    Since last visit pt with ongoing decompensation of mental health symptoms. Scheduled Klonopin prescribed to acutely decrease the anxiety. Also discussed partial hospital recommendation with referral that will likely be placed today.      Past Psychiatric Med Trials:  Psych Meds at Intake:  Paxil 20 mg daily - started when 29 years, has been up and down on the dose, on 10 mg dose mostly, last on 20 mg this past spring   Ambien 5 mg for insomnia - used for 4 nights  Ativan 0.5 mg for flying     Past Psych Meds:  Prozac for 1-2 months maybe when 27 yo    Psychiatric ROS:  Marbella Hendrix reports mood has been: See HPI above  Anxiety has been: See HPI above  Sleep has been: See HPI above  Rachna sxs: None  Psychosis sxs: None  ADHD/ADD sxs: see HPI above  PTSD sxs: NA  PHQ9 and GAD7 scores were reviewed today if completed.   Medication side effects: See HPI above  Current stressors include: Symptoms and see HPI above  Coping mechanisms and supports include: Family and Hobbies    Current medications include:   Current Outpatient Medications   Medication Sig Dispense Refill    atorvastatin (LIPITOR) 20 MG tablet Take 1 tablet (20 mg) by mouth daily. 90 tablet 2    calcium carbonate (OS-ARA) 500 MG tablet Take 1 tablet by mouth 2 times daily      clonazePAM (KLONOPIN) 1 MG tablet Take half to full tab by mouth twice daily as tolerated. 60 tablet 0    hydrOXYzine HCl (ATARAX) 25 MG tablet Take 0.5-1 tablets (12.5-25 mg) by mouth 2 times daily as needed for anxiety. 120 tablet 0    levothyroxine (SYNTHROID/LEVOTHROID) 125 MCG tablet Take 1 tablet (125 mcg) by mouth daily 90 tablet 1    melatonin 5 MG tablet Take 5 mg by mouth nightly as needed for sleep (Patient not taking: Reported on 9/25/2024)      mirtazapine (REMERON) 15 MG tablet Take 1 tablet (15 mg) by mouth at bedtime. (Patient not taking:  Reported on 2024) 30 tablet 2    Omega-3 Fatty Acids (OMEGA-3 FISH OIL PO) Take 1 g by mouth daily      Semaglutide-Weight Management (WEGOVY) 0.25 MG/0.5ML pen Inject 0.25 mg subcutaneously once a week. 2 mL 0    Semaglutide-Weight Management (WEGOVY) 0.5 MG/0.5ML pen Inject 0.5 mg subcutaneously once a week. 2 mL 5    venlafaxine (EFFEXOR XR) 150 MG 24 hr capsule Take 1 capsule (150 mg) by mouth daily 90 capsule 1    venlafaxine (EFFEXOR XR) 37.5 MG 24 hr capsule Take 1 capsule (37.5 mg) by mouth daily. Take with 150mg for a total of 187.5mg daily 30 capsule 1     No current facility-administered medications for this visit.       The [place] Prescription Monitoring Program has been reviewed and there are no concerns about diversionary activity for controlled substances at this time.     Past Medical/Surgical History:  Past Medical History:   Diagnosis Date    Arthritis     My mom has it, my grandmother had it I have it    Diabetes (H)     My mom and brother have type II    Heavy menstrual period     Leiomyoma of uterus     s/p myomectomy    Mental disorder     anxiety    PONV (postoperative nausea and vomiting)     Surgical complication after  2002    Thyroid disease     Hypothroidism, 2nd half of     Uncomplicated asthma     My son has it, since he was born    Vesico-ureteral reflux     s/p repair      has a past medical history of Arthritis, Diabetes (H), Heavy menstrual period, Leiomyoma of uterus, Mental disorder, PONV (postoperative nausea and vomiting), Surgical complication (after  2002), Thyroid disease, Uncomplicated asthma, and Vesico-ureteral reflux.    She has no past medical history of Cancer (H), Cerebral infarction (H), Congestive heart failure (H), COPD (chronic obstructive pulmonary disease) (H), Depressive disorder, Heart disease, History of blood transfusion, or Hypertension.    Social History:  Reviewed. No changes to social history except as noted above  in HPI.    Vital Signs:   None. This is phone/video visit.     Labs:  Most recent laboratory results reviewed and no new labs.     Review of Systems:  10 systems (general, cardiovascular, respiratory, eyes, ENT, endocrine, GI, , M/S, neurological) were reviewed. Most pertinent finding(s) is/are: Some chronic pain. The remaining systems are all unremarkable.    Mental Status Examination (limited as this is by phone/video):  Appearance: Awake, alert, appears stated age, no acute distress, well-groomed   Attitude:  cooperative  Motor: No gross abnormalities observed via video, not formally tested   Oriented to:  person, place, time, and situation  Attention Span and Concentration:  normal  Speech:  clear, coherent, regular rate, rhythm, and volume  Language: intact  Mood: Depressed but anxiety much better  Affect:  mood congruent  Associations:  no loose associations  Thought Process:  logical, linear and goal oriented  Thought Content:  no evidence of acute suicidality or homicidal ideation, no evidence of psychotic thought, no auditory hallucinations present and no visual hallucinations present  Recent and Remote Memory:  Intact to interview. Not formally assessed. No amnesia.  Fund of Knowledge: appropriate  Insight: Good  Judgment:  intact, adequate for safety  Impulse Control:  intact    Suicide Risk Assessment:  Today Marbella Hendrix has recently reported passive thoughts of death but no acute suicidality or plan or intent to harm self-none today.  See safety plan in chart.  Also see thorough assessment by [facility] during today's joint appointment.  Based on all available evidence including the factors cited above, Marbella Hendrix does not appear to be at imminent risk for self-harm, does not meet criteria for a 72-hr hold, and therefore remains appropriate for ongoing outpatient level of care.  A thorough assessment of risk factors related to suicide and self-harm have been reviewed and are noted above. The  patient convincingly denies suicidality on several occasions. Local community safety resources reviewed for patient to use if needed. There was no deceit detected, and the patient presented in a manner that was believable.     DSM5 Diagnosis:  Attention-Deficit/Hyperactivity Disorder  314.01 (F90.9) Unspecified Attention -Deficit / Hyperactivity Disorder  Major Depressive Disorder, Recurrent Episode, moderate  300.02 (F41.1) Generalized Anxiety Disorder  Insomnia, unspecified    Medical comorbidities include:   Patient Active Problem List    Diagnosis Date Noted    Elevated fasting glucose 05/01/2024     Priority: Medium    Anxiety 07/19/2023     Priority: Medium    Adjustment disorder with anxious mood 06/27/2021     Priority: Medium    Family history of diabetes mellitus 06/27/2021     Priority: Medium    CMC DJD(carpometacarpal degenerative joint disease), localized primary, left 11/18/2020     Priority: Medium    Thumb pain, left 11/18/2020     Priority: Medium    Dermatochalasis of both upper eyelids 11/17/2020     Priority: Medium     Added automatically from request for surgery [id number]      Involutional ptosis, acquired, bilateral 11/17/2020     Priority: Medium     Added automatically from request for surgery [id number]      Weakness of right hip 07/09/2020     Priority: Medium    Primary osteoarthritis of right hip 12/02/2019     Priority: Medium     Added automatically from request for surgery [id number]      Morbid obesity (H) 06/20/2018     Priority: Medium    Status post hysterectomy 08/02/2017     Priority: Medium    Sensation of plugged ear on both sides 07/12/2017     Priority: Medium    Acute post-operative pain 08/28/2015     Priority: Medium    Preventative health care 10/30/2012     Priority: Medium     Last pap NIL 2011; mammogram nl 2011; lipids abnl, needs annual f/u      Generalized anxiety disorder 09/26/2012     Priority: Medium     H/o panic attacks; currently controlled with paxil       Hyperlipidemia LDL goal <130 09/26/2012     Priority: Medium     Behavioral measures - avoiding statins while trying to conceive; not a candidate for red yeast rice (interactions with thyroid meds)      Acquired hypothyroidism 09/26/2012     Priority: Medium    Overweight 09/26/2012     Priority: Medium     On exercise plan, has been in weight watchers  Problem list name updated by automated process. Provider to review         Psychosocial & Contextual Factors: see HPI above    Assessment:  From Intake, 7/17/2023:  Marbella Hendrix is a 57-year-old female with past psychiatric history including anxiety, ADHD, depression, remote polysubstance abuse (in remission for decades) who presents today for psychiatric evaluation.  Patient recently with increased psychosocial stressors and acutely worsening anxiety and insomnia.  Patient most recently on Paxil and Ambien to manage symptoms.  Ambien used sparingly and did help break severe insomnia cycle recently.  Patient currently taking 10 mg of Paxil and has only ever been up to 20 mg historically.  Patient may be interested in a trial with something different than Paxil.  We also discussed further optimizing Paxil to 30 or 40 mg daily for more therapeutic trial before replacing the medication.  We did discuss Lexapro and Effexor-XR as possible alternatives.  Patient would like to discuss these options with her  who is an internist and her psychotherapist.  In the meantime, we discussed a trial with clonidine to help with sleep, anxiety, and ADHD symptoms.  Discussed risks and benefits of therapy.  Patient would like to move forward with a clonidine trial to help at bedtime as needed for sleep and a small dose during the day as needed for anxiety.  No acute safety concerns today.  No acute suicidality.  No problematic drug or alcohol use.     8/22/2023:  Patient with ongoing significant anxiety and mood related struggles.  Clonidine has not been helpful.  We will  transition patient off paroxetine and onto venlafaxine.  We will continue to consider escitalopram as an option if venlafaxine ineffective or poorly tolerated.  Could also consider use of fluoxetine if paroxetine is difficult to discontinue.  Discussed DBT therapy as a potential option to help improve symptoms.  Resources sent in [employer] message.  No acute suicidality.  No acute safety concerns.  No problematic drug or alcohol use.    10/3/2023:  Patient doing okay with transition to venlafaxine ER.  Has had some mild dizziness and headaches.  We will continue to optimize venlafaxine ER and patient will watch for worsening headaches and dizziness.  Her symptoms could be related to discontinuation of paroxetine.  Patient still struggling significantly in the evenings and with insomnia.  We will trial quetiapine at bedtime as needed for sleep.  Discussed risks and benefits of therapy including watching for any abnormal involuntary movements.  If patient continues on the medication, we will make sure to get updated lipid panel and fasting glucose.  Could also consider mirtazapine as an option.  No acute safety concerns.  No acute suicidality.  No problematic drug or alcohol use.    11/14/2023:  Patient continuing to work with sleep medicine with pending at home sleep study results.  Quetiapine has been helpful for sleep at just 12.5 mg at bedtime.  Does cause some significant cognitive clouding during the day.  Patient does feel benefits outweigh risks but is willing to trial olanzapine to see if gets similar benefit without such heavy cognitive effects.  Venlafaxine has been helpful for mood and anxiety symptoms.  Still could consider mirtazapine as an option for sleep as needed.  No acute safety concerns.  No SI.  No problematic drug or alcohol use.    1/2/2024:  Patient currently doing quite well on olanzapine.  Finds it more helpful and better tolerated than quetiapine.  Discussed risks and benefits and side  effects to watch out for.  Mild constipation-encouraged to utilize MiraLAX and/or Dulcolax.  No acute safety concerns.  No SI.  No problematic drug or alcohol use.  No abnormal involuntary movements.    4/8/2024:  Apart from some of the metabolic effects of olanzapine, patient otherwise quite stable.  Patient opts to try to address metabolic side effects of olanzapine with metformin.  Discussed there is evidence to support use of metformin to help treat and also to help decrease risk for metabolic syndrome from the medication use.  Discussed risks and benefits of metformin use.  Due to stability of mental health symptoms, psychiatric care be returned back to primary care provider.  Also encouraged primary care provider to continue to monitor metabolic status and need for metformin.  Patient continues to watch diet and stay active.  Updated labs ordered for primary care provider for baseline since starting metformin XR.  Last liver enzymes looked okay when checked in June 2023.  Patient also denies any abnormal involuntary movements.  No acute safety concerns.  No SI.  No problematic drug or alcohol use.    9/11/2024 (New episode of care initiated today after bounce-back):  Patient struggling more significantly with anxiety and insomnia.  Even up to 10 mg of olanzapine was not very helpful.  Will discontinue olanzapine and trial mirtazapine.  Ambien is effective and so patient instructed to take before bedtime nightly for 1-2 weeks to allow for reregulation of sleep schedule.  Could consider further optimization of Effexor-XR in the future as well.  No acute safety concerns.  No acute suicidality, although does have some passive fleeting thoughts with no plan or intent to harm self.  No problematic drug or alcohol use.  Psychotherapy encouraged.    9/24/2024:  Patient with severely worsening anxiety.  Started clonazepam scheduled since last visit.  This will be continued since to started yesterday.  Venlafaxine will  be increased to 225 mg daily.  Patient scheduled for intake for PHP/IOP tomorrow.  Patient should not be traveling in her current condition and also to allow time for intensive outpatient programming.  Letter was provided due to their upcoming travel plans.  No acute safety concerns.  No acute suicidality.  No problematic drug or alcohol use.    10/02/2024:  Patient doing a little bit better on decreased venlafaxine and daily clonazepam.  No changes today.  Patient will continue to monitor mood/depression symptoms  Hopefully will be able to start intensive outpatient programming soon.  No acute safety concerns.  No acute suicidality.  No problematic drug or alcohol use.    Medication side effects and alternatives were reviewed. Health promotion activities recommended and reviewed today. All questions addressed. Education and counseling completed regarding risks and benefits of medications and psychotherapy options. Recommend therapy for additional support.     Treatment Plan:  Discontinue Ambien/zolpidem while taking Klonopin  Continue Effexor-XR/venlafaxine  mg daily for anxiety/mood.  Continue clonazepam 0.5-1 mg twice daily for severe anxiety.  Okay to decrease the dose if feeling too tired or sedated during the day.  Strongly recommend continuing with the plan for PHP/IOP once off wait list.  Follow up with me in 3-4 weeks.  For scheduling needs you can call [phone].  You could also get a message to the nurses by calling the aforementioned phone number.  Letter sent to your mychart advising you not to travel for the next several weeks due to your psychiatric condition and need for intensive treatment.  Continue all other cares per primary care provider.   Continue all other medications as reviewed per electronic medical record today.   Safety plan reviewed. To the Emergency Department as needed or call after hours crisis line at [phone] or [phone]. [place] Crisis Text Line. Text [place] to  376033 or Suicide LifeLine Chat: suicidepreventionlifeline.org/chat  Follow up with primary care provider as planned or for acute medical concerns.  MyChart may be used to communicate with your provider, but this is not intended to be used for emergencies.    Risks of benzodiazepine (Ativan, Xanax, Klonopin, Valium, etc) use including, but not limited to, sedation, tolerance, risk for addiction/dependence. Do not drink alcohol while taking benzodiazepines due to risk of trouble breathing and potential death. Do not drive or operate heavy machinery until it is known how the drug affects you. Discuss with physician or pharmacist before ever taking a benzodiazepine with a narcotic/opioid pain medication.     Administrative Billing:   Phone Call/Video Duration: 17 Minutes  Start: 3:01p  Stop: 3:18p    Patient Status:  Patient will continue to be seen in collaborative care for stabilization.    The longitudinal plan of care for the diagnosis(es)/condition(s) as documented were addressed during this visit. Due to the added complexity in care, I will continue to support Marbella in the subsequent management and with ongoing continuity of care.    Episode of Care #: 3 (New episode of care started 9/11/24)    Signed:   Christi Lewis DO  [facility]    Disclaimer: This note consists of symbols derived from keyboarding, dictation and/or voice recognition software. As a result, there may be errors in the script that have gone undetected. Please consider this when interpreting information found in this chart.

## 2024-10-02 NOTE — PATIENT INSTRUCTIONS
Treatment Plan:  Discontinue Ambien/zolpidem while taking Klonopin  Continue Effexor-XR/venlafaxine  mg daily for anxiety/mood.  Continue clonazepam 0.5-1 mg twice daily for severe anxiety.  Okay to decrease the dose if feeling too tired or sedated during the day.  Strongly recommend continuing with the plan for PHP/IOP once off wait list.  Follow up with me in 3-4 weeks.  For scheduling needs you can call [phone].  You could also get a message to the nurses by calling the aforementioned phone number.  Letter sent to your mychart advising you not to travel for the next several weeks due to your psychiatric condition and need for intensive treatment.  Continue all other cares per primary care provider.   Continue all other medications as reviewed per electronic medical record today.   Safety plan reviewed. To the Emergency Department as needed or call after hours crisis line at [phone] or [phone]. [place] Crisis Text Line. Text [place] to 457565 or Suicide LifeLine Chat: suicidepreventionlifeline.org/chat  Follow up with primary care provider as planned or for acute medical concerns.  [employer] may be used to communicate with your provider, but this is not intended to be used for emergencies.    Risks of benzodiazepine (Ativan, Xanax, Klonopin, Valium, etc) use including, but not limited to, sedation, tolerance, risk for addiction/dependence. Do not drink alcohol while taking benzodiazepines due to risk of trouble breathing and potential death. Do not drive or operate heavy machinery until it is known how the drug affects you. Discuss with physician or pharmacist before ever taking a benzodiazepine with a narcotic/opioid pain medication.     Patient Education   Collaborative Care Psychiatry Service  What to Expect  Here's what to expect from your Collaborative Care Psychiatry Service (CCPS).   About CCPS  CCPS means 2 people work together to help you get better. You'll meet with a behavioral health  "clinician and a psychiatric doctor. A behavioral health clinician helps people with mental health problems by talking with them. A psychiatric doctor helps people by giving them medicine.  How it works  At every visit, you'll see the behavioral health clinician (BHC) first. They'll talk with you about how you're doing and teach you how to feel better.   Then you'll see the psychiatric doctor. This doctor can help you deal with troubling thoughts and feelings by giving you medicine. They'll make sure you know the plan for your care.   CCPS usually takes 3 to 6 visits. If you need more visits, we may have you start seeing a different psychiatric doctor for ongoing care.  If you have any questions or concerns, we'll be glad to talk with you.  About visits  Be open  At your visits, please talk openly about your problems. It may feel hard, but it's the best way for us to help you.  Cancelling visits  If you can't come to your visit, please call us right away at [phone]. If you don't cancel at least 24 hours (1 full day) before your visit, that's \"late cancellation.\"  Being late to visits  Being very late is the same as not showing up. You will be a \"no show\" if:  Your appointment starts with a BHC, and you're more than 15 minutes late for a 30-minute (half hour) visit. This will also cancel your appointment with the psychiatric doctor.  Your appointment is with a psychiatric doctor only, and you're more than 15 minutes late for a 30-minute (half hour) visit.  Your appointment is with a psychiatric doctor only, and you're more than 30 minutes late for a 60-minute (full hour) visit.  If you cancel late or don't show up 2 times within 6 months, we may end your care.   Getting help between visits  If you need help between visits, you can call us Monday to Friday from 8 a.m. to 4:30 p.m. at [phone].  Emergency care  Call 911 or go to the nearest emergency department if your life or someone else's life is in " danger.  Call 988 anytime to reach the national Suicide and Crisis hotline.  Medicine refills  To refill your medicine, call your pharmacy. You can also call [facility]'s Behavioral Access at [phone], Monday to Friday, 8 a.m. to 4:30 p.m. It can take 1 to 3 business days to get a refill.   Forms, letters, and tests  You may have papers to fill out, like FMLA, short-term disability, and workability. We can help you with these forms at your visits, but you must have an appointment. You may need more than 1 visit for this, to be in an intensive therapy program, or both.  Before we can give you medicine for ADHD, we may refer you to get tested for it or confirm it another way.  We may not be able to give you an emotional support animal letter.  We don't do mental health checks ordered by the court.   We don't do mental health testing, but we can refer you to get tested.   Thank you for choosing us for your care.  For informational purposes only. Not to replace the advice of your health care provider. Copyright   2022 [facility]. All rights reserved. Seer [id number] - 12/22.

## 2024-10-03 ENCOUNTER — TELEPHONE (OUTPATIENT)
Dept: BEHAVIORAL HEALTH | Facility: CLINIC | Age: 59
End: 2024-10-03
Payer: COMMERCIAL

## 2024-10-03 NOTE — TELEPHONE ENCOUNTER
Writer contacted pt, no answer and left msg for pt to return call if there were any interest in day programming should an opening becomes available.      ZUHAIR Wills on 10/3/2024 at 12:41 PM

## 2024-10-04 ENCOUNTER — TELEPHONE (OUTPATIENT)
Dept: ENDOCRINOLOGY | Facility: CLINIC | Age: 59
End: 2024-10-04

## 2024-10-04 ENCOUNTER — TELEPHONE (OUTPATIENT)
Dept: BEHAVIORAL HEALTH | Facility: CLINIC | Age: 59
End: 2024-10-04

## 2024-10-04 ENCOUNTER — LAB (OUTPATIENT)
Dept: LAB | Facility: CLINIC | Age: 59
End: 2024-10-04
Payer: COMMERCIAL

## 2024-10-04 DIAGNOSIS — R73.09 ELEVATED HEMOGLOBIN A1C: ICD-10-CM

## 2024-10-04 DIAGNOSIS — E78.5 HYPERLIPIDEMIA LDL GOAL <130: ICD-10-CM

## 2024-10-04 DIAGNOSIS — Z00.00 ENCOUNTER FOR ROUTINE ADULT HEALTH EXAMINATION WITHOUT ABNORMAL FINDINGS: ICD-10-CM

## 2024-10-04 LAB
ALBUMIN SERPL BCG-MCNC: 4.5 G/DL (ref 3.5–5.2)
ALP SERPL-CCNC: 47 U/L (ref 40–150)
ALT SERPL W P-5'-P-CCNC: 25 U/L (ref 0–50)
ANION GAP SERPL CALCULATED.3IONS-SCNC: 12 MMOL/L (ref 7–15)
AST SERPL W P-5'-P-CCNC: 23 U/L (ref 0–45)
BILIRUB SERPL-MCNC: 0.3 MG/DL
BUN SERPL-MCNC: 8.6 MG/DL (ref 6–20)
CALCIUM SERPL-MCNC: 9.7 MG/DL (ref 8.8–10.4)
CHLORIDE SERPL-SCNC: 106 MMOL/L (ref 98–107)
CHOLEST SERPL-MCNC: 152 MG/DL
CREAT SERPL-MCNC: 0.84 MG/DL (ref 0.51–0.95)
EGFRCR SERPLBLD CKD-EPI 2021: 81 ML/MIN/1.73M2
EST. AVERAGE GLUCOSE BLD GHB EST-MCNC: 120 MG/DL
FASTING STATUS PATIENT QL REPORTED: YES
FASTING STATUS PATIENT QL REPORTED: YES
GLUCOSE SERPL-MCNC: 96 MG/DL (ref 70–99)
HBA1C MFR BLD: 5.8 % (ref 0–5.6)
HBV SURFACE AB SERPL IA-ACNC: 339 M[IU]/ML
HBV SURFACE AB SERPL IA-ACNC: REACTIVE M[IU]/ML
HCO3 SERPL-SCNC: 24 MMOL/L (ref 22–29)
HDLC SERPL-MCNC: 32 MG/DL
LDLC SERPL CALC-MCNC: 97 MG/DL
NONHDLC SERPL-MCNC: 120 MG/DL
POTASSIUM SERPL-SCNC: 5 MMOL/L (ref 3.4–5.3)
PROT SERPL-MCNC: 6.8 G/DL (ref 6.4–8.3)
SODIUM SERPL-SCNC: 142 MMOL/L (ref 135–145)
TRIGL SERPL-MCNC: 117 MG/DL

## 2024-10-04 PROCEDURE — 86706 HEP B SURFACE ANTIBODY: CPT

## 2024-10-04 PROCEDURE — 80061 LIPID PANEL: CPT

## 2024-10-04 PROCEDURE — 83036 HEMOGLOBIN GLYCOSYLATED A1C: CPT

## 2024-10-04 PROCEDURE — 36415 COLL VENOUS BLD VENIPUNCTURE: CPT

## 2024-10-04 PROCEDURE — 80053 COMPREHEN METABOLIC PANEL: CPT

## 2024-10-04 NOTE — TELEPHONE ENCOUNTER
MTM appointment canceled, we made one more attempt to reschedule.     Routing back to referring provider and MTM Pharmacist Team        Katie Hoffmann  MTM

## 2024-10-04 NOTE — TELEPHONE ENCOUNTER
Writer returned pt's call about program start and pt reports only wanting afternoon programming. Writer acknowledged request and advise pt she would be contacted when an afternoon opening is available.

## 2024-10-08 ENCOUNTER — TELEPHONE (OUTPATIENT)
Dept: BEHAVIORAL HEALTH | Facility: CLINIC | Age: 59
End: 2024-10-08
Payer: COMMERCIAL

## 2024-10-08 ENCOUNTER — MYC MEDICAL ADVICE (OUTPATIENT)
Dept: FAMILY MEDICINE | Facility: CLINIC | Age: 59
End: 2024-10-08
Payer: COMMERCIAL

## 2024-10-08 NOTE — TELEPHONE ENCOUNTER
----- Message from Forest De La Fuente sent at 10/8/2024  1:44 PM CDT -----  Regarding: New admit  Adult Mental Health Programmatic Care Schedule Request    Patient Name: Marbella Hendrix  Location of programming: [facility]  Start Date: 10/11/24    Group/PROVIDER: ADMISSION IOP AM TRACK [[id number]]   Appt Time: 12pm   Duration of Appointment in minutes: 60 minutes   Visit Type: Treatment [870]   Attending Provider: Moris Toscano     Adult Program Group: Adult Program Group: IOP6   Schedule: M, T, W, F 1pm-4pm   12 hours per week for 9 weeks   Number of visits to be scheduled: 36 days       Attending Provider (MD):  Dr. Moris Toscano ([place]); Dr. Darrick Whitman ([place])  Visit Type:  In-Person    Accommodations Needed: No  Alerts Identified/Substantiation: No  Consulted with Supervisor: Yes    Send to:   [UR BEH BCA (15028)] ##ONLY if Start Date is determined##  NG 14 OBC's (BEH BEHAVIORAL OUTPATIENT ASSESSMENTS [[id number]])   Forest De La Fuente ([facility])  Gilda Edwards (PHP)  Juliane Jama/Sonya ([facility]: PHP & IOP)

## 2024-10-08 NOTE — TELEPHONE ENCOUNTER
Writer called patient regarding program start. Patient reports she was driving and has a therapy appt. On Thursday. Patient will call writer back with her available start date. Ph call back from patient would like to start programing on Friday 10/11/24. Welcome letter sent.      ZUHAIR Wills on 10/8/2024 at 11:07 AM

## 2024-10-09 ENCOUNTER — TELEPHONE (OUTPATIENT)
Dept: BEHAVIORAL HEALTH | Facility: CLINIC | Age: 59
End: 2024-10-09
Payer: COMMERCIAL

## 2024-10-09 ENCOUNTER — BEH TREATMENT PLAN (OUTPATIENT)
Dept: BEHAVIORAL HEALTH | Facility: CLINIC | Age: 59
End: 2024-10-09
Attending: PSYCHIATRY & NEUROLOGY

## 2024-10-09 DIAGNOSIS — G47.00 PERSISTENT INSOMNIA: ICD-10-CM

## 2024-10-09 DIAGNOSIS — F33.1 MODERATE EPISODE OF RECURRENT MAJOR DEPRESSIVE DISORDER (H): Primary | ICD-10-CM

## 2024-10-09 NOTE — PROGRESS NOTES
Gregory Safety Plan       Creation Date: 8/14/24 Last Update Date: 9/11/24      Step 1: Warning signs:     Warning Signs     intrsuive thoughts, sleeplessness, impaired breathing, tightness in chest,  more breakouts, restlessness      Step 2: Internal coping strategies - Things I can do to take my mind off my problems without contacting another person:     Strategies     deep breathing, exercise, yoga, go to a meeting, call a friend, journal, TIPP skills, take a PRN medication      Step 3: People and social settings that provide distraction:     Name Contact Information     Stephania,  in phone/lives with        Places     working out     12 step group      Step 4: People whom I can ask for help during a crisis:     Name Contact Information     Wally [phone]      Step 5: Professionals or agencies I can contact during a crisis:     Clinician/Agency Name Phone Emergency Contact     [facility] [phone]          Local Emergency Department Emergency Department Address Emergency Department Phone     Corrine Magallanes          Suicide Prevention Lifeline Phone: Call or Text 277  Crisis Text Line: Text HOME to 474171     Step 6: Making the environment safer (plan for lethal means safety):   Did not identify any lethal methods     Optional: What is most important to me and worth living for?:   My family  How fun things can be              Things I am able to do on my own to cope or help me feel better: watching a favorite tv show or movie, listening to music I enjoy, going outside and breathing fresh air, going for a walk or exercising, taking a shower or bath, a cold or hot beverage, a healthy snack, drawing/coloring/painting, journaling, singing or dancing, deep breathing      I can try practicing square breathing when I begin to feel anxious - inhale through the nose for the count of 4 and the first line on the square. Exhale through the mouth for the count of 4 for the second  line of the square. Repeat to complete the square. Repeat the square as many times as needed.     I can also use my five senses to practice mindfulness and grounding. What are five things I can see, four things I can hear, three things I can feel, two things I can smell, and one thing I can taste.      Things that I am able to do with others to cope or help me feel better: sometimes just talking or spending time with someone else, sharing a meal or having coffee, watching a movie or playing a game, going for a walk or exercising     I can also use community resources including mental health hotlines, [facility] crisis teams, or apps.      Things I can use or do for distraction: movies/tv, music, reading, games, drawing/coloring/painting or other art, essential oils, exercise, cleaning/organizing, puzzles, crossword puzzles, word search, Sudoku       I can also download a meditation or relaxation monica, like Calm, Headspace, or Insight Timer (all three offer a free version)     A great website resource is Change to Chill with active coping skills     Changes I can make to support my mental health and wellness: Attend scheduled mental health therapy and psychiatric appointments. Take my medications as prescribed. Maintain a daily schedule/routine. Abstain from all mood altering substances, including drugs, alcohol, or medications not currently prescribed to me. Implement a self-care routine.       Your [facility] has a mental health crisis team you can call 24/7: [facility], [phone]     Other things that are important when I'm in crisis: to remember that the feelings I am having right now are temporary, and it won't feel like this forever, and that it is okay and important to ask for help     Community Resources  Fast Tracker  Linking people to mental health and substance use disorder resources  fasttrackermn.org      [place] Mental Health Warm Line  Peer to peer support  Monday thru Saturday, 12 pm to 10  "pm  [phone] or 8.310.649.2097  Text \"Support\" to 90652     National [place] on Mental Illness (PRISCILLA)  119.273.5230 or 1.888.PRISCILLA.HELPS        Mental Health Apps  My3  https://Blue Bus Tees.org/     VirtualHopeBox  https://Microstim/apps/virtual-hope-box/        Crisis beds are short term community residential facilities that provide 1-10 day stabilization services.  You can self refer via calling them and inquiring into current openings.  This can be an alternative to hospitalization if you are able to be safe within the community.  This is a voluntary stay.  Some options [facility] include:     [facility]  ([place]) [phone]  Pearltin Agrawal [facility] ( [facility]) [phone]  [facility] ([place]) [phone]  [facility] ([facility]) [phone]  [facility] ([place]) [phone]  [facility] ([place]) [phone]  [facility]) [phone]     If you feel like you are in need of more emergent support for safety concerns you can present to your local emergency room for a mental health evaluation.  Once you met with a mental health clinician and emergency room health care provider they will provide level of care recommendations and next steps.     Local Emergency Rooms can include:  [facility] ([facility]) in [facility] ([facility] ) in [facility] in [place].  This [facility] also supports EmPATH   EmPATH (Emergency Psychiatric Assessment, Treatment, and Healing) mental health unit. EmPATH is an innovative approach to emergency mental health care that is designed to guide people safely through a crisis while helping them build coping skills for the future. The unit is designed for acute psychiatric patients to receive assessment and evaluation in a therapeutic and least restrictive setting.  [facility] in [facility] in [facility] in [facility] [place] [facility]" Plan. Ernestina Arizmendi and Abhishek Shin. Used with permission of the authors.

## 2024-10-09 NOTE — PROGRESS NOTES
"RN Review of Medical History / Physical Health Screen  Outpatient Mental Health Programs - [facility] Mental Health Outpatient Programs    PATIENT'S NAME: Marbella Hendrix  Preferred name: Marbella   58 year old  MRN:   [id number]  :   1965  ACCT. NUMBER: [id number]  CURRENT AGE:  58 year old    DATE OF DIAGNOSTIC ASSESSMENT:   24  DATE OF ADMISSION: 10/11/2024    Please see Diagnostic Assessment for additional Medical History.     General Health:   Have you had any exposure to any communicable disease in the past 2-3 weeks? yes - cold     Do you have a history of seizures?     If so, do you have a seizure plan? Known triggers?     Notify patient that we will call 911 (if virtual) or a code (if in-person), if we were to witness seizure during group. no         Nutrition:    Are you on a special diet? If yes, please explain:  yes - avoids dairy   Do you have any concerns regarding your nutritional status? If yes, please explain:  yes   Have you had any appetite changes in the last 3 months?  Yes - decrease in appetite     Have you had any weight loss or weight gain in the last 3 months?  Yes, how much? Patient unsure of weight at this time, but does believe some weight has been lost as a result of appetite decrease           Height/Weight Review:  Patient reported height:  5'4\"      Patient reports weight:  Date last checked: Patient was unsure of weight but most recent check at provider visit read 197 lb          Patient height and weight recorded by RN in epic flowsheet: No; Unable to measure  Programmatic Care currently provided via telehealth. All pt weights and heights will be collected through patient self-report and recorded in physical health screening progress note upon admission to the program.      BMI Review:  Was the patient informed of BMI? No.     Findings Normal, No Intervention         Fall Risk:   Have you had any falls in the past 3 months? no     Do you currently " use any assistive devices for mobility?   no      Does the patient have medication concerns? no     Was an MTM referral placed? no      Does the patient have any acute or chronic pain concerns that might impact participation in the program? yes - arthritis in the hand.       Additional Comments/Assessment: Patient reported that she was tearful prior to phone call    Per completion of the Medical History / Physical Health Screen, is there a recommendation to see / follow up with a primary care physician/clinic or dentist?    Yes, Recommendations:   pain  nutritional risk          Chhaya Myrick RN  10/9/2024

## 2024-10-09 NOTE — PROGRESS NOTES
"    Admission SBAR NOTE  Adult  Outpatient Programs          SITUATION:     Admission Date: 10/11/2024    Provider verified identity through the following two step process.  Patient provided: verbal spelling of full first and last name and Patient     Patient name:  Marbella Hendrix  Preferred name: Marbella She/Her/Hers/Herself 58 year old  Diagnosis/-es (copy from , including ICD-10):   296.33 (F33.2) Major Depressive Disorder, Recurrent Episode, Severe _ and With anxious distress  300.02 (F41.1) Generalized Anxiety Disorder.    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    Assigned Program/Track: IOP 6    Reviewed patient's schedule and informed them of any variation due to holidays. no    Does the patient have any planned absences and/or barriers to admission/treatment? yes - might miss next Friday   NOTE: impact of transportation, technology, childcare, work, or housing concerns.    Insurance: Payor: [facility] / Plan: [facility] CORE / Product Type: Indemnity /  Changes/Concerns: no    Does patient need an appointment with the program provider? yes confirmed time  NOTE: If yes, please confirm/schedule provider visit.      BACKGROUND:     Patient's stated goal/reason for treatment (copy from ; confirm with patient): \"significant behavioral change, opportunity to improve her worsened symptoms of anxiety and depression and extend her social and supportive network\"      ASSESSMENT:     Please consult  if any of the following concerns may impact admission/participation in program:     PHQ, BERTHA and PROMIS done within 7 days OR send upon admission if over 7 days.      [place] Suicide Severity Rating (select Lifetime/Recent): [place] Suicide Severity Rating Scale (Lifetime/Recent)      2023     9:35 AM 2024    11:33 AM 2024     4:55 PM 2024     5:45 PM 2024     " 6:49 PM 9/25/2024     7:00 AM   [place] Suicide Severity Rating (Lifetime/Recent)   Q1 Wish to be Dead (Lifetime)     Yes    Q2 Non-Specific Active Suicidal Thoughts (Lifetime)     No    Q1 Wished to be Dead (Past Month)   0-->no 1-->yes     Q2 Suicidal Thoughts (Past Month)   0-->no 0-->no     Q6 Suicide Behavior (Lifetime)   0-->no 0-->no 0-->no    Level of Risk per Screen   no risks indicated low risk     Q1 Wish to be Dead (Lifetime) Y N    Y   Wish to be Dead Description (Lifetime) Pt reports having thoughts and never acted on them. They report the last time was May or June 2023.     passive thoughts no men or plan   1. Wish to be Dead (Past 1 Month)      Y   Wish to be Dead Description (Past 1 Month)      passive thoughts no mean or plan   Q2 Non-Specific Active Suicidal Thoughts (Lifetime) Y N    N   Non-Specific Active Suicidal Thought Description (Lifetime) Pt reports that they had a very stressful time with many life evetns in April and this impacted these thoughts.        2. Non-Specific Active Suicidal Thoughts (Past 1 Month) N        3. Active Suicidal Ideation with any Methods (Not Plan) Without Intent to Act (Lifetime) N    N    Q4 Active Suicidal Ideation with Some Intent to Act, Without Specific Plan (Lifetime) N    N    Q5 Active Suicidal Ideation with Specific Plan and Intent (Lifetime) N    N    Most Severe Ideation Rating (Lifetime) 2        Description of Most Severe Ideation (Lifetime) Pt reports having intrusive thoughts while driving and reports that they did not act on these and have their children and will never act on them.        Frequency (Lifetime) 3        Duration (Lifetime) 1        Controllability (Lifetime) 1        Deterrents (Lifetime) 1        Reasons for Ideation (Lifetime) 0        Actual Attempt (Lifetime) N N   N    Has subject engaged in non-suicidal self-injurious behavior? (Lifetime) N N   N    Interrupted Attempts (Lifetime) N N   N    Aborted or Self-Interrupted  "Attempt (Lifetime) N N   N    Preparatory Acts or Behavior (Lifetime) N N   N    Calculated C-SSRS Risk Score (Lifetime/Recent) No Risk Indicated No Risk Indicated   No Risk Indicated Low Risk       LOCUS completed for recommended level of care? yes  IOP: 17-19; PHP: 20-22     Copy/Paste current Safety Plan to the BEH TX PLAN ENCOUNTER. yes    Safety status/concerns: Denies     Substance use concerns: no - denies use    Based on the negative CAGE score and clinical interview there  are not indications of drug or alcohol abuse.    Pertinent Medical/Nutritional concerns: no    Confirm Emergency Contact listed in the SnapShot/Demographics with patient and notify OBC if an update is required. yes    Paper or Docusign requirements for ROIs, e-KEM, emergency contact, etc have been completed? no  If not, do upon admission.     Does patient have FMLA or Short-Term Disability requests/plans? From DA, \"reports finances obtained through spouse.\"    Care Providers/Medication Management Needs:     Does patient have a current community or other [facility] provider prescribing medications for mental health? yes  NOTE: Delete below if not applicable:    Psychiatric Provider (or PCP if managing MH meds)/Name: Christi Lewis  [facility] name/location: [facility]  Last appointment:  Did not assess  Next appointment:  10/29/2024     NOTE: Inform patients, program is temporary and we will not be transferring care. Patient's should continue to see their community provider.       Individual Therapist/Name: Anna Weiland  [facility] name/location: [facility]       Patient will continue to see above provider while participating in program: yes        RECOMMENDATIONS:     Patient Admission Completed: yes    Care Team, referrals made/needed: no  PCP: Vanessa Ladd  NOTE: Notify RN, as needed, to make internal referrals.                                                             Completed by: Chhaya Myrick RN             "

## 2024-10-09 NOTE — TELEPHONE ENCOUNTER
Writer called in attempt to connect with pt to complete admission interview for IOP.  Pt did not answer. LVM requesting call back.

## 2024-10-10 RX ORDER — COVID-19 ANTIGEN TEST
220 KIT MISCELLANEOUS 2 TIMES DAILY WITH MEALS
COMMUNITY

## 2024-10-10 RX ORDER — ACETAMINOPHEN 500 MG
500-1000 TABLET ORAL EVERY 6 HOURS PRN
COMMUNITY

## 2024-10-11 ENCOUNTER — HOSPITAL ENCOUNTER (OUTPATIENT)
Dept: BEHAVIORAL HEALTH | Facility: CLINIC | Age: 59
Discharge: HOME | End: 2024-10-11
Attending: PSYCHIATRY & NEUROLOGY
Payer: COMMERCIAL

## 2024-10-11 VITALS
DIASTOLIC BLOOD PRESSURE: 70 MMHG | SYSTOLIC BLOOD PRESSURE: 125 MMHG | HEART RATE: 81 BPM | TEMPERATURE: 97.7 F | OXYGEN SATURATION: 96 %

## 2024-10-11 DIAGNOSIS — F33.2 SEVERE EPISODE OF RECURRENT MAJOR DEPRESSIVE DISORDER, WITHOUT PSYCHOTIC FEATURES (H): Primary | ICD-10-CM

## 2024-10-11 DIAGNOSIS — F41.1 GENERALIZED ANXIETY DISORDER: ICD-10-CM

## 2024-10-11 PROBLEM — F33.9 RECURRENT MAJOR DEPRESSIVE DISORDER (H): Status: ACTIVE | Noted: 2024-10-11

## 2024-10-11 PROCEDURE — 99205 OFFICE O/P NEW HI 60 MIN: CPT

## 2024-10-11 PROCEDURE — 90832 PSYTX W PT 30 MINUTES: CPT | Mod: XU

## 2024-10-11 PROCEDURE — 90853 GROUP PSYCHOTHERAPY: CPT

## 2024-10-11 ASSESSMENT — PATIENT HEALTH QUESTIONNAIRE - PHQ9
SUM OF ALL RESPONSES TO PHQ QUESTIONS 1-9: 18
5. POOR APPETITE OR OVEREATING: NEARLY EVERY DAY

## 2024-10-11 ASSESSMENT — ANXIETY QUESTIONNAIRES
IF YOU CHECKED OFF ANY PROBLEMS ON THIS QUESTIONNAIRE, HOW DIFFICULT HAVE THESE PROBLEMS MADE IT FOR YOU TO DO YOUR WORK, TAKE CARE OF THINGS AT HOME, OR GET ALONG WITH OTHER PEOPLE: SOMEWHAT DIFFICULT
7. FEELING AFRAID AS IF SOMETHING AWFUL MIGHT HAPPEN: NEARLY EVERY DAY
2. NOT BEING ABLE TO STOP OR CONTROL WORRYING: NEARLY EVERY DAY
5. BEING SO RESTLESS THAT IT IS HARD TO SIT STILL: NEARLY EVERY DAY
1. FEELING NERVOUS, ANXIOUS, OR ON EDGE: NEARLY EVERY DAY
GAD7 TOTAL SCORE: 19
GAD7 TOTAL SCORE: 19
6. BECOMING EASILY ANNOYED OR IRRITABLE: SEVERAL DAYS
3. WORRYING TOO MUCH ABOUT DIFFERENT THINGS: NEARLY EVERY DAY

## 2024-10-11 ASSESSMENT — COLUMBIA-SUICIDE SEVERITY RATING SCALE - C-SSRS
2. HAVE YOU ACTUALLY HAD ANY THOUGHTS OF KILLING YOURSELF?: NO
1. SINCE LAST CONTACT, HAVE YOU WISHED YOU WERE DEAD OR WISHED YOU COULD GO TO SLEEP AND NOT WAKE UP?: YES

## 2024-10-11 NOTE — H&P
"[facility]   Adult Mental Health Outpatient Programs  Initial Psychiatric Evaluation    Patient: Marbella Hendrix  Preferred Name: Marbella  MRN: [id number]  : 1965  Acct. No.: [id number]  Date of Service: 10/11/24  Program Track: IOP 6    Date of Diagnostic Assessment/Psychosocial Evaluation: 2024    Identifying Data:  Marbella Hendrix, a 58 year old-year-old with reported history of an anxiety disorder, presents for initial visit to provide oversight of programmatic care. Patient attended the session alone, uses she/her pronouns, and prefers to be called: \"Marbella\"    Presenting Concern:  \"Anxiety.\"   Per diagnostic assessment: \"Extreme anxiety and hopelessness.\"    History of Present Illness:  Chart reviewed, history as documented reviewed with Marbella. Patient endorses:  History anxiety, depression and ADHD  I am feeling sad and worried about everything  My depression is not a big of a deal, it just came up when anxiety got too bad  I have anxiety all my life. It got out of control in May  My mother  on  this year.  I was relieved, happy and sad. I had a lot of emotions  I have not cried about it or do anything. I have not done anything about it  We had a difficult relationship. She did all she could. It always came a cross as painful  I did not have to do much about her until she was in the nursing home. When I went there [nursing home], she complaining a lot and she made a lot of noise  The nursing was understaffed and disorganized. Some times, the colostomy bad overfilled, she took it and threw it across the room  I am sure, after er passing , I have issues that remains unresolved. At least I had a mom, but she was not a caring mother. But I know she loved me.   I am crying about my cat that . I am crying about my son who went to college because I am worried about him.  But I cannot cry about my mom who   Medications  Working with Joshua " MD Olivia  Next appointment at the end of October  Clonazepam 0.5 mg twice daily as needed  I just take one pill at night for sleep. Daily  Effexor 150 mg daily  Been taking for about a year  Melatonin 5 mg at bed time    Goals for Treatment (also please see individualized treatment plan):  I am trying to be happy again    Review of Symptoms  Review of systems as recorded in diagnostic assessment reviewed with patient.  Today notes:  Sleep: I sleep some times. Half days of the week with bad sleep. Can fall sleep asleep but wake up every 2 hours.   I have panic attacks some times  Sometimes nightmares. May be a couple of times a month. Random crazy stuff  I have sleep study schedules in January  Appetite: I am not as hungry. Regular eating pattern. Poor appetite.  I am always worries about my son who went to college.   I am worries about what will happen to him  Hopelessness, why bother  I wonder why to care  I am catastrophizing a lot  I have a son who is at home not doing anything is hard to see that happens  I am exhausted when I wake up  I exercise for fun. I do things with friend. I am feeling fun out of activities  Suicidal ideation: has passive suicidal thoughts described as not as I would act on  No history of suicide attempt  Thoughts of non-suicidal self-injury: denied  Recent self-injurious behavior: denied  Homicidal ideation: denied  Other safety concerns: denied    Activities of Daily Living and Related Systems Impacted by Illness:  Hygiene: I am doing good job at self care  Socialization: doing thing with my   Activities of Daily Living: (cleaning, shopping, bills, etc.): ok  Concerns related to work: no    Substance use:  Denies    Current Medications:  Current Outpatient Medications   Medication Sig Dispense Refill    acetaminophen (TYLENOL) 500 MG tablet Take 500-1,000 mg by mouth every 6 hours as needed for mild pain.      atorvastatin (LIPITOR) 20 MG tablet Take 1 tablet (20 mg) by mouth  daily. 90 tablet 2    calcium carbonate (OS-ARA) 500 MG tablet Take 1 tablet by mouth daily.      cholecalciferol (VITAMIN D3) 25 mcg (1000 units) capsule Take 1 capsule by mouth daily.      clonazePAM (KLONOPIN) 1 MG tablet Take half to full tab by mouth twice daily as tolerated. 60 tablet 0    levothyroxine (SYNTHROID/LEVOTHROID) 125 MCG tablet Take 1 tablet (125 mcg) by mouth daily 90 tablet 1    melatonin 5 MG tablet Take 5 mg by mouth nightly as needed for sleep.      naproxen sodium 220 MG capsule Take 220 mg by mouth 2 times daily (with meals).      Omega-3 Fatty Acids (OMEGA-3 FISH OIL PO) Take 1 g by mouth daily      Semaglutide-Weight Management (WEGOVY) 0.25 MG/0.5ML pen Inject 0.25 mg subcutaneously once a week. 2 mL 0    Semaglutide-Weight Management (WEGOVY) 0.5 MG/0.5ML pen Inject 0.5 mg subcutaneously once a week. 2 mL 5    venlafaxine (EFFEXOR XR) 150 MG 24 hr capsule Take 1 capsule (150 mg) by mouth daily 90 capsule 1    hydrOXYzine HCl (ATARAX) 25 MG tablet Take 0.5-1 tablets (12.5-25 mg) by mouth 2 times daily as needed for anxiety. (Patient not taking: Reported on 10/10/2024) 120 tablet 0       The above list was reviewed and updated in EPIC with patient today.     Patient is taking medications as prescribed and denies adverse effects    Medication History/Past Medication Trials:  Paxil for almost 30 years    Remainder of history, including psychiatric, substance, family, social as documented in diagnostic assessment/psychosocial evaluation and/or above    Medical Review of Systems:  Pertinent: None    Laboratory Results:  Most recent labs reviewed. Pertinent updates/findings: None.       Mental Status Examination:  Vital Signs: LMP  (LMP Unknown)    Appearance: adequately groomed, appears stated age, and in no apparent distress.  Attitude: cooperative   Eye Contact: good   Muscle Strength and Tone: normal  Psychomotor Behavior: normal or unremarkable   Gait and Station: normal width, turn, arm  "swing  Speech: clear, coherent, decreased prosody, regular rate, regular rhythm, and fluent  Associations: No loosening of associations  Thought Process: coherent and goal directed  Thought Content: no evidence of psychotic thought, passive suicidal ideation present, no auditory hallucinations present, and no visual hallucinations present  Mood: \"anxious and depressed\"  Affect: mood congruent, intensity is blunted, and intensity is flat  Insight: good  Judgment: intact, adequate for safety  Impulse Control: intact  Oriented to: time, place, person, and situation  Attention Span and Concentration: Normal  Language: Intact  Recent and Remote Memory: Delayed & immediate recall intact  Fund of Knowledge/Assessment of Intelligence: Average  Capacity of Activities of Daily Living: Independent, able to participate in programmatic care services.    DSM5 Diagnosis/es:    ICD-10-CM    1. Severe episode of recurrent major depressive disorder, without psychotic features (H)  F33.2       2. Generalized anxiety disorder  F41.1         HISTORY  OF  Attention-Deficit/Hyperactivity Disorder  314.01 (F90.2) Combined presentation    Assessment/Plan:  Marbella presents today for initial psychiatric evaluation as part of oversight of programmatic care. She presents with a history of ADHD and generalized anxiety disorder, currently experiencing an exacerbation of anxiety symptoms that began in May. This coincides with the loss of her mother in April, which appears to have significantly impacted her emotional well-being.    She describes her feelings about her mother's death as mixed. On one hand, she experiences relief and happiness, but she also feels sadness. Interestingly, she reports being able to cry for other losses, but has not been able to cry specifically about her mother's passing, which suggests possible emotional suppression or unresolved grief.    Today, she reported heightened anxiety symptoms, including excessive worry, " catastrophic thinking, and poor sleep, feelings of hopelessness, passive suicidal ideation. However, she has clearly stated that suicide is not an option for her at this time. Her primary goal is to regain a sense of happiness and emotional stability.    She prefers to continue seeing Dr. Lewis for medication management while also engaging in psychotherapy to address grief and anxiety. She is currently taking Effexor, Clonazepam, and Melatonin, with no reported side effects.    Given the ongoing anxiety, unresolved grief, and passive suicidal ideation, the risk for safety is elevated. To mitigate these risks, she will continue medication management with Dr. Lewis while engaging in IOP for psychotherapy.  Follow-up in 3 weeks, or sooner as needed    Anxiety  Engage in psychotherapy  Continue to work with outpatient provider for medication management  Continue with current medication regimen  Effexor 150 mg daily  Clonazepam 0.5 mg twice daily   Melatonin 5 mg daily  Improve sleep hygiene  Maintain a balanced diet  Engage in physical activities as tolerated    Depression  As noted above  Medication as above      Safety Assessment:  Marbella reports suicidal ideation and/or non-suicidal self-injury or thoughts thereof as noted above  Marbella is future-oriented and is engaged in treatment planning   I do not feel that Marbella meets criteria for a 72-hour involuntary hold and remains appropriate for an outpatient level of care      Signed:   STANLEY NIXON, JESSICA   October 11, 2024      Visit Details:  Type of service: In-person  Location (patient and provider): [facility] Out[facility] Programmatic Care Offices    Level of Medical Decision Making:   - At least 1 chronic problem that is not stable  - Engaged in prescription drug management during visit (discussed any medication benefits, side effects, alternatives, etc.)  Discussion of management or test interpretation with external physician/other qualified  healthcare professional/appropriate source - programmatic care multidisciplinary treatment team    Chart review as part of intake process includes diagnostic assessment/psychosocial evaluation and any recent updates, as well as recent ED and/or inpatient documentation, where applicable.The reader is referred to those sources for additional information.    60 min spent on the date of the encounter in chart review, patient visit, review of tests, documentation, care coordination, and/or discussion with other providers about the issues documented above.      This document completed in part using [employer] dictation software and therefore may contain inadvertent word or phrase substitutions.

## 2024-10-11 NOTE — PROGRESS NOTES
ATTENDEES: Patient   Service Modality:  In-person    Previous PHQ-9:       9/24/2024     2:14 PM 10/2/2024     2:25 PM 10/11/2024     1:01 PM   PHQ-9 SCORE   PHQ-9 Total Score MyChart 27 (Severe depression) 19 (Moderately severe depression)    PHQ-9 Total Score 27 19 18     Previous BERTHA-7:       9/7/2024    12:16 PM 9/23/2024    11:35 AM 10/11/2024     1:01 PM   BERTHA-7 SCORE   Total Score 20 (severe anxiety) 21 (severe anxiety)    Total Score 20    20 21 19       DATA    Treatment Objective(s) Addressed in This Session:  Goal setting    Current Stressors / Issues:  Anxiety and grief      Progress on Treatment Objective(s) / Homework:  Satisfactory progress - CONTEMPLATION (Considering change and yet undecided); Intervened by assessing the negative and positive thinking (ambivalence) about behavior change    Therapeutic Interventions/Treatment Strategies:  Support, Feedback, Safety Assessments, Structured Activity, Problem Solving, Clarification, and Education    Response to Treatment Strategies:  Accepted Feedback, Gave Feedback, Listened, Focused on Goals, Attentive, and Accepted Support    Changes in Health Issues:   None reported    Chemical Use Review:   Substance Use: No substance use concerns reported / identified    Assessment: Current Emotional / Mental Status (status of significant symptoms):  Risk status (Self / Other harm or suicidal ideation)  Patient has had a history of passive si  Patient denies current fears or concerns for personal safety.  Patient denies current or recent suicidal ideation or behaviors.  Patient denies current or recent homicidal ideation or behaviors.  Patient denies current or recent self injurious behavior or ideation.  Patient denies other safety concerns.  A safety and risk management plan has not been developed at this time, however patient was encouraged to call [facility] should there be a change in any of these risk factors.    Appearance:   Appropriate   Eye  Contact:   Good   Psychomotor Behavior: Restless   Attitude:   Cooperative   Orientation:   All  Speech   Rate / Production: Normal    Volume:  Normal   Mood:    Anxious  Depressed  Grieving  Affect:    Worrisome   Thought Content:  Clear   Thought Form:  Coherent   Insight:    Good     Diagnoses:      Plan: (Homework, other):  Attend group    Patient has a current master individualized treatment plan.  See Epic treatment plan for more information.                                                Patient has reviewed and agreed to the above plan.      Mai Engel, [facility]  October 11, 2024

## 2024-10-11 NOTE — PROGRESS NOTES
Individualized Treatment Plan       Patient's Name: Marbella Hendrix   Preferred Name: Marbella  Pronouns:  She/Her   :   1965  MRN:   [id number]    Level of Care: Adult Mental Health Outpatient Programs  Program Track: IOP 6 (transferred to [facility]/DT 3 55+ on )    Program services: Group and Individual Psychotherapy (at least 1 hour per day), Patient Education and Training Related to Treatment (at least one hour per day), Psychiatric Evaluation and Management (at intake and least once per month)    Program Admission Date: 10/11/24     Estimated Program Discharge Date: 24 (Please note, this date is subject to change based on needs and progress toward identified goals.)      Date of Initial Treatment Plan: 10/11/24    Primary Diagnosis:      Severe episode of recurrent major depressive disorder, without psychotic features (H)  F33.2          2. Generalized anxiety disorder  F41.1            HISTORY  OF  Attention-Deficit/Hyperactivity Disorder  314.01 (F90.2) Combined presentation  Assessments: The following assessments were completed by patient for this visit:  PHQ2:       10/2/2024     2:25 PM 2024    11:17 AM 2024    10:50 AM 2024    11:59 AM 3/25/2024     2:35 PM 2024     7:51 AM 2023    11:12 AM   PHQ-2 (  [employer])   Q1: Little interest or pleasure in doing things 3 3 3 0 1 0 2   Q2: Feeling down, depressed or hopeless 3 3 3 1 1 1 2   PHQ-2 Score 6 6 6    6 1    1 2 1    1 4   Q1: Little interest or pleasure in doing things Nearly every day Nearly every day Nearly every day Not at all  Not at all More than half the days   Q2: Feeling down, depressed or hopeless Nearly every day Nearly every day Nearly every day Several days  Several days More than half the days   PHQ-2 Score 6 6 6 1  1 4     GAD2:       2023     1:58 PM 2023     6:51 AM 2023     4:23 PM 2023    12:43 PM 2024     7:20 AM 2024    12:16 PM 2024    11:35 AM   BERTHA-2   Feeling  "nervous, anxious, or on edge 2 2 1 1 1 3 3   Not being able to stop or control worrying 2 2 1 1 1 3 3   BERTHA-2 Total Score 4 4 2 2 2    2 6    6 6     PROMIS 10-Global Health (only subscores and total score):       7/14/2023     4:22 PM 11/7/2023     4:23 PM 1/2/2024    10:47 AM 4/2/2024     7:21 AM 9/7/2024    12:17 PM 9/23/2024    11:37 AM 10/11/2024     1:01 PM   PROMIS-10 Scores Only   Global Mental Health Score 11    11 12 14 13    13 9    9 8    8 7   Global Physical Health Score 14    14 14 13 14    14 12    12 12    12 13   PROMIS TOTAL - SUBSCORES 25    25 26 27 27    27 21    21 20    20 20            Multidisciplinary Team Members: Team IOP DT 3:  Moris Toscano MD; Mary Winters [facility]; Vivian Chapman [facility]; Minna Diop [facility];  Enrique Harris, OTR/L; Chhaya Merchant RN     Chief Concern: I am in this program because: \"grief and anxiety\"       Long Term Goal: \"get back to my old self\"    Supports:    I want to include the following support people in my treatment: \"psychiatrist Christi [place]\"  Please note we cannot share information with anyone unless you sign a release of information. You can specify what information can and cannot be shared and you can retract that written permission at any time.    Staff and support people can help me by: \"unsure\"    Cultural Values and Needs: Jew      Treatment Focus:      GOAL AREA 1: Personal Safety       Goal Status: Active    Problem Description:   Suicidal ideation: Low  Self-injury urges: N/A  Last acted on self-injury: none  Skills that help me cope with self-injury: N/A   Patient Identified Goal: \"stay safe\"   Measurable Goal:  Patient will learn and apply coping strategies to manage suicidal or self-injurious behavior and urges. Patient will develop and/or use their individualized coping plan for safety and notify staff of all safety concerns.   Goal Start Date: 10/11  Goal Target Date: 10/18/24   Progress:   10/18/24: It's okay. Sometimes " "fleeting passive thoughts of, \"I wish I wasn't here.\"                                                       11/1/24: Patient has passive thoughts of wishing she were not here.    Intervention Strategies:   Staff will: Assist in identifying and applying coping skills.  Assist in identifying and problem solving barriers.  Assist clients in establishing / strengthening support network.  Assist to identify treatment goals.  Assist with discharge planning.  Engage in safety planning when indicated.  Facilitate increased self-awareness.  Provide education regarding distress tolerance skills.       GOAL AREA 2: Symptom Management  Goal Status: Active    Problem Description: unhelpful or intrusive thoughts, lack of interest or pleasure in activities, negative self-talk, difficulty with sleep, and fear and isolation     Patient Identified Goal: \"exercise daily, time in nature, talk to a friend\"   Measurable Goal:  Patient will learn and apply skills to manage symptoms of anxiety as measured by self report and staff observation in groups daily. Try to be more positive, not become so sad all the time.    Goal Start Date: 10/11                                     Goal Target Date: 10/18/24   Progress:    10/18/24: I have all the same symptoms \"problems\"       11/1/2024: Patient reports not becoming so catastrophic and is seeing some change, but it is not where she wants it to be. Coming to group, and thinking about things critically. Not thinking about her circumstances and hearing other people's struggles helps.                   Intervention Strategies:   Staff will: Assist clients in establishing / strengthening support network.  Assist to identify treatment goals.  Assist with discharge planning.  Engage in safety planning when indicated.  Facilitate increased self awareness.  Provide education regarding how to use group process, thoughts/behaviors/emotions management, emotional regulation, values identification, distorted " "thinking, grief and loss, shame, self compassion, self awareness, mindfulness, radical acceptance, distress tolerance skills, hope, problem solving. Communication/interpersonal effectiveness.        GOAL AREA 3: Daily Life Skills   Goal Status: {Goal Status:[id number]}    Problem Description: {ZEYAD:[id number]:a}   Patient Identified Goal: \"***\"   Measurable Goal: Patient will learn, practice, and apply at least 2 skills/strategies that support participation in meaningful activities with emphasis on *** as measured by self report and staff observation in groups.    Goal Start Date: ***                                     Goal Target Date: ***   Progress:  ***                        Intervention Strategies: Staff will: Assist to identify treatment goals.  Facilitate increased self-awareness.  Complete OT assessment. Provide education regarding: life balance/structure/routine, goal setting and integration, prioritizing and planning, leisure values, behavior activation, motivation, energy management, stress management, neuroscience of change, sensory modulation, window of tolerance, ANS and vagus nerve activation, sensory enhanced mindfulness, sensory/body based grounding skills.       GOAL AREA 4: Wellness  Goal Status: Active    Problem Description: sleep and physical activity/movement   Patient Identified Goal: \"To get more sleep\"   Measurable Goal:  Patient will learn, practice, and apply at least 2 skills/strategies that support overall wellness with emphasis on sleep [nutrition, sleep, meds, etc.] as measured by self report and staff observation in groups.   Goal Start Date: 10/18/24                                              Goal Target Date: 10/18/24   Progress:   10/18/24: New goal established will review in two weeks        11/1/2024: Keeps herself really busy, and gets to exercise every morning. Gratitude is an important skill. Sleep is going okay.          Intervention Strategies:   Staff " "will: Assist in identifying and applying coping skills, assist in identifying and problem solving barriers, provide therapeutic assessment and safety planning as needed, utilize motivational interviewing techniques to promote change, and provide education to promote informed decisions.       GOAL AREA 5: Aftercare Plan  Goal Status: Active    Problem Description: Patient requires aftercare plan following completion of program. Identifies return to individual therapy and outpatient psychiatry/medication management   Patient Identified Goal: \"Continue with Dr Lewis and Anna Weiland\"  Follow up on Care Coordination Referral to find a parent support group with children with ASD.      Measurable Goal:  Patient will develop an aftercare / transition plan by discharge.     Goal Start Date: 10/11                                     Goal Target Date: 10/18/24   Progress:    10/18/24: Will see therapist and psychiatrist. Exploring different groups or community support groups I can attend.        11/1/2024: A group she can go to a support group for people with children with special needs. Looking into [facility], and Depression [facility]ipolar support alliance.       11/11: Marbella is in process of looking for support groups for mood disorders and parents with children with ASD.  She was agreeable to a care coordination referral. She continues to receive support from her family.  She's attended [facility] for 35 years and participates in a daily gratitude email group.  She will reach out to her therapist to schedule an appointment within 7 days of her discharge date.        Intervention Strategies: Staff will: Assist to identify treatment goals.  Assist with discharge planning.  Engage in safety planning when indicated.  Facilitate increased self-awareness.  Provide education regarding: eight dimensions of wellness, sleep hygiene, medication education and management, stigma, nutrition, signs and symptoms, community resources, support network " "education.        GOAL AREA 6: Elective (Substance Use, Cultural, Other)  Goal Status: Goal not created, N/A     My Strengths:   intelligent, motivated, and opinionated    My Limitations or Vulnerabilities:  Anxiety  Depressive symptoms   sensitive     My Other Health Issues:  Yes: arthritis    Abuse Prevention Plan:   Treatment team will provide education regarding skill development to address symptom management, life skills, wellness, discharge planning, and personal safety.  Collaborate with patients internal and external providers to coordinate care.  Treatment will be provided in a safe, therapeutic environment.  Program provider will offer medication adjustment/management as needed/indicated.     Patient Participation in Plan:  This plan was developed by the patient named at the top of the document with assistance from the multidisciplinary care team. The patient agrees with this care plan, developed goals, and has received a copy. Supports and/or family have been invited to participate in the creation of this plan at the discretion of the patient.     Discharge Criteria:   Patient will discharge from the program when the goals above have been met and/or the patient no longer requires treatment at this level of care.      Acknowledgement of Current Treatment Plan       I have reviewed my treatment plan with my treatment team members.  I agree with the plan as it is written in the electronic health record.     Name:                   Signature:                                                          Date:  Marbella Toscano MD  Psychiatrist/Medical Director         NOTE: Patient signatures are completed manually and scanned into the electronic medical record. See \"Media\" tab in epic.    "

## 2024-10-11 NOTE — PROGRESS NOTES
Intensive Outpatient Program   Physician Certification of Medical Necessity    Patient Name: Marbella Hendrix  Patient Preferred Name: Marbella  Patient : 1965  Patient MRN: [id number]    Attending physician: Moris Toscano MD      Admission Certification:    I certify the above-named patient would require partial hospitalization care if intensive outpatient services were not provided and that the patient requires such IOP services for a minimum of 9 hours per week. These services are provided under the care and supervision of a physician and under an individualized plan of treatment that is established and periodically reviewed by a physician in consultation with appropriate staff participating in the program.    From admission date: 10/11/2024 to 60 day: 2024    Moris Toscano MD on 10/11/2024 at 3:14 PM

## 2024-10-14 ENCOUNTER — HOSPITAL ENCOUNTER (OUTPATIENT)
Dept: BEHAVIORAL HEALTH | Facility: CLINIC | Age: 59
Discharge: HOME | End: 2024-10-14
Attending: PSYCHIATRY & NEUROLOGY
Payer: COMMERCIAL

## 2024-10-14 PROCEDURE — 90853 GROUP PSYCHOTHERAPY: CPT

## 2024-10-14 NOTE — GROUP NOTE
Process Group Note    PATIENT'S NAME: Marbella Hendrix  MRN:   [id number]  :   1965  ACCT. NUMBER: [id number]  DATE OF SERVICE: 10/14/24  START TIME:  1:00 PM  END TIME:  1:50 PM  FACILITATOR: Gilda Edwards [facility]  TOPIC:  Process Group    Diagnoses:    Severe episode of recurrent major depressive disorder, without psychotic features (H)    Generalized anxiety disorder           [facility] Mental Health Outpatient Programs  TRACK: IOP 6    NUMBER OF PARTICIPANTS: 6        Data:    Session content: At the start of this group, patients were invited to check in by identifying themselves, describing their current emotional status, and identifying issues to address in this group.   Area(s) of treatment focus addressed in this session included Symptom Management and Personal Safety.    Patient reported feeling unsure about how she was feeling. Patient discussed working toward feeling less depressed and less anxious. Patient identified not knowing what skills they will use to address their goal(s). Patient reported crying all the time may be a barrier to working toward their goal(s) and/or addressing mental health symptoms. Patient reported no safety concerns and/or self-injurious behaviors. Patient reported no substance use. Patient reported they are taking their medications as prescribed. Patient reported feeling proud that they were able to exercise this morning. Patient discussed isolating herself but wanting to get back to doing things she enjoys, like traveling, with the treatment group.              Therapeutic Interventions/Treatment Strategies:  Psychotherapist offered support, feedback and validation. Treatment modalities used include Motivational Interviewing. Interventions include Symptoms Management: Promoted understanding of their diagnoses and how it impacts their functioning.    Assessment:    Patient response:   Patient responded to session by accepting feedback, giving  feedback, listening, and being attentive    Possible barriers to participation / learning include: and no barriers identified    Health Issues:   None reported       Substance Use Review:   Substance Use: No active concerns identified.    Mental Status/Behavioral Observations  Appearance:   Appropriate   Eye Contact:   Good   Psychomotor Behavior: Normal   Attitude:   Cooperative   Orientation:   All  Speech   Rate / Production: Normal    Volume:  Normal   Mood:    Normal  Affect:    Appropriate   Thought Content:   Clear  Thought Form:  Coherent  Logical     Insight:    Good  and Fair     Plan:   Safety Plan: Recommended that patient call 911 or go to the local ED should there be a change in any of these risk factors.  No current safety concerns identified.  Recommended that patient call 911 or go to the local ED should there be a change in any of these risk factors.   Barriers to treatment: None identified  Patient Contracts (see media tab):  None  Substance Use: Not addressed in session   Continue or Discharge: Patient will continue in Adult Day Treatment (ADT)  as planned. Patient is likely to benefit from learning and using skills as they work toward the goals identified in their treatment plan.      Gilda Edwards, [facility]  October 14, 2024

## 2024-10-14 NOTE — GROUP NOTE
Psychotherapy Group Note    PATIENT'S NAME: Marbella Hendrix  MRN:   [id number]  :   1965  ACCT. NUMBER: [id number]  DATE OF SERVICE: 10/14/24  START TIME:  3:00 PM  END TIME:  3:50 PM  FACILITATOR: John Stubbs LPC  TOPIC:  EBP Group: Behavioral Activation  [facility] Mental Health Outpatient Programs  TRACK: IOP-6 GENERAL MOOD  NUMBER OF PARTICIPANTS: 5    Summary of Group / Topics Discussed:  Behavioral Activation: Decision Making: Patient's reviewed a process for decision making and strategies for making effective decisions.  Barriers to decision making were discussed    Patient Session Goals / Objectives:   Review process for making decisions   Patients learn strategies for making effective decisions   Patients wrote out a pros and cons list for a decision and identified barriers to decision making.      Patient Participation / Response:  Fully participated with the group by sharing personal reflections / insights and openly received / provided feedback with other participants.    Demonstrated understanding of topics discussed through group discussion and participation, Expressed understanding of the relationship between behaviors, thoughts, and feelings, Shared experiences and challenges with making behavioral changes, Identified barriers to change, Identified / Expressed personal readiness to make behavioral change, Identified ways to increase goal directed activities, Shared their progress tracking mood and activity, and Practiced skills in session    Treatment Plan:  Patient has a current master individualized treatment plan.  See Epic treatment plan for more information.    John Stubbs LPC

## 2024-10-14 NOTE — GROUP NOTE
Psychoeducation Group Note    PATIENT'S NAME: Marbella Hendrix  MRN:   [id number]  :   1965  ACCT. NUMBER: [id number]  DATE OF SERVICE: 10/14/24  START TIME:  2:00 PM  END TIME:  2:50 PM  FACILITATOR: John Stubbs LPC  TOPIC: MH Life Skills Group: Lifestyle Balance and Structure  [facility] Adult Mental Health Outpatient Programs  TRACK: IOP-6 GENERAL MOOD  NUMBER OF PARTICIPANTS: 5    Summary of Group / Topics Discussed:  Lifestyle Balance and Strucure:  Goal-setting & integration: Patients were introduced to the process and benefits of setting realistic, timely, and achievable goals to help support their ability to follow through with meaningful personal, health, recovery and treatment goals that they would like to achieve to improve overall functioning.  Patients were also taught and then practiced techniques to manage a variety of obstacles to achieve their goals and how to break goals into manageable pieces.  Patients also reported on follow through of goals.     Patient Session Goals / Objectives:  Facilitated the creation of a vision for recovery and wellbeing  Identified and wrote meaningful SMART goals to engage in meaningful activities of daily living   Identified and problem solved barriers to achieving goals   Identified plan to support follow through on goals and reflection on progress made      Patient Participation / Response:  Minimally participated, only when prompted / asked.    Verbalized understanding of content and Patient worked towards initial treatment plan goals     Treatment Plan:  Patient has a current master individualized treatment plan.  See Epic treatment plan for more information.    John Stubbs LPC

## 2024-10-15 ENCOUNTER — HOSPITAL ENCOUNTER (OUTPATIENT)
Dept: BEHAVIORAL HEALTH | Facility: CLINIC | Age: 59
Discharge: HOME | End: 2024-10-15
Attending: PSYCHIATRY & NEUROLOGY
Payer: COMMERCIAL

## 2024-10-15 DIAGNOSIS — F33.2 SEVERE EPISODE OF RECURRENT MAJOR DEPRESSIVE DISORDER, WITHOUT PSYCHOTIC FEATURES (H): Primary | ICD-10-CM

## 2024-10-15 PROCEDURE — 90853 GROUP PSYCHOTHERAPY: CPT

## 2024-10-15 NOTE — GROUP NOTE
Process Group Note    PATIENT'S NAME: Marbella Hendrix  MRN:   [id number]  :   1965  ACCT. NUMBER: [id number]  DATE OF SERVICE: 10/15/24  START TIME:  1:00 PM  END TIME:  1:50 PM  FACILITATOR: Melvina Hebert [facility]  TOPIC:  Process Group    Diagnoses:  Severe episode of recurrent major depressive disorder, without psychotic features (H)    Generalized anxiety disorder           [facility] Mental Health Outpatient Programs  TRACK: IOP 6     NUMBER OF PARTICIPANTS: 6        Data:    Session content: At the start of this group, patients were invited to check in by identifying themselves, describing their current emotional status, and identifying issues to address in this group.   Area(s) of treatment focus addressed in this session included Symptom Management, Personal Safety, and Community Resources/Discharge Planning.  Marbella reported no safety concerns. Patient reported feeling okay and is experiencing hot flashes. Patient found  a place to repair her shoes. Patient reported that she has a son in crisis. She is resisting the urge to contact them.     Therapeutic Interventions/Treatment Strategies:  Treatment modalities used include Cognitive Behavioral Therapy and Dialectical Behavioral Therapy.    Assessment:    Patient response:   Patient responded to session by accepting feedback, giving feedback, and listening    Possible barriers to participation / learning include: severity of symptoms    Health Issues:   None reported       Substance Use Review:   Substance Use: No active concerns identified.    Mental Status/Behavioral Observations  Appearance:   Appropriate   Eye Contact:   Good   Psychomotor Behavior: Normal   Attitude:   Cooperative   Orientation:   All  Speech   Rate / Production: Normal    Volume:  Normal   Mood:    Normal  Affect:    Appropriate   Thought Content:   Clear  Thought Form:  Coherent  Logical     Insight:    Good     Plan:   Safety Plan: No current safety  concerns identified.  Recommended that patient call 911 or go to the local ED should there be a change in any of these risk factors.   Barriers to treatment: None identified  Patient Contracts (see media tab):  None  Substance Use: Not addressed in session   Continue or Discharge: Patient will continue in Adult Day Treatment (ADT)  as planned. Patient is likely to benefit from learning and using skills as they work toward the goals identified in their treatment plan.      Melvina Stroud, [facility]  October 15, 2024

## 2024-10-15 NOTE — GROUP NOTE
Psychotherapy Group Note    PATIENT'S NAME: Marbella Hendrix  MRN:   [id number]  :   1965  ACCT. NUMBER: [id number]  DATE OF SERVICE: 10/11/24  START TIME:  2:00 PM  END TIME:  2:50 PM  FACILITATOR: Melvina Hebert, ZUHAIR; Vivian Chapman LSW  TOPIC: MH EBP Group: Behavioral Activation  [facility] Adult Mental Health Outpatient Programs  TRACK: IOP 6     NUMBER OF PARTICIPANTS: 5    Summary of Group / Topics Discussed:  Behavioral Activation: [place]: {Patients identified situations that prompt unwanted and unhelpful emotions / thoughts / behaviors.   Patients discussed how to problem solve by proactively using coping skills in potentially difficult situations. Components included describing the situation, brainstorming coping skills, imagining how scenario can/will unfold, rehearsing the action plan, and practicing relaxation to follow.  Patients practiced using these skills to reduce symptom distress and increase effective coping  behaviors.      Patient Session Goals / Objectives:  Identify difficult situation(s), and gain proficiency with alternative behaviors / skills to problem solve.  Increase confidence using coping skills through group practice in session.  Receive and provide feedback regarding skill development.  Apply coping skills in daily life situations.      Patient Participation / Response:  Fully participated with the group by sharing personal reflections / insights and openly received / provided feedback with other participants.    Demonstrated understanding of topics discussed through group discussion and participation    Treatment Plan:  Patient has a current master individualized treatment plan.  See Epic treatment plan for more information.    ZUHAIR Sequeira

## 2024-10-15 NOTE — GROUP NOTE
Psychotherapy Group Note    PATIENT'S NAME: Marbella Hendrix  MRN:   [id number]  :   1965  ACCT. NUMBER: [id number]  DATE OF SERVICE: 10/15/24  START TIME:  3:00 PM  END TIME:  3:50 PM  FACILITATOR: Melvina Hebert LICSW  TOPIC: MH EBP Group: Coping Skills  [facility]  TRACK: IOP 6     NUMBER OF PARTICIPANTS: 6    Summary of Group / Topics Discussed:  Coping Skills: Improve the Moment: Patients learned to tolerate distress, by applying strategies to effect positive change in the present moment.  Patients will identified situations where they would benefit from applying strategies to improve the moment and reduce distress. Patients discussed how to distinguish when this can be useful in their lives or when other strategies would be more relevant or helpful.    Patient Session Goals / Objectives:  Discuss how the use of intentional  in the moment  actions can help reduce distress.  Review patients current practices and discuss a more formal way of practicing and accessing skills.  Increase ability to decide when to use improve the moment strategies  Choose 1-2 in the moment actions to apply during times of distress.      Patient Participation / Response:  Fully participated with the group by sharing personal reflections / insights and openly received / provided feedback with other participants.    Demonstrated understanding of topics discussed through group discussion and participation    Treatment Plan:  Patient has a current master individualized treatment plan.  See Epic treatment plan for more information.    ZUHAIR Sequeira

## 2024-10-16 ENCOUNTER — HOSPITAL ENCOUNTER (OUTPATIENT)
Dept: BEHAVIORAL HEALTH | Facility: CLINIC | Age: 59
Discharge: HOME | End: 2024-10-16
Attending: PSYCHIATRY & NEUROLOGY
Payer: COMMERCIAL

## 2024-10-16 PROCEDURE — 90853 GROUP PSYCHOTHERAPY: CPT

## 2024-10-16 NOTE — GROUP NOTE
Psychoeducation Group Note    PATIENT'S NAME: Marbella Hendrix  MRN:   [id number]  :   1965  ACCT. NUMBER: [id number]  DATE OF SERVICE: 10/15/24  START TIME:  2:00 PM  END TIME:  2:50 PM  FACILITATOR: John Stubbs LPC  TOPIC: MH Wellness Group: Mental Health Maintenance  [facility] Outpatient Programs    TRACK: IOP-6    NUMBER OF PARTICIPANTS: 6    Summary of Group / Topics Discussed:  Mental Health Maintenance:  Vulnerability: In this group, the concept of vulnerability was explored through the viewing, discussion, and self-reflection of the Spencer Crandall Talk Titled,  The Power of Vulnerability.      Patient Session Goals / Objectives:  Defined and described definition of vulnerability   Identified 2 or more ways of practicing authenticity       Patient Participation / Response:  Fully participated with the group by sharing personal reflections / insights and openly received / provided feedback with other participants.    Demonstrated understanding of topics discussed through group discussion and participation, Identified / Expressed personal readiness to practice skills, and Verbalized understanding of mental health maintenance topic    Treatment Plan:  Patient has a current master individualized treatment plan.  See Epic treatment plan for more information.    John Stubbs LPC

## 2024-10-16 NOTE — GROUP NOTE
Psychotherapy Group Note    PATIENT'S NAME: Marbella Hendrix  MRN:   [id number]  :   1965  ACCT. NUMBER: [id number]  DATE OF SERVICE: 10/16/24  START TIME:  3:00 PM  END TIME:  3:50 PM  FACILITATOR: John Stubbs LPC; Vivian Chapman LSW  TOPIC: MH EBP Group: Mindfulness  [facility] Outpatient [facility]  TRACK: IOP-6    NUMBER OF PARTICIPANTS: 5    Summary of Group / Topics Discussed:  Mindfulness: Nature and Outdoor: Foster a sense of calm and reduce anxiety through nature immersion.Promote positive emotional expression and processing. Build relationships among group members through shared outdoor experiences.  Encourage teamwork and collaboration during group activities. Develop a deeper appreciation for natural environments.  Educate participants on local parth and fauna. Encourage mindfulness practices through nature observation and sensory experiences. Teach grounding techniques using natural surroundings.    Patient Session Goals / Objectives:  Connection to Nature: Participants may realize the restorative power of nature and its positive effects on mood and anxiety levels.  Social Support: Many might find comfort in shared experiences and the realization that they are not alone in their struggles.  Mindfulness Practices: Participants could learn new ways to incorporate mindfulness into their daily lives using nature as a backdrop.  Physical Benefits: They might discover that physical activity outdoors enhances both their physical and mental health.  Self-Discovery: Individuals could gain insights about their personal strengths and values through reflections inspired by nature.      Patient Participation / Response:  Fully participated with the group by sharing personal reflections / insights and openly received / provided feedback with other participants.    Demonstrated understanding of topics discussed through group discussion and participation, Demonstrated  understanding of mindfulness skills and benefits of practice, Verbalized understanding of how mindfulness can benefit mental health symptoms, and Practiced skills in session    Treatment Plan:  Patient has a current master individualized treatment plan.  See Epic treatment plan for more information.    John Stubbs, LPC

## 2024-10-17 ENCOUNTER — TELEPHONE (OUTPATIENT)
Dept: ENDOCRINOLOGY | Facility: CLINIC | Age: 59
End: 2024-10-17
Payer: COMMERCIAL

## 2024-10-17 ENCOUNTER — MYC MEDICAL ADVICE (OUTPATIENT)
Dept: ORTHOPEDICS | Facility: CLINIC | Age: 59
End: 2024-10-17
Payer: COMMERCIAL

## 2024-10-17 ENCOUNTER — MYC MEDICAL ADVICE (OUTPATIENT)
Dept: PSYCHIATRY | Facility: CLINIC | Age: 59
End: 2024-10-17
Payer: COMMERCIAL

## 2024-10-17 NOTE — TELEPHONE ENCOUNTER
General Call    Contacts       Contact Date/Time Type Contact Phone/Fax    10/17/2024 11:15 AM CDT Phone (Incoming) SooobeyMarbella (Self) [phone] (M)          Reason for Call:  meds/ appts    What are your questions or concerns:  pt states she was not approved for wegovy and her ins does not cover the program and she no longer wants to continu      [facility] or would you prefer to receive a phone call?:   Patient would prefer a phone call   Okay to leave a detailed message?: Yes at Cell number on file:    Telephone Information:   Mobile [phone]

## 2024-10-17 NOTE — TELEPHONE ENCOUNTER
Sent Roberhart (1st Attempt) for the patient to call back and schedule the following:    Appointment type: return [facility]  Provider: Dr Laughlin  Return date: 1/30/25

## 2024-10-18 ENCOUNTER — HOSPITAL ENCOUNTER (OUTPATIENT)
Dept: BEHAVIORAL HEALTH | Facility: CLINIC | Age: 59
Discharge: HOME | End: 2024-10-18
Attending: PSYCHIATRY & NEUROLOGY
Payer: COMMERCIAL

## 2024-10-18 DIAGNOSIS — F33.2 SEVERE EPISODE OF RECURRENT MAJOR DEPRESSIVE DISORDER, WITHOUT PSYCHOTIC FEATURES (H): Primary | ICD-10-CM

## 2024-10-18 PROCEDURE — 90853 GROUP PSYCHOTHERAPY: CPT

## 2024-10-18 NOTE — GROUP NOTE
Psychotherapy Group Note    PATIENT'S NAME: Marbella Hendrix  MRN:   [id number]  :   1965  ACCT. NUMBER: [id number]  DATE OF SERVICE: 10/16/24  START TIME:  2:00 PM  END TIME:  2:50 PM  FACILITATOR: Melvina Hebert LICSW  TOPIC: MH EBP Group: Coping Skills  [facility]  TRACK: IOP 6     NUMBER OF PARTICIPANTS: 5    Summary of Group / Topics Discussed:  Coping Skills: Additional Coping Skills:  Patients discussed and practiced how to cope with stress or stress management resources.  Reviewed the benefits of applying the aforementioned coping strategies.  Patients explored how these strategies might be applied to daily stressors or distressing situations.    Patient Session Goals / Objectives:  Understand the purpose and benefits of applying stress management coping strategies  Conceptualize how to utilize stress management skills within their own lives.   Address barriers to utilizing coping skills when in distress.      Patient Participation / Response:  Fully participated with the group by sharing personal reflections / insights and openly received / provided feedback with other participants.    Demonstrated understanding of topics discussed through group discussion and participation    Treatment Plan:  Patient has a current master individualized treatment plan.  See Epic treatment plan for more information.    ZUHAIR Sequeira

## 2024-10-18 NOTE — GROUP NOTE
Psychotherapy Group Note    PATIENT'S NAME: Marbella Hendrix  MRN:   [id number]  :   1965  ACCT. NUMBER: [id number]  DATE OF SERVICE: 10/18/24  START TIME:  2:00 PM  END TIME:  2:50 PM  FACILITATOR: Melvina Hebert LICSW  TOPIC: MH EBP Group: Specialty [facility] Mental [facility] Outpatient Programs  TRACK: IOP 6     NUMBER OF PARTICIPANTS: 6    Summary of Group / Topics Discussed:  Specialty Topics: Goal Setting: Provided education and assessment on patient's group programming goals. Evaluated patient's assessment of their ability to participate in groups, share emotions with group members, and openness to the group format. Provided education regarding appropriate individual-based group therapy goals.     Patient Session Goals and Objectives:  -Participate in reflective assessment of group readiness.  -Understand appropriate topics and methods to discuss those topics in group programming.  -Identify and problem solve barriers to group participation.  -Identify strategies to actively cope while engaging in group programming.   -Reflected up individualized treatment plans  -Meet with members of the treatment team to assess goal progress       Patient Participation / Response:  Fully participated with the group by sharing personal reflections / insights and openly received / provided feedback with other participants.    Demonstrated understanding of topics discussed through group discussion and participation    Treatment Plan:  Patient has a current master individualized treatment plan.  See Epic treatment plan for more information.    ZUHAIR Sequeira

## 2024-10-18 NOTE — GROUP NOTE
"Process Group Note    PATIENT'S NAME: Marbella Hendrix  MRN:   [id number]  :   1965  ACCT. NUMBER: [id number]  DATE OF SERVICE: 10/18/24  START TIME:  1:00 PM  END TIME:  1:50 PM  FACILITATOR: Melvina Hebert [facility]  TOPIC:  Process Group    Diagnoses:  Severe episode of recurrent major depressive disorder, without psychotic features (H)    Generalized anxiety disorder           [facility] Outpatient Programs  TRACK: IOP 6     NUMBER OF PARTICIPANTS: 6        Data:    Session content: At the start of this group, patients were invited to check in by identifying themselves, describing their current emotional status, and identifying issues to address in this group.   Area(s) of treatment focus addressed in this session included Symptom Management, Personal Safety, and Community Resources/Discharge Planning.  Marbella reported feeling sad and was noticeably tearful. Patient reported having negative thoughts about her son and grieving the loss of her cat. Patient reported experiencing suicide ideation that was passive in presentation. Patient reported having the thought, \" It would be really great to not be here.\" Patient reported no safety concerns.     Therapeutic Interventions/Treatment Strategies:  Treatment modalities used include Cognitive Behavioral Therapy and Dialectical Behavioral Therapy.    Assessment:    Patient response:   Patient responded to session by accepting feedback    Possible barriers to participation / learning include: severity of symptoms    Health Issues:   None reported       Substance Use Review:   Substance Use: No active concerns identified.    Mental Status/Behavioral Observations  Appearance:   Appropriate   Eye Contact:   Good   Psychomotor Behavior: Normal   Attitude:   Cooperative   Orientation:   All  Speech   Rate / Production: Normal    Volume:  Normal   Mood:    Sad   Affect:    Tearful  Thought Content:   Safety reports  presence of " suicidal ideation passive suicidal ideation  and Safety concerns have newly developed  Thought Form:  Coherent  Logical     Insight:    Good     Plan:   Safety Plan: Committed to safety and agreed to follow previously developed safety coping plan.    Barriers to treatment: None identified  Patient Contracts (see media tab):  None  Substance Use: Not addressed in session   Continue or Discharge: Patient will continue in Adult Day Treatment (ADT)  as planned. Patient is likely to benefit from learning and using skills as they work toward the goals identified in their treatment plan.      Melvina Stroud, LICSW  October 18, 2024

## 2024-10-18 NOTE — GROUP NOTE
Psychoeducation Group Note    PATIENT'S NAME: Marbella Hendrix  MRN:   [id number]  :   1965  ACCT. NUMBER: [id number]  DATE OF SERVICE: 10/18/24  START TIME:  3:00 PM  END TIME:  3:50 PM  FACILITATOR: Melvina Hebert LICSW  TOPIC: MH Wellness Group: Health Maintenance  [facility] Mental [facility] Outpatient Programs  TRACK: IOP 6     NUMBER OF PARTICIPANTS: 5    Summary of Group / Topics Discussed:  Health Maintenance: Eight Dimensions of Wellness: The concept of holistic health through the model of eight dimensions was introduced. Group members participated in identifying behaviors and activities in each of the dimensions of wellness.  The importance of each dimension was reinforced and the concept of balance in life as it relates to wellness was explored.      Patient Session Goals / Objectives:  Verbalized understanding of balance in wellness and how it relates to their life  Identified and explained the eight dimensions of wellness  Categorized activities and wellness needs into corresponding dimensions appropriately during exercise        Patient Participation / Response:  Fully participated with the group by sharing personal reflections / insights and openly received / provided feedback with other participants.    Demonstrated understanding of topics discussed through group discussion and participation    Treatment Plan:  Patient has a current master individualized treatment plan.  See Epic treatment plan for more information.    ZUHAIR Sequeira

## 2024-10-21 ENCOUNTER — HOSPITAL ENCOUNTER (OUTPATIENT)
Dept: BEHAVIORAL HEALTH | Facility: CLINIC | Age: 59
Discharge: HOME | End: 2024-10-21
Attending: PSYCHIATRY & NEUROLOGY
Payer: COMMERCIAL

## 2024-10-21 DIAGNOSIS — F33.2 SEVERE EPISODE OF RECURRENT MAJOR DEPRESSIVE DISORDER, WITHOUT PSYCHOTIC FEATURES (H): Primary | ICD-10-CM

## 2024-10-21 PROCEDURE — 90853 GROUP PSYCHOTHERAPY: CPT

## 2024-10-21 NOTE — GROUP NOTE
Psychotherapy Group Note    PATIENT'S NAME: Marbella Hendrix  MRN:   [id number]  :   1965  ACCT. NUMBER: [id number]  DATE OF SERVICE: 10/21/24  START TIME:  3:00 PM  END TIME:  3:50 PM  FACILITATOR: Melvina Hebert LICSW  TOPIC: MH EBP Group: Self-Awareness  [facility] Outpatient [facility]  TRACK: IOP 6     NUMBER OF PARTICIPANTS: 3    Summary of Group / Topics Discussed:  Self-Awareness: Gratitude: Topic focused on assisting patients in identifying key concepts in gratitude. Patients discussed the benefits of practicing gratitude and its impact on mood improvement, mindfulness, and perspective. Patients worked to increase time spent on recognition and appreciation of what is positive and working in their lives. Patients discussed the concepts and benefits of feeling grateful. The goal is to reduce rumination and negative thinking resulting in increased mindfulness and resilience. Patients specifically discussed how they can practice and problem solve barriers to daily gratitude practice.     Patient Session Goals / Objectives:  [place] the concept and benefits of gratitude  Identified ways to practice gratitude in daily life  Problem solved barriers to practicing gratitude      Patient Participation / Response:  Fully participated with the group by sharing personal reflections / insights and openly received / provided feedback with other participants.    Demonstrated understanding of topics discussed through group discussion and participation    Treatment Plan:  Patient has a current master individualized treatment plan.  See Epic treatment plan for more information.    ZUHAIR Sequeira

## 2024-10-21 NOTE — GROUP NOTE
Process Group Note    PATIENT'S NAME: Marbella Hendrix  MRN:   [id number]  :   1965  ACCT. NUMBER: [id number]  DATE OF SERVICE: 10/21/24  START TIME:  1:00 PM  END TIME:  1:50 PM  FACILITATOR: Melvina eHbert [facility]  TOPIC:  Process Group    Diagnoses:  Severe episode of recurrent major depressive disorder, without psychotic features (H)    Generalized anxiety disorder           [facility] Outpatient Programs  TRACK: [facility] 6     NUMBER OF PARTICIPANTS: 3        Data:    Session content: At the start of this group, patients were invited to check in by identifying themselves, describing their current emotional status, and identifying issues to address in this group.   Area(s) of treatment focus addressed in this session included Symptom Management, Personal Safety, and Community Resources/Discharge Planning.  Marbella reported that a barrier is her brain. Patient reports feeling okay, and her goals are not to  be anxious. Patient reports taking her medication as prescribed. Patient reported no safety concerns.     Therapeutic Interventions/Treatment Strategies:  Treatment modalities used include Cognitive Behavioral Therapy and Dialectical Behavioral Therapy.    Assessment:    Patient response:   Patient responded to session by accepting feedback, giving feedback, and listening    Possible barriers to participation / learning include: severity of symptoms    Health Issues:   None reported       Substance Use Review:   Substance Use: No active concerns identified.    Mental Status/Behavioral Observations  Appearance:   Appropriate   Eye Contact:   Good   Psychomotor Behavior: Normal   Attitude:   Cooperative   Orientation:   All  Speech   Rate / Production: Normal    Volume:  Normal   Mood:    Normal  Affect:    Appropriate   Thought Content:   Clear  Thought Form:  Coherent  Logical     Insight:    Good     Plan:   Safety Plan: No current safety concerns identified.   Recommended that patient call 911 or go to the local ED should there be a change in any of these risk factors.   Barriers to treatment: None identified  Patient Contracts (see media tab):  None  Substance Use: Not addressed in session   Continue or Discharge: Patient will continue in Adult Day Treatment (ADT)  as planned. Patient is likely to benefit from learning and using skills as they work toward the goals identified in their treatment plan.      Melvina Stroud, [facility] Center[facility]  October 21, 2024

## 2024-10-21 NOTE — GROUP NOTE
Psychotherapy Group Note    PATIENT'S NAME: Marbella Hendrix  MRN:   [id number]  :   1965  ACCT. NUMBER: [id number]  DATE OF SERVICE: 10/21/24  START TIME:  2:00 PM  END TIME:  2:50 PM  FACILITATOR: John Stubbs LPC  TOPIC:  EBP Group: Emotions Management  [facility] Out[facility] Programs  TRACK: IOP-6 GENERAL MOOD  NUMBER OF PARTICIPANTS: 3    Summary of Group/Topics Discussed:  Emotions Management: Purpose. Patients examined the concept of Purpose, finding their purpose, and how it impacts their mental health and symptoms in their daily lives.     Objectives and Goals:  Self-Exploration: Encourage participants to reflect on their personal values and beliefs.  Connection: Foster a sense of community by sharing experiences and insights related to purpose.  Goal Setting: Help participants identify specific areas in their lives where they seek greater meaning.  Clarity of Purpose: Assist participants in articulating what gives their life meaning.  Actionable Steps: Develop practical strategies to pursue goals that align with their sense of purpose.  Resilience Building: Enhance coping skills to overcome obstacles that may hinder their pursuit of purpose.   Increased Awareness: Participants gain a clearer understanding of their personal values and motivations.  Stronger Connections: Improved relationships and support systems among group members.  Enhanced Motivation: Participants feel more empowered to take actionable steps toward fulfilling their purpose.    Patient Participation / Response:  Fully participated with the group by sharing personal reflections / insights and openly received / provided feedback with other participants.    Demonstrated understanding of topics discussed through group discussion and participation, Expressed understanding of the relevance / importance of emotions management skills at distressing times in life, Self-aware of experiences with  difficult emotions, and strategies to employ to manage them, Demonstrated knowledge of when to consider applying a variety of emotions management skills in daily life, Demonstrated understanding and practice strategies to manage difficult emotions and move towards healing, Identified barriers to applying emotions management strategies, Identified strategies to overcome barriers to use of emotions management skills, Identified emotions management strategies that have helped maintain / improve symptoms in the past, and Practiced 2-3 new skills in session    Treatment Plan:  Patient has a current master individualized treatment plan.  See Epic treatment plan for more information.    John Stubbs, LPC

## 2024-10-22 ENCOUNTER — HOSPITAL ENCOUNTER (OUTPATIENT)
Dept: BEHAVIORAL HEALTH | Facility: CLINIC | Age: 59
Discharge: HOME | End: 2024-10-22
Attending: PSYCHIATRY & NEUROLOGY
Payer: COMMERCIAL

## 2024-10-22 DIAGNOSIS — F33.2 SEVERE EPISODE OF RECURRENT MAJOR DEPRESSIVE DISORDER, WITHOUT PSYCHOTIC FEATURES (H): Primary | ICD-10-CM

## 2024-10-22 PROCEDURE — 90832 PSYTX W PT 30 MINUTES: CPT

## 2024-10-22 PROCEDURE — 90853 GROUP PSYCHOTHERAPY: CPT

## 2024-10-22 NOTE — GROUP NOTE
Process Group Note    PATIENT'S NAME: Marbella Hendrix  MRN:   [id number]  :   1965  ACCT. NUMBER: [id number]  DATE OF SERVICE: 10/22/24  START TIME:  1:00 PM  END TIME:  1:50 PM  FACILITATOR: Melvian Hebert [facility]  TOPIC:  Process Group    Diagnoses:  Severe episode of recurrent major depressive disorder, without psychotic features (H)    Generalized anxiety disorder           [facility] Outpatient Programs  TRACK: IOP 6     NUMBER OF PARTICIPANTS: 3        Data:    Session content: At the start of this group, patients were invited to check in by identifying themselves, describing their current emotional status, and identifying issues to address in this group.   Area(s) of treatment focus addressed in this session included Symptom Management, Personal Safety, and Community Resources/Discharge Planning.  Marbella reported feeling okay and misses her cat. Patient reported no self injurious behaviors, nor concerns with safety. Patient reported being grateful for the weather. Patient reported enjoying the temperature outside.     Therapeutic Interventions/Treatment Strategies:  Treatment modalities used include Cognitive Behavioral Therapy and Dialectical Behavioral Therapy.    Assessment:    Patient response:   Patient responded to session by accepting feedback, giving feedback, and listening    Possible barriers to participation / learning include: severity of symptoms    Health Issues:   None reported       Substance Use Review:   Substance Use: No active concerns identified.    Mental Status/Behavioral Observations  Appearance:   Appropriate   Eye Contact:   Good   Psychomotor Behavior: Normal   Attitude:   Cooperative   Orientation:   All  Speech   Rate / Production: Normal    Volume:  Normal   Mood:    Normal  Affect:    Appropriate   Thought Content:   Clear  Thought Form:  Coherent  Logical     Insight:    Good     Plan:   Safety Plan: No current safety concerns  identified.  Recommended that patient call 911 or go to the local ED should there be a change in any of these risk factors.   Barriers to treatment: None identified  Patient Contracts (see media tab):  None  Substance Use: Not addressed in session   Continue or Discharge: Patient will continue in Adult Day Treatment (ADT)  as planned. Patient is likely to benefit from learning and using skills as they work toward the goals identified in their treatment plan.      Melvina Stroud, [facility] CenterSW  October 22, 2024

## 2024-10-23 ENCOUNTER — HOSPITAL ENCOUNTER (OUTPATIENT)
Dept: BEHAVIORAL HEALTH | Facility: CLINIC | Age: 59
Discharge: HOME | End: 2024-10-23
Attending: PSYCHIATRY & NEUROLOGY
Payer: COMMERCIAL

## 2024-10-23 DIAGNOSIS — F33.2 SEVERE EPISODE OF RECURRENT MAJOR DEPRESSIVE DISORDER, WITHOUT PSYCHOTIC FEATURES (H): Primary | ICD-10-CM

## 2024-10-23 PROCEDURE — 90853 GROUP PSYCHOTHERAPY: CPT

## 2024-10-23 NOTE — GROUP NOTE
Psychotherapy Group Note    PATIENT'S NAME: Marbella Hendrix  MRN:   [id number]  :   1965  ACCT. NUMBER: [id number]  DATE OF SERVICE: 10/23/24  START TIME:  3:00 PM  END TIME:  3:50 PM  FACILITATOR: Melvina Hebert LICSW  TOPIC: MH EBP Group: Emotions Management  [facility]  TRACK: IOP 6     NUMBER OF PARTICIPANTS: 4    Summary of Group / Topics Discussed:  Emotions Management: Anger: Patients explored and shared personal experiences associated with feelings of anger.  Group explored how these feelings develop, what they mean to each individual, and how to increase acceptance and usefulness of these feelings.  Discussed anger as a  secondary  emotion and reviewed ways to manage anger and challenge associated cognitive distortions. Group members worked to contextualize these concepts and promote healing.     Patient Session Goals / Objectives:  Discuss and review definitions and personal views/experiences with anger  Explore how feelings of anger impact functioning  Understand and practice strategies to manage difficult emotions and move towards healing  Demonstrate understanding of the feelings of anger  Verbalize how these emotions have impacted their lives/functioning  Verbalize of knowledge gained and possible interventions to manage feelings      Patient Participation / Response:  Fully participated with the group by sharing personal reflections / insights and openly received / provided feedback with other participants.    Demonstrated understanding of topics discussed through group discussion and participation    Treatment Plan:  Patient has a current master individualized treatment plan.  See Epic treatment plan for more information.    ZUHAIR Sequeira

## 2024-10-23 NOTE — GROUP NOTE
Process Group Note    PATIENT'S NAME: Marbella Hendrix  MRN:   [id number]  :   1965  ACCT. NUMBER: [id number]  DATE OF SERVICE: 10/23/24  START TIME:  1:00 PM  END TIME:  1:50 PM  FACILITATOR: Melvina Hebert [facility]  TOPIC:  Process Group    Diagnoses:  Severe episode of recurrent major depressive disorder, without psychotic features (H)    Generalized anxiety disorder              [facility] Outpatient Programs  TRACK: IOP 6     NUMBER OF PARTICIPANTS: 4        Data:    Session content: At the start of this group, patients were invited to check in by identifying themselves, describing their current emotional status, and identifying issues to address in this group.   Area(s) of treatment focus addressed in this session included Symptom Management, Personal Safety, and Community Resources/Discharge Planning.  Marbella reported doing okay, but is feeling very sleepy. Patient reported taking her medications. Patient reported waking up around 4:30am-5:30am and not being able to return to sleep. Patient was advised to speak to her med provider about her sleep concerns. Patient reported no safety concerns.     Therapeutic Interventions/Treatment Strategies:  Treatment modalities used include Cognitive Behavioral Therapy and Dialectical Behavioral Therapy.    Assessment:    Patient response:   Patient responded to session by accepting feedback, giving feedback, and listening    Possible barriers to participation / learning include: severity of symptoms    Health Issues:   None reported       Substance Use Review:   Substance Use: No active concerns identified.    Mental Status/Behavioral Observations  Appearance:   Appropriate   Eye Contact:   Good   Psychomotor Behavior: Normal   Attitude:   Cooperative   Orientation:   All  Speech   Rate / Production: Normal    Volume:  Normal   Mood:    Normal  Affect:    Appropriate   Thought Content:   Clear  Thought Form:  Coherent   Logical     Insight:    Good     Plan:   Safety Plan: No current safety concerns identified.  Recommended that patient call 911 or go to the local ED should there be a change in any of these risk factors.   Barriers to treatment: None identified  Patient Contracts (see media tab):  None  Substance Use: Not addressed in session   Continue or Discharge: Patient will continue in Adult Day Treatment (ADT)  as planned. Patient is likely to benefit from learning and using skills as they work toward the goals identified in their treatment plan.      Melvina Stroud, [facility]  October 23, 2024

## 2024-10-24 ASSESSMENT — ANXIETY QUESTIONNAIRES
GAD7 TOTAL SCORE: 12
1. FEELING NERVOUS, ANXIOUS, OR ON EDGE: MORE THAN HALF THE DAYS
IF YOU CHECKED OFF ANY PROBLEMS ON THIS QUESTIONNAIRE, HOW DIFFICULT HAVE THESE PROBLEMS MADE IT FOR YOU TO DO YOUR WORK, TAKE CARE OF THINGS AT HOME, OR GET ALONG WITH OTHER PEOPLE: SOMEWHAT DIFFICULT
IF YOU CHECKED OFF ANY PROBLEMS ON THIS QUESTIONNAIRE, HOW DIFFICULT HAVE THESE PROBLEMS MADE IT FOR YOU TO DO YOUR WORK, TAKE CARE OF THINGS AT HOME, OR GET ALONG WITH OTHER PEOPLE: SOMEWHAT DIFFICULT
6. BECOMING EASILY ANNOYED OR IRRITABLE: NOT AT ALL
GAD7 TOTAL SCORE: 12
IF YOU CHECKED OFF ANY PROBLEMS ON THIS QUESTIONNAIRE, HOW DIFFICULT HAVE THESE PROBLEMS MADE IT FOR YOU TO DO YOUR WORK, TAKE CARE OF THINGS AT HOME, OR GET ALONG WITH OTHER PEOPLE: SOMEWHAT DIFFICULT
2. NOT BEING ABLE TO STOP OR CONTROL WORRYING: MORE THAN HALF THE DAYS
7. FEELING AFRAID AS IF SOMETHING AWFUL MIGHT HAPPEN: MORE THAN HALF THE DAYS
3. WORRYING TOO MUCH ABOUT DIFFERENT THINGS: MORE THAN HALF THE DAYS
GAD7 TOTAL SCORE: 12
8. IF YOU CHECKED OFF ANY PROBLEMS, HOW DIFFICULT HAVE THESE MADE IT FOR YOU TO DO YOUR WORK, TAKE CARE OF THINGS AT HOME, OR GET ALONG WITH OTHER PEOPLE?: SOMEWHAT DIFFICULT
2. NOT BEING ABLE TO STOP OR CONTROL WORRYING: MORE THAN HALF THE DAYS
6. BECOMING EASILY ANNOYED OR IRRITABLE: NOT AT ALL
2. NOT BEING ABLE TO STOP OR CONTROL WORRYING: MORE THAN HALF THE DAYS
8. IF YOU CHECKED OFF ANY PROBLEMS, HOW DIFFICULT HAVE THESE MADE IT FOR YOU TO DO YOUR WORK, TAKE CARE OF THINGS AT HOME, OR GET ALONG WITH OTHER PEOPLE?: SOMEWHAT DIFFICULT
5. BEING SO RESTLESS THAT IT IS HARD TO SIT STILL: MORE THAN HALF THE DAYS
6. BECOMING EASILY ANNOYED OR IRRITABLE: NOT AT ALL
5. BEING SO RESTLESS THAT IT IS HARD TO SIT STILL: MORE THAN HALF THE DAYS
GAD7 TOTAL SCORE: 12
4. TROUBLE RELAXING: MORE THAN HALF THE DAYS
5. BEING SO RESTLESS THAT IT IS HARD TO SIT STILL: MORE THAN HALF THE DAYS
3. WORRYING TOO MUCH ABOUT DIFFERENT THINGS: MORE THAN HALF THE DAYS
5. BEING SO RESTLESS THAT IT IS HARD TO SIT STILL: MORE THAN HALF THE DAYS
6. BECOMING EASILY ANNOYED OR IRRITABLE: NOT AT ALL
7. FEELING AFRAID AS IF SOMETHING AWFUL MIGHT HAPPEN: MORE THAN HALF THE DAYS
GAD7 TOTAL SCORE: 12
8. IF YOU CHECKED OFF ANY PROBLEMS, HOW DIFFICULT HAVE THESE MADE IT FOR YOU TO DO YOUR WORK, TAKE CARE OF THINGS AT HOME, OR GET ALONG WITH OTHER PEOPLE?: SOMEWHAT DIFFICULT
8. IF YOU CHECKED OFF ANY PROBLEMS, HOW DIFFICULT HAVE THESE MADE IT FOR YOU TO DO YOUR WORK, TAKE CARE OF THINGS AT HOME, OR GET ALONG WITH OTHER PEOPLE?: SOMEWHAT DIFFICULT
1. FEELING NERVOUS, ANXIOUS, OR ON EDGE: MORE THAN HALF THE DAYS
2. NOT BEING ABLE TO STOP OR CONTROL WORRYING: MORE THAN HALF THE DAYS
GAD7 TOTAL SCORE: 12
IF YOU CHECKED OFF ANY PROBLEMS ON THIS QUESTIONNAIRE, HOW DIFFICULT HAVE THESE PROBLEMS MADE IT FOR YOU TO DO YOUR WORK, TAKE CARE OF THINGS AT HOME, OR GET ALONG WITH OTHER PEOPLE: SOMEWHAT DIFFICULT
3. WORRYING TOO MUCH ABOUT DIFFERENT THINGS: MORE THAN HALF THE DAYS
7. FEELING AFRAID AS IF SOMETHING AWFUL MIGHT HAPPEN: MORE THAN HALF THE DAYS
1. FEELING NERVOUS, ANXIOUS, OR ON EDGE: MORE THAN HALF THE DAYS
GAD7 TOTAL SCORE: 12
GAD7 TOTAL SCORE: 12
4. TROUBLE RELAXING: MORE THAN HALF THE DAYS
2. NOT BEING ABLE TO STOP OR CONTROL WORRYING: MORE THAN HALF THE DAYS
3. WORRYING TOO MUCH ABOUT DIFFERENT THINGS: MORE THAN HALF THE DAYS
4. TROUBLE RELAXING: MORE THAN HALF THE DAYS
1. FEELING NERVOUS, ANXIOUS, OR ON EDGE: MORE THAN HALF THE DAYS
1. FEELING NERVOUS, ANXIOUS, OR ON EDGE: MORE THAN HALF THE DAYS
GAD7 TOTAL SCORE: 12
GAD7 TOTAL SCORE: 12
8. IF YOU CHECKED OFF ANY PROBLEMS, HOW DIFFICULT HAVE THESE MADE IT FOR YOU TO DO YOUR WORK, TAKE CARE OF THINGS AT HOME, OR GET ALONG WITH OTHER PEOPLE?: SOMEWHAT DIFFICULT
6. BECOMING EASILY ANNOYED OR IRRITABLE: NOT AT ALL
4. TROUBLE RELAXING: MORE THAN HALF THE DAYS
IF YOU CHECKED OFF ANY PROBLEMS ON THIS QUESTIONNAIRE, HOW DIFFICULT HAVE THESE PROBLEMS MADE IT FOR YOU TO DO YOUR WORK, TAKE CARE OF THINGS AT HOME, OR GET ALONG WITH OTHER PEOPLE: SOMEWHAT DIFFICULT
5. BEING SO RESTLESS THAT IT IS HARD TO SIT STILL: MORE THAN HALF THE DAYS
7. FEELING AFRAID AS IF SOMETHING AWFUL MIGHT HAPPEN: MORE THAN HALF THE DAYS
4. TROUBLE RELAXING: MORE THAN HALF THE DAYS
7. FEELING AFRAID AS IF SOMETHING AWFUL MIGHT HAPPEN: MORE THAN HALF THE DAYS
3. WORRYING TOO MUCH ABOUT DIFFERENT THINGS: MORE THAN HALF THE DAYS

## 2024-10-24 NOTE — PROGRESS NOTES
[facility]  October 29, 2024      Behavioral Health Clinician Progress Note    Patient Name: Marbella Hendrix           Service Type:  Individual      Service Location:   [facility] / Email (patient reached)     Session Start Time: 11am  Session End Time: 1118am      Session Length: 16 - 37      Attendees: Client     Service Modality:  Video Visit:      Provider verified identity through the following two step process.  Patient provided:  Patient is known previously to provider    Telemedicine Visit: The patient's condition can be safely assessed and treated via synchronous audio and visual telemedicine encounter.      Reason for Telemedicine Visit: Services only offered telehealth    Originating Site (Patient Location): Patient's home    Distant Site (Provider Location): Provider Remote Setting- Home Office    Consent:  The patient/guardian has verbally consented to: the potential risks and benefits of telemedicine (video visit) versus in person care; bill my insurance or make self-payment for services provided; and responsibility for payment of non-covered services.     Patient would like the video invitation sent by:  My Chart    Mode of Communication:  Video Conference via [facility]    Distant Location (Provider):  Off-site    As the provider I attest to compliance with applicable laws and regulations related to telemedicine.    Visit Activities (Refresh list every visit): [facility] Only    Diagnostic Assessment Date: 9/25/24 Bao MONTANEZ   Treatment Plan Review Date: 9/11/24  See Flowsheets for today's PHQ-9 and BERTHA-7 results  Previous PHQ-9:       9/24/2024     2:14 PM 10/2/2024     2:25 PM 10/11/2024     1:01 PM   PHQ-9 SCORE   PHQ-9 Total Score [facility] 27 (Severe depression) 19 (Moderately severe depression)    PHQ-9 Total Score 27 19 18     Previous BERTHA-7:       9/23/2024    11:35 AM 10/11/2024     1:01 PM 10/24/2024    11:08 AM   BERTHA-7 SCORE   Total Score  21 (severe anxiety)  12 (moderate anxiety)   Total Score 21 19 12        Patient-reported       DATA  Extended Session (60+ minutes): No  Interactive Complexity: No  Crisis: No  [facility] Patient: No    Treatment Objective(s) Addressed in This Session:  Target Behavior(s): disease management/lifestyle changes reduce sx of anxiety    Anxiety: will experience a reduction in anxiety      Current Stressors / Issues:  MH update:  Feeling better.  Processing grief of cat, son being at college.  Discussion of protection of feelings.  Sadness of son returning college.   Stresses:  looking at jobs, not applying  Appetite: n/a  Sleep: not sleeping only 1-2 a week instead of daily.  Outpatient Provider updates: Started IOP; [facility]  SI/SIB/HI:  Passive suicidal ideation, no plan/intent.  Fleeting. No previous attempts.  Safety plan on file.  BRAD:  Denies  Side effects/compliance:  Interventions:  [facility] engaged discussed grief and loss/modeling of emotions  Most important:  Very fearful of meds being taken away    10/2  MH update: back to working out.  Sadness, overwhelmed.  Feeling alienated.  Maybe going to family dinner.  Watch services online tomorrow.  Wasn't invited to a friends house as before.  Going to future services to build connection.  Anxiety is better.  Klonopin really helps.  More sadness, change of season.  Wants to work, but recognizes she can't due to need to do program.  Stresses: as above  Appetite: n/a  Sleep: sleeping 10-5, full night   Outpatient Provider updates: waitlisted for IOP maybe 2-3 weeks yet.  [facility]  SI/SIB/HI:  Passive suicidal ideation, no plan/intent.  Fleeting. No previous attempts.  Safety plan reviewed.  Future and goal oriented.  BRAD:  Denies  Side effects/compliance:  Interventions:  C discussed sense of building connection and loss along with areas for self care  Most important:  Sleeping better, fearful of Klonopin being taken away.   "Anxiety better.  More depression.       update:  Klonopin has been helpful, started yesterday.  Slept a whole night last night.  Napped today.  Doesn't have the energy to exercise.  Panic was worsening.  Tried to see a friend [place] at the cabin, came home early- couldn't sleep in same room, dog overbearing, friend kept trying to \"fix her\".  Feels unsafe driving with the panic.  Feeling down, sadness.   Stresses:  Mom  in April.  Youngest son moved away to college ([place]).  Cat -spent significant money and time trying to save him.  Loss of control, feels like she is always worried about his safety.  Other son, is really struggling with life functioning/ASD- tried being an .  Appetite: n/a  Sleep: Slept last night  Outpatient Provider updates: [facility]. DA update  for PHP/IOP  SI/SIB/HI:  Passive suicidal ideation, no plan/intent.  Fleeting. No previous attempts.  Safety plan reviewed.  Future and goal oriented.  BRAD:  Denies  Side effects/compliance:  Interventions:  [facility] engaged in discussion about levels of care  Most important:  Klonopin helps!       update:  Depression and anxiety worse.  Panic attacks regularly- taking Vistaril makes her fatigued.  Went to [facility], felt helpful.  Considering going back for support.  Stresses:  Mom  in April.  Youngest son moved away to college ([place]).  Cat -spent significant money and time trying to save him.  Loss of control, feels like she is always worried about his safety. Applying for jobs.  Other son, is really struggling with life functioning/ASD- tried being an .  Appetite:   Sleep: 2 nights a week, Zyprexa works 6-7 hours.  Other nights, only getting 4 hours with Ambien, Melatonin, Zyprexa.  About a month like this.  Outpatient Provider updates: Doing acupuncture. [facility].  SI/SIB/HI: Passive suicidal ideation, no plan/intent.  Fleeting. No previous attempts.  Safety " "plan reviewed.  Future and goal oriented.  BRAD:  Denies  Side effects/compliance:  Interventions:   [facility] reviewed safety plan and emergency services as needed.  Pt notes understanding and awareness.    Most important:  Not sleeping, not functioning.  Sx much worse.  Passive SI returned.    4/8  MH update:  Sx feel stable.  No acute depression/anxiety/panic.  Stresses:  Lots of stress with kiddos/spouse  Appetite: Weight gain-med side constantly hungry  Sleep: Denies  Outpatient Provider updates:   [facility]- taking a break.  Got a list of new therapist  SI/SIB/HI: Denies  BRAD: Denies  Side effects/compliance: N/a  Interventions:  [facility] offered space and validation for any concerns  Most important:  Feels stable.    1/2  MH update: Things are going well.  On the 5th med for anxiety.  Really like it.  Anxiety feels manageable.  Freak out daily but not every moment.  Denies any panic attacks.  Feeling behind the \"8 ball\".  [place] due to feeling uncomfortable from constipation.  Denies any overwhelming feelings of depression, hopelessness, worthlessness, etc. .  Stresses:  Holidays were fun, sad its over.  Got to see a friend from out of town.  Arthritic hand worse in the cold notes she needs to be wearing her hand brace.  Going to [employer], just completed prior to appt.   Unemployed hasn't been looking due to transportation of child.  Would like to work.  Appetite: Denies  Sleep: Feeling pleased.  Sleep much improved. Having dreams.    Outpatient Provider updates: Therapist once a month [facility], appt next week.    SI/SIB/HI: Denies  BRAD: Denies  Preg: n/a  Side effects/compliance: constipated, uncomfortable and bloated.  Interventions: [facility] supported on going anxiety reduction skill use  Most important: Constipation side effects     Progress on Treatment Objective(s) / Homework:  New Objective established this session - ACTION (Actively working towards change); Intervened by reinforcing change plan / affirming steps " taken    Motivational Interviewing    MI Intervention: Co-Developed Goal: reduce on going sx of anxiety, Expressed Empathy/Understanding, Open-ended questions, and Reflections: simple and complex     Change Talk Expressed by the Patient: Desire to change Ability to change Activation Taking steps    Provider Response to Change Talk: A - Affirmed patient's thoughts, decisions, or attempts at behavior change and R - Reflected patient's change talk    Assessments completed prior to visit:    The following assessments were completed by patient for this visit:  [place] Suicide Severity Rating Scale (Lifetime/Recent)      7/17/2023     9:35 AM 1/2/2024    11:33 AM 8/14/2024     4:55 PM 8/14/2024     5:45 PM 8/14/2024     6:49 PM 9/25/2024     7:00 AM   [place] Suicide Severity Rating (Lifetime/Recent)   Q1 Wish to be Dead (Lifetime)     Yes    Q2 Non-Specific Active Suicidal Thoughts (Lifetime)     No    Q1 Wished to be Dead (Past Month)   0-->no 1-->yes     Q2 Suicidal Thoughts (Past Month)   0-->no 0-->no     Q6 Suicide Behavior (Lifetime)   0-->no 0-->no 0-->no    Level of Risk per Screen   no risks indicated low risk     Q1 Wish to be Dead (Lifetime) Y N    Y   Wish to be Dead Description (Lifetime) Pt reports having thoughts and never acted on them. They report the last time was May or June 2023.     passive thoughts no men or plan   1. Wish to be Dead (Past 1 Month)      Y   Wish to be Dead Description (Past 1 Month)      passive thoughts no mean or plan   Q2 Non-Specific Active Suicidal Thoughts (Lifetime) Y N    N   Non-Specific Active Suicidal Thought Description (Lifetime) Pt reports that they had a very stressful time with many life evetns in April and this impacted these thoughts.        2. Non-Specific Active Suicidal Thoughts (Past 1 Month) N        3. Active Suicidal Ideation with any Methods (Not Plan) Without Intent to Act (Lifetime) N    N    Q4 Active Suicidal Ideation with Some Intent to Act, Without  Specific Plan (Lifetime) N    N    Q5 Active Suicidal Ideation with Specific Plan and Intent (Lifetime) N    N    Most Severe Ideation Rating (Lifetime) 2        Description of Most Severe Ideation (Lifetime) Pt reports having intrusive thoughts while driving and reports that they did not act on these and have their children and will never act on them.        Frequency (Lifetime) 3        Duration (Lifetime) 1        Controllability (Lifetime) 1        Deterrents (Lifetime) 1        Reasons for Ideation (Lifetime) 0        Actual Attempt (Lifetime) N N   N    Has subject engaged in non-suicidal self-injurious behavior? (Lifetime) N N   N    Interrupted Attempts (Lifetime) N N   N    Aborted or Self-Interrupted Attempt (Lifetime) N N   N    Preparatory Acts or Behavior (Lifetime) N N   N    Calculated C-SSRS Risk Score (Lifetime/Recent) No Risk Indicated No Risk Indicated   No Risk Indicated Low Risk       Care Plan review completed: Yes    Medication Review:  Changes to psychiatric medications, see updated Medication List in EPIC.     Medication Compliance:  Yes    Changes in Health Issues:   None reported    Chemical Use Review:   Substance Use: Chemical use reviewed, no active concerns identified      Tobacco Use: No current tobacco use.      Assessment: Current Emotional / Mental Status (status of significant symptoms):  Risk status (Self / Other harm or suicidal ideation)  Patient has had a history of suicidal ideation: reports a hx of ideation without specific planning/intent/action back in 2022  Patient denies current fears or concerns for personal safety.  Patient denies current or recent suicidal ideation or behaviors.  Patient denies current or recent homicidal ideation or behaviors.  Patient denies current or recent self injurious behavior or ideation.  Patient denies other safety concerns.  A safety and risk management plan has been developed including: Patient consented to co-developed safety plan.  A  safety and risk management plan was completed.  Patient agreed to use safety plan should any safety concerns arise.  A copy was given to the patient.    Appearance:   Appropriate   Eye Contact:   Good   Psychomotor Behavior: Normal   Attitude:   Cooperative   Orientation:   All  Speech   Rate / Production: Normal    Volume:  Normal   Mood:    Normal   Affect:    Appropriate   Thought Content:  Clear   Thought Form:  Coherent  Logical   Insight:    Good     Diagnoses:  1. Severe episode of recurrent major depressive disorder, without psychotic features (H)    2. Generalized anxiety disorder    3. Attention deficit hyperactivity disorder (ADHD), unspecified ADHD type                  Collateral Reports Completed:  Communicated with: Dr Lewis    Plan: (Homework, other):  Patient was given information about behavioral services and encouraged to schedule a follow up appointment with the clinic [facility] as needed.  She was also given information about mental health symptoms and treatment options .  CD Recommendations: No indications of CD issues.     Rafaela Hassan, [facility]        Individual Treatment Plan    Patient's Name: Marbella Hendrix   YOB: 1965  Date of Creation: 9/11/24  Date Treatment Plan Last Reviewed/Revised: 9/11/24    DSM5 Diagnoses:   1. Generalized anxiety disorder    2. Moderate episode of recurrent major depressive disorder (H)    3. Attention deficit hyperactivity disorder (ADHD), unspecified ADHD type        Psychosocial / Contextual Factors: Relationship Concerns, Occupational Issues, and Interpersonal Concerns  PROMIS (reviewed every 90 days):   The following assessments were completed by patient for this visit:  PROMIS 10-Global Health (only subscores and total score):       7/14/2023     4:22 PM 11/7/2023     4:23 PM 1/2/2024    10:47 AM 4/2/2024     7:21 AM 9/7/2024    12:17 PM 9/23/2024    11:37 AM 10/11/2024     1:01 PM   PROMIS-10 Scores Only   Global Mental Health Score 11    11 12  "14 13    13 9    9 8    8 7   Global Physical Health Score 14    14 14 13 14    14 12    12 12    12 13   PROMIS TOTAL - SUBSCORES 25    25 26 27 27    27 21    21 20    20 20        Referral / Collaboration:  Referral to another professional/service is not indicated at this time..    Anticipated number of session for this episode of care: 6-9 sessions  Anticipation frequency of session: Monthly  Anticipated Duration of each session: 16-37 minutes  Treatment plan will be reviewed in 90 days or when goals have been changed.       MeasurableTreatment Goal(s) related to diagnosis / functional impairment(s)  Goal 1: Patient will reduce sx of anxiety   \"I will know I've met my goal when I can sleep and clearly think through my thoughts to use my skills.\"     Objective #A (Patient Action)    Patient will identify three distraction and diversion activities and use those activities to decrease level of anxiety    Status: New - Date: 9/11/24      Intervention(s)  [facility] will provide support through CBT, MI, Acceptance and Commitment Therapy, Dialectic Behavioral Therapy and problem solving model to explore and overcome barriers.    Goal 2: Patient will manage concerns of safety    I will know I've met my goal when I can reduce suicidal ideation .      Objective #A (Patient Action)    Patient will use previously developed safety plan on file.  Status: New - Date: 9/11/24     Intervention(s)  Therapist will provide support through CBT, MI, Acceptance and Commitment Therapy, Dialectic Behavioral Therapy and problem solving model to explore and overcome barriers.      Patient has reviewed and agreed to the above plan.    Written by  Rafaela Hassan, [facility], [facility]     "

## 2024-10-28 ENCOUNTER — HOSPITAL ENCOUNTER (OUTPATIENT)
Dept: BEHAVIORAL HEALTH | Facility: CLINIC | Age: 59
Discharge: HOME | End: 2024-10-28
Attending: PSYCHIATRY & NEUROLOGY
Payer: COMMERCIAL

## 2024-10-28 DIAGNOSIS — F33.2 SEVERE EPISODE OF RECURRENT MAJOR DEPRESSIVE DISORDER, WITHOUT PSYCHOTIC FEATURES (H): Primary | ICD-10-CM

## 2024-10-28 PROCEDURE — 90853 GROUP PSYCHOTHERAPY: CPT

## 2024-10-28 NOTE — GROUP NOTE
Process Group Note    PATIENT'S NAME: Marbella Hendrix  MRN:   [id number]  :   1965  ACCT. NUMBER: [id number]  DATE OF SERVICE: 10/28/24  START TIME:  1:00 PM  END TIME:  1:50 PM  FACILITATOR: Melvina Hebert [facility]  TOPIC:  Process Group    Diagnoses:  Severe episode of recurrent major depressive disorder, without psychotic features (H)    Generalized anxiety disorder       [facility]  TRACK: IOP 6     NUMBER OF PARTICIPANTS: 4        Data:    Session content: At the start of this group, patients were invited to check in by identifying themselves, describing their current emotional status, and identifying issues to address in this group.   Area(s) of treatment focus addressed in this session included Symptom Management, Personal Safety, and Community Resources/Discharge Planning.  Marbella reported no safety concerns or concerns of suicide or self injurious behavior. Patient reported feeling accepted, and a little sad. Patient reported her son visited from college over the weekend. They spent time together, and Marbella was able to refrain from crying when he went back to college. Patient reported a goal of wanting to return to work in the near future.    Therapeutic Interventions/Treatment Strategies:  Treatment modalities used include Cognitive Behavioral Therapy and Dialectical Behavioral Therapy.    Assessment:    Patient response:   Patient responded to session by accepting feedback, giving feedback, and listening    Possible barriers to participation / learning include: severity of symptoms    Health Issues:   None reported       Substance Use Review:   Substance Use: No active concerns identified.    Mental Status/Behavioral Observations  Appearance:   Appropriate   Eye Contact:   Good   Psychomotor Behavior: Normal   Attitude:   Cooperative   Orientation:   All  Speech   Rate / Production: Normal    Volume:  Normal    Mood:    Normal  Affect:    Appropriate   Thought Content:   Clear  Thought Form:  Coherent  Logical     Insight:    Good     Plan:   Safety Plan: No current safety concerns identified.  Recommended that patient call 911 or go to the local ED should there be a change in any of these risk factors.   Barriers to treatment: None identified  Patient Contracts (see media tab):  None  Substance Use: Not addressed in session   Continue or Discharge: Patient will continue in Adult Day Treatment (ADT)  as planned. Patient is likely to benefit from learning and using skills as they work toward the goals identified in their treatment plan.      Melvina Stroud, LICSW  October 28, 2024

## 2024-10-28 NOTE — GROUP NOTE
Psychotherapy Group Note    PATIENT'S NAME: Marbella Hendrix  MRN:   [id number]  :   1965  ACCT. NUMBER: [id number]  DATE OF SERVICE: 10/28/24  START TIME:  2:00 PM  END TIME:  2:50 PM  FACILITATOR: John Stubbs LPC  TOPIC:  EBP Group: Emotions Management  [facility] Mental Health Outpatient Programs  TRACK: IOP-6  NUMBER OF PARTICIPANTS: 4    Summary of Group / Topics Discussed:  Emotions Management: Understanding Emotions: Patients discussed the purpose of emotions and function they serve in our lives.  Reviewed core emotions, why they happen (triggers), and how they occur. The group assisted one anothers' understanding that: emotional experiences are important; difficult emotions have a place in our lives; and the differences between various emotions.    Patient Session Goals / Objectives:  Demonstrate understanding of types various emotions  Identify and discuss specific emotions and when they occur; understand triggers  Discuss barriers to emotional regulation      Patient Participation / Response:  Fully participated with the group by sharing personal reflections / insights and openly received / provided feedback with other participants.    Demonstrated understanding of topics discussed through group discussion and participation, Expressed understanding of the relevance / importance of emotions management skills at distressing times in life, Self-aware of experiences with difficult emotions, and strategies to employ to manage them, Demonstrated knowledge of when to consider applying a variety of emotions management skills in daily life, Demonstrated understanding and practice strategies to manage difficult emotions and move towards healing, Identified barriers to applying emotions management strategies, Identified strategies to overcome barriers to use of emotions management skills, Identified emotions management strategies that have helped maintain / improve symptoms in the  past, Practiced 2-3 new skills in session, and Identified / Expressed personal readiness to practice new emotions management skills    Treatment Plan:  Patient has a current master individualized treatment plan.  See Epic treatment plan for more information.    John Stubbs, LPC

## 2024-10-28 NOTE — GROUP NOTE
Psychotherapy Group Note    PATIENT'S NAME: Marbella Hendrix  MRN:   [id number]  :   1965  ACCT. NUMBER: [id number]  DATE OF SERVICE: 10/28/24  START TIME:  3:00 PM  END TIME:  3:50 PM  FACILITATOR: Melvina Hebert LICSW  TOPIC: MH EBP Group: Symptom Awareness  [facility] Mental Health Outpatient Programs  TRACK: IOP 6    NUMBER OF PARTICIPANTS: 4    Summary of Group / Topics Discussed:  Symptom Awareness: Mood Disorders: Patients received a general overview of mood disorders including depressive disorders, anxiety disorders, and bipolar disorders and how it relates to their current symptoms. The purpose is to promote understanding of their diagnoses and how it impacts their functioning. Patients reviewed their current awareness of symptoms and diagnoses. Patients received information regarding diagnoses, etiology, cultural, and environmental factors as well as impact on functioning.     Patient Session Goals / Objectives:  Discussed patient individual symptoms and experiences  Reviewed diagnostic criteria and etiology of diagnoses       Patient Participation / Response:  Fully participated with the group by sharing personal reflections / insights and openly received / provided feedback with other participants.    Demonstrated understanding of topics discussed through group discussion and participation    Treatment Plan:  Patient has a current master individualized treatment plan.  See Epic treatment plan for more information.    ZUHAIR Sequeira

## 2024-10-29 ENCOUNTER — VIRTUAL VISIT (OUTPATIENT)
Dept: PSYCHIATRY | Facility: CLINIC | Age: 59
End: 2024-10-29
Payer: COMMERCIAL

## 2024-10-29 ENCOUNTER — HOSPITAL ENCOUNTER (OUTPATIENT)
Dept: BEHAVIORAL HEALTH | Facility: CLINIC | Age: 59
Discharge: HOME | End: 2024-10-29
Attending: PSYCHIATRY & NEUROLOGY
Payer: COMMERCIAL

## 2024-10-29 ENCOUNTER — VIRTUAL VISIT (OUTPATIENT)
Dept: BEHAVIORAL HEALTH | Facility: CLINIC | Age: 59
End: 2024-10-29
Payer: COMMERCIAL

## 2024-10-29 VITALS — HEIGHT: 64 IN | BODY MASS INDEX: 34.15 KG/M2 | WEIGHT: 200 LBS

## 2024-10-29 DIAGNOSIS — F90.9 ATTENTION DEFICIT HYPERACTIVITY DISORDER (ADHD), UNSPECIFIED ADHD TYPE: ICD-10-CM

## 2024-10-29 DIAGNOSIS — F33.2 SEVERE EPISODE OF RECURRENT MAJOR DEPRESSIVE DISORDER, WITHOUT PSYCHOTIC FEATURES (H): Primary | ICD-10-CM

## 2024-10-29 DIAGNOSIS — F33.1 MODERATE EPISODE OF RECURRENT MAJOR DEPRESSIVE DISORDER (H): ICD-10-CM

## 2024-10-29 DIAGNOSIS — F41.1 GENERALIZED ANXIETY DISORDER: Primary | ICD-10-CM

## 2024-10-29 DIAGNOSIS — G47.00 INSOMNIA, UNSPECIFIED TYPE: ICD-10-CM

## 2024-10-29 DIAGNOSIS — F41.1 GENERALIZED ANXIETY DISORDER: ICD-10-CM

## 2024-10-29 PROCEDURE — 90853 GROUP PSYCHOTHERAPY: CPT

## 2024-10-29 PROCEDURE — G2211 COMPLEX E/M VISIT ADD ON: HCPCS | Mod: 95 | Performed by: PSYCHIATRY & NEUROLOGY

## 2024-10-29 PROCEDURE — 90832 PSYTX W PT 30 MINUTES: CPT | Mod: 95 | Performed by: COUNSELOR

## 2024-10-29 PROCEDURE — 99214 OFFICE O/P EST MOD 30 MIN: CPT | Mod: 95 | Performed by: PSYCHIATRY & NEUROLOGY

## 2024-10-29 RX ORDER — BUSPIRONE HYDROCHLORIDE 10 MG/1
10 TABLET ORAL 2 TIMES DAILY
Qty: 180 TABLET | Refills: 0 | Status: SHIPPED | OUTPATIENT
Start: 2024-10-29

## 2024-10-29 RX ORDER — CLONAZEPAM 1 MG/1
TABLET ORAL
Qty: 60 TABLET | Refills: 1 | Status: SHIPPED | OUTPATIENT
Start: 2024-10-29

## 2024-10-29 RX ORDER — CLONAZEPAM 1 MG/1
TABLET ORAL
Qty: 60 TABLET | Refills: 0 | Status: CANCELLED | OUTPATIENT
Start: 2024-10-29

## 2024-10-29 ASSESSMENT — PAIN SCALES - GENERAL: PAINLEVEL_OUTOF10: NO PAIN (0)

## 2024-10-29 NOTE — PATIENT INSTRUCTIONS
Treatment Plan:  Start BusPar/buspirone anxiety: take 5 mg twice daily for 1 week then increase to 10 mg twice daily as tolerated.   Continue Effexor-XR/venlafaxine  mg daily for anxiety/mood.  Continue clonazepam 0.5-1 mg twice daily for severe anxiety.  Okay to decrease the dose if feeling too tired or sedated during the day. (SEND THIS RX TO [facility])  Strongly recommend continuing with PHP/IOP.  Follow up with me in 1 month.  For scheduling needs you can call [phone].  You could also get a message to the nurses by calling the aforementioned phone number.  Continue all other cares per primary care provider.   Continue all other medications as reviewed per electronic medical record today.   Safety plan reviewed. To the Emergency Department as needed or call after hours crisis line at [phone] or [phone]. [place] Crisis Text Line. Text [place] to 436064 or Suicide LifeLine Chat: suicidepreventionlifeline.org/chat  Follow up with primary care provider as planned or for acute medical concerns.  Handpressionst may be used to communicate with your provider, but this is not intended to be used for emergencies.    Risks of benzodiazepine (Ativan, Xanax, Klonopin, Valium, etc) use including, but not limited to, sedation, tolerance, risk for addiction/dependence. Do not drink alcohol while taking benzodiazepines due to risk of trouble breathing and potential death. Do not drive or operate heavy machinery until it is known how the drug affects you. Discuss with physician or pharmacist before ever taking a benzodiazepine with a narcotic/opioid pain medication.     Patient Education   Collaborative Care Psychiatry Service  What to Expect  Here's what to expect from your Collaborative Care Psychiatry Service (CCPS).   About CCPS  CCPS means 2 people work together to help you get better. You'll meet with a behavioral health clinician and a psychiatric doctor. A behavioral health clinician helps people with mental health  "problems by talking with them. A psychiatric doctor helps people by giving them medicine.  How it works  At every visit, you'll see the behavioral health clinician (BHC) first. They'll talk with you about how you're doing and teach you how to feel better.   Then you'll see the psychiatric doctor. This doctor can help you deal with troubling thoughts and feelings by giving you medicine. They'll make sure you know the plan for your care.   CCPS usually takes 3 to 6 visits. If you need more visits, we may have you start seeing a different psychiatric doctor for ongoing care.  If you have any questions or concerns, we'll be glad to talk with you.  About visits  Be open  At your visits, please talk openly about your problems. It may feel hard, but it's the best way for us to help you.  Cancelling visits  If you can't come to your visit, please call us right away at [phone]. If you don't cancel at least 24 hours (1 full day) before your visit, that's \"late cancellation.\"  Being late to visits  Being very late is the same as not showing up. You will be a \"no show\" if:  Your appointment starts with a BHC, and you're more than 15 minutes late for a 30-minute (half hour) visit. This will also cancel your appointment with the psychiatric doctor.  Your appointment is with a psychiatric doctor only, and you're more than 15 minutes late for a 30-minute (half hour) visit.  Your appointment is with a psychiatric doctor only, and you're more than 30 minutes late for a 60-minute (full hour) visit.  If you cancel late or don't show up 2 times within 6 months, we may end your care.   Getting help between visits  If you need help between visits, you can call us Monday to Friday from 8 a.m. to 4:30 p.m. at [phone].  Emergency care  Call 911 or go to the nearest emergency department if your life or someone else's life is in danger.  Call 988 anytime to reach the national Suicide and Crisis hotline.  Medicine refills  To " refill your medicine, call your pharmacy. You can also call [facility]'s Behavioral Access at [phone], Monday to Friday, 8 a.m. to 4:30 p.m. It can take 1 to 3 business days to get a refill.   Forms, letters, and tests  You may have papers to fill out, like FMLA, short-term disability, and workability. We can help you with these forms at your visits, but you must have an appointment. You may need more than 1 visit for this, to be in an intensive therapy program, or both.  Before we can give you medicine for ADHD, we may refer you to get tested for it or confirm it another way.  We may not be able to give you an emotional support animal letter.  We don't do mental health checks ordered by the court.   We don't do mental health testing, but we can refer you to get tested.   Thank you for choosing us for your care.  For informational purposes only. Not to replace the advice of your health care provider. Copyright   2022 [facility]. All rights reserved. qualifyor 716874 - 12/22.

## 2024-10-29 NOTE — GROUP NOTE
Psychotherapy Group Note    PATIENT'S NAME: Marbella Hendrix  MRN:   [id number]  :   1965  ACCT. NUMBER: [id number]  DATE OF SERVICE: 10/29/24  START TIME:  2:00 PM  END TIME:  2:50 PM  FACILITATOR: Teresa Jacobson LICSW  TOPIC:  DBT Group: [facility] Adult Mental Health Outpatient Programs  TRACK: IOP 6     NUMBER OF PARTICIPANTS: 3     Summary of Group/Topics Discussed:  Mindfulness: Patients will be provided with both the goals and definitions of Mindfulness from the DBT framework and will learn a core set of skills: The WHAT skills and the HOW skills. These skills teach Patients how to observe and experience reality as it is, to be less judgmental, and to live in the moment with effectiveness. Patients will engage in discussions, active practice and reflection and begin to understand the importance of these skills as a foundation for all other DBT practices. Today Patients practiced wise mind     Patient Session Goals/Objectives:  Demonstrate understanding of wise mind  Identify barriers to wise mind and engage in problem solving.  Reinforce Mindfulness as a foundational skill for continued skill use.   Wise mind exploration      Patient Participation / Response:  Fully participated with the group by sharing personal reflections / insights and openly received / provided feedback with other participants.    Demonstrated understanding of topics discussed through group discussion and participation, Expressed understanding of the relationship between behaviors, thoughts, and feelings, and Identified barriers to change    Treatment Plan:  Patient has a current master individualized treatment plan.  See Epic treatment plan for more information.    ZUHAIR Schilling

## 2024-10-29 NOTE — GROUP NOTE
Psychoeducation Group Note    PATIENT'S NAME: Marbella Hendrix  MRN:   [id number]  :   1965  ACCT. NUMBER: [id number]  DATE OF SERVICE: 10/29/24  START TIME:  3:00 PM  END TIME:  3:50 PM  FACILITATOR: Melvina Hebert LICSW  TOPIC: MH Wellness Group: Health Maintenance  [facility] Adult Mental Health Outpatient Programs  TRACK: IOP 6     NUMBER OF PARTICIPANTS: 3    Summary of Group / Topics Discussed:  Health Maintenance: Discharge planning/Community resources: Patients worked on completing an instructor-facilitated discharge planning activity. Discharge planning begins for all patients after admission. Competent discharge planning promotes a successful transition and decreases the likelihood of mental health relapse. In this group, all dimensions of wellness were reviewed to assess for needs/discharge readiness. These dimensions included: physical, emotional, occupational/productivity, environmental, social, spiritual, intellectual, and financial. Patients worked on completing/updating their discharge planning and identifying their treatment needs prior to time of discharge.     Patient Session Goals / Objectives:  Identified unmet treatment needs to accomplish before discharge  Completed all dimensions of the discharge planning packet  Participated in the planning process, make phone calls, set up appointments, got connected with community resources, followed up with treatment team as needed         Patient Participation / Response:  Fully participated with the group by sharing personal reflections / insights and openly received / provided feedback with other participants.    Demonstrated understanding of topics discussed through group discussion and participation    Treatment Plan:  Patient has a current master individualized treatment plan.  See Epic treatment plan for more information.    ZUHAIR Sequeira

## 2024-10-29 NOTE — GROUP NOTE
Process Group Note    PATIENT'S NAME: Marbella Hendrix  MRN:   [id number]  :   1965  ACCT. NUMBER: [id number]  DATE OF SERVICE: 10/29/24  START TIME:  1:00 PM  END TIME:  1:50 PM  FACILITATOR: Melvina Hebert [facility]  TOPIC:  Process Group    Diagnoses:  Severe episode of recurrent major depressive disorder, without psychotic features (H)    Generalized anxiety disorder       [facility] Mental Health Outpatient Programs  TRACK: IOP 6     NUMBER OF PARTICIPANTS: 3        Data:    Session content: At the start of this group, patients were invited to check in by identifying themselves, describing their current emotional status, and identifying issues to address in this group.   Area(s) of treatment focus addressed in this session included Symptom Management, Personal Safety, and Community Resources/Discharge Planning.  Patient is taking Klonopin to assist with sleep. Patient was advised by her med provider that eventually she would be taken off the medication. This is causing distress for Marbella because this has been an important tool for sleep. Patient reported feeling crabby, frustrated due to this. Patient reported feeling frustrated with traffic as there have been some changes to the freeway. Patient reported having an appointment for the arthritis in her hand. Patient reported no safety concerns.     Therapeutic Interventions/Treatment Strategies:  Treatment modalities used include Cognitive Behavioral Therapy and Dialectical Behavioral Therapy.    Assessment:    Patient response:   Patient responded to session by accepting feedback, giving feedback, and listening    Possible barriers to participation / learning include: severity of symptoms    Health Issues:   None reported       Substance Use Review:   Substance Use: No active concerns identified.    Mental Status/Behavioral Observations  Appearance:   Appropriate   Eye Contact:   Good   Psychomotor Behavior: Normal    Attitude:   Cooperative   Orientation:   All  Speech   Rate / Production: Normal    Volume:  Normal   Mood:    Normal  Affect:    Appropriate   Thought Content:   Clear  Thought Form:  Coherent  Logical     Insight:    Good     Plan:   Safety Plan: No current safety concerns identified.  Recommended that patient call 911 or go to the local ED should there be a change in any of these risk factors.   Barriers to treatment: None identified  Patient Contracts (see media tab):  None  Substance Use: Not addressed in session   Continue or Discharge: Patient will continue in Adult Day Treatment (ADT)  as planned. Patient is likely to benefit from learning and using skills as they work toward the goals identified in their treatment plan.      Melvina Stroud, Southern Maine Health CareSW  October 29, 2024

## 2024-10-29 NOTE — PROGRESS NOTES
"Virtual Visit Details    Type of service:  Video Visit     Originating Location (pt. Location): {video visit patient location:767401::\"Home\"}  {PROVIDER LOCATION On-site should be selected for visits conducted from your clinic location or adjoining [facility] hospital, academic office, or other nearby [facility] building. Off-site should be selected for all other provider locations, including home:960120}  Distant Location (provider location):  {virtual location provider:185747}  Platform used for Video Visit: {Virtual Visit Platforms:289492::\"Phone.com\"}  "

## 2024-10-29 NOTE — NURSING NOTE
Current patient location: [address] [place] [address] [place] [facility] [address]    Is the patient currently in the state of [place]? YES    Visit mode:VIDEO    If the visit is dropped, the patient can be reconnected by: VIDEO VISIT: Send to e-mail at: [email or web address]    Will anyone else be joining the visit? NO  (If patient encounters technical issues they should call [phone])    Are changes needed to the allergy or medication list? No    Are refills needed on medications prescribed by this physician? YES    Rooming Documentation:  Questionnaire(s) completed    Reason for visit: RECHECK    Dea LOVETT

## 2024-10-29 NOTE — PROGRESS NOTES
"Telemedicine Visit: The patient's condition can be safely assessed and treated via synchronous audio and visual telemedicine encounter.      Reason for Telemedicine Visit: Patient has requested telehealth visit    Originating Site (Patient Location): Patient's home    Distant Location (provider location):  Off-Site    Consent:  The patient/guardian has verbally consented to: the potential risks and benefits of telemedicine (video visit) versus in person care; bill my insurance or make self-payment for services provided; and responsibility for payment of non-covered services.     Mode of Communication:  Video Conference via Novan    As the provider I attest to compliance with applicable laws and regulations related to telemedicine.          Outpatient Psychiatric Progress Note    Name: Marbella Hendrix   : 1965                    Primary Care Provider: Vanessa Ladd MD   Therapist: Doing acupuncture. [facility].  Currently in [facility].       PHQ-9 scores:      2024     2:14 PM 10/2/2024     2:25 PM 10/11/2024     1:01 PM   PHQ-9 SCORE   PHQ-9 Total Score MyChart 27 (Severe depression) 19 (Moderately severe depression)    PHQ-9 Total Score 27 19 18       BERTHA-7 scores:      2024    11:35 AM 10/11/2024     1:01 PM 10/24/2024    11:08 AM   BERTHA-7 SCORE   Total Score 21 (severe anxiety)  12 (moderate anxiety)   Total Score 21 19 12        Patient-reported       Patient Identification:  Patient is a 59 year old,   White Not  or  female  who presents for return visit with me.  Patient is currently unemployed. Patient attended the phone/video session with , Wally.  Patient prefers to be called: \"Marbella\".    Interim History:  I last saw Marbella Hendrix for outpatient psychiatry return visit on 2024. During that appointment, we:    Discontinue Ambien/zolpidem while taking Klonopin  Increase Effexor-XR/venlafaxine ER to 225 mg daily " for anxiety/mood.  Continue mirtazapine 15 mg at bedtime for sleep and to augment venlafaxine ER. ALSO OK TO STOP WHILE TAKING KLONOPIN.   Continue clonazepam 0.5-1 mg twice daily for severe anxiety.  Okay to decrease the dose if feeling too tired or sedated during the day.  Strongly recommend continuing with the plan for PHP/IOP.  Follow up with me in 1-2 weeks.  For scheduling needs you can call [phone].  You could also get a message to the nurses by calling the aforementioned phone number.  Letter sent to your mychart advising you not to travel for the next several weeks due to your psychiatric condition and need for intensive treatment.    10/29: Ongoing anxiety but improving.  Has been taking clonazepam 1.5 mg at bedtime for sleep.  Started to struggle with sleep again on just 1 mg and is now sleeping better again on 1.5 mg.  Some sleep struggles still 1-2 days a week but not daily.  Currently in IOP.  Is hopeful to be able to work again soon.  Passive thoughts of suicide with no plan or intent.  See [facility] note below for additional details.  No acute safety concerns.  No acute suicidality.  No problematic drug or alcohol use.    Per [facility], Rafaela Hassan [facility], during today's team-based visit:  MH update:  Feeling better.  Processing grief of cat, son being at college.  Discussion of protection of feelings.  Sadness of son returning college.   Stresses:  looking at jobs, not applying  Appetite: n/a  Sleep: not sleeping only 1-2 a week instead of daily.  Outpatient Provider updates: Started IOP; [facility]  SI/SIB/HI:  Passive suicidal ideation, no plan/intent.  Fleeting. No previous attempts.  Safety plan on file.  BRAD:  Denies  Side effects/compliance:  Interventions:  [facility] engaged discussed grief and loss/modeling of emotions  Most important:  Very fearful of meds being taken away    Past Psychiatric Med Trials:  Psych Meds at Intake:  Paxil 20 mg daily - started when 29 years, has been  up and down on the dose, on 10 mg dose mostly, last on 20 mg this past spring   Ambien 5 mg for insomnia - used for 4 nights  Ativan 0.5 mg for flying     Past Psych Meds:  Prozac for 1-2 months maybe when 27 yo    Psychiatric ROS:  Marbella Osorioee Simeon reports mood has been: See HPI above  Anxiety has been: See HPI above  Sleep has been: See HPI above  Rachna sxs: None  Psychosis sxs: None  ADHD/ADD sxs: see HPI above  PTSD sxs: NA  PHQ9 and GAD7 scores were reviewed today if completed.   Medication side effects: See HPI above  Current stressors include: Symptoms and see HPI above  Coping mechanisms and supports include: Family and Hobbies, IOP    Current medications include:   Current Outpatient Medications   Medication Sig Dispense Refill    acetaminophen (TYLENOL) 500 MG tablet Take 500-1,000 mg by mouth every 6 hours as needed for mild pain.      atorvastatin (LIPITOR) 20 MG tablet Take 1 tablet (20 mg) by mouth daily. 90 tablet 2    calcium carbonate (OS-ARA) 500 MG tablet Take 1 tablet by mouth daily.      cholecalciferol (VITAMIN D3) 25 mcg (1000 units) capsule Take 1 capsule by mouth daily.      clonazePAM (KLONOPIN) 1 MG tablet Take half to full tab by mouth twice daily as tolerated. 60 tablet 0    hydrOXYzine HCl (ATARAX) 25 MG tablet Take 0.5-1 tablets (12.5-25 mg) by mouth 2 times daily as needed for anxiety. (Patient not taking: Reported on 10/10/2024) 120 tablet 0    levothyroxine (SYNTHROID/LEVOTHROID) 125 MCG tablet Take 1 tablet (125 mcg) by mouth daily 90 tablet 1    melatonin 5 MG tablet Take 5 mg by mouth nightly as needed for sleep.      naproxen sodium 220 MG capsule Take 220 mg by mouth 2 times daily (with meals).      Omega-3 Fatty Acids (OMEGA-3 FISH OIL PO) Take 1 g by mouth daily      Semaglutide-Weight Management (WEGOVY) 0.25 MG/0.5ML pen Inject 0.25 mg subcutaneously once a week. 2 mL 0    Semaglutide-Weight Management (WEGOVY) 0.5 MG/0.5ML pen Inject 0.5 mg subcutaneously once a week. 2  mL 5    venlafaxine (EFFEXOR XR) 150 MG 24 hr capsule Take 1 capsule (150 mg) by mouth daily 90 capsule 1     No current facility-administered medications for this visit.       The [place] Prescription Monitoring Program has been reviewed and there are no concerns about diversionary activity for controlled substances at this time.   2024 1 Clonazepam 1 Mg Tablet 60.00 30 Al u [id number] Sup ([id number]) 0/0 4.00 [place]   2024 1 Zolpidem Tartrate 5 Mg Tablet 30.00 30 Al Bau [id number] Juan ([id number]) 0/0 0.25 [place]   2024 1 Lorazepam 0.5 Mg Tablet 8.00 4 Al Bau [id number] Juan ([id number]) 0/0 1.00 [place]       Past Medical/Surgical History:  Past Medical History:   Diagnosis Date    Arthritis     My mom has it, my grandmother had it I have it    Diabetes (H)     My mom and brother have type II    Heavy menstrual period     Leiomyoma of uterus     s/p myomectomy    Mental disorder     anxiety    PONV (postoperative nausea and vomiting)     Surgical complication after  2002    Thyroid disease     Hypothroidism, 2nd half of     Uncomplicated asthma     My son has it, since he was born    Vesico-ureteral reflux     s/p repair      has a past medical history of Arthritis, Diabetes (H), Heavy menstrual period, Leiomyoma of uterus, Mental disorder, PONV (postoperative nausea and vomiting), Surgical complication (after  2002), Thyroid disease, Uncomplicated asthma, and Vesico-ureteral reflux.    She has no past medical history of Cancer (H), Cerebral infarction (H), Congestive heart failure (H), COPD (chronic obstructive pulmonary disease) (H), Depressive disorder, Heart disease, History of blood transfusion, or Hypertension.    Social History:  Reviewed. No changes to social history except as noted above in HPI.    Vital Signs:   None. This is phone/video visit.     Labs:  Most recent laboratory results reviewed and no new labs.      Review of Systems:  10 systems (general, cardiovascular, respiratory, eyes, ENT, endocrine, GI, , M/S, neurological) were reviewed. Most pertinent finding(s) is/are: Some chronic pain. The remaining systems are all unremarkable.    Mental Status Examination (limited as this is by phone/video):  Appearance: Awake, alert, appears stated age, no acute distress, well-groomed   Attitude:  cooperative  Motor: No gross abnormalities observed via video, not formally tested   Oriented to:  person, place, time, and situation  Attention Span and Concentration:  normal  Speech:  clear, coherent, regular rate, rhythm, and volume  Language: intact  Mood: Anxious, but a little better  Affect:  mood congruent  Associations:  no loose associations  Thought Process:  logical, linear and goal oriented  Thought Content:  no evidence of acute suicidality or homicidal ideation, no evidence of psychotic thought, no auditory hallucinations present and no visual hallucinations present  Recent and Remote Memory:  Intact to interview. Not formally assessed. No amnesia.  Fund of Knowledge: appropriate  Insight: Good  Judgment:  intact, adequate for safety  Impulse Control:  intact    Suicide Risk Assessment:  Today Marbella Hendrix has recently reported passive thoughts of death but no acute suicidality or plan or intent to harm self-none today.  See safety plan in chart.  Also see thorough assessment by [facility] during today's joint appointment.  Based on all available evidence including the factors cited above, Marbella Hendrix does not appear to be at imminent risk for self-harm, does not meet criteria for a 72-hr hold, and therefore remains appropriate for ongoing outpatient level of care.  A thorough assessment of risk factors related to suicide and self-harm have been reviewed and are noted above. The patient convincingly denies suicidality on several occasions. Local community safety resources reviewed for patient to use if  needed. There was no deceit detected, and the patient presented in a manner that was believable.     DSM5 Diagnosis:  Attention-Deficit/Hyperactivity Disorder  314.01 (F90.9) Unspecified Attention -Deficit / Hyperactivity Disorder  Major Depressive Disorder, Recurrent Episode, moderate  300.02 (F41.1) Generalized Anxiety Disorder  Insomnia, unspecified    Medical comorbidities include:   Patient Active Problem List    Diagnosis Date Noted    Recurrent major depressive disorder (H) 10/11/2024     Priority: High    Elevated fasting glucose 05/01/2024     Priority: Medium    Anxiety 07/19/2023     Priority: Medium    Adjustment disorder with anxious mood 06/27/2021     Priority: Medium    Family history of diabetes mellitus 06/27/2021     Priority: Medium    CMC DJD(carpometacarpal degenerative joint disease), localized primary, left 11/18/2020     Priority: Medium    Thumb pain, left 11/18/2020     Priority: Medium    Dermatochalasis of both upper eyelids 11/17/2020     Priority: Medium     Added automatically from request for surgery [id number]      Involutional ptosis, acquired, bilateral 11/17/2020     Priority: Medium     Added automatically from request for surgery [id number]      Weakness of right hip 07/09/2020     Priority: Medium    Primary osteoarthritis of right hip 12/02/2019     Priority: Medium     Added automatically from request for surgery [id number]      Morbid obesity (H) 06/20/2018     Priority: Medium    Status post hysterectomy 08/02/2017     Priority: Medium    Sensation of plugged ear on both sides 07/12/2017     Priority: Medium    Acute post-operative pain 08/28/2015     Priority: Medium    Preventative health care 10/30/2012     Priority: Medium     Last pap NIL 2011; mammogram nl 2011; lipids abnl, needs annual f/u      Generalized anxiety disorder 09/26/2012     Priority: Medium     H/o panic attacks; currently controlled with paxil      Hyperlipidemia LDL goal <130 09/26/2012     Priority: Medium      Behavioral measures - avoiding statins while trying to conceive; not a candidate for red yeast rice (interactions with thyroid meds)      Acquired hypothyroidism 09/26/2012     Priority: Medium    Overweight 09/26/2012     Priority: Medium     On exercise plan, has been in weight watchers  Problem list name updated by automated process. Provider to review         Psychosocial & Contextual Factors: see HPI above    Assessment:  From Intake, 7/17/2023:  Marbella Hendrix is a 57-year-old female with past psychiatric history including anxiety, ADHD, depression, remote polysubstance abuse (in remission for decades) who presents today for psychiatric evaluation.  Patient recently with increased psychosocial stressors and acutely worsening anxiety and insomnia.  Patient most recently on Paxil and Ambien to manage symptoms.  Ambien used sparingly and did help break severe insomnia cycle recently.  Patient currently taking 10 mg of Paxil and has only ever been up to 20 mg historically.  Patient may be interested in a trial with something different than Paxil.  We also discussed further optimizing Paxil to 30 or 40 mg daily for more therapeutic trial before replacing the medication.  We did discuss Lexapro and Effexor-XR as possible alternatives.  Patient would like to discuss these options with her  who is an internist and her psychotherapist.  In the meantime, we discussed a trial with clonidine to help with sleep, anxiety, and ADHD symptoms.  Discussed risks and benefits of therapy.  Patient would like to move forward with a clonidine trial to help at bedtime as needed for sleep and a small dose during the day as needed for anxiety.  No acute safety concerns today.  No acute suicidality.  No problematic drug or alcohol use.     8/22/2023:  Patient with ongoing significant anxiety and mood related struggles.  Clonidine has not been helpful.  We will transition patient off paroxetine and onto venlafaxine.  We  will continue to consider escitalopram as an option if venlafaxine ineffective or poorly tolerated.  Could also consider use of fluoxetine if paroxetine is difficult to discontinue.  Discussed DBT therapy as a potential option to help improve symptoms.  Resources sent in [employer] message.  No acute suicidality.  No acute safety concerns.  No problematic drug or alcohol use.    10/3/2023:  Patient doing okay with transition to venlafaxine ER.  Has had some mild dizziness and headaches.  We will continue to optimize venlafaxine ER and patient will watch for worsening headaches and dizziness.  Her symptoms could be related to discontinuation of paroxetine.  Patient still struggling significantly in the evenings and with insomnia.  We will trial quetiapine at bedtime as needed for sleep.  Discussed risks and benefits of therapy including watching for any abnormal involuntary movements.  If patient continues on the medication, we will make sure to get updated lipid panel and fasting glucose.  Could also consider mirtazapine as an option.  No acute safety concerns.  No acute suicidality.  No problematic drug or alcohol use.    11/14/2023:  Patient continuing to work with sleep medicine with pending at home sleep study results.  Quetiapine has been helpful for sleep at just 12.5 mg at bedtime.  Does cause some significant cognitive clouding during the day.  Patient does feel benefits outweigh risks but is willing to trial olanzapine to see if gets similar benefit without such heavy cognitive effects.  Venlafaxine has been helpful for mood and anxiety symptoms.  Still could consider mirtazapine as an option for sleep as needed.  No acute safety concerns.  No SI.  No problematic drug or alcohol use.    1/2/2024:  Patient currently doing quite well on olanzapine.  Finds it more helpful and better tolerated than quetiapine.  Discussed risks and benefits and side effects to watch out for.  Mild constipation-encouraged to utilize  MiraLAX and/or Dulcolax.  No acute safety concerns.  No SI.  No problematic drug or alcohol use.  No abnormal involuntary movements.    4/8/2024:  Apart from some of the metabolic effects of olanzapine, patient otherwise quite stable.  Patient opts to try to address metabolic side effects of olanzapine with metformin.  Discussed there is evidence to support use of metformin to help treat and also to help decrease risk for metabolic syndrome from the medication use.  Discussed risks and benefits of metformin use.  Due to stability of mental health symptoms, psychiatric care be returned back to primary care provider.  Also encouraged primary care provider to continue to monitor metabolic status and need for metformin.  Patient continues to watch diet and stay active.  Updated labs ordered for primary care provider for baseline since starting metformin XR.  Last liver enzymes looked okay when checked in June 2023.  Patient also denies any abnormal involuntary movements.  No acute safety concerns.  No SI.  No problematic drug or alcohol use.    9/11/2024 (New episode of care initiated today after bounce-back):  Patient struggling more significantly with anxiety and insomnia.  Even up to 10 mg of olanzapine was not very helpful.  Will discontinue olanzapine and trial mirtazapine.  Ambien is effective and so patient instructed to take before bedtime nightly for 1-2 weeks to allow for reregulation of sleep schedule.  Could consider further optimization of Effexor-XR in the future as well.  No acute safety concerns.  No acute suicidality, although does have some passive fleeting thoughts with no plan or intent to harm self.  No problematic drug or alcohol use.  Psychotherapy encouraged.    9/24/2024:  Patient with severely worsening anxiety.  Started clonazepam scheduled since last visit.  This will be continued since to started yesterday.  Venlafaxine will be increased to 225 mg daily.  Patient scheduled for intake for  PHP/IOP tomorrow.  Patient should not be traveling in her current condition and also to allow time for intensive outpatient programming.  Letter was provided due to their upcoming travel plans.  No acute safety concerns.  No acute suicidality.  No problematic drug or alcohol use.    10/02/2024:  Patient doing a little bit better on decreased venlafaxine and daily clonazepam.  No changes today.  Patient will continue to monitor mood/depression symptoms  Hopefully will be able to start intensive outpatient programming soon.  No acute safety concerns.  No acute suicidality.  No problematic drug or alcohol use.    10/29/2024:  Overall continuing to feel improvement of symptoms.  Taking clonazepam 1.5 mg at bedtime for sleep.  Worried about not being able to have clonazepam and not sleeping again.  For now clonazepam will be continued.  Patient is agreeable to starting buspirone to see if it might further improve anxiety to at some point reduce reliance on clonazepam.  Encouraged to continue in IOP.  No acute safety concerns.  No SI.  No problematic drug or alcohol use.    Medication side effects and alternatives were reviewed. Health promotion activities recommended and reviewed today. All questions addressed. Education and counseling completed regarding risks and benefits of medications and psychotherapy options. Recommend therapy for additional support.     Treatment Plan:  Start BusPar/buspirone anxiety: take 5 mg twice daily for 1 week then increase to 10 mg twice daily as tolerated.   Continue Effexor-XR/venlafaxine  mg daily for anxiety/mood.  Continue clonazepam 0.5-1 mg twice daily for severe anxiety.  Okay to decrease the dose if feeling too tired or sedated during the day. (SEND THIS RX TO [facility])  Strongly recommend continuing with PHP/[facility].  Follow up with me in 1 month.  For scheduling needs you can call [phone].  You could also get a message to the nurses by calling the aforementioned phone  number.  Continue all other cares per primary care provider.   Continue all other medications as reviewed per electronic medical record today.   Safety plan reviewed. To the Emergency Department as needed or call after hours crisis line at [phone] or [phone]. [place] Crisis Text Line. Text [place] to 853554 or Suicide LifeLine Chat: suicidepreventionlifeline.org/chat  Follow up with primary care provider as planned or for acute medical concerns.  MyChart may be used to communicate with your provider, but this is not intended to be used for emergencies.    Risks of benzodiazepine (Ativan, Xanax, Klonopin, Valium, etc) use including, but not limited to, sedation, tolerance, risk for addiction/dependence. Do not drink alcohol while taking benzodiazepines due to risk of trouble breathing and potential death. Do not drive or operate heavy machinery until it is known how the drug affects you. Discuss with physician or pharmacist before ever taking a benzodiazepine with a narcotic/opioid pain medication.     Administrative Billing:   Phone Call/Video Duration: 13 Minutes  Start: 11:38a  Stop: 11:51a    Patient Status:  Patient will continue to be seen in collaborative care for stabilization.    The longitudinal plan of care for the diagnosis(es)/condition(s) as documented were addressed during this visit. Due to the added complexity in care, I will continue to support Marbella in the subsequent management and with ongoing continuity of care.    Episode of Care #: 4 (New episode of care started 9/11/24)    Signed:   Christi Lewis DO  [facility]    Disclaimer: This note consists of symbols derived from keyboarding, dictation and/or voice recognition software. As a result, there may be errors in the script that have gone undetected. Please consider this when interpreting information found in this chart.

## 2024-10-30 ENCOUNTER — HOSPITAL ENCOUNTER (OUTPATIENT)
Dept: BEHAVIORAL HEALTH | Facility: CLINIC | Age: 59
Discharge: HOME | End: 2024-10-30
Attending: PSYCHIATRY & NEUROLOGY
Payer: COMMERCIAL

## 2024-10-30 DIAGNOSIS — F33.2 SEVERE EPISODE OF RECURRENT MAJOR DEPRESSIVE DISORDER, WITHOUT PSYCHOTIC FEATURES (H): Primary | ICD-10-CM

## 2024-10-30 PROCEDURE — 90853 GROUP PSYCHOTHERAPY: CPT

## 2024-10-30 NOTE — GROUP NOTE
Process Group Note    PATIENT'S NAME: Marbella Hendrix  MRN:   [id number]  :   1965  ACCT. NUMBER: [id number]  DATE OF SERVICE: 10/30/24  START TIME:  1:00 PM  END TIME:  1:50 PM  FACILITATOR: Melvina Hebert [facility]  TOPIC:  Process Group    Diagnoses:  Severe episode of recurrent major depressive disorder, without psychotic features (H)    Generalized anxiety disorder          [facility] Outpatient Programs  TRACK: [facility] 6     NUMBER OF PARTICIPANTS: 3        Data:    Session content: At the start of this group, patients were invited to check in by identifying themselves, describing their current emotional status, and identifying issues to address in this group.   Area(s) of treatment focus addressed in this session included Symptom Management, Personal Safety, and Community Resources/Discharge Planning.  Patient reported thinking about the death of her beloved cat. Patient still struggles with his death. Patient reported feelings of sadness. Patient reported a goal of wanting to stay present. Patient reported no concerns with safety or suicide ideation.    Therapeutic Interventions/Treatment Strategies:  Treatment modalities used include Cognitive Behavioral Therapy and Dialectical Behavioral Therapy.    Assessment:    Patient response:   Patient responded to session by accepting feedback, giving feedback, and listening    Possible barriers to participation / learning include: severity of symptoms    Health Issues:   None reported       Substance Use Review:   Substance Use: No active concerns identified.    Mental Status/Behavioral Observations  Appearance:   Appropriate   Eye Contact:   Good   Psychomotor Behavior: Normal   Attitude:   Cooperative   Orientation:   All  Speech   Rate / Production: Normal    Volume:  Normal   Mood:    Sad   Affect:    Tearful  Thought Content:   Clear  Thought Form:  Coherent  Logical     Insight:    Good     Plan:   Safety Plan: No  current safety concerns identified.  Recommended that patient call 911 or go to the local ED should there be a change in any of these risk factors.   Barriers to treatment: None identified  Patient Contracts (see media tab):  None  Substance Use: Not addressed in session   Continue or Discharge: Patient will continue in Adult Day Treatment (ADT)  as planned. Patient is likely to benefit from learning and using skills as they work toward the goals identified in their treatment plan.      Melvina Stroud, MaineGeneral Medical CenterSW  October 30, 2024

## 2024-10-30 NOTE — GROUP NOTE
Psychoeducation Group Note    PATIENT'S NAME: Marbella Hendrix  MRN:   [id number]  :   1965  ACCT. NUMBER: [id number]  DATE OF SERVICE: 10/30/24  START TIME:  2:00 PM  END TIME:  2:50 PM  FACILITATOR: Melvina Hebert LICSW  TOPIC: MH Wellness Group: Mental Health Maintenance  [facility] Mental [facility] Outpatient Programs  TRACK: IOP 6     NUMBER OF PARTICIPANTS: 3    Summary of Group / Topics Discussed:  Mental Health Maintenance:  Stigma: In this group patients explored stigma surrounding a mental health diagnosis.  The group discussed the way stigma impacts their own life, and discussed strategies to reduce. The relationship between physical and mental health were also explored in the context of healthcare access, treatment, and support.    Patient Session Goals / Objectives:  Patients identified the importance of practicing emotional hygiene  Patients identified ways to decrease the  impact of stigma in their own life      Patient Participation / Response:  Fully participated with the group by sharing personal reflections / insights and openly received / provided feedback with other participants.    Demonstrated understanding of topics discussed through group discussion and participation    Treatment Plan:  Patient has a current master individualized treatment plan.  See Epic treatment plan for more information.    ZUHAIR Sequeira

## 2024-10-31 NOTE — GROUP NOTE
Psychotherapy Group Note    PATIENT'S NAME: Marbella Hendrix  MRN:   [id number]  :   1965  ACCT. NUMBER: [id number]  DATE OF SERVICE: 10/30/24  START TIME:  3:00 PM  END TIME:  3:50 PM  FACILITATOR: John Stubbs LPC  TOPIC: MH EBP Group: Symptom Awareness  [facility] Mental [facility] Outpatient Programs  TRACK: IOP6  NUMBER OF PARTICIPANTS: 3    Summary of Group / Topics Discussed:  Symptom Awareness: Polyvagul Theory. Patient examined and discussed vagus nerve, window of tolerance and how this impacts and affects mental health.    Patient Session Goals / Objectives:  Identified patient individual symptoms and experiences  Identified potential symptom patterns and factors that contribute to changes in symptom severity    Patient Participation / Response:  Fully participated with the group by sharing personal reflections / insights and openly received / provided feedback with other participants.    Demonstrated understanding of topics discussed through group discussion and participation, Demonstrated understanding of how information regarding symptoms can assist in management of symptoms, Identified / Expressed personal readiness to increase awareness of symptoms and apply skills as necessary, Verbalized understanding of how awareness can benefit mental health symptoms , Identified plan to address barriers to increase awareness and knowledge about diagnoses and symptoms , and Practiced skills in session    Treatment Plan:  Patient has a current master individualized treatment plan.  See Epic treatment plan for more information.    John Stubbs LPC

## 2024-11-01 ENCOUNTER — HOSPITAL ENCOUNTER (OUTPATIENT)
Dept: BEHAVIORAL HEALTH | Facility: CLINIC | Age: 59
Discharge: HOME | End: 2024-11-01
Attending: PSYCHIATRY & NEUROLOGY
Payer: COMMERCIAL

## 2024-11-01 DIAGNOSIS — F33.2 SEVERE EPISODE OF RECURRENT MAJOR DEPRESSIVE DISORDER, WITHOUT PSYCHOTIC FEATURES (H): Primary | ICD-10-CM

## 2024-11-01 PROCEDURE — 90853 GROUP PSYCHOTHERAPY: CPT

## 2024-11-01 NOTE — GROUP NOTE
Psychotherapy Group Note    PATIENT'S NAME: Marbella eHndrix  MRN:   [id number]  :   1965  ACCT. NUMBER: [id number]  DATE OF SERVICE: 24  START TIME:  2:00 PM  END TIME:  2:50 PM  FACILITATOR: Melvina Hebert LICSW  TOPIC: MH EBP Group: Specialty [facility] Mental [facility] Outpatient Programs  TRACK: IOP 6     NUMBER OF PARTICIPANTS: 3    Summary of Group / Topics Discussed:  Specialty Topics: Goal Setting: Provided education and assessment on patient's group programming goals. Evaluated patient's assessment of their ability to participate in groups, share emotions with group members, and openness to the group format. Provided education regarding appropriate individual-based group therapy goals.     Patient Session Goals and Objectives:  -Participate in reflective assessment of group readiness.  -Understand appropriate topics and methods to discuss those topics in group programming.  -Identify and problem solve barriers to group participation.  -Identify strategies to actively cope while engaging in group programming.   -Reflected up individualized treatment plans  -Meet with members of the treatment team to assess goal progress       Patient Participation / Response:  Fully participated with the group by sharing personal reflections / insights and openly received / provided feedback with other participants.    Demonstrated understanding of topics discussed through group discussion and participation    Treatment Plan:  Patient has a current master individualized treatment plan.  See Epic treatment plan for more information.    ZUHAIR Sequeira

## 2024-11-01 NOTE — GROUP NOTE
Psychoeducation Group Note    PATIENT'S NAME: Marbella Hendrix  MRN:   [id number]  :   1965  ACCT. NUMBER: [id number]  DATE OF SERVICE: 24  START TIME:  3:00 PM  END TIME:  3:50 PM  FACILITATOR: Melvina Hebert LICSW  TOPIC:  Wellness Group: Brain Health  [facility] Mental Health Outpatient Programs  TRACK: IOP 6     NUMBER OF PARTICIPANTS: 3    Summary of Group / Topics Discussed:  Brain Health:  Laughter for health: Effects of humor on various outcomes such as stress, health and immune function are well documented by empirical research, and therefore commonly accepted. Patients were educated on the results of this research and the health benefits of laughter was discussed. Patients were encourages to identify methods of increasing laughter in their life and then discussed these ideas within the group.      Patient Session Goals / Objectives:  Explained the health benefits that come from laughter  Identified one to two ways to increase laughter or opportunities for laughter in daily life      Patient Participation / Response:  Fully participated with the group by sharing personal reflections / insights and openly received / provided feedback with other participants.    Demonstrated understanding of topics discussed through group discussion and participation    Treatment Plan:  Patient has a current master individualized treatment plan.  See Epic treatment plan for more information.    ZUHAIR Sequeira

## 2024-11-01 NOTE — GROUP NOTE
Process Group Note    PATIENT'S NAME: Marbella Hendrix  MRN:   [id number]  :   1965  ACCT. NUMBER: [id number]  DATE OF SERVICE: 24  START TIME:  1:00 PM  END TIME:  1:50 PM  FACILITATOR: Melvina Hebert [facility]  TOPIC:  Process Group    Diagnoses:  Severe episode of recurrent major depressive disorder, without psychotic features (H)    Generalized anxiety disorder       [facility] Mental Health Outpatient Programs  TRACK: IOP 6     NUMBER OF PARTICIPANTS: 3        Data:    Session content: At the start of this group, patients were invited to check in by identifying themselves, describing their current emotional status, and identifying issues to address in this group.   Area(s) of treatment focus addressed in this session included Symptom Management, Personal Safety, and Community Resources/Discharge Planning.  Marbella reported feeling okay, and sad. Patient reported taking her son trick or treating and they went to all their neighbors. Patient reported grieving the fact that her child is not normal. Patient reported worrying about homelessness.     Therapeutic Interventions/Treatment Strategies:  Treatment modalities used include Cognitive Behavioral Therapy and Dialectical Behavioral Therapy.    Assessment:    Patient response:   Patient responded to session by accepting feedback, giving feedback, and listening    Possible barriers to participation / learning include: severity of symptoms    Health Issues:   None reported       Substance Use Review:   Substance Use: No active concerns identified.    Mental Status/Behavioral Observations  Appearance:   Appropriate   Eye Contact:   Good   Psychomotor Behavior: Normal   Attitude:   Cooperative   Orientation:   All  Speech   Rate / Production: Normal    Volume:  Normal   Mood:    Normal  Affect:    Appropriate   Thought Content:   Clear  Thought Form:  Coherent  Logical     Insight:    Good     Plan:   Safety Plan: No current safety  concerns identified.  Recommended that patient call 911 or go to the local ED should there be a change in any of these risk factors.   Barriers to treatment: None identified  Patient Contracts (see media tab):  None  Substance Use: Not addressed in session   Continue or Discharge: Patient will continue in Adult Day Treatment (ADT)  as planned. Patient is likely to benefit from learning and using skills as they work toward the goals identified in their treatment plan.      Melvina Stroud, Cary Medical CenterSW  November 1, 2024

## 2024-11-04 ENCOUNTER — HOSPITAL ENCOUNTER (OUTPATIENT)
Dept: BEHAVIORAL HEALTH | Facility: CLINIC | Age: 59
Discharge: HOME | End: 2024-11-04
Attending: PSYCHIATRY & NEUROLOGY
Payer: COMMERCIAL

## 2024-11-04 DIAGNOSIS — F33.2 SEVERE EPISODE OF RECURRENT MAJOR DEPRESSIVE DISORDER, WITHOUT PSYCHOTIC FEATURES (H): Primary | ICD-10-CM

## 2024-11-04 PROCEDURE — 90832 PSYTX W PT 30 MINUTES: CPT

## 2024-11-04 PROCEDURE — 90853 GROUP PSYCHOTHERAPY: CPT | Performed by: SOCIAL WORKER

## 2024-11-04 NOTE — PROGRESS NOTES
"    Progress Note    Patient Name: Marbella Hendrix      Date: 11/04/2024  Service Type: Individual    Session Start Time: 1:15pm  Session End Time: 1:45pm  Session Length: 30min  Track: IOP-6    DATA    Current Stressors/Issues:  Patient stated, \"Coming over here to group and not being able to have group. I would rather do something else with my time. I am okay joining the other groups. I would try it. I am not afraid of strangers, or whatever. I just miss my cat and I miss my son. I think he is doing okay. I am doing jimbo crappy with the transition.\" Patient added, \"I am just feeling sad. I want it to work. I want to feel better. But I feel like it is breaking apart and it is just kind of feeling like it is sort of the end of the program for me. This is my fourth week in trying this and I just want to get better. So, if it just ended this week it would be...I don't know what I would do. It would just be really sad.\"    Treatment Objective(s) Addressed in This Session:  Patient elaborated on coping with stressors and stated, \"I have done a lot of crying and trying to be happy for him. I know he is happy to be gone and doing something interesting to better himself. And I need to stop being selfish about it. Plus I have another kid at home who doesn't know what to do, so I have to worry about him. I worry about him a lot. I also like to exercise a lot. I do it all. I do something different every day. This morning it was at home. I have a little gym in my basement that I made during COVID. Tomorrow I will go to the gym. I have Pilates. Just stuff like that. Different things each day.\"    Progress on Treatment Objective(s) / Homework:  Satisfactory progress - ACTION (Actively working towards change); Intervened by reinforcing change plan / affirming steps taken  Patient added, \"Things are getting a hair better than before I would say.\"    Therapeutic Interventions/Treatment Strategies:  Support, Redirection, " "Feedback, Limit/Boundaries, Safety Assessments, Structured Activity, Problem Solving, Clarification, Education, and Cognitive Behavioral Therapy    Response to Treatment Strategies:  Accepted Feedback, Gave Feedback, Listened, Focused on Goals, Attentive, Accepted Support, and Alert    Changes in Health Issues:  None reported    Chemical Use Review:  Substance Use: No substance use concerns reported / identified    ASSESSMENT    Current Emotional / Mental Status (status of significant symptoms):  Risk status (Self / Other harm or suicidal ideation)  Patient denies a history of suicide attempts, self-injurious behavior, homicidal ideation, homicidal behavior, and and other safety concerns  Patient stated, \"I have thought about suicide in the past, but that's all.\" Patient denied any current safety concerns today.  Patient denies current fears or concerns for personal safety.  Patient denies current or recent suicidal ideation or behaviors.  Patient denies current or recent homicidal ideation or behaviors.  Patient denies current or recent self injurious behavior or ideation.  Patient denies other safety concerns.  A safety and risk management plan has been developed including: Patient consented to co-developed safety plan.  A safety and risk management plan was completed.  Patient agreed to use safety plan should any safety concerns arise.  A copy was given to the patient.    Appearance:   Appropriate   Eye Contact:   Good   Psychomotor Behavior: Normal   Attitude:   Cooperative   Orientation:   All  Speech   Rate / Production: Normal    Volume:  Normal   Mood:    Normal  Affect:    Appropriate   Thought Content:  Clear   Thought Form:  Coherent  Logical   Insight:    Good     Assessments completed:  The following assessments were completed by patient for this visit:  N/A      Principal Diagnoses:  296.33 (F33.2) Major Depressive Disorder, Recurrent Episode, Severe and With anxious distress  300.02 (F41.1) " Generalized Anxiety Disorder.  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    Plan: (Homework, other):  Patient will merge with alternative IOP group today.  Patient also shared, post groups, patient will relax through means of showering, putting on pajamas, and relaxing.  Patient has a current master individualized treatment plan.  See Epic treatment plan for more information.                           Patient has reviewed and agreed to the above plan.    John Stubbs MS, LPC, NCC  November 4, 2024

## 2024-11-04 NOTE — GROUP NOTE
Psychoeducation Group Note    PATIENT'S NAME: Marbella Hendrix  MRN:   [id number]  :   1965  ACCT. NUMBER: [id number]  DATE OF SERVICE: 24  START TIME:  3:00 PM  END TIME:  3:50 PM  FACILITATOR: Minna Ambrocio LICSW; Chhaya Myrick RN  TOPIC:  Wellness Group: Brain Health  [facility] Mental Health Outpatient Programs  TRACK: IOP/DT 3 and IOP 6    NUMBER OF PARTICIPANTS: 7 from IOP/DT 3 and 2 from IOP 6    Summary of Group / Topics Discussed:  Brain Health:  Pathophysiology of stress and anxiety: Patients were educated on the difference between stress, chronic stress, and anxiety. The anatomy and pathophysiology of the body/brain were reviewed to illustrate the immediate effects of stress/anxiety in the body and the long term effects and increased risks for chronic disease that come from unmanaged stress/anxiety. Self-coping strategies to manage symptoms of stress were reviewed and pharmacologic, psychotherapeutic, and complementary treatment options were discussed.    Patient Session Goals / Objectives:  Described the differences between stress and anxiety and how the body responds to it  Listed the long term effects and increased risks for chronic disease that can arise from unmanaged stress/anxiety  Identified and described pharmacologic, psychotherapeutic, and complementary treatment options      Patient Participation / Response:  Fully participated with the group by sharing personal reflections / insights and openly received / provided feedback with other participants.    Demonstrated understanding of topics discussed through group discussion and participation and Verbalized understanding of brain health topic    Treatment Plan:  Patient has a current master individualized treatment plan.  See Epic treatment plan for more information.    ZUHAIR Angela

## 2024-11-05 ENCOUNTER — HOSPITAL ENCOUNTER (OUTPATIENT)
Dept: BEHAVIORAL HEALTH | Facility: CLINIC | Age: 59
Discharge: HOME | End: 2024-11-05
Attending: PSYCHIATRY & NEUROLOGY
Payer: COMMERCIAL

## 2024-11-05 PROCEDURE — 90853 GROUP PSYCHOTHERAPY: CPT

## 2024-11-05 PROCEDURE — 90853 GROUP PSYCHOTHERAPY: CPT | Performed by: SOCIAL WORKER

## 2024-11-05 NOTE — GROUP NOTE
Psychotherapy Group Note    PATIENT'S NAME: Marbella Hendrix  MRN:   [id number]  :   1965  ACCT. NUMBER: [id number]  DATE OF SERVICE: 24  START TIME:  3:00 PM  END TIME:  3:50 PM  FACILITATOR: Vivian Chapman LSW  TOPIC: MH EBP Group: Coping Skills  [facility] Adult Mental Health Outpatient Programs  TRACK: IOP 3     NUMBER OF PARTICIPANTS: 8    Summary of Group / Topics Discussed:  Coping Skills: Building Positive Experiences: Patients discussed the importance of planning and engaging in positive experiences, as strategies to increase positive thinking, hope, and self-worth.  Explored the benefits of planning / creating positive experiences, including recognizing and reducing negativity bias by focusing on and building positive experiences.   Several approaches to building positive experiences were presented and discussed relevant to each patient.      Patient Session Goals / Objectives:  Understand the purpose of planning / creating / participating / sharing in positive experiences.  Explore patient s experiences related to negative thinking and how it influences activities and moodIdentify current positive events in patient s life.   Set goals to increase a variety of positive experiences.  Address barriers to planning / engaging in positive experiences.      Patient Participation / Response:  Fully participated with the group by sharing personal reflections / insights and openly received / provided feedback with other participants.    Demonstrated understanding of topics discussed through group discussion and participation, Expressed understanding of the relevance / importance of coping skills at distressing times in life, Demonstrated knowledge of when to consider using a variety of coping skills in daily life, and Identified barriers to applying coping skills    Treatment Plan:  Patient has a current master individualized treatment plan.  See Epic treatment plan for more  information.    GILMAR Corrigan

## 2024-11-06 ENCOUNTER — HOSPITAL ENCOUNTER (OUTPATIENT)
Dept: BEHAVIORAL HEALTH | Facility: CLINIC | Age: 59
Discharge: HOME | End: 2024-11-06
Attending: PSYCHIATRY & NEUROLOGY
Payer: COMMERCIAL

## 2024-11-06 ENCOUNTER — TELEPHONE (OUTPATIENT)
Dept: BEHAVIORAL HEALTH | Facility: CLINIC | Age: 59
End: 2024-11-06
Payer: COMMERCIAL

## 2024-11-06 DIAGNOSIS — F33.2 SEVERE EPISODE OF RECURRENT MAJOR DEPRESSIVE DISORDER, WITHOUT PSYCHOTIC FEATURES (H): Primary | ICD-10-CM

## 2024-11-06 PROCEDURE — 90853 GROUP PSYCHOTHERAPY: CPT

## 2024-11-06 PROCEDURE — 90853 GROUP PSYCHOTHERAPY: CPT | Performed by: SOCIAL WORKER

## 2024-11-06 NOTE — GROUP NOTE
Psychoeducation Group Note    PATIENT'S NAME: Marbella Hendrix  MRN:   [id number]  :   1965  ACCT. NUMBER: [id number]  DATE OF SERVICE: 24  START TIME:  3:00 PM  END TIME:  3:50 PM  FACILITATOR: Minna Ambrocio LICSW; Violette Modi OTR  TOPIC: MH Life Skills Group: Resiliency Development  [facility] Mental [facility] Outpatient Programs  TRACK: IOP 5    NUMBER OF PARTICIPANTS: 7    Summary of Group / Topics Discussed:  Resiliency Development:  Resiliency Development: ABCs of Coping: Reviewed signs of stress including physical changes, behavior changes, and changes to thoughts and emotions.  Provided education on the benefits of developing a robust coping plan to help manage distress and regulate emotions.  Discussed the importance of having easy access to this plan in times of increased symptoms.  Brainstormed with group members to identify several coping skills for each letter of the alphabet in order to develop a list that includes variety and depth.  Prompted patients to identify at least one new skill from the list they are willing to try.         Patient Session Goals / Objectives:   Review the signs and symptoms of stress and establish a need for stress management strategies.   Identify benefits of having a robust coping plan to help manage distress and regulate emotions.   Develop a list of coping skills to promote self-regulation.   Establish a plan for practice of these skills in their own environments.       Patient Participation / Response:  Fully participated with the group by sharing personal reflections / insights and openly received / provided feedback with other participants.    Verbalized understanding of content    Treatment Plan:  Patient has a current master individualized treatment plan.  See Epic treatment plan for more information.    ZUHAIR Angela

## 2024-11-06 NOTE — GROUP NOTE
Psychotherapy Group Note    PATIENT'S NAME: Marbella Hendrix  MRN:   [id number]  :   1965  ACCT. NUMBER: [id number]  DATE OF SERVICE: 24  START TIME:  2:00 PM  END TIME:  2:50 PM  FACILITATOR: Vivian Chapman LSW  TOPIC: MH EBP Group: Coping Skills  [facility] Adult Mental Health Outpatient Programs  TRACK: Iop/DT 3 55+    NUMBER OF PARTICIPANTS: 7    Summary of Group / Topics Discussed:  Coping Skills: Additional Coping Skills:  Patients discussed and practiced humor.  Reviewed the benefits of applying humor as a coping strategies.  Patients explored how humor strategies might be applied to daily stressors or distressing situations.    Patient Session Goals / Objectives:  Understand the purpose and benefits of applying humor coping strategies  Identified that humor can be used to promote healing, build connections, and support mental health  Reflected on how humor plays a role in daily life.  Address barriers to utilizing coping skills when in distress.      Patient Participation / Response:  Minimally participated, only when prompted / asked.    Demonstrated understanding of topics discussed through group discussion and participation    Treatment Plan:  Patient has a current master individualized treatment plan.  See Epic treatment plan for more information.    GILMAR Corrigan

## 2024-11-06 NOTE — TELEPHONE ENCOUNTER
"----- Message from Srinivas THOMASON sent at 11/6/2024  1:28 PM CST -----  Regarding: FW: Transfer  Please cancel current IOP6 appts and make new appts for IOP/DT3 starting today    Scheduling Request    Patient Name: Marbella Hendrix  Location of programming: Day Tx  Start Date: November / 06 / 2024  Group: [id number] on Monday, Wednesday, Thursday, and Friday at 1:00 PM to 4:00 PM  Attending Provider (MD): Moris Toscano  Duration of Appointment in minutes: 180  Visit Type: In-person or Treatment - 870  ----- Message -----  From: Melvina Hebert, LICSW  Sent: 11/6/2024   1:14 PM CST  To: [facility]; John Stubbs [facility]; #  Subject: Transfer                                         TRANSFER SBAR NOTE  Adult  Outpatient Programs          SITUATION:     Admission Date: 11/6/2024    Patient name:  Marbella Hendrix  Preferred name: Marbella She/Her/Hers/Herself 59 year old  Diagnosis/-es (copy from , including ICD-10):   296.33 (F33.2) Major Depressive Disorder, Recurrent Episode, Severe _ and With anxious distress  300.02 (F41.1) Generalized Anxiety Disorder.     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      New assigned Program/Track: IOP/DT 3 55+  Is this a new Level of Care? no  NOTE: LOC: PHP, IOP/Day Tx, \"Clinic\". If yes, LOCUS and Discharge Summary required.     Reviewed patient's schedule and informed them of any variation due to late days (PHP) or Holidays. yes  Does the patient have any planned absences and/or barriers to admission/treatment? no  NOTE: impact of transportation, technology, childcare, work, or housing concerns.    Insurance: Payor: [employer] / Plan: [facility] CHOICE / Product Type: Indemnity /  Changes/Concerns: no      BACKGROUND:     Patient's stated goal/reason for treatment (copy from DA or Treatment plan; confirm with patient): \"\"get back to my old self\"\"    Provide " additional information regarding clinical reasons and goals identified for continued care into next program or level of care? Patient is dealing with grief loss of a cat, and wanting her son to be a functioning adult.       ASSESSMENT:     Please consult  if any of the following concerns may impact admission/participation in program.    Is there a Safety Plan? yes    Safety status/concerns: None      New Information since original Admission SBAR:  Continued Substance Use Status concerns: no  Ongoing Pertinent Medical/Nutritional concerns: no  Any concerns related to use of telehealth, ROIs, e-KEM, emergency contacts: no      Does patient have FMLA or Short-Term Disability requests/plans? no  NOTE: Whenever possible, FMLA or Short-Term Disability paperwork needs to be managed/completed by the patient's community provider.   Exceptions: Patient does not have a community provider AND request is specific to mental health and time off for the duration of the program participation.    Notify RN Triage as soon as possible.     Care Providers/Medication Management Needs:   Is Dr. Toscano providing care?  yes  Is Rishi Dang providing care? yes  Is there a need (requirement or for continued patient care) for this to continue in next program/level of care: yes    See information listed in Discharge Instructions or Original Admission Note related to Community or other MH providers.      Provide any additional background information pertinent for continued care (areas of focus and useful approaches): Patient is a delight, and is motivated to work on symptom management.         RECOMMENDATIONS:     Patient Transfer Completed: yes    Care Team, referrals made/needed: no  PCP: Vanessa Ladd  NOTE: Notify RN, as needed, to make internal referrals.                                                             Completed by: Melvina Stroud [facility]

## 2024-11-06 NOTE — GROUP NOTE
Process Group Note    PATIENT'S NAME: Marbella Hendrix  MRN:   [id number]  :   1965  ACCT. NUMBER: [id number]  DATE OF SERVICE: 24  START TIME:  1:00 PM  END TIME:  1:50 PM  FACILITATOR: Nakia Miller [facility]  TOPIC: MH Process Group    Diagnoses:  296.33 (F33.2) Major Depressive Disorder, Recurrent Episode, Severe _ and With anxious distress  300.02 (F41.1) Generalized Anxiety Disorder.       [facility] Adult Mental Health Outpatient Programs  TRACK: IOP3    NUMBER OF PARTICIPANTS: 6        Data:    Session content: At the start of this group, patients were invited to check in by identifying themselves, describing their current emotional status, and identifying issues to address in this group.   Area(s) of treatment focus addressed in this session included Symptom Management, Personal Safety, and Community Resources/Discharge Planning.  Client reported feeling anxious.   She joined this group today as her group did not have enough members.. Their goal is attend group and listen..  Skills they will use are decreasing media use as election night is tonight.  Barriers are anxiety. Client denied suicidal ideation. Client is taking their medications as planned.  Client denied chemical use.  Therapeutic Interventions/Treatment Strategies:  Psychotherapist offered support, feedback and validation and reinforced use of skills. Treatment modalities used include Cognitive Behavioral Therapy. Interventions include Behavioral Activation: Explored how behaviors effect mood and interact with thoughts and feelings.    Assessment:    Patient response:   Patient responded to session by listening and focusing on goals    Possible barriers to participation / learning include: and no barriers identified    Health Issues:   None reported       Substance Use Review:   Substance Use: No active concerns identified.    Mental Status/Behavioral Observations  Appearance:   Appropriate   Eye Contact:   Good    Psychomotor Behavior: Normal   Attitude:   Cooperative  Interested  Orientation:   All  Speech   Rate / Production: Normal    Volume:  Normal   Mood:    Anxious  Depressed   Affect:    Appropriate   Thought Content:   Clear  Thought Form:  Coherent  Logical     Insight:    Good     Plan:   Safety Plan: No current safety concerns identified.  Recommended that patient call 911 or go to the local ED should there be a change in any of these risk factors.   Barriers to treatment: None identified  Patient Contracts (see media tab):  None  Substance Use: Not addressed in session   Continue or Discharge: Patient will continue in Adult Day Treatment (ADT)  as planned. Patient is likely to benefit from learning and using skills as they work toward the goals identified in their treatment plan.      Nakia Miller, [facility]  November 6, 2024

## 2024-11-06 NOTE — GROUP NOTE
"Process Group Note    PATIENT'S NAME: Marbella Hendrix  MRN:   [id number]  :   1965  ACCT. NUMBER: [id number]  DATE OF SERVICE: 24  START TIME:  1:00 PM  END TIME:  1:50 PM  FACILITATOR: Mary Winters LICSW  TOPIC:  Process Group    Diagnoses:  296.33 (F33.2) Major Depressive Disorder, Recurrent Episode, Severe _ and With anxious distress  300.02 (F41.1) Generalized Anxiety Disorder.    [facility] Adult Mental Health Outpatient Programs  TRACK: IOP/DT 3 55+    NUMBER OF PARTICIPANTS: 7        Data:    Session content: At the start of this group, patients were invited to check in by identifying themselves, describing their current emotional status, and identifying issues to address in this group.   Area(s) of treatment focus addressed in this session included Symptom Management, Personal Safety, and Community Resources/Discharge Planning.  Reported mood is \"sad, nervous, anxious, overwhelmed, empty\".  Main stressor is the results of the election and resulting fear for her LGBTQ and BIPOC peers. She is interested in engaging in advocacy work.  Client denied safety concerns, including SI and SIB. Client denies any chemical use.  Client is taking medications as prescribed. Client's goal is \"take it hour by hour\".  Client is grateful for attending yoga this morning. Peers offered supportive feedback and validation.      Therapeutic Interventions/Treatment Strategies:  Psychotherapist offered support, feedback and validation and reinforced use of skills. Treatment modalities used include Cognitive Behavioral Therapy and Dialectical Behavioral Therapy. Interventions include Mindfulness: Facilitated discussion of when/how to use mindfulness skills to benefit general health, mental health symptoms, and stressors and Other: Explored with patient how life transitions may impact mental health and functioning .    Assessment:    Patient response:   Patient responded to session by accepting feedback, " giving feedback, listening, focusing on goals, being attentive, and accepting support    Possible barriers to participation / learning include: and no barriers identified    Health Issues:   None reported       Substance Use Review:   Substance Use: No active concerns identified.    Mental Status/Behavioral Observations  Appearance:   Appropriate   Eye Contact:   Good   Psychomotor Behavior: Normal   Attitude:   Cooperative  Interested Friendly Pleasant  Orientation:   All  Speech   Rate / Production: Normal    Volume:  Normal   Mood:    Anxious  Sad  Overwhelmed Empty  Affect:    Tearful  Thought Content:   Clear and Safety denies any current safety concerns including suicidal ideation, self-harm, and homicidal ideation  Thought Form:  Coherent  Logical     Insight:    Good     Plan:   Safety Plan: No current safety concerns identified.  Recommended that patient call 911 or go to the local ED should there be a change in any of these risk factors.   Barriers to treatment: None identified  Patient Contracts (see media tab):  None  Substance Use: Not addressed in session   Continue or Discharge: Patient will continue in 55+ Program (55+) as planned. Patient is likely to benefit from learning and using skills as they work toward the goals identified in their treatment plan.      Mary Winters, IKESW  November 6, 2024

## 2024-11-06 NOTE — GROUP NOTE
Psychotherapy Group Note    PATIENT'S NAME: Marbella Hendrix  MRN:   [id number]  :   1965  ACCT. NUMBER: [id number]  DATE OF SERVICE: 24  START TIME:  2:00 PM  END TIME:  2:50 PM  FACILITATOR: Nakia Miller LICSW  TOPIC:  EBP Group: Self-Awareness  [facility] Mental Health Outpatient Programs  TRACK: IOP3    NUMBER OF PARTICIPANTS: 6    Summary of Group / Topics Discussed:  Self-Awareness: Grief: Patients were provided with an overview of how personal losses impact their thoughts, feelings, and behaviors. Different stages of grief were discussed, with a focus on the personal and individual experiences of grief as a natural response to loss. The relationship between grief, depression, and anxiety was also discussed. Patients were provided with information regarding different ways of processing grief and shared their personal experiences.     Patient Session Goals / Objectives:  Defined and explored the concept of grief and the grieving process  Discussed relationship between grief, depression, and anxiety   Normalized and recognized the purpose/benefits of the grieving process  Discussed management of the thoughts and feelings associated with grief      Patient Participation / Response:  Fully participated with the group by sharing personal reflections / insights and openly received / provided feedback with other participants.    Identified / Expressed readiness to act intentionally, increase self-compassion, promote personal growth    Treatment Plan:  Patient has a current master individualized treatment plan.  See Epic treatment plan for more information.    ZUHAIR Geronimo

## 2024-11-07 ENCOUNTER — TELEPHONE (OUTPATIENT)
Dept: BEHAVIORAL HEALTH | Facility: CLINIC | Age: 59
End: 2024-11-07
Payer: COMMERCIAL

## 2024-11-07 ENCOUNTER — HOSPITAL ENCOUNTER (OUTPATIENT)
Dept: BEHAVIORAL HEALTH | Facility: CLINIC | Age: 59
Discharge: HOME | End: 2024-11-07
Attending: PSYCHIATRY & NEUROLOGY
Payer: COMMERCIAL

## 2024-11-07 DIAGNOSIS — F33.2 SEVERE EPISODE OF RECURRENT MAJOR DEPRESSIVE DISORDER, WITHOUT PSYCHOTIC FEATURES (H): Primary | ICD-10-CM

## 2024-11-07 PROCEDURE — 90853 GROUP PSYCHOTHERAPY: CPT

## 2024-11-07 NOTE — GROUP NOTE
Psychotherapy Group Note    PATIENT'S NAME: Marbella Hendrix  MRN:   [id number]  :   1965  ACCT. NUMBER: [id number]  DATE OF SERVICE: 24  START TIME:  2:00 PM  END TIME:  2:50 PM  FACILITATOR: Mary Winters LICSW  TOPIC: MH EBP Group: Emotions Management  [facility] Mental Health Outpatient Programs  TRACK: IOP/DT 3 55+    NUMBER OF PARTICIPANTS: 7    Summary of Group / Topics Discussed:  Emotions Management: Model of Emotions: Patients were introduced to the cyclical model of emotions.  Explored emotions are shaped by different events and one s interpretation of events.  Group discussed how emotions begin with an event, followed by one s interpretation, followed by associated feelings.  Discussion included a review of personal urges and actions that can/do follow an emotional experience in the patient s life, and the end results.    Patient Session Goals / Objectives:  Demonstrate understanding of types various emotions.  Identify and discuss specific emotions and when they occur; understand triggers.  Identify individual emotions and physical sensations that accompany them.  Discuss urges and actions, and how to influence the intensity of emotional reactions and disrupt the cycle.    Discuss barriers to emotional regulation.  Choose 1-2 strategies to assist with emotional response to potentially distressing situations.      Patient Participation / Response:  Minimally participated, only when prompted / asked.    Demonstrated understanding of topics discussed through group discussion and participation    Treatment Plan:  Patient has a current master individualized treatment plan.  See Epic treatment plan for more information.    ZUHAIR Mosher

## 2024-11-07 NOTE — GROUP NOTE
Psychoeducation Group Note    PATIENT'S NAME: Marbella Hendrix  MRN:   [id number]  :   1965  ACCT. NUMBER: [id number]  DATE OF SERVICE: 24  START TIME:  3:00 PM  END TIME:  3:50 PM  FACILITATOR: Vivian Chapman LSW; Chhaya Myrick RN  TOPIC:  Wellness Group: Brain Health  [facility] Mental Health Outpatient Programs  TRACK: IOP/DT 3 55+    NUMBER OF PARTICIPANTS: 6    Summary of Group / Topics Discussed:  Brain Health:  Pathophysiology of stress and anxiety: Patients were educated on the difference between stress, chronic stress, and anxiety. The anatomy and pathophysiology of the body/brain were reviewed to illustrate the immediate effects of stress/anxiety in the body and the long term effects and increased risks for chronic disease that come from unmanaged stress/anxiety. Self-coping strategies to manage symptoms of stress were reviewed and pharmacologic, psychotherapeutic, and complementary treatment options were discussed.    Patient Session Goals / Objectives:  Described the differences between stress and anxiety and how the body responds to it  Listed the long term effects and increased risks for chronic disease that can arise from unmanaged stress/anxiety  Identified and described pharmacologic, psychotherapeutic, and complementary treatment options      Patient Participation / Response:  Minimally participated, only when prompted / asked.    Demonstrated understanding of topics discussed through group discussion and participation    Treatment Plan:  Patient has a current master individualized treatment plan.  See Epic treatment plan for more information.    GILMAR Corrigan

## 2024-11-07 NOTE — GROUP NOTE
"Process Group Note    PATIENT'S NAME: Marbella Hendrix  MRN:   [id number]  :   1965  ACCT. NUMBER: [id number]  DATE OF SERVICE: 24  START TIME:  1:00 PM  END TIME:  1:50 PM  FACILITATOR: Mary Winters LICSW  TOPIC:  Process Group    Diagnoses:  296.33 (F33.2) Major Depressive Disorder, Recurrent Episode, Severe _ and With anxious distress  300.02 (F41.1) Generalized Anxiety Disorder.    [facility] Adult Mental Health Outpatient Programs  TRACK: IOP/DT 3 55+    NUMBER OF PARTICIPANTS: 7        Data:    Session content: At the start of this group, patients were invited to check in by identifying themselves, describing their current emotional status, and identifying issues to address in this group.   Area(s) of treatment focus addressed in this session included Symptom Management, Personal Safety, and Community Resources/Discharge Planning.  Reported mood is \"unsure\".  She feels \"fortunate\" but expressed fear for those who will be directly impacted by the election results. She is \"preparing myself mentally\" to \"watch people suffering\".  She noted fears around deportations and women not having access to healthcare.  Writer offered redirection as she started to discuss in detail stories of deportation and suicide.  Client denied safety concerns, including SI and SIB. Client denies any chemical use.  Client is taking medications as prescribed. Client's goal is \"stop being anxious and sad\".  When writer asked what she wanted instead she said \"happy but I'm not sure that's gonna happen\".  Client reported plans to use the following coping skills: \"be positive\", gratitude, \"I need time to get used to it\", avoid watching TV. Client talked about a gratitude group she is a part of.  Peers offered supportive feedback and validation.    Therapeutic Interventions/Treatment Strategies:  Psychotherapist offered support, feedback and validation and reinforced use of skills. Treatment modalities used include " Cognitive Behavioral Therapy and Dialectical Behavioral Therapy. Interventions include Cognitive Restructuring:  Explored impact of ineffective thoughts / distortions on mood and activity and Assisted patient in formulating new neutral/positive alternatives to challenge less helpful / ineffective thoughts and Coping Skills: Reviewed patients current calming practices and discussed a more formal way of practicing and accessing skills.    Assessment:    Patient response:   Patient responded to session by accepting feedback, listening, accepting support, and appearing distracted    Possible barriers to participation / learning include: and no barriers identified    Health Issues:   None reported       Substance Use Review:   Substance Use: No active concerns identified.    Mental Status/Behavioral Observations  Appearance:   Appropriate   Eye Contact:   Good   Psychomotor Behavior: Normal   Attitude:   Cooperative  Guarded   Orientation:   All  Speech   Rate / Production: Normal/ Responsive Normal    Volume:  Normal   Mood:    Anxious  Irritable  Sad   Affect:    Subdued   Thought Content:   Clear and Safety denies any current safety concerns including suicidal ideation, self-harm, and homicidal ideation  Thought Form:  Coherent  Logical     Insight:    Good     Plan:   Safety Plan: No current safety concerns identified.  Recommended that patient call 911 or go to the local ED should there be a change in any of these risk factors.   Barriers to treatment: None identified  Patient Contracts (see media tab):  None  Substance Use: Not addressed in session   Continue or Discharge: Patient will continue in 55+ Program (55+) as planned. Patient is likely to benefit from learning and using skills as they work toward the goals identified in their treatment plan.      ZUHAIR Mosher  November 7, 2024

## 2024-11-07 NOTE — TELEPHONE ENCOUNTER
----- Message from Sriinvas THOMASON sent at 11/7/2024  9:23 AM CST -----  Regarding: please remake appt  Scheduling Request    Patient Name: Marbella Hendrix  Location of programming: Day Tx  Start Date: November / 07 / 2024  Group: [id number] on Monday, Wednesday, Thursday, and Friday at 1:00 PM to 4:00 PM  Attending Provider (MD): Moris Toscano  Duration of Appointment in minutes: 180  Visit Type: In-person or Treatment - 870    Additional notes: Please remake appt for today; pt cancelled throught Intake

## 2024-11-11 ENCOUNTER — HOSPITAL ENCOUNTER (OUTPATIENT)
Dept: BEHAVIORAL HEALTH | Facility: CLINIC | Age: 59
Discharge: HOME | End: 2024-11-11
Attending: PSYCHIATRY & NEUROLOGY
Payer: COMMERCIAL

## 2024-11-11 DIAGNOSIS — F33.2 SEVERE EPISODE OF RECURRENT MAJOR DEPRESSIVE DISORDER, WITHOUT PSYCHOTIC FEATURES (H): Primary | ICD-10-CM

## 2024-11-11 DIAGNOSIS — F41.9 ANXIETY: ICD-10-CM

## 2024-11-11 PROCEDURE — 90853 GROUP PSYCHOTHERAPY: CPT

## 2024-11-11 NOTE — GROUP NOTE
Psychoeducation Group Note    PATIENT'S NAME: Marbella Hendrix  MRN:   [id number]  :   1965  ACCT. NUMBER: [id number]  DATE OF SERVICE: 24  START TIME:  3:00 PM  END TIME:  3:50 PM  FACILITATOR: John Stubbs LPC; Chhaya Myrick RN  TOPIC:  Wellness Group: Mind/Body Practice & Complementary  [facility] Adult Mental Health Outpatient Programs  TRACK: [facility]/-3 55+    NUMBER OF PARTICIPANTS: 7    Summary of Group / Topics Discussed:  Mind/Body Practice & Complementary Therapies:  Complementary Therapies: Patients were educated on a variety of complementary therapies that can be used in conjunction with psychotherapy, pharmacotherapy, & other treatments or independently to promote holistic wellness and symptom reduction. Complementary therapies addressed included: acupuncture, chiropractic, meditation, aromatherapy, massage, spiritual/energy healing, yoga & other mind/body practices.    Patient Session Goals / Objectives:  Defined what a complementary therapy is and why it can be beneficial in conjunction or independently of other treatment modalities   Explained how the various complementary therapies are practiced and what are the expected effects  Practiced complementary therapy experiential       Patient Participation / Response:  Moderately participated, sharing some personal reflections / insights and adequately adequately received / provided feedback with other participants.    Demonstrated understanding of topics discussed through group discussion and participation, Identified / Expressed personal readiness to practice skills, and Verbalized understanding of Mind/Body Practice & Complementary Therapies topic    Treatment Plan:  Patient has a current master individualized treatment plan.  See Epic treatment plan for more information.    John Stubbs LPC

## 2024-11-11 NOTE — GROUP NOTE
"Process Group Note    PATIENT'S NAME: Marbella Hendrix  MRN:   [id number]  :   1965  ACCT. NUMBER: [id number]  DATE OF SERVICE: 24  START TIME:  1:00 PM  END TIME:  1:50 PM  FACILITATOR: Mary Winters LICSW  TOPIC:  Process Group    Diagnoses:  296.33 (F33.2) Major Depressive Disorder, Recurrent Episode, Severe _ and With anxious distress  300.02 (F41.1) Generalized Anxiety Disorder.    [facility] Adult Mental Health Outpatient Programs  TRACK: IOP/DT 3 55+    NUMBER OF PARTICIPANTS: 7        Data:    Session content: At the start of this group, patients were invited to check in by identifying themselves, describing their current emotional status, and identifying issues to address in this group.   Area(s) of treatment focus addressed in this session included Symptom Management, Personal Safety, and Community Resources/Discharge Planning.  Reported mood is \"tired, gross\".  She reports getting no sleep last night despite taking two prescribed sleep medications and practicing meditation and grounding. Overall she has noticed improved sleep (no sleep now occurs once every 2-3 weeks instead of every other day).  She has a sleep study scheduled for January.   Client denied safety concerns, including SI and SIB. Client's goal is get sleep tonight.  Client reported plans to use the following coping skills: \"be kind to myself, exercise, that's my only constant\", gratitude. She was open to the idea of exercising later in the day to promote sleep. She declined further processing time.    Therapeutic Interventions/Treatment Strategies:  Psychotherapist offered support, feedback and validation and reinforced use of skills. Treatment modalities used include Cognitive Behavioral Therapy and Dialectical Behavioral Therapy. Interventions include Coping Skills: Discussed use of self-soothe skills to decrease distress in the body, Discussed how the use of intentional \"in the moment\" actions can help reduce " distress, Promoted understanding of how and when to apply grounding strategies to reduce distress and increase presence in the moment, Discussed meditation skills and addressed ways to implement meditation skills , and Addressed barriers to utilizing coping skills when in distress and Symptoms Management: Promoted understanding of their diagnoses and how it impacts their functioning.    Assessment:    Patient response:   Patient responded to session by accepting feedback, giving feedback, listening, focusing on goals, being attentive, and accepting support    Possible barriers to participation / learning include: and no barriers identified    Health Issues:   Yes: Sleep disturbance, Associated Psychological Distress       Substance Use Review:   Substance Use: No active concerns identified.    Mental Status/Behavioral Observations  Appearance:   Appropriate   Eye Contact:   Good   Psychomotor Behavior: Normal   Attitude:   Cooperative  Interested Friendly Pleasant  Orientation:   All  Speech   Rate / Production: Normal    Volume:  Normal   Mood:    Depressed   Affect:    Lethargic   Thought Content:   Clear and Safety denies any current safety concerns including suicidal ideation, self-harm, and homicidal ideation  Thought Form:  Coherent  Logical     Insight:    Good     Plan:   Safety Plan: No current safety concerns identified.  Recommended that patient call 911 or go to the local ED should there be a change in any of these risk factors.   Barriers to treatment: None identified  Patient Contracts (see media tab):  None  Substance Use: Not addressed in session   Continue or Discharge: Patient will continue in 55+ Program (55+) as planned. Patient is likely to benefit from learning and using skills as they work toward the goals identified in their treatment plan.      ZUHAIR Mosher  November 11, 2024

## 2024-11-11 NOTE — PROGRESS NOTES
"[facility]   Adult Mental Health Outpatient Programs  Psychiatric Progress Record    Program Track: IOP/DT 3 55+    PATIENT'S NAME: Marbella Hendrix  MRN:   [id number]  :   1965  ACCT. NUMBER: [id number]  DATE OF SERVICE: 24    Interval History:  \"ok.\" Marbella presents today for follow-up and ongoing program supervision.   Endorses:  I am just doing ok, I am doing a little better  I was not able to sleep last night, so I am very tired. It happens all times, like once a week  Anxiety and depression are better. Still having some anxiety and depression though. I am able to control anxiety better this   I met Dr Lewis once since we last met. She put me on Buspar, today is 6th day. I am taking a half, soon I will start taking a whole pill. No side effects  I take Clonazepam at be time for sleep  I am not having a much as SI.     Review of Symptoms  Sleep: see above. Inability to fall asleep on certain days  Appetite: no problem  Suicidal ideation: has passive suicidal thoughts described as not a much  Thoughts of non-suicidal self-injury: denied  Recent self-injurious behavior: denied  Homicidal ideation: denied  Other safety concerns: denied    Activities of Daily Living and Related Systems Impacted by Illness:  Hygiene: I can to that  Socialization: ok  Activities of Daily Living: (cleaning, shopping, bills, etc.): I can do that  Concerns related to work: no    Substance use:  Denies    Response to Program Interventions:  Some aspect are helpful, some another are very bring. Support each others and learning form peers experience.   Worksheet are very boring.    Medications:  Current Outpatient Medications   Medication Sig Dispense Refill    acetaminophen (TYLENOL) 500 MG tablet Take 500-1,000 mg by mouth every 6 hours as needed for mild pain.      busPIRone (BUSPAR) 10 MG tablet Take 1 tablet (10 mg) by mouth 2 times daily. Start with half tablet twice daily for first week. 180 tablet 0    calcium " "carbonate (OS-ARA) 500 MG tablet Take 1 tablet by mouth daily.      cholecalciferol (VITAMIN D3) 25 mcg (1000 units) capsule Take 1 capsule by mouth daily.      clonazePAM (KLONOPIN) 1 MG tablet Take half to full tab by mouth twice daily as needed for anxiety, sleep. 60 tabs to last 30 days. 60 tablet 1    levothyroxine (SYNTHROID/LEVOTHROID) 125 MCG tablet Take 1 tablet (125 mcg) by mouth daily 90 tablet 1    melatonin 5 MG tablet Take 5 mg by mouth nightly as needed for sleep.      naproxen sodium 220 MG capsule Take 220 mg by mouth 2 times daily (with meals).      Omega-3 Fatty Acids (OMEGA-3 FISH OIL PO) Take 1 g by mouth daily      venlafaxine (EFFEXOR XR) 150 MG 24 hr capsule Take 1 capsule (150 mg) by mouth daily 90 capsule 1    atorvastatin (LIPITOR) 20 MG tablet Take 1 tablet (20 mg) by mouth daily. 90 tablet 2    hydrOXYzine HCl (ATARAX) 25 MG tablet Take 0.5-1 tablets (12.5-25 mg) by mouth 2 times daily as needed for anxiety. (Patient not taking: Reported on 11/11/2024) 120 tablet 0       The above list was reviewed and updated in [facility] with patient today.     Patient is taking medications as prescribed and denies adverse effects    Laboratory Results:  Most recent labs reviewed. Pertinent updates/findings: None.     Mental Status Examination:  Vital Signs: LMP  (LMP Unknown)    Appearance: adequately groomed, appears stated age, and in no apparent distress.  Attitude: cooperative   Eye Contact: good   Muscle Strength and Tone: normal  Psychomotor Behavior: normal or unremarkable   Gait and Station: normal width, turn, arm swing  Speech: clear, coherent, decreased prosody, regular rate, regular rhythm, and fluent  Associations: No loosening of associations  Thought Process: coherent and goal directed  Thought Content: no evidence of suicidal ideation or homicidal ideation, no evidence of psychotic thought, no auditory hallucinations present, and no visual hallucinations present  Mood: \"anxious and " "depressed\"  Affect: mood congruent  Insight: good  Judgment: intact, adequate for safety  Impulse Control: intact  Oriented to: time, place, person, and situation  Attention Span and Concentration: Normal  Language: Intact  Recent and Remote Memory: Delayed & immediate recall intact  Fund of Knowledge/Assessment of Intelligence: Average  Capacity of Activities of Daily Living: Independent, able to participate in programmatic care services.    Diagnosis/es:    ICD-10-CM    1. Severe episode of recurrent major depressive disorder, without psychotic features (H)  F33.2       2. Anxiety  F41.9         Assessment/Plan:  Marbella presents today for follow-up psychiatric evaluation and assessment of progress. Overall, she reported an encouraging trend of improvement, although sleep remains a persistent source of stress. She is compliant with medication and recently met with Dr. Lewis, where no adjustments were made to her current medications.  The risk of requiring  higher level of care and safety remains elevated. To mitigate these risks, she will continue IOP for psychotherapy and medication management with Dr. Lewis   A follow-up appointment is scheduled in 3 weeks, with the option for an earlier visit if needed.    Anxiety  Overall improved   Engage in psychotherapy  Continue to work with outpatient provider for medication management  Continue with current medication regimen  Effexor 150 mg daily  Clonazepam 0.5 mg twice daily   Melatonin 5 mg daily  Improve sleep hygiene  Maintain a balanced diet  Engage in physical activities as tolerated     Depression  Overall improved  As noted above  Medication as above    Safety Assessment:  Marbella reports suicidal ideation and/or non-suicidal self-injury or thoughts thereof as noted above  Marbella is future-oriented and is engaged in treatment planning   I do not feel that Marbella meets criteria for a 72-hour involuntary hold and remains appropriate for an outpatient level of " demetris NIXON DNP on 11/11/2024 at 3:03 PM    Visit Details:  Type of service: In-person  Location (patient and provider): [facility] Adult [facility] Outpatient Programmatic Care Offices    Level of Medical Decision Making:   - At least 1 chronic problem that is not stable  - Engaged in prescription drug management during visit (discussed any medication benefits, side effects, alternatives, etc.)  Discussion of management or test interpretation with external physician/other qualified healthcare professional/appropriate source - programmatic care multidisciplinary treatment team    30 min spent on the date of the encounter in chart review, patient visit, review of tests, documentation, care coordination, and/or discussion with other providers about the issues documented above.      This document completed in part using [employer] dictation software and therefore may contain inadvertent word or phrase substitutions.

## 2024-11-11 NOTE — GROUP NOTE
Psychoeducation Group Note    PATIENT'S NAME: Marbella Hendrix  MRN:   [id number]  :   1965  ACCT. NUMBER: [id number]  DATE OF SERVICE: 24  START TIME:  2:00 PM  END TIME:  2:50 PM  FACILITATOR: Mary Winters LICSW  TOPIC: MH Life Skills Group: Lifestyle Balance and Structure  [facility] Adult Mental Health Outpatient Programs  TRACK: IOP/DT 3 55+    NUMBER OF PARTICIPANTS: 6    Summary of Group / Topics Discussed:  Lifestyle Balance and Strucure:  Goal-setting & integration: Patients were introduced to the process and benefits of setting realistic, timely, and achievable goals to help support their ability to follow through with meaningful personal, health, recovery and treatment goals that they would like to achieve to improve overall functioning.  Patients were also taught and then practiced techniques to manage a variety of obstacles to achieve their goals and how to break goals into manageable pieces.  Patients also reported on follow through of goals.     Patient Session Goals / Objectives:  Facilitated the creation of a vision for recovery and wellbeing  Identified and wrote meaningful SMART goals to engage in meaningful activities of daily living   Identified and problem solved barriers to achieving goals   Identified plan to support follow through on goals and reflection on progress made      Patient Participation / Response:  Fully participated with the group by sharing personal reflections / insights and openly received / provided feedback with other participants.    Verbalized understanding of content and Patient made progress towards meeting ITP goal number 5&6    Treatment Plan:  Patient has a current master individualized treatment plan.  See Epic treatment plan for more information.    ZUHAIR Mosher

## 2024-11-12 ENCOUNTER — MYC MEDICAL ADVICE (OUTPATIENT)
Dept: PSYCHIATRY | Facility: CLINIC | Age: 59
End: 2024-11-12
Payer: COMMERCIAL

## 2024-11-12 DIAGNOSIS — G47.00 INSOMNIA, UNSPECIFIED TYPE: ICD-10-CM

## 2024-11-12 DIAGNOSIS — R73.09 ELEVATED HEMOGLOBIN A1C: Primary | ICD-10-CM

## 2024-11-12 DIAGNOSIS — T88.7XXA MEDICATION SIDE EFFECTS: ICD-10-CM

## 2024-11-13 ENCOUNTER — HOSPITAL ENCOUNTER (OUTPATIENT)
Dept: BEHAVIORAL HEALTH | Facility: CLINIC | Age: 59
End: 2024-11-13
Attending: PSYCHIATRY & NEUROLOGY
Payer: COMMERCIAL

## 2024-11-13 DIAGNOSIS — F33.2 SEVERE EPISODE OF RECURRENT MAJOR DEPRESSIVE DISORDER, WITHOUT PSYCHOTIC FEATURES (H): Primary | ICD-10-CM

## 2024-11-13 PROCEDURE — 90853 GROUP PSYCHOTHERAPY: CPT

## 2024-11-13 NOTE — GROUP NOTE
Psychoeducation Group Note    PATIENT'S NAME: Marbella Hendrix  MRN:   [id number]  :   1965  ACCT. NUMBER: [id number]  DATE OF SERVICE: 24  START TIME:  3:00 PM  END TIME:  3:50 PM  FACILITATOR: Vivian Chapman LSW; Enrique Harris OTR  TOPIC: MH Life Skills Group: Lifestyle Balance and Structure  [facility] Adult Mental Health Outpatient Programs  TRACK: IOP/DT 3 55+    NUMBER OF PARTICIPANTS: 6    Summary of Group / Topics Discussed:  Lifestyle Balance and Structure: Patients were introduced to the importance of leisure, self-care, productivity, and social participation in support of mental health management by exploring a balanced lifestyle.  Patients identified how they spend their time and skills to bring this into better balance.  Patients identified a self-care, productive, leisure, and social participation activity that they would like to complete this week. Patients practiced the skill of planning to identify when and where they could complete these activities. Patients shared with their peers their plans for the week. Validation, support, and guidance provided throughout group.     Patient Session Goals / Objectives:  Discussed the importance of life balance, structure, and routine.    Identified and planned 4 activities in the areas of leisure, productivity, social participation and self-care.   Self-reflected on the benefits of structure and routine in their life.      Patient Participation / Response:  Fully participated with the group by sharing personal reflections / insights and openly received / provided feedback with other participants.    Demonstrated understanding of content through giving examples of leisure, productivity, self-care, and social participation activities.     Treatment Plan:  Patient has a current master individualized treatment plan.  See Epic treatment plan for more information.    GILMAR Corrigan

## 2024-11-13 NOTE — GROUP NOTE
Psychotherapy Group Note    PATIENT'S NAME: Marbella Hendrix  MRN:   [id number]  :   1965  ACCT. NUMBER: [id number]  DATE OF SERVICE: 24  START TIME:  2:00 PM  END TIME:  2:50 PM  FACILITATOR: Mary Winters LICSW  TOPIC: MH EBP Group: Cognitive Restructuring  [facility] Adult Mental Health Outpatient Programs  TRACK: IOP/DT 3 55+    NUMBER OF PARTICIPANTS: 6    Summary of Group / Topics Discussed:  Cognitive Restructuring: Distortions: Patients received an overview of how to identify common cognitive distortions. Patients will explore alternatives to cognitive distortions and practice challenging their negative thought patterns. The goal is to help patients target modify ineffective thought patterns.     Patient Session Goals / Objectives:  Familiarized self with ineffective / unhealthy thoughts and how they develop.    Explored impact of ineffective thoughts / distortions on mood and activity  Formulated new neutral/positive alternatives to challenge less helpful / ineffective thoughts.  Practiced and plan to apply in daily life             Patient Participation / Response:  Minimally participated, only when prompted / asked.    Demonstrated understanding of topics discussed through group discussion and participation, Expressed understanding of the relationship between behaviors, thoughts, and feelings, Demonstrated knowledge of personal thought patterns and how they impact their mood and behavior., and Identified barriers to changing thought patterns    Treatment Plan:  Patient has a current master individualized treatment plan.  See Epic treatment plan for more information.    ZUHAIR Mosher

## 2024-11-14 ENCOUNTER — HOSPITAL ENCOUNTER (OUTPATIENT)
Dept: BEHAVIORAL HEALTH | Facility: CLINIC | Age: 59
End: 2024-11-14
Attending: PSYCHIATRY & NEUROLOGY
Payer: COMMERCIAL

## 2024-11-14 DIAGNOSIS — F33.2 SEVERE EPISODE OF RECURRENT MAJOR DEPRESSIVE DISORDER, WITHOUT PSYCHOTIC FEATURES (H): Primary | ICD-10-CM

## 2024-11-14 PROCEDURE — 90853 GROUP PSYCHOTHERAPY: CPT

## 2024-11-14 RX ORDER — METFORMIN HYDROCHLORIDE 500 MG/1
1000 TABLET, EXTENDED RELEASE ORAL
Qty: 180 TABLET | Refills: 1 | Status: SHIPPED | OUTPATIENT
Start: 2024-11-14

## 2024-11-14 NOTE — GROUP NOTE
Psychoeducation Group Note    PATIENT'S NAME: Marbella Hendrix  MRN:   [id number]  :   1965  ACCT. NUMBER: [id number]  DATE OF SERVICE: 24  START TIME:  3:00 PM  END TIME:  3:50 PM  FACILITATOR: Vivian Chapman LSW; Chhaya Myrick RN  TOPIC: MH Wellness Group: Mental Health Maintenance  [facility] Adult Mental Health Outpatient Programs  TRACK: IOP/DT 3    NUMBER OF PARTICIPANTS: 7    Summary of Group / Topics Discussed:  Mental Health Maintenance:  Social/Coping Bingo: Patients were educated on the importance of balance in meeting our wellness needs. Topic of social/coping was reviewed and patients participated in playing a verbal response style coping/social BINGO game. In this game, patients were challenged to utilize their understanding of themselves and their coping strategies to respond to the questions on their BINGO cards.    Patient Session Goals / Objectives:  Identified the importance of balance in wellness  Explained the important aspects of socialization/effective coping strategies  Listed ways of improving weak areas in social/coping skills      Patient Participation / Response:  Fully participated with the group by sharing personal reflections / insights and openly received / provided feedback with other participants.    Demonstrated understanding of topics discussed through group discussion and participation, Identified / Expressed personal readiness to practice skills, and Verbalized understanding of mental health maintenance topic    Treatment Plan:  Patient has a current master individualized treatment plan.  See Epic treatment plan for more information.    GILMAR Corrigan

## 2024-11-14 NOTE — GROUP NOTE
Psychotherapy Group Note    PATIENT'S NAME: Marbella Hendrix  MRN:   [id number]  :   1965  ACCT. NUMBER: [id number]  DATE OF SERVICE: 24  START TIME:  2:00 PM  END TIME:  2:50 PM  FACILITATOR: Mary Winters LICSW  TOPIC: MH EBP Group: Cognitive Restructuring  [facility] Adult Mental Health Outpatient Programs  TRACK: IOP/DT 3 55+    NUMBER OF PARTICIPANTS: 8    Summary of Group / Topics Discussed:  Cognitive Restructuring: Core Beliefs: Patients received an overview of what a core belief is, and how they develop. Patients then began to identify their negative core beliefs. Patients worked to modify core beliefs with the goal of improved self-image and functioning.     Patient Session Goals / Objectives:  Familiarize self with the concept of core beliefs and how they develop.    Explore personal core beliefs (positive and negative)  Develop / advance recognition of the connection between negative thoughts and negative core beliefs.  Formulate new neutral/positive core beliefs             Patient Participation / Response:  Minimally participated, only when prompted / asked.    Demonstrated understanding of topics discussed through group discussion and participation and Practiced skills in session    Treatment Plan:  Patient has a current master individualized treatment plan.  See Epic treatment plan for more information.    ZUHAIR Mosher

## 2024-11-14 NOTE — GROUP NOTE
"Process Group Note    PATIENT'S NAME: Marbella Hendrix  MRN:   [id number]  :   1965  ACCT. NUMBER: [id number]  DATE OF SERVICE: 24  START TIME:  1:00 PM  END TIME:  1:50 PM  FACILITATOR: Mary Winters LICSW  TOPIC:  Process Group    Diagnoses:  296.33 (F33.2) Major Depressive Disorder, Recurrent Episode, Severe _ and With anxious distress  300.02 (F41.1) Generalized Anxiety Disorder.    [facility] Adult Mental Health Outpatient Programs  TRACK: IOP/DT 3 55+    NUMBER OF PARTICIPANTS: 6        Data:    Session content: At the start of this group, patients were invited to check in by identifying themselves, describing their current emotional status, and identifying issues to address in this group.   Area(s) of treatment focus addressed in this session included Symptom Management, Personal Safety, and Community Resources/Discharge Planning.  Reported mood is \"anxious, angry\".  She continues to report interrupted sleep, though she was able to sleep last night by taking one of her 's muscle relaxers.  Writer discouraged this and outlined the risk of using non prescribed medications.   Client denied safety concerns, including SI and SIB. Client's goal is ask her psychiatrist sleep medication questions.  She had plans to MyChart message her provider on the break.  She was agreeable to a sleep psychology referral. Client reported plans to use the following coping skills: \"coming here\". Client is grateful for attending yoga this morning. Peers offered supportive feedback and validation.    Therapeutic Interventions/Treatment Strategies:  Psychotherapist offered support, feedback and validation and reinforced use of skills. Treatment modalities used include Cognitive Behavioral Therapy and Dialectical Behavioral Therapy. Interventions include Symptoms Management: Promoted understanding of their diagnoses and how it impacts their functioning.    Assessment:    Patient response:   Patient " responded to session by accepting feedback, listening, focusing on goals, and being attentive    Possible barriers to participation / learning include: and no barriers identified    Health Issues:   Yes: Sleep disturbance, Associated Psychological Distress       Substance Use Review:   Substance Use: No active concerns identified.    Mental Status/Behavioral Observations  Appearance:   Appropriate   Eye Contact:   Good   Psychomotor Behavior: Agitated   Attitude:   Cooperative  Interested  Orientation:   All  Speech   Rate / Production: Normal/ Responsive Normal    Volume:  Normal   Mood:    Angry  Anxious   Affect:    Flat   Thought Content:   Clear and Safety denies any current safety concerns including suicidal ideation, self-harm, and homicidal ideation  Thought Form:  Coherent  Logical     Insight:    Good     Plan:   Safety Plan: No current safety concerns identified.  Recommended that patient call 911 or go to the local ED should there be a change in any of these risk factors.   Barriers to treatment: None identified  Patient Contracts (see media tab):  None  Substance Use: Not addressed in session   Continue or Discharge: Patient will continue in 55+ Program (55+) as planned. Patient is likely to benefit from learning and using skills as they work toward the goals identified in their treatment plan.      Mary Winters, ZUHAIR  November 14, 2024

## 2024-11-14 NOTE — GROUP NOTE
"Process Group Note    PATIENT'S NAME: Marbella Hendrix  MRN:   [id number]  :   1965  ACCT. NUMBER: [id number]  DATE OF SERVICE: 24  START TIME:  1:00 PM  END TIME:  1:50 PM  FACILITATOR: Mary Winters LICSW  TOPIC:  Process Group    Diagnoses:  296.33 (F33.2) Major Depressive Disorder, Recurrent Episode, Severe _ and With anxious distress  300.02 (F41.1) Generalized Anxiety Disorder.    [facility] Outpatient [facility]  TRACK: IOP/DT 3 55+    NUMBER OF PARTICIPANTS: 8        Data:    Session content: At the start of this group, patients were invited to check in by identifying themselves, describing their current emotional status, and identifying issues to address in this group.   Area(s) of treatment focus addressed in this session included Symptom Management, Personal Safety, and Community Resources/Discharge Planning.  Reported mood is \"sad\".  She added, \"I have no reason to be sad. I'm feeling privileged.  I should be happy\".  Writer and peers validated and normalized her feelings and encouraged her to hold space for her sadness and gratitude.   Client denied safety concerns, including SI and SIB. Client reported plans to use the following coping skills: challenging cognitive distortions. She reported improved sleep last night after changing her medications.  She notices anxious worry about efficacy (\"it's going to stop working\"). She was receptive to labeling this as fortune telling and using cognitive reframing strategies. Peers offered supportive feedback and validation.    Therapeutic Interventions/Treatment Strategies:  Psychotherapist offered support, feedback and validation and reinforced use of skills. Treatment modalities used include Cognitive Behavioral Therapy and Dialectical Behavioral Therapy. Interventions include Cognitive Restructuring:  Explored impact of ineffective thoughts / distortions on mood and activity and Assisted patient in formulating new " neutral/positive alternatives to challenge less helpful / ineffective thoughts and Emotions Management:  Reinforced the purpose and biological basis of emotions and Discussed barriers to emotional regulation.    Assessment:    Patient response:   Patient responded to session by accepting feedback, giving feedback, listening, focusing on goals, being attentive, and accepting support    Possible barriers to participation / learning include: and no barriers identified    Health Issues:   None reported       Substance Use Review:   Substance Use: No active concerns identified.    Mental Status/Behavioral Observations  Appearance:   Appropriate   Eye Contact:   Good   Psychomotor Behavior: Normal   Attitude:   Cooperative  Interested Friendly Pleasant  Orientation:   All  Speech   Rate / Production: Normal    Volume:  Normal   Mood:    Anxious  Sad   Affect:    Flat   Thought Content:   Clear and Safety denies any current safety concerns including suicidal ideation, self-harm, and homicidal ideation  Thought Form:  Coherent  Logical     Insight:    Good     Plan:   Safety Plan: No current safety concerns identified.  Recommended that patient call 911 or go to the local ED should there be a change in any of these risk factors.   Barriers to treatment: None identified  Patient Contracts (see media tab):  None  Substance Use: Not addressed in session   Continue or Discharge: Patient will continue in 55+ Program (55+) as planned. Patient is likely to benefit from learning and using skills as they work toward the goals identified in their treatment plan.      Mary Winters, [facility]  November 14, 2024

## 2024-11-15 ENCOUNTER — HOSPITAL ENCOUNTER (OUTPATIENT)
Dept: BEHAVIORAL HEALTH | Facility: CLINIC | Age: 59
End: 2024-11-15
Attending: PSYCHIATRY & NEUROLOGY
Payer: COMMERCIAL

## 2024-11-15 PROCEDURE — 90853 GROUP PSYCHOTHERAPY: CPT

## 2024-11-15 RX ORDER — ESZOPICLONE 2 MG/1
2 TABLET, FILM COATED ORAL
Qty: 15 TABLET | Refills: 0 | Status: SHIPPED | OUTPATIENT
Start: 2024-11-15

## 2024-11-15 NOTE — GROUP NOTE
Psychotherapy Group Note    PATIENT'S NAME: Marbella Hendrix  MRN:   [id number]  :   1965  ACCT. NUMBER: [id number]  DATE OF SERVICE: 11/15/24  START TIME:  2:00 PM  END TIME:  2:50 PM  FACILITATOR: Mary Winters LICSW  TOPIC: MH EBP Group: Coping Skills  [facility] Mental [facility] Outpatient [facility]  TRACK: IOP/DT 3 55+    NUMBER OF PARTICIPANTS: 9    Summary of Group / Topics Discussed:  Coping Skills: Additional Coping Skills:  Patients discussed and practiced positive self-affirmations.  Reviewed the benefits of applying the aforementioned coping strategies.  Patients explored how these strategies might be applied to daily stressors or distressing situations.    Patient Session Goals / Objectives:  Understand the purpose and benefits of applying affirmation coping strategies  Created and practiced personal affirmations  Address barriers to utilizing coping skills when in distress.      Patient Participation / Response:  Fully participated with the group by sharing personal reflections / insights and openly received / provided feedback with other participants.    Demonstrated understanding of topics discussed through group discussion and participation, Expressed understanding of the relevance / importance of coping skills at distressing times in life, Demonstrated knowledge of when to consider using a variety of coping skills in daily life, Identified barriers to applying coping skills, Identified 2-3 positive coping strategies that have helped maintain / improve symptoms in the past, and Practiced 2-3 new coping skills in session    Treatment Plan:  Patient has a current master individualized treatment plan.  See Epic treatment plan for more information.    ZUHAIR Mosher

## 2024-11-15 NOTE — GROUP NOTE
"Process Group Note    PATIENT'S NAME: Marbella Hendrix  MRN:   [id number]  :   1965  ACCT. NUMBER: [id number]  DATE OF SERVICE: 11/15/24  START TIME:  1:00 PM  END TIME:  1:50 PM  FACILITATOR: Mary Winters LICSW  TOPIC:  Process Group    Diagnoses:  296.33 (F33.2) Major Depressive Disorder, Recurrent Episode, Severe _ and With anxious distress  300.02 (F41.1) Generalized Anxiety Disorder.    [facility] Outpatient [facility]  TRACK: IOP/DT 3 55+    NUMBER OF PARTICIPANTS: 9        Data:    Session content: At the start of this group, patients were invited to check in by identifying themselves, describing their current emotional status, and identifying issues to address in this group.   Area(s) of treatment focus addressed in this session included Symptom Management, Personal Safety, and Community Resources/Discharge Planning.  Reported mood is depressed. Affect was tearful.  She was unable to sleep last night and acknowledged related barriers to using coping skills. She followed up on the sleep clinic referral placed by writer and found out there was a 6 month wait. She tried a new exercise class with her friend this morning and left with thoughts of \"I'm not good, I'm a terrible human\".  Writer normalized the experience of not feeling like you're good at something new. Client denied safety concerns but noted passive thoughts of \"if a bus ran me over I'd be fine\".  Client's goal is \"not beat myself up\".  Client reported plans to use the following coping skills: opposite to emotion. Peers offered supportive feedback and validation.    Therapeutic Interventions/Treatment Strategies:  Psychotherapist offered support, feedback and validation and reinforced use of skills. Treatment modalities used include Cognitive Behavioral Therapy and Dialectical Behavioral Therapy. Interventions include Cognitive Restructuring:  Explored impact of ineffective thoughts / distortions on mood and " activity and Assisted patient in formulating new neutral/positive alternatives to challenge less helpful / ineffective thoughts and Emotions Management:  Reviewed opposite action skill.    Assessment:    Patient response:   Patient responded to session by accepting feedback, giving feedback, listening, focusing on goals, being attentive, and accepting support    Possible barriers to participation / learning include: and no barriers identified    Health Issues:   Yes: Sleep disturbance, Associated Psychological Distress       Substance Use Review:   Substance Use: No active concerns identified.    Mental Status/Behavioral Observations  Appearance:   Appropriate   Eye Contact:   Good   Psychomotor Behavior: Normal   Attitude:   Cooperative  Interested Friendly Pleasant  Orientation:   All  Speech   Rate / Production: Normal    Volume:  Normal   Mood:    Depressed   Affect:    Subdued  Tearful  Thought Content:   Clear and Safety reports  presence of suicidal ideation passive suicidal ideation   Thought Form:  Coherent  Logical     Insight:    Good     Plan:   Safety Plan: Committed to safety and agreed to follow previously developed safety coping plan.    Barriers to treatment: None identified  Patient Contracts (see media tab):  None  Substance Use: Not addressed in session   Continue or Discharge: Patient will continue in 55+ Program (55+) as planned. Patient is likely to benefit from learning and using skills as they work toward the goals identified in their treatment plan.      ZUHAIR Mosher  November 15, 2024

## 2024-11-15 NOTE — GROUP NOTE
Psychoeducation Group Note    PATIENT'S NAME: Marbella Hendrix  MRN:   [id number]  :   1965  ACCT. NUMBER: [id number]  DATE OF SERVICE: 11/15/24  START TIME:  3:00 PM  END TIME:  3:50 PM  FACILITATOR: Mai Johnson LICSW; Enrique Harris OTR  TOPIC: MH Life Skills Group: Communication and Social Skills Development  [facility] Outpatient [facility]  TRACK: IOP/DT 3    NUMBER OF PARTICIPANTS: 9    Summary of Group / Topics Discussed:  Communication and Social Skills Development: Social Participation: Facilitated discussion on the importance of social participation in their daily lives and identified areas that they are currently participating in socially. Patients discussed the benefits of social participation on their mental wellbeing and social values. Patients were provided with an opportunity to engage in a social leisure activity. Patients identified and discussed with peers and staff regarding their experience of practicing social skills (being assertive, setting boundaries, accepting positive feedback) and ways to stay connected with others. Validation, support, and feedback was provided throughout the group process.    Patient Session Goals / Objectives:   Identified strengths and opportunities for growth in the area of social participation.   Improved awareness of important aspects of social participation for mental wellbeing.   Engage with other peers for experiential learning of social leisure skills.   Practiced and reflected on how to generalize social participation skills in their everyday life.       Patient Participation / Response:  Fully participated with the group by sharing personal reflections / insights and openly received / provided feedback with other participants.    Verbalized understanding of content    Treatment Plan:  Patient has a current master individualized treatment plan.  See Epic treatment plan for more information.    ZUHAIR Trujillo

## 2024-11-18 ENCOUNTER — HOSPITAL ENCOUNTER (OUTPATIENT)
Dept: BEHAVIORAL HEALTH | Facility: CLINIC | Age: 59
End: 2024-11-18
Attending: PSYCHIATRY & NEUROLOGY
Payer: COMMERCIAL

## 2024-11-18 DIAGNOSIS — F33.2 SEVERE EPISODE OF RECURRENT MAJOR DEPRESSIVE DISORDER, WITHOUT PSYCHOTIC FEATURES (H): Primary | ICD-10-CM

## 2024-11-18 PROCEDURE — 90853 GROUP PSYCHOTHERAPY: CPT

## 2024-11-18 PROCEDURE — 90853 GROUP PSYCHOTHERAPY: CPT | Performed by: SOCIAL WORKER

## 2024-11-18 NOTE — GROUP NOTE
Psychotherapy Group Note    PATIENT'S NAME: Marbella Hendrix  MRN:   [id number]  :   1965  ACCT. NUMBER: [id number]  DATE OF SERVICE: 24  START TIME:  2:00 PM  END TIME:  2:50 PM  FACILITATOR: Mary Winters LICSW  TOPIC: MH EBP Group: Behavioral Activation  [facility] Mental [facility] Outpatient [facility]  TRACK: IOP/DT 3 55+    NUMBER OF PARTICIPANTS: 9    Summary of Group / Topics Discussed:  Behavioral Activation: The Change Process - Behavior Change: Patients explored the process and types of change, including but not limited to, theories of change, steps to making change, methods of changing behavior, and potential barriers.  Patients worked to identify what changes may benefit their daily lives, and work towards a plan to implement change.      Patient Session Goals / Objectives:  Demonstrate understanding of the change process.    Identify positive and negative behavioral patterns.  Make plans to track and implement changes and share experiences in group.  Identify personal barriers to change      Patient Participation / Response:  Moderately participated, sharing some personal reflections / insights and adequately adequately received / provided feedback with other participants.    Demonstrated understanding of topics discussed through group discussion and participation, Expressed understanding of the relationship between behaviors, thoughts, and feelings, Shared experiences and challenges with making behavioral changes, and Identified barriers to change    Treatment Plan:  Patient has a current master individualized treatment plan.  See Epic treatment plan for more information.    ZUHAIR Mosher

## 2024-11-18 NOTE — GROUP NOTE
Psychoeducation Group Note    PATIENT'S NAME: Marbella Hendrix  MRN:   [id number]  :   1965  ACCT. NUMBER: [id number]  DATE OF SERVICE: 24  START TIME:  3:00 PM  END TIME:  3:50 PM  FACILITATOR: [facility] Ambrocio [facility]; Chhaya Myrick RN  TOPIC: MH Wellness Group: Health Maintenance  [facility] Outpatient [facility]  TRACK: IOP/DT 3    NUMBER OF PARTICIPANTS: 9    Summary of Group / Topics Discussed:  Health Maintenance: Discharge planning/Community resources: Patients worked on completing an instructor-facilitated discharge planning activity. Discharge planning begins for all patients after admission. Competent discharge planning promotes a successful transition and decreases the likelihood of mental health relapse. In this group, all dimensions of wellness were reviewed to assess for needs/discharge readiness. These dimensions included: physical, emotional, occupational/productivity, environmental, social, spiritual, intellectual, and financial. Patients worked on completing/updating their discharge planning and identifying their treatment needs prior to time of discharge.     Patient Session Goals / Objectives:  Identified unmet treatment needs to accomplish before discharge  Completed all dimensions of the discharge planning packet  Participated in the planning process, make phone calls, set up appointments, got connected with community resources, followed up with treatment team as needed         Patient Participation / Response:  Fully participated with the group by sharing personal reflections / insights and openly received / provided feedback with other participants.    Demonstrated understanding of topics discussed through group discussion and participation and Verbalized understanding of health maintenance topic    Treatment Plan:  Patient has a current master individualized treatment plan.  See Epic treatment plan for more information.    Minna COHEN  IKE AmbrocioSW

## 2024-11-18 NOTE — GROUP NOTE
"Process Group Note    PATIENT'S NAME: Marbella Hendrix  MRN:   [id number]  :   1965  ACCT. NUMBER: [id number]  DATE OF SERVICE: 24  START TIME:  1:00 PM  END TIME:  1:50 PM  FACILITATOR: Mary Winters LICSW  TOPIC:  Process Group    Diagnoses:  296.33 (F33.2) Major Depressive Disorder, Recurrent Episode, Severe _ and With anxious distress  300.02 (F41.1) Generalized Anxiety Disorder.    [facility] Mental [facility] Outpatient [facility]  TRACK: IOP/DT 3 55+    NUMBER OF PARTICIPANTS: 9        Data:    Session content: At the start of this group, patients were invited to check in by identifying themselves, describing their current emotional status, and identifying issues to address in this group.   Area(s) of treatment focus addressed in this session included Symptom Management, Personal Safety, and Community Resources/Discharge Planning.  Reported mood is less depressed. She reports improved sleep Friday and Saturday.  She slept for 4 hours last night but is \"still pretty functional\".   Client denied safety concerns, including SI and SIB. Client's goal is \"get better permanently\".  Client reported plans to use the following coping skills: positive self-affirmations 5 times per day. Client is proud of herself for completing yard work, although she is sore.  She processed emotions around her sister in law staying with her while she finds stable housing.  She plans on discussing relevant boundaries with her  (e.g. would like her sister to be out of the house after 10 days). Peers offered supportive feedback and validation.    Therapeutic Interventions/Treatment Strategies:  Psychotherapist offered support, feedback and validation and reinforced use of skills. Treatment modalities used include Cognitive Behavioral Therapy and Dialectical Behavioral Therapy. Interventions include Cognitive Restructuring:  Explored impact of ineffective thoughts / distortions on mood and activity and " Assisted patient in formulating new neutral/positive alternatives to challenge less helpful / ineffective thoughts and Relationship Skills: Encouraged development and maintenance  of healthy boundaries.    Assessment:    Patient response:   Patient responded to session by accepting feedback, listening, being attentive, and accepting support    Possible barriers to participation / learning include: and no barriers identified    Health Issues:   Yes: Sleep disturbance, No Psychological Distress       Substance Use Review:   Substance Use: No active concerns identified.    Mental Status/Behavioral Observations  Appearance:   Appropriate   Eye Contact:   Good   Psychomotor Behavior: Normal   Attitude:   Cooperative  Interested Friendly Pleasant  Orientation:   All  Speech   Rate / Production: Normal    Volume:  Normal   Mood:    Depressed   Affect:    Appropriate   Thought Content:   Clear and Safety denies any current safety concerns including suicidal ideation, self-harm, and homicidal ideation  Thought Form:  Coherent  Logical     Insight:    Good     Plan:   Safety Plan: No current safety concerns identified.  Recommended that patient call 911 or go to the local ED should there be a change in any of these risk factors.   Barriers to treatment: None identified  Patient Contracts (see media tab):  None  Substance Use: Not addressed in session   Continue or Discharge: Patient will continue in 55+ Program (55+) as planned. Patient is likely to benefit from learning and using skills as they work toward the goals identified in their treatment plan.      Mary Winters, ZUHAIR  November 18, 2024

## 2024-11-20 ENCOUNTER — HOSPITAL ENCOUNTER (OUTPATIENT)
Dept: BEHAVIORAL HEALTH | Facility: CLINIC | Age: 59
End: 2024-11-20
Attending: PSYCHIATRY & NEUROLOGY
Payer: COMMERCIAL

## 2024-11-20 DIAGNOSIS — F33.2 SEVERE EPISODE OF RECURRENT MAJOR DEPRESSIVE DISORDER, WITHOUT PSYCHOTIC FEATURES (H): Primary | ICD-10-CM

## 2024-11-20 PROCEDURE — 90853 GROUP PSYCHOTHERAPY: CPT | Performed by: SOCIAL WORKER

## 2024-11-20 PROCEDURE — 90853 GROUP PSYCHOTHERAPY: CPT

## 2024-11-20 NOTE — GROUP NOTE
Psychoeducation Group Note    PATIENT'S NAME: Marbella Hendrix  MRN:   [id number]  :   1965  ACCT. NUMBER: [id number]  DATE OF SERVICE: 24  START TIME:  3:00 PM  END TIME:  3:50 PM  FACILITATOR: Minna Ambrocio LICSW; Enrique Harris OTR  TOPIC: MH PHP OT Group: Lifestyle Balance and Structure  [facility]  TRACK: IOP/DT 3    NUMBER OF PARTICIPANTS: 8    Summary of Group / Topics Discussed:  Lifestyle Balance and Structure:  Lifestyle Balance and Structure: Procrastination: Patients discussed the impact of procrastination on their daily function. Patients explored 15 different motivation strategies to improve engagement in daily life. Patients self-reflected on daily tasks they have been procrastinating and identified a motivation strategy they would like to try. Patients created a goal and the first step into decreasing procrastination and improving motivation in their daily life. Validation, support, and feedback were provided throughout group.      Patient Session Goals / Objectives:   Identify current patterns of procrastination behavior and how they influence thoughts and moods and inhibit motivation.   Identify behaviors that can be implemented that contribute to improving thoughts and feelings, motivation, and reduce symptoms.   Identify and develop a plan to increase activities that promote a sense of accomplishment and competence.   Practice scheduling positive activities / behaviors into daily routines.       Patient Participation / Response:  Fully participated with the group by sharing personal reflections / insights and openly received / provided feedback with other participants.    Verbalized understanding of content    Treatment Plan:  Patient has a current master individualized treatment plan.  See Epic treatment plan for more information.    ZUHAIR Angela

## 2024-11-20 NOTE — GROUP NOTE
Psychotherapy Group Note    PATIENT'S NAME: Marbella Hendrix  MRN:   [id number]  :   1965  ACCT. NUMBER: [id number]  DATE OF SERVICE: 24  START TIME:  2:00 PM  END TIME:  2:50 PM  FACILITATOR: Vivian Chapman LSW  TOPIC: MH EBP Group: DDP Relapse Prevention  [facility]  TRACK: IOP/DT 3    NUMBER OF PARTICIPANTS: 8    Summary of Group / Topics Discussed:  DDP Relapse Prevention: Stages of Change: Patients explored the process and types of change in relation to substance use, including but not limited to: theories of change, steps to making change, methods of changing behavior, and potential barriers to implementing change. Patients discussed their current and past experiences with managing change in relation to substance use and what stage of change they currently identify with. Patients identified what changes may benefit their daily lives and how they can work towards implementing change.    Patient Session Goals / Objectives:  Gained understanding of the change process  Identified positive and negative behavioral patterns  Made plans to track and implement changes and shared experiences in group  Identified personal barriers to change      Patient Participation / Response:  Fully participated with the group by sharing personal reflections / insights and openly received / provided feedback with other participants.    Demonstrated understanding of topics discussed through group discussion and participation, Demonstrated understanding of utilizing relapse prevention skills to manage urges and maintain sobriety, and Identified / Expressed personal readiness to utilize relapse prevention skills    Treatment Plan:  Patient has a current master individualized treatment plan.  See Epic treatment plan for more information.    GILMAR Corrigan    Cosentyx Counseling:  I discussed with the patient the risks of Cosentyx including but not limited to worsening of Crohn's disease, immunosuppression, allergic reactions and infections.  The patient understands that monitoring is required including a PPD at baseline and must alert us or the primary physician if symptoms of infection or other concerning signs are noted.

## 2024-11-20 NOTE — GROUP NOTE
"Process Group Note    PATIENT'S NAME: Marbella Hendrix  MRN:   [id number]  :   1965  ACCT. NUMBER: [id number]  DATE OF SERVICE: 24  START TIME:  1:00 PM  END TIME:  1:50 PM  FACILITATOR: Mary Winters LICSW  TOPIC:  Process Group    Diagnoses:  296.33 (F33.2) Major Depressive Disorder, Recurrent Episode, Severe _ and With anxious distress  300.02 (F41.1) Generalized Anxiety Disorder.    [facility]  TRACK: IOP/DT 3 55+    NUMBER OF PARTICIPANTS: 8        Data:    Session content: At the start of this group, patients were invited to check in by identifying themselves, describing their current emotional status, and identifying issues to address in this group.   Area(s) of treatment focus addressed in this session included Symptom Management, Personal Safety, and Community Resources/Discharge Planning.  Reported mood is less depressed, \"okay\".  She has gotten 5-6 hours of sleep the past three nights.   Client denied safety concerns, including SI and SIB. Client denies any chemical use.  Client is taking medications as prescribed. Client's goal is \"try to be positive about some things\".  Client reported plans to use the following coping skills: positive self-affirmations, meditation. Client asked for practical strategies of removing smoke smell from her son's bedroom.  This is in preparation for her sister in law to move out next Tuesday and her son to come home for the holidays. Client is proud of herself for exercising this morning. She denied further processing time.    Therapeutic Interventions/Treatment Strategies:  Psychotherapist offered support, feedback and validation and reinforced use of skills. Treatment modalities used include Cognitive Behavioral Therapy and Dialectical Behavioral Therapy. Interventions include Behavioral Activation: Encouraged strategies to reduce individual procrastination and increase motivation by increasing goal-directed " "activities to enhance mood and reduce symptoms., Cognitive Restructuring:  Explored impact of ineffective thoughts / distortions on mood and activity and Assisted patient in formulating new neutral/positive alternatives to challenge less helpful / ineffective thoughts, and Coping Skills: Discussed how the use of intentional \"in the moment\" actions can help reduce distress and Discussed meditation skills and addressed ways to implement meditation skills .    Assessment:    Patient response:   Patient responded to session by accepting feedback, giving feedback, listening, focusing on goals, being attentive, and accepting support    Possible barriers to participation / learning include: and no barriers identified    Health Issues:   None reported       Substance Use Review:   Substance Use: No active concerns identified.    Mental Status/Behavioral Observations  Appearance:   Appropriate   Eye Contact:   Good   Psychomotor Behavior: Normal   Attitude:   Cooperative  Interested Friendly Pleasant  Orientation:   All  Speech   Rate / Production: Normal    Volume:  Normal   Mood:    Depressed   Affect:    Appropriate   Thought Content:   Clear and Safety denies any current safety concerns including suicidal ideation, self-harm, and homicidal ideation  Thought Form:  Coherent  Logical     Insight:    Good     Plan:   Safety Plan: No current safety concerns identified.  Recommended that patient call 911 or go to the local ED should there be a change in any of these risk factors.   Barriers to treatment: None identified  Patient Contracts (see media tab):  None  Substance Use: Not addressed in session   Continue or Discharge: Patient will continue in 55+ Program (55+) as planned. Patient is likely to benefit from learning and using skills as they work toward the goals identified in their treatment plan.      Mary Winters, [facility]  November 20, 2024  "

## 2024-11-25 ENCOUNTER — HOSPITAL ENCOUNTER (OUTPATIENT)
Dept: BEHAVIORAL HEALTH | Facility: CLINIC | Age: 59
End: 2024-11-25
Attending: PSYCHIATRY & NEUROLOGY
Payer: COMMERCIAL

## 2024-11-25 DIAGNOSIS — F33.2 SEVERE EPISODE OF RECURRENT MAJOR DEPRESSIVE DISORDER, WITHOUT PSYCHOTIC FEATURES (H): Primary | ICD-10-CM

## 2024-11-25 PROCEDURE — 90853 GROUP PSYCHOTHERAPY: CPT | Performed by: SOCIAL WORKER

## 2024-11-25 PROCEDURE — 90853 GROUP PSYCHOTHERAPY: CPT

## 2024-11-25 NOTE — GROUP NOTE
Psychoeducation Group Note    PATIENT'S NAME: Marbella Hendrix  MRN:   [id number]  :   1965  ACCT. NUMBER: [id number]  DATE OF SERVICE: 24  START TIME:  3:00 PM  END TIME:  3:50 PM  FACILITATOR: Minna Ambrocio LICSW  TOPIC:  Wellness Group: Brain Health  [facility]  TRACK: IOP/DT 3    NUMBER OF PARTICIPANTS:     Summary of Group / Topics Discussed:  Brain Health:  Pathophysiology of Mood Disorders: Patients were educated on mood disorder etiology and neuroscience, risk factors, symptoms, and pharmacologic, psychotherapeutic, and complementary treatment options. Patients were guided on a discussion of mental, behavioral, and physical symptoms and shared their symptoms with the group.     Patient Session Goals / Objectives:  Described what mood disorders are and identified risk factors   Explained how chemical imbalances in the brain can cause symptoms and how medications work to reverse this imbalance   Identified and described pharmacologic, psychotherapeutic, and complementary treatment options      Patient Participation / Response:  Fully participated with the group by sharing personal reflections / insights and openly received / provided feedback with other participants.    Demonstrated understanding of topics discussed through group discussion and participation and Verbalized understanding of brain health topic    Treatment Plan:  Patient has a current master individualized treatment plan.  See Epic treatment plan for more information.    ZUHAIR Angela

## 2024-11-25 NOTE — GROUP NOTE
Psychotherapy Group Note    PATIENT'S NAME: Marbella Hendrix  MRN:   [id number]  :   1965  ACCT. NUMBER: [id number]  DATE OF SERVICE: 24  START TIME:  2:00 PM  END TIME:  2:50 PM  FACILITATOR: Mary Winters LICSW  TOPIC:  EBP Group: [facility] Outpatient [facility]  TRACK: IOP/DT 3 55+    NUMBER OF PARTICIPANTS: 7    Summary of Group / Topics Discussed:  Mindfulness: What is Mindfulness: Patients received an overview on what mindfulness is and how mindfulness can benefit general health, mental health symptoms, and stressors. The history of mindfulness, its application to mental health therapies, and key concepts were also discussed. Patients discussed current awareness, knowledge, and practice of mindfulness skills. Patients also discussed barriers to mindfulness practice.     Patient Session Goals / Objectives:  Demonstrated understanding of key concepts and application to daily life  Identified when/how to use mindfulness   Resolved barriers to practice  Identified plan to use mindfulness in daily life      Patient Participation / Response:  Moderately participated, sharing some personal reflections / insights and adequately adequately received / provided feedback with other participants.    Demonstrated understanding of topics discussed through group discussion and participation and Demonstrated understanding of mindfulness skills and benefits of practice    Treatment Plan:  Patient has a current master individualized treatment plan.  See Epic treatment plan for more information.    ZUHAIR Mosher

## 2024-11-27 ENCOUNTER — HOSPITAL ENCOUNTER (OUTPATIENT)
Dept: BEHAVIORAL HEALTH | Facility: CLINIC | Age: 59
End: 2024-11-27
Attending: PSYCHIATRY & NEUROLOGY
Payer: COMMERCIAL

## 2024-11-27 DIAGNOSIS — F33.2 SEVERE EPISODE OF RECURRENT MAJOR DEPRESSIVE DISORDER, WITHOUT PSYCHOTIC FEATURES (H): Primary | ICD-10-CM

## 2024-11-27 PROCEDURE — 90853 GROUP PSYCHOTHERAPY: CPT | Performed by: COUNSELOR

## 2024-11-27 PROCEDURE — 90853 GROUP PSYCHOTHERAPY: CPT

## 2024-11-27 NOTE — GROUP NOTE
Psychoeducation Group Note    PATIENT'S NAME: Marbella Hendrix  MRN:   [id number]  :   1965  ACCT. NUMBER: [id number]  DATE OF SERVICE: 24  START TIME:  3:00 PM  END TIME:  3:50 PM  FACILITATOR: Jose Bang LMFT; Enrique Harris OTR  TOPIC: MH Life Skills Group: Resiliency Development  [facility]  TRACK: IOPDT3    NUMBER OF PARTICIPANTS: 5    Summary of Group / Topics Discussed:  Resiliency Development:  Provided education and facilitated discussion on self-compassion to combat negative thoughts and improve overall mental wellness. Facilitated a self-reflection process on self-compassion strategies that they are using in their daily life. Explored and discussed 11 different self-compassion strategies with peers. Completed skill exploration of self-compassion journaling to gain self-awareness and identify benefits of skill usage in their daily life. Validation and support provided throughout group process.     Patient Session Goals / Objectives:  Identify current self-compassion strategies in their daily life.   Engage in experiential practice of self-compassion journaling to identify benefits and future use.   Identify and reflect on self-compassion strategies they will use in their daily life to improve feelings of self-confidence and to use as a source of coping/resiliency.       Patient Participation / Response:  Fully participated with the group by sharing personal reflections / insights and openly received / provided feedback with other participants.    Demonstrated understanding of content through group participation     Treatment Plan:  Patient has a current master individualized treatment plan.  See Epic treatment plan for more information.    PRINCE Blood

## 2024-11-27 NOTE — GROUP NOTE
"Process Group Note    PATIENT'S NAME: Marbella Hendrix  MRN:   [id number]  :   1965  ACCT. NUMBER: [id number]  DATE OF SERVICE: 24  START TIME:  1:00 PM  END TIME:  1:50 PM  FACILITATOR: Mary Winters LICSW  TOPIC:  Process Group    Diagnoses:  296.33 (F33.2) Major Depressive Disorder, Recurrent Episode, Severe _ and With anxious distress  300.02 (F41.1) Generalized Anxiety Disorder.    [facility] Outpatient Programs  TRACK: IOP/DT 3 55+    NUMBER OF PARTICIPANTS: 5        Data:    Session content: At the start of this group, patients were invited to check in by identifying themselves, describing their current emotional status, and identifying issues to address in this group.   Area(s) of treatment focus addressed in this session included Symptom Management, Personal Safety, and Community Resources/Discharge Planning.  Reported mood is \"okay, busy\".  She reflected on her busy morning consisting of a massage, taking her car in for repair, yoga.  Client denied safety concerns, including SI and SIB. Client's goal is \"be happy, grateful\".  Client reported plans to use the following coping skills: meditation, affirmations, be aware of negative self-talk. Client is grateful for being invited to a thanksgiving gathering (\"it's nice to be included\"). Peers offered supportive feedback and validation.    Therapeutic Interventions/Treatment Strategies:  Psychotherapist offered support, feedback and validation and reinforced use of skills. Treatment modalities used include Cognitive Behavioral Therapy and Dialectical Behavioral Therapy. Interventions include Cognitive Restructuring:  Explored impact of ineffective thoughts / distortions on mood and activity and Assisted patient in formulating new neutral/positive alternatives to challenge less helpful / ineffective thoughts and Coping Skills: Discussed meditation skills and addressed ways to implement meditation skills  and Assisted " "patient in understanding the purpose of planning / creating / participating / sharing in positive experiences.    Assessment:    Patient response:   Patient responded to session by accepting feedback, giving feedback, listening, focusing on goals, being attentive, and accepting support    Possible barriers to participation / learning include: and no barriers identified    Health Issues:   None reported       Substance Use Review:   Substance Use: No active concerns identified.    Mental Status/Behavioral Observations  Appearance:   Appropriate   Eye Contact:   Good   Psychomotor Behavior: Normal   Attitude:   Cooperative  Interested Friendly Pleasant  Orientation:   All  Speech   Rate / Production: Normal    Volume:  Normal   Mood:    Depressed  \"Okay\"  Affect:    Appropriate   Thought Content:   Clear and Safety denies any current safety concerns including suicidal ideation, self-harm, and homicidal ideation  Thought Form:  Coherent  Logical     Insight:    Good     Plan:   Safety Plan: No current safety concerns identified.  Recommended that patient call 911 or go to the local ED should there be a change in any of these risk factors.   Barriers to treatment: None identified  Patient Contracts (see media tab):  None  Substance Use: Not addressed in session   Continue or Discharge: Patient will continue in 55+ Program (55+) as planned. Patient is likely to benefit from learning and using skills as they work toward the goals identified in their treatment plan.      Mary Winters, [facility]  November 27, 2024  "

## 2024-11-27 NOTE — GROUP NOTE
Psychotherapy Group Note    PATIENT'S NAME: Marbella Hendrix  MRN:   [id number]  :   1965  ACCT. NUMBER: [id number]  DATE OF SERVICE: 24  START TIME:  2:00 PM  END TIME:  2:50 PM  FACILITATOR: Mary Winters LICSW  TOPIC: MH EBP Group: Coping Skills  [facility] Outpatient [facility]  TRACK: IOP/DT 3 55+    NUMBER OF PARTICIPANTS: 5    Summary of Group / Topics Discussed:  Coping Skills: Grounding: Patients discussed and practiced strategies to increase attachment / presence to the current moment.  Patients identified situations in which using these strategies will help improve emotion regulation sense of calm in the body.  Reviewed the benefits of applying grounding strategies, as well as past / current practices of each member.  Patients identified situations in which using these strategies would reduce stress. They developed the ability to distinguish when these strategies can be useful in their lives for management and stress and psychological well-being.    Patient Session Goals / Objectives:  Understand the purpose of using grounding strategies to reduce stress.  Verbalize understanding of how and when to apply grounding strategies to reduce distress and increase presence in the moment.  Review patients current grounding practices and discuss a more formal way of practicing and accessing skills.  Practice using various calming strategies (e.g. 5-4-3-2-1; mental and body awareness).  Choose 1-2 grounding strategies to apply during times of distress.      Patient Participation / Response:  Fully participated with the group by sharing personal reflections / insights and openly received / provided feedback with other participants.    Demonstrated understanding of topics discussed through group discussion and participation, Expressed understanding of the relevance / importance of coping skills at distressing times in life, Demonstrated knowledge of when to consider using a  variety of coping skills in daily life, and Practiced 2-3 new coping skills in session    Treatment Plan:  Patient has a current master individualized treatment plan.  See Epic treatment plan for more information.    IKE MosherSW

## 2024-12-02 ENCOUNTER — THERAPY VISIT (OUTPATIENT)
Dept: OCCUPATIONAL THERAPY | Facility: CLINIC | Age: 59
End: 2024-12-02
Payer: COMMERCIAL

## 2024-12-02 ENCOUNTER — HOSPITAL ENCOUNTER (OUTPATIENT)
Dept: BEHAVIORAL HEALTH | Facility: CLINIC | Age: 59
End: 2024-12-02
Attending: PSYCHIATRY & NEUROLOGY
Payer: COMMERCIAL

## 2024-12-02 DIAGNOSIS — F33.2 SEVERE EPISODE OF RECURRENT MAJOR DEPRESSIVE DISORDER, WITHOUT PSYCHOTIC FEATURES (H): Primary | ICD-10-CM

## 2024-12-02 DIAGNOSIS — M79.645 THUMB PAIN, LEFT: Primary | ICD-10-CM

## 2024-12-02 PROCEDURE — 97140 MANUAL THERAPY 1/> REGIONS: CPT | Mod: GO | Performed by: OCCUPATIONAL THERAPIST

## 2024-12-02 PROCEDURE — 97035 APP MDLTY 1+ULTRASOUND EA 15: CPT | Mod: GO | Performed by: OCCUPATIONAL THERAPIST

## 2024-12-02 PROCEDURE — 90853 GROUP PSYCHOTHERAPY: CPT

## 2024-12-02 PROCEDURE — 97165 OT EVAL LOW COMPLEX 30 MIN: CPT | Mod: GO | Performed by: OCCUPATIONAL THERAPIST

## 2024-12-02 PROCEDURE — 90853 GROUP PSYCHOTHERAPY: CPT | Performed by: COUNSELOR

## 2024-12-02 PROCEDURE — 97110 THERAPEUTIC EXERCISES: CPT | Mod: GO | Performed by: OCCUPATIONAL THERAPIST

## 2024-12-02 NOTE — GROUP NOTE
"Process Group Note    PATIENT'S NAME: Marbella Hendrix  MRN:   [id number]  :   1965  ACCT. NUMBER: [id number]  DATE OF SERVICE: 24  START TIME:  1:00 PM  END TIME:  1:50 PM  FACILITATOR: Mary Winters LICSW  TOPIC:  Process Group    Diagnoses:  296.33 (F33.2) Major Depressive Disorder, Recurrent Episode, Severe _ and With anxious distress  300.02 (F41.1) Generalized Anxiety Disorder.    [facility]  TRACK: IOP/DT 3 55+    NUMBER OF PARTICIPANTS: 3        Data:    Session content: At the start of this group, patients were invited to check in by identifying themselves, describing their current emotional status, and identifying issues to address in this group.   Area(s) of treatment focus addressed in this session included Symptom Management, Personal Safety, and Community Resources/Discharge Planning.  Reported mood is less depressed, \"doing okay\". Sleep is improved. She had a \"nice\" Thanksgiving reflected on gratitude around \"being included\".  She enjoyed time spent with her son and niece.  She gave herself credit for \"discovering\" a new Pilates skill that she can achieve.   Client denied safety concerns, including SI and SIB. Client denies any chemical use.  Client is taking medications as prescribed. Client's goal is \"slow down\".  Client reported plans to use the following coping skills: mindful of negative self-talk, affirmations and meditation. Peers offered supportive feedback and validation.    Therapeutic Interventions/Treatment Strategies:  Psychotherapist offered support, feedback and validation and reinforced use of skills. Treatment modalities used include Cognitive Behavioral Therapy and Dialectical Behavioral Therapy. Interventions include Cognitive Restructuring:  Explored impact of ineffective thoughts / distortions on mood and activity and Assisted patient in formulating new neutral/positive alternatives to challenge less helpful / " ineffective thoughts and Coping Skills: Discussed meditation skills and addressed ways to implement meditation skills .    Assessment:    Patient response:   Patient responded to session by accepting feedback, giving feedback, listening, focusing on goals, being attentive, and accepting support    Possible barriers to participation / learning include: and no barriers identified    Health Issues:   None reported       Substance Use Review:   Substance Use: No active concerns identified.    Mental Status/Behavioral Observations  Appearance:   Appropriate   Eye Contact:   Good   Psychomotor Behavior: Normal   Attitude:   Cooperative  Interested Friendly Pleasant  Orientation:   All  Speech   Rate / Production: Normal    Volume:  Normal   Mood:    Depressed   Affect:    Appropriate   Thought Content:   Clear and Safety denies any current safety concerns including suicidal ideation, self-harm, and homicidal ideation  Thought Form:  Coherent  Logical     Insight:    Good     Plan:   Safety Plan: No current safety concerns identified.  Recommended that patient call 911 or go to the local ED should there be a change in any of these risk factors.   Barriers to treatment: None identified  Patient Contracts (see media tab):  None  Substance Use: Not addressed in session   Continue or Discharge: Patient will continue in 55+ Program (55+) as planned. Patient is likely to benefit from learning and using skills as they work toward the goals identified in their treatment plan.      Mary Winters, [facility]  December 2, 2024

## 2024-12-02 NOTE — PROGRESS NOTES
OCCUPATIONAL THERAPY EVALUATION  Type of Visit: Evaluation       Fall Risk Screen:  Fall screen completed by: OT  Have you fallen 2 or more times in the past year?: No  Have you fallen and had an injury in the past year?: No  Is patient a fall risk?: No    Subjective        Presenting condition or subjective complaint: (Patient-Rptd) L CMC pain  Date of onset:  (several years ago)    Relevant medical history: (Patient-Rptd) Arthritis   Dates & types of surgery:      Prior diagnostic imaging/testing results: (Patient-Rptd) X-ray     Prior therapy history for the same diagnosis, illness or injury: (Patient-Rptd) Yes (Patient-Rptd) PT, 11/22-12/22    Prior Level of Function  Transfers: Independent  Ambulation: Independent  ADL: Independent  IADL: Driving, Finances, Housekeeping, Laundry, Meal preparation, Medication management    Living Environment  Social support: (Patient-Rptd) With family members   Type of home: (Patient-Rptd) House   Stairs to enter the home: (Patient-Rptd) Yes (Patient-Rptd) 3 Is there a railing: (Patient-Rptd) Yes     Ramp: (Patient-Rptd) No   Stairs inside the home: (Patient-Rptd) Yes (Patient-Rptd) 26     Help at home: (Patient-Rptd) None  Equipment owned: (Patient-Rptd) Straight Cane     Employment: (Patient-Rptd) No    Hobbies/Interests: (Patient-Rptd) Exercise, reading    Patient goals for therapy: (Patient-Rptd) Not be in pain from arthritis.       Objective   ADDITIONAL HISTORY:  Right hand dominant  Patient reports symptoms of pain, stiffness/loss of motion, and weakness/loss of strength  Transportation: drives  Currently     Functional Outcome Measure:   Upper Extremity Functional Index Score:  SCORE:   Column Totals: /80: (Patient-Rptd) 55   (A lower score indicates greater disability.)      Objective:    Pain Level (Scale 0-10)   12/2/2024   At Rest 0   With Use 5-6     Pain Description  Date 12/2/2024   Location thumb   Pain Quality Aching and Shooting   Frequency  intermittent     Pain is worst  daytime   Exacerbated by     Relieved by heat   Progression unchanged     ROM  Thumb 12/2/2024 12/2/2024   AROM  (PROM) Right Left   MP /60 /50   IP /80 /73   RABD 50 50   PABD 60 45   Retropulsion (cm) 3- 3++   Kapandji Opposition Scale (0-10/10) 10 10+     Thumb Observation/Appearance   - none  + mild    ++ moderate    +++ severe     12/2/2024   Shoulder deformity present over CMC   L: +   Edema over the CMC joint   L: +   Noted collapse of MP into hyperextension during pinch   L: +      Provocative Tests  Pain Report:  - none    + mild    ++ moderate    +++ severe     12/2/2024   AP Joint Laxity of Trapezium   L: -   Crepitus present   L: +   CMC Adduction Stress Test   L: +   CMC Extension Stress Test   L: +   Finkelstein's   L: -   WHAT Test   L: -       Strength   (Measured in pounds)  Pain Report: - none  + mild    ++ moderate    +++ severe    12/2/2024 12/2/2024   Trials Right Left   1  2  3     Average 48# 42#+     Lat Pinch 12/2/2024 12/2/2024   Trials Right Left   1  2  3     Average 13# 13#++     3 Pt Pinch 12/2/2024 12/2/2024   Trials Right Left   1  2  3     Average 15# 11#++     Palpation   Pain Report:  - none    + mild    ++ moderate    +++ severe    12/2/2024   CMC Joint Line   L: +   Thenar Wynne   L: +   Web Space   L: +   1st DC   L: +   Radial Styloid   L: -   FCR   L: -            Assessment & Plan   CLINICAL IMPRESSIONS  Medical Diagnosis: Left CMC OA    Treatment Diagnosis: Left CMC OA    Impression/Assessment: Pt is a 59 year old female presenting to Occupational Therapy due to CMC OA/thumb pain.  The following significant findings have been identified: Impaired ROM, Impaired strength, and Pain.  These identified deficits interfere with their ability to perform self care tasks, recreational activities, household chores, driving , medication management, financial management, care of others, and meal planning and preparation as compared to previous  level of function.   Patient's limitations or Problem List includes: Pain, Decreased ROM/motion, Increased edema, Weakness, Decreased , Decreased pinch, and Tightness in musculature of the left thumb which interferes with the patient's ability to perform Self Care Tasks (dressing, eating, bathing, hygiene/toileting), Sleep Patterns, Recreational Activities, Household Chores, and Driving  as compared to previous level of function.    Clinical Decision Making (Complexity):  Assessment of Occupational Performance: 5 or more Performance Deficits  Occupational Performance Limitations: bathing/showering, toileting, dressing, feeding, hygiene and grooming, care of others, child rearing, health management and maintenance, home establishment and management, shopping, sleep, play, and leisure activities  Clinical Decision Making (Complexity): Low complexity    PLAN OF CARE  Treatment Interventions:  Modalities:  US  Therapeutic Exercise:  AROM and Stabilization  Neuromuscular re-education:  Nerve Gliding and Kinesiotaping  Manual Techniques:  Myofascial release    Long Term Goals   OT Goal 1  Goal Identifier: eating  Goal Description: cut food with minimal pain  Rationale: In order to maximize safety and independence with ADL/IADLs  Target Date: 01/01/25      Frequency of Treatment: 1x/week  Duration of Treatment: 1 month     Recommended Referrals to Other Professionals: none  Education Assessment: Learner/Method: Patient     Risks and benefits of evaluation/treatment have been explained.   Patient/Family/caregiver agrees with Plan of Care.     Evaluation Time:    OT Eval, Low Complexity Minutes (28863): 20       Signing Clinician: Felisha Núñez OT

## 2024-12-02 NOTE — GROUP NOTE
Psychotherapy Group Note    PATIENT'S NAME: Marbella Hendrix  MRN:   [id number]  :   1965  ACCT. NUMBER: [id number]  DATE OF SERVICE: 24  START TIME:  2:00 PM  END TIME:  2:50 PM  FACILITATOR: Mary Winters LICSW  TOPIC: MH EBP Group: Self-Awareness  [facility] Adult Mental Health Outpatient Programs  TRACK: IOP/DT 3 55+    NUMBER OF PARTICIPANTS: 3    Summary of Group / Topics Discussed:  Self-Awareness: Personal Strengths: Topic focused on assisting patients in identifying personal strengths and how they relate to the management of mental health symptoms. Patients discussed the benefits of acknowledging their personal strengths and their impact on mood improvement, mindfulness, and perspective. Patients worked to increase time spent on recognition and appreciation of what is positive and working in their lives. The goal is to reduce rumination and negative thinking resulting in increased mindfulness and resilience. Patients will work to put skills into practice and problem-solve barriers.     Patient Session Goals / Objectives:  Identified personal strengths  Identified barriers to recognition of personal strengths  Verbalized understanding of strategies to increase use of their strengths in management of daily symptoms      Patient Participation / Response:  Fully participated with the group by sharing personal reflections / insights and openly received / provided feedback with other participants.    Demonstrated understanding of topics discussed through group discussion and participation, Demonstrated understanding of values, strengths, and challenges to learn about themselves, Identified / Expressed readiness to act intentionally, increase self-compassion, promote personal growth, Verbalized understanding of ways to proactively manage illness, Identified plan to address barriers to practicing skills that promote self-awareness , and Practiced skills in session    Treatment Plan:  Patient  has a current master individualized treatment plan.  See Epic treatment plan for more information.    IKE MosherSW

## 2024-12-04 ENCOUNTER — HOSPITAL ENCOUNTER (OUTPATIENT)
Dept: BEHAVIORAL HEALTH | Facility: CLINIC | Age: 59
End: 2024-12-04
Attending: PSYCHIATRY & NEUROLOGY
Payer: COMMERCIAL

## 2024-12-04 DIAGNOSIS — F33.2 SEVERE EPISODE OF RECURRENT MAJOR DEPRESSIVE DISORDER, WITHOUT PSYCHOTIC FEATURES (H): Primary | ICD-10-CM

## 2024-12-04 PROCEDURE — 90853 GROUP PSYCHOTHERAPY: CPT

## 2024-12-04 NOTE — GROUP NOTE
Psychotherapy Group Note    PATIENT'S NAME: Marbella Hendrix  MRN:   [id number]  :   1965  ACCT. NUMBER: [id number]  DATE OF SERVICE: 24  START TIME:  2:00 PM  END TIME:  2:50 PM  FACILITATOR: Mary Winters LICSW  TOPIC: MH EBP Group: Coping Skills  [facility] Adult Mental Health Outpatient Programs  TRACK: IOP/DT 3 55+    NUMBER OF PARTICIPANTS: 6    Summary of Group / Topics Discussed:  Coping Skills: Additional Coping Skills:  Patients discussed and practiced wise mind.  Reviewed the benefits of applying the aforementioned coping strategies.  Patients explored how these strategies might be applied to daily stressors or distressing situations.    Patient Session Goals / Objectives:  Understand the purpose and benefits of applying wise mind coping strategies  Apply wise mind strategies to everyday stressors  Address barriers to utilizing coping skills when in distress.      Patient Participation / Response:  Fully participated with the group by sharing personal reflections / insights and openly received / provided feedback with other participants.    Demonstrated understanding of topics discussed through group discussion and participation, Expressed understanding of the relevance / importance of coping skills at distressing times in life, Demonstrated knowledge of when to consider using a variety of coping skills in daily life, Identified barriers to applying coping skills, Identified 2-3 positive coping strategies that have helped maintain / improve symptoms in the past, and Practiced 2-3 new coping skills in session    Treatment Plan:  Patient has a current master individualized treatment plan.  See Epic treatment plan for more information.    ZUHAIR Mosher

## 2024-12-04 NOTE — GROUP NOTE
"Psychoeducation Group Note    PATIENT'S NAME: Marbella Hendrix  MRN:   [id number]  :   1965  ACCT. NUMBER: [id number]  DATE OF SERVICE: 24  START TIME:  3:00 PM  END TIME:  3:50 PM  FACILITATOR: Melvina Hebert, [facility] HospitalSW; Enrique Harris OTR  TOPIC: MH Life Skills Group: Resiliency Development  [facility] Adult Mental Health Outpatient Programs  TRACK: IOP/DT 3     NUMBER OF PARTICIPANTS: 6    Summary of Group / Topics Discussed:  Resiliency Development:  Resiliency Development: Occupational Gifts: Patients were given the opportunity to reflect on and identify different types of occupations (activities) they participate in to maintain and/or build resilience in their lives.  Patients were taught about how \"doing\" promotes mental health recovery by providing routine and structure, empowerment, sense of self, and connectedness.  Patients identified occupations (activities) that promote mental health: connection, centering, creation, contemplation, and contribution.  Patients were also given the opportunity to improve self-efficacy, self-sufficiency, quality of life and a sense of mastery and competency by identifying and exploring positive activities they participate in their life. Validation, support, and feedback were provided from staff throughout group.   Patient Session Goals / Objectives:   Identified occupations (activities) they can use to promote mental health recovery and to effectively manage mental health symptoms.  Improved awareness of positive qualities of occupations they currently participate in and new occupations they might try and how this contributes to the process of recovery and mental health wellbeing and resiliency.   Established a plan for practice of these skills in their own environments.   Practiced and reflected on how to generalize taught skills to their everyday life.        Patient Participation / Response:  Fully participated with the group by sharing personal " reflections / insights and openly received / provided feedback with other participants.    Verbalized understanding of content    Treatment Plan:  Patient has a current master individualized treatment plan.  See Epic treatment plan for more information.    Melvina Stroud, LICSW

## 2024-12-04 NOTE — PROGRESS NOTES
Outpatient Mental Health Program Discharge Summary / Instructions - Adult       PATIENT'S NAME: Marbella Hendrix   MRN:   [id number]  :   1965    PROGRAM PARTICIPATION: 55+ Program (55+) Track: IOP/DT 3     ADMISSION DATE: 10/11/2024  DISCHARGE DATE: 2024      Reason for Discharge:   Completed treatment: factors leading to admission have been addressed to the extent that the patient can be safely transitioned to a less intensive level of care.     Diagnosis:   296.33 (F33.2) Major Depressive Disorder, Recurrent Episode, Severe _ and With anxious distress  300.02 (F41.1) Generalized Anxiety Disorder.    Medications: (include name, dose, and frequency)  Per provider: Dr. Rishi Dang    Current Outpatient Medications   Medication Sig Dispense Refill    acetaminophen (TYLENOL) 500 MG tablet Take 500-1,000 mg by mouth every 6 hours as needed for mild pain.      atorvastatin (LIPITOR) 20 MG tablet Take 1 tablet (20 mg) by mouth daily. 90 tablet 2    calcium carbonate (OS-ARA) 500 MG tablet Take 1 tablet by mouth daily.      cholecalciferol (VITAMIN D3) 25 mcg (1000 units) capsule Take 1 capsule by mouth daily.      levothyroxine (SYNTHROID/LEVOTHROID) 125 MCG tablet Take 1 tablet (125 mcg) by mouth daily 90 tablet 1    naproxen sodium 220 MG capsule Take 220 mg by mouth 2 times daily (with meals).      Omega-3 Fatty Acids (OMEGA-3 FISH OIL PO) Take 1 g by mouth daily      venlafaxine (EFFEXOR XR) 150 MG 24 hr capsule Take 1 capsule (150 mg) by mouth daily 90 capsule 1    busPIRone (BUSPAR) 10 MG tablet Take 1 tablet (10 mg) by mouth 2 times daily. Start with half tablet twice daily for first week. 180 tablet 0    eszopiclone (LUNESTA) 2 MG tablet Take 1 tablet (2 mg) by mouth nightly as needed for sleep. 30 tablet 2    hydrOXYzine HCl (ATARAX) 25 MG tablet Take 0.5-1 tablets (12.5-25 mg) by mouth 2 times daily as needed for anxiety. 120 tablet 1    melatonin 5 MG tablet Take 5 mg by mouth nightly  as needed for sleep.      metFORMIN (GLUCOPHAGE XR) 500 MG 24 hr tablet Take 2 tablets (1,000 mg) by mouth daily (with dinner). Start with ONE tab for first 1-2 weeks. 180 tablet 1     No current facility-administered medications for this visit.         DISCHARGE PLAN / RECOMMENDATIONS TO MANAGE SYMPTOMS AND PREVENT RELAPSE:    Take medications as prescribed.  Medication Management: Provider: Dr. Christi Lewis  Last Appointment: 12/10/2024 Next Appointment: 1/7/2025 at 3:30 PM    Follow up with your providers and appointments.  Next Level of Care / Frequency:   Therapist: Anna Weiland.  Next appointment: 12/19/2024  Other Therapist: Rafaela Hassan [facility] Next appointment: 1/7/2025 @ 2:45 PM    Follow up with the following resources and/or community supports:   Fernando Akers (as needed)  Parent Support Groups (care coordinator sent resources via email)  [facility] support groups.   Listings for the free support groups are at https://[facility].org/support/[facility]-[place]-support-groups/ ([phone] [phone] free support groups for numerous group therapy topics & issues including: individual, family, couples, LGBTQ, Young Adults, parenting, anxiety, grief and loss, depression, and  others  Stepdown group in [place] (Tuesday 2-4pm) or [place] (Thursday 1-3pm) - referral placed on 12/10.  Call [phone] to schedule.      [place] Mental [facility] Warmlines  https://mentalhealth[place].org/support/[place]-warmline/ Open Monday-Saturday, 12 PM to 10 PM; [phone]; Toll Free 855-WARMLINE or [phone] or text  Support  to 14289  https://mnwitw.org/nikolay Available from 5pm - 9am  (7 days a week/365 days a year) [phone]    Report symptoms and significant changes to your care team: including any safety concerns, thoughts of suicide or homicide, changes in sleep, increased confusion, mood getting worse, feeling more aggressive or increased isolation    Use coping skills:  Practice Mindfulness, Identify thought distortions &  "feelings, distractions, express self, keep appointments, take medications daily, journal, use gratitude, self-compassion, Wise Mind, Use of the 5 Senses, grounding, Talk to someone you trust at least one time weekly, set boundaries and say \"no\", be assertive, act opposite of negative feelings, accept challenges with a positive attitude, exercise at least three times per week for 30 minutes,  get enough sleep, eat healthy foods, get into a good routine    Do not use illicit drugs.    Avoid alcohol.    PERSONAL SAFETY / CRISIS MANAGEMENT:  Follow your individualized Safety Coping Plan.  Report increased symptoms and safety concerns to your care team.  Call 911 or go to your nearest emergency department.  Use the crisis resources listed below:    Crisis Resources:  Suicide Prevention Lifeline: 1-206-489-NEPG (0037)  Crisis Text Line Service (available 24 hours a day, 7 days a week): Text [place] to 684943  Call  **CRISIS (856214) from a cell phone to talk to a team of professionals who can help you.    Crisis Services By [facility]: Phone Number:   [place]     [phone]   [place]    [phone]   [place] ([place])    [phone]   [place]    [phone]   [place]                                                    [phone] (after hours)   [place]   [phone]   [place]     [phone]       The above discharge plan and recommendations were created to help you manage your mental health and to improve your overall functioning and well-being.     Not following the above recommendations may result in an increase of symptoms, potential safety risks and a need for increased services.    We appreciate the opportunity to work with you and sincerely thank you for choosing [email or web address].        Your treatment team: Moris Toscano MD; ZUHAIR Mosher;  ZUHAIR Lea; Enrique Harris OTR/L; Chhaya Merchant RN        Patient Signature: ______________________________________________________ (signed copy " uploaded to patient chart) Date:___________

## 2024-12-05 NOTE — GROUP NOTE
"Psychoeducation Group Note    PATIENT'S NAME: Marbella Hendrix  MRN:   [id number]  :   1965  ACCT. NUMBER: [id number]  DATE OF SERVICE: 24  START TIME:  3:00 PM  END TIME:  3:50 PM  FACILITATOR: Jose Bang LMFT; Chhaya Myrick RN  TOPIC:  Wellness Group: [facility] Adult Mental Health Outpatient Programs  TRACK: IOPDT3    NUMBER OF PARTICIPANTS: 3    Summary of Group / Topics Discussed:  [facility]:  [facility]: distress tolerance scattergories. Patients used category word game as a tool for grounding. Patients were educated that, in times of distress, this skill can be used to engage the \"wise mind\" rather than the \"emotion mind.\"      Patient Session Goals / Objectives:     Practice skill of purposeful thought as a way to interrupt distressing thought patterns.     Engage in social interaction in form of a game and discussed co-regulation           Patient Participation / Response:  Fully participated with the group by sharing personal reflections / insights and openly received / provided feedback with other participants.    Demonstrated understanding of topics discussed through group discussion and participation and Identified / Expressed personal readiness to practice skills    Treatment Plan:  Patient has a current master individualized treatment plan.  See Epic treatment plan for more information.    PRINCE Blood  "

## 2024-12-05 NOTE — GROUP NOTE
"Process Group Note    PATIENT'S NAME: Marbella Hendrix  MRN:   [id number]  :   1965  ACCT. NUMBER: [id number]  DATE OF SERVICE: 24  START TIME:  1:00 PM  END TIME:  1:50 PM  FACILITATOR: Mary Winters LICSW  TOPIC:  Process Group    Diagnoses:  296.33 (F33.2) Major Depressive Disorder, Recurrent Episode, Severe _ and With anxious distress  300.02 (F41.1) Generalized Anxiety Disorder.    [facility] Adult Mental Health Outpatient Programs  TRACK: IOP/DT 3 55+    NUMBER OF PARTICIPANTS: 6        Data:    Session content: At the start of this group, patients were invited to check in by identifying themselves, describing their current emotional status, and identifying issues to address in this group.   Area(s) of treatment focus addressed in this session included Symptom Management, Personal Safety, and Community Resources/Discharge Planning.  Writer talked to patient before group to assess readiness to discharge.  She reports feeling feeling less depressed and confirmed her 2024 discharge date.  See discharge summary for discharge plans that were discussed.    Reported mood is \"okay\".  Main stressor is pain from arthritis in her hand. She is grateful to have started physical therapy and plans to continue weekly in December.  She reflected positively on her productive day yesterday consisting of exercise, errands and donating blood.   Client denied safety concerns, including SI and SIB. Client denies any chemical use.  Client is taking medications as prescribed. Client's goal is \"be kind to myself, challenge negative self-talk\".  Client reported plans to use the following coping skills: affirmations in the morning, meditation. Peers offered supportive feedback and validation.      Therapeutic Interventions/Treatment Strategies:  Psychotherapist offered support, feedback and validation and reinforced use of skills. Treatment modalities used include Cognitive Behavioral Therapy and " Dialectical Behavioral Therapy. Interventions include Cognitive Restructuring:  Explored impact of ineffective thoughts / distortions on mood and activity, Assisted patient in formulating new neutral/positive alternatives to challenge less helpful / ineffective thoughts, Facilitated recognition of the connection between negative thoughts and negative core beliefs, and Assisted patient in identifying new neutral/positive core beliefs and Coping Skills: Discussed meditation skills and addressed ways to implement meditation skills .    Assessment:    Patient response:   Patient responded to session by accepting feedback, giving feedback, listening, focusing on goals, being attentive, and accepting support    Possible barriers to participation / learning include: and no barriers identified    Health Issues:   None reported       Substance Use Review:   Substance Use: No active concerns identified.    Mental Status/Behavioral Observations  Appearance:   Appropriate   Eye Contact:   Good   Psychomotor Behavior: Normal   Attitude:   Cooperative  Interested Friendly Pleasant  Orientation:   All  Speech   Rate / Production: Normal    Volume:  Normal   Mood:    Depressed   Affect:    Appropriate   Thought Content:   Clear and Safety denies any current safety concerns including suicidal ideation, self-harm, and homicidal ideation  Thought Form:  Coherent  Logical     Insight:    Good     Plan:   Safety Plan: No current safety concerns identified.  Recommended that patient call 911 or go to the local ED should there be a change in any of these risk factors.   Barriers to treatment: None identified  Patient Contracts (see media tab):  None  Substance Use: Not addressed in session   Continue or Discharge: Patient will continue in 55+ Program (55+) as planned. Patient is likely to benefit from learning and using skills as they work toward the goals identified in their treatment plan.      Mary Winters, [facility]  December 5, 2024

## 2024-12-06 NOTE — PROGRESS NOTES
"Individualized Treatment Plan       Patient's Name: Marbella Hendrix   Preferred Name: Marbella  Pronouns:  She/Her   :   1965  MRN:   [id number]    Program Admission Date: 10/11/2024     Estimated Program Discharge Date: 2024 (Please note, this date is subject to change based on needs and progress toward identified goals.)      Date of Initial Treatment Plan: 10/11/2024    Chief Concern: I am seeking treatment in this program because: \"I have experienced crippling anxiety and worry\"    Contributing Factors:  Symptoms/Difficulties:  suicidal thoughts, emotion instability, unhelpful or intrusive thoughts, negative self-talk, and difficulty with sleep  Marbella also noted persistent sadness and depression,extreme anxiety, social isolation, pushing people away, and attachment challenges as symptoms.     Impacts On My Daily Life:     Safety: Patient will learn and apply coping strategies to manage suicidal or self-injurious behavior and urges. Patient will develop and/or use their individualized coping plan for safety and notify staff of all safety concerns.     Wellness: sleep and physical activity/movement Patient will learn, practice, and apply at least 2 skills/strategies that support overall wellness with emphasis on sleep [nutrition, sleep, meds, etc.] as measured by self report and staff observation in groups.     Daily Life Activities: home management, leisure participation (activities for enjoyment, hobbies), self-care activities, life roles (parenting, working, going to school, care giving, volunteering, etc.), social participation, and community engagement Patient will learn, practice, and apply at least 2 skills/strategies that support participation in meaningful activities with emphasis on finding a job or volunteer opportunity as measured by self report and staff observation in groups.     Cultural Values and Needs: Taoism \"I am not a Druze. I am liberal regarding some things and conservative " "on others\".    Treatment Goal:   My main goal for treatment in this program is: \"I will be able to use positive self-talk at least 3 times or more each week\"     Secondary/supportive goals (optional):  \"I will find a job or volunteer opportunity before the end of January\"  \"I will get on a plane and fly somewhere mid March with my spouse\"    I will know I'm making progress in my goal when: \"I am able to sleep 6-8 hours per night, to not cry all the time, be a stable partner to my spouse, and will be able to get on an airplane again\"    I think I will be ready to discharge when: \"I've met my SMART goals and I feel more even.\" Marbella continues, \"I'm on the road to recovery from  relentless negative self-talk.\"    Progress updates:  10/18/24: Will see therapist and psychiatrist. Exploring different groups or community support groups She can attend. She reports \"I have all the same symptoms \"problems\"          11/1/2024: Marbella is looking into a group she can go to a support group for people with children with special needs. Looking into [facility], and [facility].   Keeps herself really busy, and gets to exercise every morning. Gratitude is an important skill. Sleep is going okay.  Patient reports not becoming so catastrophic and is seeing some change, but it is not where she wants it to be. Coming to group, and thinking about things critically. Not thinking about her circumstances and hearing other people's struggles helps.      11/11: Marbella is in process of looking for support groups for mood disorders and parents with children with ASD.  She was agreeable to a care coordination referral. She continues to receive support from her family.  She's attended [facility] for [facility] years and participates in a daily gratitude email group.  She will reach out to her therapist to schedule an appointment within 7 days of her discharge date.    12/12 goals stopped due to discharge. She reports improvement in mood feeling " "less depressed and more hopeful.  She has improved sleep hygiene and is able to sleep through the night by keeping electronics away and adding in some intentional gratitude practices as she falls asleep.  She successfully uses affirmations, gratitude and meditation daily to reduce negative self-talk.  She has been attending exercise classes regularly, including a difficult yoga class that she has stuck with.  She is actively looking into volunteer opportunities to continue building purpose and meaning.         Aftercare Plan: Patient requires aftercare plan following completion of program. Identifies return to individual therapy and outpatient psychiatry/medication management.      \"Continue with Dr Lewis and Anna Weiland\" Follow up on Care Coordination Referral to find a parent support group with children with ASD.     Patient scheduled with therapist for next week.  She reviewed parent support groups with care coordinator Brenda and is interested in starting after discharge.  She plans to start the [facility] stepdown program.     My Strengths:  intelligent, motivated, and and opinionated    My Limitations or Vulnerabilities:  Anxiety  Depressive symptoms   Sensitive     My Other Health Issues:  Yes: Arthritis    Supports:    I want to include the following support people in my treatment: [patient left blank]  Please note we cannot share information with anyone unless you sign a release of information. You can specify what information can and cannot be shared and you can retract that written permission at any time.    Staff and support people can help me by: [patient left blank]    Assessments: The following assessments were completed by patient for this visit:   PHQ2:        10/2/2024     2:25 PM 9/23/2024    11:17 AM 9/11/2024    10:50 AM 4/8/2024    11:59 AM 3/25/2024     2:35 PM 1/1/2024     7:51 AM 8/22/2023    11:12 AM   PHQ-2 ( 1999 [employer])   Q1: Little interest or pleasure in doing things 3 3 3 0 1 " 0 2   Q2: Feeling down, depressed or hopeless 3 3 3 1 1 1 2   PHQ-2 Score 6 6 6    6 1    1 2 1    1 4   Q1: Little interest or pleasure in doing things Nearly every day Nearly every day Nearly every day Not at all   Not at all More than half the days   Q2: Feeling down, depressed or hopeless Nearly every day Nearly every day Nearly every day Several days   Several days More than half the days   PHQ-2 Score 6 6 6 1   1 4      GAD2:        8/18/2023     1:58 PM 9/28/2023     6:51 AM 11/7/2023     4:23 PM 12/26/2023    12:43 PM 4/2/2024     7:20 AM 9/7/2024    12:16 PM 9/23/2024    11:35 AM   BERTHA-2   Feeling nervous, anxious, or on edge 2 2 1 1 1 3 3   Not being able to stop or control worrying 2 2 1 1 1 3 3   BERTHA-2 Total Score 4 4 2 2 2    2 6    6 6      PROMIS 10-Global Health (only subscores and total score):        7/14/2023     4:22 PM 11/7/2023     4:23 PM 1/2/2024    10:47 AM 4/2/2024     7:21 AM 9/7/2024    12:17 PM 9/23/2024    11:37 AM 10/11/2024     1:01 PM   PROMIS-10 Scores Only   Global Mental Health Score 11    11 12 14 13    13 9    9 8    8 7   Global Physical Health Score 14    14 14 13 14    14 12    12 12    12 13   PROMIS TOTAL - SUBSCORES 25    25 26 27 27    27 21    21 20    20 20        Diagnosis/es:  Severe episode of recurrent major depressive disorder, without psychotic features (H)  F33.2            Generalized anxiety disorder  F41.1            HISTORY  OF  Attention-Deficit/Hyperactivity Disorder  314.01 (F90.2) Combined presentation    Level of Care: Adult Mental Health Outpatient Programs    Program Track: IOP/DT 3 55+    Multidisciplinary Team Members: Team IOP DT 3: Moris Toscano MD; [facility]; Vivian Chapman SW; [facility]; Enrique Harris OTR/L; Chhaya Merchant RN     Program services: Group and Individual Psychotherapy (at least 1 hour per day), Patient Education and Training Related to Treatment (at least one hour per day), Occupational Therapy and/or  Nurse Liaison Education and Support (at least one hour per day), Psychiatric Evaluation and Management (at intake and least once per month)    Staff Interventions:  Assist to identify treatment goals, including identifying and problem-solving barriers. Assist in identifying strengths and how to mobilize them in meeting treatment goals. Assist in identifying limitations and other health concerns, and ways to manage those in the recovery process and beyond. Assist in identifying and helping to establish, maintain, and strengthen support network. Assist with and facilitate discharge planning, including connection with available and appropriate community services.      Ongoing psychotherapeutic and multidisciplinary assessment. Regular meetings with psychiatrist or other independent psychiatric provider. Assessment by registered nurse liaisons at admission and ongoing/as needed. Complete OT assessment where applicable/as needed. Complete functional assessment(s) where applicable/as needed.    Plan for and help with maintaining safety, including revising and updating written safety plan where applicable. Assist in identifying and applying coping skills. Assist in identifying and problem solving barriers to treatment and clinical progress. Utilize motivational interviewing techniques to promote change. Provide education to promote informed decisions and decision-making. Utilize motivational interviewing techniques to promote change.     Provide education regarding how to effectively use group process. Teach skills to help identify and manage thoughts, behaviors, and emotions, with specific skills/topics including: emotional regulation, identification of values, understanding and modifying distorted thinking, understanding and coping with grief and loss, shame, self-compassion, self-awareness, mindfulness, radical acceptance, distress tolerance skills, hope, problem-solving, communication, interpersonal effectiveness,  distress tolerance. Facilitate increased self-awareness.       Provide education regarding: life balance/structure/routine, goal setting and integration, prioritizing and planning, leisure values, behavior activation, motivation, energy management, stress management, neuroscience of change, sensory modulation, window of tolerance, ANS and vagus nerve activation, sensory enhanced mindfulness, sensory/body based grounding skills.    Provide education regarding: eight dimensions of wellness, sleep hygiene, medication education and management, stigma, nutrition, signs and symptoms, community resources, support network education.     Abuse Prevention Plan:   Treatment team will provide education regarding skill development to address symptom management, life skills, wellness, discharge planning, and personal safety.  Collaborate with patients internal and external providers to coordinate care.  Treatment will be provided in a safe, therapeutic environment.  Program provider will offer medication adjustment/management as needed/indicated.   Program will monitor for use of substances.    Patient Participation in Plan:  This plan was developed by the patient named at the top of the document with assistance from the multidisciplinary care team. The patient agrees with this care plan, worked with staff to develop goals, and has received a copy. Supports and/or family have been invited to participate in the creation of this plan at the discretion of the patient.     Discharge Criteria:   Patient will discharge from the program when the goal(s) above have been met, the patient is otherwise deemed to no longer need and/or benefit from the program/this level of care, another treatment modality is identified that would better meet treatment needs and goals, and/or the patient opts to discharge from the program for any reason.    Acknowledgement of Current Treatment Plan   I have reviewed my treatment plan with my treatment team  "members.  I agree with the plan as it is written in the electronic health record.     Name:                   Signature:                                                          Date:  Marbella Hendrix Patient signature page and treatment plan worksheet in media tab   12/6/2024   Moris Toscano MD  Psychiatrist/Medical Director See above 12/6/2024   NOTE: Patient signatures are completed manually and scanned into the electronic medical record. See \"Media\" tab in Epic.    "

## 2024-12-09 ENCOUNTER — THERAPY VISIT (OUTPATIENT)
Dept: OCCUPATIONAL THERAPY | Facility: CLINIC | Age: 59
End: 2024-12-09
Payer: COMMERCIAL

## 2024-12-09 ENCOUNTER — HOSPITAL ENCOUNTER (OUTPATIENT)
Dept: BEHAVIORAL HEALTH | Facility: CLINIC | Age: 59
End: 2024-12-09
Attending: PSYCHIATRY & NEUROLOGY
Payer: COMMERCIAL

## 2024-12-09 DIAGNOSIS — F33.2 SEVERE EPISODE OF RECURRENT MAJOR DEPRESSIVE DISORDER, WITHOUT PSYCHOTIC FEATURES (H): Primary | ICD-10-CM

## 2024-12-09 DIAGNOSIS — M79.645 THUMB PAIN, LEFT: Primary | ICD-10-CM

## 2024-12-09 PROCEDURE — 97035 APP MDLTY 1+ULTRASOUND EA 15: CPT | Mod: GO | Performed by: OCCUPATIONAL THERAPIST

## 2024-12-09 PROCEDURE — 97140 MANUAL THERAPY 1/> REGIONS: CPT | Mod: GO | Performed by: OCCUPATIONAL THERAPIST

## 2024-12-09 PROCEDURE — 90853 GROUP PSYCHOTHERAPY: CPT | Performed by: SOCIAL WORKER

## 2024-12-09 PROCEDURE — 90853 GROUP PSYCHOTHERAPY: CPT

## 2024-12-09 NOTE — PROGRESS NOTES
[facility] Psychiatry Services [facility]  December 10, 2024      Behavioral Health Clinician Progress Note    Patient Name: Marbella Hendrix           Service Type:  Individual      Service Location:   [facility] / Email (patient reached)     Session Start Time: 11am  Session End Time: 1114am      Session Length: 14min     Attendees: Client     Service Modality:  Video Visit:      Provider verified identity through the following two step process.  Patient provided:  Patient is known previously to provider    Telemedicine Visit: The patient's condition can be safely assessed and treated via synchronous audio and visual telemedicine encounter.      Reason for Telemedicine Visit: Services only offered telehealth    Originating Site (Patient Location): Patient's home    Distant Site (Provider Location): Provider Remote Setting- Home Office    Consent:  The patient/guardian has verbally consented to: the potential risks and benefits of telemedicine (video visit) versus in person care; bill my insurance or make self-payment for services provided; and responsibility for payment of non-covered services.     Patient would like the video invitation sent by:  My Chart    Mode of Communication:  Video Conference via [facility]    Distant Location (Provider):  Off-site    As the provider I attest to compliance with applicable laws and regulations related to telemedicine.    Visit Activities (Refresh list every visit): [facility] Only    Diagnostic Assessment Date: 9/25/24 Bao Elliott LP  Treatment Plan Review Date: 12/10/24  See Flowsheets for today's PHQ-9 and BERTHA-7 results  Previous PHQ-9:       10/2/2024     2:25 PM 10/11/2024     1:01 PM 12/10/2024    10:54 AM   PHQ-9 SCORE   PHQ-9 Total Score [facility] 19 (Moderately severe depression)  9 (Mild depression)   PHQ-9 Total Score 19 18 9        Patient-reported     Previous BERTHA-7:       9/23/2024    11:35 AM 10/11/2024     1:01 PM 10/24/2024    11:08 AM   BERTHA-7  SCORE   Total Score 21 (severe anxiety)  12 (moderate anxiety)   Total Score 21 19 12        Patient-reported       DATA  Extended Session (60+ minutes): No  Interactive Complexity: No  Crisis: No  BH Patient: No    Treatment Objective(s) Addressed in This Session:  Target Behavior(s): disease management/lifestyle changes reduce sx of anxiety    Anxiety: will experience a reduction in anxiety      Current Stressors / Issues:  MH update:  Feeling much better. Mildly anxious, back to baseline.  Functioning, getting tasks and ADL's done.  Stresses:  son is returning for 4 weeks for the holdays.  Appetite:  putting weight back on from Zyprexa  Sleep: Back to baseline.    Outpatient Provider updates: Nearing IOP completion next week.  Going to do step down program.  [facility]. Attends [employer].  Thinking about [facility] support groups.  SI/SIB/HI:  Fleeting and passive ideation  BRAD:  Denies  Side effects/compliance:  Interventions:  C engaged in discussion about support options  Most important:  Forgot about metformin. Putting weight back on.  MH doing really well.     10/29  MH update:  Feeling better.  Processing grief of cat, son being at college.  Discussion of protection of feelings.  Sadness of son returning college.   Stresses:  looking at jobs, not applying  Appetite: n/a  Sleep: not sleeping only 1-2 a week instead of daily.  Outpatient Provider updates: Started IOP; [facility]  SI/SIB/HI:  Passive suicidal ideation, no plan/intent.  Fleeting. No previous attempts.  Safety plan on file.  BRAD:  Denies  Side effects/compliance:  Interventions:  [facility] engaged discussed grief and loss/modeling of emotions  Most important:  Very fearful of meds being taken away    10/2  MH update: back to working out.  Sadness, overwhelmed.  Feeling alienated.  Maybe going to family dinner.  Watch services online tomorrow.  Wasn't invited to a friends house as before.  Going to future services  "to build connection.  Anxiety is better.  Klonopin really helps.  More sadness, change of season.  Wants to work, but recognizes she can't due to need to do program.  Stresses: as above  Appetite: n/a  Sleep: sleeping 10-5, full night   Outpatient Provider updates: waitlisted for IOP maybe 2-3 weeks yet.  [facility]  SI/SIB/HI:  Passive suicidal ideation, no plan/intent.  Fleeting. No previous attempts.  Safety plan reviewed.  Future and goal oriented.  BRAD:  Denies  Side effects/compliance:  Interventions:  [facility] discussed sense of building connection and loss along with areas for self care  Most important:  Sleeping better, fearful of Klonopin being taken away.  Anxiety better.  More depression.      MH update:  Klonopin has been helpful, started yesterday.  Slept a whole night last night.  Napped today.  Doesn't have the energy to exercise.  Panic was worsening.  Tried to see a friend [place] at the cabin, came home early- couldn't sleep in same room, dog overbearing, friend kept trying to \"fix her\".  Feels unsafe driving with the panic.  Feeling down, sadness.   Stresses:  Mom  in April.  Youngest son moved away to college ([place]).  Cat -spent significant money and time trying to save him.  Loss of control, feels like she is always worried about his safety.  Other son, is really struggling with life functioning/ASD- tried being an .  Appetite: n/a  Sleep: Slept last night  Outpatient Provider updates: [facility]. DA update  for PHP/IOP  SI/SIB/HI:  Passive suicidal ideation, no plan/intent.  Fleeting. No previous attempts.  Safety plan reviewed.  Future and goal oriented.  BRAD:  Denies  Side effects/compliance:  Interventions:  [facility] engaged in discussion about levels of care  Most important:  Klonopin helps!      MH update:  Depression and anxiety worse.  Panic attacks regularly- taking Vistaril makes her fatigued.  Went to [facility], felt " "helpful.  Considering going back for support.  Stresses:  Mom  in April.  Youngest son moved away to college ([place]).  Cat -spent significant money and time trying to save him.  Loss of control, feels like she is always worried about his safety. Applying for jobs.  Other son, is really struggling with life functioning/ASD- tried being an .  Appetite:   Sleep: 2 nights a week, Zyprexa works 6-7 hours.  Other nights, only getting 4 hours with Ambien, Melatonin, Zyprexa.  About a month like this.  Outpatient Provider updates: Doing acupuncture. [facility].  SI/SIB/HI: Passive suicidal ideation, no plan/intent.  Fleeting. No previous attempts.  Safety plan reviewed.  Future and goal oriented.  BRAD:  Denies  Side effects/compliance:  Interventions:   [facility] reviewed safety plan and emergency services as needed.  Pt notes understanding and awareness.    Most important:  Not sleeping, not functioning.  Sx much worse.  Passive SI returned.      MH update:  Sx feel stable.  No acute depression/anxiety/panic.  Stresses:  Lots of stress with kiddos/spouse  Appetite: Weight gain-med side constantly hungry  Sleep: Denies  Outpatient Provider updates:   [facility]- taking a break.  Got a list of new therapist  SI/SIB/HI: Denies  BRAD: Denies  Side effects/compliance: N/a  Interventions:  [facility] offered space and validation for any concerns  Most important:  Feels stable.      MH update: Things are going well.  On the  med for anxiety.  Really like it.  Anxiety feels manageable.  Freak out daily but not every moment.  Denies any panic attacks.  Feeling behind the \"8 ball\".  [place] due to feeling uncomfortable from constipation.  Denies any overwhelming feelings of depression, hopelessness, worthlessness, etc. .  Stresses:  Holidays were fun, sad its over.  Got to see a friend from out of town.  Arthritic hand worse in the cold notes she needs to be wearing her hand brace.  Going to " [employer], just completed prior to appt.   Unemployed hasn't been looking due to transportation of child.  Would like to work.  Appetite: Denies  Sleep: Feeling pleased.  Sleep much improved. Having dreams.    Outpatient Provider updates: Therapist once a month [facility], appt next week.    SI/SIB/HI: Denies  BRAD: Denies  Preg: n/a  Side effects/compliance: constipated, uncomfortable and bloated.  Interventions: [facility] supported on going anxiety reduction skill use  Most important: Constipation side effects     Progress on Treatment Objective(s) / Homework:  New Objective established this session - ACTION (Actively working towards change); Intervened by reinforcing change plan / affirming steps taken    Motivational Interviewing    MI Intervention: Co-Developed Goal: reduce on going sx of anxiety, Expressed Empathy/Understanding, Open-ended questions, and Reflections: simple and complex     Change Talk Expressed by the Patient: Desire to change Ability to change Activation Taking steps    Provider Response to Change Talk: A - Affirmed patient's thoughts, decisions, or attempts at behavior change and R - Reflected patient's change talk    Assessments completed prior to visit:    The following assessments were completed by patient for this visit:  [place] Suicide Severity Rating Scale (Lifetime/Recent)      7/17/2023     9:35 AM 1/2/2024    11:33 AM 8/14/2024     4:55 PM 8/14/2024     5:45 PM 8/14/2024     6:49 PM 9/25/2024     7:00 AM   [place] Suicide Severity Rating (Lifetime/Recent)   Q1 Wish to be Dead (Lifetime)     Yes    Q2 Non-Specific Active Suicidal Thoughts (Lifetime)     No    Q1 Wished to be Dead (Past Month)   0-->no 1-->yes     Q2 Suicidal Thoughts (Past Month)   0-->no 0-->no     Q6 Suicide Behavior (Lifetime)   0-->no 0-->no 0-->no    Level of Risk per Screen   no risks indicated low risk     Q1 Wish to be Dead (Lifetime) Y N    Y   Wish to be Dead Description (Lifetime) Pt reports having thoughts  and never acted on them. They report the last time was May or June 2023.     passive thoughts no men or plan   1. Wish to be Dead (Past 1 Month)      Y   Wish to be Dead Description (Past 1 Month)      passive thoughts no mean or plan   Q2 Non-Specific Active Suicidal Thoughts (Lifetime) Y N    N   Non-Specific Active Suicidal Thought Description (Lifetime) Pt reports that they had a very stressful time with many life evetns in April and this impacted these thoughts.        2. Non-Specific Active Suicidal Thoughts (Past 1 Month) N        3. Active Suicidal Ideation with any Methods (Not Plan) Without Intent to Act (Lifetime) N    N    Q4 Active Suicidal Ideation with Some Intent to Act, Without Specific Plan (Lifetime) N    N    Q5 Active Suicidal Ideation with Specific Plan and Intent (Lifetime) N    N    Most Severe Ideation Rating (Lifetime) 2        Description of Most Severe Ideation (Lifetime) Pt reports having intrusive thoughts while driving and reports that they did not act on these and have their children and will never act on them.        Frequency (Lifetime) 3        Duration (Lifetime) 1        Controllability (Lifetime) 1        Deterrents (Lifetime) 1        Reasons for Ideation (Lifetime) 0        Actual Attempt (Lifetime) N N   N    Has subject engaged in non-suicidal self-injurious behavior? (Lifetime) N N   N    Interrupted Attempts (Lifetime) N N   N    Aborted or Self-Interrupted Attempt (Lifetime) N N   N    Preparatory Acts or Behavior (Lifetime) N N   N    Calculated C-SSRS Risk Score (Lifetime/Recent) No Risk Indicated No Risk Indicated   No Risk Indicated Low Risk       Care Plan review completed: Yes    Medication Review:  Changes to psychiatric medications, see updated Medication List in EPIC.     Medication Compliance:  Yes    Changes in Health Issues:   None reported    Chemical Use Review:   Substance Use: Chemical use reviewed, no active concerns identified      Tobacco Use: No  current tobacco use.      Assessment: Current Emotional / Mental Status (status of significant symptoms):  Risk status (Self / Other harm or suicidal ideation)  Patient has had a history of suicidal ideation: reports a hx of ideation without specific planning/intent/action back in 2022  Patient denies current fears or concerns for personal safety.  Patient denies current or recent suicidal ideation or behaviors.  Patient denies current or recent homicidal ideation or behaviors.  Patient denies current or recent self injurious behavior or ideation.  Patient denies other safety concerns.  A safety and risk management plan has been developed including: Patient consented to co-developed safety plan.  A safety and risk management plan was completed.  Patient agreed to use safety plan should any safety concerns arise.  A copy was given to the patient.    Appearance:   Appropriate   Eye Contact:   Good   Psychomotor Behavior: Normal   Attitude:   Cooperative   Orientation:   All  Speech   Rate / Production: Normal    Volume:  Normal   Mood:    Normal   Affect:    Appropriate   Thought Content:  Clear   Thought Form:  Coherent  Logical   Insight:    Good     Diagnoses:  1. Moderate episode of recurrent major depressive disorder (H)    2. Generalized anxiety disorder    3. Attention deficit hyperactivity disorder (ADHD), unspecified ADHD type        Collateral Reports Completed:  Communicated with: Dr Lewis    Plan: (Homework, other):  Patient was given information about behavioral services and encouraged to schedule a follow up appointment with the clinic [facility] as needed.  She was also given information about mental health symptoms and treatment options .  CD Recommendations: No indications of CD issues.     Rafaela Hassan [facility]        Individual Treatment Plan    Patient's Name: Marbella Hendrix   YOB: 1965  Date of Creation: 9/11/24  Date Treatment Plan Last Reviewed/Revised: 12/10/24    DSM5 Diagnoses:  "  1. Generalized anxiety disorder    2. Moderate episode of recurrent major depressive disorder (H)    3. Attention deficit hyperactivity disorder (ADHD), unspecified ADHD type        Psychosocial / Contextual Factors: Relationship Concerns, Occupational Issues, and Interpersonal Concerns  PROMIS (reviewed every 90 days):   The following assessments were completed by patient for this visit:  PROMIS 10-Global Health (only subscores and total score):       11/7/2023     4:23 PM 1/2/2024    10:47 AM 4/2/2024     7:21 AM 9/7/2024    12:17 PM 9/23/2024    11:37 AM 10/11/2024     1:01 PM 12/10/2024    10:54 AM   PROMIS-10 Scores Only   Global Mental Health Score 12 14 13    13 9    9 8    8 7 13   Global Physical Health Score 14 13 14    14 12    12 12    12 13 16   PROMIS TOTAL - SUBSCORES 26 27 27    27 21    21 20    20 20 29        Referral / Collaboration:  Referral to another professional/service is not indicated at this time..    Anticipated number of session for this episode of care: 6-9 sessions  Anticipation frequency of session: Monthly  Anticipated Duration of each session: 16-37 minutes  Treatment plan will be reviewed in 90 days or when goals have been changed.       MeasurableTreatment Goal(s) related to diagnosis / functional impairment(s)  Goal 1: Patient will reduce sx of anxiety   \"I will know I've met my goal when I can sleep and clearly think through my thoughts to use my skills.\"     Objective #A (Patient Action)    Patient will identify three distraction and diversion activities and use those activities to decrease level of anxiety    Status: Continued - Date(s):12/10/24    Intervention(s)  [facility] will provide support through CBT, MI, Acceptance and Commitment Therapy, Dialectic Behavioral Therapy and problem solving model to explore and overcome barriers.    Goal 2: Patient will manage concerns of safety    I will know I've met my goal when I can reduce suicidal ideation .      Objective #A (Patient " Action)    Patient will use previously developed safety plan on file.  Status: Cont - Date: 9/11/24 , 12/10/24    Intervention(s)  Therapist will provide support through CBT, MI, Acceptance and Commitment Therapy, Dialectic Behavioral Therapy and problem solving model to explore and overcome barriers.      Patient has reviewed and agreed to the above plan.    Written by  Rafaela Hassan LPCC, [facility]

## 2024-12-09 NOTE — GROUP NOTE
Psychoeducation Group Note    PATIENT'S NAME: Marbella Hendrix  MRN:   [id number]  :   1965  ACCT. NUMBER: [id number]  DATE OF SERVICE: 24  START TIME:  3:00 PM  END TIME:  3:50 PM  FACILITATOR: Minna Ambrocio LICSW; Chhaya Myrick RN  TOPIC:  Wellness Group: [facility] Adult Mental Health Outpatient Programs  TRACK: IOP/DT 3    NUMBER OF PARTICIPANTS: 4    Summary of Group / Topics Discussed:  Foundations of Health: Sleep: Sleep hygiene: Patients explored the connection between sleep and mental illness. Patients learned about how adequate sleep can improve health, productivity, wellness, quality of life, and safety.     Patient Session Goals / Objectives:  Demonstrated understanding of sleep hygiene practices and benefits of sleep  Identified sleep hygiene strategies to utilize   Described the connection between sleep disturbances and mental illness      Patient Participation / Response:  Fully participated with the group by sharing personal reflections / insights and openly received / provided feedback with other participants.    Demonstrated understanding of topics discussed through group discussion and participation and Verbalized understanding of foundations of health topic    Treatment Plan:  Patient has a current master individualized treatment plan.  See Epic treatment plan for more information.    ZUHAIR Angela

## 2024-12-09 NOTE — GROUP NOTE
Psychoeducation Group Note    PATIENT'S NAME: Marbella Hendrix  MRN:   [id number]  :   1965  ACCT. NUMBER: [id number]  DATE OF SERVICE: 24  START TIME:  2:00 PM  END TIME:  2:50 PM  FACILITATOR: Vivian Chapman LSW  TOPIC:  Group: Lifestyle Balance and Structure  [facility] Adult Mental Health Outpatient Programs  TRACK: IOP/DT 3    NUMBER OF PARTICIPANTS: 4    Summary of Group / Topics Discussed:  Lifestyle Balance and Structure: Goal-setting & integration: To support progress towards treatment goals and psychiatric recovery, group members were led through a weekly structured process to reflect on progress, integrate new learning/skills, and emerging self-awareness into their daily and weekly life. Provided psychoeducation on the neuroscience of change, self-compassion, and the anatomy of a SMART goal to the group. Facilitated the sharing of individual goals with validation, support, and feedback provided.    Patient Session Goals / Objectives:  Reflected on and create a vision for recovery and wellbeing  Identified and wrote 3 SMART goals for the week  Identified and problem solved barriers to achieving identified goals   Identified plan to support follow through on goals and reflection on progress made      Patient Participation / Response:  Fully participated with the group by sharing personal reflections / insights and openly received / provided feedback with other participants.    Patient presentation: Marbella participated in the group discussion around goals and shared information from her treatment plan.      Treatment Plan:  Patient has a current master individualized treatment plan.  See Epic treatment plan for more information.    GILMAR Corrigan

## 2024-12-10 ENCOUNTER — VIRTUAL VISIT (OUTPATIENT)
Dept: PSYCHIATRY | Facility: CLINIC | Age: 59
End: 2024-12-10
Payer: COMMERCIAL

## 2024-12-10 ENCOUNTER — VIRTUAL VISIT (OUTPATIENT)
Dept: BEHAVIORAL HEALTH | Facility: CLINIC | Age: 59
End: 2024-12-10
Payer: COMMERCIAL

## 2024-12-10 DIAGNOSIS — Z79.899 ENCOUNTER FOR LONG-TERM (CURRENT) USE OF MEDICATIONS: ICD-10-CM

## 2024-12-10 DIAGNOSIS — F41.1 GENERALIZED ANXIETY DISORDER: ICD-10-CM

## 2024-12-10 DIAGNOSIS — F33.1 MODERATE EPISODE OF RECURRENT MAJOR DEPRESSIVE DISORDER (H): Primary | ICD-10-CM

## 2024-12-10 DIAGNOSIS — F90.9 ATTENTION DEFICIT HYPERACTIVITY DISORDER (ADHD), UNSPECIFIED ADHD TYPE: Primary | ICD-10-CM

## 2024-12-10 DIAGNOSIS — G47.00 INSOMNIA, UNSPECIFIED TYPE: ICD-10-CM

## 2024-12-10 DIAGNOSIS — F33.41 RECURRENT MAJOR DEPRESSIVE DISORDER, IN PARTIAL REMISSION (H): ICD-10-CM

## 2024-12-10 DIAGNOSIS — F90.9 ATTENTION DEFICIT HYPERACTIVITY DISORDER (ADHD), UNSPECIFIED ADHD TYPE: ICD-10-CM

## 2024-12-10 PROCEDURE — 99207 PR NO CHARGE LOS: CPT | Mod: 95 | Performed by: COUNSELOR

## 2024-12-10 PROCEDURE — G2211 COMPLEX E/M VISIT ADD ON: HCPCS | Mod: 95 | Performed by: PSYCHIATRY & NEUROLOGY

## 2024-12-10 PROCEDURE — 99214 OFFICE O/P EST MOD 30 MIN: CPT | Mod: 95 | Performed by: PSYCHIATRY & NEUROLOGY

## 2024-12-10 RX ORDER — VENLAFAXINE HYDROCHLORIDE 150 MG/1
150 CAPSULE, EXTENDED RELEASE ORAL DAILY
Qty: 90 CAPSULE | Refills: 1 | Status: SHIPPED | OUTPATIENT
Start: 2024-12-10

## 2024-12-10 RX ORDER — OLANZAPINE 5 MG/1
TABLET ORAL
COMMUNITY
Start: 2024-11-21

## 2024-12-10 ASSESSMENT — PATIENT HEALTH QUESTIONNAIRE - PHQ9
SUM OF ALL RESPONSES TO PHQ QUESTIONS 1-9: 9
10. IF YOU CHECKED OFF ANY PROBLEMS, HOW DIFFICULT HAVE THESE PROBLEMS MADE IT FOR YOU TO DO YOUR WORK, TAKE CARE OF THINGS AT HOME, OR GET ALONG WITH OTHER PEOPLE: SOMEWHAT DIFFICULT
SUM OF ALL RESPONSES TO PHQ QUESTIONS 1-9: 9
10. IF YOU CHECKED OFF ANY PROBLEMS, HOW DIFFICULT HAVE THESE PROBLEMS MADE IT FOR YOU TO DO YOUR WORK, TAKE CARE OF THINGS AT HOME, OR GET ALONG WITH OTHER PEOPLE: SOMEWHAT DIFFICULT
SUM OF ALL RESPONSES TO PHQ QUESTIONS 1-9: 9
SUM OF ALL RESPONSES TO PHQ QUESTIONS 1-9: 9

## 2024-12-10 ASSESSMENT — PAIN SCALES - GENERAL: PAINLEVEL_OUTOF10: MODERATE PAIN (5)

## 2024-12-10 NOTE — PATIENT INSTRUCTIONS
Treatment Plan:  Continue Effexor-XR/venlafaxine  mg daily for anxiety/mood.  Continue Lunesta 1-3 mg at bedtime as needed for sleep. Rx previously sent for the 2 mg tablets. Be sure to devote at least 8 hours to sleep after taking before operating motor vehicle or other heavy or sharp equipment. Do NOT mix with alcohol.   Continue olanzapine 5 mg at bedtime for sleep, anxiety.   Continue hydroxyzine 12.5-25 mg at bedtime as needed for sleep.   Start metformin ER to help mitigate metabolic side effects from olanzapine and due to elevated A1c: take 500 mg at bedtime with dinner for 1-2 weeks then increase to 1000 mg as tolerated.   Strongly recommend continuing with step-down IOP.  Follow up with me in 1 month.  For scheduling needs you can call [phone].  You could also get a message to the nurses by calling the aforementioned phone number.  Continue all other cares per primary care provider.   Continue all other medications as reviewed per electronic medical record today.   Safety plan reviewed. To the Emergency Department as needed or call after hours crisis line at [phone] or [phone]. [place] Crisis Text Line. Text [place] to 813022 or Suicide LifeLine Chat: suicidepreventionlifeline.org/chat  Follow up with primary care provider as planned or for acute medical concerns.  [employer] may be used to communicate with your provider, but this is not intended to be used for emergencies.

## 2024-12-10 NOTE — PROGRESS NOTES
"Telemedicine Visit: The patient's condition can be safely assessed and treated via synchronous audio and visual telemedicine encounter.      Reason for Telemedicine Visit: Patient has requested telehealth visit    Originating Site (Patient Location): Patient's home    Distant Location (provider location):  Off-Site    Consent:  The patient/guardian has verbally consented to: the potential risks and benefits of telemedicine (video visit) versus in person care; bill my insurance or make self-payment for services provided; and responsibility for payment of non-covered services.     Mode of Communication:  Video Conference via [employer]    As the provider I attest to compliance with applicable laws and regulations related to telemedicine.          Outpatient Psychiatric Progress Note    Name: Marbella Hendrix   : 1965                    Primary Care Provider: Vanessa Ladd MD   Therapist: Doing acupuncture. [facility].  Currently in [facility].       PHQ-9 scores:      10/2/2024     2:25 PM 10/11/2024     1:01 PM 12/10/2024    10:54 AM   PHQ-9 SCORE   PHQ-9 Total Score MyChart 19 (Moderately severe depression)  9 (Mild depression)   PHQ-9 Total Score 19 18 9        Patient-reported       BERTHA-7 scores:      2024    11:35 AM 10/11/2024     1:01 PM 10/24/2024    11:08 AM   BERTHA-7 SCORE   Total Score 21 (severe anxiety)  12 (moderate anxiety)   Total Score 21 19 12        Patient-reported       Patient Identification:  Patient is a 59 year old,   White Not  or  female  who presents for return visit with me.  Patient is currently unemployed. Patient attended the phone/video session with , Wally.  Patient prefers to be called: \"Marbella\".    Interim History:  I last saw Marbella Hendrix for outpatient psychiatry return visit on 10/29/2024. During that appointment, we:    Start BusPar/buspirone anxiety: take 5 mg twice daily for 1 week then increase to " 10 mg twice daily as tolerated.   Continue Effexor-XR/venlafaxine  mg daily for anxiety/mood.  Continue clonazepam 0.5-1 mg twice daily for severe anxiety.  Okay to decrease the dose if feeling too tired or sedated during the day. (SEND THIS RX TO [place])  Strongly recommend continuing with PHP/IOP.  Follow up with me in 1 month.  For scheduling needs you can call [phone].  You could also get a message to the nurses by calling the aforementioned phone number.  Continue all other cares per primary care provider.     Since last visit-patient started on Lunesta and clonazepam and Ambien discontinued.  Patient also restarted olanzapine and hydroxyzine.    12/10: Patient overall doing quite a bit better.  Feels mostly back to baseline.  Still some anxiety but able to manage much better and also sleeping much better.  When gets up in AM takes effexor-xr and synthroid. Before bed taking olanzapine and half a hydroxyzine and 5 mg melatonin. No more Klonopin.  Has only used Lunesta 3-4 times since prescribed.  Feels like it has been helpful to times she has needed to use the medication.  Tolerating well with no major negative side effects.  No acute safety concerns.  No SI.  No problematic drug or alcohol use.  See [facility] note below for additional details.    [facility], during today's team-based visit:  Current Stressors / Issues:  MH update:  Feeling much better. Mildly anxious, back to baseline.  Functioning, getting tasks and ADL's done.  Stresses:  son is returning for 4 weeks for the holdays.  Appetite:  putting weight back on from Zyprexa  Sleep: Back to baseline.    Outpatient Provider updates: Nearing IOP completion next week.  Going to do step down program.  [facility]. Attends [employer].  Thinking about [employer] support groups.  SI/SIB/HI:  Fleeting and passive ideation  BRAD:  Denies  Side effects/compliance:  Interventions:  [facility] engaged in discussion about support options  Most  important:  Forgot about metformin. Putting weight back on.  MH doing really well.    Past Psychiatric Med Trials:  Psych Meds at Intake:  Paxil 20 mg daily - started when 29 years, has been up and down on the dose, on 10 mg dose mostly, last on 20 mg this past spring   Ambien 5 mg for insomnia - used for 4 nights  Ativan 0.5 mg for flying     Past Psych Meds:  Prozac for 1-2 months maybe when 27 yo    Psychiatric ROS:  Marbella Hendrix reports mood has been: See HPI above  Anxiety has been: See HPI above  Sleep has been: See HPI above  Rachna sxs: None  Psychosis sxs: None  ADHD/ADD sxs: see HPI above  PTSD sxs: NA  PHQ9 and GAD7 scores were reviewed today if completed.   Medication side effects: See HPI above  Current stressors include: Symptoms and see HPI above  Coping mechanisms and supports include: Family and Hobbies, IOP    Current medications include:   Current Outpatient Medications   Medication Sig Dispense Refill    OLANZapine (ZYPREXA) 5 MG tablet       acetaminophen (TYLENOL) 500 MG tablet Take 500-1,000 mg by mouth every 6 hours as needed for mild pain.      atorvastatin (LIPITOR) 20 MG tablet Take 1 tablet (20 mg) by mouth daily. 90 tablet 2    calcium carbonate (OS-ARA) 500 MG tablet Take 1 tablet by mouth daily.      cholecalciferol (VITAMIN D3) 25 mcg (1000 units) capsule Take 1 capsule by mouth daily.      eszopiclone (LUNESTA) 2 MG tablet Take 1 tablet (2 mg) by mouth nightly as needed for sleep. 30 tablet 2    hydrOXYzine HCl (ATARAX) 25 MG tablet Take 0.5-1 tablets (12.5-25 mg) by mouth 2 times daily as needed for anxiety. 120 tablet 1    levothyroxine (SYNTHROID/LEVOTHROID) 125 MCG tablet Take 1 tablet (125 mcg) by mouth daily 90 tablet 1    melatonin 5 MG tablet Take 5 mg by mouth nightly as needed for sleep.      metFORMIN (GLUCOPHAGE XR) 500 MG 24 hr tablet Take 2 tablets (1,000 mg) by mouth daily (with dinner). Start with ONE tab for first 1-2 weeks. 180 tablet 1    naproxen sodium 220  MG capsule Take 220 mg by mouth 2 times daily (with meals).      Omega-3 Fatty Acids (OMEGA-3 FISH OIL PO) Take 1 g by mouth daily      venlafaxine (EFFEXOR XR) 150 MG 24 hr capsule Take 1 capsule (150 mg) by mouth daily 90 capsule 1     No current facility-administered medications for this visit.       The [place] Prescription Monitoring Program has been reviewed and there are no concerns about diversionary activity for controlled substances at this time.   11/27/2024 11/22/2024 1 Eszopiclone 2 Mg Tablet 30.00 30 Al Bau [id number] Sup (7778) 0/2 0.66 [place]   11/15/2024 11/15/2024 1 Eszopiclone 2 Mg Tablet 15.00 15 Al Bau [id number] Sup (7778) 0/0 0.66 [place]   10/29/2024 10/29/2024 1 Clonazepam 1 Mg Tablet 60.00 30 Al Bau [id number] Sup (7778) 0/1 4.00 [place]     Recent Labs   Lab Test 10/04/24  0740 04/23/24  0738   CHOL 152 189   HDL 32* 34*   LDL 97 121*   TRIG 117 172*     Last Comprehensive Metabolic Panel:  Sodium   Date Value Ref Range Status   10/04/2024 142 135 - 145 mmol/L Final   10/22/2012 141 133 - 144 mmol/L Final     Potassium   Date Value Ref Range Status   10/04/2024 5.0 3.4 - 5.3 mmol/L Final   07/08/2022 4.4 3.4 - 5.3 mmol/L Final   10/22/2012 3.8 3.4 - 5.3 mmol/L Final     Chloride   Date Value Ref Range Status   10/04/2024 106 98 - 107 mmol/L Final   07/08/2022 108 94 - 109 mmol/L Final   10/22/2012 105 94 - 109 mmol/L Final     Carbon Dioxide   Date Value Ref Range Status   10/22/2012 25 20 - 32 mmol/L Final     Carbon Dioxide (CO2)   Date Value Ref Range Status   10/04/2024 24 22 - 29 mmol/L Final   07/08/2022 20 20 - 32 mmol/L Final     Anion Gap   Date Value Ref Range Status   10/04/2024 12 7 - 15 mmol/L Final   07/08/2022 10 3 - 14 mmol/L Final   10/22/2012 11 6 - 17 mmol/L Final     Glucose   Date Value Ref Range Status   10/04/2024 96 70 - 99 mg/dL Final   07/08/2022 93 70 - 99 mg/dL Final   07/01/2020 88 70 - 99 mg/dL Final     Comment:     Fasting specimen     Urea  Nitrogen   Date Value Ref Range Status   10/04/2024 8.6 6.0 - 20.0 mg/dL Final   2022 18 7 - 30 mg/dL Final   10/22/2012 25 (H) 5 - 24 mg/dL Final     Creatinine   Date Value Ref Range Status   10/04/2024 0.84 0.51 - 0.95 mg/dL Final   2015 0.65 0.52 - 1.04 mg/dL Final     GFR Estimate   Date Value Ref Range Status   10/04/2024 81 >60 mL/min/1.73m2 Final     Comment:     eGFR calculated using  CKD-EPI equation.   2015 >90  Non  GFR Calc   >60 mL/min/1.7m2 Final     Calcium   Date Value Ref Range Status   10/04/2024 9.7 8.8 - 10.4 mg/dL Final     Comment:     Reference intervals for this test were updated on 2024 to reflect our healthy population more accurately. There may be differences in the flagging of prior results with similar values performed with this method. Those prior results can be interpreted in the context of the updated reference intervals.   10/22/2012 9.2 8.5 - 10.4 mg/dL Final     Bilirubin Total   Date Value Ref Range Status   10/04/2024 0.3 <=1.2 mg/dL Final     Alkaline Phosphatase   Date Value Ref Range Status   10/04/2024 47 40 - 150 U/L Final     ALT   Date Value Ref Range Status   10/04/2024 25 0 - 50 U/L Final   2012 18.0 0.0 - 50.0 U/L Final     AST   Date Value Ref Range Status   10/04/2024 23 0 - 45 U/L Final   2012 25.0 0.0 - 45.0 U/L Final     Past Medical/Surgical History:  Past Medical History:   Diagnosis Date    Arthritis     My mom has it, my grandmother had it I have it    Diabetes (H)     My mom and brother have type II    Heavy menstrual period     Leiomyoma of uterus     s/p myomectomy    Mental disorder     anxiety    PONV (postoperative nausea and vomiting)     Surgical complication after   sept    Thyroid disease     Hypothroidism, 2nd half of     Uncomplicated asthma     My son has it, since he was born    Vesico-ureteral reflux     s/p repair      has a past medical history of Arthritis, Diabetes (H),  Heavy menstrual period, Leiomyoma of uterus, Mental disorder, PONV (postoperative nausea and vomiting), Surgical complication (after  2002), Thyroid disease, Uncomplicated asthma, and Vesico-ureteral reflux.    She has no past medical history of Cancer (H), Cerebral infarction (H), Congestive heart failure (H), COPD (chronic obstructive pulmonary disease) (H), Depressive disorder, Heart disease, History of blood transfusion, or Hypertension.    Social History:  Reviewed. No changes to social history except as noted above in HPI.    Vital Signs:   None. This is phone/video visit.     Labs:  Most recent laboratory results reviewed:  Lab Results   Component Value Date    A1C 5.8 10/04/2024    A1C 5.9 2024    A1C 5.7 2022    A1C 5.7 2021    A1C 5.6 2019    A1C 5.5 2018    A1C 5.5 2017       Review of Systems:  10 systems (general, cardiovascular, respiratory, eyes, ENT, endocrine, GI, , M/S, neurological) were reviewed. Most pertinent finding(s) is/are: Some chronic pain. The remaining systems are all unremarkable.    Mental Status Examination (limited as this is by phone/video):  Appearance: Awake, alert, appears stated age, no acute distress, well-groomed   Attitude:  cooperative  Motor: No gross abnormalities observed via video, not formally tested   Oriented to:  person, place, time, and situation  Attention Span and Concentration:  normal  Speech:  clear, coherent, regular rate, rhythm, and volume  Language: intact  Mood: Better  Affect:  mood congruent  Associations:  no loose associations  Thought Process:  logical, linear and goal oriented  Thought Content:  no evidence of acute suicidality or homicidal ideation, no evidence of psychotic thought, no auditory hallucinations present and no visual hallucinations present  Recent and Remote Memory:  Intact to interview. Not formally assessed. No amnesia.  Fund of Knowledge: appropriate  Insight: Good  Judgment:   intact, adequate for safety  Impulse Control:  intact    Suicide Risk Assessment:  Today Marbella Hendrix has recently reported passive thoughts of death but no acute suicidality or plan or intent to harm self-none today 12/10/24.  See safety plan in chart.  Also see thorough assessment by [facility] during today's joint appointment.  Based on all available evidence including the factors cited above, Marbella Hendrix does not appear to be at imminent risk for self-harm, does not meet criteria for a 72-hr hold, and therefore remains appropriate for ongoing outpatient level of care.  A thorough assessment of risk factors related to suicide and self-harm have been reviewed and are noted above. The patient convincingly denies suicidality on several occasions. Local community safety resources reviewed for patient to use if needed. There was no deceit detected, and the patient presented in a manner that was believable.     DSM5 Diagnosis:  Attention-Deficit/Hyperactivity Disorder  314.01 (F90.9) Unspecified Attention -Deficit / Hyperactivity Disorder  Major Depressive Disorder, Recurrent Episode, in partial remission  300.02 (F41.1) Generalized Anxiety Disorder  Insomnia, unspecified    Medical comorbidities include:   Patient Active Problem List    Diagnosis Date Noted    Recurrent major depressive disorder (H) 10/11/2024     Priority: High    Elevated fasting glucose 05/01/2024     Priority: Medium    Anxiety 07/19/2023     Priority: Medium    Adjustment disorder with anxious mood 06/27/2021     Priority: Medium    Family history of diabetes mellitus 06/27/2021     Priority: Medium    CMC DJD(carpometacarpal degenerative joint disease), localized primary, left 11/18/2020     Priority: Medium    Thumb pain, left 11/18/2020     Priority: Medium    Dermatochalasis of both upper eyelids 11/17/2020     Priority: Medium     Added automatically from request for surgery [id number]      Involutional ptosis, acquired, bilateral  11/17/2020     Priority: Medium     Added automatically from request for surgery [id number]      Weakness of right hip 07/09/2020     Priority: Medium    Primary osteoarthritis of right hip 12/02/2019     Priority: Medium     Added automatically from request for surgery [id number]      Morbid obesity (H) 06/20/2018     Priority: Medium    Status post hysterectomy 08/02/2017     Priority: Medium    Sensation of plugged ear on both sides 07/12/2017     Priority: Medium    Acute post-operative pain 08/28/2015     Priority: Medium    Preventative health care 10/30/2012     Priority: Medium     Last pap NIL 2011; mammogram nl 2011; lipids abnl, needs annual f/u      Generalized anxiety disorder 09/26/2012     Priority: Medium     H/o panic attacks; currently controlled with paxil      Hyperlipidemia LDL goal <130 09/26/2012     Priority: Medium     Behavioral measures - avoiding statins while trying to conceive; not a candidate for red yeast rice (interactions with thyroid meds)      Acquired hypothyroidism 09/26/2012     Priority: Medium    Overweight 09/26/2012     Priority: Medium     On exercise plan, has been in weight watchers  Problem list name updated by automated process. Provider to review         Psychosocial & Contextual Factors: see HPI above    Assessment:  From Intake, 7/17/2023:  Marbella Hendrix is a 57-year-old female with past psychiatric history including anxiety, ADHD, depression, remote polysubstance abuse (in remission for decades) who presents today for psychiatric evaluation.  Patient recently with increased psychosocial stressors and acutely worsening anxiety and insomnia.  Patient most recently on Paxil and Ambien to manage symptoms.  Ambien used sparingly and did help break severe insomnia cycle recently.  Patient currently taking 10 mg of Paxil and has only ever been up to 20 mg historically.  Patient may be interested in a trial with something different than Paxil.  We also discussed further  optimizing Paxil to 30 or 40 mg daily for more therapeutic trial before replacing the medication.  We did discuss Lexapro and Effexor-XR as possible alternatives.  Patient would like to discuss these options with her  who is an internist and her psychotherapist.  In the meantime, we discussed a trial with clonidine to help with sleep, anxiety, and ADHD symptoms.  Discussed risks and benefits of therapy.  Patient would like to move forward with a clonidine trial to help at bedtime as needed for sleep and a small dose during the day as needed for anxiety.  No acute safety concerns today.  No acute suicidality.  No problematic drug or alcohol use.     8/22/2023:  Patient with ongoing significant anxiety and mood related struggles.  Clonidine has not been helpful.  We will transition patient off paroxetine and onto venlafaxine.  We will continue to consider escitalopram as an option if venlafaxine ineffective or poorly tolerated.  Could also consider use of fluoxetine if paroxetine is difficult to discontinue.  Discussed DBT therapy as a potential option to help improve symptoms.  Resources sent in [employer] message.  No acute suicidality.  No acute safety concerns.  No problematic drug or alcohol use.    10/3/2023:  Patient doing okay with transition to venlafaxine ER.  Has had some mild dizziness and headaches.  We will continue to optimize venlafaxine ER and patient will watch for worsening headaches and dizziness.  Her symptoms could be related to discontinuation of paroxetine.  Patient still struggling significantly in the evenings and with insomnia.  We will trial quetiapine at bedtime as needed for sleep.  Discussed risks and benefits of therapy including watching for any abnormal involuntary movements.  If patient continues on the medication, we will make sure to get updated lipid panel and fasting glucose.  Could also consider mirtazapine as an option.  No acute safety concerns.  No acute suicidality.  No  problematic drug or alcohol use.    11/14/2023:  Patient continuing to work with sleep medicine with pending at home sleep study results.  Quetiapine has been helpful for sleep at just 12.5 mg at bedtime.  Does cause some significant cognitive clouding during the day.  Patient does feel benefits outweigh risks but is willing to trial olanzapine to see if gets similar benefit without such heavy cognitive effects.  Venlafaxine has been helpful for mood and anxiety symptoms.  Still could consider mirtazapine as an option for sleep as needed.  No acute safety concerns.  No SI.  No problematic drug or alcohol use.    1/2/2024:  Patient currently doing quite well on olanzapine.  Finds it more helpful and better tolerated than quetiapine.  Discussed risks and benefits and side effects to watch out for.  Mild constipation-encouraged to utilize MiraLAX and/or Dulcolax.  No acute safety concerns.  No SI.  No problematic drug or alcohol use.  No abnormal involuntary movements.    4/8/2024:  Apart from some of the metabolic effects of olanzapine, patient otherwise quite stable.  Patient opts to try to address metabolic side effects of olanzapine with metformin.  Discussed there is evidence to support use of metformin to help treat and also to help decrease risk for metabolic syndrome from the medication use.  Discussed risks and benefits of metformin use.  Due to stability of mental health symptoms, psychiatric care be returned back to primary care provider.  Also encouraged primary care provider to continue to monitor metabolic status and need for metformin.  Patient continues to watch diet and stay active.  Updated labs ordered for primary care provider for baseline since starting metformin XR.  Last liver enzymes looked okay when checked in June 2023.  Patient also denies any abnormal involuntary movements.  No acute safety concerns.  No SI.  No problematic drug or alcohol use.    9/11/2024 (New episode of care initiated  today after bounce-back):  Patient struggling more significantly with anxiety and insomnia.  Even up to 10 mg of olanzapine was not very helpful.  Will discontinue olanzapine and trial mirtazapine.  Ambien is effective and so patient instructed to take before bedtime nightly for 1-2 weeks to allow for reregulation of sleep schedule.  Could consider further optimization of Effexor-XR in the future as well.  No acute safety concerns.  No acute suicidality, although does have some passive fleeting thoughts with no plan or intent to harm self.  No problematic drug or alcohol use.  Psychotherapy encouraged.    9/24/2024:  Patient with severely worsening anxiety.  Started clonazepam scheduled since last visit.  This will be continued since to started yesterday.  Venlafaxine will be increased to 225 mg daily.  Patient scheduled for intake for [facility]/IOP tomorrow.  Patient should not be traveling in her current condition and also to allow time for intensive outpatient programming.  Letter was provided due to their upcoming travel plans.  No acute safety concerns.  No acute suicidality.  No problematic drug or alcohol use.    10/02/2024:  Patient doing a little bit better on decreased venlafaxine and daily clonazepam.  No changes today.  Patient will continue to monitor mood/depression symptoms  Hopefully will be able to start intensive outpatient programming soon.  No acute safety concerns.  No acute suicidality.  No problematic drug or alcohol use.    10/29/2024:  Overall continuing to feel improvement of symptoms.  Taking clonazepam 1.5 mg at bedtime for sleep.  Worried about not being able to have clonazepam and not sleeping again.  For now clonazepam will be continued.  Patient is agreeable to starting buspirone to see if it might further improve anxiety to at some point reduce reliance on clonazepam.  Encouraged to continue in IOP.  No acute safety concerns.  No SI.  No problematic drug or alcohol  use.    12/10/2024:  Patient overall doing quite well.  He feels back to a baseline.  Still some ongoing anxiety but much more manageable and sleeping well.  Using olanzapine, hydroxyzine, and as needed Lunesta at bedtime to help with sleep.  Tolerating well but does have some increased appetite and weight gain from olanzapine.  Patient has not yet started metformin that was sent to help mitigate metabolic effects from olanzapine.  Patient also with slightly elevated hemoglobin A1c.  Patient will now start metformin.  May be a good candidate for GLP-1 injections, like tirzepatide.  No acute safety concerns.  No SI.  No problematic drug or alcohol use.    Medication side effects and alternatives were reviewed. Health promotion activities recommended and reviewed today. All questions addressed. Education and counseling completed regarding risks and benefits of medications and psychotherapy options. Recommend therapy for additional support.     Treatment Plan:  Continue Effexor-XR/venlafaxine  mg daily for anxiety/mood.  Continue Lunesta 1-3 mg at bedtime as needed for sleep. Rx previously sent for the 2 mg tablets. Be sure to devote at least 8 hours to sleep after taking before operating motor vehicle or other heavy or sharp equipment. Do NOT mix with alcohol.   Continue olanzapine 5 mg at bedtime for sleep, anxiety.   Continue hydroxyzine 12.5-25 mg at bedtime as needed for sleep.   Start metformin ER to help mitigate metabolic side effects from olanzapine and due to elevated A1c: take 500 mg at bedtime with dinner for 1-2 weeks then increase to 1000 mg as tolerated.   Strongly recommend continuing with step-down IOP.  Follow up with me in 1 month.  For scheduling needs you can call [phone].  You could also get a message to the nurses by calling the aforementioned phone number.  Continue all other cares per primary care provider.   Continue all other medications as reviewed per electronic medical record  today.   Safety plan reviewed. To the Emergency Department as needed or call after hours crisis line at [phone] or [phone]. [place] Crisis Text Line. Text [place] to 703489 or Suicide LifeLine Chat: suicidepreventionlifeline.org/chat  Follow up with primary care provider as planned or for acute medical concerns.  MyChart may be used to communicate with your provider, but this is not intended to be used for emergencies.    Administrative Billing:   Phone Call/Video Duration: 25 Minutes  Start: 11:25a  Stop: 11:50a    Patient Status:  Patient will continue to be seen in collaborative care for stabilization.    The longitudinal plan of care for the diagnosis(es)/condition(s) as documented were addressed during this visit. Due to the added complexity in care, I will continue to support Marbella in the subsequent management and with ongoing continuity of care.    Episode of Care #: 5 (New episode of care started 9/11/24)    Signed:   Christi Lewis DO  [facility]    Disclaimer: This note consists of symbols derived from keyboarding, dictation and/or voice recognition software. As a result, there may be errors in the script that have gone undetected. Please consider this when interpreting information found in this chart.

## 2024-12-10 NOTE — NURSING NOTE
Is the patient currently in the state of [place]? YES    Current patient location: [address] [place] [address] [facility] [address]    Visit mode:VIDEO    If the visit is dropped, the patient can be reconnected by: VIDEO VISIT: Text to cell phone:   Telephone Information:   Mobile [phone]       Will anyone else be joining the visit? No  (If patient encounters technical issues they should call [phone])    Are changes needed to the allergy or medication list? Pt stated no changes to allergies and Pt stated no med changes    Are refills needed on medications prescribed by this physician? No    Rooming Documentation: Questionnaire(s) completed.    Reason for visit: RECHECK     Dinora Finch, VVF

## 2024-12-10 NOTE — GROUP NOTE
"Process Group Note    PATIENT'S NAME: Marbella Hendrix  MRN:   [id number]  :   1965  ACCT. NUMBER: [id number]  DATE OF SERVICE: 24  START TIME:  1:00 PM  END TIME:  1:50 PM  FACILITATOR: Mary Winters LICSW  TOPIC:  Process Group    Diagnoses:  296.33 (F33.2) Major Depressive Disorder, Recurrent Episode, Severe _ and With anxious distress  300.02 (F41.1) Generalized Anxiety Disorder.    [facility]  TRACK: IOP/DT 3 55+    NUMBER OF PARTICIPANTS: 5        Data:    Session content: At the start of this group, patients were invited to check in by identifying themselves, describing their current emotional status, and identifying issues to address in this group.   Area(s) of treatment focus addressed in this session included Symptom Management, Personal Safety, and Community Resources/Discharge Planning.  Reported mood is \"okay\", less depressed.  She reports having the \"fifth weekend in a row\" of \"fun\", including a light show at the [facility]. She felt positive about buying flowers for her sister's birthday today.  Client denied safety concerns, including SI and SIB. Client denies any chemical use.  Client is taking medications as prescribed. Client's goal is \"reduce\" and \"reverse\" negative self-talk.  Client reported plans to use the following coping skills: gratitude, meditation. Client talked about flexibility with her routines (I.e. waking up earlier to exercise so that she could attend her hand therapy appointment). Peers offered supportive feedback and validation.    Therapeutic Interventions/Treatment Strategies:  Psychotherapist offered support, feedback and validation and reinforced use of skills. Treatment modalities used include Cognitive Behavioral Therapy and Dialectical Behavioral Therapy. Interventions include Cognitive Restructuring:  Explored impact of ineffective thoughts / distortions on mood and activity and Assisted patient in " formulating new neutral/positive alternatives to challenge less helpful / ineffective thoughts and Coping Skills: Discussed meditation skills and addressed ways to implement meditation skills  and Assisted patient in understanding the purpose of planning / creating / participating / sharing in positive experiences.    Assessment:    Patient response:   Patient responded to session by accepting feedback, giving feedback, listening, focusing on goals, being attentive, and accepting support    Possible barriers to participation / learning include: and no barriers identified    Health Issues:   None reported       Substance Use Review:   Substance Use: No active concerns identified.    Mental Status/Behavioral Observations  Appearance:   Appropriate   Eye Contact:   Good   Psychomotor Behavior: Normal   Attitude:   Cooperative  Interested Friendly Pleasant  Orientation:   All  Speech   Rate / Production: Normal    Volume:  Normal   Mood:    Depressed   Affect:    Appropriate   Thought Content:   Clear and Safety denies any current safety concerns including suicidal ideation, self-harm, and homicidal ideation  Thought Form:  Coherent  Logical     Insight:    Good     Plan:   Safety Plan: No current safety concerns identified.  Recommended that patient call 911 or go to the local ED should there be a change in any of these risk factors.   Barriers to treatment: None identified  Patient Contracts (see media tab):  None  Substance Use: Not addressed in session   Continue or Discharge: Patient will continue in 55+ Program (55+) as planned. Patient is likely to benefit from learning and using skills as they work toward the goals identified in their treatment plan.      Mary Winters, [facility] CenterSW  December 10, 2024

## 2024-12-11 ENCOUNTER — HOSPITAL ENCOUNTER (OUTPATIENT)
Dept: BEHAVIORAL HEALTH | Facility: CLINIC | Age: 59
End: 2024-12-11
Attending: PSYCHIATRY & NEUROLOGY
Payer: COMMERCIAL

## 2024-12-11 DIAGNOSIS — F33.2 SEVERE EPISODE OF RECURRENT MAJOR DEPRESSIVE DISORDER, WITHOUT PSYCHOTIC FEATURES (H): Primary | ICD-10-CM

## 2024-12-11 PROCEDURE — 90853 GROUP PSYCHOTHERAPY: CPT

## 2024-12-11 PROCEDURE — 90853 GROUP PSYCHOTHERAPY: CPT | Performed by: SOCIAL WORKER

## 2024-12-11 NOTE — GROUP NOTE
Psychotherapy Group Note    PATIENT'S NAME: Marbella Hendrix  MRN:   [id number]  :   1965  ACCT. NUMBER: [id number]  DATE OF SERVICE: 24  START TIME:  2:00 PM  END TIME:  2:50 PM  FACILITATOR: Teresa Jacobson LICSW  TOPIC: MH EBP Group: Coping Skills  [facility]  TRACK: IOP     NUMBER OF PARTICIPANTS: 3    Summary of Group / Topics Discussed:  Coping Skills: Additional Coping Skills:  Patients discussed and practiced shame/guilt.  Reviewed the benefits of applying the aforementioned coping strategies.  Patients explored how these strategies might be applied to daily stressors or distressing situations.    Patient Session Goals / Objectives:  Understand the purpose and benefits of applying shame/guilt coping strategies  Discussed shame/guilt   Explored affirmations to combat shame/guilt  Address barriers to utilizing coping skills when in distress.      Patient Participation / Response:  Fully participated with the group by sharing personal reflections / insights and openly received / provided feedback with other participants.    Demonstrated understanding of topics discussed through group discussion and participation, Expressed understanding of the relevance / importance of coping skills at distressing times in life, and Demonstrated knowledge of when to consider using a variety of coping skills in daily life    Treatment Plan:  Patient has a current master individualized treatment plan.  See Epic treatment plan for more information.    ZUHAIR Schilling

## 2024-12-11 NOTE — GROUP NOTE
"Process Group Note    PATIENT'S NAME: Marbella Hendrix  MRN:   [id number]  :   1965  ACCT. NUMBER: [id number]  DATE OF SERVICE: 24  START TIME:  1:00 PM  END TIME:  1:50 PM  FACILITATOR: Mary Winters LICSW  TOPIC:  Process Group    Diagnoses:  296.33 (F33.2) Major Depressive Disorder, Recurrent Episode, Severe _ and With anxious distress  300.02 (F41.1) Generalized Anxiety Disorder.    [facility]  TRACK: IOP/DT 3 55+    NUMBER OF PARTICIPANTS: 3        Data:    Session content: At the start of this group, patients were invited to check in by identifying themselves, describing their current emotional status, and identifying issues to address in this group.   Area(s) of treatment focus addressed in this session included Symptom Management, Personal Safety, and Community Resources/Discharge Planning.  Reported mood is less depressed and \"okay, tired\".  She reported waking up at 4:30 this morning.  She reported starting a new medication after she met with her psychiatrist yesterday.   Client denied safety concerns, including SI and SIB. Client denies any chemical use.  Client is taking medications as prescribed. Client's goal is \"I am working on being gentle with myself. Liking myself\".  Client reported plans to use the following coping skills: morning meditation and affirmations, gratitude in the evenings. Client talked about exploring job and volunteer opportunities post discharge. Client is proud of herself for doing a different type of yoga. Peers offered supportive feedback and validation.    Therapeutic Interventions/Treatment Strategies:  Psychotherapist offered support, feedback and validation and reinforced use of skills. Treatment modalities used include Cognitive Behavioral Therapy and Dialectical Behavioral Therapy. Interventions include Cognitive Restructuring:  Explored impact of ineffective thoughts / distortions on mood and activity, " Assisted patient in formulating new neutral/positive alternatives to challenge less helpful / ineffective thoughts, Facilitated recognition of the connection between negative thoughts and negative core beliefs, and Assisted patient in identifying new neutral/positive core beliefs and Coping Skills: Reviewed patients current calming practices and discussed a more formal way of practicing and accessing skills and Discussed meditation skills and addressed ways to implement meditation skills .    Assessment:    Patient response:   Patient responded to session by accepting feedback, giving feedback, listening, focusing on goals, being attentive, and accepting support    Possible barriers to participation / learning include: and no barriers identified    Health Issues:   None reported       Substance Use Review:   Substance Use: No active concerns identified.    Mental Status/Behavioral Observations  Appearance:   Appropriate   Eye Contact:   Good   Psychomotor Behavior: Normal   Attitude:   Cooperative  Interested Friendly Pleasant  Orientation:   All  Speech   Rate / Production: Normal    Volume:  Normal   Mood:    Depressed   Affect:    Appropriate   Thought Content:   Clear and Safety denies any current safety concerns including suicidal ideation, self-harm, and homicidal ideation  Thought Form:  Coherent  Logical     Insight:    Good     Plan:   Safety Plan: No current safety concerns identified.  Recommended that patient call 911 or go to the local ED should there be a change in any of these risk factors.   Barriers to treatment: None identified  Patient Contracts (see media tab):  None  Substance Use: Not addressed in session   Continue or Discharge: Patient will continue in 55+ Program (55+) as planned. Patient is likely to benefit from learning and using skills as they work toward the goals identified in their treatment plan.      Mary Winters, ZUHAIR  December 11, 2024

## 2024-12-11 NOTE — GROUP NOTE
Psychoeducation Group Note    PATIENT'S NAME: Marbella Hendrix  MRN:   [id number]  :   1965  ACCT. NUMBER: [id number]  DATE OF SERVICE: 24  START TIME:  3:00 PM  END TIME:  3:50 PM  FACILITATOR: Minna Ambrocio LICSW; Enrique Harris OTR  TOPIC: MH Life Skills Group: Resiliency Development  [facility]  TRACK: IOP/DT 3    NUMBER OF PARTICIPANTS: 3    Summary of Group / Topics Discussed:  Resiliency Development:  Resiliency Development: Coping Skills: Provided education on the benefits of exploring and identifying helpful coping skills to help manage distress and regulate emotions.  Discussed the importance of having a coping skills plan in times of increased symptoms.  Patients were given an opportunity to explore different types of coping skills (distraction, positive/reward, relaxation, mindfulness/grounding, safe ways to express feelings).  Patients self-reflected on coping skills that they could engage in their daily life and add into their daily routine.      Patient Session Goals / Objectives:   Review the benefits of having different types of coping skills to help manage distress and regulate emotions.   Explore different types of coping skills to promote self-regulation and independent skill use.   Established a plan for practice of these skills in their own environments.       Patient Participation / Response:  Fully participated with the group by sharing personal reflections / insights and openly received / provided feedback with other participants.    Verbalized understanding of content    Treatment Plan:  Patient has a current master individualized treatment plan.  See Epic treatment plan for more information.    ZUHAIR Angela

## 2024-12-13 ASSESSMENT — COLUMBIA-SUICIDE SEVERITY RATING SCALE - C-SSRS
6. HAVE YOU EVER DONE ANYTHING, STARTED TO DO ANYTHING, OR PREPARED TO DO ANYTHING TO END YOUR LIFE?: NO
ATTEMPT SINCE LAST CONTACT: NO
TOTAL  NUMBER OF INTERRUPTED ATTEMPTS SINCE LAST CONTACT: NO
1. SINCE LAST CONTACT, HAVE YOU WISHED YOU WERE DEAD OR WISHED YOU COULD GO TO SLEEP AND NOT WAKE UP?: NO
2. HAVE YOU ACTUALLY HAD ANY THOUGHTS OF KILLING YOURSELF?: NO
SUICIDE, SINCE LAST CONTACT: NO
TOTAL  NUMBER OF ABORTED OR SELF INTERRUPTED ATTEMPTS SINCE LAST CONTACT: NO

## 2024-12-13 NOTE — PROGRESS NOTES
Intensive Outpatient Program    Physician Recertification of Medical Necessity  LATE ENTRY    Patient Legal Name: Marbella Hendrix   Patient Preferred Name: Marbella  Patient : 1965  Patient MRN: [id number]    Attending physician: Moris Toscano MD    Recertification #1 from date 2024 through date 2024    I certify the above-named patient would require partial hospitalization care if intensive outpatient services were not provided and that the patient requires such IOP services for a minimum of 9 hours per week. These services are provided under the care and supervision of a physician and under a written, individualized plan of treatment authorized and approved by the physician.    Patient's response to the therapeutic interventions provided by IOP:  Decrease in symptoms, particularly catastrophic thinking      Patient's psychiatric symptoms that continue to place the patient at risk of need for higher level of care:  Extending several days to consolidate treatment gains      Treatment Goals for coordination of services to facilitate discharge from the intensive outpatient program:    Goal # 1: finalize discharge planning    Goal # 2: consolidate treatment gains    Goal # 3: anticipate discharge 2024      Moris Toscano MD on 2024 at 11:29 AM

## 2024-12-16 ENCOUNTER — THERAPY VISIT (OUTPATIENT)
Dept: OCCUPATIONAL THERAPY | Facility: CLINIC | Age: 59
End: 2024-12-16
Payer: COMMERCIAL

## 2024-12-16 DIAGNOSIS — M79.645 THUMB PAIN, LEFT: Primary | ICD-10-CM

## 2024-12-16 PROCEDURE — 97140 MANUAL THERAPY 1/> REGIONS: CPT | Mod: GO | Performed by: OCCUPATIONAL THERAPIST

## 2024-12-16 PROCEDURE — 97035 APP MDLTY 1+ULTRASOUND EA 15: CPT | Mod: GO | Performed by: OCCUPATIONAL THERAPIST

## 2024-12-17 PROBLEM — F33.9 RECURRENT MAJOR DEPRESSIVE DISORDER (H): Status: RESOLVED | Noted: 2024-10-11 | Resolved: 2024-12-17

## 2024-12-21 ENCOUNTER — TELEPHONE (OUTPATIENT)
Dept: NURSING | Facility: CLINIC | Age: 59
End: 2024-12-21
Payer: COMMERCIAL

## 2024-12-21 DIAGNOSIS — K12.0 APHTHOUS ULCER OF TONGUE: ICD-10-CM

## 2024-12-21 NOTE — TELEPHONE ENCOUNTER
Telephone call  Patient calling she was not able to get the medication at the [facility] pharmacy transferred to to the [facility] pharmacy.  Routing to PCP as a CHRISTINA Greenberg RN   [facility] Nurse Advisor  11:45 AM 12/21/2024

## 2024-12-23 ENCOUNTER — THERAPY VISIT (OUTPATIENT)
Dept: OCCUPATIONAL THERAPY | Facility: CLINIC | Age: 59
End: 2024-12-23
Payer: COMMERCIAL

## 2024-12-23 DIAGNOSIS — M79.645 THUMB PAIN, LEFT: Primary | ICD-10-CM

## 2024-12-23 PROCEDURE — 97035 APP MDLTY 1+ULTRASOUND EA 15: CPT | Mod: GO | Performed by: OCCUPATIONAL THERAPIST

## 2024-12-23 PROCEDURE — 97140 MANUAL THERAPY 1/> REGIONS: CPT | Mod: GO | Performed by: OCCUPATIONAL THERAPIST

## 2024-12-26 DIAGNOSIS — E03.9 ACQUIRED HYPOTHYROIDISM: ICD-10-CM

## 2024-12-26 RX ORDER — LEVOTHYROXINE SODIUM 125 UG/1
125 TABLET ORAL DAILY
Qty: 90 TABLET | Refills: 1 | Status: SHIPPED | OUTPATIENT
Start: 2024-12-26

## 2024-12-30 ENCOUNTER — ANCILLARY PROCEDURE (OUTPATIENT)
Dept: MAMMOGRAPHY | Facility: CLINIC | Age: 59
End: 2024-12-30
Attending: FAMILY MEDICINE
Payer: COMMERCIAL

## 2024-12-30 ENCOUNTER — THERAPY VISIT (OUTPATIENT)
Dept: OCCUPATIONAL THERAPY | Facility: CLINIC | Age: 59
End: 2024-12-30
Payer: COMMERCIAL

## 2024-12-30 DIAGNOSIS — Z12.31 VISIT FOR SCREENING MAMMOGRAM: ICD-10-CM

## 2024-12-30 DIAGNOSIS — M79.645 THUMB PAIN, LEFT: Primary | ICD-10-CM

## 2024-12-30 PROCEDURE — 77067 SCR MAMMO BI INCL CAD: CPT | Mod: GC | Performed by: RADIOLOGY

## 2024-12-30 PROCEDURE — 97035 APP MDLTY 1+ULTRASOUND EA 15: CPT | Mod: GO | Performed by: OCCUPATIONAL THERAPIST

## 2024-12-30 PROCEDURE — 77063 BREAST TOMOSYNTHESIS BI: CPT | Mod: GC | Performed by: RADIOLOGY

## 2024-12-30 PROCEDURE — 97140 MANUAL THERAPY 1/> REGIONS: CPT | Mod: GO | Performed by: OCCUPATIONAL THERAPIST

## 2024-12-30 NOTE — PROGRESS NOTES
12/30/24 0500   Appointment Info   Treating Provider Felisha Núñez OTR, CHT   Total/Authorized Visits 6 (POC)   Visits Used 4   Medical Diagnosis Left [facility] OA   OT Tx Diagnosis Left [facility] OA   Progress Note/Certification   Onset of Illness/Injury or Date of Surgery   (several years ago)   Therapy Frequency 1x/week   Predicted Duration 1 month   Progress Note Due Date 01/01/25   OT Goal 1   Goal Identifier eating   Goal Description cut food with minimal pain   Rationale In order to maximize safety and independence with ADL/IADLs   Target Date 01/01/25   Date Met 12/30/24   Objective Measures   Objective Measures Objective Measure 1;Objective Measure 2;Objective Measure 3;Objective Measure 4;Hand Obj Measures   Hand Objective Measures Palpation   Palpation Thumb Palpation   Objective Measure 1   Objective Measure Web space R: tender; L: -   Objective Measure 2   Objective Measure thenar R: tender; L: +   Objective Measure 3   Objective Measure R: -; L: +   OT Modalities   OT Modalities Ultrasound    Ultrasound   Ultrasound, Minutes (17339) 8 Minutes   Ultrasound -Type (does not include 3-5 min prep/cleanup time) Continuous   Intensity .9   Duration (does not include the 3-5 min set up/clean up time) 8 min   Frequency 3 MHz   Location [facility]; web   Positioning sitting   Treatment Interventions (OT)   Interventions Manual Therapy;Therapeutic Procedure/Exercise;Neuromuscular Re-education   Neuromuscular Re-education   Neuromuscular Re-ed Minutes (33202) 2   Neuro Re-ed 1 k-tape [facility]   Therapeutic Procedure/Exercise   Ther Proc 1 reviewed STM; stretches   Manual Therapy   Manual Therapy Minutes (70483) 20   Manual Therapy 1 [facility]; web; dorsal/volar/radial fa   Education   Learner/Method Patient   Total Session Time   Timed Code Treatment Minutes 30   Total Treatment Time (sum of timed and untimed services) 30         DISCHARGE  Reason for Discharge: Patient chooses to discontinue therapy.    Equipment Issued:  none    Discharge Plan: Patient to continue home program.    Referring Provider:  Referred Self

## 2025-01-06 NOTE — PROGRESS NOTES
[facility]  1/7/25      Behavioral Health Clinician Progress Note    Patient Name: Marbella Hendrix           Service Type:  Individual      Service Location:   [facility] / Email (patient reached)     Session Start Time: 306pm  Session End Time: 322pm      Session Length: 16 - 37      Attendees: Client     Service Modality:  Video Visit:      Provider verified identity through the following two step process.  Patient provided:  Patient is known previously to provider    Telemedicine Visit: The patient's condition can be safely assessed and treated via synchronous audio and visual telemedicine encounter.      Reason for Telemedicine Visit: Services only offered telehealth    Originating Site (Patient Location): Patient's home    Distant Site (Provider Location): Provider Remote Setting- Home Office    Consent:  The patient/guardian has verbally consented to: the potential risks and benefits of telemedicine (video visit) versus in person care; bill my insurance or make self-payment for services provided; and responsibility for payment of non-covered services.     Patient would like the video invitation sent by:  My Chart    Mode of Communication:  Video Conference via [facility]    Distant Location (Provider):  Off-site    As the provider I attest to compliance with applicable laws and regulations related to telemedicine.    Visit Activities (Refresh list every visit): [facility] Only    Diagnostic Assessment Date: 9/25/24 Bao Elliott LP  Treatment Plan Review Date: 12/10/24  See Flowsheets for today's PHQ-9 and BERTHA-7 results  Previous PHQ-9:       10/2/2024     2:25 PM 10/11/2024     1:01 PM 12/10/2024    10:54 AM   PHQ-9 SCORE   PHQ-9 Total Score Freda 19 (Moderately severe depression)  9 (Mild depression)   PHQ-9 Total Score 19 18 9        Proxy-reported     Previous BERTHA-7:       9/23/2024    11:35 AM 10/11/2024     1:01 PM 10/24/2024    11:08 AM   BERTHA-7 SCORE  "  Total Score 21 (severe anxiety)  12 (moderate anxiety)   Total Score 21 19 12        Patient-reported       DATA  Extended Session (60+ minutes): No  Interactive Complexity: No  Crisis: No  [facility] Patient: No    Treatment Objective(s) Addressed in This Session:  Target Behavior(s): disease management/lifestyle changes reduce sx of anxiety    Anxiety: will experience a reduction in anxiety      Current Stressors / Issues:  MH update:  daily affirmations, exercise, meditations.  Concerns that  had heart attack EMS was called-he is okay.  Denies garrett concerns- pt presents with some concerns of non goal directed activities, elevated mood, decreased sleep, and slightly pressured speech. Cleaning house out project.  Son is home for winter break. Anxiety and grief gone.  Stresses:  presenting for [employer] tomorrow for future networking and job.   Appetite: n/a  Sleep:  not sleeping great.  Outpatient Provider updates: Completed IOP transitioning to step down unit.  [facility]. Attends [facility].  Thinking about [employer] support groups.  SI/SIB/HI:  Denies current!  BRAD:  Denies  Side effects/compliance:  Interventions:  [facility] engaged in discussing change of sx and on going maintaince of improvement of mood  Most important:  \"feels fucking great\".      12/10  MH update:  Feeling much better. Mildly anxious, back to baseline.  Functioning, getting tasks and ADL's done.  Stresses:  son is returning for 4 weeks for the holdays.  Appetite:  putting weight back on from Zyprexa  Sleep: Back to baseline.    Outpatient Provider updates: Nearing IOP completion next week.  Going to do step down program.  [facility]. Attends [facility] Gu[facility].  Thinking about [employer] support groups.  SI/SIB/HI:  Fleeting and passive ideation  BRAD:  Denies  Side effects/compliance:  Interventions:  [facility] engaged in discussion about support options  Most important:  Forgot about metformin. Putting weight back on.   " "doing really well.     10/29  MH update:  Feeling better.  Processing grief of cat, son being at college.  Discussion of protection of feelings.  Sadness of son returning college.   Stresses:  looking at jobs, not applying  Appetite: n/a  Sleep: not sleeping only 1-2 a week instead of daily.  Outpatient Provider updates: Started IOP; [facility]  SI/SIB/HI:  Passive suicidal ideation, no plan/intent.  Fleeting. No previous attempts.  Safety plan on file.  BRAD:  Denies  Side effects/compliance:  Interventions:  [facility] engaged discussed grief and loss/modeling of emotions  Most important:  Very fearful of meds being taken away    10/2  MH update: back to working out.  Sadness, overwhelmed.  Feeling alienated.  Maybe going to family dinner.  Watch services online tomorrow.  Wasn't invited to a friends house as before.  Going to future services to build connection.  Anxiety is better.  Klonopin really helps.  More sadness, change of season.  Wants to work, but recognizes she can't due to need to do program.  Stresses: as above  Appetite: n/a  Sleep: sleeping 10-5, full night   Outpatient Provider updates: waitlisted for IOP maybe 2-3 weeks yet.  [facility]  SI/SIB/HI:  Passive suicidal ideation, no plan/intent.  Fleeting. No previous attempts.  Safety plan reviewed.  Future and goal oriented.  BRAD:  Denies  Side effects/compliance:  Interventions:  [facility] discussed sense of building connection and loss along with areas for self care  Most important:  Sleeping better, fearful of Klonopin being taken away.  Anxiety better.  More depression.    9/24   update:  Klonopin has been helpful, started yesterday.  Slept a whole night last night.  Napped today.  Doesn't have the energy to exercise.  Panic was worsening.  Tried [place] see a friend [place] at the cabin, came home early- couldn't sleep in same room, dog overbearing, friend kept trying to \"fix her\".  Feels unsafe driving with the " panic.  Feeling down, sadness.   Stresses:  Mom  in April.  Youngest son moved away to college ([employer]).  Cat -spent significant money and time trying to save him.  Loss of control, feels like she is always worried about his safety.  Other son, is really struggling with life functioning/ASD- tried being an .  Appetite: n/a  Sleep: Slept last night  Outpatient Provider updates: [facility]. DA update  for PHP/IOP  SI/SIB/HI:  Passive suicidal ideation, no plan/intent.  Fleeting. No previous attempts.  Safety plan reviewed.  Future and goal oriented.  BRAD:  Denies  Side effects/compliance:  Interventions:  [facility] engaged in discussion about levels of care  Most important:  Klonopin helps!      MH update:  Depression and anxiety worse.  Panic attacks regularly- taking Vistaril makes her fatigued.  Went to [facility], felt helpful.  Considering going back for support.  Stresses:  Mom  in April.  Youngest son moved away to college ([employer]).  Cat -spent significant money and time trying to save him.  Loss of control, feels like she is always worried about his safety. Applying for jobs.  Other son, is really struggling with life functioning/ASD- tried being an .  Appetite:   Sleep: 2 nights a week, Zyprexa works 6-7 hours.  Other nights, only getting 4 hours with Ambien, Melatonin, Zyprexa.  About a month like this.  Outpatient Provider updates: Doing acupuncture. [facility].  SI/SIB/HI: Passive suicidal ideation, no plan/intent.  Fleeting. No previous attempts.  Safety plan reviewed.  Future and goal oriented.  BRAD:  Denies  Side effects/compliance:  Interventions:   [facility] reviewed safety plan and emergency services as needed.  Pt notes understanding and awareness.    Most important:  Not sleeping, not functioning.  Sx much worse.  Passive SI returned.      MH update:  Sx feel stable.  No acute depression/anxiety/panic.  Stresses:  Lots  "of stress with kiddos/spouse  Appetite: Weight gain-med side constantly hungry  Sleep: Denies  Outpatient Provider updates:   [facility]- taking a break.  Got a list of new therapist  SI/SIB/HI: Denies  BRAD: Denies  Side effects/compliance: N/a  Interventions:  [facility] offered space and validation for any concerns  Most important:  Feels stable.    1/2  MH update: Things are going well.  On the 5th med for anxiety.  Really like it.  Anxiety feels manageable.  Freak out daily but not every moment.  Denies any panic attacks.  Feeling behind the \"8 ball\".  [place] due to feeling uncomfortable from constipation.  Denies any overwhelming feelings of depression, hopelessness, worthlessness, etc. .  Stresses:  Holidays were fun, sad its over.  Got to see a friend from out of town.  Arthritic hand worse in the cold notes she needs to be wearing her hand brace.  Going to [employer], just completed prior to appt.   Unemployed hasn't been looking due to transportation of child.  Would like to work.  Appetite: Denies  Sleep: Feeling pleased.  Sleep much improved. Having dreams.    Outpatient Provider updates: Therapist once a month [facility], appt next week.    SI/SIB/HI: Denies  BRAD: Denies  Preg: n/a  Side effects/compliance: constipated, uncomfortable and bloated.  Interventions: [facility] supported on going anxiety reduction skill use  Most important: Constipation side effects     Progress on Treatment Objective(s) / Homework:  Satisfactory progress - ACTION (Actively working towards change); Intervened by reinforcing change plan / affirming steps taken    Motivational Interviewing    MI Intervention: Co-Developed Goal: reduce on going sx of anxiety, Expressed Empathy/Understanding, Open-ended questions, and Reflections: simple and complex     Change Talk Expressed by the Patient: Desire to change Ability to change Activation Taking steps    Provider Response to Change Talk: A - Affirmed patient's thoughts, decisions, or attempts at " behavior change and R - Reflected patient's change talk    Assessments completed prior to visit:    The following assessments were completed by patient for this visit:  N/a    Care Plan review completed: Yes    Medication Review:  Changes to psychiatric medications, see updated Medication List in EPIC.     Medication Compliance:  Yes    Changes in Health Issues:   None reported    Chemical Use Review:   Substance Use: Chemical use reviewed, no active concerns identified      Tobacco Use: No current tobacco use.      Assessment: Current Emotional / Mental Status (status of significant symptoms):  Risk status (Self / Other harm or suicidal ideation)  Patient has had a history of suicidal ideation: reports a hx of ideation without specific planning/intent/action back in 2022  Patient denies current fears or concerns for personal safety.  Patient denies current or recent suicidal ideation or behaviors.  Patient denies current or recent homicidal ideation or behaviors.  Patient denies current or recent self injurious behavior or ideation.  Patient denies other safety concerns.  A safety and risk management plan has been developed including: Patient consented to co-developed safety plan.  A safety and risk management plan was completed.  Patient agreed to use safety plan should any safety concerns arise.  A copy was given to the patient.    Appearance:   Appropriate   Eye Contact:   Good   Psychomotor Behavior: Hyperactive   Attitude:   Cooperative   Orientation:   All  Speech   Rate / Production: Normal    Volume:  Normal   Mood:    Elevated  Normal   Affect:    Appropriate   Thought Content:  Clear   Thought Form:  Coherent  Logical   Insight:    Good     Diagnoses:  1. Generalized anxiety disorder    2. Attention deficit hyperactivity disorder (ADHD), unspecified ADHD type    3. Mood disorder (H)          Collateral Reports Completed:  Communicated with: Dr Lewis    Plan: (Homework, other):  Patient was given  "information about behavioral services and encouraged to schedule a follow up appointment with the clinic [facility] as needed.  She was also given information about mental health symptoms and treatment options .  CD Recommendations: No indications of CD issues.     Rafaela Hassan [facility]        Individual Treatment Plan    Patient's Name: Marbella Hendrix   YOB: 1965  Date of Creation: 9/11/24  Date Treatment Plan Last Reviewed/Revised: 12/10/24    DSM5 Diagnoses:   1. Generalized anxiety disorder    2. Attention deficit hyperactivity disorder (ADHD), unspecified ADHD type    3. Mood disorder (H)        Psychosocial / Contextual Factors: Relationship Concerns, Occupational Issues, and Interpersonal Concerns  PROMIS (reviewed every 90 days):   The following assessments were completed by patient for this visit:  PROMIS 10-Global Health (only subscores and total score):       11/7/2023     4:23 PM 1/2/2024    10:47 AM 4/2/2024     7:21 AM 9/7/2024    12:17 PM 9/23/2024    11:37 AM 10/11/2024     1:01 PM 12/10/2024    10:54 AM   PROMIS-10 Scores Only   Global Mental Health Score 12 14 13    13 9    9 8    8 7 13   Global Physical Health Score 14 13 14    14 12    12 12    12 13 16   PROMIS TOTAL - SUBSCORES 26 27 27    27 21    21 20    20 20 29        Referral / Collaboration:  Referral to another professional/service is not indicated at this time..    Anticipated number of session for this episode of care: 6-9 sessions  Anticipation frequency of session: Monthly  Anticipated Duration of each session: 16-37 minutes  Treatment plan will be reviewed in 90 days or when goals have been changed.       MeasurableTreatment Goal(s) related to diagnosis / functional impairment(s)  Goal 1: Patient will reduce sx of anxiety   \"I will know I've met my goal when I can sleep and clearly think through my thoughts to use my skills.\"     Objective #A (Patient Action)    Patient will identify three distraction and diversion " activities and use those activities to decrease level of anxiety    Status: Continued - Date(s):12/10/24    Intervention(s)  [facility] will provide support through CBT, MI, Acceptance and Commitment Therapy, Dialectic Behavioral Therapy and problem solving model to explore and overcome barriers.    Goal 2: Patient will manage concerns of safety    I will know I've met my goal when I can reduce suicidal ideation .      Objective #A (Patient Action)    Patient will use previously developed safety plan on file.  Status: Cont - Date: 9/11/24 , 12/10/24    Intervention(s)  Therapist will provide support through CBT, MI, Acceptance and Commitment Therapy, Dialectic Behavioral Therapy and problem solving model to explore and overcome barriers.      Patient has reviewed and agreed to the above plan.    Written by  Rafaela Hassan LPCC, [facility]

## 2025-01-07 ENCOUNTER — VIRTUAL VISIT (OUTPATIENT)
Dept: PSYCHIATRY | Facility: CLINIC | Age: 60
End: 2025-01-07
Payer: COMMERCIAL

## 2025-01-07 ENCOUNTER — VIRTUAL VISIT (OUTPATIENT)
Dept: BEHAVIORAL HEALTH | Facility: CLINIC | Age: 60
End: 2025-01-07
Payer: COMMERCIAL

## 2025-01-07 DIAGNOSIS — F41.1 GENERALIZED ANXIETY DISORDER: Primary | ICD-10-CM

## 2025-01-07 DIAGNOSIS — F39 MOOD DISORDER: ICD-10-CM

## 2025-01-07 DIAGNOSIS — F90.9 ATTENTION DEFICIT HYPERACTIVITY DISORDER (ADHD), UNSPECIFIED ADHD TYPE: ICD-10-CM

## 2025-01-07 DIAGNOSIS — F41.1 GENERALIZED ANXIETY DISORDER: ICD-10-CM

## 2025-01-07 DIAGNOSIS — F39 MOOD DISORDER: Primary | ICD-10-CM

## 2025-01-07 DIAGNOSIS — G47.00 INSOMNIA, UNSPECIFIED TYPE: ICD-10-CM

## 2025-01-07 PROCEDURE — 90832 PSYTX W PT 30 MINUTES: CPT | Mod: 95 | Performed by: COUNSELOR

## 2025-01-07 RX ORDER — OLANZAPINE 10 MG/1
10 TABLET ORAL AT BEDTIME
Qty: 90 TABLET | Refills: 0 | Status: SHIPPED | OUTPATIENT
Start: 2025-01-07

## 2025-01-07 ASSESSMENT — PAIN SCALES - GENERAL: PAINLEVEL_OUTOF10: NO PAIN (0)

## 2025-01-07 NOTE — PROGRESS NOTES
"Virtual Visit Details    Type of service:  Video Visit     Originating Location (pt. Location): {video visit patient location:973469::\"Home\"}  {PROVIDER LOCATION On-site should be selected for visits conducted from your clinic location or adjoining [facility] hospital, academic office, or other nearby [facility] building. Off-site should be selected for all other provider locations, including home:[id number]}  Distant Location (provider location):  {virtual location provider:321314}  Platform used for Video Visit: {Virtual Visit Platforms:456809::\"[employer]\"}  "

## 2025-01-07 NOTE — NURSING NOTE
Current patient location: [address] [place] [address] [facility] [id number]    Is the patient currently in the state of [place]? YES    Visit mode:VIDEO    If the visit is dropped, the patient can be reconnected by:VIDEO VISIT: Send to e-mail at: [email or web address]    Will anyone else be joining the visit? NO  (If patient encounters technical issues they should call [phone]    Are changes needed to the allergy or medication list? No    Are refills needed on medications prescribed by this physician? Discuss with provider    Rooming Documentation:  Questionnaire(s) completed    Reason for visit: RECHECK    Jules LOVETT

## 2025-01-07 NOTE — PROGRESS NOTES
"Telemedicine Visit: The patient's condition can be safely assessed and treated via synchronous audio and visual telemedicine encounter.      Reason for Telemedicine Visit: Patient has requested telehealth visit    Originating Site (Patient Location): Patient's home    Distant Location (provider location):  Off-Site    Consent:  The patient/guardian has verbally consented to: the potential risks and benefits of telemedicine (video visit) versus in person care; bill my insurance or make self-payment for services provided; and responsibility for payment of non-covered services.     Mode of Communication:  Video Conference via Virtual 3-D Display for Smartphones    As the provider I attest to compliance with applicable laws and regulations related to telemedicine.          Outpatient Psychiatric Progress Note    Name: Marbella Hendrix   : 1965                    Primary Care Provider: Vanessa Ladd MD   Therapist: Doing acupuncture. [facility].  Recently finished IOP.       PHQ-9 scores:      10/2/2024     2:25 PM 10/11/2024     1:01 PM 12/10/2024    10:54 AM   PHQ-9 SCORE   PHQ-9 Total Score MyChart 19 (Moderately severe depression)  9 (Mild depression)   PHQ-9 Total Score 19 18 9        Proxy-reported       BERTHA-7 scores:      2024    11:35 AM 10/11/2024     1:01 PM 10/24/2024    11:08 AM   BERTHA-7 SCORE   Total Score 21 (severe anxiety)  12 (moderate anxiety)   Total Score 21 19 12        Patient-reported       Patient Identification:  Patient is a 59 year old,   White Not  or  female  who presents for return visit with me.  Patient is currently unemployed. Patient attended the phone/video session with , Wally.  Patient prefers to be called: \"Marbella\".    Interim History:  I last saw Marbella Hendrix for outpatient psychiatry return visit on 12/10/2024. During that appointment, we:    Continue Effexor-XR/venlafaxine  mg daily for anxiety/mood.  Continue Lunesta 1-3 mg " "at bedtime as needed for sleep. Rx previously sent for the 2 mg tablets. Be sure to devote at least 8 hours to sleep after taking before operating motor vehicle or other heavy or sharp equipment. Do NOT mix with alcohol.   Continue olanzapine 5 mg at bedtime for sleep, anxiety.   Continue hydroxyzine 12.5-25 mg at bedtime as needed for sleep.   Start metformin ER to help mitigate metabolic side effects from olanzapine and due to elevated A1c: take 500 mg at bedtime with dinner for 1-2 weeks then increase to 1000 mg as tolerated.   Strongly recommend continuing with step-down IOP.  Follow up with me in 1 month.  For scheduling needs you can call [phone].  You could also get a message to the nurses by calling the aforementioned phone number.    1/7: Patient is feeling quite well today.  Reports mood has been great the last couple of days.  Patient with significant amount of energy.  Has not slept but 4-5 hours last couple of nights.  Still feels energetic and like she did \"as a teenager.\"  Patient with new career goals and ideas.  Patient reports as an adolescent/young adult when using stimulant drugs would \"be up for 3 days straight partying.\"  Patient open to the idea that she may be a little manic but is skeptical since she felt like this a lot when younger.  No acute safety concerns.  No current drug or alcohol abuse.    Per : over the last three days \"has become kind of manic\" some grandiose thinking and flight of ideas, \"20x more energy than she has had for years,\" poor sleep; if energy ran on a scale of 0-10, patient at baseline is typically \"dialed in at 2-4\" but last couple of days has been around at least 7/10.    Per [facility], Rafaela Hassan, [facility], during today's team-based visit:  MH update:  daily affirmations, exercise, meditations.  Concerns that  had heart attack EMS was called-he is okay.  Denies garrett concerns- pt presents with some concerns of non goal directed activities, elevated mood, " "decreased sleep, and slightly pressured speech. Cleaning house out project.  Son is home for winter break. Anxiety and grief gone.  Stresses:  presenting for [employer] tomorrow for future networking and job.   Appetite: n/a  Sleep:  not sleeping great.  Outpatient Provider updates: Completed IOP transitioning to step down unit.  [facility]. Attends [employer].  Thinking about [employer] support groups.  SI/SIB/HI:  Denies current!  BRAD:  Denies  Side effects/compliance:  Interventions:  [facility] engaged in discussing change of sx and on going maintaince of improvement of mood  Most important:  \"feels fucking great\".      Past Psychiatric Med Trials:  Psych Meds at Intake:  Paxil 20 mg daily - started when 29 years, has been up and down on the dose, on 10 mg dose mostly, last on 20 mg this past spring   Ambien 5 mg for insomnia - used for 4 nights  Ativan 0.5 mg for flying     Past Psych Meds:  Prozac for 1-2 months maybe when 27 yo    Psychiatric ROS:  Marbella Hendrix reports mood has been: See HPI above  Anxiety has been: See HPI above  Sleep has been: See HPI above  Rachna sxs: None  Psychosis sxs: None  ADHD/ADD sxs: see HPI above  PTSD sxs: NA  PHQ9 and GAD7 scores were reviewed today if completed.   Medication side effects: See HPI above  Current stressors include: Symptoms and see HPI above  Coping mechanisms and supports include: Family and Hobbies    Current medications include:   Current Outpatient Medications   Medication Sig Dispense Refill    acetaminophen (TYLENOL) 500 MG tablet Take 500-1,000 mg by mouth every 6 hours as needed for mild pain.      atorvastatin (LIPITOR) 20 MG tablet Take 1 tablet (20 mg) by mouth daily. 90 tablet 2    calcium carbonate (OS-ARA) 500 MG tablet Take 1 tablet by mouth daily.      cholecalciferol (VITAMIN D3) 25 mcg (1000 units) capsule Take 1 capsule by mouth daily.      eszopiclone (LUNESTA) 2 MG tablet Take 1 tablet (2 mg) by mouth nightly as needed for " sleep. 30 tablet 2    hydrOXYzine HCl (ATARAX) 25 MG tablet Take 0.5-1 tablets (12.5-25 mg) by mouth 2 times daily as needed for anxiety. 120 tablet 1    levothyroxine (SYNTHROID/LEVOTHROID) 125 MCG tablet TAKE ONE TABLET BY MOUTH ONCE DAILY 90 tablet 1    magic mouthwash suspension (diphenhydrAMINE, lidocaine, aluminum-magnesium & simethicone) Swish and spit 10 mLs in mouth every 4 hours as needed for mouth sores. 300 mL 1    melatonin 5 MG tablet Take 5 mg by mouth nightly as needed for sleep.      metFORMIN (GLUCOPHAGE XR) 500 MG 24 hr tablet Take 2 tablets (1,000 mg) by mouth daily (with dinner). Start with ONE tab for first 1-2 weeks. 180 tablet 1    naproxen sodium 220 MG capsule Take 220 mg by mouth 2 times daily (with meals).      OLANZapine (ZYPREXA) 5 MG tablet       Omega-3 Fatty Acids (OMEGA-3 FISH OIL PO) Take 1 g by mouth daily      venlafaxine (EFFEXOR XR) 150 MG 24 hr capsule Take 1 capsule (150 mg) by mouth daily. 90 capsule 1     No current facility-administered medications for this visit.       The [place] Prescription Monitoring Program has been reviewed and there are no concerns about diversionary activity for controlled substances at this time.   11/27/2024 11/22/2024 1 Eszopiclone 2 Mg Tablet 30.00 30 Al Bau [id number] Sup (7778) 0/2 0.66 [place]   11/15/2024 11/15/2024 1 Eszopiclone 2 Mg Tablet 15.00 15 Al Bau [id number] Sup (7778) 0/0 0.66 [place]   10/29/2024 10/29/2024 1 Clonazepam 1 Mg Tablet 60.00 30 Al Bau [id number] Sup (7778) 0/1 4.00 [place]   09/23/2024 09/23/2024 1 Clonazepam 1 Mg Tablet 60.00 30 Al Bau [id number] Sup (7778) 0/0 4.00 [place]     Recent Labs   Lab Test 10/04/24  0740 04/23/24  0738   CHOL 152 189   HDL 32* 34*   LDL 97 121*   TRIG 117 172*     Last Comprehensive Metabolic Panel:  Sodium   Date Value Ref Range Status   10/04/2024 142 135 - 145 mmol/L Final   10/22/2012 141 133 - 144 mmol/L Final     Potassium   Date Value Ref Range Status    10/04/2024 5.0 3.4 - 5.3 mmol/L Final   07/08/2022 4.4 3.4 - 5.3 mmol/L Final   10/22/2012 3.8 3.4 - 5.3 mmol/L Final     Chloride   Date Value Ref Range Status   10/04/2024 106 98 - 107 mmol/L Final   07/08/2022 108 94 - 109 mmol/L Final   10/22/2012 105 94 - 109 mmol/L Final     Carbon Dioxide   Date Value Ref Range Status   10/22/2012 25 20 - 32 mmol/L Final     Carbon Dioxide (CO2)   Date Value Ref Range Status   10/04/2024 24 22 - 29 mmol/L Final   07/08/2022 20 20 - 32 mmol/L Final     Anion Gap   Date Value Ref Range Status   10/04/2024 12 7 - 15 mmol/L Final   07/08/2022 10 3 - 14 mmol/L Final   10/22/2012 11 6 - 17 mmol/L Final     Glucose   Date Value Ref Range Status   10/04/2024 96 70 - 99 mg/dL Final   07/08/2022 93 70 - 99 mg/dL Final   07/01/2020 88 70 - 99 mg/dL Final     Comment:     Fasting specimen     Urea Nitrogen   Date Value Ref Range Status   10/04/2024 8.6 6.0 - 20.0 mg/dL Final   07/08/2022 18 7 - 30 mg/dL Final   10/22/2012 25 (H) 5 - 24 mg/dL Final     Creatinine   Date Value Ref Range Status   10/04/2024 0.84 0.51 - 0.95 mg/dL Final   08/28/2015 0.65 0.52 - 1.04 mg/dL Final     GFR Estimate   Date Value Ref Range Status   10/04/2024 81 >60 mL/min/1.73m2 Final     Comment:     eGFR calculated using 2021 CKD-EPI equation.   08/28/2015 >90  Non  GFR Calc   >60 mL/min/1.7m2 Final     Calcium   Date Value Ref Range Status   10/04/2024 9.7 8.8 - 10.4 mg/dL Final     Comment:     Reference intervals for this test were updated on 7/16/2024 to reflect our healthy population more accurately. There may be differences in the flagging of prior results with similar values performed with this method. Those prior results can be interpreted in the context of the updated reference intervals.   10/22/2012 9.2 8.5 - 10.4 mg/dL Final     Bilirubin Total   Date Value Ref Range Status   10/04/2024 0.3 <=1.2 mg/dL Final     Alkaline Phosphatase   Date Value Ref Range Status   10/04/2024 47 40  - 150 U/L Final     ALT   Date Value Ref Range Status   10/04/2024 25 0 - 50 U/L Final   2012 18.0 0.0 - 50.0 U/L Final     AST   Date Value Ref Range Status   10/04/2024 23 0 - 45 U/L Final   2012 25.0 0.0 - 45.0 U/L Final     Past Medical/Surgical History:  Past Medical History:   Diagnosis Date    Arthritis     My mom has it, my grandmother had it I have it    Diabetes (H)     My mom and brother have type II    Heavy menstrual period     Leiomyoma of uterus     s/p myomectomy    Mental disorder     anxiety    PONV (postoperative nausea and vomiting)     Surgical complication after  2002    Thyroid disease     Hypothroidism, 2nd half of     Uncomplicated asthma     My son has it, since he was born    Vesico-ureteral reflux     s/p repair      has a past medical history of Arthritis, Diabetes (H), Heavy menstrual period, Leiomyoma of uterus, Mental disorder, PONV (postoperative nausea and vomiting), Surgical complication (after  2002), Thyroid disease, Uncomplicated asthma, and Vesico-ureteral reflux.    She has no past medical history of Cancer (H), Cerebral infarction (H), Congestive heart failure (H), COPD (chronic obstructive pulmonary disease) (H), Depressive disorder, Heart disease, History of blood transfusion, or Hypertension.    Social History:  Reviewed. No changes to social history except as noted above in HPI.    Vital Signs:   None. This is phone/video visit.     Labs:  Most recent laboratory results reviewed:  Lab Results   Component Value Date    A1C 5.8 10/04/2024    A1C 5.9 2024    A1C 5.7 2022    A1C 5.7 2021    A1C 5.6 2019    A1C 5.5 2018    A1C 5.5 2017       Review of Systems:  10 systems (general, cardiovascular, respiratory, eyes, ENT, endocrine, GI, , M/S, neurological) were reviewed. Most pertinent finding(s) is/are: Some chronic pain. The remaining systems are all unremarkable.    Mental Status Examination  "(limited as this is by phone/video):  Appearance: Awake, alert, appears stated age, no acute distress, well-groomed   Attitude:  cooperative  Motor: Psychomotor agitation/restlessness; patient seen to be constantly fidgeting with her necklace and changing seated positions on the couch  Oriented to:  person, place, time, and situation  Attention Span and Concentration: Distractible  Speech: Clear, coherent, slightly pressured, normal volume  Language: intact  Mood: \"fucking great\"  Affect: Heightened/elevated  Associations:  no loose associations  Thought Process: Rambling and tangential  Thought Content:  no evidence of acute suicidality or homicidal ideation, no evidence of psychotic thought, no auditory hallucinations present and no visual hallucinations present  Recent and Remote Memory:  Intact to interview. Not formally assessed. No amnesia.  Fund of Knowledge: appropriate  Insight: Good  Judgment:  intact, adequate for safety  Impulse Control:  intact    Suicide Risk Assessment:  Today Marbella Hendrix has recently reported passive thoughts of death but no acute suicidality or plan or intent to harm self-none today 1/7/2025.  See safety plan in chart.  Also see thorough assessment by [facility] during today's joint appointment.  Based on all available evidence including the factors cited above, Marbella Hendrix does not appear to be at imminent risk for self-harm, does not meet criteria for a 72-hr hold, and therefore remains appropriate for ongoing outpatient level of care.  A thorough assessment of risk factors related to suicide and self-harm have been reviewed and are noted above. The patient convincingly denies suicidality on several occasions. Local community safety resources reviewed for patient to use if needed. There was no deceit detected, and the patient presented in a manner that was believable.     DSM5 Diagnosis:  Attention-Deficit/Hyperactivity Disorder  314.01 (F90.9) Unspecified Attention " -Deficit / Hyperactivity Disorder  Mood disorder, unspecified (suspicions/monitoring for bipolar II disorder)  300.02 (F41.1) Generalized Anxiety Disorder  Insomnia, unspecified    Medical comorbidities include:   Patient Active Problem List    Diagnosis Date Noted    Elevated fasting glucose 05/01/2024     Priority: Medium    Anxiety 07/19/2023     Priority: Medium    Adjustment disorder with anxious mood 06/27/2021     Priority: Medium    Family history of diabetes mellitus 06/27/2021     Priority: Medium    CMC DJD(carpometacarpal degenerative joint disease), localized primary, left 11/18/2020     Priority: Medium    Dermatochalasis of both upper eyelids 11/17/2020     Priority: Medium     Added automatically from request for surgery [id number]      Involutional ptosis, acquired, bilateral 11/17/2020     Priority: Medium     Added automatically from request for surgery [id number]      Weakness of right hip 07/09/2020     Priority: Medium    Primary osteoarthritis of right hip 12/02/2019     Priority: Medium     Added automatically from request for surgery [id number]      Morbid obesity (H) 06/20/2018     Priority: Medium    Status post hysterectomy 08/02/2017     Priority: Medium    Sensation of plugged ear on both sides 07/12/2017     Priority: Medium    Acute post-operative pain 08/28/2015     Priority: Medium    Preventative health care 10/30/2012     Priority: Medium     Last pap NIL 2011; mammogram nl 2011; lipids abnl, needs annual f/u      Generalized anxiety disorder 09/26/2012     Priority: Medium     H/o panic attacks; currently controlled with paxil      Hyperlipidemia LDL goal <130 09/26/2012     Priority: Medium     Behavioral measures - avoiding statins while trying to conceive; not a candidate for red yeast rice (interactions with thyroid meds)      Acquired hypothyroidism 09/26/2012     Priority: Medium    Overweight 09/26/2012     Priority: Medium     On exercise plan, has been in weight watchers  Problem  list name updated by automated process. Provider to review         Psychosocial & Contextual Factors: see HPI above    Assessment:  From Intake, 7/17/2023:  Marbella Hendrix is a 57-year-old female with past psychiatric history including anxiety, ADHD, depression, remote polysubstance abuse (in remission for decades) who presents today for psychiatric evaluation.  Patient recently with increased psychosocial stressors and acutely worsening anxiety and insomnia.  Patient most recently on Paxil and Ambien to manage symptoms.  Ambien used sparingly and did help break severe insomnia cycle recently.  Patient currently taking 10 mg of Paxil and has only ever been up to 20 mg historically.  Patient may be interested in a trial with something different than Paxil.  We also discussed further optimizing Paxil to 30 or 40 mg daily for more therapeutic trial before replacing the medication.  We did discuss Lexapro and Effexor-XR as possible alternatives.  Patient would like to discuss these options with her  who is an internist and her psychotherapist.  In the meantime, we discussed a trial with clonidine to help with sleep, anxiety, and ADHD symptoms.  Discussed risks and benefits of therapy.  Patient would like to move forward with a clonidine trial to help at bedtime as needed for sleep and a small dose during the day as needed for anxiety.  No acute safety concerns today.  No acute suicidality.  No problematic drug or alcohol use.     8/22/2023:  Patient with ongoing significant anxiety and mood related struggles.  Clonidine has not been helpful.  We will transition patient off paroxetine and onto venlafaxine.  We will continue to consider escitalopram as an option if venlafaxine ineffective or poorly tolerated.  Could also consider use of fluoxetine if paroxetine is difficult to discontinue.  Discussed DBT therapy as a potential option to help improve symptoms.  Resources sent in [employer] message.  No acute  suicidality.  No acute safety concerns.  No problematic drug or alcohol use.    10/3/2023:  Patient doing okay with transition to venlafaxine ER.  Has had some mild dizziness and headaches.  We will continue to optimize venlafaxine ER and patient will watch for worsening headaches and dizziness.  Her symptoms could be related to discontinuation of paroxetine.  Patient still struggling significantly in the evenings and with insomnia.  We will trial quetiapine at bedtime as needed for sleep.  Discussed risks and benefits of therapy including watching for any abnormal involuntary movements.  If patient continues on the medication, we will make sure to get updated lipid panel and fasting glucose.  Could also consider mirtazapine as an option.  No acute safety concerns.  No acute suicidality.  No problematic drug or alcohol use.    11/14/2023:  Patient continuing to work with sleep medicine with pending at home sleep study results.  Quetiapine has been helpful for sleep at just 12.5 mg at bedtime.  Does cause some significant cognitive clouding during the day.  Patient does feel benefits outweigh risks but is willing to trial olanzapine to see if gets similar benefit without such heavy cognitive effects.  Venlafaxine has been helpful for mood and anxiety symptoms.  Still could consider mirtazapine as an option for sleep as needed.  No acute safety concerns.  No SI.  No problematic drug or alcohol use.    1/2/2024:  Patient currently doing quite well on olanzapine.  Finds it more helpful and better tolerated than quetiapine.  Discussed risks and benefits and side effects to watch out for.  Mild constipation-encouraged to utilize MiraLAX and/or Dulcolax.  No acute safety concerns.  No SI.  No problematic drug or alcohol use.  No abnormal involuntary movements.    4/8/2024:  Apart from some of the metabolic effects of olanzapine, patient otherwise quite stable.  Patient opts to try to address metabolic side effects of  olanzapine with metformin.  Discussed there is evidence to support use of metformin to help treat and also to help decrease risk for metabolic syndrome from the medication use.  Discussed risks and benefits of metformin use.  Due to stability of mental health symptoms, psychiatric care be returned back to primary care provider.  Also encouraged primary care provider to continue to monitor metabolic status and need for metformin.  Patient continues to watch diet and stay active.  Updated labs ordered for primary care provider for baseline since starting metformin XR.  Last liver enzymes looked okay when checked in June 2023.  Patient also denies any abnormal involuntary movements.  No acute safety concerns.  No SI.  No problematic drug or alcohol use.    9/11/2024 (New episode of care initiated today after bounce-back):  Patient struggling more significantly with anxiety and insomnia.  Even up to 10 mg of olanzapine was not very helpful.  Will discontinue olanzapine and trial mirtazapine.  Ambien is effective and so patient instructed to take before bedtime nightly for 1-2 weeks to allow for reregulation of sleep schedule.  Could consider further optimization of Effexor-XR in the future as well.  No acute safety concerns.  No acute suicidality, although does have some passive fleeting thoughts with no plan or intent to harm self.  No problematic drug or alcohol use.  Psychotherapy encouraged.    9/24/2024:  Patient with severely worsening anxiety.  Started clonazepam scheduled since last visit.  This will be continued since to started yesterday.  Venlafaxine will be increased to 225 mg daily.  Patient scheduled for intake for PHP/IOP tomorrow.  Patient should not be traveling in her current condition and also to allow time for intensive outpatient programming.  Letter was provided due to their upcoming travel plans.  No acute safety concerns.  No acute suicidality.  No problematic drug or alcohol  use.    10/02/2024:  Patient doing a little bit better on decreased venlafaxine and daily clonazepam.  No changes today.  Patient will continue to monitor mood/depression symptoms  Hopefully will be able to start intensive outpatient programming soon.  No acute safety concerns.  No acute suicidality.  No problematic drug or alcohol use.    10/29/2024:  Overall continuing to feel improvement of symptoms.  Taking clonazepam 1.5 mg at bedtime for sleep.  Worried about not being able to have clonazepam and not sleeping again.  For now clonazepam will be continued.  Patient is agreeable to starting buspirone to see if it might further improve anxiety to at some point reduce reliance on clonazepam.  Encouraged to continue in IOP.  No acute safety concerns.  No SI.  No problematic drug or alcohol use.    12/10/2024:  Patient overall doing quite well.  He feels back to a baseline.  Still some ongoing anxiety but much more manageable and sleeping well.  Using olanzapine, hydroxyzine, and as needed Lunesta at bedtime to help with sleep.  Tolerating well but does have some increased appetite and weight gain from olanzapine.  Patient has not yet started metformin that was sent to help mitigate metabolic effects from olanzapine.  Patient also with slightly elevated hemoglobin A1c.  Patient will now start metformin.  May be a good candidate for GLP-1 injections, like tirzepatide.  No acute safety concerns.  No SI.  No problematic drug or alcohol use.    1/7/2025:  Patient appearing hypomanic today.  Discussed suspicions for bipolar disorder with both patient and .  Patient with history of episodes as an adolescent/young adult while abusing stimulants that seem consistent with manic episodes.   has noted episodes that seem consistent with mixed episodes of bipolar disorder.  Patient with most recent suspected mixed episode.  Now suspicions for hypomania after coming off clonazepam and with stressor leading to lack of  sleep.  We will increase olanzapine but may need to consider decreasing or discontinuing venlafaxine.  No acute safety concerns.  No SI.  No problematic drug or alcohol use.    Medication side effects and alternatives were reviewed. Health promotion activities recommended and reviewed today. All questions addressed. Education and counseling completed regarding risks and benefits of medications and psychotherapy options. Recommend therapy for additional support.     Treatment Plan:  Continue Effexor-XR/venlafaxine  mg daily for anxiety/mood.  Continue Lunesta 1-3 mg at bedtime as needed for sleep. Rx previously sent for the 2 mg tablets. Be sure to devote at least 8 hours to sleep after taking before operating motor vehicle or other heavy or sharp equipment. Do NOT mix with alcohol.   Increase olanzapine to 10 mg at bedtime for the next few nights at least until sleeping well and hypomania decreases/back to mood baseline. Can take up to 15 mg at bedtime and also can take a dose during day as needed for hypomania, racing thoughts, anxiety, etc. Do NOT exceed 20 mg total daily dose.   Continue hydroxyzine 12.5-25 mg at bedtime as needed for sleep.   Follow up with me in 2 weeks.  For scheduling needs you can call [phone].  You could also get a message to the nurses by calling the aforementioned phone number.  Continue all other cares per primary care provider.   Continue all other medications as reviewed per electronic medical record today.   Safety plan reviewed. To the Emergency Department as needed or call after hours crisis line at [phone] or [phone]. [place] Crisis Text Line. Text [place] to 999222 or Suicide LifeLine Chat: suicidepreventionlifeline.org/chat  Follow up with primary care provider as planned or for acute medical concerns.  [employer] may be used to communicate with your provider, but this is not intended to be used for emergencies.    Administrative Billing:   Phone Call/Video  Duration: 32 Minutes  Start: 3:35p  Stop: 4:07p    Patient Status:  Patient will continue to be seen in collaborative care for stabilization.    The longitudinal plan of care for the diagnosis(es)/condition(s) as documented were addressed during this visit. Due to the added complexity in care, I will continue to support [place] in the subsequent management and with ongoing continuity of care.    Episode of Care #: 6 (New episode of care started 9/11/24)    Signed:   Christi Lewis DO  [facility]    Disclaimer: This note consists of symbols derived from keyboarding, dictation and/or voice recognition software. As a result, there may be errors in the script that have gone undetected. Please consider this when interpreting information found in this chart.

## 2025-01-08 NOTE — PATIENT INSTRUCTIONS
Treatment Plan:  Continue Effexor-XR/venlafaxine  mg daily for anxiety/mood.  Continue Lunesta 1-3 mg at bedtime as needed for sleep. Rx previously sent for the 2 mg tablets. Be sure to devote at least 8 hours to sleep after taking before operating motor vehicle or other heavy or sharp equipment. Do NOT mix with alcohol.   Increase olanzapine to 10 mg at bedtime for the next few nights at least until sleeping well and hypomania decreases/back to mood baseline. Can take up to 15 mg at bedtime and also can take a dose during day as needed for hypomania, racing thoughts, anxiety, etc. Do NOT exceed 20 mg total daily dose.   Continue hydroxyzine 12.5-25 mg at bedtime as needed for sleep.   Follow up with me in 2 weeks.  For scheduling needs you can call [phone].  You could also get a message to the nurses by calling the aforementioned phone number.  Continue all other cares per primary care provider.   Continue all other medications as reviewed per electronic medical record today.   Safety plan reviewed. To the Emergency Department as needed or call after hours crisis line at [phone] or [phone]. [place] Crisis Text Line. Text [place] to 305055 or Suicide LifeLine Chat: suicidepreventionlifeline.org/chat  Follow up with primary care provider as planned or for acute medical concerns.  [employer] may be used to communicate with your provider, but this is not intended to be used for emergencies.

## 2025-01-13 ASSESSMENT — SLEEP AND FATIGUE QUESTIONNAIRES
HOW LIKELY ARE YOU TO NOD OFF OR FALL ASLEEP WHEN YOU ARE A PASSENGER IN A CAR FOR AN HOUR WITHOUT A BREAK: SLIGHT CHANCE OF DOZING
HOW LIKELY ARE YOU TO NOD OFF OR FALL ASLEEP WHILE SITTING AND READING: WOULD NEVER DOZE
HOW LIKELY ARE YOU TO NOD OFF OR FALL ASLEEP WHILE SITTING QUIETLY AFTER LUNCH WITHOUT ALCOHOL: WOULD NEVER DOZE
HOW LIKELY ARE YOU TO NOD OFF OR FALL ASLEEP WHILE WATCHING TV: WOULD NEVER DOZE
HOW LIKELY ARE YOU TO NOD OFF OR FALL ASLEEP WHILE SITTING AND TALKING TO SOMEONE: WOULD NEVER DOZE
HOW LIKELY ARE YOU TO NOD OFF OR FALL ASLEEP IN A CAR, WHILE STOPPED FOR A FEW MINUTES IN TRAFFIC: WOULD NEVER DOZE
HOW LIKELY ARE YOU TO NOD OFF OR FALL ASLEEP WHILE SITTING INACTIVE IN A PUBLIC PLACE: SLIGHT CHANCE OF DOZING
HOW LIKELY ARE YOU TO NOD OFF OR FALL ASLEEP WHILE LYING DOWN TO REST IN THE AFTERNOON WHEN CIRCUMSTANCES PERMIT: MODERATE CHANCE OF DOZING

## 2025-01-15 ENCOUNTER — THERAPY VISIT (OUTPATIENT)
Dept: SLEEP MEDICINE | Facility: CLINIC | Age: 60
End: 2025-01-15
Attending: PHYSICIAN ASSISTANT
Payer: COMMERCIAL

## 2025-01-15 DIAGNOSIS — G47.33 OSA (OBSTRUCTIVE SLEEP APNEA): ICD-10-CM

## 2025-01-18 ENCOUNTER — MYC REFILL (OUTPATIENT)
Dept: FAMILY MEDICINE | Facility: CLINIC | Age: 60
End: 2025-01-18
Payer: COMMERCIAL

## 2025-01-18 ENCOUNTER — MYC REFILL (OUTPATIENT)
Dept: PSYCHIATRY | Facility: CLINIC | Age: 60
End: 2025-01-18
Payer: COMMERCIAL

## 2025-01-18 DIAGNOSIS — G47.00 INSOMNIA, UNSPECIFIED TYPE: ICD-10-CM

## 2025-01-18 DIAGNOSIS — E55.9 HYPOVITAMINOSIS D: Primary | ICD-10-CM

## 2025-01-18 DIAGNOSIS — F41.1 GENERALIZED ANXIETY DISORDER: ICD-10-CM

## 2025-01-20 LAB — SLPCOMP: NORMAL

## 2025-01-20 RX ORDER — HYDROXYZINE HYDROCHLORIDE 25 MG/1
12.5-25 TABLET, FILM COATED ORAL 2 TIMES DAILY PRN
Qty: 120 TABLET | Refills: 1 | Status: SHIPPED | OUTPATIENT
Start: 2025-01-20

## 2025-01-21 ENCOUNTER — VIRTUAL VISIT (OUTPATIENT)
Dept: PSYCHIATRY | Facility: CLINIC | Age: 60
End: 2025-01-21
Payer: COMMERCIAL

## 2025-01-21 VITALS — WEIGHT: 200 LBS | HEIGHT: 64 IN | BODY MASS INDEX: 34.15 KG/M2

## 2025-01-21 DIAGNOSIS — G47.00 INSOMNIA, UNSPECIFIED TYPE: ICD-10-CM

## 2025-01-21 DIAGNOSIS — Z79.899 ENCOUNTER FOR LONG-TERM (CURRENT) USE OF MEDICATIONS: ICD-10-CM

## 2025-01-21 DIAGNOSIS — F90.9 ATTENTION DEFICIT HYPERACTIVITY DISORDER (ADHD), UNSPECIFIED ADHD TYPE: ICD-10-CM

## 2025-01-21 DIAGNOSIS — F41.1 GENERALIZED ANXIETY DISORDER: ICD-10-CM

## 2025-01-21 DIAGNOSIS — F39 MOOD DISORDER: Primary | ICD-10-CM

## 2025-01-21 PROCEDURE — 98006 SYNCH AUDIO-VIDEO EST MOD 30: CPT | Performed by: PSYCHIATRY & NEUROLOGY

## 2025-01-21 PROCEDURE — G2211 COMPLEX E/M VISIT ADD ON: HCPCS | Performed by: PSYCHIATRY & NEUROLOGY

## 2025-01-21 ASSESSMENT — PAIN SCALES - GENERAL: PAINLEVEL_OUTOF10: NO PAIN (0)

## 2025-01-21 NOTE — PROGRESS NOTES
"Telemedicine Visit: The patient's condition can be safely assessed and treated via synchronous audio and visual telemedicine encounter.      Reason for Telemedicine Visit: Patient has requested telehealth visit    Originating Site (Patient Location): Patient's home    Distant Location (provider location):  Off-Site    Consent:  The patient/guardian has verbally consented to: the potential risks and benefits of telemedicine (video visit) versus in person care; bill my insurance or make self-payment for services provided; and responsibility for payment of non-covered services.     Mode of Communication:  Video Conference via [employer]    As the provider I attest to compliance with applicable laws and regulations related to telemedicine.          Outpatient Psychiatric Progress Note    Name: Marbella Hendrix   : 1965                    Primary Care Provider: Vanessa Ladd MD   Therapist: Doing acupuncture. [facility].  Recently finished IOP.       PHQ-9 scores:      10/2/2024     2:25 PM 10/11/2024     1:01 PM 12/10/2024    10:54 AM   PHQ-9 SCORE   PHQ-9 Total Score MyChart 19 (Moderately severe depression)  9 (Mild depression)   PHQ-9 Total Score 19 18 9        Proxy-reported       BERTHA-7 scores:      2024    11:35 AM 10/11/2024     1:01 PM 10/24/2024    11:08 AM   BERTHA-7 SCORE   Total Score 21 (severe anxiety)  12 (moderate anxiety)   Total Score 21 19 12        Patient-reported       Patient Identification:  Patient is a 59 year old,   White Not  or  female  who presents for return visit with me.  Patient is currently unemployed. Patient attended the phone/video session with , Wally.  Patient prefers to be called: \"Marbella\".    Interim History:  I last saw Marbella Hendrix for outpatient psychiatry return visit on 2025. During that appointment, we:    Continue Effexor-XR/venlafaxine  mg daily for anxiety/mood.  Continue Lunesta 1-3 mg " at bedtime as needed for sleep. Rx previously sent for the 2 mg tablets. Be sure to devote at least 8 hours to sleep after taking before operating motor vehicle or other heavy or sharp equipment. Do NOT mix with alcohol.   Increase olanzapine to 10 mg at bedtime for the next few nights at least until sleeping well and hypomania decreases/back to mood baseline. Can take up to 15 mg at bedtime and also can take a dose during day as needed for hypomania, racing thoughts, anxiety, etc. Do NOT exceed 20 mg total daily dose.   Continue hydroxyzine 12.5-25 mg at bedtime as needed for sleep.   Follow up with me in 2 weeks.  For scheduling needs you can call [phone].  You could also get a message to the nurses by calling the aforementioned phone number.    1/21: Overall patient doing well.  Has had sustained improved mood benefits.  Anxiety seems much more manageable.  Has been taking olanzapine 10 mg at bedtime with one hydroxyzine.  Sleeping around at least 6 hours.  Energy has been good despite just 6 hours of sleep.   denies any major concerns today.  Patient does not appear to be as elevated as last visit.  Much less distractible.  No pressured speech today.  Not nearly as much psychomotor agitation.  Denies any new medication side effects.  No acute safety concerns.  No suicidality.  No problematic drug or alcohol use.    Per [facility], [facility], during today's team-based visit:  No [facility] appt today      Past Psychiatric Med Trials:  Psych Meds at Intake:  Paxil 20 mg daily - started when 29 years, has been up and down on the dose, on 10 mg dose mostly, last on 20 mg this past spring   Ambien 5 mg for insomnia - used for 4 nights  Ativan 0.5 mg for flying     Past Psych Meds:  Prozac for 1-2 months maybe when 27 yo    Psychiatric ROS:  Marbella Hendrix reports mood has been: See HPI above  Anxiety has been: See HPI above  Sleep has been: See HPI above  Rachna sxs: None  Psychosis sxs: None  ADHD/ADD  sxs: see HPI above  PTSD sxs: NA  PHQ9 and GAD7 scores were reviewed today if completed.   Medication side effects: See HPI above  Current stressors include: Symptoms and see HPI above  Coping mechanisms and supports include: Family and Hobbies    Current medications include:   Current Outpatient Medications   Medication Sig Dispense Refill    acetaminophen (TYLENOL) 500 MG tablet Take 500-1,000 mg by mouth every 6 hours as needed for mild pain.      atorvastatin (LIPITOR) 20 MG tablet Take 1 tablet (20 mg) by mouth daily. 90 tablet 2    calcium carbonate (OS-ARA) 500 MG tablet Take 1 tablet by mouth daily.      cholecalciferol (VITAMIN D3) 25 mcg (1000 units) capsule Take 1 capsule (25 mcg) by mouth daily. 90 capsule 2    eszopiclone (LUNESTA) 2 MG tablet Take 1 tablet (2 mg) by mouth nightly as needed for sleep. 30 tablet 2    hydrOXYzine HCl (ATARAX) 25 MG tablet Take 0.5-1 tablets (12.5-25 mg) by mouth 2 times daily as needed for anxiety. 120 tablet 1    levothyroxine (SYNTHROID/LEVOTHROID) 125 MCG tablet TAKE ONE TABLET BY MOUTH ONCE DAILY 90 tablet 1    magic mouthwash suspension (diphenhydrAMINE, lidocaine, aluminum-magnesium & simethicone) Swish and spit 10 mLs in mouth every 4 hours as needed for mouth sores. 300 mL 1    melatonin 5 MG tablet Take 5 mg by mouth nightly as needed for sleep.      naproxen sodium 220 MG capsule Take 220 mg by mouth 2 times daily (with meals).      OLANZapine (ZYPREXA) 10 MG tablet Take 1 tablet (10 mg) by mouth at bedtime. 90 tablet 0    Omega-3 Fatty Acids (OMEGA-3 FISH OIL PO) Take 1 g by mouth daily      venlafaxine (EFFEXOR XR) 150 MG 24 hr capsule Take 1 capsule (150 mg) by mouth daily. 90 capsule 1     No current facility-administered medications for this visit.       The [place] Prescription Monitoring Program has been reviewed and there are no concerns about diversionary activity for controlled substances at this time.   01/16/2025 11/22/2024 1 Eszopiclone 2 Mg  Tablet 60.00 60 Al Bau [id number] Juan ([id number]) 0/0 0.66 [place]   11/27/2024 11/22/2024 1 Eszopiclone 2 Mg Tablet 30.00 30 Al Bau [id number] Sup ([id number]) 0/2 0.66 [place]   11/15/2024 11/15/2024 1 Eszopiclone 2 Mg Tablet 15.00 15 Al Bau [id number] Sup ([id number]) 0/0 0.66 [place]   10/29/2024 10/29/2024 1 Clonazepam 1 Mg Tablet 60.00 30 Al Bau [id number] Sup ([id number]) 0/1 4.00 [place]     Recent Labs   Lab Test 10/04/24  0740 04/23/24  0738   CHOL 152 189   HDL 32* 34*   LDL 97 121*   TRIG 117 172*     Last Comprehensive Metabolic Panel:  Sodium   Date Value Ref Range Status   10/04/2024 142 135 - 145 mmol/L Final   10/22/2012 141 133 - 144 mmol/L Final     Potassium   Date Value Ref Range Status   10/04/2024 5.0 3.4 - 5.3 mmol/L Final   07/08/2022 4.4 3.4 - 5.3 mmol/L Final   10/22/2012 3.8 3.4 - 5.3 mmol/L Final     Chloride   Date Value Ref Range Status   10/04/2024 106 98 - 107 mmol/L Final   07/08/2022 108 94 - 109 mmol/L Final   10/22/2012 105 94 - 109 mmol/L Final     Carbon Dioxide   Date Value Ref Range Status   10/22/2012 25 20 - 32 mmol/L Final     Carbon Dioxide (CO2)   Date Value Ref Range Status   10/04/2024 24 22 - 29 mmol/L Final   07/08/2022 20 20 - 32 mmol/L Final     Anion Gap   Date Value Ref Range Status   10/04/2024 12 7 - 15 mmol/L Final   07/08/2022 10 3 - 14 mmol/L Final   10/22/2012 11 6 - 17 mmol/L Final     Glucose   Date Value Ref Range Status   10/04/2024 96 70 - 99 mg/dL Final   07/08/2022 93 70 - 99 mg/dL Final   07/01/2020 88 70 - 99 mg/dL Final     Comment:     Fasting specimen     Urea Nitrogen   Date Value Ref Range Status   10/04/2024 8.6 6.0 - 20.0 mg/dL Final   07/08/2022 18 7 - 30 mg/dL Final   10/22/2012 25 (H) 5 - 24 mg/dL Final     Creatinine   Date Value Ref Range Status   10/04/2024 0.84 0.51 - 0.95 mg/dL Final   08/28/2015 0.65 0.52 - 1.04 mg/dL Final     GFR Estimate   Date Value Ref Range Status   10/04/2024 81 >60 mL/min/1.73m2 Final     Comment:     eGFR  calculated using  CKD-EPI equation.   2015 >90  Non  GFR Calc   >60 mL/min/1.7m2 Final     Calcium   Date Value Ref Range Status   10/04/2024 9.7 8.8 - 10.4 mg/dL Final     Comment:     Reference intervals for this test were updated on 2024 to reflect our healthy population more accurately. There may be differences in the flagging of prior results with similar values performed with this method. Those prior results can be interpreted in the context of the updated reference intervals.   10/22/2012 9.2 8.5 - 10.4 mg/dL Final     Bilirubin Total   Date Value Ref Range Status   10/04/2024 0.3 <=1.2 mg/dL Final     Alkaline Phosphatase   Date Value Ref Range Status   10/04/2024 47 40 - 150 U/L Final     ALT   Date Value Ref Range Status   10/04/2024 25 0 - 50 U/L Final   2012 18.0 0.0 - 50.0 U/L Final     AST   Date Value Ref Range Status   10/04/2024 23 0 - 45 U/L Final   2012 25.0 0.0 - 45.0 U/L Final     Past Medical/Surgical History:  Past Medical History:   Diagnosis Date    Arthritis     My mom has it, my grandmother had it I have it    Diabetes (H)     My mom and brother have type II    Heavy menstrual period     Leiomyoma of uterus     s/p myomectomy    Mental disorder     anxiety    PONV (postoperative nausea and vomiting)     Surgical complication after  2002    Thyroid disease     Hypothroidism, 2nd half of     Uncomplicated asthma     My son has it, since he was born    Vesico-ureteral reflux     s/p repair      has a past medical history of Arthritis, Diabetes (H), Heavy menstrual period, Leiomyoma of uterus, Mental disorder, PONV (postoperative nausea and vomiting), Surgical complication (after  2002), Thyroid disease, Uncomplicated asthma, and Vesico-ureteral reflux.    She has no past medical history of Cancer (H), Cerebral infarction (H), Congestive heart failure (H), COPD (chronic obstructive pulmonary disease) (H), Depressive  disorder, Heart disease, History of blood transfusion, or Hypertension.    Social History:  Reviewed. No changes to social history except as noted above in HPI.    Vital Signs:   None. This is phone/video visit.     Labs:  Most recent laboratory results reviewed:  Lab Results   Component Value Date    A1C 5.8 10/04/2024    A1C 5.9 04/23/2024    A1C 5.7 04/13/2022    A1C 5.7 06/23/2021    A1C 5.6 06/12/2019    A1C 5.5 06/27/2018    A1C 5.5 08/02/2017     Recent Labs   Lab Test 10/04/24  0740 04/23/24  0738   CHOL 152 189   HDL 32* 34*   LDL 97 121*   TRIG 117 172*       Review of Systems:  10 systems (general, cardiovascular, respiratory, eyes, ENT, endocrine, GI, , M/S, neurological) were reviewed. Most pertinent finding(s) is/are: Some chronic pain. The remaining systems are all unremarkable.    Mental Status Examination (limited as this is by phone/video):  Appearance: Awake, alert, appears stated age, no acute distress, well-groomed   Attitude:  cooperative  Motor: Slightly fidgety but much less psychomotor agitation compared to last visit  Oriented to:  person, place, time, and situation  Attention Span and Concentration: Intact  Speech: Clear, coherent, normal volume, no longer pressured  Language: intact  Mood: Really good  Affect: Slight elevation, but much improved and much closer to a relative baseline compared to last visit  Associations:  no loose associations  Thought Process: Much more linear and goal directed  Thought Content:  no evidence of acute suicidality or homicidal ideation, no evidence of psychotic thought, no auditory hallucinations present and no visual hallucinations present  Recent and Remote Memory:  Intact to interview. Not formally assessed. No amnesia.  Fund of Knowledge: appropriate  Insight: Good  Judgment:  intact, adequate for safety  Impulse Control:  intact    Suicide Risk Assessment:  Marbella Hendrix has history of passive thoughts of death but no acute suicidality or plan  or intent to harm self-none today 1/21/2025.  See safety plan in chart.  Also see thorough assessment by [facility] during previous appointments.  Based on all available evidence including the factors cited above, Marbella Hendrix does not appear to be at imminent risk for self-harm, does not meet criteria for a 72-hr hold, and therefore remains appropriate for ongoing outpatient level of care.  A thorough assessment of risk factors related to suicide and self-harm have been reviewed and are noted above. The patient convincingly denies suicidality on several occasions. Local community safety resources reviewed for patient to use if needed. There was no deceit detected, and the patient presented in a manner that was believable.     DSM5 Diagnosis:  Attention-Deficit/Hyperactivity Disorder  314.01 (F90.9) Unspecified Attention -Deficit / Hyperactivity Disorder  Mood disorder, unspecified (suspicions/monitoring for bipolar II disorder)  300.02 (F41.1) Generalized Anxiety Disorder  Insomnia, unspecified    Medical comorbidities include:   Patient Active Problem List    Diagnosis Date Noted    Elevated fasting glucose 05/01/2024     Priority: Medium    Anxiety 07/19/2023     Priority: Medium    Adjustment disorder with anxious mood 06/27/2021     Priority: Medium    Family history of diabetes mellitus 06/27/2021     Priority: Medium    CMC DJD(carpometacarpal degenerative joint disease), localized primary, left 11/18/2020     Priority: Medium    Dermatochalasis of both upper eyelids 11/17/2020     Priority: Medium     Added automatically from request for surgery [id number]      Involutional ptosis, acquired, bilateral 11/17/2020     Priority: Medium     Added automatically from request for surgery [id number]      Weakness of right hip 07/09/2020     Priority: Medium    Primary osteoarthritis of right hip 12/02/2019     Priority: Medium     Added automatically from request for surgery [id number]      Morbid obesity (H) 06/20/2018      Priority: Medium    Status post hysterectomy 08/02/2017     Priority: Medium    Sensation of plugged ear on both sides 07/12/2017     Priority: Medium    Acute post-operative pain 08/28/2015     Priority: Medium    Preventative health care 10/30/2012     Priority: Medium     Last pap NIL 2011; mammogram nl 2011; lipids abnl, needs annual f/u      Generalized anxiety disorder 09/26/2012     Priority: Medium     H/o panic attacks; currently controlled with paxil      Hyperlipidemia LDL goal <130 09/26/2012     Priority: Medium     Behavioral measures - avoiding statins while trying to conceive; not a candidate for red yeast rice (interactions with thyroid meds)      Acquired hypothyroidism 09/26/2012     Priority: Medium    Overweight 09/26/2012     Priority: Medium     On exercise plan, has been in weight watchers  Problem list name updated by automated process. Provider to review         Psychosocial & Contextual Factors: see HPI above    Assessment:  From Intake, 7/17/2023:  Marbella Hendrix is a 57-year-old female with past psychiatric history including anxiety, ADHD, depression, remote polysubstance abuse (in remission for decades) who presents today for psychiatric evaluation.  Patient recently with increased psychosocial stressors and acutely worsening anxiety and insomnia.  Patient most recently on Paxil and Ambien to manage symptoms.  Ambien used sparingly and did help break severe insomnia cycle recently.  Patient currently taking 10 mg of Paxil and has only ever been up to 20 mg historically.  Patient may be interested in a trial with something different than Paxil.  We also discussed further optimizing Paxil to 30 or 40 mg daily for more therapeutic trial before replacing the medication.  We did discuss Lexapro and Effexor-XR as possible alternatives.  Patient would like to discuss these options with her  who is an internist and her psychotherapist.  In the meantime, we discussed a trial with  clonidine to help with sleep, anxiety, and ADHD symptoms.  Discussed risks and benefits of therapy.  Patient would like to move forward with a clonidine trial to help at bedtime as needed for sleep and a small dose during the day as needed for anxiety.  No acute safety concerns today.  No acute suicidality.  No problematic drug or alcohol use.     8/22/2023:  Patient with ongoing significant anxiety and mood related struggles.  Clonidine has not been helpful.  We will transition patient off paroxetine and onto venlafaxine.  We will continue to consider escitalopram as an option if venlafaxine ineffective or poorly tolerated.  Could also consider use of fluoxetine if paroxetine is difficult to discontinue.  Discussed DBT therapy as a potential option to help improve symptoms.  Resources sent in [employer] message.  No acute suicidality.  No acute safety concerns.  No problematic drug or alcohol use.    10/3/2023:  Patient doing okay with transition to venlafaxine ER.  Has had some mild dizziness and headaches.  We will continue to optimize venlafaxine ER and patient will watch for worsening headaches and dizziness.  Her symptoms could be related to discontinuation of paroxetine.  Patient still struggling significantly in the evenings and with insomnia.  We will trial quetiapine at bedtime as needed for sleep.  Discussed risks and benefits of therapy including watching for any abnormal involuntary movements.  If patient continues on the medication, we will make sure to get updated lipid panel and fasting glucose.  Could also consider mirtazapine as an option.  No acute safety concerns.  No acute suicidality.  No problematic drug or alcohol use.    11/14/2023:  Patient continuing to work with sleep medicine with pending at home sleep study results.  Quetiapine has been helpful for sleep at just 12.5 mg at bedtime.  Does cause some significant cognitive clouding during the day.  Patient does feel benefits outweigh risks  but is willing to trial olanzapine to see if gets similar benefit without such heavy cognitive effects.  Venlafaxine has been helpful for mood and anxiety symptoms.  Still could consider mirtazapine as an option for sleep as needed.  No acute safety concerns.  No SI.  No problematic drug or alcohol use.    1/2/2024:  Patient currently doing quite well on olanzapine.  Finds it more helpful and better tolerated than quetiapine.  Discussed risks and benefits and side effects to watch out for.  Mild constipation-encouraged to utilize MiraLAX and/or Dulcolax.  No acute safety concerns.  No SI.  No problematic drug or alcohol use.  No abnormal involuntary movements.    4/8/2024:  Apart from some of the metabolic effects of olanzapine, patient otherwise quite stable.  Patient opts to try to address metabolic side effects of olanzapine with metformin.  Discussed there is evidence to support use of metformin to help treat and also to help decrease risk for metabolic syndrome from the medication use.  Discussed risks and benefits of metformin use.  Due to stability of mental health symptoms, psychiatric care be returned back to primary care provider.  Also encouraged primary care provider to continue to monitor metabolic status and need for metformin.  Patient continues to watch diet and stay active.  Updated labs ordered for primary care provider for baseline since starting metformin XR.  Last liver enzymes looked okay when checked in June 2023.  Patient also denies any abnormal involuntary movements.  No acute safety concerns.  No SI.  No problematic drug or alcohol use.    9/11/2024 (New episode of care initiated today after bounce-back):  Patient struggling more significantly with anxiety and insomnia.  Even up to 10 mg of olanzapine was not very helpful.  Will discontinue olanzapine and trial mirtazapine.  Ambien is effective and so patient instructed to take before bedtime nightly for 1-2 weeks to allow for reregulation  of sleep schedule.  Could consider further optimization of Effexor-XR in the future as well.  No acute safety concerns.  No acute suicidality, although does have some passive fleeting thoughts with no plan or intent to harm self.  No problematic drug or alcohol use.  Psychotherapy encouraged.    9/24/2024:  Patient with severely worsening anxiety.  Started clonazepam scheduled since last visit.  This will be continued since to started yesterday.  Venlafaxine will be increased to 225 mg daily.  Patient scheduled for intake for [facility] tomorrow.  Patient should not be traveling in her current condition and also to allow time for intensive outpatient programming.  Letter was provided due to their upcoming travel plans.  No acute safety concerns.  No acute suicidality.  No problematic drug or alcohol use.    10/02/2024:  Patient doing a little bit better on decreased venlafaxine and daily clonazepam.  No changes today.  Patient will continue to monitor mood/depression symptoms  Hopefully will be able to start intensive outpatient programming soon.  No acute safety concerns.  No acute suicidality.  No problematic drug or alcohol use.    10/29/2024:  Overall continuing to feel improvement of symptoms.  Taking clonazepam 1.5 mg at bedtime for sleep.  Worried about not being able to have clonazepam and not sleeping again.  For now clonazepam will be continued.  Patient is agreeable to starting buspirone to see if it might further improve anxiety to at some point reduce reliance on clonazepam.  Encouraged to continue in [facility].  No acute safety concerns.  No SI.  No problematic drug or alcohol use.    12/10/2024:  Patient overall doing quite well.  He feels back to a baseline.  Still some ongoing anxiety but much more manageable and sleeping well.  Using olanzapine, hydroxyzine, and as needed Lunesta at bedtime to help with sleep.  Tolerating well but does have some increased appetite and weight gain from olanzapine.  Patient  has not yet started metformin that was sent to help mitigate metabolic effects from olanzapine.  Patient also with slightly elevated hemoglobin A1c.  Patient will now start metformin.  May be a good candidate for GLP-1 injections, like tirzepatide.  No acute safety concerns.  No SI.  No problematic drug or alcohol use.    1/7/2025:  Patient appearing hypomanic today.  Discussed suspicions for bipolar disorder with both patient and .  Patient with history of episodes as an adolescent/young adult while abusing stimulants that seem consistent with manic episodes.   has noted episodes that seem consistent with mixed episodes of bipolar disorder.  Patient with most recent suspected mixed episode.  Now suspicions for hypomania after coming off clonazepam and with stressor leading to lack of sleep.  We will increase olanzapine but may need to consider decreasing or discontinuing venlafaxine.  No acute safety concerns.  No SI.  No problematic drug or alcohol use.    1/21/2025:  Patient overall with some appropriately sustained elevation in mood.  Patient does not seem as elevated as last visit.  No signs of depression.  Less psychomotor agitation.  No longer with pressured speech.  Getting roughly 6 hours of sleep a night.  Discussed continuing to monitor sleep closely.  If patient with signs or symptoms of mixed episode or hypomania again in the future, we may need to consider decreasing, or finding an alternative to, the venlafaxine.  Patient will continue on olanzapine 10 mg at bedtime.  Could consider lamotrigine in the future if mood were to crash while on venlafaxine, or if venlafaxine again becomes too activating despite presence of olanzapine.  We will get updated fasting labs in about 1 month.  No acute safety concerns.  No suicidality.  No problematic drug or alcohol use.  Will continue to monitor weight.  No noted abnormal involuntary movements today on camera.  None noted by patient.    Medication  side effects and alternatives were reviewed. Health promotion activities recommended and reviewed today. All questions addressed. Education and counseling completed regarding risks and benefits of medications and psychotherapy options. Recommend therapy for additional support.     Treatment Plan:  Continue Effexor-XR/venlafaxine  mg daily for anxiety/mood.  Continue Lunesta 1-3 mg at bedtime as needed for sleep. Rx previously sent for the 2 mg tablets. Be sure to devote at least 8 hours to sleep after taking before operating motor vehicle or other heavy or sharp equipment. Do NOT mix with alcohol.   Continue olanzapine 10 mg at bedtime for sleep, mood, anxiety.  Can take up to 15 mg at bedtime and also can take a dose during day as needed for hypomania, racing thoughts, anxiety, etc. Do NOT exceed 20 mg total daily dose.   Continue hydroxyzine 12.5-25 mg at bedtime as needed for sleep.   Have fasting labs completed in about 1 month at any [facility] lab. Need to call your clinic ahead of time to schedule an appointment for lab due to limited hours and no walk-ins due to Covid-19 restrictions/changes.   Follow up with me in 1 month.  For scheduling needs you can call [phone].  You could also get a message to the nurses by calling the aforementioned phone number.  Continue all other cares per primary care provider.   Continue all other medications as reviewed per electronic medical record today.   Safety plan reviewed. To the Emergency Department as needed or call after hours crisis line at [phone] or [phone]. [place] Crisis Text Line. Text [place] to 723642 or Suicide LifeLine Chat: suicidepreventionlifeline.org/chat  Follow up with primary care provider as planned or for acute medical concerns.  [employer] may be used to communicate with your provider, but this is not intended to be used for emergencies.    Administrative Billing:   Phone Call/Video Duration: 21 Minutes  Start: 3:32p  Stop:  3:53p    Patient Status:  Patient is a continuous care patient.     The longitudinal plan of care for the diagnosis(es)/condition(s) as documented were addressed during this visit. Due to the added complexity in care, I will continue to support Marbella in the subsequent management and with ongoing continuity of care.    Signed:   Christi Lewis DO  [facility]    Disclaimer: This note consists of symbols derived from keyboarding, dictation and/or voice recognition software. As a result, there may be errors in the script that have gone undetected. Please consider this when interpreting information found in this chart.

## 2025-01-21 NOTE — PATIENT INSTRUCTIONS
Treatment Plan:  Continue Effexor-XR/venlafaxine  mg daily for anxiety/mood.  Continue Lunesta 1-3 mg at bedtime as needed for sleep. Rx previously sent for the 2 mg tablets. Be sure to devote at least 8 hours to sleep after taking before operating motor vehicle or other heavy or sharp equipment. Do NOT mix with alcohol.   Continue olanzapine 10 mg at bedtime for sleep, mood, anxiety.  Can take up to 15 mg at bedtime and also can take a dose during day as needed for hypomania, racing thoughts, anxiety, etc. Do NOT exceed 20 mg total daily dose.   Continue hydroxyzine 12.5-25 mg at bedtime as needed for sleep.   Have fasting labs completed in about 1 month at any [facility] lab. Need to call your clinic ahead of time to schedule an appointment for lab due to limited hours and no walk-ins due to Covid-19 restrictions/changes.   Follow up with me in 1 month.  For scheduling needs you can call [phone].  You could also get a message to the nurses by calling the aforementioned phone number.  Continue all other cares per primary care provider.   Continue all other medications as reviewed per electronic medical record today.   Safety plan reviewed. To the Emergency Department as needed or call after hours crisis line at [phone] or [phone]. [place] Crisis Text Line. Text [place] to 563034 or Suicide LifeLine Chat: suicidepreventionlifeline.org/chat  Follow up with primary care provider as planned or for acute medical concerns.  [employer] may be used to communicate with your provider, but this is not intended to be used for emergencies.

## 2025-01-21 NOTE — NURSING NOTE
Current patient location: [address] [place] [address] [facility] [facility] [address]    Is the patient currently in the state of [place]? YES    Visit mode: VIDEO    If the visit is dropped, the patient can be reconnected by:VIDEO VISIT: Send to e-mail at: [email or web address]    Will anyone else be joining the visit? NO  (If patient encounters technical issues they should call [phone])    Are changes needed to the allergy or medication list? No    Are refills needed on medications prescribed by this physician? NO    Rooming Documentation:  Questionnaire(s) not pre-assigned    Reason for visit: RECHECK    Dea LOVETT

## 2025-01-27 LAB — SLPCOMP: NORMAL

## 2025-01-28 ENCOUNTER — OFFICE VISIT (OUTPATIENT)
Dept: OPTOMETRY | Facility: CLINIC | Age: 60
End: 2025-01-28
Payer: COMMERCIAL

## 2025-01-28 DIAGNOSIS — H52.4 PRESBYOPIA: ICD-10-CM

## 2025-01-28 DIAGNOSIS — Z01.00 ENCOUNTER FOR EXAMINATION OF EYES AND VISION WITHOUT ABNORMAL FINDINGS: Primary | ICD-10-CM

## 2025-01-28 DIAGNOSIS — H52.03 HYPERMETROPIA, BILATERAL: ICD-10-CM

## 2025-01-28 DIAGNOSIS — Z98.890 HX OF BLEPHAROPLASTY: ICD-10-CM

## 2025-01-28 PROCEDURE — 92015 DETERMINE REFRACTIVE STATE: CPT | Performed by: OPTOMETRIST

## 2025-01-28 PROCEDURE — 92014 COMPRE OPH EXAM EST PT 1/>: CPT | Performed by: OPTOMETRIST

## 2025-01-28 ASSESSMENT — CONF VISUAL FIELD
OS_SUPERIOR_NASAL_RESTRICTION: 0
OD_NORMAL: 1
OD_INFERIOR_TEMPORAL_RESTRICTION: 0
OS_SUPERIOR_TEMPORAL_RESTRICTION: 0
OS_INFERIOR_TEMPORAL_RESTRICTION: 0
OS_INFERIOR_NASAL_RESTRICTION: 0
OD_SUPERIOR_TEMPORAL_RESTRICTION: 0
OD_INFERIOR_NASAL_RESTRICTION: 0
METHOD: COUNTING FINGERS
OS_NORMAL: 1
OD_SUPERIOR_NASAL_RESTRICTION: 0

## 2025-01-28 ASSESSMENT — TONOMETRY
IOP_METHOD: ICARE
OD_IOP_MMHG: 21
OS_IOP_MMHG: 22

## 2025-01-28 ASSESSMENT — REFRACTION_WEARINGRX
OS_SPHERE: +2.00
OS_CYLINDER: SPHERE
OD_CYLINDER: SPHERE
SPECS_TYPE: OTC READERS
OD_SPHERE: +2.00

## 2025-01-28 ASSESSMENT — REFRACTION_MANIFEST
OD_CYLINDER: SPHERE
OS_CYLINDER: SPHERE
OD_ADD: +2.25
OD_SPHERE: +0.25
OS_SPHERE: +0.25
OS_ADD: +2.25

## 2025-01-28 ASSESSMENT — VISUAL ACUITY
OD_CC: 20/20-1
OD_SC: 20/60-1
OS_SC+: +2
OS_CC: 20/30
OS_SC: 20/25
OS_SC: 20/80-1
METHOD: SNELLEN - LINEAR
OD_SC+: -2
OD_SC: 20/20
CORRECTION_TYPE: GLASSES

## 2025-01-28 ASSESSMENT — CUP TO DISC RATIO
OD_RATIO: 0.35
OS_RATIO: 0.3

## 2025-01-28 ASSESSMENT — SLIT LAMP EXAM - LIDS
COMMENTS: NORMAL, S/P BLEPHAROPLASTY
COMMENTS: NORMAL, S/P BLEPHAROPLASTY

## 2025-01-28 ASSESSMENT — EXTERNAL EXAM - RIGHT EYE: OD_EXAM: NORMAL

## 2025-01-28 ASSESSMENT — EXTERNAL EXAM - LEFT EYE: OS_EXAM: NORMAL

## 2025-01-28 NOTE — PATIENT INSTRUCTIONS
Continue use of OTC reading glasses as needed.     Return in 1-2 years for a comprehensive eye exam, or sooner if needed.        The effects of the dilating drops last for 4- 6 hours.  You will be more sensitive to light and vision will be blurry up close.  Mydriatic sunglasses were given if needed.     Dion Sanches, OD  [address]  [place]  [address]    [phone]

## 2025-01-28 NOTE — PROGRESS NOTES
Chief Complaint   Patient presents with    Annual Eye Exam        Chief Complaint(s) and History of Present Illness(es)       Annual Eye Exam                    Lab Results   Component Value Date    A1C 5.8 10/04/2024    A1C 5.9 04/23/2024    A1C 5.7 04/13/2022    A1C 5.7 06/23/2021    A1C 5.6 06/12/2019    A1C 5.5 06/27/2018    A1C 5.5 08/02/2017     -Family hx of glaucoma - father    -Family hx of AMD - father      Last Eye Exam: 11/14/2023 Dr. Sanches   Dilated Previously: Yes, side effects of dilation explained today    What are you currently using to see?  +2.00 OTC readers - uses for everything up close    -Has never worn glasses for distance     Distance Vision Acuity: Satisfied with vision    Near Vision Acuity: Satisfied with vision while reading and using computer with readers    Eye Comfort: dry, watery, and tired - constant   Do you use eye drops? 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       OBJECTIVE: See Ophthalmology exam    ASSESSMENT:    ICD-10-CM    1. Encounter for examination of eyes and vision without abnormal findings  Z01.00       2. Hx of blepharoplasty  Z98.890       3. Hypermetropia, bilateral  H52.03       4. Presbyopia  H52.4           PLAN:    Marbella Hendrix aware  eye exam results will be sent to Vanessa Ladd.  Patient Instructions   Continue use of OTC reading glasses as needed.     Return in 1-2 years for a comprehensive eye exam, or sooner if needed.        The effects of the dilating drops last for 4- 6 hours.  You will be more sensitive to light and vision will be blurry up close.  Mydriatic sunglasses were given if needed.     Dion Sanches, OD  [facility]  [address] [place] [address]. [place]  [place]  [address] [phone]

## 2025-01-28 NOTE — LETTER
1/28/2025      Marbella Hendrix  [address] [facility] [address]      Dear Colleague,    Thank you for referring your patient, Marbella Hendrix, to the [facility]. Please see a copy of my visit note below.    Chief Complaint   Patient presents with     Annual Eye Exam        Chief Complaint(s) and History of Present Illness(es)       Annual Eye Exam                    Lab Results   Component Value Date    A1C 5.8 10/04/2024    A1C 5.9 04/23/2024    A1C 5.7 04/13/2022    A1C 5.7 06/23/2021    A1C 5.6 06/12/2019    A1C 5.5 06/27/2018    A1C 5.5 08/02/2017     -Family hx of glaucoma - father    -Family hx of AMD - father      Last Eye Exam: 11/14/2023 Dr. Sanches   Dilated Previously: Yes, side effects of dilation explained today    What are you currently using to see?  +2.00 OTC readers - uses for everything up close    -Has never worn glasses for distance     Distance Vision Acuity: Satisfied with vision    Near Vision Acuity: Satisfied with vision while reading and using computer with readers    Eye Comfort: dry, watery, and tired - constant   Do you use eye drops? 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       OBJECTIVE: See Ophthalmology exam    ASSESSMENT:    ICD-10-CM    1. Encounter for examination of eyes and vision without abnormal findings  Z01.00       2. Hx of blepharoplasty  Z98.890       3. Hypermetropia, bilateral  H52.03       4. Presbyopia  H52.4           PLAN:    Marbella Vannobey aware  eye exam results will be sent to Vanessa Ladd.  Patient Instructions   Continue use of OTC reading glasses as needed.     Return in 1-2 years for a comprehensive eye exam, or sooner if needed.        The effects of the dilating drops last for 4- 6 hours.  You will be more sensitive to light and vision will be blurry up close.  Mydriatic sunglasses  were given if needed.     Dion Sanches OD  [facility]  [address] [place] [address]. [place]  [place]  [address]    [phone]             Again, thank you for allowing me to participate in the care of your patient.        Sincerely,        Dion Sanches OD    Electronically signed

## 2025-02-17 ASSESSMENT — SLEEP AND FATIGUE QUESTIONNAIRES
HOW LIKELY ARE YOU TO NOD OFF OR FALL ASLEEP WHILE SITTING AND TALKING TO SOMEONE: SLIGHT CHANCE OF DOZING
HOW LIKELY ARE YOU TO NOD OFF OR FALL ASLEEP WHILE SITTING INACTIVE IN A PUBLIC PLACE: SLIGHT CHANCE OF DOZING
HOW LIKELY ARE YOU TO NOD OFF OR FALL ASLEEP IN A CAR, WHILE STOPPED FOR A FEW MINUTES IN TRAFFIC: SLIGHT CHANCE OF DOZING
HOW LIKELY ARE YOU TO NOD OFF OR FALL ASLEEP WHILE WATCHING TV: SLIGHT CHANCE OF DOZING
HOW LIKELY ARE YOU TO NOD OFF OR FALL ASLEEP WHEN YOU ARE A PASSENGER IN A CAR FOR AN HOUR WITHOUT A BREAK: SLIGHT CHANCE OF DOZING
HOW LIKELY ARE YOU TO NOD OFF OR FALL ASLEEP WHILE LYING DOWN TO REST IN THE AFTERNOON WHEN CIRCUMSTANCES PERMIT: SLIGHT CHANCE OF DOZING
HOW LIKELY ARE YOU TO NOD OFF OR FALL ASLEEP WHILE SITTING AND READING: SLIGHT CHANCE OF DOZING
HOW LIKELY ARE YOU TO NOD OFF OR FALL ASLEEP WHILE SITTING QUIETLY AFTER LUNCH WITHOUT ALCOHOL: SLIGHT CHANCE OF DOZING

## 2025-02-19 ENCOUNTER — VIRTUAL VISIT (OUTPATIENT)
Dept: SLEEP MEDICINE | Facility: CLINIC | Age: 60
End: 2025-02-19
Payer: COMMERCIAL

## 2025-02-19 VITALS — WEIGHT: 200 LBS | HEIGHT: 64 IN | BODY MASS INDEX: 34.15 KG/M2

## 2025-02-19 DIAGNOSIS — E66.9 OBESITY (BMI 30-39.9): Primary | ICD-10-CM

## 2025-02-19 DIAGNOSIS — G47.33 OSA (OBSTRUCTIVE SLEEP APNEA): ICD-10-CM

## 2025-02-19 ASSESSMENT — PAIN SCALES - GENERAL: PAINLEVEL_OUTOF10: NO PAIN (0)

## 2025-02-19 NOTE — PATIENT INSTRUCTIONS
"          MY TREATMENT INFORMATION FOR SLEEP APNEA-  Marbella Hendrix      Frequently asked questions:  1. What is Obstructive Sleep Apnea (EVER)? EVER is the most common type of sleep apnea. Apnea means, \"without breath.\"  Apnea is most often caused by narrowing or collapse of the upper airway as muscles relax during sleep.   Almost everyone has occasional apneas. Most people with sleep apnea have had brief interruptions at night frequently for many years.  The severity of sleep apnea is related to how frequent and severe the events are.   2. What are the consequences of EVER? Symptoms include: feeling sleepy during the day, snoring loudly, gasping or stopping of breathing, trouble sleeping, and occasionally morning headaches or heartburn at night.  Sleepiness can be serious and even increase the risk of falling asleep while driving. Other health consequences may include development of high blood pressure and other cardiovascular disease in persons who are susceptible. Untreated EVER  can contribute to heart disease, stroke and diabetes.   3. What are the treatment options? In most situations, sleep apnea is a lifelong disease that must be managed with daily therapy. Medications are not effective for sleep apnea and surgery is generally not considered until other therapies have been tried. Your treatment is your choice . Continuous Positive Airway (CPAP) works right away and is the therapy that is effective in nearly everyone. An oral device to hold your jaw forward is usually the next most reliable option. Other options include postioning devices (to keep you off your back), weight loss, and surgery including a tongue pacing device. There is more detail about some of these options below.  4. Are my sleep studies covered by insurance? Although we will request verification of coverage, we advise you also check in advance of the study to ensure there is coverage.    Important tips for those choosing CPAP and similar " devices  REMEMBER-IF YOU RECEIVE A CALL FROM  [phone]-->IT IS TO SETUP A DEVICE  For new devices, sign up for device MONICA to monitor your device for your followup visits  We encourage you to utilize the [employer] monica or website ( https://Movaris.[employer]/ ) to monitor your therapy progress and share the data with your healthcare team when you discuss your sleep apnea.                                                    Know your equipment:  CPAP is continuous positive airway pressure that prevents obstructive sleep apnea by keeping the throat from collapsing while you are sleeping. In most cases, the device is  smart  and can slowly self-adjusts if your throat collapses and keeps a record every day of how well you are treated-this information is available to you and your care team.  BPAP is bilevel positive airway pressure that keeps your throat open and also assists each breath with a pressure boost to maintain adequate breathing.  Special kinds of BPAP are used in patients who have inadequate breathing from lung or heart disease. In most cases, the device is  smart  and can slowly self-adjusts to assist breathing. Like CPAP, the device keeps a record of how well you are treated.  Your mask is your connection to the device. You get to choose what feels most comfortable and the staff will help to make sure if fits. Here: are some examples of the different masks that are available: Magnetic mask aids may assist with use but there are safety issues that should be addressed when considering with magnets* ( see end of discussion).       Key points to remember on your journey with sleep apnea:  Sleep study.  PAP devices often need to be adjusted during a sleep study to show that they are effective and adjusted right.  Good tips to remember: Try wearing just the mask during a quiet time during the day so your body adapts to wearing it. A humidifier is recommended for comfort in most cases to prevent drying of  your nose and throat. Allergy medication from your provider may help you if you are having nasal congestion.  Getting settled-in. It takes more than one night for most of us to get used to wearing a mask. Try wearing just the mask during a quiet time during the day so your body adapts to wearing it. A humidifier is recommended for comfort in most cases. Our team will work with you carefully on the first day and will be in contact within 4 days and again at 2 and 4 weeks for advice and remote device adjustments. Your therapy is evaluated by the device each day.   Use it every night. The more you are able to sleep naturally for 7-8 hours, the more likely you will have good sleep and to prevent health risks or symptoms from sleep apnea. Even if you use it 4 hours it helps. Occasionally all of us are unable to use a medical therapy, in sleep apnea, it is not dangerous to miss one night.   Communicate. Call our skilled team on the number provided on the first day if your visit for problems that make it difficult to wear the device. Over 2 out of 3 patients can learn to wear the device long-term with help from our team. Remember to call our team or your sleep providers if you are unable to wear the device as we may have other solutions for those who cannot adapt to mask CPAP therapy. It is recommended that you sleep your sleep provider within the first 3 months and yearly after that if you are not having problems.   Use it for your health. We encourage use of CPAP masks during daytime quiet periods to allow your face and brain to adapt to the sensation of CPAP so that it will be a more natural sensation to awaken to at night or during naps. This can be very useful during the first few weeks or months of adapting to CPAP though it does not help medically to wear CPAP during wakefulness and  should not be used as a strategy just to meet guidelines.  Take care of your equipment. Make sure you clean your mask and tubing using  directions every day and that your filter and mask are replaced as recommended or if they are not working.     *Masks with magnets:  Updated Contraindications  Masks with magnetic components are contraindicated for use by patients where they, or anyone in close physical contact while using the mask, have the following:   Active medical implants that interact with magnets (i.e., pacemakers, implantable cardioverter defibrillators (ICD), neurostimulators, cerebrospinal fluid (CSF) shunts, insulin/infusion pumps)   Metallic implants/objects containing ferromagnetic material (i.e., aneurysm clips/flow disruption devices, embolic coils, stents, valves, electrodes, implants to restore hearing or balance with implanted magnets, ocular implants, metallic splinters in the eye)  Updated Warning  Keep the mask magnets at a safe distance of at least 6 inches (150 mm) away from implants or medical devices that may be adversely affected by magnetic interference. This warning applies to you or anyone in close physical contact with your mask. The magnets are in the frame and lower headgear clips, with a magnetic field strength of up to 400mT. When worn, they connect to secure the mask but may inadvertently detach while asleep.  Implants/medical devices, including those listed within contraindications, may be adversely affected if they change function under external magnetic fields or contain ferromagnetic materials that attract/repel to magnetic fields (some metallic implants, e.g., contact lenses with metal, dental implants, metallic cranial plates, screws, sergio hole covers, and bone substitute devices). Consult your physician and  of your implant / other medical device for information on the potential adverse effects of magnetic fields.    BESIDES CPAP, WHAT OTHER THERAPIES ARE THERE?    Positioning Device  Positioning devices are generally used when sleep apnea is mild and only occurs on your back.This example shows  a pillow that straps around the waist. It may be appropriate for those whose sleep study shows milder sleep apnea that occurs primarily when lying flat on one's back. Preliminary studies have shown benefit but effectiveness at home may need to be verified by a home sleep test. These devices are generally not covered by medical insurance.  Examples of devices that maintain sleeping on the back to prevent snoring and mild sleep apnea.    Belt type body positioner  http://SkyPower.[employer]/    Electronic reminder  http://nightshifttherapy.com/            Oral Appliance  What is oral appliance therapy?  An oral appliance device fits on your teeth at night like a retainer used after having braces. The device is made by a specialized dentist and requires several visits over 1-2 months before a manufactured device is made to fit your teeth and is adjusted to prevent your sleep apnea. Once an oral device is working properly, snoring should be improved. A home sleep test may be recommended at that time if to determine whether the sleep apnea is adequately treated.       Some things to remember:  -Oral devices are often, but not always, covered by your medical insurance. Be sure to check with your insurance provider.   -If you are referred for oral therapy, you will be given a list of specialized dentists to consider or you may choose to visit the Web site of the American Academy of Dental Sleep Medicine  -Oral devices are less likely to work if you have severe sleep apnea or are extremely overweight.     More detailed information  An oral appliance is a small acrylic device that fits over the upper and lower teeth  (similar to a retainer or a mouth guard). This device slightly moves jaw forward, which moves the base of the tongue forward, opens the airway, improves breathing for effective treat snoring and obstructive sleep apnea in perhaps 7 out of 10 people .  The best working devices are custom-made by a dental device   after a mold is made of the teeth 1, 2, 3.  When is an oral appliance indicated?  Oral appliance therapy is recommended as a first-line treatment for patients with primary snoring, mild sleep apnea, and for patients with moderate sleep apnea who prefer appliance therapy to use of CPAP4, 5. Severity of sleep apnea is determined by sleep testing and is based on the number of respiratory events per hour of sleep.   How successful is oral appliance therapy?  The success rate of oral appliance therapy in patients with mild sleep apnea is 75-80% while in patients with moderate sleep apnea it is 50-70%. The chance of success in patients with severe sleep apnea is 40-50%. The research also shows that oral appliances have a beneficial effect on the cardiovascular health of EVER patients at the same magnitude as CPAP therapy7.  Oral appliances should be a second-line treatment in cases of severe sleep apnea, but if not completely successful then a combination therapy utilizing CPAP plus oral appliance therapy may be effective. Oral appliances tend to be effective in a broad range of patients although studies show that the patients who have the highest success are females, younger patients, those with milder disease, and less severe obesity. 3, 6.   Finding a dentist that practices dental sleep medicine  Specific training is available through the American Academy of Dental Sleep Medicine for dentists interested in working in the field of sleep. To find a dentist who is educated in the field of sleep and the use of oral appliances, near you, visit the Web site of the American Academy of Dental Sleep Medicine.    References  1. Velasquez, et al. Objectively measured vs self-reported compliance during oral appliance therapy for sleep-disordered breathing. Chest 2013; 144(5): 4489-0081.  2. John et al. Objective measurement of compliance during oral appliance therapy for sleep-disordered breathing. Thorax  2013; 68(1): 91-96.  3. Marlen et al. Mandibular advancement devices in 620 men and women with EVER and snoring: tolerability and predictors of treatment success. Chest 2004; 125: 5844-1272.  4. Yolette et al. Oral appliances for snoring and EVER: a review. Sleep 2006; 29: 244-262.  5. Trent et al. Oral appliance treatment for EVER: an update. J Clin Sleep Med 2014; 10(2): 215-227.  6. Leon et al. Predictors of OSAH treatment outcome. J Dent Res 2007; 86: 8083-3974.      Weight Loss:   Your Body mass index is 34.33 kg/m .    Being overweight does not necessarily mean you will have health consequences.  Those who have BMI over 35 or over 27 with existing medical conditions carries greater risk.   Weight loss decreases severity of sleep apnea in most people with obesity. For those with mild obesity who have developed snoring with weight gain, even 15-30 pound weight loss can improve and occasionally milder eliminate sleep apnea.  Structured and life-long dietary and health habits are necessary to lose weight and keep healthier weight levels.     The Comprehensive Weight loss program offers all aspects of weight loss strategies including two Non-Surgical Weight Loss Programs: Medical Weight Management and our 24 Week Healthy Lifestyle Program:  Medical Weight Management: You will meet with a Medical Weight Management Provider, as well as a Registered Dietician. The program may include medication therapy, dietary education, recommended exercise and physical therapy programs, monthly support group meetings, and possible psychological counseling. Follow up visits with the provider or dietician are scheduled based on your progress and needs.  24 Week Healthy Lifestyle Program: This unique program is designed to give you the support of weekly appointments and activities thru a 24-week period. It may include all of the components of the basic program (above), with the addition of 11 individual Health   Visits, 24-week access to the [employer] website for over 700 online classes, and monthly support group meetings. This program has an out-of-pocket expense of $499 to cover the items that can not be billed to insurance (health coaches and [employer] access), and is non-refundable/non-transferable (you may be able to use a Health Savings Account; ask your HSA provider). There may be an optional meal replacement plan prescribed as well.   Medication therapy has been approved for the treatment of sleep apnea: The FDA approved tirzepatide for moderate to severe sleep apnea(apnea-hypopnea index greater than or equal to 15 (in patients with BMI of greater than or equal to 30 or greater than or equal to 27 with at least 1 weight-related condition such as hypertension or dyslipidemia.  Surgical management achieves meaningful long-term weight loss and improvement in health risks in most patients with more severe obesity.      Sleep Apnea Surgery:    Surgery for obstructive sleep apnea is considered generally only when other therapies fail to work. Surgery may be discussed with you if you are having a difficult time tolerating CPAP and or when there is an abnormal structure that requires surgical correction.  Nose and throat surgeries often enlarge the airway to prevent collapse.  Most of these surgeries create pain for 1-2 weeks and up to half of the most common surgeries are not effective throughout life.  You should carefully discuss the benefits and drawbacks to surgery with your sleep provider and surgeon to determine if it is the best solution for you.   More information  Surgery for EVER is directed at areas that are responsible for narrowing or complete obstruction of the airway during sleep.  There are a wide range of procedures available to enlarge and/or stabilize the airway to prevent blockage of breathing in the three major areas where it can occur: the palate, tongue, and nasal regions.  Successful surgical  treatment depends on the accurate identification of the factors responsible for obstructive sleep apnea in each person.  A personalized approach is required because there is no single treatment that works well for everyone.  Because of anatomic variation, consultation with an examination by a sleep surgeon is a critical first step in determining what surgical options are best for each patient.  In some cases, examination during sedation may be recommended in order to guide the selection of procedures.  Patients will be counseled about risks and benefits as well as the typical recovery course after surgery. Surgery is typically not a cure for a person s EVER.  However, surgery will often significantly improve one s EVER severity (termed  success rate ).  Even in the absence of a cure, surgery will decrease the cardiovascular risk associated with OSA7; improve overall quality of life8 (sleepiness, functionality, sleep quality, etc).      Palate Procedures:  Patients with EVER often have narrowing of their airway in the region of their tonsils and uvula.  The goals of palate procedures are to widen the airway in this region as well as to help the tissues resist collapse.  Modern palate procedure techniques focus on tissue conservation and soft tissue rearrangement, rather than tissue removal.  Often the uvula is preserved in this procedure. Residual sleep apnea is common in patient after pharyngoplasty with an average reduction in sleep apnea events of 33%2.      Tongue Procedures:  ExamWhile patients are awake, the muscles that surround the throat are active and keep this region open for breathing. These muscles relax during sleep, allowing the tongue and other structures to collapse and block breathing.  There are several different tongue procedures available.  Selection of a tongue base procedure depends on characteristics seen on physical exam.  Generally, procedures are aimed at removing bulky tissues in this area or  preventing the back of the tongue from falling back during sleep.  Success rates for tongue surgery range from 50-62%3.    Hypoglossal Nerve Stimulation:  Hypoglossal nerve stimulation has recently received approval from the [place] Food and Drug Administration for the treatment of obstructive sleep apnea.  This is based on research showing that the system was safe and effective in treating sleep apnea6.  Results showed that the median AHI score decreased 68%, from 29.3 to 9.0. This therapy uses an implant system that senses breathing patterns and delivers mild stimulation to airway muscles, which keeps the airway open during sleep.  The system consists of three fully implanted components: a small generator (similar in size to a pacemaker), a breathing sensor, and a stimulation lead.  Using a small handheld remote, a patient turns the therapy on before bed and off upon awakening.    Candidates for this device must be greater than 18 years of age, have moderate to severe obstructive sleep apnea with less than 25% central events  (AHI between 15-65), BMI less than 35, have tried CPAP/oral appliance for at least 8 weeks without success, and have appropriate upper airway anatomy (determined by a sleep endoscopy performed by Dr. Fer Siddiqui or Dr. Jerrell Alegria).    Nasal Procedures:  Nasal obstruction can interfere with nasal breathing during the day and night.  Studies have shown that relief of nasal obstruction can improve the ability of some patients to tolerate positive airway pressure therapy for obstructive sleep apnea1.  Treatment options include medications such as nasal saline, topical corticosteroid and antihistamine sprays, and oral medications such as antihistamines or decongestants. Non-surgical treatments can include external nasal dilators for selected patients. If these are not successful by themselves, surgery can improve the nasal airway either alone or in combination with these other  options.        Combination Procedures:  Combination of surgical procedures and other treatments may be recommended, particularly if patients have more than one area of narrowing or persistent positional disease.  The success rate of combination surgery ranges from 66-80%2,3.    References  Nima THOMASON. The Role of the Nose in Snoring and Obstructive Sleep Apnoea: An Update.  Eur Arch Otorhinolaryngol. 2011; 268: 1365-73.   Param SM; Arthur JA; Ping JR; Pallanch JF; Ricky MB; Darell SG; Diogenes NINA. Surgical modifications of the upper airway for obstructive sleep apnea in adults: a systematic review and meta-analysis. SLEEP 2010;33(10):6779-6101. Hafsa DAWKINS. Hypopharyngeal surgery in obstructive sleep apnea: an evidence-based medicine review.  Arch Otolaryngol Head Neck Surg. 2006 Feb;132(2):206-13.  Juan YH1, Krystina Y, Jabari HORTENCIA. The efficacy of anatomically based multilevel surgery for obstructive sleep apnea. Otolaryngol Head Neck Surg. 2003 Oct;129(4):327-35.  Kezirian E, Goldberg A. Hypopharyngeal Surgery in Obstructive Sleep Apnea: An Evidence-Based Medicine Review. Arch Otolaryngol Head Neck Surg. 2006 Feb;132(2):206-13.  Shannon GONZALEZ et al. Upper-Airway Stimulation for Obstructive Sleep Apnea.  N Engl J Med. 2014 Jan 9;370(2):139-49.  Emmanuel Y et al. Increased Incidence of Cardiovascular Disease in Middle-aged Men with Obstructive Sleep Apnea. Am J Respir Crit Care Med; 2002 166: 159-165  Stokes EM et al. Studying Life Effects and Effectiveness of Palatopharyngoplasty (SLEEP) study: Subjective Outcomes of Isolated Uvulopalatopharyngoplasty. Otolaryngol Head Neck Surg. 2011; 144: 623-631.        WHAT IF I ONLY HAVE SNORING?    Mandibular advancement devices, lateral sleep positioning, long-term weight loss and treatment of nasal allergies have been shown to improve snoring.  Exercising tongue muscles with a game (https://apps.[employer]/us/monica/soundly-reduce-snoring/jf4101963167) or stimulating the tongue during  the day with a device (https://doi.org/10.3390/djx50363694) have improved snoring in some individuals.  https://www.eblizz.[employer]/  https://www.sleepfoundation.org/best-anti-snoring-mouthpieces-and-mouthguards    Remember to Drive Safe... Drive Alive     Sleep health profoundly affects your health, mood, and your safety.  Thirty three percent of the population (one in three of us) is not getting enough sleep and many have a sleep disorder. Not getting enough sleep or having an untreated / undertreated sleep condition may make us sleepy without even knowing it. In fact, our driving could be dramatically impaired due to our sleep health. As your provider, here are some things I would like you to know about driving:     Here are some warning signs for impairment and dangerous drowsy driving:              -Having been awake more than 16 hours               -Looking tired               -Eyelid drooping              -Head nodding (it could be too late at this point)              -Driving for more than 30 minutes     Some things you could do to make the driving safer if you are experiencing some drowsiness:              -Stop driving and rest              -Call for transportation              -Make sure your sleep disorder is adequately treated     Some things that have been shown NOT to work when experiencing drowsiness while driving:              -Turning on the radio              -Opening windows              -Eating any  distracting  /  entertaining  foods (e.g., sunflower seeds, candy, or any other)              -Talking on the phone      Your decision may not only impact your life, but also the life of others. Please, remember to drive safe for yourself and all of us.

## 2025-02-19 NOTE — NURSING NOTE
Current patient location: [address] [place] [id number]    Is the patient currently in the state of [place]? YES    Visit mode: VIDEO    If the visit is dropped, the patient can be reconnected by:VIDEO VISIT: Text to cell phone:   Telephone Information:   Mobile [phone]       Will anyone else be joining the visit? NO  (If patient encounters technical issues they should call [phone]    Are changes needed to the allergy or medication list? Pt stated no changes to allergies and Pt stated no med changes    Are refills needed on medications prescribed by this physician? NO    Rooming Documentation:  Questionnaire(s) completed    Reason for visit: Consult    Mis LOPEZF

## 2025-02-19 NOTE — PROGRESS NOTES
Virtual Visit Details    Type of service:  Video Visit   Originating Location (pt. Location): Home    Distant Location (provider location):  Off-site  Platform used for Video Visit: [facility]    Outpatient Sleep Medicine Clinic follow-up visit note      Name: Marbella Hendrix MRN# [id number]   Age: 59 year old YOB: 1965     Date of visit: February 19, 2025    Primary care provider: Vanessa Ladd MD    Chief complaint/purpose of visit:     Review results of recent sleep study         Assessment and Plan:     Summary Sleep Diagnoses and Summary Recommendations:  Moderate obstructive sleep apnea, pronounced during supine sleep position particularly pronounced during REM sleep in supine position with sleep associated hypoxemia.  We discussed the results of the sleep study with the patient in detail. We discussed the consequences of untreated sleep apnea. We also discussed the treatment options for EVER. Patient was interested in CPAP treatment.  DME orders generated for auto CPAP 5 to 15 cm water.  After obtaining CPAP equipment, patient was recommended to use the device regularly during sleep and was instructed to get back to us if any concerns with the device use.  We discussed the mask exchange policy and the compliance goals.  Patient  will be followed through sleep therapy management program.  Plan is to follow-up via video visit in approximately 10 weeks after initiating CPAP therapy to review the CPAP compliance measures.     Frequent periodic limb movements were observed during the sleep study. No pharmacological intervention has been considered, since the PLMs were not associated significant arousals and patient denies symptoms of RLS.      Obesity BMI 34.23 kg/m . Per patient's request, referral to medical weight management clinic has been provided.  Encouraged to follow healthy diet and exercise regularly as tolerated.      Patient was strongly advised to avoid driving, operating any heavy  "machinery or other hazardous situations while drowsy or sleepy.  Patient was counseled on the importance of driving while alert, to pull over if drowsy, or nap before getting into the vehicle if sleepy.          Orders Placed This Encounter   Procedures    Comprehensive DME    Comprehensive Weight Management       Summary Counseling:    Sleep Testing Reviewed  Obstructive Sleep Apnea Reviewed  Complications of Untreated Sleep Apnea Reviewed      Medical Decision-making:   Educational materials provided in instructions       CC: Rishi Dang DNP      The above note was dictated using voice recognition software. Although reviewed after completion, some word and grammatical error may remain . Please contact the author for any clarifications.       \" Total time spent was 40 minutes for this appointment on this date of service which include time spent before, during and after the visit for chart review, patient care, counseling and coordination of care including documentation.\"      David Foss MD  [facility]  [address] [place] [address]             History of Present Illness:     Marbella Hendrix is a 59-year-old female who presents to sleep clinic via video visit accompanied by her , to review the results of the recently the diagnostic sleep study and to discuss plan of care.      Sleep study report:  Study date: January 15, 2025    Sleep Architecture:   The total recording time of the polysomnogram was 383.4 minutes. The total sleep time was 321.5 minutes. Sleep latency was normal at 17.0 minutes with the use of a sleep aid (5mg of Zolpidem). REM latency was 160.5 minutes. Arousal index was increased at 23.7 arousals per hour. Sleep efficiency was decreased at 83.9%. Wake after sleep onset was 44.5 minutes. The patient spent 2.6% of total sleep time in Stage N1, 48.7% in Stage N2, 19.9% in Stage N3, and 28.8% in REM. Time in REM supine was 39.0 " minutes.     Respiration:   ? Events ? The polysomnogram revealed a presence of 35 obstructive, 10 central, and 1 mixed apnea, resulting in an apnea index of 8.6 events per hour. There were 97 obstructive hypopneas and - central hypopneas resulting in an obstructive hypopnea index of 18.1 and central hypopnea index of - events per hour. The combined apnea/hypopnea index was 26.7 events per hour (central apnea/hypopnea index was 1.9 events per hour). The REM AHI was 50.6 events per hour. The supine AHI was 36.7 events per hour. The RERA index was 0.4 events per hour. The RDI was 27.1 events per hour. ? Snoring - was reported as moderate to loud and intermittent.   ? Respiratory rate and pattern - was notable for normal respiratory rate and pattern.   ? Sustained Sleep Associated Hypoventilation - Transcutaneous carbon dioxide monitoring was not used.   ? Sleep Associated Hypoxemia - (Greater than 5 minutes O2 sat at or below 88%) was present. Baseline oxygen saturation was 91.8%. Lowest oxygen saturation was 58.0%. Time spent less than or equal to 88% was 36.3 minutes. Time spent less than or equal to 89% was 41.6 minutes.     Movement Activity:   ? Periodic Limb Activity - There were 98 PLMs during the entire study. The PLM index was 18.3 movements per hour. The PLM Arousal Index was 2.2 per hour.   ? REM EMG Activity - Excessive transient/sustained muscle activity was not present.  ? Nocturnal Behavior - Abnormal sleep related behaviors were not noted during/arising out of NREM / REM sleep.   ? Bruxism - Not apparent.    Cardiac Summary:   The average pulse rate was 88.3 bpm. The minimum pulse rate was 70.0 bpm while the maximum pulse rate was 112.0 bpm. Arrhythmias were not noted.     Assessment:   ? Sleep efficiency was reduced. All sleep stages were seen. Increase in REM sleep suggests possible sleep deprivation.   ? Moderate obstructive sleep apnea was present, pronounced during supine sleep, particularly  pronounced during REM sleep in supine position with sleep associated hypoxemia.  ? Frequent periodic limb movements were seen, and they were not associated with significant arousals.   ? There were no abnormal sleep-related behaviors during the study.   ? There were no arrhythmias during the study.      SLEEP SCALES:  EPWORTH SLEEPINESS SCALE         2/17/2025     6:20 PM    [place] Sleepiness Scale ( ANNABEL Pizarro  [id number]<br>ESS - [place]/English - Final version - 21 Nov 07 - [facility] [place].)   Sitting and reading Slight chance of dozing   Watching TV Slight chance of dozing   Sitting, inactive in a public place (e.g. a theatre or a meeting) Slight chance of dozing   As a passenger in a car for an hour without a break Slight chance of dozing   Lying down to rest in the afternoon when circumstances permit Slight chance of dozing   Sitting and talking to someone Slight chance of dozing   Sitting quietly after a lunch without alcohol Slight chance of dozing   In a car, while stopped for a few minutes in traffic Slight chance of dozing   [place] Score (MC) 8   [place] Score (Sleep) 8        Patient-reported         INSOMNIA SEVERITY INDEX (YEVGENIY)          2/17/2025     6:12 PM   Insomnia Severity Index (YEVGENIY)   Difficulty falling asleep 1   Difficulty staying asleep 1   Problems waking up too early 1   How SATISFIED/DISSATISFIED are you with your CURRENT sleep pattern? 1   How NOTICEABLE to others do you think your sleep problem is in terms of impairing the quality of your life? 2   How WORRIED/DISTRESSED are you about your current sleep problem? 1   To what extent do you consider your sleep problem to INTERFERE with your daily functioning (e.g. daytime fatigue, mood, ability to function at work/daily chores, concentration, memory, mood, etc.) CURRENTLY? 4   YEVGENIY Total Score 11        Patient-reported       Guidelines for Scoring/Interpretation:  Total score categories:  0-7 = No clinically significant insomnia   8-14  = Subthreshold insomnia   15-21 = Clinical insomnia (moderate severity)  22-28 = Clinical insomnia (severe)  Used via courtesy of www[place]health.[place].gov with permission from Osiel Reilly PhD., [facility]      Past medical/surgical history, family history, social history, medications and allergies were reviewed.      Problem List:  Patient Active Problem List    Diagnosis Date Noted    Elevated fasting glucose 05/01/2024     Priority: Medium    Anxiety 07/19/2023     Priority: Medium    Adjustment disorder with anxious mood 06/27/2021     Priority: Medium    Family history of diabetes mellitus 06/27/2021     Priority: Medium    CMC DJD(carpometacarpal degenerative joint disease), localized primary, left 11/18/2020     Priority: Medium    Dermatochalasis of both upper eyelids 11/17/2020     Priority: Medium     Added automatically from request for surgery [id number]      Involutional ptosis, acquired, bilateral 11/17/2020     Priority: Medium     Added automatically from request for surgery [id number]      Weakness of right hip 07/09/2020     Priority: Medium    Primary osteoarthritis of right hip 12/02/2019     Priority: Medium     Added automatically from request for surgery [id number]      Morbid obesity (H) 06/20/2018     Priority: Medium    Status post hysterectomy 08/02/2017     Priority: Medium    Sensation of plugged ear on both sides 07/12/2017     Priority: Medium    Acute post-operative pain 08/28/2015     Priority: Medium    Preventative health care 10/30/2012     Priority: Medium     Last pap NIL 2011; mammogram nl 2011; lipids abnl, needs annual f/u      Generalized anxiety disorder 09/26/2012     Priority: Medium     H/o panic attacks; currently controlled with paxil      Hyperlipidemia LDL goal <130 09/26/2012     Priority: Medium     Behavioral measures - avoiding statins while trying to conceive; not a candidate for red yeast rice (interactions with thyroid meds)      Acquired hypothyroidism 09/26/2012  "    Priority: Medium    Overweight 09/26/2012     Priority: Medium     On exercise plan, has been in weight watchers  Problem list name updated by automated process. Provider to review            Physical Examination:  Vitals: Ht 1.626 m (5' 4\")   Wt 90.7 kg (200 lb)   LMP  (LMP Unknown)   BMI 34.33 kg/m    BMI= Body mass index is 34.33 kg/m .  General: Pleasant. Cooperative. In no apparent distress.  Pulmonary: Able to speak in full sentences easily. No cough or wheeze.   Neurologic: Alert, oriented x3.  Psychiatric: Mood euthymic. Affect congruent with full range and intensity.       David Foss MD 2/19/25    "

## 2025-03-03 NOTE — PROGRESS NOTES
[facility]  3/4/25      Behavioral Health Clinician Progress Note    Patient Name: Marbella Hendrix           Service Type:  Individual      Service Location:   [facility] / Email (patient reached)     Session Start Time: 11am  Session End Time: 1120 am      Session Length: 16 - 37      Attendees: Client     Service Modality:  Video Visit:      Provider verified identity through the following two step process.  Patient provided:  Patient is known previously to provider    Telemedicine Visit: The patient's condition can be safely assessed and treated via synchronous audio and visual telemedicine encounter.      Reason for Telemedicine Visit: Services only offered telehealth    Originating Site (Patient Location): Patient's home    Distant Site (Provider Location): Provider Remote Setting- Home Office    Consent:  The patient/guardian has verbally consented to: the potential risks and benefits of telemedicine (video visit) versus in person care; bill my insurance or make self-payment for services provided; and responsibility for payment of non-covered services.     Patient would like the video invitation sent by:  My Chart    Mode of Communication:  Video Conference via [facility]    Distant Location (Provider):  Off-site    As the provider I attest to compliance with applicable laws and regulations related to telemedicine.    Visit Activities (Refresh list every visit): [facility] Only    Diagnostic Assessment Date: 9/25/24 Bao Elliott LP  Treatment Plan Review Date: 12/10/24  See Flowsheets for today's PHQ-9 and BERTHA-7 results  Previous PHQ-9:       10/2/2024     2:25 PM 10/11/2024     1:01 PM 12/10/2024    10:54 AM   PHQ-9 SCORE   PHQ-9 Total Score Freda 19 (Moderately severe depression)  9 (Mild depression)   PHQ-9 Total Score 19 18 9        Proxy-reported     Previous BERTHA-7:       9/23/2024    11:35 AM 10/11/2024     1:01 PM 10/24/2024    11:08 AM   BERTHA-7 SCORE    Total Score 21 (severe anxiety)  12 (moderate anxiety)   Total Score 21 19 12        Patient-reported       DATA  Extended Session (60+ minutes): No  Interactive Complexity: No  Crisis: No  [facility] Patient: No    Treatment Objective(s) Addressed in This Session:  Target Behavior(s): disease management/lifestyle changes reduce sx of anxiety    Anxiety: will experience a reduction in anxiety      Current Stressors / Issues:  MH update:  Anxiety of flying.  Racing thoughts.  Don't fly until next week.  medication conf.   No longer euphoric.  Not depressed.  Feeling good overall, disappointed that euphoria can't remain.   had 3 stents put in.    Stresses: flight  Appetite:  increased hunger   Sleep: EVER, sleep study done, recs of CPAP-getting tomorrow.  Up to 8 hours.  Outpatient Provider updates: IOP step down groups. [facility]. Attends [facility].  Thinking about [facility] support groups.  SI/SIB/HI:  Denies current.  Intrusive thoughts at times. Denies any plan or intent.  BRAD:  Denies  Side effects/compliance:  Interventions:  [facility] engaged in discussion of flight anxiety and skills  Most important:  Ran out of effexor for a period and reduced dose but went back up .  Zyprexa is making her very hungry - weight gain and impulsive hunger.     1/7  MH update:  daily affirmations, exercise, meditations.  Concerns that  had heart attack EMS was called-he is okay.  Denies garrett concerns- pt presents with some concerns of non goal directed activities, elevated mood, decreased sleep, and slightly pressured speech. Cleaning house out project.  Son is home for winter break. Anxiety and grief gone.  Stresses:  presenting for [employer] tomorrow for future networking and job.   Appetite: n/a  Sleep:  not sleeping great.  Outpatient Provider updates: Completed IOP transitioning to step down unit.  [facility]. Attends [facility].  Thinking about [facility] support groups.  SI/SIB/HI:  " Denies current!  BRAD:  Denies  Side effects/compliance:  Interventions:  [facility] engaged in discussing change of sx and on going maintaince of improvement of mood  Most important:  \"feels fucking great\".      12/10  MH update:  Feeling much better. Mildly anxious, back to baseline.  Functioning, getting tasks and ADL's done.  Stresses:  son is returning for 4 weeks for the holdays.  Appetite:  putting weight back on from Zyprexa  Sleep: Back to baseline.    Outpatient Provider updates: Nearing IOP completion next week.  Going to do step down program.  [facility]. Attends [facility].  Thinking about [facility] support groups.  SI/SIB/HI:  Fleeting and passive ideation  BRAD:  Denies  Side effects/compliance:  Interventions:  [facility] engaged in discussion about support options  Most important:  Forgot about metformin. Putting weight back on.  MH doing really well.     10/29  MH update:  Feeling better.  Processing grief of cat, son being at college.  Discussion of protection of feelings.  Sadness of son returning college.   Stresses:  looking at jobs, not applying  Appetite: n/a  Sleep: not sleeping only 1-2 a week instead of daily.  Outpatient Provider updates: Started IOP; [facility]  SI/SIB/HI:  Passive suicidal ideation, no plan/intent.  Fleeting. No previous attempts.  Safety plan on file.  BRAD:  Denies  Side effects/compliance:  Interventions:  [facility] engaged discussed grief and loss/modeling of emotions  Most important:  Very fearful of meds being taken away    10/2  MH update: back to working out.  Sadness, overwhelmed.  Feeling alienated.  Maybe going to family dinner.  Watch services online tomorrow.  Wasn't invited to a friends house as before.  Going to future services to build connection.  Anxiety is better.  Klonopin really helps.  More sadness, change of season.  Wants to work, but recognizes she can't due to need to do program.  Stresses: as above  Appetite: n/a  Sleep: sleeping " "10-5, full night   Outpatient Provider updates: waitlisted for IOP maybe 2-3 weeks yet.  [facility]  SI/SIB/HI:  Passive suicidal ideation, no plan/intent.  Fleeting. No previous attempts.  Safety plan reviewed.  Future and goal oriented.  BRAD:  Denies  Side effects/compliance:  Interventions:  [facility] discussed sense of building connection and loss along with areas for self care  Most important:  Sleeping better, fearful of Klonopin being taken away.  Anxiety better.  More depression.       update:  Klonopin has been helpful, started yesterday.  Slept a whole night last night.  Napped today.  Doesn't have the energy to exercise.  Panic was worsening.  Tried to see a friend up at the cabin, came home early- couldn't sleep in same room, dog overbearing, friend kept trying to \"fix her\".  Feels unsafe driving with the panic.  Feeling down, sadness.   Stresses:  Mom  in April.  Youngest son moved away to college ([employer]).  Cat -spent significant money and time trying to save him.  Loss of control, feels like she is always worried about his safety.  Other son, is really struggling with life functioning/ASD- tried being an .  Appetite: n/a  Sleep: Slept last night  Outpatient Provider updates: [facility]. DA update  for PHP/IOP  SI/SIB/HI:  Passive suicidal ideation, no plan/intent.  Fleeting. No previous attempts.  Safety plan reviewed.  Future and goal oriented.  BRAD:  Denies  Side effects/compliance:  Interventions:  [facility] engaged in discussion about levels of care  Most important:  Klonopin helps!       update:  Depression and anxiety worse.  Panic attacks regularly- taking Vistaril makes her fatigued.  Went to [facility], felt helpful.  Considering going back for support.  Stresses:  Mom  in April.  Youngest son moved away to college ([employer]).  Cat -spent significant money and time trying to save him.  Loss of control, feels like she is " "always worried about his safety. Applying for jobs.  Other son, is really struggling with life functioning/ASD- tried being an .  Appetite:   Sleep: 2 nights a week, Zyprexa works 6-7 hours.  Other nights, only getting 4 hours with Ambien, Melatonin, Zyprexa.  About a month like this.  Outpatient Provider updates: Doing acupuncture. [facility].  SI/SIB/HI: Passive suicidal ideation, no plan/intent.  Fleeting. No previous attempts.  Safety plan reviewed.  Future and goal oriented.  BRAD:  Denies  Side effects/compliance:  Interventions:   [facility] reviewed safety plan and emergency services as needed.  Pt notes understanding and awareness.    Most important:  Not sleeping, not functioning.  Sx much worse.  Passive SI returned.    4/8  MH update:  Sx feel stable.  No acute depression/anxiety/panic.  Stresses:  Lots of stress with kiddos/spouse  Appetite: Weight gain-med side constantly hungry  Sleep: Denies  Outpatient Provider updates:   Dix[facility]- taking a break.  Got a list of new therapist  SI/SIB/HI: Denies  BRAD: Denies  Side effects/compliance: N/a  Interventions:  [facility] offered space and validation for any concerns  Most important:  Feels stable.    1/2  MH update: Things are going well.  On the 5th med for anxiety.  Really like it.  Anxiety feels manageable.  Freak out daily but not every moment.  Denies any panic attacks.  Feeling behind the \"8 ball\".  [place] due to feeling uncomfortable from constipation.  Denies any overwhelming feelings of depression, hopelessness, worthlessness, etc. .  Stresses:  Holidays were fun, sad its over.  Got to see a friend from out of town.  Arthritic hand worse in the cold notes she needs to be wearing her hand brace.  Going to [employer], just completed prior to appt.   Unemployed hasn't been looking due to transportation of child.  Would like to work.  Appetite: Denies  Sleep: Feeling pleased.  Sleep much improved. Having dreams.    Outpatient " Provider updates: Therapist once a month Dixie Boykin, monicat next week.    SI/SIB/HI: Denies  BRAD: Denies  Preg: n/a  Side effects/compliance: constipated, uncomfortable and bloated.  Interventions: [facility] supported on going anxiety reduction skill use  Most important: Constipation side effects     Progress on Treatment Objective(s) / Homework:  Satisfactory progress - ACTION (Actively working towards change); Intervened by reinforcing change plan / affirming steps taken    Motivational Interviewing    MI Intervention: Co-Developed Goal: reduce on going sx of anxiety, Expressed Empathy/Understanding, Open-ended questions, and Reflections: simple and complex     Change Talk Expressed by the Patient: Desire to change Ability to change Activation Taking steps    Provider Response to Change Talk: A - Affirmed patient's thoughts, decisions, or attempts at behavior change and R - Reflected patient's change talk    Assessments completed prior to visit:    The following assessments were completed by patient for this visit:  N/a    Care Plan review completed: Yes    Medication Review:  Changes to psychiatric medications, see updated Medication List in EPIC.     Medication Compliance:  Yes    Changes in Health Issues:   None reported    Chemical Use Review:   Substance Use: Chemical use reviewed, no active concerns identified      Tobacco Use: No current tobacco use.      Assessment: Current Emotional / Mental Status (status of significant symptoms):  Risk status (Self / Other harm or suicidal ideation)  Patient has had a history of suicidal ideation: reports a hx of ideation without specific planning/intent/action back in 2022  Patient denies current fears or concerns for personal safety.  Patient denies current or recent suicidal ideation or behaviors.  Patient denies current or recent homicidal ideation or behaviors.  Patient denies current or recent self injurious behavior or ideation.  Patient denies other safety  concerns.  A safety and risk management plan has been developed including: Patient consented to co-developed safety plan.  A safety and risk management plan was completed.  Patient agreed to use safety plan should any safety concerns arise.  A copy was given to the patient.    Appearance:   Appropriate   Eye Contact:   Good   Psychomotor Behavior: Hyperactive   Attitude:   Cooperative   Orientation:   All  Speech   Rate / Production: Normal    Volume:  Normal   Mood:    Elevated  Normal   Affect:    Appropriate   Thought Content:  Clear   Thought Form:  Coherent  Logical   Insight:    Good     Diagnoses:  1. Mood disorder    2. Attention deficit hyperactivity disorder (ADHD), unspecified ADHD type    3. Generalized anxiety disorder        Collateral Reports Completed:  Communicated with: Dr Lewis    Plan: (Homework, other):  Patient was given information about behavioral services and encouraged to schedule a follow up appointment with the clinic [facility] as needed.  She was also given information about mental health symptoms and treatment options .  CD Recommendations: No indications of CD issues.     Rafaela Hassan [facility]        Individual Treatment Plan    Patient's Name: Marbella Hendrix   YOB: 1965  Date of Creation: 9/11/24  Date Treatment Plan Last Reviewed/Revised: 12/10/24    DSM5 Diagnoses:   1. Generalized anxiety disorder    2. Attention deficit hyperactivity disorder (ADHD), unspecified ADHD type    3. Mood disorder (H)        Psychosocial / Contextual Factors: Relationship Concerns, Occupational Issues, and Interpersonal Concerns  PROMIS (reviewed every 90 days):   The following assessments were completed by patient for this visit:  PROMIS 10-Global Health (only subscores and total score):       11/7/2023     4:23 PM 1/2/2024    10:47 AM 4/2/2024     7:21 AM 9/7/2024    12:17 PM 9/23/2024    11:37 AM 10/11/2024     1:01 PM 12/10/2024    10:54 AM   PROMIS-10 Scores Only   Global Mental Health  "Score 12 14 13    13 9    9 8    8 7 13   Global Physical Health Score 14 13 14    14 12    12 12    12 13 16   PROMIS TOTAL - SUBSCORES 26 27 27    27 21    21 20    20 20 29        Referral / Collaboration:  Referral to another professional/service is not indicated at this time..    Anticipated number of session for this episode of care: 6-9 sessions  Anticipation frequency of session: Monthly  Anticipated Duration of each session: 16-37 minutes  Treatment plan will be reviewed in 90 days or when goals have been changed.       MeasurableTreatment Goal(s) related to diagnosis / functional impairment(s)  Goal 1: Patient will reduce sx of anxiety   \"I will know I've met my goal when I can sleep and clearly think through my thoughts to use my skills.\"     Objective #A (Patient Action)    Patient will identify three distraction and diversion activities and use those activities to decrease level of anxiety    Status: Continued - Date(s):12/10/24    Intervention(s)  [facility] will provide support through CBT, MI, Acceptance and Commitment Therapy, Dialectic Behavioral Therapy and problem solving model to explore and overcome barriers.    Goal 2: Patient will manage concerns of safety    I will know I've met my goal when I can reduce suicidal ideation .      Objective #A (Patient Action)    Patient will use previously developed safety plan on file.  Status: Cont - Date: 9/11/24 , 12/10/24    Intervention(s)  Therapist will provide support through CBT, MI, Acceptance and Commitment Therapy, Dialectic Behavioral Therapy and problem solving model to explore and overcome barriers.      Patient has reviewed and agreed to the above plan.    Written by  Rafaela Hassan, [facility], [facility]     "

## 2025-03-04 ENCOUNTER — VIRTUAL VISIT (OUTPATIENT)
Dept: BEHAVIORAL HEALTH | Facility: CLINIC | Age: 60
End: 2025-03-04
Payer: COMMERCIAL

## 2025-03-04 ENCOUNTER — VIRTUAL VISIT (OUTPATIENT)
Dept: PSYCHIATRY | Facility: CLINIC | Age: 60
End: 2025-03-04
Payer: COMMERCIAL

## 2025-03-04 ENCOUNTER — APPOINTMENT (OUTPATIENT)
Facility: CLINIC | Age: 60
End: 2025-03-04
Payer: COMMERCIAL

## 2025-03-04 VITALS — WEIGHT: 200 LBS | HEIGHT: 64 IN | BODY MASS INDEX: 34.15 KG/M2

## 2025-03-04 DIAGNOSIS — F41.1 GENERALIZED ANXIETY DISORDER: ICD-10-CM

## 2025-03-04 DIAGNOSIS — F39 MOOD DISORDER: Primary | ICD-10-CM

## 2025-03-04 DIAGNOSIS — F90.9 ATTENTION DEFICIT HYPERACTIVITY DISORDER (ADHD), UNSPECIFIED ADHD TYPE: ICD-10-CM

## 2025-03-04 DIAGNOSIS — G47.00 INSOMNIA, UNSPECIFIED TYPE: ICD-10-CM

## 2025-03-04 PROCEDURE — 1126F AMNT PAIN NOTED NONE PRSNT: CPT | Performed by: PSYCHIATRY & NEUROLOGY

## 2025-03-04 PROCEDURE — 90832 PSYTX W PT 30 MINUTES: CPT | Mod: 95 | Performed by: COUNSELOR

## 2025-03-04 PROCEDURE — 98006 SYNCH AUDIO-VIDEO EST MOD 30: CPT | Performed by: PSYCHIATRY & NEUROLOGY

## 2025-03-04 PROCEDURE — G2211 COMPLEX E/M VISIT ADD ON: HCPCS | Performed by: PSYCHIATRY & NEUROLOGY

## 2025-03-04 RX ORDER — OLANZAPINE 10 MG/1
10 TABLET ORAL AT BEDTIME
Qty: 90 TABLET | Refills: 0 | Status: SHIPPED | OUTPATIENT
Start: 2025-03-31 | End: 2025-03-04

## 2025-03-04 RX ORDER — OLANZAPINE 5 MG/1
5 TABLET ORAL DAILY PRN
Qty: 90 TABLET | Refills: 0 | Status: SHIPPED | OUTPATIENT
Start: 2025-03-04

## 2025-03-04 RX ORDER — OLANZAPINE 10 MG/1
10 TABLET ORAL AT BEDTIME
Qty: 90 TABLET | Refills: 0 | Status: SHIPPED | OUTPATIENT
Start: 2025-03-04

## 2025-03-04 ASSESSMENT — PAIN SCALES - GENERAL: PAINLEVEL_OUTOF10: NO PAIN (0)

## 2025-03-04 NOTE — NURSING NOTE
Current patient location: [address] [place] [address] [facility] [id number]    Is the patient currently in the state of [place]? YES    Visit mode: VIDEO    If the visit is dropped, the patient can be reconnected by:VIDEO VISIT: Text to cell phone:   Telephone Information:   Mobile [phone]       Will anyone else be joining the visit? YES: How would they like to receive their invitation? Send to e-mail: [email or web address]"  (If patient encounters technical issues they should call [phone])    Are changes needed to the allergy or medication list? No    Are refills needed on medications prescribed by this physician? NO    Rooming Documentation:  Questionnaire(s) completed    Reason for visit: RECHECK    Dea LOVETT

## 2025-03-04 NOTE — PROGRESS NOTES
"Telemedicine Visit: The patient's condition can be safely assessed and treated via synchronous audio and visual telemedicine encounter.      Reason for Telemedicine Visit: Patient has requested telehealth visit    Originating Site (Patient Location): Patient's home    Distant Location (provider location):  Off-Site    Consent:  The patient/guardian has verbally consented to: the potential risks and benefits of telemedicine (video visit) versus in person care; bill my insurance or make self-payment for services provided; and responsibility for payment of non-covered services.     Mode of Communication:  Video Conference via [employer]    As the provider I attest to compliance with applicable laws and regulations related to telemedicine.          Outpatient Psychiatric Progress Note    Name: Marbella Hendrix   : 1965                    Primary Care Provider: Vanessa Ladd MD   Therapist: Doing acupuncture. [facility].  Recently finished IOP.       PHQ-9 scores:      10/2/2024     2:25 PM 10/11/2024     1:01 PM 12/10/2024    10:54 AM   PHQ-9 SCORE   PHQ-9 Total Score MyChart 19 (Moderately severe depression)  9 (Mild depression)   PHQ-9 Total Score 19 18 9        Proxy-reported       BERTHA-7 scores:      2024    11:35 AM 10/11/2024     1:01 PM 10/24/2024    11:08 AM   BERTHA-7 SCORE   Total Score 21 (severe anxiety)  12 (moderate anxiety)   Total Score 21 19 12        Patient-reported       Patient Identification:  Patient is a 59 year old,   White Not  or  female  who presents for return visit with me.  Patient is currently unemployed. Patient attended the phone/video session with , Wally.  Patient prefers to be called: \"Marbella\".    Interim History:  I last saw Marbella Hendrix for outpatient psychiatry return visit on 2025. During that appointment, we:    Continue Effexor-XR/venlafaxine  mg daily for anxiety/mood.  Continue Lunesta 1-3 mg " at bedtime as needed for sleep. Rx previously sent for the 2 mg tablets. Be sure to devote at least 8 hours to sleep after taking before operating motor vehicle or other heavy or sharp equipment. Do NOT mix with alcohol.   Continue olanzapine 10 mg at bedtime for sleep, mood, anxiety.  Can take up to 15 mg at bedtime and also can take a dose during day as needed for hypomania, racing thoughts, anxiety, etc. Do NOT exceed 20 mg total daily dose.   Continue hydroxyzine 12.5-25 mg at bedtime as needed for sleep.   Have fasting labs completed in about 1 month at any [facility] lab. Need to call your clinic ahead of time to schedule an appointment for lab due to limited hours and no walk-ins due to Covid-19 restrictions/changes.   Follow up with me in 1 month.  For scheduling needs you can call [phone].  You could also get a message to the nurses by calling the aforementioned phone number.    3/4: Patient overall doing fairly well.  Mood relatively stable.  Not overly elevated and not depressed.  Anxiety manageable.  Sleeping okay.  Taking 20 mg of olanzapine nightly.  Has had significantly increased hunger and some weight gain.  Patient and  traveled to [place] next week for a trip.  Hopefully planning for a trip to [facility] for the summer to visit a friend.  No acute safety concerns.  No SI.  No problematic drug or alcohol use.  See [facility] note below for additional details.    Per [facility], Rafaela Hassan [facility], during today's team-based visit:  Current Stressors / Issues:  MH update:  Anxiety of flying.  Racing thoughts.  Don't fly until next week.  medication conf.   No longer euphoric.  Not depressed.  Feeling good overall, disappointed that euphoria can't remain.   had 3 stents put in.    Stresses: flight  Appetite:  increased hunger   Sleep: EVER, sleep study done, recs of CPAP-getting tomorrow.  Up to 8 hours.  Outpatient Provider updates: [facility] step down groups. [facility]. Attends [facility].  Thinking about [facility] support groups.  SI/SIB/HI:  Denies current.  Intrusive thoughts at times. Denies any plan or intent.  BRAD:  Denies  Side effects/compliance:  Interventions:  [facility] engaged in discussion of flight anxiety and skills  Most important:  Ran out of effexor for a period and reduced dose but went back up .  Zyprexa is making her very hungry - weight gain and impulsive hunger.    Past Psychiatric Med Trials:  Psych Meds at Intake:  Paxil 20 mg daily - started when 29 years, has been up and down on the dose, on 10 mg dose mostly, last on 20 mg this past spring   Ambien 5 mg for insomnia - used for 4 nights  Ativan 0.5 mg for flying     Past Psych Meds:  Prozac for 1-2 months maybe when 29 yo    Psychiatric ROS:  Marbella Hendrix reports mood has been: See HPI above  Anxiety has been: See HPI above  Sleep has been: See HPI above  Rachna sxs: None  Psychosis sxs: None  ADHD/ADD sxs: see HPI above  PTSD sxs: NA  PHQ9 and GAD7 scores were reviewed today if completed.   Medication side effects: See HPI above  Current stressors include: Symptoms and see HPI above  Coping mechanisms and supports include: Family and Hobbies    Current medications include:   Current Outpatient Medications   Medication Sig Dispense Refill    acetaminophen (TYLENOL) 500 MG tablet Take 500-1,000 mg by mouth every 6 hours as needed for mild pain.      atorvastatin (LIPITOR) 20 MG tablet Take 1 tablet (20 mg) by mouth daily. 90 tablet 2    calcium carbonate (OS-ARA) 500 MG tablet Take 1 tablet by mouth daily.      cholecalciferol (VITAMIN D3) 25 mcg (1000 units) capsule Take 1 capsule (25 mcg) by mouth daily. 90 capsule 2    eszopiclone (LUNESTA) 2 MG tablet Take 1 tablet (2 mg) by mouth nightly as needed for sleep. 30 tablet 2    hydrOXYzine HCl (ATARAX) 25 MG tablet Take 0.5-1 tablets (12.5-25 mg) by mouth 2 times daily as needed for anxiety. 120 tablet 1    levothyroxine (SYNTHROID/LEVOTHROID) 125 MCG  tablet TAKE ONE TABLET BY MOUTH ONCE DAILY 90 tablet 1    magic mouthwash suspension (diphenhydrAMINE, lidocaine, aluminum-magnesium & simethicone) Swish and spit 10 mLs in mouth every 4 hours as needed for mouth sores. 300 mL 1    melatonin 5 MG tablet Take 5 mg by mouth nightly as needed for sleep.      naproxen sodium 220 MG capsule Take 220 mg by mouth 2 times daily (with meals).      OLANZapine (ZYPREXA) 10 MG tablet Take 1 tablet (10 mg) by mouth at bedtime. 90 tablet 0    Omega-3 Fatty Acids (OMEGA-3 FISH OIL PO) Take 1 g by mouth daily      venlafaxine (EFFEXOR XR) 150 MG 24 hr capsule Take 1 capsule (150 mg) by mouth daily. 90 capsule 1     No current facility-administered medications for this visit.       The [place] Prescription Monitoring Program has been reviewed and there are no concerns about diversionary activity for controlled substances at this time.   01/16/2025 11/22/2024 1 Eszopiclone 2 Mg Tablet 60.00 60 Al Bau [id number] Juan ([id number]) 0/0 0.66 [place]   11/27/2024 11/22/2024 1 Eszopiclone 2 Mg Tablet 30.00 30 Al Bau [id number] Sup ([id number]) 0/2 0.66 [place]   11/15/2024 11/15/2024 1 Eszopiclone 2 Mg Tablet 15.00 15 Al Bau [id number] Sup ([id number]) 0/0 0.66 [place]   10/29/2024 10/29/2024 1 Clonazepam 1 Mg Tablet 60.00 30 Al Bau [id number] Sup ([id number]) 0/1 4.00 [place]     Recent Labs   Lab Test 10/04/24  0740 04/23/24  0738   CHOL 152 189   HDL 32* 34*   LDL 97 121*   TRIG 117 172*     Last Comprehensive Metabolic Panel:  Sodium   Date Value Ref Range Status   10/04/2024 142 135 - 145 mmol/L Final   10/22/2012 141 133 - 144 mmol/L Final     Potassium   Date Value Ref Range Status   10/04/2024 5.0 3.4 - 5.3 mmol/L Final   07/08/2022 4.4 3.4 - 5.3 mmol/L Final   10/22/2012 3.8 3.4 - 5.3 mmol/L Final     Chloride   Date Value Ref Range Status   10/04/2024 106 98 - 107 mmol/L Final   07/08/2022 108 94 - 109 mmol/L Final   10/22/2012 105 94 - 109 mmol/L Final     Carbon Dioxide   Date  Value Ref Range Status   10/22/2012 25 20 - 32 mmol/L Final     Carbon Dioxide (CO2)   Date Value Ref Range Status   10/04/2024 24 22 - 29 mmol/L Final   07/08/2022 20 20 - 32 mmol/L Final     Anion Gap   Date Value Ref Range Status   10/04/2024 12 7 - 15 mmol/L Final   07/08/2022 10 3 - 14 mmol/L Final   10/22/2012 11 6 - 17 mmol/L Final     Glucose   Date Value Ref Range Status   10/04/2024 96 70 - 99 mg/dL Final   07/08/2022 93 70 - 99 mg/dL Final   07/01/2020 88 70 - 99 mg/dL Final     Comment:     Fasting specimen     Urea Nitrogen   Date Value Ref Range Status   10/04/2024 8.6 6.0 - 20.0 mg/dL Final   07/08/2022 18 7 - 30 mg/dL Final   10/22/2012 25 (H) 5 - 24 mg/dL Final     Creatinine   Date Value Ref Range Status   10/04/2024 0.84 0.51 - 0.95 mg/dL Final   08/28/2015 0.65 0.52 - 1.04 mg/dL Final     GFR Estimate   Date Value Ref Range Status   10/04/2024 81 >60 mL/min/1.73m2 Final     Comment:     eGFR calculated using 2021 CKD-EPI equation.   08/28/2015 >90  Non  GFR Calc   >60 mL/min/1.7m2 Final     Calcium   Date Value Ref Range Status   10/04/2024 9.7 8.8 - 10.4 mg/dL Final     Comment:     Reference intervals for this test were updated on 7/16/2024 to reflect our healthy population more accurately. There may be differences in the flagging of prior results with similar values performed with this method. Those prior results can be interpreted in the context of the updated reference intervals.   10/22/2012 9.2 8.5 - 10.4 mg/dL Final     Bilirubin Total   Date Value Ref Range Status   10/04/2024 0.3 <=1.2 mg/dL Final     Alkaline Phosphatase   Date Value Ref Range Status   10/04/2024 47 40 - 150 U/L Final     ALT   Date Value Ref Range Status   10/04/2024 25 0 - 50 U/L Final   11/20/2012 18.0 0.0 - 50.0 U/L Final     AST   Date Value Ref Range Status   10/04/2024 23 0 - 45 U/L Final   11/20/2012 25.0 0.0 - 45.0 U/L Final     Past Medical/Surgical History:  Past Medical History:   Diagnosis  Date    Arthritis     My mom has it, my grandmother had it I have it    Diabetes (H)     My mom and brother have type II    Heavy menstrual period     Leiomyoma of uterus     s/p myomectomy    Mental disorder     anxiety    PONV (postoperative nausea and vomiting)     Surgical complication after  2002    Thyroid disease     Hypothroidism, 2nd half of     Uncomplicated asthma     My son has it, since he was born    Vesico-ureteral reflux     s/p repair      has a past medical history of Arthritis, Diabetes (H), Heavy menstrual period, Leiomyoma of uterus, Mental disorder, PONV (postoperative nausea and vomiting), Surgical complication (after  2002), Thyroid disease, Uncomplicated asthma, and Vesico-ureteral reflux.    She has no past medical history of Cancer (H), Cerebral infarction (H), Congestive heart failure (H), COPD (chronic obstructive pulmonary disease) (H), Depressive disorder, Heart disease, History of blood transfusion, or Hypertension.    Social History:  Reviewed. No changes to social history except as noted above in HPI.    Vital Signs:   None. This is phone/video visit.     Labs:  Most recent laboratory results reviewed:  Lab Results   Component Value Date    A1C 5.8 10/04/2024    A1C 5.9 2024    A1C 5.7 2022    A1C 5.7 2021    A1C 5.6 2019    A1C 5.5 2018    A1C 5.5 2017     Recent Labs   Lab Test 10/04/24  0740 24  0738   CHOL 152 189   HDL 32* 34*   LDL 97 121*   TRIG 117 172*       Review of Systems:  10 systems (general, cardiovascular, respiratory, eyes, ENT, endocrine, GI, , M/S, neurological) were reviewed. Most pertinent finding(s) is/are: Some chronic pain. The remaining systems are all unremarkable.    Mental Status Examination (limited as this is by phone/video):  Appearance: Awake, alert, appears stated age, no acute distress, well-groomed   Attitude:  cooperative, pleasant  Motor: No notable psychomotor  agitation  Oriented to:  person, place, time, and situation  Attention Span and Concentration: Intact  Speech: Clear, coherent, normal volume, no longer pressured  Language: intact  Mood: pretty good  Affect: no elevation today, current presentation seems to be consistent with baseline  Associations:  no loose associations  Thought Process: linear and goal directed  Thought Content:  no evidence of acute suicidality or homicidal ideation, no evidence of psychotic thought, no auditory hallucinations present and no visual hallucinations present  Recent and Remote Memory:  Intact to interview. Not formally assessed. No amnesia.  Fund of Knowledge: appropriate  Insight: Good  Judgment:  intact, adequate for safety  Impulse Control:  intact    Suicide Risk Assessment:  Marbella Hendrix has history of passive thoughts of death but no acute suicidality or plan or intent to harm self-none today 3/4/2025.  See safety plan in chart.  Also see thorough assessment by [facility] during previous appointments.  Based on all available evidence including the factors cited above, Marbella Hendrix does not appear to be at imminent risk for self-harm, does not meet criteria for a 72-hr hold, and therefore remains appropriate for ongoing outpatient level of care.  A thorough assessment of risk factors related to suicide and self-harm have been reviewed and are noted above. The patient convincingly denies suicidality on several occasions. Local community safety resources reviewed for patient to use if needed. There was no deceit detected, and the patient presented in a manner that was believable.     DSM5 Diagnosis:  Attention-Deficit/Hyperactivity Disorder  314.01 (F90.9) Unspecified Attention -Deficit / Hyperactivity Disorder  Mood disorder, unspecified (suspicions/monitoring for bipolar II disorder)  300.02 (F41.1) Generalized Anxiety Disorder  Insomnia, unspecified    Medical comorbidities include:   Patient Active Problem List     Diagnosis Date Noted    Elevated fasting glucose 05/01/2024     Priority: Medium    Anxiety 07/19/2023     Priority: Medium    Adjustment disorder with anxious mood 06/27/2021     Priority: Medium    Family history of diabetes mellitus 06/27/2021     Priority: Medium    CMC DJD(carpometacarpal degenerative joint disease), localized primary, left 11/18/2020     Priority: Medium    Dermatochalasis of both upper eyelids 11/17/2020     Priority: Medium     Added automatically from request for surgery [id number]      Involutional ptosis, acquired, bilateral 11/17/2020     Priority: Medium     Added automatically from request for surgery [id number]      Weakness of right hip 07/09/2020     Priority: Medium    Primary osteoarthritis of right hip 12/02/2019     Priority: Medium     Added automatically from request for surgery [id number]      Morbid obesity (H) 06/20/2018     Priority: Medium    Status post hysterectomy 08/02/2017     Priority: Medium    Sensation of plugged ear on both sides 07/12/2017     Priority: Medium    Acute post-operative pain 08/28/2015     Priority: Medium    Preventative health care 10/30/2012     Priority: Medium     Last pap NIL 2011; mammogram nl 2011; lipids abnl, needs annual f/u      Generalized anxiety disorder 09/26/2012     Priority: Medium     H/o panic attacks; currently controlled with paxil      Hyperlipidemia LDL goal <130 09/26/2012     Priority: Medium     Behavioral measures - avoiding statins while trying to conceive; not a candidate for red yeast rice (interactions with thyroid meds)      Acquired hypothyroidism 09/26/2012     Priority: Medium    Overweight 09/26/2012     Priority: Medium     On exercise plan, has been in weight watchers  Problem list name updated by automated process. Provider to review         Psychosocial & Contextual Factors: see HPI above    Assessment:  From Intake, 7/17/2023:  Marbella Hendrix is a 57-year-old female with past psychiatric history including  anxiety, ADHD, depression, remote polysubstance abuse (in remission for decades) who presents today for psychiatric evaluation.  Patient recently with increased psychosocial stressors and acutely worsening anxiety and insomnia.  Patient most recently on Paxil and Ambien to manage symptoms.  Ambien used sparingly and did help break severe insomnia cycle recently.  Patient currently taking 10 mg of Paxil and has only ever been up to 20 mg historically.  Patient may be interested in a trial with something different than Paxil.  We also discussed further optimizing Paxil to 30 or 40 mg daily for more therapeutic trial before replacing the medication.  We did discuss Lexapro and Effexor-XR as possible alternatives.  Patient would like to discuss these options with her  who is an internist and her psychotherapist.  In the meantime, we discussed a trial with clonidine to help with sleep, anxiety, and ADHD symptoms.  Discussed risks and benefits of therapy.  Patient would like to move forward with a clonidine trial to help at bedtime as needed for sleep and a small dose during the day as needed for anxiety.  No acute safety concerns today.  No acute suicidality.  No problematic drug or alcohol use.     8/22/2023:  Patient with ongoing significant anxiety and mood related struggles.  Clonidine has not been helpful.  We will transition patient off paroxetine and onto venlafaxine.  We will continue to consider escitalopram as an option if venlafaxine ineffective or poorly tolerated.  Could also consider use of fluoxetine if paroxetine is difficult to discontinue.  Discussed DBT therapy as a potential option to help improve symptoms.  Resources sent in [employer] message.  No acute suicidality.  No acute safety concerns.  No problematic drug or alcohol use.    10/3/2023:  Patient doing okay with transition to venlafaxine ER.  Has had some mild dizziness and headaches.  We will continue to optimize venlafaxine ER and patient  will watch for worsening headaches and dizziness.  Her symptoms could be related to discontinuation of paroxetine.  Patient still struggling significantly in the evenings and with insomnia.  We will trial quetiapine at bedtime as needed for sleep.  Discussed risks and benefits of therapy including watching for any abnormal involuntary movements.  If patient continues on the medication, we will make sure to get updated lipid panel and fasting glucose.  Could also consider mirtazapine as an option.  No acute safety concerns.  No acute suicidality.  No problematic drug or alcohol use.    11/14/2023:  Patient continuing to work with sleep medicine with pending at home sleep study results.  Quetiapine has been helpful for sleep at just 12.5 mg at bedtime.  Does cause some significant cognitive clouding during the day.  Patient does feel benefits outweigh risks but is willing to trial olanzapine to see if gets similar benefit without such heavy cognitive effects.  Venlafaxine has been helpful for mood and anxiety symptoms.  Still could consider mirtazapine as an option for sleep as needed.  No acute safety concerns.  No SI.  No problematic drug or alcohol use.    1/2/2024:  Patient currently doing quite well on olanzapine.  Finds it more helpful and better tolerated than quetiapine.  Discussed risks and benefits and side effects to watch out for.  Mild constipation-encouraged to utilize MiraLAX and/or Dulcolax.  No acute safety concerns.  No SI.  No problematic drug or alcohol use.  No abnormal involuntary movements.    4/8/2024:  Apart from some of the metabolic effects of olanzapine, patient otherwise quite stable.  Patient opts to try to address metabolic side effects of olanzapine with metformin.  Discussed there is evidence to support use of metformin to help treat and also to help decrease risk for metabolic syndrome from the medication use.  Discussed risks and benefits of metformin use.  Due to stability of mental  health symptoms, psychiatric care be returned back to primary care provider.  Also encouraged primary care provider to continue to monitor metabolic status and need for metformin.  Patient continues to watch diet and stay active.  Updated labs ordered for primary care provider for baseline since starting metformin XR.  Last liver enzymes looked okay when checked in June 2023.  Patient also denies any abnormal involuntary movements.  No acute safety concerns.  No SI.  No problematic drug or alcohol use.    9/11/2024 (New episode of care initiated today after bounce-back):  Patient struggling more significantly with anxiety and insomnia.  Even up to 10 mg of olanzapine was not very helpful.  Will discontinue olanzapine and trial mirtazapine.  Ambien is effective and so patient instructed to take before bedtime nightly for 1-2 weeks to allow for reregulation of sleep schedule.  Could consider further optimization of Effexor-XR in the future as well.  No acute safety concerns.  No acute suicidality, although does have some passive fleeting thoughts with no plan or intent to harm self.  No problematic drug or alcohol use.  Psychotherapy encouraged.    9/24/2024:  Patient with severely worsening anxiety.  Started clonazepam scheduled since last visit.  This will be continued since to started yesterday.  Venlafaxine will be increased to 225 mg daily.  Patient scheduled for intake for PHP/IOP tomorrow.  Patient should not be traveling in her current condition and also to allow time for intensive outpatient programming.  Letter was provided due to their upcoming travel plans.  No acute safety concerns.  No acute suicidality.  No problematic drug or alcohol use.    10/02/2024:  Patient doing a little bit better on decreased venlafaxine and daily clonazepam.  No changes today.  Patient will continue to monitor mood/depression symptoms  Hopefully will be able to start intensive outpatient programming soon.  No acute safety  concerns.  No acute suicidality.  No problematic drug or alcohol use.    10/29/2024:  Overall continuing to feel improvement of symptoms.  Taking clonazepam 1.5 mg at bedtime for sleep.  Worried about not being able to have clonazepam and not sleeping again.  For now clonazepam will be continued.  Patient is agreeable to starting buspirone to see if it might further improve anxiety to at some point reduce reliance on clonazepam.  Encouraged to continue in IOP.  No acute safety concerns.  No SI.  No problematic drug or alcohol use.    12/10/2024:  Patient overall doing quite well.  He feels back to a baseline.  Still some ongoing anxiety but much more manageable and sleeping well.  Using olanzapine, hydroxyzine, and as needed Lunesta at bedtime to help with sleep.  Tolerating well but does have some increased appetite and weight gain from olanzapine.  Patient has not yet started metformin that was sent to help mitigate metabolic effects from olanzapine.  Patient also with slightly elevated hemoglobin A1c.  Patient will now start metformin.  May be a good candidate for GLP-1 injections, like tirzepatide.  No acute safety concerns.  No SI.  No problematic drug or alcohol use.    1/7/2025:  Patient appearing hypomanic today.  Discussed suspicions for bipolar disorder with both patient and .  Patient with history of episodes as an adolescent/young adult while abusing stimulants that seem consistent with manic episodes.   has noted episodes that seem consistent with mixed episodes of bipolar disorder.  Patient with most recent suspected mixed episode.  Now suspicions for hypomania after coming off clonazepam and with stressor leading to lack of sleep.  We will increase olanzapine but may need to consider decreasing or discontinuing venlafaxine.  No acute safety concerns.  No SI.  No problematic drug or alcohol use.    1/21/2025:  Patient overall with some appropriately sustained elevation in mood.  Patient  does not seem as elevated as last visit.  No signs of depression.  Less psychomotor agitation.  No longer with pressured speech.  Getting roughly 6 hours of sleep a night.  Discussed continuing to monitor sleep closely.  If patient with signs or symptoms of mixed episode or hypomania again in the future, we may need to consider decreasing, or finding an alternative to, the venlafaxine.  Patient will continue on olanzapine 10 mg at bedtime.  Could consider lamotrigine in the future if mood were to crash while on venlafaxine, or if venlafaxine again becomes too activating despite presence of olanzapine.  We will get updated fasting labs in about 1 month.  No acute safety concerns.  No suicidality.  No problematic drug or alcohol use.  Will continue to monitor weight.  No noted abnormal involuntary movements today on camera.  None noted by patient.    3/4/2025:  Patient overall feeling relatively stable.  No signs of garrett today.  No major depression.  Has been taking 20 mg of olanzapine at bedtime with increased appetite, weight gain, evening fatigue and sedation.  Discussed decreasing to 15 mg at bedtime for a few days at least before decreasing to 10 mg at bedtime.  Could still consider slightly reducing venlafaxine in the future, particularly if sleep trouble is still an issue when tapering down olanzapine.  Discussed I would advocate for patient to start GLP-1 injection, Zepbound, if primary care provider felt appropriate and was agreeable to prescribing.  No acute safety concerns.  No SI.  No problematic drug or alcohol use.  Fasting labs ordered but not yet drawn.  Of note, patient will start treating sleep apnea-picks up CPAP tomorrow.    Medication side effects and alternatives were reviewed. Health promotion activities recommended and reviewed today. All questions addressed. Education and counseling completed regarding risks and benefits of medications and psychotherapy options. Recommend therapy for  additional support.     Treatment Plan:  Continue Effexor-XR/venlafaxine  mg daily for anxiety/mood.  Continue Lunesta 1-3 mg at bedtime as needed for sleep. Rx previously sent for the 2 mg tablets. Be sure to devote at least 8 hours to sleep after taking before operating motor vehicle or other heavy or sharp equipment. Do NOT mix with alcohol.   Decrease olanzapine to 15 mg at bedtime for sleep, mood, anxiety for about 5 days, then decrease to 10 mg nightly as tolerated.   Continue hydroxyzine 12.5-25 mg at bedtime as needed for sleep.   Have fasting labs completed in next few weeks at any [facility] lab. Need to call your clinic ahead of time to schedule an appointment for lab due to limited hours and no walk-ins due to Covid-19 restrictions/changes.   Follow up with me in 6 weeks.  For scheduling needs you can call [phone].  You could also get a message to the nurses by calling the aforementioned phone number.  Continue all other cares per primary care provider.   Continue all other medications as reviewed per electronic medical record today.   Safety plan reviewed. To the Emergency Department as needed or call after hours crisis line at [phone] or [phone]. [place] Crisis Text Line. Text [place] to 403866 or Suicide LifeLine Chat: suicidepreventionlifeline.org/chat  Follow up with primary care provider as planned or for acute medical concerns.  ProZymet may be used to communicate with your provider, but this is not intended to be used for emergencies.    Have discussed risks for neuroleptic medication use (olanzapine/Zyprexa) including but not limited to possibility for movement disorders such as restlessness, abnormal and involuntary movements that could be lasting even after stopping the medication, muscle stiffness.  Also discussed risks for weight gain and cholesterol and blood sugar abnormalities and the need for monitoring.    Administrative Billing:   Phone Call/Video Duration: 22  Minutes  Start: 11:31a  Stop: 11:53a    Patient Status:  Patient is a continuous care patient.     The longitudinal plan of care for the diagnosis(es)/condition(s) as documented were addressed during this visit. Due to the added complexity in care, I will continue to support Marbella in the subsequent management and with ongoing continuity of care.    Signed:   Christi Lewis DO  [facility]    Disclaimer: This note consists of symbols derived from keyboarding, dictation and/or voice recognition software. As a result, there may be errors in the script that have gone undetected. Please consider this when interpreting information found in this chart.

## 2025-03-04 NOTE — PATIENT INSTRUCTIONS
Treatment Plan:  Continue Effexor-XR/venlafaxine  mg daily for anxiety/mood.  Continue Lunesta 1-3 mg at bedtime as needed for sleep. Rx previously sent for the 2 mg tablets. Be sure to devote at least 8 hours to sleep after taking before operating motor vehicle or other heavy or sharp equipment. Do NOT mix with alcohol.   Decrease olanzapine to 15 mg at bedtime for sleep, mood, anxiety for about 5 days, then decrease to 10 mg nightly as tolerated.   Continue hydroxyzine 12.5-25 mg at bedtime as needed for sleep.   Have fasting labs completed in next few weeks at any [facility] lab. Need to call your clinic ahead of time to schedule an appointment for lab due to limited hours and no walk-ins due to Covid-19 restrictions/changes.   Follow up with me in 6 weeks.  For scheduling needs you can call [phone].  You could also get a message to the nurses by calling the aforementioned phone number.  Continue all other cares per primary care provider.   Continue all other medications as reviewed per electronic medical record today.   Safety plan reviewed. To the Emergency Department as needed or call after hours crisis line at [phone] or [phone]. [place] Crisis Text Line. Text [place] to 431379 or Suicide LifeLine Chat: suicidepreventionlifeline.org/chat  Follow up with primary care provider as planned or for acute medical concerns.  mobiDEOSt may be used to communicate with your provider, but this is not intended to be used for emergencies.      Have discussed risks for neuroleptic medication use (olanzapine/Zyprexa) including but not limited to possibility for movement disorders such as restlessness, abnormal and involuntary movements that could be lasting even after stopping the medication, muscle stiffness.  Also discussed risks for weight gain and cholesterol and blood sugar abnormalities and the need for monitoring.      Patient Education   Collaborative Care Psychiatry Service  What to  "Expect  Here's what to expect from your Collaborative Care Psychiatry Service (CCPS).   About CCPS  CCPS means 2 people work together to help you get better. You'll meet with a behavioral health clinician and a psychiatric doctor. A behavioral health clinician helps people with mental health problems by talking with them. A psychiatric doctor helps people by giving them medicine.  How it works  At every visit, you'll see the behavioral health clinician (BHC) first. They'll talk with you about how you're doing and teach you how to feel better.   Then you'll see the psychiatric doctor. This doctor can help you deal with troubling thoughts and feelings by giving you medicine. They'll make sure you know the plan for your care.   CCPS usually takes 3 to 6 visits. If you need more visits, we may have you start seeing a different psychiatric doctor for ongoing care.  If you have any questions or concerns, we'll be glad to talk with you.  About visits  Be open  At your visits, please talk openly about your problems. It may feel hard, but it's the best way for us to help you.  Cancelling visits  If you can't come to your visit, please call us right away at [phone]. If you don't cancel at least 24 hours (1 full day) before your visit, that's \"late cancellation.\"  Being late to visits  Being very late is the same as not showing up. You will be a \"no show\" if:  Your appointment starts with a BHC, and you're more than 15 minutes late for a 30-minute (half hour) visit. This will also cancel your appointment with the psychiatric doctor.  Your appointment is with a psychiatric doctor only, and you're more than 15 minutes late for a 30-minute (half hour) visit.  Your appointment is with a psychiatric doctor only, and you're more than 30 minutes late for a 60-minute (full hour) visit.  If you cancel late or don't show up 2 times within 6 months, we may end your care.   Getting help between visits  If you need help between " visits, you can call us Monday to Friday from 8 a.m. to 4:30 p.m. at [phone].  Emergency care  Call 911 or go to the nearest emergency department if your life or someone else's life is in danger.  Call 988 anytime to reach the national Suicide and Crisis hotline.  Medicine refills  To refill your medicine, call your pharmacy. You can also call [facility]'s Behavioral Access at [phone], Monday to Friday, 8 a.m. to 4:30 p.m. It can take 1 to 3 business days to get a refill.   Forms, letters, and tests  You may have papers to fill out, like FMLA, short-term disability, and workability. We can help you with these forms at your visits, but you must have an appointment. You may need more than 1 visit for this, to be in an intensive therapy program, or both.  Before we can give you medicine for ADHD, we may refer you to get tested for it or confirm it another way.  We may not be able to give you an emotional support animal letter.  We don't do mental health checks ordered by the court.   We don't do mental health testing, but we can refer you to get tested.   Thank you for choosing us for your care.  For informational purposes only. Not to replace the advice of your health care provider. Copyright   2022 [facility]. All rights reserved. HERMEL DELOR [id number] - Rev 11/24.

## 2025-03-04 NOTE — Clinical Note
Hi there! If pt is appropriate candidate from primary care standpoint - could potentially benefit greatly from tirzepetide/Zepbound. We are finding in psychiatry that they greatly reduce risk for/mitigate the metabolic side effects of the neuroleptic medications (but we are not prescribing as psychiatric prescribers within [place]).  I sent pt info on the vials savings program through [employer]. She was encouraged to meet with you to discuss further. Thanks! -Christi

## 2025-03-05 ENCOUNTER — DOCUMENTATION ONLY (OUTPATIENT)
Dept: SLEEP MEDICINE | Facility: CLINIC | Age: 60
End: 2025-03-05
Payer: COMMERCIAL

## 2025-03-05 DIAGNOSIS — G47.33 OBSTRUCTIVE SLEEP APNEA (ADULT) (PEDIATRIC): Primary | ICD-10-CM

## 2025-03-05 NOTE — PROGRESS NOTES
Patient was offered choice of vendor and chose [facility].  Patient Marbella Hendrix was set up at [place] on March 5, 2025. Patient received a Resmed Airsense 10 Pressures were set at  5-15 cm H2O.   Patient s ramp is 5 cm H2O for Auto and FLEX/EPR is 2.  Patient received a 3DMGAME & JumpTime Mask name: F&P Tutu  Full Face mask size Medium, heated tubing and heated humidifier.  Patient has the following compliance requirements: none    Alexus Paiz

## 2025-03-10 ENCOUNTER — DOCUMENTATION ONLY (OUTPATIENT)
Dept: SLEEP MEDICINE | Facility: CLINIC | Age: 60
End: 2025-03-10
Payer: COMMERCIAL

## 2025-03-10 NOTE — PROGRESS NOTES
3 day Sleep therapy management telephone visit    Diagnostic AHI: HST: 9.8        Confirmed with patient at time of call- Yes Patient is still interested in STM service       Subjective measures:  Patient stated she needs to get a different mask and hasn't started using her CPAP yet.         Objective data     Order Settings for PAP  CPAP min     CPAP max              Device settings from machine CPAP min 5     CPAP max 15                      Assessment: No usage but has account in [employer]      Patient has the following upcoming sleep appts:  Future Sleep Appointments         Provider Department    3/24/2025 1:30 PM (Arrive by 1:15 PM) Goltz, Bennett Ezra, PA-C [facility] [place]            Replacement device: No  STM ordered by provider: Yes     Total time spent on accessing and  interpreting remote patient PAP therapy data  10 minutes    Total time spent counseling, coaching  and reviewing PAP therapy data with patient  3 minutes    [id number] no

## 2025-03-13 NOTE — PROGRESS NOTES
Clinic Care Coordination Contact  [facility]/Voicemail       Clinical Data: Care Coordinator Outreach  Outreach attempted x 2.  Left message on patient's voicemail with call back information and requested return call.  Plan: Care Coordinator will send unable to contact letter with care coordinator contact information via mail. Care Coordinator will do no further outreaches at this time.       FYI to clinical to review apt booked for 15 min for manip.  Looks like  Has next apt slot frozen to add additional time.   Please review this apt.        Appointment for: Adela Cruz ([id number])  Visit type: PC OFFICE VISIT ([id number])  4/2/2025 8:45 AM (15 minutes) with Brando Fall DO in [facility] [address]     Patient comments:  Manipulation of back

## 2025-03-18 ENCOUNTER — OFFICE VISIT (OUTPATIENT)
Facility: CLINIC | Age: 60
End: 2025-03-18
Payer: COMMERCIAL

## 2025-03-18 DIAGNOSIS — F41.1 GENERALIZED ANXIETY DISORDER: Primary | ICD-10-CM

## 2025-03-18 DIAGNOSIS — F33.1 MDD (MAJOR DEPRESSIVE DISORDER), RECURRENT EPISODE, MODERATE (H): ICD-10-CM

## 2025-03-18 DIAGNOSIS — F90.9 ATTENTION DEFICIT HYPERACTIVITY DISORDER (ADHD), UNSPECIFIED ADHD TYPE: ICD-10-CM

## 2025-03-18 PROCEDURE — 90853 GROUP PSYCHOTHERAPY: CPT

## 2025-03-19 ENCOUNTER — VIRTUAL VISIT (OUTPATIENT)
Dept: FAMILY MEDICINE | Facility: CLINIC | Age: 60
End: 2025-03-19
Payer: COMMERCIAL

## 2025-03-19 DIAGNOSIS — E03.9 ACQUIRED HYPOTHYROIDISM: ICD-10-CM

## 2025-03-19 DIAGNOSIS — E78.5 HYPERLIPIDEMIA LDL GOAL <130: ICD-10-CM

## 2025-03-19 DIAGNOSIS — E66.01 MORBID OBESITY (H): Primary | ICD-10-CM

## 2025-03-19 DIAGNOSIS — F33.1 MODERATE EPISODE OF RECURRENT MAJOR DEPRESSIVE DISORDER (H): ICD-10-CM

## 2025-03-19 DIAGNOSIS — G47.33 OSA (OBSTRUCTIVE SLEEP APNEA): ICD-10-CM

## 2025-03-19 PROCEDURE — 98006 SYNCH AUDIO-VIDEO EST MOD 30: CPT | Performed by: FAMILY MEDICINE

## 2025-03-19 PROCEDURE — 1126F AMNT PAIN NOTED NONE PRSNT: CPT | Mod: 95 | Performed by: FAMILY MEDICINE

## 2025-03-19 ASSESSMENT — PATIENT HEALTH QUESTIONNAIRE - PHQ9
SUM OF ALL RESPONSES TO PHQ QUESTIONS 1-9: 7
10. IF YOU CHECKED OFF ANY PROBLEMS, HOW DIFFICULT HAVE THESE PROBLEMS MADE IT FOR YOU TO DO YOUR WORK, TAKE CARE OF THINGS AT HOME, OR GET ALONG WITH OTHER PEOPLE: SOMEWHAT DIFFICULT
SUM OF ALL RESPONSES TO PHQ QUESTIONS 1-9: 7

## 2025-03-19 ASSESSMENT — ANXIETY QUESTIONNAIRES
3. WORRYING TOO MUCH ABOUT DIFFERENT THINGS: NEARLY EVERY DAY
GAD7 TOTAL SCORE: 14
4. TROUBLE RELAXING: MORE THAN HALF THE DAYS
1. FEELING NERVOUS, ANXIOUS, OR ON EDGE: NEARLY EVERY DAY
GAD7 TOTAL SCORE: 14
7. FEELING AFRAID AS IF SOMETHING AWFUL MIGHT HAPPEN: MORE THAN HALF THE DAYS
2. NOT BEING ABLE TO STOP OR CONTROL WORRYING: MORE THAN HALF THE DAYS
5. BEING SO RESTLESS THAT IT IS HARD TO SIT STILL: SEVERAL DAYS
GAD7 TOTAL SCORE: 14
IF YOU CHECKED OFF ANY PROBLEMS ON THIS QUESTIONNAIRE, HOW DIFFICULT HAVE THESE PROBLEMS MADE IT FOR YOU TO DO YOUR WORK, TAKE CARE OF THINGS AT HOME, OR GET ALONG WITH OTHER PEOPLE: SOMEWHAT DIFFICULT
7. FEELING AFRAID AS IF SOMETHING AWFUL MIGHT HAPPEN: MORE THAN HALF THE DAYS
8. IF YOU CHECKED OFF ANY PROBLEMS, HOW DIFFICULT HAVE THESE MADE IT FOR YOU TO DO YOUR WORK, TAKE CARE OF THINGS AT HOME, OR GET ALONG WITH OTHER PEOPLE?: SOMEWHAT DIFFICULT
6. BECOMING EASILY ANNOYED OR IRRITABLE: SEVERAL DAYS

## 2025-03-19 NOTE — PROGRESS NOTES
"Marbella is a 59 year old who is being evaluated via a billable video visit.    How would you like to obtain your AVS? MyChart  If the video visit is dropped, the invitation should be resent by: Text to cell phone: [phone]  Will anyone else be joining your video visit? No      Assessment & Plan     Morbid obesity (H)  Her BMI is 37 but she has comorbidity , EVER, hyperlipidemia , hypothyroidism, joint pain , depression   We discussed weight management referral for medication management to help on top of healthy diet and exercise which she has been doing but unable to lose weight   I placed the referral for this   - Adult Comprehensive Weight Management  Referral; Future    Moderate episode of recurrent major depressive disorder (H)    She sees psychiatry for this , Dr Lewis and is on olanzapine 10 mg at HS and also 5 mg as needed for anxiety during the day . Also on Effexor  mg Lunesta 2 mg as needed for insomnia     Hyperlipidemia LDL goal <130  She is on 20 mg of Lipitor.  No side effects.  I have refilled her prescription.  Last cholesterol check was normal        EVER (obstructive sleep apnea)  Using a CPAP machine     Hypothyroidism and on Synthroid 112 mcg daily dose , no side effects , doing well on this         BMI  Estimated body mass index is 34.33 kg/m  as calculated from the following:    Height as of 3/4/25: 1.626 m (5' 4\").    Weight as of 3/4/25: 90.7 kg (200 lb).       Depression Screening Follow Up        3/19/2025    12:05 PM   PHQ   PHQ-9 Total Score 7    Q9: Thoughts of better off dead/self-harm past 2 weeks Several days   F/U: Thoughts of suicide or self-harm No   F/U: Safety concerns No       Patient-reported           Follow Up Actions Taken  Referred patient back to mental health provider    Discussed the following ways the patient can remain in a safe environment:  be around others        Subjective   Marbella is a 59 year old, presenting for the following health " issues:  Medication Request (Wegovy, zepbound)        3/19/2025    12:49 PM   Additional Questions   Roomed by JUAN Do   Accompanied by DIVINA     History of Present Illness       Reason for visit:  Talk about Jenna or Zepbound.   She is taking medications regularly.          Weight management         Review of Systems  Constitutional, HEENT, cardiovascular, pulmonary, GI, , musculoskeletal, neuro, skin, endocrine and psych systems are negative, except as otherwise noted.      Objective    Vitals - Patient Reported  Systolic (Patient Reported):  (Pt denies VS to report at this time)  Pain Score: No Pain (0)      Vitals:  No vitals were obtained today due to virtual visit.    Physical Exam   GENERAL: alert and no distress  EYES: Eyes grossly normal to inspection.  No discharge or erythema, or obvious scleral/conjunctival abnormalities.  RESP: No audible wheeze, cough, or visible cyanosis.    SKIN: Visible skin clear. No significant rash, abnormal pigmentation or lesions.  NEURO: Cranial nerves grossly intact.  Mentation and speech appropriate for age.  PSYCH: Appropriate affect, tone, and pace of words    No results found for any visits on 03/19/25.      Video-Visit Details    Type of service:  Video Visit   Originating Location (pt. Location): Home    Distant Location (provider location):  On-site  Platform used for Video Visit: Alice  Signed Electronically by: Vanessa Ladd MD

## 2025-03-20 ENCOUNTER — DOCUMENTATION ONLY (OUTPATIENT)
Dept: SLEEP MEDICINE | Facility: CLINIC | Age: 60
End: 2025-03-20
Payer: COMMERCIAL

## 2025-03-20 PROBLEM — F33.1 MODERATE EPISODE OF RECURRENT MAJOR DEPRESSIVE DISORDER (H): Status: ACTIVE | Noted: 2025-03-20

## 2025-03-20 RX ORDER — ATORVASTATIN CALCIUM 20 MG/1
20 TABLET, FILM COATED ORAL DAILY
Qty: 90 TABLET | Refills: 0 | Status: SHIPPED | OUTPATIENT
Start: 2025-03-20

## 2025-03-20 RX ORDER — LEVOTHYROXINE SODIUM 125 UG/1
125 TABLET ORAL DAILY
Qty: 90 TABLET | Refills: 0 | Status: SHIPPED | OUTPATIENT
Start: 2025-03-20

## 2025-03-20 NOTE — PROGRESS NOTES
14 day Sleep therapy management telephone visit    Diagnostic AHI:    HST: 9.8        LEFT VOICE MESSAGE FOR PATIENT TO RETURN CALL      Objective measures: 14 day rolling measures   COMPLIANCE LEAK AHI AVERAGE USE IN MINUTES   0 % 9.6 6.7 98   GOAL >70% GOAL < 24 LPM GOAL <5 GOAL >240          Device settings:  CPAP MIN CPAP MAX EPR RESMED SOFT RESPONSE SETTING   5.0 cm  H20 15.0 cm  H20 TWO OFF         Patient has the following upcoming sleep appts:  Future Sleep Appointments         Provider Department    3/24/2025 1:30 PM (Arrive by 1:15 PM) Goltz, Bennett Ezra, PA-C [facility] [place]            Replacement device: No  STM ordered by provider: Yes     Total time spent on accessing and  interpreting remote patient PAP therapy data  10 minutes    Total time spent counseling, coaching  and reviewing PAP therapy data with patient  0 minutes

## 2025-03-21 NOTE — PROGRESS NOTES
Virtual Visit Details    Type of service:  Video Visit   Start Time: 1:21 PM   End Time: 1:53 PM   Originating Location (pt. Location): Home    Distant Location (provider location):  Off-site  Platform used for Video Visit: Oonair    CPAP Follow-Up Visit:    Date on this visit: 3/24/2025    Marbella Hendrix has a follow-up visit today to review her CPAP use for EVER. Marbella Hendrix was initially seen for I don't sleep enough. Therapist thought I should be seen for it.       Previous Study Results:   Date: 1/15/25.  Weight 217 pounds.  AHI: 26.7/hr (7% central). REM AHI 50.6/hr. supine AHI 36.7/hr. Non-supine AHI 9.6/hr.  RDI 27.1/hr. O2 maxx 58%.  36.3 min below 89%    Two prior attempts at home testing did not yield reliable results.      ResMed 3/5/25  Auto-PAP 5.0 - 15.0 cmH2O 30 day usage data:    The compliance data shows that the patient used the CPAP for 6/30 nights, 10% of nights for >4 hours.  The 95th% pressure is 14.2 cm.  The 95th% leak is 24.6 lpm.  The average nightly usage is 4:39.  The average AHI is 3.4/hr (1.2/hr centrals). The leak tracing on the download is not highly suggestive of mouth leak.       She does not like to sleep on her back. She was asked to sleep on her back on the sleep study.   She woke with mouth leak. She started with an Tutu mask and switched to the Dreamwisp.   She could not tolerate or fall asleep with the Tutu.   When she gets up for the restroom, she does not put the mask back on. She just is so out of it, she is not paying attention to it. Sometimes she gets it back on, but will remove it at some point. The pressure feels high initially, but feels it goes down a little.     She [facility]. We discussed that Zepbound is now approved for EVER.      No specialty comments available.    Do you use a CPAP Machine at home: (Patient-Rptd) Yes  Overall, on a scale of 0-10 how would you rate your CPAP (0 poor, 10 great):  (Patient-Rptd) 4    What type of mask do you use:   Tutu  Is your mask comfortable: (Patient-Rptd) Yes  If not, why:    How often do you replace supplies:    Is your mask leaking: (Patient-Rptd) Yes  If yes, where do you feel it: (Patient-Rptd) At the top of the head.  How many night per week does the mask leak (0-7): (Patient-Rptd) 7    Do you notice snoring with mask on: (Patient-Rptd) No  Do you notice gasping arousals with mask on: (Patient-Rptd) No  Are you having significant oral or nasal dryness: (Patient-Rptd) Yes dry mouth  Are you using the humidifier: yes  Does the water chamber run out before the night is over:no  Do you get condensation in the mask or hose:no  Is the pressure setting comfortable: (Patient-Rptd) Yes  If not, why:      Typical bedtime: (Patient-Rptd) 10 pm  Sleep latency on PAP therapy: (Patient-Rptd) 10 minutes  Typical wake time: (Patient-Rptd) 6:30-7:30 am.  Wakes 3-4 times per night for 2 minutes. Reason for waking: restroom  How many hours on average per night are you using PAP therapy: (Patient-Rptd) 4  How many hours are you sleeping per night: (Patient-Rptd) 8  Do you feel well rested in the morning: (Patient-Rptd) No    Naps: 0.       Weight change since sleep study: 210 lbs      Past medical/surgical history, family history, social history, medications and allergies were reviewed.      Problem List:  Patient Active Problem List    Diagnosis Date Noted    Moderate episode of recurrent major depressive disorder (H) 03/20/2025     Priority: Medium    Elevated fasting glucose 05/01/2024     Priority: Medium    Anxiety 07/19/2023     Priority: Medium    Adjustment disorder with anxious mood 06/27/2021     Priority: Medium    Family history of diabetes mellitus 06/27/2021     Priority: Medium    CMC DJD(carpometacarpal degenerative joint disease), localized primary, left 11/18/2020     Priority: Medium    Dermatochalasis of both upper eyelids 11/17/2020     Priority: Medium     Added  automatically from request for surgery [id number]      Involutional ptosis, acquired, bilateral 11/17/2020     Priority: Medium     Added automatically from request for surgery [id number]      Weakness of right hip 07/09/2020     Priority: Medium    Primary osteoarthritis of right hip 12/02/2019     Priority: Medium     Added automatically from request for surgery [id number]      Morbid obesity (H) 06/20/2018     Priority: Medium    Status post hysterectomy 08/02/2017     Priority: Medium    Sensation of plugged ear on both sides 07/12/2017     Priority: Medium    Acute post-operative pain 08/28/2015     Priority: Medium    Preventative health care 10/30/2012     Priority: Medium     Last pap NIL 2011; mammogram nl 2011; lipids abnl, needs annual f/u      Generalized anxiety disorder 09/26/2012     Priority: Medium     H/o panic attacks; currently controlled with paxil      Hyperlipidemia LDL goal <130 09/26/2012     Priority: Medium     Behavioral measures - avoiding statins while trying to conceive; not a candidate for red yeast rice (interactions with thyroid meds)      Acquired hypothyroidism 09/26/2012     Priority: Medium    Overweight 09/26/2012     Priority: Medium     On exercise plan, has been in weight watchers  Problem list name updated by automated process. Provider to review          Impression/Plan:    (G47.33) EVER (obstructive sleep apnea)  (primary encounter diagnosis)  Comment: Marbella just started CPAP a few weeks ago. Her usage has been low. She notes having mouth leak and dry mouth. The pressure sometimes excalates quite rapidly, sometimes hitting the upper limit. She sometimes has increased leak when that happens.  She did not tolerate the Tutu mask.   Plan: Comprehensive DME        I changed her pressures to 5-12 cm, EPR to 3 cm and response to soft. She was encouraged to try using CPAP in the day to get used to having it on. I also advised her to leave the mask on when she gets up for the restroom, just  disconnect the hose, to help remind her to get it back on when she gets back into bed.  We reviewed the schedule for replacing supplies. We talked about options for reducing mouth leak. She did not express interest in any of those options. Hopefully, reducing the pressures will help with that issue. She also has an appointment next month with the weight loss clinic and she is excited to try Zepbound as she is uncomfortable with the weight she has gained from olanzapine.      She will follow up with me in about 6 month(s). She was encouraged to contact me prior to that appointment if still having difficulty tolerating CPAP.    39 minutes were spent on the date of the encounter doing chart review, history and exam, documentation and further activities as noted above.     Bennett Goltz, PA-C    CC: Vanessa Ladd

## 2025-03-24 ENCOUNTER — VIRTUAL VISIT (OUTPATIENT)
Dept: SLEEP MEDICINE | Facility: CLINIC | Age: 60
End: 2025-03-24
Payer: COMMERCIAL

## 2025-03-24 VITALS — HEIGHT: 64 IN | BODY MASS INDEX: 35.85 KG/M2 | WEIGHT: 210 LBS

## 2025-03-24 DIAGNOSIS — G47.33 OSA (OBSTRUCTIVE SLEEP APNEA): Primary | ICD-10-CM

## 2025-03-24 PROCEDURE — 98006 SYNCH AUDIO-VIDEO EST MOD 30: CPT | Performed by: PHYSICIAN ASSISTANT

## 2025-03-24 PROCEDURE — 1126F AMNT PAIN NOTED NONE PRSNT: CPT | Mod: 95 | Performed by: PHYSICIAN ASSISTANT

## 2025-03-24 ASSESSMENT — SLEEP AND FATIGUE QUESTIONNAIRES
HOW LIKELY ARE YOU TO NOD OFF OR FALL ASLEEP WHILE SITTING QUIETLY AFTER LUNCH WITHOUT ALCOHOL: SLIGHT CHANCE OF DOZING
HOW LIKELY ARE YOU TO NOD OFF OR FALL ASLEEP WHEN YOU ARE A PASSENGER IN A CAR FOR AN HOUR WITHOUT A BREAK: SLIGHT CHANCE OF DOZING
HOW LIKELY ARE YOU TO NOD OFF OR FALL ASLEEP WHILE SITTING AND TALKING TO SOMEONE: SLIGHT CHANCE OF DOZING
HOW LIKELY ARE YOU TO NOD OFF OR FALL ASLEEP WHILE SITTING AND READING: SLIGHT CHANCE OF DOZING
HOW LIKELY ARE YOU TO NOD OFF OR FALL ASLEEP WHILE WATCHING TV: SLIGHT CHANCE OF DOZING
HOW LIKELY ARE YOU TO NOD OFF OR FALL ASLEEP IN A CAR, WHILE STOPPED FOR A FEW MINUTES IN TRAFFIC: SLIGHT CHANCE OF DOZING
HOW LIKELY ARE YOU TO NOD OFF OR FALL ASLEEP WHILE SITTING INACTIVE IN A PUBLIC PLACE: SLIGHT CHANCE OF DOZING
HOW LIKELY ARE YOU TO NOD OFF OR FALL ASLEEP WHILE LYING DOWN TO REST IN THE AFTERNOON WHEN CIRCUMSTANCES PERMIT: SLIGHT CHANCE OF DOZING

## 2025-03-24 ASSESSMENT — PAIN SCALES - GENERAL: PAINLEVEL_OUTOF10: NO PAIN (0)

## 2025-03-24 ASSESSMENT — PATIENT HEALTH QUESTIONNAIRE - PHQ9: SUM OF ALL RESPONSES TO PHQ QUESTIONS 1-9: 6

## 2025-03-24 NOTE — NURSING NOTE
Current patient location: [address] [place], [place] [address] [facility] [address]    Is the patient currently in the state of [place]? YES    Visit mode: VIDEO    If the visit is dropped, the patient can be reconnected by:VIDEO VISIT: Send to e-mail at: [email or web address]    Will anyone else be joining the visit? NO  (If patient encounters technical issues they should call [phone] :[phone])    Are changes needed to the allergy or medication list? Pt stated no changes to allergies and Pt stated no med changes    Are refills needed on medications prescribed by this physician? NO    Rooming Documentation:  Questionnaire(s) completed    Reason for visit: MUNA LOPEZF

## 2025-03-26 NOTE — GROUP NOTE
Psychotherapy Group Note    PATIENT'S NAME: Marbella Hendrix  MRN:   [id number]  :   1965  ACCT. NUMBER: [id number]  DATE OF SERVICE: 3/18/25  START TIME:  3:00 PM  END TIME:  3:50 PM  FACILITATOR: Jyoti Blancas LICSW  TOPIC:  EBP Group: Cognitive Restructuring  [facility] Step-Down Group    NUMBER OF PARTICIPANTS: 7    Summary of Group / Topics Discussed:  Cognitive Restructuring: Introduction and Overview: Patients were introduced to Cognitive Behavioral Therapy (CBT) as a way to identify ineffective thought patterns, understand factors that contribute to ineffective thought patterns, gain awareness of the impact of those thoughts on emotions and behavior, and learn methods for modifying thinking to achieve better mood regulation. Patients received a general overview of how ones thoughts influence feelings and behaviors in the context of CBT. Patients explored the development of their own thought patterns and how they impact daily functioning.      Patient Session Goals / Objectives:  Familiarize self with the interrelationship of thoughts, feelings and behavior.  Identify ineffective / unhelpful thought patterns and their impact on mood.               Service Modality:  In-person    Session: 4b    Patient Participation / Response:  Patient was quiet and did not share much with group.  Patient did listen to what others had to say.     Diagnosis:  1. Generalized anxiety disorder    2. Attention deficit hyperactivity disorder (ADHD), unspecified ADHD type    3. MDD (major depressive disorder), recurrent episode, moderate (H)              PLAN: (Patient Tasks / Therapist Tasks / Other)  Continue in weekly group participation.        ZUHAIR Mcmanus           [facility] Group Treatment Plan    Patient's Name: Marbella Lawton  Simeon  YOB: 1965    Date of Creation: February 18, 2025  Date Treatment Plan Last Reviewed/Revised: February 18, 2025    DSM5 Diagnoses: Attention-Deficit/Hyperactivity Disorder  314.01 (F90.9) Unspecified Attention -Deficit / Hyperactivity Disorder  Major Depressive Disorder, Recurrent Episode, moderate  300.02 (F41.1) Generalized Anxiety Disorder  Insomnia, unspecified  Psychosocial / Contextual Factors: , remote polysubstance abuse (in remission for decades) who presents today for psychiatric evaluation.  Patient recently with increased psychosocial stressors and acutely worsening anxiety and insomnia.     Referral / Collaboration:  Referral to another professional/service is not indicated at this time..    Anticipated number of session for this episode of care: 12 weeks (with possibility of extension per patient need and space in group)  Anticipation frequency of session: Weekly   Anticipated Duration of each session: 2 hours.  Treatment plan will be reviewed when goals have been changed or treatment is extended.       MeasurableTreatment Goal(s) related to diagnosis / functional impairment(s)  Goal 1: Patient will decrease symptoms of anxiety and/or depression as evidenced by reductions in GAD7 and PHQ9 scores, as well as self-report.    Objective #A     Patient will develop and/or increase self-awareness, build capacity for emotional self-regulation and increase emotional resilience.  Status: Group participation began: February 18, 2025  Intervention(s)  Group therapy - development of supportive relationships with the opportunity to share ideas, provide mutual encouragement, and to practice social skills.  Psycho-education regarding specific skills useful to client in current situation (i.e. assertiveness, communication, conflict management, problem-solving, relaxation)  Discuss common cognitive distortions, identify them in patient's life  Explore ways to challenge, replace, and act against  these cognitions  Explore aspects of psychological flexibility: cognitive de-fusion, self-as-context/observing self, acceptance, present moment awareness, values, and committed action  Explore development and application of mindfulness and self-compassion skills    Objective #B  Patient will develop and/or improve relationship skills, including relationship-building skills, communication skills and the ability to handle conflict constructively.  Status: Group participation began: February 18, 2025  Intervention(s)  Group therapy - development of supportive relationships with the opportunity to share ideas, provide mutual encouragement, and to practice social skills.  Psycho-education regarding specific skills useful to client in current situation (i.e. assertiveness, communication, conflict management, problem-solving, relaxation)  Explore patient's history of relationships and how they impact present behaviors  Explore patterns in relationships that are effective or ineffective at helping patient reach their goals, find satisfying experience  Explore how to work with and make changes in these schemas and patterns  Discuss new patterns or behaviors to engage in for improved social functioning     Objective #C  Patient will identify important values in their life and explore ways to commit to behavioral activation around these values.   Status: Group participation began: February 18, 2025  Intervention(s)  Group therapy - development of supportive relationships with the opportunity to share ideas, provide mutual encouragement, and to practice social skills.  Psycho-education regarding specific skills useful to client in current situation (i.e. assertiveness, communication, conflict management, problem-solving, relaxation)  Identify important values in patient's life and discuss ways to commit to behavioral activation around these values  Identify barriers to meaningful activities and explore possible solutions to  these barriers  Explore new options for problem-solving, communication, managing stress, etc.       IKE McmanusSW

## 2025-03-27 ENCOUNTER — MYC MEDICAL ADVICE (OUTPATIENT)
Dept: PSYCHIATRY | Facility: CLINIC | Age: 60
End: 2025-03-27
Payer: COMMERCIAL

## 2025-03-27 DIAGNOSIS — F41.1 GENERALIZED ANXIETY DISORDER: Primary | ICD-10-CM

## 2025-03-27 NOTE — GROUP NOTE
Psychotherapy Group Note    PATIENT'S NAME: Marbella Hendrix  MRN:   [id number]  :   1965  ACCT. NUMBER: [id number]  DATE OF SERVICE: 3/18/25  START TIME:  2:00 PM  END TIME:  2:50 PM  FACILITATOR: Jyoti Blancas LICSW  TOPIC: MH Process Group   [facility]    NUMBER OF PARTICIPANTS: 7        Service Modality:  In-person    Session: 4a    DATA     Current / Ongoing Stressors and Concerns:  Patient reported feeling anxiety and sadness. Patient discussed working toward learning how to cope with her anxiety. Patient identified focusing on what was good as skills they will use to address their goal(s). Patient reported disorganization may be a barrier to working toward their goal(s) and/or addressing mental health symptoms.Patient discussed emotions and experiences with the treatment group.      Intervention:   ACT: Patient discussed situation, emotions and thoughts.  Patient explored committed action.   Explore development and application of self-compassion skills     ASSESSMENT: Current Emotional / Mental Status (status of significant symptoms):   Risk status (Self / Other harm or suicidal ideation)   Patient denies current fears or concerns for personal safety.   Patient denies current or recent suicidal ideation or behaviors.   Patient denies current or recent homicidal ideation or behaviors.   Patient denies current or recent self injurious behavior or ideation.   Patient denies other safety concerns.   Patient reports there has been no change in risk factors since their last session.     Patient reports there has been no change in protective factors since their last session.     A safety and risk management plan has been developed including: Patient consented to co-developed safety plan.  Safety and risk management plan was completed - see below.  Patient agreed to use safety plan should any safety concerns arise.  A  copy was given to the patient.     Appearance:   Appropriate    Eye Contact:   Good    Psychomotor Behavior: Normal    Attitude:   Cooperative    Orientation:   All   Speech    Rate / Production: Normal     Volume:  Normal    Mood:    Anxious    Affect:    Appropriate    Thought Content:  Clear    Thought Form:  Coherent  Logical    Insight:    Good        Diagnosis:  1. Generalized anxiety disorder    2. Attention deficit hyperactivity disorder (ADHD), unspecified ADHD type    3. MDD (major depressive disorder), recurrent episode, moderate (H)                PLAN: (Patient Tasks / Therapist Tasks / Other)  Continue weekly group participation      NOTE: Patient's Group Therapy Treatment Plan is located separately in the medical record.      IKE McmanusSW

## 2025-04-07 ENCOUNTER — MYC MEDICAL ADVICE (OUTPATIENT)
Dept: PSYCHIATRY | Facility: CLINIC | Age: 60
End: 2025-04-07
Payer: COMMERCIAL

## 2025-04-08 ENCOUNTER — LAB (OUTPATIENT)
Dept: LAB | Facility: CLINIC | Age: 60
End: 2025-04-08
Payer: COMMERCIAL

## 2025-04-08 DIAGNOSIS — E03.9 ACQUIRED HYPOTHYROIDISM: Primary | ICD-10-CM

## 2025-04-08 DIAGNOSIS — Z79.899 ENCOUNTER FOR LONG-TERM (CURRENT) USE OF MEDICATIONS: ICD-10-CM

## 2025-04-08 LAB
CHOLEST SERPL-MCNC: 164 MG/DL
EST. AVERAGE GLUCOSE BLD GHB EST-MCNC: 114 MG/DL
FASTING STATUS PATIENT QL REPORTED: YES
HBA1C MFR BLD: 5.6 % (ref 0–5.6)
HDLC SERPL-MCNC: 40 MG/DL
LDLC SERPL CALC-MCNC: 105 MG/DL
NONHDLC SERPL-MCNC: 124 MG/DL
TRIGL SERPL-MCNC: 94 MG/DL
TSH SERPL DL<=0.005 MIU/L-ACNC: 1.08 UIU/ML (ref 0.3–4.2)

## 2025-04-08 PROCEDURE — 83036 HEMOGLOBIN GLYCOSYLATED A1C: CPT

## 2025-04-08 PROCEDURE — 80061 LIPID PANEL: CPT

## 2025-04-08 PROCEDURE — 84443 ASSAY THYROID STIM HORMONE: CPT

## 2025-04-09 ENCOUNTER — NURSE TRIAGE (OUTPATIENT)
Dept: NURSING | Facility: CLINIC | Age: 60
End: 2025-04-09

## 2025-04-09 ENCOUNTER — DOCUMENTATION ONLY (OUTPATIENT)
Dept: SLEEP MEDICINE | Facility: CLINIC | Age: 60
End: 2025-04-09
Payer: COMMERCIAL

## 2025-04-09 ENCOUNTER — MYC MEDICAL ADVICE (OUTPATIENT)
Dept: PSYCHIATRY | Facility: CLINIC | Age: 60
End: 2025-04-09

## 2025-04-09 DIAGNOSIS — F41.1 GENERALIZED ANXIETY DISORDER: Primary | ICD-10-CM

## 2025-04-09 NOTE — PROGRESS NOTES
30 day Sleep therapy management telephone visit    Diagnostic AHI:    HST: 9.8        LEFT VOICE MESSAGE FOR PATIENT TO RETURN CALL      Objective measures: 14 day rolling measures   COMPLIANCE LEAK AHI AVERAGE USE IN MINUTES   0 % 66 1.9 31   GOAL >70% GOAL < 24 LPM GOAL <5 GOAL >240          Device settings:  CPAP MIN CPAP MAX EPR RESMED SOFT RESPONSE SETTING   5.0 cm  H20 12.0 cm  H20 THREE OFF         Patient has the following upcoming sleep appts:  Future Sleep Appointments         Provider Department    11/21/2025 2:00 PM (Arrive by 1:45 PM) Goltz, Bennett Ezra, PA-C [facility]            Replacement device: No  STM ordered by provider: Yes     Total time spent on accessing and  interpreting remote patient PAP therapy data  10 minutes    Total time spent counseling, coaching  and reviewing PAP therapy data with patient  0 minutes

## 2025-04-09 NOTE — TELEPHONE ENCOUNTER
Nurse Triage SBAR    Is this a 2nd Level Triage? NO    Situation: Severe anxiety    Background: Patient has a mental health provider    Assessment: Reporting very anxious since Sunday but today is worse, with shortness of breath all day. Patient says she is experiencing some confusion and speech issues.  Her  is with her.    Protocol Recommended Disposition:   Call  Now    Recommendation: Patient verbalizes understanding but declines to call 911 but says her  can take her to E.  She then asked for crisis line numbers and asked if if it was possible to get a message to her mental health provider.  Writer advised there may not be on-call providers available for mental health.  Writer did provide patient with [facility] crisis [facility] number.    Porsha Cole RN  [facility]  Reason for Disposition   Difficult to awaken or acting confused (e.g., disoriented, slurred speech)    Additional Information   Negative: SEVERE difficulty breathing (e.g., struggling for each breath, speaks in single words)   Negative: Bluish (or gray) lips or face now    Protocols used: Anxiety and Panic Attack-A-

## 2025-04-09 NOTE — TELEPHONE ENCOUNTER
Lorazepam not indicted on treatment plan or active medication list.     Date of Last Office Visit: 3/4/25  Date of Next Office Visit:  4/22/25  No shows since last visit: No  More than one patient-initiated cancellation (with reschedule) since last seen in clinic? No    []Medication refilled per  Medication Refill in Ambulatory Care  policy.  []Scope of Practice: refill request processed by LPN/MA  [x]Medication unable to be refilled by RN due to criteria not met as indicated below:    []Eligibility: has not had a provider visit within last 6 months   []Supervision: no future appointment; < 7 days before next appointment   []Compliance: no shows; cancellations; lapse in therapy   []Verification: order discrepancy; may need modification...   [] > 30-day supply request   []Advanced refill request: > 7 days before refill date   [x]Controlled medication   []Medication not included in policy   []Review: new med; med adjusted <= 30 days; safety alert; requires lab monitoring...   [x]Other: discontinued      Medication(s) requested:           Any Controlled Substance(s)? Yes   [place]  checked? N/A      Requested medication(s) verified as identical to current order? No: unable to pend. Not on active med list    Any lapse in adherence to medication(s) greater than 5 days? N/A     Additional action taken? routed encounter to provider for review.      Last visit treatment plan:     Treatment Plan:  Continue Effexor-XR/venlafaxine  mg daily for anxiety/mood.  Continue Lunesta 1-3 mg at bedtime as needed for sleep. Rx previously sent for the 2 mg tablets. Be sure to devote at least 8 hours to sleep after taking before operating motor vehicle or other heavy or sharp equipment. Do NOT mix with alcohol.   Decrease olanzapine to 15 mg at bedtime for sleep, mood, anxiety for about 5 days, then decrease to 10 mg nightly as tolerated.   Continue hydroxyzine 12.5-25 mg at bedtime as needed for sleep.   Have fasting labs completed  in next few weeks at any [facility] lab. Need to call your clinic ahead of time to schedule an appointment for lab due to limited hours and no walk-ins due to Covid-19 restrictions/changes.   Follow up with me in 6 weeks.  For scheduling needs you can call [phone].  You could also get a message to the nurses by calling the aforementioned phone number.  Continue all other cares per primary care provider.   Continue all other medications as reviewed per electronic medical record today.   Safety plan reviewed. To the Emergency Department as needed or call after hours crisis line at [phone] or [phone]. [place] Crisis Text Line. Text [place] to 103998 or Suicide LifeLine Chat: suicidepreventionlifeline.org/chat  Follow up with primary care provider as planned or for acute medical concerns.  Factor 14hart may be used to communicate with your provider, but this is not intended to be used for emergencies.    Any medication(s) require lab monitoring? No    LORY CHANG RN on 4/9/2025 at 4:09 PM

## 2025-04-10 RX ORDER — LORAZEPAM 0.5 MG/1
.25-.5 TABLET ORAL EVERY 6 HOURS PRN
Qty: 20 TABLET | Refills: 0 | Status: SHIPPED | OUTPATIENT
Start: 2025-04-10

## 2025-04-10 RX ORDER — VENLAFAXINE HYDROCHLORIDE 75 MG/1
CAPSULE, EXTENDED RELEASE ORAL
Qty: 90 CAPSULE | Refills: 0 | Status: SHIPPED | OUTPATIENT
Start: 2025-04-10

## 2025-04-10 NOTE — TELEPHONE ENCOUNTER
"1) Called and spoke with the patient.     She reports that since Sunday 4/6/25 she has had \"severe, crippling anxiety.\" Denies current chest pressure or SOB but did last have some this morning prior to therapy and since Sunday (4/6/25).   - Hard to breathe in and out and hard to stand up   - pressure on her chest, not pain just pressure   - Had acupuncture yesterday and had a hard time sitting still. The acupuncturist had to help her take deep breaths and calm down  - At times her heart feels like it's beating out of her chest  - feeling like she's going to die, or that she'll die in a car crash, or something bad is going to happen  - feels a little \"manicky\"  - feels like a \"caged animal.\"  - Difficulty sleeping since Sunday. Had to take Lunesta every night since Sunday.  - This morning had a thought of suicide - \"I should just kill myself because I can't get my brain to stop thinking and my body to stop\" Denied plan or intent and stated that she could never do that to her children. Denies SI at this time.  - Yesterday she called the triage nurse at her PCP's office and they told her to call 911. She didn't call and took 1mg clonazepam instead. It took the edge off a bit but she still had a lot of anxiety  - She had an appointment with her therapist at 12:00pm today and she is feeling calmer now. No Chest pressure, or difficulty breathing since the appointment.    2)  1.5 weeks ago she reduce her dose of Zyprexa from 15mg to 10mg. This feels almost exactly the way she felt back in September/October.     3) last night she increased her Zyprexa back up to 15mg. She went to bed an hour after taking it. Still woke up feeling anxious and hasn't noticed improvement. She does have Hydroxyzine prn but has only been taking it at night. If she becomes anxious again, she will take a prn dose.     4) Given chest pressure, SOB, and racing heart, RN advised patient to seek medical attention in the ER. She stated she was not " "going to call 911 or go in, and \"If I feel like this is medical, I have no problem calling 911, but I don't think this is medical.\"    5) Routing to provider high priority.     LORY CHANG RN on 4/10/2025 at 2:38 PM                "

## 2025-04-10 NOTE — TELEPHONE ENCOUNTER
Called and spoke with the patient. She was in the middle of a therapy session. She will call the clinic when her session is done - around 1:30pm    Advised her to ask to talk to the triage nurse working with Dr. Lewis.       LORY CHANG RN on 4/10/2025 at 12:10 PM

## 2025-04-10 NOTE — CONFIDENTIAL NOTE
9 minutes on phone with patient ending call at 3:03p    Pt feeling a little better currently compared to earlier. No major chest pain or shortness of breathe. Of note - pt  is a primary care physician and very supportive of patient. Discussed taking olanzapine 15 mg tonight and klonopin 1 mg as well to help with sleep since worked well last night. We will also start reducing venlafaxine since pt with another episode possibly related to ?bipolar (suspected mixed episode vs start of manic episode vs very severe anxiety). Continue to monitor. Will message recap to pt.     Christi Lewis DO on 4/10/2025 at 4:32 PM

## 2025-04-18 ASSESSMENT — ANXIETY QUESTIONNAIRES
5. BEING SO RESTLESS THAT IT IS HARD TO SIT STILL: MORE THAN HALF THE DAYS
IF YOU CHECKED OFF ANY PROBLEMS ON THIS QUESTIONNAIRE, HOW DIFFICULT HAVE THESE PROBLEMS MADE IT FOR YOU TO DO YOUR WORK, TAKE CARE OF THINGS AT HOME, OR GET ALONG WITH OTHER PEOPLE: SOMEWHAT DIFFICULT
5. BEING SO RESTLESS THAT IT IS HARD TO SIT STILL: MORE THAN HALF THE DAYS
3. WORRYING TOO MUCH ABOUT DIFFERENT THINGS: NEARLY EVERY DAY
3. WORRYING TOO MUCH ABOUT DIFFERENT THINGS: NEARLY EVERY DAY
2. NOT BEING ABLE TO STOP OR CONTROL WORRYING: NEARLY EVERY DAY
6. BECOMING EASILY ANNOYED OR IRRITABLE: SEVERAL DAYS
7. FEELING AFRAID AS IF SOMETHING AWFUL MIGHT HAPPEN: NEARLY EVERY DAY
1. FEELING NERVOUS, ANXIOUS, OR ON EDGE: NEARLY EVERY DAY
4. TROUBLE RELAXING: MORE THAN HALF THE DAYS
6. BECOMING EASILY ANNOYED OR IRRITABLE: SEVERAL DAYS
4. TROUBLE RELAXING: MORE THAN HALF THE DAYS
7. FEELING AFRAID AS IF SOMETHING AWFUL MIGHT HAPPEN: NEARLY EVERY DAY
8. IF YOU CHECKED OFF ANY PROBLEMS, HOW DIFFICULT HAVE THESE MADE IT FOR YOU TO DO YOUR WORK, TAKE CARE OF THINGS AT HOME, OR GET ALONG WITH OTHER PEOPLE?: SOMEWHAT DIFFICULT
GAD7 TOTAL SCORE: 17
2. NOT BEING ABLE TO STOP OR CONTROL WORRYING: NEARLY EVERY DAY
GAD7 TOTAL SCORE: 17
GAD7 TOTAL SCORE: 17
8. IF YOU CHECKED OFF ANY PROBLEMS, HOW DIFFICULT HAVE THESE MADE IT FOR YOU TO DO YOUR WORK, TAKE CARE OF THINGS AT HOME, OR GET ALONG WITH OTHER PEOPLE?: SOMEWHAT DIFFICULT
1. FEELING NERVOUS, ANXIOUS, OR ON EDGE: NEARLY EVERY DAY
GAD7 TOTAL SCORE: 17
IF YOU CHECKED OFF ANY PROBLEMS ON THIS QUESTIONNAIRE, HOW DIFFICULT HAVE THESE PROBLEMS MADE IT FOR YOU TO DO YOUR WORK, TAKE CARE OF THINGS AT HOME, OR GET ALONG WITH OTHER PEOPLE: SOMEWHAT DIFFICULT

## 2025-04-21 NOTE — PROGRESS NOTES
[facility]  4/22/25      Behavioral Health Clinician Progress Note    Patient Name: Marbella Hendrix           Service Type:  Individual      Service Location:   [facility] / Email (patient reached)     Session Start Time: 11am  Session End Time: 1120 am      Session Length: 16 - 37      Attendees: Client     Service Modality:  Video Visit:      Provider verified identity through the following two step process.  Patient provided:  Patient is known previously to provider    Telemedicine Visit: The patient's condition can be safely assessed and treated via synchronous audio and visual telemedicine encounter.      Reason for Telemedicine Visit: Services only offered telehealth    Originating Site (Patient Location): Patient's home    Distant Site (Provider Location): Provider Remote Setting- Home Office    Consent:  The patient/guardian has verbally consented to: the potential risks and benefits of telemedicine (video visit) versus in person care; bill my insurance or make self-payment for services provided; and responsibility for payment of non-covered services.     Patient would like the video invitation sent by:  My Chart    Mode of Communication:  Video Conference via [facility]    Distant Location (Provider):  Off-site    As the provider I attest to compliance with applicable laws and regulations related to telemedicine.    Visit Activities (Refresh list every visit): [facility] Only    Diagnostic Assessment Date: 9/25/24 Bao Elliott LP  Treatment Plan Review Date: 4/22/25  See Flowsheets for today's PHQ-9 and BERTHA-7 results  Previous PHQ-9:       12/10/2024    10:54 AM 3/19/2025    12:05 PM 3/24/2025     1:08 PM   PHQ-9 SCORE   PHQ-9 Total Score [facility] 9 (Mild depression) 7 (Mild depression)    PHQ-9 Total Score 9  7  6       Patient-reported    Proxy-reported     Previous BERTHA-7:       10/24/2024    11:08 AM 3/19/2025    12:06 PM 4/18/2025    12:47 PM   BERTHA-7 SCORE    Total Score 12 (moderate anxiety) 14 (moderate anxiety) 17 (severe anxiety)   Total Score 12  14  17        Patient-reported       DATA  Extended Session (60+ minutes): No  Interactive Complexity: No  Crisis: No  formerly [facility] Patient: No    Treatment Objective(s) Addressed in This Session:  Target Behavior(s): disease management/lifestyle changes reduce sx of anxiety    Anxiety: will experience a reduction in anxiety      Current Stressors / Issues:  MH update:  flew since last appt.  Had to have  sit with her.  Got sick and restless prior to flights.  Wasn't sleeping- was taking lunesta for 2 weeks, denies garrett.  Restless energy.  Back to baseline anxiety.  Generally kind of down.  Stresses:  flights are over, vacation in July  Appetite: n/a  Sleep: EVER.  Stopped lunestra.  Mostly okay.   Outpatient Provider updates: IOP step down groups. [facility]. Attends Maria Guadalupe.  Thinking about [facility] support groups.  SI/SIB/HI:  Denies current. Notes passive ideation during fearful flight.  Denies plan/intent.  Safety plan on file  BRAD:  Denies  Side effects/compliance:  Interventions:  [facility] engaged in discussion about flight and anxiety mgmt.  Flipping the script, ACT, using distraction, reassurance etc.  Most important:  Back to baseline anxiety.  Mood a bit down?    3/4  MH update:  Anxiety of flying.  Racing thoughts.  Don't fly until next week.  medication conf.   No longer euphoric.  Not depressed.  Feeling good overall, disappointed that euphoria can't remain.   had 3 stents put in.    Stresses: flight  Appetite:  increased hunger   Sleep: EVER, sleep study done, recs of CPAP-getting tomorrow.  Up to 8 hours.  Outpatient Provider updates: IOP step down groups. [facility]. Attends Maria Guadalupe.  Thinking about [facility] support groups.  SI/SIB/HI:  Denies current.  Intrusive thoughts at times. Denies any plan or intent.  BRAD:  Denies  Side  "effects/compliance:  Interventions:  [facility] engaged in discussion of flight anxiety and skills  Most important:  Ran out of effexor for a period and reduced dose but went back up .  Zyprexa is making her very hungry - weight gain and impulsive hunger.     1/7  MH update:  daily affirmations, exercise, meditations.  Concerns that  had heart attack EMS was called-he is okay.  Denies garrett concerns- pt presents with some concerns of non goal directed activities, elevated mood, decreased sleep, and slightly pressured speech. Cleaning house out project.  Son is home for winter break. Anxiety and grief gone.  Stresses:  presenting for [employer] tomorrow for future networking and job.   Appetite: n/a  Sleep:  not sleeping great.  Outpatient Provider updates: Completed IOP transitioning to step down unit.  [facility]. Attends Maria Guadalupe.  Thinking about [facility] support groups.  SI/SIB/HI:  Denies current!  BRAD:  Denies  Side effects/compliance:  Interventions:  [facility] engaged in discussing change of sx and on going maintaince of improvement of mood  Most important:  \"feels fucking great\".      12/10  MH update:  Feeling much better. Mildly anxious, back to baseline.  Functioning, getting tasks and ADL's done.  Stresses:  son is returning for 4 weeks for the holdays.  Appetite:  putting weight back on from Zyprexa  Sleep: Back to baseline.    Outpatient Provider updates: Nearing IOP completion next week.  Going to do step down program.  [facility]. Attends Maria Guadalupe.  Thinking about [facility] support groups.  SI/SIB/HI:  Fleeting and passive ideation  BRAD:  Denies  Side effects/compliance:  Interventions:  [facility] engaged in discussion about support options  Most important:  Forgot about metformin. Putting weight back on.   doing really well.     10/29  MH update:  Feeling better.  Processing grief of cat, son being at college.  Discussion of protection of feelings.  Sadness of son returning " "college.   Stresses:  looking at jobs, not applying  Appetite: n/a  Sleep: not sleeping only 1-2 a week instead of daily.  Outpatient Provider updates: Started IOP; [facility]  SI/SIB/HI:  Passive suicidal ideation, no plan/intent.  Fleeting. No previous attempts.  Safety plan on file.  BRAD:  Denies  Side effects/compliance:  Interventions:  [facility] engaged discussed grief and loss/modeling of emotions  Most important:  Very fearful of meds being taken away    10/2  MH update: back to working out.  Sadness, overwhelmed.  Feeling alienated.  Maybe going to family dinner.  Watch services online tomorrow.  Wasn't invited to a friends house as before.  Going to future services to build connection.  Anxiety is better.  Klonopin really helps.  More sadness, change of season.  Wants to work, but recognizes she can't due to need to do program.  Stresses: as above  Appetite: n/a  Sleep: sleeping 10-5, full night   Outpatient Provider updates: waitlisted for IOP maybe 2-3 weeks yet.  [facility]  SI/SIB/HI:  Passive suicidal ideation, no plan/intent.  Fleeting. No previous attempts.  Safety plan reviewed.  Future and goal oriented.  BRAD:  Denies  Side effects/compliance:  Interventions:  C discussed sense of building connection and loss along with areas for self care  Most important:  Sleeping better, fearful of Klonopin being taken away.  Anxiety better.  More depression.       update:  Klonopin has been helpful, started yesterday.  Slept a whole night last night.  Napped today.  Doesn't have the energy to exercise.  Panic was worsening.  Tried to see a friend up at the cabin, came home early- couldn't sleep in same room, dog overbearing, friend kept trying to \"fix her\".  Feels unsafe driving with the panic.  Feeling down, sadness.   Stresses:  Mom  in April.  Youngest son moved away to college ([place]).  Cat -spent significant money and time trying to save him.  " Loss of control, feels like she is always worried about his safety.  Other son, is really struggling with life functioning/ASD- tried being an .  Appetite: n/a  Sleep: Slept last night  Outpatient Provider updates: [facility]. DA update  for PHP/IOP  SI/SIB/HI:  Passive suicidal ideation, no plan/intent.  Fleeting. No previous attempts.  Safety plan reviewed.  Future and goal oriented.  BRAD:  Denies  Side effects/compliance:  Interventions:  [facility] engaged in discussion about levels of care  Most important:  Klonopin helps!      MH update:  Depression and anxiety worse.  Panic attacks regularly- taking Vistaril makes her fatigued.  Went to [facility], felt helpful.  Considering going back for support.  Stresses:  Mom  in April.  Youngest son moved away to college ([place]).  Cat -spent significant money and time trying to save him.  Loss of control, feels like she is always worried about his safety. Applying for jobs.  Other son, is really struggling with life functioning/ASD- tried being an .  Appetite:   Sleep: 2 nights a week, Zyprexa works 6-7 hours.  Other nights, only getting 4 hours with Ambien, Melatonin, Zyprexa.  About a month like this.  Outpatient Provider updates: Doing acupuncture. [facility].  SI/SIB/HI: Passive suicidal ideation, no plan/intent.  Fleeting. No previous attempts.  Safety plan reviewed.  Future and goal oriented.  BRAD:  Denies  Side effects/compliance:  Interventions:   [facility] reviewed safety plan and emergency services as needed.  Pt notes understanding and awareness.    Most important:  Not sleeping, not functioning.  Sx much worse.  Passive SI returned.      MH update:  Sx feel stable.  No acute depression/anxiety/panic.  Stresses:  Lots of stress with kiddos/spouse  Appetite: Weight gain-med side constantly hungry  Sleep: Denies  Outpatient Provider updates:   Dixie [facility]- taking a break.  Got a list of  "new therapist  SI/SIB/HI: Denies  BRAD: Denies  Side effects/compliance: N/a  Interventions:  [facility] offered space and validation for any concerns  Most important:  Feels stable.    1/2  MH update: Things are going well.  On the 5th med for anxiety.  Really like it.  Anxiety feels manageable.  Freak out daily but not every moment.  Denies any panic attacks.  Feeling behind the \"8 ball\".  [place] due to feeling uncomfortable from constipation.  Denies any overwhelming feelings of depression, hopelessness, worthlessness, etc. .  Stresses:  Holidays were fun, sad its over.  Got to see a friend from out of town.  Arthritic hand worse in the cold notes she needs to be wearing her hand brace.  Going to [employer], just completed prior to appt.   Unemployed hasn't been looking due to transportation of child.  Would like to work.  Appetite: Denies  Sleep: Feeling pleased.  Sleep much improved. Having dreams.    Outpatient Provider updates: Therapist once a month Dixie Boykin, appt next week.    SI/SIB/HI: Denies  BRAD: Denies  Preg: n/a  Side effects/compliance: constipated, uncomfortable and bloated.  Interventions: [facility] supported on going anxiety reduction skill use  Most important: Constipation side effects     Progress on Treatment Objective(s) / Homework:  Satisfactory progress - ACTION (Actively working towards change); Intervened by reinforcing change plan / affirming steps taken    Motivational Interviewing    MI Intervention: Co-Developed Goal: reduce on going sx of anxiety, Expressed Empathy/Understanding, Open-ended questions, and Reflections: simple and complex     Change Talk Expressed by the Patient: Desire to change Ability to change Activation Taking steps    Provider Response to Change Talk: A - Affirmed patient's thoughts, decisions, or attempts at behavior change and R - Reflected patient's change talk    Assessments completed prior to visit:    The following assessments were completed by patient for this " visit:  N/a    Care Plan review completed: Yes    Medication Review:  Changes to psychiatric medications, see updated Medication List in EPIC.     Medication Compliance:  Yes    Changes in Health Issues:   None reported    Chemical Use Review:   Substance Use: Chemical use reviewed, no active concerns identified      Tobacco Use: No current tobacco use.      Assessment: Current Emotional / Mental Status (status of significant symptoms):  Risk status (Self / Other harm or suicidal ideation)  Patient has had a history of suicidal ideation: reports a hx of ideation without specific planning/intent/action back in 2022  Patient denies current fears or concerns for personal safety.  Patient denies current or recent suicidal ideation or behaviors.  Patient denies current or recent homicidal ideation or behaviors.  Patient denies current or recent self injurious behavior or ideation.  Patient denies other safety concerns.  A safety and risk management plan has been developed including: Patient consented to co-developed safety plan.  A safety and risk management plan was completed.  Patient agreed to use safety plan should any safety concerns arise.  A copy was given to the patient.    Appearance:   Appropriate   Eye Contact:   Good   Psychomotor Behavior: Hyperactive   Attitude:   Cooperative   Orientation:   All  Speech   Rate / Production: Normal    Volume:  Normal   Mood:    Elevated  Normal   Affect:    Appropriate   Thought Content:  Clear   Thought Form:  Coherent  Logical   Insight:    Good     Diagnoses:  1. Mood disorder    2. Attention deficit hyperactivity disorder (ADHD), unspecified ADHD type    3. Generalized anxiety disorder          Collateral Reports Completed:  Communicated with: Dr Lewis    Plan: (Homework, other):  Patient was given information about behavioral services and encouraged to schedule a follow up appointment with the clinic [facility] as needed.  She was also given information about mental health  "symptoms and treatment options .  CD Recommendations: No indications of CD issues.     Rafaela Hassan, [facility]        Individual Treatment Plan    Patient's Name: Marbella Hendrix   YOB: 1965  Date of Creation: 9/11/24  Date Treatment Plan Last Reviewed/Revised: 4/22/25    DSM5 Diagnoses:   1. Generalized anxiety disorder    2. Attention deficit hyperactivity disorder (ADHD), unspecified ADHD type    3. Mood disorder (H)        Psychosocial / Contextual Factors: Relationship Concerns, Occupational Issues, and Interpersonal Concerns  PROMIS (reviewed every 90 days):   The following assessments were completed by patient for this visit:  PROMIS 10-Global Health (only subscores and total score):       1/2/2024    10:47 AM 4/2/2024     7:21 AM 9/7/2024    12:17 PM 9/23/2024    11:37 AM 10/11/2024     1:01 PM 12/10/2024    10:54 AM 4/18/2025    12:47 PM   PROMIS-10 Scores Only   Global Mental Health Score 14 13    13 9    9 8    8 7 13 12    Global Physical Health Score 13 14    14 12    12 12    12 13 16 15    PROMIS TOTAL - SUBSCORES 27 27    27 21    21 20    20 20 29 27        Patient-reported        Referral / Collaboration:  Referral to another professional/service is not indicated at this time..    Anticipated number of session for this episode of care: 6-9 sessions  Anticipation frequency of session: Monthly  Anticipated Duration of each session: 16-37 minutes  Treatment plan will be reviewed in 90 days or when goals have been changed.       MeasurableTreatment Goal(s) related to diagnosis / functional impairment(s)  Goal 1: Patient will reduce sx of anxiety   \"I will know I've met my goal when I can sleep and clearly think through my thoughts to use my skills.\"     Objective #A (Patient Action)    Patient will identify three distraction and diversion activities and use those activities to decrease level of anxiety    Status: Continued - Date(s):12/10/24, 4/22/25    Intervention(s)  [facility] will provide " support through CBT, MI, Acceptance and Commitment Therapy, Dialectic Behavioral Therapy and problem solving model to explore and overcome barriers.    Goal 2: Patient will manage concerns of safety    I will know I've met my goal when I can reduce suicidal ideation .      Objective #A (Patient Action)    Patient will use previously developed safety plan on file.  Status: Cont - Date: 9/11/24 , 12/10/24, 4/22/25    Intervention(s)  Therapist will provide support through CBT, MI, Acceptance and Commitment Therapy, Dialectic Behavioral Therapy and problem solving model to explore and overcome barriers.      Patient has reviewed and agreed to the above plan.    Written by  Rafaela Hassan, MARYC, C

## 2025-04-22 ENCOUNTER — VIRTUAL VISIT (OUTPATIENT)
Dept: PSYCHIATRY | Facility: CLINIC | Age: 60
End: 2025-04-22
Payer: COMMERCIAL

## 2025-04-22 ENCOUNTER — VIRTUAL VISIT (OUTPATIENT)
Dept: BEHAVIORAL HEALTH | Facility: CLINIC | Age: 60
End: 2025-04-22
Payer: COMMERCIAL

## 2025-04-22 ENCOUNTER — APPOINTMENT (OUTPATIENT)
Facility: CLINIC | Age: 60
End: 2025-04-22
Payer: COMMERCIAL

## 2025-04-22 VITALS — HEIGHT: 64 IN | BODY MASS INDEX: 36.05 KG/M2

## 2025-04-22 DIAGNOSIS — F41.1 GENERALIZED ANXIETY DISORDER: ICD-10-CM

## 2025-04-22 DIAGNOSIS — F90.9 ATTENTION DEFICIT HYPERACTIVITY DISORDER (ADHD), UNSPECIFIED ADHD TYPE: ICD-10-CM

## 2025-04-22 DIAGNOSIS — F90.9 ATTENTION DEFICIT HYPERACTIVITY DISORDER (ADHD), UNSPECIFIED ADHD TYPE: Primary | ICD-10-CM

## 2025-04-22 DIAGNOSIS — G47.00 INSOMNIA, UNSPECIFIED TYPE: ICD-10-CM

## 2025-04-22 DIAGNOSIS — F39 MOOD DISORDER: Primary | ICD-10-CM

## 2025-04-22 DIAGNOSIS — F39 MOOD DISORDER: ICD-10-CM

## 2025-04-22 PROCEDURE — 1126F AMNT PAIN NOTED NONE PRSNT: CPT | Performed by: PSYCHIATRY & NEUROLOGY

## 2025-04-22 PROCEDURE — 98006 SYNCH AUDIO-VIDEO EST MOD 30: CPT | Performed by: PSYCHIATRY & NEUROLOGY

## 2025-04-22 PROCEDURE — 90832 PSYTX W PT 30 MINUTES: CPT | Mod: 95 | Performed by: COUNSELOR

## 2025-04-22 RX ORDER — VENLAFAXINE HYDROCHLORIDE 37.5 MG/1
37.5 CAPSULE, EXTENDED RELEASE ORAL DAILY
Qty: 30 CAPSULE | Refills: 1 | Status: SHIPPED | OUTPATIENT
Start: 2025-04-22

## 2025-04-22 ASSESSMENT — PAIN SCALES - GENERAL: PAINLEVEL_OUTOF10: NO PAIN (0)

## 2025-04-22 NOTE — NURSING NOTE
Current patient location: [address] [place] [address] [facility] [address]    Is the patient currently in the state of [place]? YES    Visit mode: VIDEO    If the visit is dropped, the patient can be reconnected by:VIDEO VISIT: Text to cell phone:   Telephone Information:   Mobile [phone]       Will anyone else be joining the visit? YES: How would they like to receive their invitation? Send to e-mail: [email or web address]  (If patient encounters technical issues they should call [phone])    Are changes needed to the allergy or medication list? No    Are refills needed on medications prescribed by this physician? YES    Rooming Documentation:  Questionnaire(s) completed    Reason for visit: RECHECK    Dea LOVETT

## 2025-04-22 NOTE — PROGRESS NOTES
"Virtual Visit Details    Type of service:  Video Visit     Originating Location (pt. Location): {video visit patient location:272007::\"Home\"}  {PROVIDER LOCATION On-site should be selected for visits conducted from your clinic location or adjoining [facility] hospital, academic office, or other nearby [facility] building. Off-site should be selected for all other provider locations, including home:476013}  Distant Location (provider location):  {virtual location provider:010925}  Platform used for Video Visit: {Virtual Visit Platforms:296221::\"[employer]\"}  "

## 2025-04-22 NOTE — PROGRESS NOTES
"Telemedicine Visit: The patient's condition can be safely assessed and treated via synchronous audio and visual telemedicine encounter.      Reason for Telemedicine Visit: Patient has requested telehealth visit    Originating Site (Patient Location): Patient's home    Distant Location (provider location):  Off-Site    Consent:  The patient/guardian has verbally consented to: the potential risks and benefits of telemedicine (video visit) versus in person care; bill my insurance or make self-payment for services provided; and responsibility for payment of non-covered services.     Mode of Communication:  Video Conference via [email or web address]    As the provider I attest to compliance with applicable laws and regulations related to telemedicine.          Outpatient Psychiatric Progress Note    Name: Marbella Hendrix   : 1965                    Primary Care Provider: Vanessa Ladd MD   Therapist: [facility] down [facility]. [facility]. Attends [facility].  Thinking about [employer] support        PHQ-9 scores:      12/10/2024    10:54 AM 3/19/2025    12:05 PM 3/24/2025     1:08 PM   PHQ-9 SCORE   PHQ-9 Total Score MyChart 9 (Mild depression) 7 (Mild depression)    PHQ-9 Total Score 9  7  6       Patient-reported    Proxy-reported       BERTHA-7 scores:      10/24/2024    11:08 AM 3/19/2025    12:06 PM 2025    12:47 PM   BERTHA-7 SCORE   Total Score 12 (moderate anxiety) 14 (moderate anxiety) 17 (severe anxiety)   Total Score 12  14  17        Patient-reported       Patient Identification:  Patient is a 59 year old,   White Not  or  female  who presents for return visit with me.  Patient is currently unemployed. Patient attended the phone/video session with , Wally.  Patient prefers to be called: \"Marbella\".    Interim History:  I last saw Marbella Hendrix for outpatient psychiatry return visit on 3/4/2025. During that appointment, we:    Continue Effexor-XR/venlafaxine  mg " daily for anxiety/mood.  Continue Lunesta 1-3 mg at bedtime as needed for sleep. Rx previously sent for the 2 mg tablets. Be sure to devote at least 8 hours to sleep after taking before operating motor vehicle or other heavy or sharp equipment. Do NOT mix with alcohol.   Decrease olanzapine to 15 mg at bedtime for sleep, mood, anxiety for about 5 days, then decrease to 10 mg nightly as tolerated.   Continue hydroxyzine 12.5-25 mg at bedtime as needed for sleep.   Have fasting labs completed in next few weeks at any [facility] lab. Need to call your clinic ahead of time to schedule an appointment for lab due to limited hours and no walk-ins due to Covid-19 restrictions/changes.     4/22: Patient overall doing okay.  Reduced anxiety.  Mood maybe a little lower.  Not terrible.  Is currently taking Effexor- mg alternated every other day with 75 mg.  No major differences noted, although does report reduced anxiety and sleeping okay.  Currently taking 10 mg of olanzapine.  Think some of the reduced anxiety related to not having to fly on an airplane again until the summer.  No acute safety concerns.  No SI.  No problematic drug or alcohol use.  See [facility] note below for additional details.  Working to get used to CPAP machine.    Per [facility], Rafaela Hassan [facility], during today's team-based visit:  Current Stressors / Issues:  MH update:  flew since last appt.  Had to have  sit with her.  Got sick and restless prior to flights.  Wasn't sleeping- was taking lunesta for 2 weeks, denies garrett.  Restless energy.  Back to baseline anxiety.  Generally kind of down.  Stresses:  flights are over, vacation in July  Appetite: n/a  Sleep: EVER.  Stopped lunestra.  Mostly okay.   Outpatient Provider updates: [facility] step down groups. [facility]. Attends [facility].  Thinking about [employer] support groups.  SI/SIB/HI:  Denies current. Notes passive ideation during fearful flight.  Denies plan/intent.  Safety  plan on file  BRAD:  Denies  Side effects/compliance:  Interventions:  [facility] engaged in discussion about flight and anxiety mgmt.  Flipping the script, ACT, using distraction, reassurance etc.  Most important:  Back to baseline anxiety.  Mood a bit down?    Past Psychiatric Med Trials:  Psych Meds at Intake:  Paxil 20 mg daily - started when 29 years, has been up and down on the dose, on 10 mg dose mostly, last on 20 mg this past spring   Ambien 5 mg for insomnia - used for 4 nights  Ativan 0.5 mg for flying     Past Psych Meds:  Prozac for 1-2 months maybe when 27 yo    Psychiatric ROS:  Marbella Hendrix reports mood has been: See HPI above  Anxiety has been: See HPI above  Sleep has been: See HPI above  Rachna sxs: None  Psychosis sxs: None  ADHD/ADD sxs: see HPI above  PTSD sxs: NA  PHQ9 and GAD7 scores were reviewed today if completed.   Medication side effects: See HPI above  Current stressors include: Symptoms and see HPI above  Coping mechanisms and supports include: Family and Hobbies    Current medications include:   Current Outpatient Medications   Medication Sig Dispense Refill    acetaminophen (TYLENOL) 500 MG tablet Take 500-1,000 mg by mouth every 6 hours as needed for mild pain.      atorvastatin (LIPITOR) 20 MG tablet Take 1 tablet (20 mg) by mouth daily. 90 tablet 0    calcium carbonate (OS-ARA) 500 MG tablet Take 1 tablet by mouth daily.      cholecalciferol (VITAMIN D3) 25 mcg (1000 units) capsule Take 1 capsule (25 mcg) by mouth daily. 90 capsule 2    eszopiclone (LUNESTA) 2 MG tablet Take 1 tablet (2 mg) by mouth nightly as needed for sleep. 30 tablet 2    hydrOXYzine HCl (ATARAX) 25 MG tablet Take 0.5-1 tablets (12.5-25 mg) by mouth 2 times daily as needed for anxiety. 120 tablet 1    levothyroxine (SYNTHROID/LEVOTHROID) 125 MCG tablet Take 1 tablet (125 mcg) by mouth daily. 90 tablet 0    LORazepam (ATIVAN) 0.5 MG tablet Take 0.5-1 tablets (0.25-0.5 mg) by mouth every 6 hours as needed for  anxiety. 20 tablet 0    magic mouthwash suspension (diphenhydrAMINE, lidocaine, aluminum-magnesium & simethicone) Swish and spit 10 mLs in mouth every 4 hours as needed for mouth sores. 300 mL 1    melatonin 5 MG tablet Take 5 mg by mouth nightly as needed for sleep.      naproxen sodium 220 MG capsule Take 220 mg by mouth 2 times daily (with meals).      OLANZapine (ZYPREXA) 10 MG tablet Take 1 tablet (10 mg) by mouth at bedtime. 90 tablet 0    OLANZapine (ZYPREXA) 5 MG tablet Take 1 tablet (5 mg) by mouth daily as needed (sleep, mood, anxiety). 90 tablet 0    Omega-3 Fatty Acids (OMEGA-3 FISH OIL PO) Take 1 g by mouth daily      venlafaxine (EFFEXOR XR) 150 MG 24 hr capsule Take 1 capsule (150 mg) by mouth daily. 90 capsule 1    venlafaxine (EFFEXOR XR) 75 MG 24 hr capsule Take one cap (75 mg) by mouth every other day. The days you do not take 75 mg, take the 150 mg cap. 90 capsule 0     No current facility-administered medications for this visit.       The [place] Prescription Monitoring Program has been reviewed and there are no concerns about diversionary activity for controlled substances at this time.   04/10/2025 04/10/2025 1 Lorazepam 0.5 Mg Tablet 20.00 5 Al Bau [id number] Sup (1778) 0/0 2.00 [place]   01/16/2025 11/22/2024 1 Eszopiclone 2 Mg Tablet 60.00 60 Al Bau [id number] Juan (0432) 0/0 0.66 [place]   11/27/2024 11/22/2024 1 Eszopiclone 2 Mg Tablet 30.00 30 Al Bau [id number] Sup (2278) 0/2 0.66 [place]   11/15/2024 11/15/2024 1 Eszopiclone 2 Mg Tablet 15.00 15 Al Bau [id number] Sup (8178) 0/0 0.66 [place]     Recent Labs   Lab Test 10/04/24  0740 04/23/24  0738   CHOL 152 189   HDL 32* 34*   LDL 97 121*   TRIG 117 172*     Last Comprehensive Metabolic Panel:  Sodium   Date Value Ref Range Status   10/04/2024 142 135 - 145 mmol/L Final   10/22/2012 141 133 - 144 mmol/L Final     Potassium   Date Value Ref Range Status   10/04/2024 5.0 3.4 - 5.3 mmol/L Final   07/08/2022 4.4 3.4 -  5.3 mmol/L Final   10/22/2012 3.8 3.4 - 5.3 mmol/L Final     Chloride   Date Value Ref Range Status   10/04/2024 106 98 - 107 mmol/L Final   07/08/2022 108 94 - 109 mmol/L Final   10/22/2012 105 94 - 109 mmol/L Final     Carbon Dioxide   Date Value Ref Range Status   10/22/2012 25 20 - 32 mmol/L Final     Carbon Dioxide (CO2)   Date Value Ref Range Status   10/04/2024 24 22 - 29 mmol/L Final   07/08/2022 20 20 - 32 mmol/L Final     Anion Gap   Date Value Ref Range Status   10/04/2024 12 7 - 15 mmol/L Final   07/08/2022 10 3 - 14 mmol/L Final   10/22/2012 11 6 - 17 mmol/L Final     Glucose   Date Value Ref Range Status   10/04/2024 96 70 - 99 mg/dL Final   07/08/2022 93 70 - 99 mg/dL Final   07/01/2020 88 70 - 99 mg/dL Final     Comment:     Fasting specimen     Urea Nitrogen   Date Value Ref Range Status   10/04/2024 8.6 6.0 - 20.0 mg/dL Final   07/08/2022 18 7 - 30 mg/dL Final   10/22/2012 25 (H) 5 - 24 mg/dL Final     Creatinine   Date Value Ref Range Status   10/04/2024 0.84 0.51 - 0.95 mg/dL Final   08/28/2015 0.65 0.52 - 1.04 mg/dL Final     GFR Estimate   Date Value Ref Range Status   10/04/2024 81 >60 mL/min/1.73m2 Final     Comment:     eGFR calculated using 2021 CKD-EPI equation.   08/28/2015 >90  Non  GFR Calc   >60 mL/min/1.7m2 Final     Calcium   Date Value Ref Range Status   10/04/2024 9.7 8.8 - 10.4 mg/dL Final     Comment:     Reference intervals for this test were updated on 7/16/2024 to reflect our healthy population more accurately. There may be differences in the flagging of prior results with similar values performed with this method. Those prior results can be interpreted in the context of the updated reference intervals.   10/22/2012 9.2 8.5 - 10.4 mg/dL Final     Bilirubin Total   Date Value Ref Range Status   10/04/2024 0.3 <=1.2 mg/dL Final     Alkaline Phosphatase   Date Value Ref Range Status   10/04/2024 47 40 - 150 U/L Final     ALT   Date Value Ref Range Status    10/04/2024 25 0 - 50 U/L Final   2012 18.0 0.0 - 50.0 U/L Final     AST   Date Value Ref Range Status   10/04/2024 23 0 - 45 U/L Final   2012 25.0 0.0 - 45.0 U/L Final     Past Medical/Surgical History:  Past Medical History:   Diagnosis Date    Arthritis     My mom has it, my grandmother had it I have it    Diabetes (H)     My mom and brother have type II    Heavy menstrual period     Leiomyoma of uterus     s/p myomectomy    Mental disorder     anxiety    PONV (postoperative nausea and vomiting)     Surgical complication after  2002    Thyroid disease     Hypothroidism, 2nd half of     Uncomplicated asthma     My son has it, since he was born    Vesico-ureteral reflux     s/p repair      has a past medical history of Arthritis, Diabetes (H), Heavy menstrual period, Leiomyoma of uterus, Mental disorder, PONV (postoperative nausea and vomiting), Surgical complication (after  2002), Thyroid disease, Uncomplicated asthma, and Vesico-ureteral reflux.    She has no past medical history of Cancer (H), Cerebral infarction (H), Congestive heart failure (H), COPD (chronic obstructive pulmonary disease) (H), Depressive disorder, Heart disease, History of blood transfusion, or Hypertension.    Social History:  Reviewed. No changes to social history except as noted above in HPI.    Vital Signs:   None. This is phone/video visit.     Labs:  Most recent laboratory results reviewed:  Lab Results   Component Value Date    A1C 5.8 10/04/2024    A1C 5.9 2024    A1C 5.7 2022    A1C 5.7 2021    A1C 5.6 2019    A1C 5.5 2018    A1C 5.5 2017     Recent Labs   Lab Test 10/04/24  0740 24  0738   CHOL 152 189   HDL 32* 34*   LDL 97 121*   TRIG 117 172*       Review of Systems:  10 systems (general, cardiovascular, respiratory, eyes, ENT, endocrine, GI, , M/S, neurological) were reviewed. Most pertinent finding(s) is/are: Some chronic pain. The  remaining systems are all unremarkable.    Mental Status Examination (limited as this is by phone/video):  Appearance: Awake, alert, appears stated age, no acute distress, well-groomed   Attitude:  cooperative, pleasant  Motor: No notable psychomotor agitation, fidgeting with necklace some  Oriented to:  person, place, time, and situation  Attention Span and Concentration: Intact  Speech: Clear, coherent, normal volume, not pressured or rapid at all  Language: intact  Mood: ok  Affect: no elevation today, current presentation seems to be consistent with baseline  Associations:  no loose associations  Thought Process: linear and goal directed  Thought Content:  no evidence of acute suicidality or homicidal ideation, no evidence of psychotic thought, no auditory hallucinations present and no visual hallucinations present  Recent and Remote Memory:  Intact to interview. Not formally assessed. No amnesia.  Fund of Knowledge: appropriate  Insight: Good  Judgment:  intact, adequate for safety  Impulse Control:  intact    Suicide Risk Assessment:  Marbella Hendrix has history of passive thoughts of death but no acute suicidality or plan or intent to harm self-none today 4/22/2025.  See safety plan in chart.  Also see thorough assessment by [facility] during previous appointments.  Based on all available evidence including the factors cited above, Marbella Hendrix does not appear to be at imminent risk for self-harm, does not meet criteria for a 72-hr hold, and therefore remains appropriate for ongoing outpatient level of care.  A thorough assessment of risk factors related to suicide and self-harm have been reviewed and are noted above. The patient convincingly denies suicidality on several occasions. Local community safety resources reviewed for patient to use if needed. There was no deceit detected, and the patient presented in a manner that was believable.     DSM5 Diagnosis:  Attention-Deficit/Hyperactivity Disorder   314.01 (F90.9) Unspecified Attention -Deficit / Hyperactivity Disorder  Mood disorder, unspecified (suspicions/monitoring for bipolar II disorder)  300.02 (F41.1) Generalized Anxiety Disorder  Insomnia, unspecified    Medical comorbidities include:   Patient Active Problem List    Diagnosis Date Noted    Moderate episode of recurrent major depressive disorder (H) 03/20/2025     Priority: Medium    Elevated fasting glucose 05/01/2024     Priority: Medium    Anxiety 07/19/2023     Priority: Medium    Adjustment disorder with anxious mood 06/27/2021     Priority: Medium    Family history of diabetes mellitus 06/27/2021     Priority: Medium    CMC DJD(carpometacarpal degenerative joint disease), localized primary, left 11/18/2020     Priority: Medium    Dermatochalasis of both upper eyelids 11/17/2020     Priority: Medium     Added automatically from request for surgery [id number]      Involutional ptosis, acquired, bilateral 11/17/2020     Priority: Medium     Added automatically from request for surgery [id number]      Weakness of right hip 07/09/2020     Priority: Medium    Primary osteoarthritis of right hip 12/02/2019     Priority: Medium     Added automatically from request for surgery [id number]      Morbid obesity (H) 06/20/2018     Priority: Medium    Status post hysterectomy 08/02/2017     Priority: Medium    Sensation of plugged ear on both sides 07/12/2017     Priority: Medium    Acute post-operative pain 08/28/2015     Priority: Medium    Preventative health care 10/30/2012     Priority: Medium     Last pap NIL 2011; mammogram nl 2011; lipids abnl, needs annual f/u      Generalized anxiety disorder 09/26/2012     Priority: Medium     H/o panic attacks; currently controlled with paxil      Hyperlipidemia LDL goal <130 09/26/2012     Priority: Medium     Behavioral measures - avoiding statins while trying to conceive; not a candidate for red yeast rice (interactions with thyroid meds)      Acquired hypothyroidism  09/26/2012     Priority: Medium    Overweight 09/26/2012     Priority: Medium     On exercise plan, has been in weight watchers  Problem list name updated by automated process. Provider to review         Psychosocial & Contextual Factors: see HPI above    Assessment:  From Intake, 7/17/2023:  Marbella Hendrix is a 57-year-old female with past psychiatric history including anxiety, ADHD, depression, remote polysubstance abuse (in remission for decades) who presents today for psychiatric evaluation.  Patient recently with increased psychosocial stressors and acutely worsening anxiety and insomnia.  Patient most recently on Paxil and Ambien to manage symptoms.  Ambien used sparingly and did help break severe insomnia cycle recently.  Patient currently taking 10 mg of Paxil and has only ever been up to 20 mg historically.  Patient may be interested in a trial with something different than Paxil.  We also discussed further optimizing Paxil to 30 or 40 mg daily for more therapeutic trial before replacing the medication.  We did discuss Lexapro and Effexor-XR as possible alternatives.  Patient would like to discuss these options with her  who is an internist and her psychotherapist.  In the meantime, we discussed a trial with clonidine to help with sleep, anxiety, and ADHD symptoms.  Discussed risks and benefits of therapy.  Patient would like to move forward with a clonidine trial to help at bedtime as needed for sleep and a small dose during the day as needed for anxiety.  No acute safety concerns today.  No acute suicidality.  No problematic drug or alcohol use.     8/22/2023:  Patient with ongoing significant anxiety and mood related struggles.  Clonidine has not been helpful.  We will transition patient off paroxetine and onto venlafaxine.  We will continue to consider escitalopram as an option if venlafaxine ineffective or poorly tolerated.  Could also consider use of fluoxetine if paroxetine is difficult  to discontinue.  Discussed DBT therapy as a potential option to help improve symptoms.  Resources sent in [facility] message.  No acute suicidality.  No acute safety concerns.  No problematic drug or alcohol use.    10/3/2023:  Patient doing okay with transition to venlafaxine ER.  Has had some mild dizziness and headaches.  We will continue to optimize venlafaxine ER and patient will watch for worsening headaches and dizziness.  Her symptoms could be related to discontinuation of paroxetine.  Patient still struggling significantly in the evenings and with insomnia.  We will trial quetiapine at bedtime as needed for sleep.  Discussed risks and benefits of therapy including watching for any abnormal involuntary movements.  If patient continues on the medication, we will make sure to get updated lipid panel and fasting glucose.  Could also consider mirtazapine as an option.  No acute safety concerns.  No acute suicidality.  No problematic drug or alcohol use.    11/14/2023:  Patient continuing to work with sleep medicine with pending at home sleep study results.  Quetiapine has been helpful for sleep at just 12.5 mg at bedtime.  Does cause some significant cognitive clouding during the day.  Patient does feel benefits outweigh risks but is willing to trial olanzapine to see if gets similar benefit without such heavy cognitive effects.  Venlafaxine has been helpful for mood and anxiety symptoms.  Still could consider mirtazapine as an option for sleep as needed.  No acute safety concerns.  No SI.  No problematic drug or alcohol use.    1/2/2024:  Patient currently doing quite well on olanzapine.  Finds it more helpful and better tolerated than quetiapine.  Discussed risks and benefits and side effects to watch out for.  Mild constipation-encouraged to utilize MiraLAX and/or Dulcolax.  No acute safety concerns.  No SI.  No problematic drug or alcohol use.  No abnormal involuntary movements.    4/8/2024:  Apart from some of  the metabolic effects of olanzapine, patient otherwise quite stable.  Patient opts to try to address metabolic side effects of olanzapine with metformin.  Discussed there is evidence to support use of metformin to help treat and also to help decrease risk for metabolic syndrome from the medication use.  Discussed risks and benefits of metformin use.  Due to stability of mental health symptoms, psychiatric care be returned back to primary care provider.  Also encouraged primary care provider to continue to monitor metabolic status and need for metformin.  Patient continues to watch diet and stay active.  Updated labs ordered for primary care provider for baseline since starting metformin XR.  Last liver enzymes looked okay when checked in June 2023.  Patient also denies any abnormal involuntary movements.  No acute safety concerns.  No SI.  No problematic drug or alcohol use.    9/11/2024 (New episode of care initiated today after bounce-back):  Patient struggling more significantly with anxiety and insomnia.  Even up to 10 mg of olanzapine was not very helpful.  Will discontinue olanzapine and trial mirtazapine.  Ambien is effective and so patient instructed to take before bedtime nightly for 1-2 weeks to allow for reregulation of sleep schedule.  Could consider further optimization of Effexor-XR in the future as well.  No acute safety concerns.  No acute suicidality, although does have some passive fleeting thoughts with no plan or intent to harm self.  No problematic drug or alcohol use.  Psychotherapy encouraged.    9/24/2024:  Patient with severely worsening anxiety.  Started clonazepam scheduled since last visit.  This will be continued since to started yesterday.  Venlafaxine will be increased to 225 mg daily.  Patient scheduled for intake for PHP/IOP tomorrow.  Patient should not be traveling in her current condition and also to allow time for intensive outpatient programming.  Letter was provided due to  their upcoming travel plans.  No acute safety concerns.  No acute suicidality.  No problematic drug or alcohol use.    10/02/2024:  Patient doing a little bit better on decreased venlafaxine and daily clonazepam.  No changes today.  Patient will continue to monitor mood/depression symptoms  Hopefully will be able to start intensive outpatient programming soon.  No acute safety concerns.  No acute suicidality.  No problematic drug or alcohol use.    10/29/2024:  Overall continuing to feel improvement of symptoms.  Taking clonazepam 1.5 mg at bedtime for sleep.  Worried about not being able to have clonazepam and not sleeping again.  For now clonazepam will be continued.  Patient is agreeable to starting buspirone to see if it might further improve anxiety to at some point reduce reliance on clonazepam.  Encouraged to continue in IOP.  No acute safety concerns.  No SI.  No problematic drug or alcohol use.    12/10/2024:  Patient overall doing quite well.  He feels back to a baseline.  Still some ongoing anxiety but much more manageable and sleeping well.  Using olanzapine, hydroxyzine, and as needed Lunesta at bedtime to help with sleep.  Tolerating well but does have some increased appetite and weight gain from olanzapine.  Patient has not yet started metformin that was sent to help mitigate metabolic effects from olanzapine.  Patient also with slightly elevated hemoglobin A1c.  Patient will now start metformin.  May be a good candidate for GLP-1 injections, like tirzepatide.  No acute safety concerns.  No SI.  No problematic drug or alcohol use.    1/7/2025:  Patient appearing hypomanic today.  Discussed suspicions for bipolar disorder with both patient and .  Patient with history of episodes as an adolescent/young adult while abusing stimulants that seem consistent with manic episodes.   has noted episodes that seem consistent with mixed episodes of bipolar disorder.  Patient with most recent  suspected mixed episode.  Now suspicions for hypomania after coming off clonazepam and with stressor leading to lack of sleep.  We will increase olanzapine but may need to consider decreasing or discontinuing venlafaxine.  No acute safety concerns.  No SI.  No problematic drug or alcohol use.    1/21/2025:  Patient overall with some appropriately sustained elevation in mood.  Patient does not seem as elevated as last visit.  No signs of depression.  Less psychomotor agitation.  No longer with pressured speech.  Getting roughly 6 hours of sleep a night.  Discussed continuing to monitor sleep closely.  If patient with signs or symptoms of mixed episode or hypomania again in the future, we may need to consider decreasing, or finding an alternative to, the venlafaxine.  Patient will continue on olanzapine 10 mg at bedtime.  Could consider lamotrigine in the future if mood were to crash while on venlafaxine, or if venlafaxine again becomes too activating despite presence of olanzapine.  We will get updated fasting labs in about 1 month.  No acute safety concerns.  No suicidality.  No problematic drug or alcohol use.  Will continue to monitor weight.  No noted abnormal involuntary movements today on camera.  None noted by patient.    3/4/2025:  Patient overall feeling relatively stable.  No signs of garrett today.  No major depression.  Has been taking 20 mg of olanzapine at bedtime with increased appetite, weight gain, evening fatigue and sedation.  Discussed decreasing to 15 mg at bedtime for a few days at least before decreasing to 10 mg at bedtime.  Could still consider slightly reducing venlafaxine in the future, particularly if sleep trouble is still an issue when tapering down olanzapine.  Discussed I would advocate for patient to start GLP-1 injection, Zepbound, if primary care provider felt appropriate and was agreeable to prescribing.  No acute safety concerns.  No SI.  No problematic drug or alcohol use.  Fasting  labs ordered but not yet drawn.  Of note, patient will start treating sleep apnea-picks up CPAP tomorrow.    4/22/2025:  Patient overall doing okay.  We will continue to reduce dose of venlafaxine ER/Effexor-XR in the case it could be too activating for patient and worsening anxiety and sleep issues.  Continuing to monitor for bipolar spectrum illness.  Could consider replacing venlafaxine with low-dose fluoxetine or sertraline.  For now we will try away from noradrenergic medication.  Currently on olanzapine 10 mg and sleeping okay at bedtime.  Recent fasting labs unremarkable.  Patient hopeful to start tirzepatide soon.  No longer using Lunesta.  No acute safety concerns.  No SI.  No problematic drug or alcohol use.    Medication side effects and alternatives were reviewed. Health promotion activities recommended and reviewed today. All questions addressed. Education and counseling completed regarding risks and benefits of medications and psychotherapy options. Recommend therapy for additional support.     Treatment Plan:  Decrease Effexor-XR/venlafaxine ER:  Reduced to 75 mg daily for 10 days; then reduce to 75 mg alternated every other day with 37.5 mg for 10-14 days; then reduce to 37.5 mg daily until follow-up.  Please reach out if mood or anxiety symptoms start to worsen significantly during this taper.  Continue Lunesta 1-3 mg at bedtime as needed for sleep. Rx previously sent for the 2 mg tablets. Be sure to devote at least 8 hours to sleep after taking before operating motor vehicle or other heavy or sharp equipment. Do NOT mix with alcohol. Do NOT take with Ativan.   Continue lorazepam/Ativan 0.5 mg daily as needed for severe anxiety, fear of flying. Do NOT take with Lunesta.   Continue olanzapine 10 mg at bedtime for sleep, mood, anxiety.  Continue hydroxyzine 12.5-25 mg at bedtime as needed for sleep.   Follow up with me in 6 weeks.  For scheduling needs you can call [phone].  You could also get a  message to the nurses by calling the aforementioned phone number.  Continue all other cares per primary care provider.   Continue all other medications as reviewed per electronic medical record today.   Safety plan reviewed. To the Emergency Department as needed or call after hours crisis line at [phone] or [phone]. [place] Crisis Text Line. Text [place] to 225718 or Suicide LifeLine Chat: suicidepreventionlifeline.org/chat  Follow up with primary care provider as planned or for acute medical concerns.  [employer] may be used to communicate with your provider, but this is not intended to be used for emergencies.    Have discussed risks for neuroleptic medication use (olanzapine/Zyprexa) including but not limited to possibility for movement disorders such as restlessness, abnormal and involuntary movements that could be lasting even after stopping the medication, muscle stiffness.  Also discussed risks for weight gain and cholesterol and blood sugar abnormalities and the need for monitoring.    Risks of benzodiazepine (Ativan, Xanax, Klonopin, Valium, etc) use including, but not limited to, sedation, tolerance, risk for addiction/dependence. Do not drink alcohol while taking benzodiazepines due to risk of trouble breathing and potential death. Do not drive or operate heavy machinery until it is known how the drug affects you. Discuss with physician or pharmacist before ever taking a benzodiazepine with a narcotic/opioid pain medication.     Administrative Billing:   Phone Call/Video Duration: 21 Minutes  Start: 11:32a  Stop: 11:53a    Patient Status:  Patient is a continuous care patient.     The longitudinal plan of care for the diagnosis(es)/condition(s) as documented were addressed during this visit. Due to the added complexity in care, I will continue to support [place] in the subsequent management and with ongoing continuity of care.    Signed:   Christi Lewis DO  [facility]    Disclaimer: This note  consists of symbols derived from keyboarding, dictation and/or voice recognition software. As a result, there may be errors in the script that have gone undetected. Please consider this when interpreting information found in this chart.

## 2025-04-22 NOTE — PATIENT INSTRUCTIONS
Treatment Plan:  Decrease Effexor-XR/venlafaxine ER:  Reduced to 75 mg daily for 10 days; then reduce to 75 mg alternated every other day with 37.5 mg for 10-14 days; then reduce to 37.5 mg daily until follow-up.  Please reach out if mood or anxiety symptoms start to worsen significantly during this taper.  Continue Lunesta 1-3 mg at bedtime as needed for sleep. Rx previously sent for the 2 mg tablets. Be sure to devote at least 8 hours to sleep after taking before operating motor vehicle or other heavy or sharp equipment. Do NOT mix with alcohol. Do NOT take with Ativan.   Continue lorazepam/Ativan 0.5 mg daily as needed for severe anxiety, fear of flying. Do NOT take with Lunesta.   Continue olanzapine 10 mg at bedtime for sleep, mood, anxiety.  Continue hydroxyzine 12.5-25 mg at bedtime as needed for sleep.   Follow up with me in 6 weeks.  For scheduling needs you can call [phone].  You could also get a message to the nurses by calling the aforementioned phone number.  Continue all other cares per primary care provider.   Continue all other medications as reviewed per electronic medical record today.   Safety plan reviewed. To the Emergency Department as needed or call after hours crisis line at [phone] or [phone]. [place] Crisis Text Line. Text [place] to 517190 or Suicide LifeLine Chat: suicidepreventionlifeline.org/chat  Follow up with primary care provider as planned or for acute medical concerns.  [employer] may be used to communicate with your provider, but this is not intended to be used for emergencies.    Have discussed risks for neuroleptic medication use (olanzapine/Zyprexa) including but not limited to possibility for movement disorders such as restlessness, abnormal and involuntary movements that could be lasting even after stopping the medication, muscle stiffness.  Also discussed risks for weight gain and cholesterol and blood sugar abnormalities and the need for monitoring.    Risks of  "benzodiazepine (Ativan, Xanax, Klonopin, Valium, etc) use including, but not limited to, sedation, tolerance, risk for addiction/dependence. Do not drink alcohol while taking benzodiazepines due to risk of trouble breathing and potential death. Do not drive or operate heavy machinery until it is known how the drug affects you. Discuss with physician or pharmacist before ever taking a benzodiazepine with a narcotic/opioid pain medication.     Patient Education   Collaborative Care Psychiatry Service  What to Expect  Here's what to expect from your Collaborative Care Psychiatry Service (CCPS).   About CCPS  CCPS means 2 people work together to help you get better. You'll meet with a behavioral health clinician and a psychiatric doctor. A behavioral health clinician helps people with mental health problems by talking with them. A psychiatric doctor helps people by giving them medicine.  How it works  At every visit, you'll see the behavioral health clinician (BHC) first. They'll talk with you about how you're doing and teach you how to feel better.   Then you'll see the psychiatric doctor. This doctor can help you deal with troubling thoughts and feelings by giving you medicine. They'll make sure you know the plan for your care.   CCPS usually takes 3 to 6 visits. If you need more visits, we may have you start seeing a different psychiatric doctor for ongoing care.  If you have any questions or concerns, we'll be glad to talk with you.  About visits  Be open  At your visits, please talk openly about your problems. It may feel hard, but it's the best way for us to help you.  Cancelling visits  If you can't come to your visit, please call us right away at [phone]. If you don't cancel at least 24 hours (1 full day) before your visit, that's \"late cancellation.\"  Being late to visits  Being very late is the same as not showing up. You will be a \"no show\" if:  Your appointment starts with a BHC, and you're more than " 15 minutes late for a 30-minute (half hour) visit. This will also cancel your appointment with the psychiatric doctor.  Your appointment is with a psychiatric doctor only, and you're more than 15 minutes late for a 30-minute (half hour) visit.  Your appointment is with a psychiatric doctor only, and you're more than 30 minutes late for a 60-minute (full hour) visit.  If you cancel late or don't show up 2 times within 6 months, we may end your care.   Getting help between visits  If you need help between visits, you can call us Monday to Friday from 8 a.m. to 4:30 p.m. at [phone].  Emergency care  Call 911 or go to the nearest emergency department if your life or someone else's life is in danger.  Call 718 anytime to reach the national Suicide and Crisis hotline.  Medicine refills  To refill your medicine, call your pharmacy. You can also call [facility]'s [facility] at [phone], Monday to Friday, 8 a.m. to 4:30 p.m. It can take 1 to 3 business days to get a refill.   Forms, letters, and tests  You may have papers to fill out, like FMLA, short-term disability, and workability. We can help you with these forms at your visits, but you must have an appointment. You may need more than 1 visit for this, to be in an intensive therapy program, or both.  Before we can give you medicine for ADHD, we may refer you to get tested for it or confirm it another way.  We may not be able to give you an emotional support animal letter.  We don't do mental health checks ordered by the court.   We don't do mental health testing, but we can refer you to get tested.   Thank you for choosing us for your care.  For informational purposes only. Not to replace the advice of your health care provider. Copyright   2022 [facility]. All rights reserved. Cognitive Code 933705 - Rev 11/24.
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
## 2025-04-30 ENCOUNTER — ALLIED HEALTH/NURSE VISIT (OUTPATIENT)
Dept: SURGERY | Facility: CLINIC | Age: 60
End: 2025-04-30
Payer: COMMERCIAL

## 2025-04-30 ENCOUNTER — OFFICE VISIT (OUTPATIENT)
Dept: SURGERY | Facility: CLINIC | Age: 60
End: 2025-04-30
Payer: COMMERCIAL

## 2025-04-30 VITALS
BODY MASS INDEX: 34.31 KG/M2 | HEIGHT: 64 IN | HEART RATE: 85 BPM | DIASTOLIC BLOOD PRESSURE: 72 MMHG | RESPIRATION RATE: 16 BRPM | SYSTOLIC BLOOD PRESSURE: 120 MMHG | WEIGHT: 201 LBS | OXYGEN SATURATION: 97 %

## 2025-04-30 DIAGNOSIS — G47.33 MODERATE OBSTRUCTIVE SLEEP APNEA: ICD-10-CM

## 2025-04-30 DIAGNOSIS — E66.9 OBESITY (BMI 30-39.9): ICD-10-CM

## 2025-04-30 DIAGNOSIS — E66.9 OBESITY (BMI 30-39.9): Primary | ICD-10-CM

## 2025-04-30 DIAGNOSIS — E78.5 HYPERLIPIDEMIA LDL GOAL <130: ICD-10-CM

## 2025-04-30 DIAGNOSIS — E66.09 CLASS 1 OBESITY DUE TO EXCESS CALORIES WITH SERIOUS COMORBIDITY AND BODY MASS INDEX (BMI) OF 34.0 TO 34.9 IN ADULT: Primary | ICD-10-CM

## 2025-04-30 DIAGNOSIS — E66.811 CLASS 1 OBESITY DUE TO EXCESS CALORIES WITH SERIOUS COMORBIDITY AND BODY MASS INDEX (BMI) OF 34.0 TO 34.9 IN ADULT: Primary | ICD-10-CM

## 2025-04-30 RX ORDER — CLONAZEPAM 0.5 MG/1
0.5 TABLET ORAL 2 TIMES DAILY PRN
COMMUNITY

## 2025-04-30 NOTE — PATIENT INSTRUCTIONS
"Jonnie Tyson!        It was great meeting with you today! Here are some links to the handouts I referenced:        Protein Sources:  [email or web address]     Fiber Sources:  [email or web address]     My Plate:  https://www.choosemyplate.gov/        A helpful search term to type into [employer], [employer], etc is \"myplate examples.\" [myplate examples - Google Search]     Key points from today:  Eat 3 meals/day at consistent intervals  Shoot for 60-80g protein (20-30g/meal)  Aim for 25-35g fiber (5-10g/meal)  Eat slowly: 20-30 minutes per meal     Here is a summary of the goals that we discussed:     1. Eat a balanced breakfast within 1 hour of waking up, then have meals every 4-6 hours       2. Include at least 3 food groups at each meal  - Always protein, then select amidst fruits, vegetables and starches             Let's plan on following up in 1-2 months. This can be scheduled via our call center at . Of course, reach out sooner if you have any questions or concerns. Have a great week and welcome to the program!        Christi Polanco, RD, LD, [facility]  Clinical Dietitian   "

## 2025-04-30 NOTE — PATIENT INSTRUCTIONS
It was nice to talk with you today! Below is the plan discussed.-  KELECHI Martinez      Pt Instructions:  Start Zepbound  Inject 2.5 mg subcutaneously once weekly for 1-2 months   Then inject 5 mg injected subcutaneously once weekly    2. Labs ordered.  Please call [phone] to set up a lab appt.   Read about cash pay options below if insurance does not cover Zepbound.      Goals:  Eat within 1 hour of waking. Try to eat every 4-6 hours. Prioritize protein 1st and fruit/veggies/fiber 2nd.  Strive to drink 64oz water throughout the day.  Great job with exercise! The department of health recommends 150-300 minutes of activity per week and to strength train twice a week to preserve muscle.       Follow up:    Call [phone] to schedule next visit in August Michelle Palomino, NP  Make follow-up appointment with dieticians.                            Zepbound (Tirzepatide)    Zepbound (Tirzepatide): is an injectable prescription medicine recently FDA approved for adults with obesity or overweight with an additional condition affected by their weight.      It is given as a shot once a week. It activates the body's receptors for GIP (glucose-dependent insulinotropic polypeptide) and GLP-1 (glucagon-like peptide-1) receptor, two naturally occurring hormones that help tell the brain that you are full. It also works is by slowing down how quickly food leaves your stomach.     You will start to feel fernando more quickly, notice portion changes and eat less often between meals.  Patients are advised to eat slowly and purposefully. Give yourself less portions. You may find after starting this medication you have a new point of fullness. Maintain consistent eating schedule with meals +/- snacks and get in good hydration.    Dosing:  Initial dose: 2.5 mg injected subcutaneously once weekly for 4 weeks  Further dose changes can include: increase to 5 mg once weekly for 4 weeks, then 7.5 mg once weekly for 4 weeks, then 10 mg once  weekly for 4 weeks then 12.5 mg once weekly for 4 weeks, then 15 mg (maximum dose) once weekly.    Dose changes are based on how you are tolerating the current dose, what benefits you are seeing at the current dose and if improvement in effectiveness of the drug is needed. The goal is to find the dose that works best for you with the least amount of side effects. You may find you reach this at a lower dose than the maximum dose.     Side effects: Common side effects include nausea, Heartburn,diarrhea, constipation. This is worse when starting the medication and with dose dose escalation.  It does improve with time. Stay adequately hydrated and eat small portions to decrease the risk of side effects.     The risk of pancreatitis (inflammation of the pancreas) has been associated with this type of medication, but is very rare.  If you have had pancreatitis in the past, this medication may not be for you. If you experience persistent severe abdominal pain (sometimes radiating to the back potentially accompanied by vomiting and have a fever), stop the medication and contact your provider immediately for assessment. They will do a blood test to check for pancreatitis.       Caution:   Tirzepatide (Zepbound, Mounjaro) May decrease effectiveness of your oral birth control while starting and with dose adjustments.  Use backup forms of birth control if necessary.      For any questions or concerns please send a [email or web address] message to our team or call our weight management call center at [phone] during regular business hours.   Using Your Mounjaro/Zepbound Pen  Medicine (tirzepatide)    Storing your pens  Store your pens in the refrigerator until you are ready to use them. Don't let them freeze.  Your pen may be stored at room temperature for 21 days or fewer. Just make sure the temperature doesn't get higher than 86 or lower than 46 degrees Fahrenheit.   Protect the pens from light.  Never use any pens that have  .    Check your pen before use:  The liquid in the pen window should be clear or light yellow. Bubbles are okay to see.   Do not use the pen if you can see the window contains any specks, is cloudy, or has changed color.  Make sure you have the medication and dose your health care provider prescribed.    Getting ready to inject:   1. Wash and dry your hands well.  2. Make sure the counter you use to place your supplies on is clean.  3. Make sure your injection time will not be interrupted by children or pets.  4. Have alcohol wipes or alcohol and cotton balls available to clean the injection site.   5. Choose your injection site. It can be on your stomach, back of upper arms, or upper legs. Remember to change your injection site each time you inject. Try to be at least 1 inch away from the previous one. Stay at least 1 inch away from your belly button.       Inject your dose:  1. Each pen is prefilled with one dose of medicine.    2. Pull off the grey cap at the bottom of the pen. Throw it in the trash. Do not put the cap back on the pen.   3. Make sure your pen is in the locked position. You will find the lock at the top of the pen.   4. Clean the injection site with alcohol.   5. Place the clear part of the pen against your skin. Unlock the pen by turning it to the unlock  position at the top.   6. Press and hold the purple injection button at the top of the pen. Listen for 2 clicks. The last click tells you the injection has been completed.   7. This injection is given 1 day a week. If you need to change the day you inject, there needs to be at least 3 days or 72 hours between the two doses.  8. If you miss a dose, you may take the dose within 4 days or 96 hours of the missed dose.   9. Your injection may be best tolerated if given at night.     Disposing of your pens:  Dispose of your pens in a puncture-resistant container (hard plastic bottle) or Sharps container.  Check with the county you live in on  how to dispose of the container.  Do not recycle the container with used pens.       Of note, you may not be able to  your medication right away. It may require a prior authorization from your insurance company. This may take a week or more.       Out of Pocket GLP1a options:  Wegovy or Zepbound pens out of pocket cost (>$1000/month cash price without savings card)   Zepbound  Cost of any dose with savings card (link below to sign up): $650/month with card at any pharmacy   https://www.enrollment.[employer]tervention/enroll/checkEnrollment - Medicaid, Medicare or other government insurances cannot use savings cards  Wegovy   Cost of any dose with savings card (link below to sign up): $650/month with card at any pharmacy   https://www.[employer]/coverage-and-savings/save-on-wegovy.html - Medicaid, Medicare or other government insurances cannot use savings cards  Cost for any dose through [email or web address]: $499/month - This is [facility]'s mail order pharmacy   Terms and Conditions of Use  [facility]   Additional $5 per 10 pack of for administrations supplies (syringe/needles, etc)   Zepbound Vials through [facility] - vials only available in 2.5 mg, 5 mg, 7.5mg, 10 mg doses  [email or web address]  $349/month for 2.5mg vials  $499/month for 5mg vials   7.5 mg and 10 mg vials now available with as well for $499/month with regular refills (cost increases if refills not consistently filled at least every 45 days - see more info at [employer] website)  Additional $5 per month for administrations supplies (syringe/needles, etc)     Sublingual Semaglutide at [facility]  [facility] is now offering sublingual semaglutide. This is an alternative option for patients who may not have access to or insurance coverage for commercially available semaglutide products.  Currently, the FDA-approved forms of semaglutide  include:  Rybelsus (oral tablet) - approved for Type 2 Diabetes  Ozempic (injectable) - approved for Type 2 Diabetes  Wegovy (injectable) - approved for chronic weight management and cardiovascular risk reduction in certain populations  Unlike FDA-approved products, sublingual semaglutide has not been studied in clinical trials, so its effectiveness and safety in this form are not fully known. However, it may be a helpful alternative when other formulations are not accessible.    Sublingual means the medication is placed under the tongue, where it dissolves and is absorbed directly into the bloodstream. At [facility], sublingual semaglutide is made by combining commercially available Rybelsus tablets with a base called Submagna to create a liquid that can be absorbed under the tongue.    Compounding is permitted when a medication is not available in the needed form, like sublingual semaglutide, and a provider writes a prescription for a specific patient. [facility] follows strict safety and quality standards, including [place] Pharmacopeia (USP) 795 guidelines for non-sterile compounding. Since sublingual semaglutide is not available commercially, it is eligible for compounding under these guidelines.     Availability:  Sublingual semaglutide is expected to be available long term.    Dosing:  Available doses include 0.5 mg, 1 mg, 1.5 mg, 2 mg and 2.5 mg. Further dose escalation above 2.5 mg can be can discussed with your provider based on your individual response and if appropriate.     Sublingual Semaglutide 2 mg/mL   mg mL   0.5 mg 0.25 mL   1 mg 0.5 mL   1.5 mg 0.75 mL   2 mg 1 mL   2.5 mg 1.25 mL     New Start: Begin with 0.5 mg (0.25 mL) once daily for 2-4 weeks. If tolerated, increase to 1 mg (0.5 mL) once daily for at least 4 weeks. If necessary thereafter, dose can be increased by 0.5 mg (0.25 mL) every 4 weeks (e.g. 1.5 mg, then 2 mg, then 2.5 mg). Follow the  dosing and escalation recommendations prescribed by your provider. Do not escalate dosing further than prescribed by your provider.     From injectable Semaglutide: There are no official studies comparing injectable and sublingual semaglutide doses. If you are switching from injectable to sublingual semaglutide, your provider will help choose the most appropriate dose for you.    Administration:  Sublingual semaglutide is administered once daily. Flavor is spearmint similar to [place] double mint gum or spearmint toothpaste.     Shake well before each use (product has a thick, honey-like consistency)  Use syringe to draw your dose from bottle   Place medication under your tongue and hold under tongue a long as possible (at least 60-90 seconds), then swallow  If the volume feels like too much, you may split the dose into two parts, taken 5-10 minutes apart  Avoid eating, drinking, or smoking for 30 minutes afterward    Helpful Routine Tip: Brush your teeth and rinse your mouth, take your medication, then shower or get ready for the day.    Storage:  Store at room temperature. The medication remains stable for 90 days.    Common Side Effects:  Side effects may be similar to FDA-approved semaglutide products (Rybelsus, Wegovy, Ozempic), though the sublingual version has not been formally studied. Common side effects include: nausea, diarrhea, constipation, headache, indigestion, tiredness (fatigue), stomach upset or abdominal pain. Less commonly, semaglutide can cause low blood sugar (symptoms: shaky, dizzy, sweaty, agitation). Contact your care team if side effects are bothersome or unmanageable or if you are concerned for low blood sugar.     Tips to reduce side effects:   Eat regular meals (don t skip meals)  Stop eating when feeling satiated/satisfied.  Stay hydrated--aim for at least 64 oz of water daily    Obtaining Medication From Pharmacy:   You will receive an automated call once the pharmacy receives your  prescription. You must call back and speak to a team member to confirm name, product, shipping, and cost. If you have not received a call within 3 business days, call the pharmacy directly.    [facility] Phone:  [phone]    Shipping available to: [place], [place], [place], [place], [place], [place], [place].     Managing Refills:  Call [phone]   Download the [employer] monica Download  Online:  [email or web address]    Cost:   0.5 mg and 1 mg doses  ~$180 for 30-day supply  ~$450 for 90-day supply   1.5 mg dose  ~$200-$300 for a 30-day supply   May have cost savings for 90 day supply  2 mg dose  ~$300 for a 30-day supply  May have cost savings for 90 day supply  Dosing higher than 2 mg will be determine by [facility]

## 2025-04-30 NOTE — ASSESSMENT & PLAN NOTE
4/30/2025 initial evaluation. Start Zepbound titration. We reviewed recommendations for muscle conservation including eating three meals daily, good protein intake, and strength training. Discussed possibility of GLP affecting serum concentration of Levothryoxine and to mention to PCP in case they want closer monitoring of Thyroid. Discussed mechanism of action, common side effects, titration guidelines, and discussed risks for pancreatitis/gallbladder problems/gastroparesis/low sugars/MTC/MEN2. Medication handout given in AVS. Discussed long term use and variable rate of weight loss.   If not approved by insurance, pt will look at cash-pay options for GLP1's.   Dietician appointment later today.
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
## 2025-05-01 VITALS — HEIGHT: 64 IN | WEIGHT: 201 LBS | BODY MASS INDEX: 34.31 KG/M2

## 2025-05-04 ENCOUNTER — MYC MEDICAL ADVICE (OUTPATIENT)
Dept: PSYCHIATRY | Facility: CLINIC | Age: 60
End: 2025-05-04
Payer: COMMERCIAL

## 2025-05-04 DIAGNOSIS — F41.1 GENERALIZED ANXIETY DISORDER: Primary | ICD-10-CM

## 2025-05-05 ASSESSMENT — ANXIETY QUESTIONNAIRES
1. FEELING NERVOUS, ANXIOUS, OR ON EDGE: NEARLY EVERY DAY
GAD7 TOTAL SCORE: 18
6. BECOMING EASILY ANNOYED OR IRRITABLE: SEVERAL DAYS
IF YOU CHECKED OFF ANY PROBLEMS ON THIS QUESTIONNAIRE, HOW DIFFICULT HAVE THESE PROBLEMS MADE IT FOR YOU TO DO YOUR WORK, TAKE CARE OF THINGS AT HOME, OR GET ALONG WITH OTHER PEOPLE: SOMEWHAT DIFFICULT
5. BEING SO RESTLESS THAT IT IS HARD TO SIT STILL: MORE THAN HALF THE DAYS
7. FEELING AFRAID AS IF SOMETHING AWFUL MIGHT HAPPEN: NEARLY EVERY DAY
GAD7 TOTAL SCORE: 18
3. WORRYING TOO MUCH ABOUT DIFFERENT THINGS: NEARLY EVERY DAY
8. IF YOU CHECKED OFF ANY PROBLEMS, HOW DIFFICULT HAVE THESE MADE IT FOR YOU TO DO YOUR WORK, TAKE CARE OF THINGS AT HOME, OR GET ALONG WITH OTHER PEOPLE?: SOMEWHAT DIFFICULT
GAD7 TOTAL SCORE: 18
2. NOT BEING ABLE TO STOP OR CONTROL WORRYING: NEARLY EVERY DAY
7. FEELING AFRAID AS IF SOMETHING AWFUL MIGHT HAPPEN: NEARLY EVERY DAY
4. TROUBLE RELAXING: NEARLY EVERY DAY

## 2025-05-05 ASSESSMENT — PATIENT HEALTH QUESTIONNAIRE - PHQ9
SUM OF ALL RESPONSES TO PHQ QUESTIONS 1-9: 20
SUM OF ALL RESPONSES TO PHQ QUESTIONS 1-9: 20
10. IF YOU CHECKED OFF ANY PROBLEMS, HOW DIFFICULT HAVE THESE PROBLEMS MADE IT FOR YOU TO DO YOUR WORK, TAKE CARE OF THINGS AT HOME, OR GET ALONG WITH OTHER PEOPLE: SOMEWHAT DIFFICULT

## 2025-05-05 NOTE — CONFIDENTIAL NOTE
04/10/2025 04/10/2025 1 Lorazepam 0.5 Mg Tablet 20.00 5 Al Bau [id number] Sup (6078) 0/0 2.00 [place]   01/16/2025 11/22/2024 1 Eszopiclone 2 Mg Tablet 60.00 60 Al Bau [id number] Juan (3451) 0/0 0.66 [place]   11/27/2024 11/22/2024 1 Eszopiclone 2 Mg Tablet 30.00 30 Al Bau [id number] Sup (5097) 0/2 0.66 [place]

## 2025-05-06 ENCOUNTER — OFFICE VISIT (OUTPATIENT)
Facility: CLINIC | Age: 60
End: 2025-05-06
Payer: COMMERCIAL

## 2025-05-06 DIAGNOSIS — F90.9 ATTENTION DEFICIT HYPERACTIVITY DISORDER (ADHD), UNSPECIFIED ADHD TYPE: ICD-10-CM

## 2025-05-06 DIAGNOSIS — F41.1 GENERALIZED ANXIETY DISORDER: Primary | ICD-10-CM

## 2025-05-06 DIAGNOSIS — F33.1 MDD (MAJOR DEPRESSIVE DISORDER), RECURRENT EPISODE, MODERATE (H): ICD-10-CM

## 2025-05-06 PROCEDURE — 90853 GROUP PSYCHOTHERAPY: CPT

## 2025-05-07 RX ORDER — VENLAFAXINE HYDROCHLORIDE 150 MG/1
150 CAPSULE, EXTENDED RELEASE ORAL DAILY
Qty: 30 CAPSULE | Refills: 1 | Status: SHIPPED | OUTPATIENT
Start: 2025-05-07

## 2025-05-08 ENCOUNTER — PATIENT OUTREACH (OUTPATIENT)
Dept: CARE COORDINATION | Facility: CLINIC | Age: 60
End: 2025-05-08
Payer: COMMERCIAL

## 2025-05-09 NOTE — GROUP NOTE
Psychotherapy Group Note    PATIENT'S NAME: Marbella Hendrix  MRN:   [id number]  :   1965  ACCT. NUMBER: [id number]  DATE OF SERVICE: 25  START TIME:  3:00 PM  END TIME:  3:50 PM  FACILITATOR: Jyoti Blancas LICSW  TOPIC: MH Process Group   [facility]  [facility]    NUMBER OF PARTICIPANTS: 6        Service Modality:  In-person    Session: 10a    DATA     Current / Ongoing Stressors and Concerns:  Patient reported feeling extremely anxious and panic. Patient discussed thinking about what she wants to do after she finishes group. Patient identified taking things one day at a time as skills they will use to address their goal(s). Patient reported concerns about son may be a barrier to working toward their goal(s) and/or addressing mental health symptoms.Patient discussed emotions and experiences with the treatment group.      Intervention:   ACT: Patient discussed situations, thoughts and emotions.  She explored committed action.   Identify barriers to meaningful activities and explore possible solutions to these barriers     ASSESSMENT: Current Emotional / Mental Status (status of significant symptoms):   Risk status (Self / Other harm or suicidal ideation)   Patient denies current fears or concerns for personal safety.   Patient denies current or recent suicidal ideation or behaviors.   Patient denies current or recent homicidal ideation or behaviors.   Patient denies current or recent self injurious behavior or ideation.   Patient denies other safety concerns.   Patient reports there has been no change in risk factors since their last session.     Patient reports there has been no change in protective factors since their last session.     A safety and risk management plan has been developed including: Patient consented to co-developed safety plan.  Safety and risk management plan was completed - see below.  Patient agreed to use safety plan should any  safety concerns arise.  A copy was given to the patient.     Appearance:   Appropriate    Eye Contact:   Good    Psychomotor Behavior: Normal    Attitude:   Cooperative    Orientation:   All   Speech    Rate / Production: Normal/ Responsive    Volume:  Normal    Mood:    Anxious    Affect:    Appropriate    Thought Content:  Clear    Thought Form:  Coherent  Logical    Insight:    Good        Diagnosis:  1. Generalized anxiety disorder    2. Attention deficit hyperactivity disorder (ADHD), unspecified ADHD type    3. MDD (major depressive disorder), recurrent episode, moderate (H)                PLAN: (Patient Tasks / Therapist Tasks / Other)  Continue weekly group participation      NOTE: Patient's Group Therapy Treatment Plan is located separately in the medical record.      IKE McmanusSW

## 2025-05-13 ENCOUNTER — OFFICE VISIT (OUTPATIENT)
Facility: CLINIC | Age: 60
End: 2025-05-13
Payer: COMMERCIAL

## 2025-05-13 DIAGNOSIS — F33.1 MDD (MAJOR DEPRESSIVE DISORDER), RECURRENT EPISODE, MODERATE (H): ICD-10-CM

## 2025-05-13 DIAGNOSIS — F90.9 ATTENTION DEFICIT HYPERACTIVITY DISORDER (ADHD), UNSPECIFIED ADHD TYPE: ICD-10-CM

## 2025-05-13 DIAGNOSIS — F41.1 GENERALIZED ANXIETY DISORDER: Primary | ICD-10-CM

## 2025-05-13 PROCEDURE — 90853 GROUP PSYCHOTHERAPY: CPT

## 2025-05-19 NOTE — GROUP NOTE
Psychotherapy Group Note    PATIENT'S NAME: Marbella Hendrix  MRN:   [id number]  :   1965  ACCT. NUMBER: [id number]  DATE OF SERVICE: 25  START TIME:  3:00 PM  END TIME:  3:50 PM  FACILITATOR: Jyoti Blancas LICSW  TOPIC: MH EBP Group: Relationship Skills  [facility]  [facility]    NUMBER OF PARTICIPANTS: 7    Summary of Group / Topics Discussed:  Relationship Skills: Assertive Communication: Patients were provided with a general overview of assertive communication skills and how practicing assertive communication skills will assist patients in developing healthier and more effective relationships. Patients reviewed their current awareness on ability to practice assertive communication, ways to increase assertive communication, and identified/problem solved barriers to assertive communication.     Patient Session Goals / Objectives:  Identified and discussed patient individual challenges with communication  Presented and practiced effective communication skills in session  Assisted patients in implementing more effective communication skills in their relationships        Service Modality:  In-person  Session: 9b    Patient Participation / Response:  Patient was an active participant in the conversation, demonstrating engagement with the topic and application to life circumstances, while also supporting other group members.  Patient worked with group on how to engage in effective communication.     Diagnosis:  1. Generalized anxiety disorder    2. Attention deficit hyperactivity disorder (ADHD), unspecified ADHD type    3. MDD (major depressive disorder), recurrent episode, moderate (H)        PLAN: (Patient Tasks / Therapist Tasks / Other)  Patient will discharge        ZUHAIR Mcmanus [facility] Group Treatment Plan    Patient's Name: Marbella Lawton  Smieon  YOB: 1965    Date of Creation: February 18, 2025  Date Treatment Plan Last Reviewed/Revised: February 18, 2025    DSM5 Diagnoses: Attention-Deficit/Hyperactivity Disorder  314.01 (F90.9) Unspecified Attention -Deficit / Hyperactivity Disorder  Major Depressive Disorder, Recurrent Episode, moderate  300.02 (F41.1) Generalized Anxiety Disorder  Insomnia, unspecified  Psychosocial / Contextual Factors: , remote polysubstance abuse (in remission for decades) who presents today for psychiatric evaluation.  Patient recently with increased psychosocial stressors and acutely worsening anxiety and insomnia.     Referral / Collaboration:  Referral to another professional/service is not indicated at this time..    Anticipated number of session for this episode of care: 12 weeks (with possibility of extension per patient need and space in group)  Anticipation frequency of session: Weekly   Anticipated Duration of each session: 2 hours.  Treatment plan will be reviewed when goals have been changed or treatment is extended.       MeasurableTreatment Goal(s) related to diagnosis / functional impairment(s)  Goal 1: Patient will decrease symptoms of anxiety and/or depression as evidenced by reductions in GAD7 and PHQ9 scores, as well as self-report.    Objective #A     Patient will develop and/or increase self-awareness, build capacity for emotional self-regulation and increase emotional resilience.  Status: Group participation began: February 18, 2025  Intervention(s)  Group therapy - development of supportive relationships with the opportunity to share ideas, provide mutual encouragement, and to practice social skills.  Psycho-education regarding specific skills useful to client in current situation (i.e. assertiveness, communication, conflict management, problem-solving, relaxation)  Discuss common cognitive distortions, identify them in patient's life  Explore ways to challenge, replace, and act against  these cognitions  Explore aspects of psychological flexibility: cognitive de-fusion, self-as-context/observing self, acceptance, present moment awareness, values, and committed action  Explore development and application of mindfulness and self-compassion skills    Objective #B  Patient will develop and/or improve relationship skills, including relationship-building skills, communication skills and the ability to handle conflict constructively.  Status: Group participation began: February 18, 2025  Intervention(s)  Group therapy - development of supportive relationships with the opportunity to share ideas, provide mutual encouragement, and to practice social skills.  Psycho-education regarding specific skills useful to client in current situation (i.e. assertiveness, communication, conflict management, problem-solving, relaxation)  Explore patient's history of relationships and how they impact present behaviors  Explore patterns in relationships that are effective or ineffective at helping patient reach their goals, find satisfying experience  Explore how to work with and make changes in these schemas and patterns  Discuss new patterns or behaviors to engage in for improved social functioning     Objective #C  Patient will identify important values in their life and explore ways to commit to behavioral activation around these values.   Status: Group participation began: February 18, 2025  Intervention(s)  Group therapy - development of supportive relationships with the opportunity to share ideas, provide mutual encouragement, and to practice social skills.  Psycho-education regarding specific skills useful to client in current situation (i.e. assertiveness, communication, conflict management, problem-solving, relaxation)  Identify important values in patient's life and discuss ways to commit to behavioral activation around these values  Identify barriers to meaningful activities and explore possible solutions to  these barriers  Explore new options for problem-solving, communication, managing stress, etc.       IKE McmanusSW

## 2025-05-19 NOTE — GROUP NOTE
Psychotherapy Group Note    PATIENT'S NAME: Marbella Hendrix  MRN:   [id number]  :   1965  ACCT. NUMBER: [id number]  DATE OF SERVICE: 25  START TIME:  2:00 PM  END TIME:  2:50 PM  FACILITATOR: Jyoti Blancas LICSW  TOPIC: MH Process Group   [facility]  [facility]    NUMBER OF PARTICIPANTS: 7        Service Modality:  In-person    Session: 11a    DATA     Current / Ongoing Stressors and Concerns:  Patient reported feeling overwhelm and anxiety. Patient discussed concerns about watching her children make life decisions. Patient identified seeking support from group as skills they will use to address their goal(s). Patient reported being shutdown in her head may be a barrier to working toward their goal(s) and/or addressing mental health symptoms. Patient discussed emotions and experiences with the treatment group.      Intervention:   ACT: Patient discussed situations, thoughts and emotions with group.   Explore specific skills useful to client in current situation (i.e. assertiveness, communication, conflict management, problem-solving, relaxation)     ASSESSMENT: Current Emotional / Mental Status (status of significant symptoms):   Risk status (Self / Other harm or suicidal ideation)   Patient denies current fears or concerns for personal safety.   Patient denies current or recent suicidal ideation or behaviors.   Patient denies current or recent homicidal ideation or behaviors.   Patient denies current or recent self injurious behavior or ideation.   Patient denies other safety concerns.   Patient reports there has been no change in risk factors since their last session.     Patient reports there has been no change in protective factors since their last session.     A safety and risk management plan has been developed including: Patient consented to co-developed safety plan.  Safety and risk management plan was completed - see below.  Patient  agreed to use safety plan should any safety concerns arise.  A copy was given to the patient.     Appearance:   Appropriate    Eye Contact:   Good    Psychomotor Behavior: Normal    Attitude:   Cooperative    Orientation:   All   Speech    Rate / Production: Normal/ Responsive    Volume:  Normal    Mood:    Anxious    Affect:    Appropriate    Thought Content:  Clear    Thought Form:  Coherent  Logical    Insight:    Good        Diagnosis:  1. Generalized anxiety disorder    2. Attention deficit hyperactivity disorder (ADHD), unspecified ADHD type    3. MDD (major depressive disorder), recurrent episode, moderate (H)                PLAN: (Patient Tasks / Therapist Tasks / Other)  Patient will discharge from group      NOTE: Patient's Group Therapy Treatment Plan is located separately in the medical record.      IKE McmanusSW

## 2025-05-29 ENCOUNTER — PATIENT OUTREACH (OUTPATIENT)
Dept: CARE COORDINATION | Facility: CLINIC | Age: 60
End: 2025-05-29
Payer: COMMERCIAL

## 2025-05-30 ASSESSMENT — ANXIETY QUESTIONNAIRES
4. TROUBLE RELAXING: MORE THAN HALF THE DAYS
6. BECOMING EASILY ANNOYED OR IRRITABLE: NOT AT ALL
3. WORRYING TOO MUCH ABOUT DIFFERENT THINGS: NEARLY EVERY DAY
IF YOU CHECKED OFF ANY PROBLEMS ON THIS QUESTIONNAIRE, HOW DIFFICULT HAVE THESE PROBLEMS MADE IT FOR YOU TO DO YOUR WORK, TAKE CARE OF THINGS AT HOME, OR GET ALONG WITH OTHER PEOPLE: SOMEWHAT DIFFICULT
3. WORRYING TOO MUCH ABOUT DIFFERENT THINGS: NEARLY EVERY DAY
5. BEING SO RESTLESS THAT IT IS HARD TO SIT STILL: NEARLY EVERY DAY
1. FEELING NERVOUS, ANXIOUS, OR ON EDGE: NEARLY EVERY DAY
8. IF YOU CHECKED OFF ANY PROBLEMS, HOW DIFFICULT HAVE THESE MADE IT FOR YOU TO DO YOUR WORK, TAKE CARE OF THINGS AT HOME, OR GET ALONG WITH OTHER PEOPLE?: SOMEWHAT DIFFICULT
4. TROUBLE RELAXING: MORE THAN HALF THE DAYS
8. IF YOU CHECKED OFF ANY PROBLEMS, HOW DIFFICULT HAVE THESE MADE IT FOR YOU TO DO YOUR WORK, TAKE CARE OF THINGS AT HOME, OR GET ALONG WITH OTHER PEOPLE?: SOMEWHAT DIFFICULT
IF YOU CHECKED OFF ANY PROBLEMS ON THIS QUESTIONNAIRE, HOW DIFFICULT HAVE THESE PROBLEMS MADE IT FOR YOU TO DO YOUR WORK, TAKE CARE OF THINGS AT HOME, OR GET ALONG WITH OTHER PEOPLE: SOMEWHAT DIFFICULT
7. FEELING AFRAID AS IF SOMETHING AWFUL MIGHT HAPPEN: NEARLY EVERY DAY
5. BEING SO RESTLESS THAT IT IS HARD TO SIT STILL: NEARLY EVERY DAY
GAD7 TOTAL SCORE: 17
2. NOT BEING ABLE TO STOP OR CONTROL WORRYING: NEARLY EVERY DAY
GAD7 TOTAL SCORE: 17
1. FEELING NERVOUS, ANXIOUS, OR ON EDGE: NEARLY EVERY DAY
GAD7 TOTAL SCORE: 17
6. BECOMING EASILY ANNOYED OR IRRITABLE: NOT AT ALL
7. FEELING AFRAID AS IF SOMETHING AWFUL MIGHT HAPPEN: NEARLY EVERY DAY
GAD7 TOTAL SCORE: 17
2. NOT BEING ABLE TO STOP OR CONTROL WORRYING: NEARLY EVERY DAY

## 2025-06-03 NOTE — PROGRESS NOTES
[facility]iatry Services [facility]  6/4/25      Behavioral Health Clinician Progress Note    Patient Name: Marbella Hendrix           Service Type:  Individual      Service Location:   [facility] / Email (patient reached)     Session Start Time: 130pm  Session End Time: 155      Session Length: 16 - 37      Attendees: Client     Service Modality:  Video Visit:      Provider verified identity through the following two step process.  Patient provided:  Patient is known previously to provider    Telemedicine Visit: The patient's condition can be safely assessed and treated via synchronous audio and visual telemedicine encounter.      Reason for Telemedicine Visit: Services only offered telehealth    Originating Site (Patient Location): Patient's home    Distant Site (Provider Location): Provider Remote Setting- Home Office    Consent:  The patient/guardian has verbally consented to: the potential risks and benefits of telemedicine (video visit) versus in person care; bill my insurance or make self-payment for services provided; and responsibility for payment of non-covered services.     Patient would like the video invitation sent by:  My Chart    Mode of Communication:  Video Conference via [employer]    Distant Location (Provider):  Off-site    As the provider I attest to compliance with applicable laws and regulations related to telemedicine.    Visit Activities (Refresh list every visit): [facility] Only    Diagnostic Assessment Date: 9/25/24 Bao Elliott LP  Treatment Plan Review Date: 4/22/25  See Flowsheets for today's PHQ-9 and BERTHA-7 results  Previous PHQ-9:       3/19/2025    12:05 PM 3/24/2025     1:08 PM 5/5/2025     3:18 PM   PHQ-9 SCORE   PHQ-9 Total Score MyChart 7 (Mild depression)  20 (Severe depression)   PHQ-9 Total Score 7  6 20        Patient-reported     Previous BERTHA-7:       4/18/2025    12:47 PM 5/5/2025     3:17 PM 5/30/2025     7:14 PM   BERTHA-7 SCORE   Total Score 17  "(severe anxiety) 18 (severe anxiety) 17 (severe anxiety)   Total Score 17  18  17        Patient-reported       DATA  Extended Session (60+ minutes): No  Interactive Complexity: No  Crisis: No  [facility] Patient: No    Treatment Objective(s) Addressed in This Session:  Target Behavior(s): disease management/lifestyle changes reduce sx of anxiety    Anxiety: will experience a reduction in anxiety      Current Stressors / Issues:   update:  \"everything is making me anxious\".  Taking  Klonopin daily.  A month ago increase in sx  Stresses:n/a  Appetite: reduced  Sleep: sleeping too little, broken.  Taking lunesta to sleep last night  Outpatient Provider updates: On going [facility]care group at [facility].  Recs' for DBT.  Anna Weiland at [facility] psychology   SI/SIB/HI:  passive suicidal ideation, denies plan/intent.  Denies previous attempt. Safety plan on file.  BRAD: Denies  Side effects/compliance:  Interventions:  [facility] directly supported and engaged in teaching and walking through TIP skills  Most important:  Starting DBT tomorrow at [facility].  Worries about \"being an addict\" with taking Klonpin.    4/22   update:  flew since last appt.  Had to have  sit with her.  Got sick and restless prior to flights.  Wasn't sleeping- was taking lunesta for 2 weeks, denies garrett.  Restless energy.  Back to baseline anxiety.  Generally kind of down.  Stresses:  flights are over, vacation in July  Appetite: n/a  Sleep: EVER.  Stopped lunestra.  Mostly okay.   Outpatient Provider updates: [facility] step down groups. [facility]. Attends [facility].  Thinking about [facility] support groups.  SI/SIB/HI:  Denies current. Notes passive ideation during fearful flight.  Denies plan/intent.  Safety plan on file  BRAD:  Denies  Side effects/compliance:  Interventions:  [facility] engaged in discussion about flight and anxiety mgmt.  Flipping the script, ACT, using distraction, reassurance etc.  Most important:  Back to baseline anxiety.  Mood a " "bit down?    3/4  MH update:  Anxiety of flying.  Racing thoughts.  Don't fly until next week.  medication conf.   No longer euphoric.  Not depressed.  Feeling good overall, disappointed that euphoria can't remain.   had 3 stents put in.    Stresses: flight  Appetite:  increased hunger   Sleep: EVER, sleep study done, recs of CPAP-getting tomorrow.  Up to 8 hours.  Outpatient Provider updates: [facility]. Attends [facility] Guadal[facility].  Thinking about [facility] support groups.  SI/SIB/HI:  Denies current.  Intrusive thoughts at times. Denies any plan or intent.  BRAD:  Denies  Side effects/compliance:  Interventions:  [facility] engaged in discussion of flight anxiety and skills  Most important:  Ran out of effexor for a period and reduced dose but went back up .  Zyprexa is making her very hungry - weight gain and impulsive hunger.     1/7  MH update:  daily affirmations, exercise, meditations.  Concerns that  had heart attack EMS was called-he is okay.  Denies garrett concerns- pt presents with some concerns of non goal directed activities, elevated mood, decreased sleep, and slightly pressured speech. Cleaning house out project.  Son is home for winter break. Anxiety and grief gone.  Stresses:  presenting for [employer] tomorrow for future networking and job.   Appetite: n/a  Sleep:  not sleeping great.  Outpatient Provider updates: Completed IOP transitioning to step down unit.  [facility]. Attends Maria Guadalupe.  Thinking about [facility] support groups.  SI/SIB/HI:  Denies current!  BRAD:  Denies  Side effects/compliance:  Interventions:  [facility] engaged in discussing change of sx and on going maintaince of improvement of mood  Most important:  \"feels fucking great\".      12/10  MH update:  Feeling much better. Mildly anxious, back to baseline.  Functioning, getting tasks and ADL's done.  Stresses:  son is returning for 4 weeks for the holdays.  Appetite:  putting " weight back on from Zyprexa  Sleep: Back to baseline.    Outpatient Provider updates: Nearing IOP completion next week.  Going to do step down program.  [facility]. Attends [facility] Guadalupe.  Thinking about [facility] support groups.  SI/SIB/HI:  Fleeting and passive ideation  BRAD:  Denies  Side effects/compliance:  Interventions:  C engaged in discussion about support options  Most important:  Forgot about metformin. Putting weight back on.  MH doing really well.     10/29   update:  Feeling better.  Processing grief of cat, son being at college.  Discussion of protection of feelings.  Sadness of son returning college.   Stresses:  looking at jobs, not applying  Appetite: n/a  Sleep: not sleeping only 1-2 a week instead of daily.  Outpatient Provider updates: Started IOP; [facility]  SI/SIB/HI:  Passive suicidal ideation, no plan/intent.  Fleeting. No previous attempts.  Safety plan on file.  BRAD:  Denies  Side effects/compliance:  Interventions:  [facility] engaged discussed grief and loss/modeling of emotions  Most important:  Very fearful of meds being taken away    10/2   update: back to working out.  Sadness, overwhelmed.  Feeling alienated.  Maybe going to family dinner.  Watch services online tomorrow.  Wasn't invited to a friends house as before.  Going to future services to build connection.  Anxiety is better.  Klonopin really helps.  More sadness, change of season.  Wants to work, but recognizes she can't due to need to do program.  Stresses: as above  Appetite: n/a  Sleep: sleeping 10-5, full night   Outpatient Provider updates: waitlisted for IOP maybe 2-3 weeks yet.  [facility]  SI/SIB/HI:  Passive suicidal ideation, no plan/intent.  Fleeting. No previous attempts.  Safety plan reviewed.  Future and goal oriented.  BRAD:  Denies  Side effects/compliance:  Interventions:  [facility] discussed sense of building connection and loss along with areas for self  "care  Most important:  Sleeping better, fearful of Klonopin being taken away.  Anxiety better.  More depression.      MH update:  Klonopin has been helpful, started yesterday.  Slept a whole night last night.  Napped today.  Doesn't have the energy to exercise.  Panic was worsening.  Tried to see a friend up at the cabin, came home early- couldn't sleep in same room, dog overbearing, friend kept trying to \"fix her\".  Feels unsafe driving with the panic.  Feeling down, sadness.   Stresses:  Mom  in April.  Youngest son moved away to college (Left Hand).  Cat -spent significant money and time trying to save him.  Loss of control, feels like she is always worried about his safety.  Other son, is really struggling with life functioning/ASD- tried being an .  Appetite: n/a  Sleep: Slept last night  Outpatient Provider updates: [facility]. DA update  for PHP/IOP  SI/SIB/HI:  Passive suicidal ideation, no plan/intent.  Fleeting. No previous attempts.  Safety plan reviewed.  Future and goal oriented.  BRAD:  Denies  Side effects/compliance:  Interventions:  [facility] engaged in discussion about levels of care  Most important:  Klonopin helps!       update:  Depression and anxiety worse.  Panic attacks regularly- taking Vistaril makes her fatigued.  Went to [facility], felt helpful.  Considering going back for support.  Stresses:  Mom  in April.  Youngest son moved away to college (Left Hand).  Cat -spent significant money and time trying to save him.  Loss of control, feels like she is always worried about his safety. Applying for jobs.  Other son, is really struggling with life functioning/ASD- tried being an .  Appetite:   Sleep: 2 nights a week, Zyprexa works 6-7 hours.  Other nights, only getting 4 hours with Ambien, Melatonin, Zyprexa.  About a month like this.  Outpatient Provider updates: Doing acupuncture. [facility].  SI/SIB/HI: " "Passive suicidal ideation, no plan/intent.  Fleeting. No previous attempts.  Safety plan reviewed.  Future and goal oriented.  BRAD:  Denies  Side effects/compliance:  Interventions:   [facility] reviewed safety plan and emergency services as needed.  Pt notes understanding and awareness.    Most important:  Not sleeping, not functioning.  Sx much worse.  Passive SI returned.    4/8  MH update:  Sx feel stable.  No acute depression/anxiety/panic.  Stresses:  Lots of stress with kiddos/spouse  Appetite: Weight gain-med side constantly hungry  Sleep: Denies  Outpatient Provider updates:   [facility]- taking a break.  Got a list of new therapist  SI/SIB/HI: Denies  BRAD: Denies  Side effects/compliance: N/a  Interventions:  [facility] offered space and validation for any concerns  Most important:  Feels stable.    1/2  MH update: Things are going well.  On the 5th med for anxiety.  Really like it.  Anxiety feels manageable.  Freak out daily but not every moment.  Denies any panic attacks.  Feeling behind the \"8 ball\".  [place]nis due to feeling uncomfortable from constipation.  Denies any overwhelming feelings of depression, hopelessness, worthlessness, etc. .  Stresses:  Holidays were fun, sad its over.  Got to see a friend from out of town.  Arthritic hand worse in the cold notes she needs to be wearing her hand brace.  Going to [employer], just completed prior to appt.   Unemployed hasn't been looking due to transportation of child.  Would like to work.  Appetite: Denies  Sleep: Feeling pleased.  Sleep much improved. Having dreams.    Outpatient Provider updates: Therapist once a month [facility], appt next week.    SI/SIB/HI: Denies  BRAD: Denies  Preg: n/a  Side effects/compliance: constipated, uncomfortable and bloated.  Interventions: [facility] supported on going anxiety reduction skill use  Most important: Constipation side effects     Progress on Treatment Objective(s) / Homework:  Satisfactory progress - ACTION (Actively working " towards change); Intervened by reinforcing change plan / affirming steps taken    Motivational Interviewing    MI Intervention: Co-Developed Goal: reduce on going sx of anxiety, Expressed Empathy/Understanding, Open-ended questions, and Reflections: simple and complex     Change Talk Expressed by the Patient: Desire to change Ability to change Activation Taking steps    Provider Response to Change Talk: A - Affirmed patient's thoughts, decisions, or attempts at behavior change and R - Reflected patient's change talk    Assessments completed prior to visit:    The following assessments were completed by patient for this visit:  N/a    Care Plan review completed: Yes    Medication Review:  Changes to psychiatric medications, see updated Medication List in EPIC.     Medication Compliance:  Yes    Changes in Health Issues:   None reported    Chemical Use Review:   Substance Use: Chemical use reviewed, no active concerns identified      Tobacco Use: No current tobacco use.      Assessment: Current Emotional / Mental Status (status of significant symptoms):  Risk status (Self / Other harm or suicidal ideation)  Patient has had a history of suicidal ideation: reports a hx of ideation without specific planning/intent/action back in 2022  Patient denies current fears or concerns for personal safety.  Patient denies current or recent suicidal ideation or behaviors.  Patient denies current or recent homicidal ideation or behaviors.  Patient denies current or recent self injurious behavior or ideation.  Patient denies other safety concerns.  A safety and risk management plan has been developed including: Patient consented to co-developed safety plan.  A safety and risk management plan was completed.  Patient agreed to use safety plan should any safety concerns arise.  A copy was given to the patient.    Appearance:   Appropriate   Eye Contact:   Good   Psychomotor Behavior: Hyperactive   Attitude:   Cooperative    Orientation:   All  Speech   Rate / Production: Normal    Volume:  Normal   Mood:    Elevated  Normal   Affect:    Appropriate   Thought Content:  Clear   Thought Form:  Coherent  Logical   Insight:    Good     Diagnoses:  1. Attention deficit hyperactivity disorder (ADHD), unspecified ADHD type    2. Mood disorder    3. Generalized anxiety disorder            Collateral Reports Completed:  Communicated with: Dr Lewis    Plan: (Homework, other):  Patient was given information about behavioral services and encouraged to schedule a follow up appointment with the clinic [facility] as needed.  She was also given information about mental health symptoms and treatment options .  CD Recommendations: No indications of CD issues.     Rafaela Hassan, [facility]        Individual Treatment Plan    Patient's Name: Marbella Hendrix   YOB: 1965  Date of Creation: 9/11/24  Date Treatment Plan Last Reviewed/Revised: 4/22/25    DSM5 Diagnoses:   1. Generalized anxiety disorder    2. Attention deficit hyperactivity disorder (ADHD), unspecified ADHD type    3. Mood disorder (H)        Psychosocial / Contextual Factors: Relationship Concerns, Occupational Issues, and Interpersonal Concerns  PROMIS (reviewed every 90 days):   The following assessments were completed by patient for this visit:  PROMIS 10-Global Health (only subscores and total score):       4/2/2024     7:21 AM 9/7/2024    12:17 PM 9/23/2024    11:37 AM 10/11/2024     1:01 PM 12/10/2024    10:54 AM 4/18/2025    12:47 PM 4/22/2025     5:19 PM   PROMIS-10 Scores Only   Global Mental Health Score 13    13 9    9 8    8 7 13 12  12    Global Physical Health Score 14    14 12    12 12    12 13 16 15  14    PROMIS TOTAL - SUBSCORES 27    27 21    21 20    20 20 29 27  26        Patient-reported        Referral / Collaboration:  Referral to another professional/service is not indicated at this time..    Anticipated number of session for this episode of care: 6-9  "sessions  Anticipation frequency of session: Monthly  Anticipated Duration of each session: 16-37 minutes  Treatment plan will be reviewed in 90 days or when goals have been changed.       MeasurableTreatment Goal(s) related to diagnosis / functional impairment(s)  Goal 1: Patient will reduce sx of anxiety   \"I will know I've met my goal when I can sleep and clearly think through my thoughts to use my skills.\"     Objective #A (Patient Action)    Patient will identify three distraction and diversion activities and use those activities to decrease level of anxiety    Status: Continued - Date(s):12/10/24, 4/22/25    Intervention(s)  [facility] will provide support through CBT, MI, Acceptance and Commitment Therapy, Dialectic Behavioral Therapy and problem solving model to explore and overcome barriers.    Goal 2: Patient will manage concerns of safety    I will know I've met my goal when I can reduce suicidal ideation .      Objective #A (Patient Action)    Patient will use previously developed safety plan on file.  Status: Cont - Date: 9/11/24 , 12/10/24, 4/22/25    Intervention(s)  Therapist will provide support through CBT, MI, Acceptance and Commitment Therapy, Dialectic Behavioral Therapy and problem solving model to explore and overcome barriers.      Patient has reviewed and agreed to the above plan.    Written by  Rafaela Hassan LPCC, [facility]     "

## 2025-06-04 ENCOUNTER — VIRTUAL VISIT (OUTPATIENT)
Dept: BEHAVIORAL HEALTH | Facility: CLINIC | Age: 60
End: 2025-06-04
Payer: COMMERCIAL

## 2025-06-04 ENCOUNTER — VIRTUAL VISIT (OUTPATIENT)
Dept: PSYCHIATRY | Facility: CLINIC | Age: 60
End: 2025-06-04
Payer: COMMERCIAL

## 2025-06-04 ENCOUNTER — TELEPHONE (OUTPATIENT)
Dept: PSYCHIATRY | Facility: CLINIC | Age: 60
End: 2025-06-04
Payer: COMMERCIAL

## 2025-06-04 DIAGNOSIS — F90.9 ATTENTION DEFICIT HYPERACTIVITY DISORDER (ADHD), UNSPECIFIED ADHD TYPE: ICD-10-CM

## 2025-06-04 DIAGNOSIS — Z79.899 ENCOUNTER FOR LONG-TERM (CURRENT) USE OF MEDICATIONS: ICD-10-CM

## 2025-06-04 DIAGNOSIS — F90.9 ATTENTION DEFICIT HYPERACTIVITY DISORDER (ADHD), UNSPECIFIED ADHD TYPE: Primary | ICD-10-CM

## 2025-06-04 DIAGNOSIS — F41.1 GENERALIZED ANXIETY DISORDER: ICD-10-CM

## 2025-06-04 DIAGNOSIS — F39 MOOD DISORDER: ICD-10-CM

## 2025-06-04 DIAGNOSIS — G47.00 INSOMNIA, UNSPECIFIED TYPE: ICD-10-CM

## 2025-06-04 DIAGNOSIS — F39 MOOD DISORDER: Primary | ICD-10-CM

## 2025-06-04 PROCEDURE — 90832 PSYTX W PT 30 MINUTES: CPT | Mod: 95 | Performed by: COUNSELOR

## 2025-06-04 PROCEDURE — 1126F AMNT PAIN NOTED NONE PRSNT: CPT | Mod: 95 | Performed by: PSYCHIATRY & NEUROLOGY

## 2025-06-04 PROCEDURE — 98006 SYNCH AUDIO-VIDEO EST MOD 30: CPT | Performed by: PSYCHIATRY & NEUROLOGY

## 2025-06-04 PROCEDURE — G2211 COMPLEX E/M VISIT ADD ON: HCPCS | Performed by: PSYCHIATRY & NEUROLOGY

## 2025-06-04 RX ORDER — ESZOPICLONE 2 MG/1
2 TABLET, FILM COATED ORAL
Qty: 30 TABLET | Refills: 2 | Status: SHIPPED | OUTPATIENT
Start: 2025-06-04

## 2025-06-04 ASSESSMENT — PAIN SCALES - GENERAL: PAINLEVEL_OUTOF10: NO PAIN (0)

## 2025-06-04 NOTE — CONFIDENTIAL NOTE
Connected with patient's therapist at about 3:15 PM on 6/4/2025. 17 min call.   Patient's therapist also encouraging DBT for patient.  Patient's therapist has also noted mood dysregulation and suspected manic episode this past January.  Overall, diagnostic clarity remains unclear to both therapist and this prescriber.  Will continue to monitor symptoms and we are both on the same page with patient starting DBT tomorrow.  Patient will likely be taking a break from therapist while in DBT.  Therapist given [phone] phone number to call with any concerns related to patient.  Therapist did confirm receiving mental health notes/records from [facility].    Christi Lewis DO on 6/4/2025 at 3:41 PM

## 2025-06-04 NOTE — NURSING NOTE
Current patient location: [address] [facility] [address]    Is the patient currently in the state of [place]? YES    Visit mode: VIDEO    If the visit is dropped, the patient can be reconnected by:VIDEO VISIT: Text to cell phone:   Telephone Information:   Mobile [phone]    and VIDEO VISIT: Send to e-mail at: [email or web address]    Will anyone else be joining the visit? NO  (If patient encounters technical issues they should call [phone]    Are changes needed to the allergy or medication list? No    Are refills needed on medications prescribed by this physician? NO    Rooming Documentation:  Questionnaire(s) completed    Reason for visit: RECHECK    Jasmin LOVETT

## 2025-06-04 NOTE — PATIENT INSTRUCTIONS
Treatment Plan:  Continue planned discontinuation of Effexor-XR.  Can start taking 37.5 mg every other day for about 10 days then discontinue.  Continue on sertraline 100 mg daily for anxiety, mood.  After at least 1 week on 100 mg daily, okay to increase to 150 mg daily.  Continue to monitor for worsening sleep, restlessness, anxiety, irritability, agitation.  Continue Lunesta 2 mg at bedtime as needed for sleep. Be sure to devote at least 8 hours to sleep after taking before operating motor vehicle or other heavy or sharp equipment. Do NOT mix with alcohol. Do NOT take with Ativan or clonazepam/Klonopin.  Continue Klonopin/clonazepam 0.5-1 mg twice daily as needed for severe anxiety, fear of flying. Do NOT take with Lunesta.   Continue olanzapine 10 mg at bedtime for sleep, mood, anxiety.  Continue hydroxyzine 12.5-25 mg at bedtime as needed for sleep.   Follow up with me in 3 weeks.  For scheduling needs you can call [phone].  You could also get a message to the nurses by calling the aforementioned phone number.  Continue with plans to start DBT.  Continue all other cares per primary care provider.   Continue all other medications as reviewed per electronic medical record today.   Safety plan reviewed. To the Emergency Department as needed or call after hours crisis line at [phone] or [phone]. [place] Crisis Text Line. Text [place] to 879982 or Suicide LifeLine Chat: suicidepreventionlifeline.org/chat  Follow up with primary care provider as planned or for acute medical concerns.  [place] may be used to communicate with your provider, but this is not intended to be used for emergencies.    Have discussed risks for neuroleptic medication use (olanzapine/Zyprexa) including but not limited to possibility for movement disorders such as restlessness, abnormal and involuntary movements that could be lasting even after stopping the medication, muscle stiffness.  Also discussed risks for weight gain and  "cholesterol and blood sugar abnormalities and the need for monitoring.    Risks of benzodiazepine (Ativan, Xanax, Klonopin, Valium, etc) use including, but not limited to, sedation, tolerance, risk for addiction/dependence. Do not drink alcohol while taking benzodiazepines due to risk of trouble breathing and potential death. Do not drive or operate heavy machinery until it is known how the drug affects you. Discuss with physician or pharmacist before ever taking a benzodiazepine with a narcotic/opioid pain medication.     Patient Education   Collaborative Care Psychiatry Service  What to Expect  Here's what to expect from your Collaborative Care Psychiatry Service (CCPS).   About CCPS  CCPS means 2 people work together to help you get better. You'll meet with a behavioral health clinician and a psychiatric doctor. A behavioral health clinician helps people with mental health problems by talking with them. A psychiatric doctor helps people by giving them medicine.  How it works  At every visit, you'll see the behavioral health clinician (BHC) first. They'll talk with you about how you're doing and teach you how to feel better.   Then you'll see the psychiatric doctor. This doctor can help you deal with troubling thoughts and feelings by giving you medicine. They'll make sure you know the plan for your care.   CCPS usually takes 3 to 6 visits. If you need more visits, we may have you start seeing a different psychiatric doctor for ongoing care.  If you have any questions or concerns, we'll be glad to talk with you.  About visits  Be open  At your visits, please talk openly about your problems. It may feel hard, but it's the best way for us to help you.  Cancelling visits  If you can't come to your visit, please call us right away at [phone]. If you don't cancel at least 24 hours (1 full day) before your visit, that's \"late cancellation.\"  Being late to visits  Being very late is the same as not showing up. You " "will be a \"no show\" if:  Your appointment starts with a C, and you're more than 15 minutes late for a 30-minute (half hour) visit. This will also cancel your appointment with the psychiatric doctor.  Your appointment is with a psychiatric doctor only, and you're more than 15 minutes late for a 30-minute (half hour) visit.  Your appointment is with a psychiatric doctor only, and you're more than 30 minutes late for a 60-minute (full hour) visit.  If you cancel late or don't show up 2 times within 6 months, we may end your care.   Getting help between visits  If you need help between visits, you can call us Monday to Friday from 8 a.m. to 4:30 p.m. at [phone].  Emergency care  Call 911 or go to the nearest emergency department if your life or someone else's life is in danger.  Call 988 anytime to reach the national Suicide and Crisis hotline.  Medicine refills  To refill your medicine, call your pharmacy. You can also call [facility]'s Behavioral Access at [phone], Monday to Friday, 8 a.m. to 4:30 p.m. It can take 1 to 3 business days to get a refill.   Forms, letters, and tests  You may have papers to fill out, like FMLA, short-term disability, and workability. We can help you with these forms at your visits, but you must have an appointment. You may need more than 1 visit for this, to be in an intensive therapy program, or both.  Before we can give you medicine for ADHD, we may refer you to get tested for it or confirm it another way.  We may not be able to give you an emotional support animal letter.  We don't do mental health checks ordered by the court.   We don't do mental health testing, but we can refer you to get tested.   Thank you for choosing us for your care.  For informational purposes only. Not to replace the advice of your health care provider. Copyright   2022 [facility]. All rights reserved. [employer] 269416 - Rev 11/24.     "

## 2025-06-04 NOTE — CONFIDENTIAL NOTE
Voicemail left for Dixie (pt therapist) at 12:15p on 6/4/25 at number for her cell phone ([phone]) listed in patient KEM. Left my cell number for therapist to contact me when able.     Christi Lewis, DO on 6/4/2025 at 12:22 PM

## 2025-06-05 ENCOUNTER — MYC MEDICAL ADVICE (OUTPATIENT)
Dept: PSYCHIATRY | Facility: CLINIC | Age: 60
End: 2025-06-05
Payer: COMMERCIAL

## 2025-06-12 ENCOUNTER — MYC MEDICAL ADVICE (OUTPATIENT)
Dept: PSYCHIATRY | Facility: CLINIC | Age: 60
End: 2025-06-12
Payer: COMMERCIAL

## 2025-06-12 DIAGNOSIS — F39 MOOD DISORDER: Primary | ICD-10-CM

## 2025-06-13 RX ORDER — LAMOTRIGINE 25 MG/1
TABLET ORAL
Qty: 42 TABLET | Refills: 0 | Status: SHIPPED | OUTPATIENT
Start: 2025-06-13

## 2025-06-13 RX ORDER — LAMOTRIGINE 100 MG/1
100 TABLET ORAL DAILY
Qty: 30 TABLET | Refills: 1 | Status: SHIPPED | OUTPATIENT
Start: 2025-07-11

## 2025-06-13 NOTE — TELEPHONE ENCOUNTER
Patient says she has had a couple of bad days with some suicidal ideation and anxiety.     She is wondering what you think about her diagnosis and if you think it is Bipolar 2?     Last visit:  I was able to connect with patient's therapist today.  Diagnostic clarity still a little unclear-severe/neurotic anxiety versus bipolar disorder versus axis II pathology versus ADHD/neurodivergence vs multiple etiologies.  No acute suicidality.

## 2025-07-07 ENCOUNTER — MYC MEDICAL ADVICE (OUTPATIENT)
Dept: FAMILY MEDICINE | Facility: CLINIC | Age: 60
End: 2025-07-07
Payer: COMMERCIAL

## 2025-07-07 DIAGNOSIS — M79.672 LEFT FOOT PAIN: Primary | ICD-10-CM

## 2025-07-07 NOTE — GROUP NOTE
"Process Group Note    PATIENT'S NAME: Marbella Hendrix  MRN:   [id number]  :   1965  ACCT. NUMBER: [id number]  DATE OF SERVICE: 24  START TIME:  1:00 PM  END TIME:  1:50 PM  FACILITATOR: Mary Winters LICSW  TOPIC:  Process Group    Diagnoses:  296.33 (F33.2) Major Depressive Disorder, Recurrent Episode, Severe _ and With anxious distress  300.02 (F41.1) Generalized Anxiety Disorder.    [facility]  TRACK: IOP/DT 3 55+    NUMBER OF PARTICIPANTS: 7        Data:    Session content: At the start of this group, patients were invited to check in by identifying themselves, describing their current emotional status, and identifying issues to address in this group.   Area(s) of treatment focus addressed in this session included Symptom Management, Personal Safety, and Community Resources/Discharge Planning.  Reported mood is \"agitated\".  Main stressor is her sister in law's porous boundaries.  She is hopeful her sister in law follows through on her plans to leave their home today.   Client denied safety concerns, including SI and SIB. Client denies any chemical use.  Client is taking medications as prescribed. Client's goal is to prepare her son's bedroom for his upcoming visit. Client is grateful for having the \"third good weekend in a row\".  She credits this to increased socialization (e.g. dinner with friends and quality time with siblings). Peers offered supportive feedback and validation around boundaries with her sister in law.    Therapeutic Interventions/Treatment Strategies:  Psychotherapist offered support, feedback and validation and reinforced use of skills. Treatment modalities used include Cognitive Behavioral Therapy and Dialectical Behavioral Therapy. Interventions include Relationship Skills: Encouraged development and maintenance  of healthy boundaries and Discussed strategies to promote healthier understanding of interpersonal " "relationships.    Assessment:    Patient response:   Patient responded to session by accepting feedback, giving feedback, listening, focusing on goals, being attentive, and accepting support    Possible barriers to participation / learning include: and no barriers identified    Health Issues:   None reported       Substance Use Review:   Substance Use: No active concerns identified.    Mental Status/Behavioral Observations  Appearance:   Appropriate   Eye Contact:   Good   Psychomotor Behavior: Normal   Attitude:   Cooperative  Interested Friendly Pleasant  Orientation:   All  Speech   Rate / Production: Normal    Volume:  Normal   Mood:    \"Agitated\"  Affect:    Appropriate   Thought Content:   Clear and Safety denies any current safety concerns including suicidal ideation, self-harm, and homicidal ideation  Thought Form:  Coherent  Logical     Insight:    Good     Plan:   Safety Plan: No current safety concerns identified.  Recommended that patient call 911 or go to the local ED should there be a change in any of these risk factors.   Barriers to treatment: None identified  Patient Contracts (see media tab):  None  Substance Use: Not addressed in session   Continue or Discharge: Patient will continue in 55+ Program (55+) as planned. Patient is likely to benefit from learning and using skills as they work toward the goals identified in their treatment plan.      Mary Winters, [facility]  November 25, 2024  " English

## 2025-07-08 ENCOUNTER — OFFICE VISIT (OUTPATIENT)
Dept: PODIATRY | Facility: CLINIC | Age: 60
End: 2025-07-08
Attending: FAMILY MEDICINE
Payer: COMMERCIAL

## 2025-07-08 VITALS — BODY MASS INDEX: 34.33 KG/M2 | WEIGHT: 200 LBS

## 2025-07-08 DIAGNOSIS — M79.672 LEFT FOOT PAIN: ICD-10-CM

## 2025-07-08 DIAGNOSIS — M72.2 PLANTAR FASCIITIS, LEFT: Primary | ICD-10-CM

## 2025-07-08 DIAGNOSIS — M25.572 SINUS TARSITIS, LEFT: ICD-10-CM

## 2025-07-08 PROCEDURE — 99203 OFFICE O/P NEW LOW 30 MIN: CPT | Performed by: PODIATRIST

## 2025-07-08 RX ORDER — METHYLPREDNISOLONE 4 MG/1
TABLET ORAL
Qty: 21 TABLET | Refills: 0 | Status: SHIPPED | OUTPATIENT
Start: 2025-07-08

## 2025-07-08 NOTE — TELEPHONE ENCOUNTER
Dr. Ladd,    Please see below MyChart message and advise.   Orthopod Referral pended, if approved.      Thanks,   Malika MOBLEY RN

## 2025-07-08 NOTE — PROGRESS NOTES
PATIENT HISTORY:  Dr. Ladd requested I see this patient for their foot issue.  Marbella Hendrix is a 59 year old female who presents to clinic for left heel pain of sudden onset and progression about 1.5 months ago. She has had plantar fasciitis in the past. She has been stretching, icing, and wearing heel cups with continued pain. The pain is sharp in the morning or after a seated rest that improves with the first couple of steps. She noted the pain start suddenly and persist. She is very anxious today     She is riding a bike and swimming and piliates currently for exercise. Too much pain with yoga.     Review of Systems:   ROS: 10 point ROS neg other than the symptoms noted above in the HPI.     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     Uncomplicated asthma     My son has it, since he was born    Vesico-ureteral reflux     s/p repair        PAST SURGICAL HISTORY:   Past Surgical History:   Procedure Laterality Date    ABDOMEN SURGERY      exploratory after C section for sepsis     SECTION      x 2    HYSTERECTOMY RADICAL  2015    HYSTERECTOMY SUPRACERVICAL N/A 2015    Procedure: HYSTERECTOMY SUPRACERVICAL;  Surgeon: Kelle Wsaserman MD;  Location: [facility] OR    LUMPECTOMY BREAST      MYOMECTOMY UTERUS      ORTHOPEDIC SURGERY  2020    3-18-1, Nancy date R hip replacement, March L meniscus repair    REPAIR PTOSIS Right     reconstruction of tear duct    REPAIR PTOSIS BILATERAL Bilateral 2020    Procedure: BILATERAL PTOSIS REPAIR, IRRIGATION OF LACRIMAL SYSTEM BILATERAL;  Surgeon:  Pako Sosa MD;  Location: [facility] OR    SOFT TISSUE SURGERY  4/1/2021    L meniscus tear repair        MEDICATIONS:   Current Outpatient Medications:     acetaminophen (TYLENOL) 500 MG tablet, Take 500-1,000 mg by mouth every 6 hours as needed for mild pain., Disp: , Rfl:     atorvastatin (LIPITOR) 20 MG tablet, Take 1 tablet (20 mg) by mouth daily., Disp: 90 tablet, Rfl: 0    calcium carbonate (OS-ARA) 500 MG tablet, Take 1 tablet by mouth daily., Disp: , Rfl:     cholecalciferol (VITAMIN D3) 25 mcg (1000 units) capsule, Take 1 capsule (25 mcg) by mouth daily., Disp: 90 capsule, Rfl: 2    clonazePAM (KLONOPIN) 1 MG tablet, Take 0.5-1 tablets (0.5-1 mg) by mouth 2 times daily as needed for anxiety, sleep or agitation. 60 tabs to last 30 days, Disp: 60 tablet, Rfl: 1    eszopiclone (LUNESTA) 2 MG tablet, Take 1 tablet (2 mg) by mouth nightly as needed for sleep., Disp: 30 tablet, Rfl: 2    hydrOXYzine HCl (ATARAX) 25 MG tablet, Take 0.5-1 tablets (12.5-25 mg) by mouth 2 times daily as needed for anxiety., Disp: 120 tablet, Rfl: 1    [START ON 7/11/2025] lamoTRIgine (LAMICTAL) 100 MG tablet, Take 1 tablet (100 mg) by mouth daily., Disp: 30 tablet, Rfl: 1    lamoTRIgine (LAMICTAL) 25 MG tablet, Take 25 mg by mouth daily for 2 weeks then 50 mg daily for 2 weeks then 100 mg daily., Disp: 42 tablet, Rfl: 0    levothyroxine (SYNTHROID/LEVOTHROID) 125 MCG tablet, Take 1 tablet (125 mcg) by mouth daily., Disp: 90 tablet, Rfl: 0    melatonin 5 MG tablet, Take 5 mg by mouth nightly as needed for sleep., Disp: , Rfl:     naproxen sodium 220 MG capsule, Take 220 mg by mouth 2 times daily (with meals)., Disp: , Rfl:     OLANZapine (ZYPREXA) 10 MG tablet, Take 1 tablet (10 mg) by mouth at bedtime., Disp: 90 tablet, Rfl: 0    Omega-3 Fatty Acids (OMEGA-3 FISH OIL PO), Take 1 g by mouth daily, Disp: , Rfl:     sertraline (ZOLOFT) 100 MG tablet, Take 100 mg by mouth daily., Disp: , Rfl:     tirzepatide-Weight Management (ZEPBOUND)  "2.5 MG/0.5ML prefilled pen, Inject 0.5 mLs (2.5 mg) subcutaneously every 7 days., Disp: 2 mL, Rfl: 1    tirzepatide-Weight Management (ZEPBOUND) 5 MG/0.5ML prefilled pen, Inject 0.5 mLs (5 mg) subcutaneously every 7 days., Disp: 2 mL, Rfl: 2     ALLERGIES:    Allergies   Allergen Reactions    Erythromycin Itching     Itching and swelling of lids    Itching, swelling of lids    Metformin Other (See Comments)     Aphthous Ulcer     Seasonal Allergies Itching and Other (See Comments)        SOCIAL HISTORY:   Social History     Socioeconomic History    Marital status:      Spouse name: Wally    Number of children: 2    Years of education: Not on file    Highest education level: Not on file   Occupational History    Not on file   Tobacco Use    Smoking status: Former     Current packs/day: 0.00     Types: Cigarettes     Quit date: 1990     Years since quittin.5     Passive exposure: Never    Smokeless tobacco: Never   Vaping Use    Vaping status: Never Used   Substance and Sexual Activity    Alcohol use: Not Currently     Comment: One or two drinks a month    Drug use: Not Currently     Types: Cocaine, Marijuana, Methamphetamines, Opiates, Amphetamines, Barbiturates, \"Crack\" cocaine, Codeine, Hashish, Hydrocodone, LSD, Morphine, Opium, Oxycodone     Comment: nothing since     Sexual activity: Yes     Partners: Male     Birth control/protection: None     Comment: No uterus, same partner 20 years   Other Topics Concern    Parent/sibling w/ CABG, MI or angioplasty before 65F 55M? No   Social History Narrative    Not on file     Social Drivers of Health     Financial Resource Strain: Low Risk  (2025)    Financial Resource Strain     Within the past 12 months, have you or your family members you live with been unable to get utilities (heat, electricity) when it was really needed?: No   Food Insecurity: Low Risk  (2025)    Food Insecurity     Within the past 12 months, did you worry that your " food would run out before you got money to buy more?: No     Within the past 12 months, did the food you bought just not last and you didn t have money to get more?: No   Transportation Needs: Low Risk  (6/22/2025)    Transportation Needs     Within the past 12 months, has lack of transportation kept you from medical appointments, getting your medicines, non-medical meetings or appointments, work, or from getting things that you need?: No   Physical Activity: Sufficiently Active (6/4/2024)    Exercise Vital Sign     Days of Exercise per Week: 6 days     Minutes of Exercise per Session: 60 min   Stress: Stress Concern Present (6/4/2024)    [employer] - Occupational Stress Questionnaire     Feeling of Stress : Rather much   Social Connections: Unknown (6/4/2024)    Social Connection and Isolation Panel [NHANES]     Frequency of Communication with Friends and Family: Not on file     Frequency of Social Gatherings with Friends and Family: Once a week     Attends Worship Services: Not on file     Active Member of Clubs or Organizations: Not on file     Attends Club or Organization Meetings: Not on file     Marital Status: Not on file   Interpersonal Safety: Low Risk  (12/20/2024)    Interpersonal Safety     Do you feel physically and emotionally safe where you currently live?: Yes     Within the past 12 months, have you been hit, slapped, kicked or otherwise physically hurt by someone?: No     Within the past 12 months, have you been humiliated or emotionally abused in other ways by your partner or ex-partner?: No   Housing Stability: Low Risk  (6/22/2025)    Housing Stability     Do you have housing? : Yes     Are you worried about losing your housing?: No        FAMILY HISTORY:   Family History   Problem Relation Age of Onset    Depression Mother     Diabetes Mother     Ulcerative Colitis Mother     Lipids Mother         hypertriglyceridemia    Anxiety Disorder Mother     Osteoporosis  Mother     High cholesterol Father     Cancer Father         CLL    Blood Disease Father         CLL    Glaucoma Father     Macular Degeneration Father     Breast Cancer Maternal Grandmother         in 80s    Cancer Maternal Grandmother     Osteoporosis Maternal Grandmother     Diabetes Brother     Anxiety Disorder Brother     Anxiety Disorder Sister         EXAM:Vitals: LMP  (LMP Unknown)   BMI= There is no height or weight on file to calculate BMI.    General appearance: Patient is alert and fully cooperative with history & exam.  No sign of distress is noted during the visit.     Psychiatric: Affect is pleasant & appropriate.  Patient appears motivated to improve health.     Respiratory: Breathing is regular & unlabored while sitting.     HEENT: Hearing is intact to spoken word.  Speech is clear.  No gross evidence of visual impairment that would impact ambulation.    Lower Extremity Focused:    Dermatologic: Supple, no openings.      Vascular: DP pulse 2/4 right 2/4 left PT pulse 2/4 right 2/4 left.  no edema, no varicosities noted.  CFT and skin temperature is normal to both lower extremities.     Neurologic: Lower extremity sensation is intact to light touch.  No evidence of weakness or contracture in the lower extremities.  No evidence of neuropathy.       Musculoskeletal: left pain with palpation to the proximal medial plantar fascial ligament at the insertion. no pain with palpation to the posterior tibial tendon, no pain with palpation to the achilles tendon, no pain with palpation to the peroneal tendons. left pain with palpation to the sinus tarsi, no pain with palpation to the anterior ankle gutters,  supple ankle range of motion, normal subtalar joint range of motion slight decrease arch height with stance.     Labs and Imaging: I have personally reviewed the following       ASSESSMENT:   Left plantar fasciitis  Left foot pain  Sinus tarsi syndrome left     Medical Decision Making/Plan:  Reviewed  patient's chart in epic.  The potential causes and nature of plantar fasciitis were discussed with the patient.  We reviewed the natural history/prognosis of the condition and risks if left untreated.  These include chronic pain, other sites of pain due to gait changes, and potential plantar fascial rupture. We discussed possible causes of the condition as it relates to the patients specific situation. Conservative treatment options were reviewed:  appropriate shoes, avoidance of barefoot walking, inserts/orthoses, stretching, ice, massage, immobilization and NSAIDs. We also reviewed the options of injection therapy and surgery.  However, it was made clear that surgery is only considered when conservative therapy fails.  The risks and benefits of injection therapy, and surgery were discussed.   After thorough discussion and answering all questions the patient elected to proceed physical therapy and a medrol dose pack  Prescription for a Medrol dose pack, take as directed   Follow-up in 8 weeks if pain persists we will consider a steroid injection, CAM boot immobilization.     Patient risk factor: low    All questions were answered to patients satisfaction and they will call with further questions or concerns.       Dang Joya DPM

## 2025-07-08 NOTE — LETTER
2025      Marbella Hendrix  [address] [facility] [address]      Dear Colleague,    Thank you for referring your patient, Mrabella Hendrix, to the [facility]. Please see a copy of my visit note below.    PATIENT HISTORY:  Dr. Ladd requested I see this patient for their foot issue.  Marbella Hendrix is a 59 year old female who presents to clinic for left heel pain of sudden onset and progression about 1.5 months ago. She has had plantar fasciitis in the past. She has been stretching, icing, and wearing heel cups with continued pain. The pain is sharp in the morning or after a seated rest that improves with the first couple of steps. She noted the pain start suddenly and persist. She is very anxious today     She is riding a bike and swimming and piliates currently for exercise. Too much pain with yoga.     Review of Systems:   ROS: 10 point ROS neg other than the symptoms noted above in the HPI.     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      Uncomplicated asthma     My son has it, since he was born     Vesico-ureteral reflux     s/p repair        PAST SURGICAL HISTORY:   Past Surgical History:   Procedure Laterality Date     ABDOMEN SURGERY      exploratory after C section for sepsis      SECTION      x 2     HYSTERECTOMY RADICAL  2015     HYSTERECTOMY SUPRACERVICAL N/A 2015    Procedure: HYSTERECTOMY SUPRACERVICAL;  Surgeon: Kelle Wasserman MD;  Location: [facility] OR     LUMPECTOMY BREAST       MYOMECTOMY UTERUS       ORTHOPEDIC SURGERY   06/04/2020    3-18-1, Nancy date R hip replacement, March L meniscus repair     REPAIR PTOSIS Right 2002    reconstruction of tear duct     REPAIR PTOSIS BILATERAL Bilateral 12/18/2020    Procedure: BILATERAL PTOSIS REPAIR, IRRIGATION OF LACRIMAL SYSTEM BILATERAL;  Surgeon: Pako Sosa MD;  Location: [facility] OR     SOFT TISSUE SURGERY  4/1/2021    L meniscus tear repair        MEDICATIONS:   Current Outpatient Medications:      acetaminophen (TYLENOL) 500 MG tablet, Take 500-1,000 mg by mouth every 6 hours as needed for mild pain., Disp: , Rfl:      atorvastatin (LIPITOR) 20 MG tablet, Take 1 tablet (20 mg) by mouth daily., Disp: 90 tablet, Rfl: 0     calcium carbonate (OS-ARA) 500 MG tablet, Take 1 tablet by mouth daily., Disp: , Rfl:      cholecalciferol (VITAMIN D3) 25 mcg (1000 units) capsule, Take 1 capsule (25 mcg) by mouth daily., Disp: 90 capsule, Rfl: 2     clonazePAM (KLONOPIN) 1 MG tablet, Take 0.5-1 tablets (0.5-1 mg) by mouth 2 times daily as needed for anxiety, sleep or agitation. 60 tabs to last 30 days, Disp: 60 tablet, Rfl: 1     eszopiclone (LUNESTA) 2 MG tablet, Take 1 tablet (2 mg) by mouth nightly as needed for sleep., Disp: 30 tablet, Rfl: 2     hydrOXYzine HCl (ATARAX) 25 MG tablet, Take 0.5-1 tablets (12.5-25 mg) by mouth 2 times daily as needed for anxiety., Disp: 120 tablet, Rfl: 1     [START ON 7/11/2025] lamoTRIgine (LAMICTAL) 100 MG tablet, Take 1 tablet (100 mg) by mouth daily., Disp: 30 tablet, Rfl: 1     lamoTRIgine (LAMICTAL) 25 MG tablet, Take 25 mg by mouth daily for 2 weeks then 50 mg daily for 2 weeks then 100 mg daily., Disp: 42 tablet, Rfl: 0     levothyroxine (SYNTHROID/LEVOTHROID) 125 MCG tablet, Take 1 tablet (125 mcg) by mouth daily., Disp: 90 tablet, Rfl: 0     melatonin 5 MG tablet, Take 5 mg by mouth nightly as needed for sleep., Disp: , Rfl:      naproxen sodium 220 MG capsule, Take 220 mg by mouth 2 times daily (with meals)., Disp: , Rfl:      OLANZapine (ZYPREXA) 10  "MG tablet, Take 1 tablet (10 mg) by mouth at bedtime., Disp: 90 tablet, Rfl: 0     Omega-3 Fatty Acids (OMEGA-3 FISH OIL PO), Take 1 g by mouth daily, Disp: , Rfl:      sertraline (ZOLOFT) 100 MG tablet, Take 100 mg by mouth daily., Disp: , Rfl:      tirzepatide-Weight Management (ZEPBOUND) 2.5 MG/0.5ML prefilled pen, Inject 0.5 mLs (2.5 mg) subcutaneously every 7 days., Disp: 2 mL, Rfl: 1     tirzepatide-Weight Management (ZEPBOUND) 5 MG/0.5ML prefilled pen, Inject 0.5 mLs (5 mg) subcutaneously every 7 days., Disp: 2 mL, Rfl: 2     ALLERGIES:    Allergies   Allergen Reactions     Erythromycin Itching     Itching and swelling of lids    Itching, swelling of lids     Metformin Other (See Comments)     Aphthous Ulcer      Seasonal Allergies Itching and Other (See Comments)        SOCIAL HISTORY:   Social History     Socioeconomic History     Marital status:      Spouse name: Wally     Number of children: 2     Years of education: Not on file     Highest education level: Not on file   Occupational History     Not on file   Tobacco Use     Smoking status: Former     Current packs/day: 0.00     Types: Cigarettes     Quit date: 1990     Years since quittin.5     Passive exposure: Never     Smokeless tobacco: Never   Vaping Use     Vaping status: Never Used   Substance and Sexual Activity     Alcohol use: Not Currently     Comment: One or two drinks a month     Drug use: Not Currently     Types: Cocaine, Marijuana, Methamphetamines, Opiates, Amphetamines, Barbiturates, \"Crack\" cocaine, Codeine, Hashish, Hydrocodone, LSD, Morphine, Opium, Oxycodone     Comment: nothing since      Sexual activity: Yes     Partners: Male     Birth control/protection: None     Comment: No uterus, same partner 20 years   Other Topics Concern     Parent/sibling w/ CABG, MI or angioplasty before 65F 55M? No   Social History Narrative     Not on file     Social Drivers of Health     Financial Resource Strain: Low Risk  " (6/22/2025)    Financial Resource Strain      Within the past 12 months, have you or your family members you live with been unable to get utilities (heat, electricity) when it was really needed?: No   Food Insecurity: Low Risk  (6/22/2025)    Food Insecurity      Within the past 12 months, did you worry that your food would run out before you got money to buy more?: No      Within the past 12 months, did the food you bought just not last and you didn t have money to get more?: No   Transportation Needs: Low Risk  (6/22/2025)    Transportation Needs      Within the past 12 months, has lack of transportation kept you from medical appointments, getting your medicines, non-medical meetings or appointments, work, or from getting things that you need?: No   Physical Activity: Sufficiently Active (6/4/2024)    Exercise Vital Sign      Days of Exercise per Week: 6 days      Minutes of Exercise per Session: 60 min   Stress: Stress Concern Present (6/4/2024)    [employer] - Occupational Stress Questionnaire      Feeling of Stress : Rather much   Social Connections: Unknown (6/4/2024)    Social Connection and Isolation Panel [NHANES]      Frequency of Communication with Friends and Family: Not on file      Frequency of Social Gatherings with Friends and Family: Once a week      Attends Oriental orthodox Services: Not on file      Active Member of Clubs or Organizations: Not on file      Attends Club or Organization Meetings: Not on file      Marital Status: Not on file   Interpersonal Safety: Low Risk  (12/20/2024)    Interpersonal Safety      Do you feel physically and emotionally safe where you currently live?: Yes      Within the past 12 months, have you been hit, slapped, kicked or otherwise physically hurt by someone?: No      Within the past 12 months, have you been humiliated or emotionally abused in other ways by your partner or ex-partner?: No   Housing Stability: Low Risk  (6/22/2025)    Housing  Stability      Do you have housing? : Yes      Are you worried about losing your housing?: No        FAMILY HISTORY:   Family History   Problem Relation Age of Onset     Depression Mother      Diabetes Mother      Ulcerative Colitis Mother      Lipids Mother         hypertriglyceridemia     Anxiety Disorder Mother      Osteoporosis Mother      High cholesterol Father      Cancer Father         CLL     Blood Disease Father         CLL     Glaucoma Father      Macular Degeneration Father      Breast Cancer Maternal Grandmother         in 80s     Cancer Maternal Grandmother      Osteoporosis Maternal Grandmother      Diabetes Brother      Anxiety Disorder Brother      Anxiety Disorder Sister         EXAM:Vitals: LMP  (LMP Unknown)   BMI= There is no height or weight on file to calculate BMI.    General appearance: Patient is alert and fully cooperative with history & exam.  No sign of distress is noted during the visit.     Psychiatric: Affect is pleasant & appropriate.  Patient appears motivated to improve health.     Respiratory: Breathing is regular & unlabored while sitting.     HEENT: Hearing is intact to spoken word.  Speech is clear.  No gross evidence of visual impairment that would impact ambulation.    Lower Extremity Focused:    Dermatologic: Supple, no openings.      Vascular: DP pulse 2/4 right 2/4 left PT pulse 2/4 right 2/4 left.  no edema, no varicosities noted.  CFT and skin temperature is normal to both lower extremities.     Neurologic: Lower extremity sensation is intact to light touch.  No evidence of weakness or contracture in the lower extremities.  No evidence of neuropathy.       Musculoskeletal: left pain with palpation to the proximal medial plantar fascial ligament at the insertion. no pain with palpation to the posterior tibial tendon, no pain with palpation to the achilles tendon, no pain with palpation to the peroneal tendons. left pain with palpation to the sinus tarsi, no pain with  palpation to the anterior ankle gutters,  supple ankle range of motion, normal subtalar joint range of motion slight decrease arch height with stance.     Labs and Imaging: I have personally reviewed the following       ASSESSMENT:   Left plantar fasciitis  Left foot pain  Sinus tarsi syndrome left     Medical Decision Making/Plan:  Reviewed patient[facility] chart in [facility].  The potential causes and nature of plantar fasciitis were discussed with the patient.  We reviewed the natural history/prognosis of the condition and risks if left untreated.  These include chronic pain, other sites of pain due to gait changes, and potential plantar fascial rupture. We discussed possible causes of the condition as it relates to the patients specific situation. Conservative treatment options were reviewed:  appropriate shoes, avoidance of barefoot walking, inserts/orthoses, stretching, ice, massage, immobilization and NSAIDs. We also reviewed the options of injection therapy and surgery.  However, it was made clear that surgery is only considered when conservative therapy fails.  The risks and benefits of injection therapy, and surgery were discussed.   After thorough discussion and answering all questions the patient elected to proceed physical therapy and a medrol dose pack  Prescription for a Medrol dose pack, take as directed   Follow-up in 8 weeks if pain persists we will consider a steroid injection, CAM boot immobilization.     Patient risk factor: low    All questions were answered to patients satisfaction and they will call with further questions or concerns.       Dang Joya DPM      Again, thank you for allowing me to participate in the care of your patient.        Sincerely,        Dang Joya DPM    Electronically signed

## 2025-07-08 NOTE — PATIENT INSTRUCTIONS
Thank you for choosing [facility] Podiatry / Foot & Ankle Surgery!    DR JOYA CLINIC:  [facility]   [address]   [place] [address]   (Mon, Tues)     [facility]  [address], [place] [address]  (Friday)    [facility]  [address]  [place] [address]  (Wednesdays)       TRIAGE LINE: [phone]  APPOINTMENTS: [phone]  RADIOLOGY: [phone] [id number] ([phone])  SET UP SURGERY: [phone]  PHYSICAL THERAPY: [phone]   BILLING QUESTIONS: [phone]  FAX: [phone]       Follow up: 8 weeks      Dr. Joya's Plantar Fasciitis Recommendations:     1)  A rigid-soled shoe will take stress off of the plantar fascia. An athletic type shoe with a more rigid sole is probably best.    2)  Some good over the counter inserts/ arch supports might help:  Spenco, Superfeet, Birkenstock, others.  If you buy some, consider gradually getting used to them. Increase time on them over the course of a week.    3)  Custom orthoses (prescription arch supports) are known to help treat and prevent plantar fasciitis.    4)  Avoid barefoot walking, even at home.  It is a good idea to wear supportive shoes as much as possible.    5)  Stretch your calf musculature and plantar fascia frequently.  It is a good idea to do this before getting out of bed, if pain is worse in the mornings.    6)  Ice and anti-inflammatories can help if pain is related to inflammation - more likely to help when the problem first starts.  If the pain has existed for over a month, anti-inflammatory measures might be less helpful.  Sometimes he can be therapeutic.    If your pain persists, please return to clinic.  Future options include bracing, physical therapy, immobilization/walking boot, surgery or other procedures.    Plantar fasciitis is very common and given time, will usually resolve. Obviously it can take a long time, as we walk on the  part that is hurting.        PLANTAR FASCIITIS  Plantar fasciitis is often referred to as heel spurs or heel pain. Plantar fasciitis is a very common problem that affects people of all foot shapes, age, weight and activity level. Pain may be in the arch or on the weight-bearing surface of the heel. The pain may come on without injury or identifiable cause. Pain is generally present when first getting out of bed in the morning or up from a seated break.     CAUSES  The plantar fascia is a dense fibrous band of tissue that stretches across the bottom surface of the foot. The fascia helps support the foot muscles and arch. Plantar fasciitis is thought to be caused by mechanical strain or overload. Frequent walking without shoes or wearing unsupportive shoes is thought to cause structural overload and ultimately inflammation of the plantar fascia. Some people have heel spurs that can be seen on x-ray. The heel spur is actually a minor component of plantar fascitis and is largely ignored.       SELF TREATMENT   The easiest solution is to stop walking around your home without shoes. Plantar fasciitis is largely a shoe problem. Shoes are either not being worn often enough or your current shoes are inadequate for your weight, foot structure or activity level. The majority of shoes on the market today are not sufficient to resist development of plantar fasciitis or to promote healing. Assume that your current shoes are inadequate and will need to be replaced. Even high quality shoes wear out with 6 months to one year of frequent use. Weight loss is another option. Losing ten pounds in the next two months may be enough to resolve the problem. Ice applied to the area of pain two to three times per day for ten minutes each session can be very helpful. Warm foot soaks in epsom salts can also relieve pain. This should continue until the problem resolves. Achilles tendon stretching is essential. Stretch multiple times daily to  promote healing and to prevent recurrence in the future. Over all stretching of the body is helpful as well such as the calves, thighs and lower back. Normally when one area of the body is tight, other areas are too. Gentle Yoga can be good for this.     Over the counter topical anti inflammatories can be helpful such as biofreeze, bengay, salon pas, ect...  Oral ibuprofen or aleve is recommended as well to try to calm down inflammation.     Night splints can be helpful to gradually stretch the foot at night as a lot of pain is when you get up in the morning. Taking a towel or thera band and stretching the foot back multiple times before you get out of bed can be beneficial as well.     MEDICAL TREATMENT  Medical treatments often include custom arch supports, cortisone injections, physical therapy, splints to be worn in bed, prescription medications and surgery. The home treatments listed above will be necessary regardless of these advanced medical treatments. Surgery is rarely needed but is very helpful in selected cases.     PROGNOSIS  Plantar fasciitis can last from one day to a lifetime. Some people get intermittent fascitis that is very short-lived. Others suffer daily for years. Excessive body weight, frequent bare foot walking, long hours on the feet, inadequate shoes, predisposing foot structures and excessive activity such as running are all potential issues that lead to chronic and/or recurring plantar fascitis. Having plantar fasciitis means that you are forever prone to this problem and will require modification of some of the above factors. Most people seek treatment within one to four months. Healing usually requires a similar one to four month time frame. Healing time is relative to the amount of effort spent treating the problem.   Plantar fasciitis is highly recurrent. Risk factors often continue, including return to bare foot walking, inadequate shoes, excessive body weight, excessive activities,  etc. Your life style and foot structure may predispose you to recurrent plantar fasciitis. A daily prevention regimen can be very helpful. Ongoing use of shoe inserts, careful attention to appropriate shoes, daily Achilles stretching, etc. may prevent recurrence. Prompt attention at the earliest warning signs of heel pain can resolve the problem in as short as a few days.     EXERCISES  Stair Exercise: Step on the stairs with the ball of your foot and hold your position for at least 15 seconds, then slowly step down with the heels of your foot. You can do this daily and as often as you want.   Picking the Towel: Sit comfortably and then pick the towel up with your toes. You can use any object other than a towel as long as the material can be soft and you can pick it up with your toes.  Rolling the Bottle: Use a small ball or frozen water bottle and then roll it around with your foot.   Flex the Toes: Sit comfortably and then flex your toes by pointing it towards the floor or towards your body. This will relax and flex your foot and exercise your plantar fascia, the calf, and the Achilles tendon. The inability of the foot to stretch often causes the bunching up of the plantar fascia area leading to the pain.  Calf/Achilles Stretching Examples:  Do the stretching gently. Do not bounce or stretch to the point of pain. Hold each stretch for 30 seconds. Stretch 10 times per set, three sets per day. Morning, afternoon and evening. If your heel pain is very severe in the morning, consider doing the first set of stretches before you get out of bed.                 OVER THE COUNTER INSERT RECOMMENDATIONS  SuperFeet   Sofsole Fit Spenco   Power Step   Walk-Fit Arch Cradles     Most of these can be found at your local [employer], [employer], or online.  **A good high quality over the counter insert should cost around $40-$50      [employer] [address]  [phone]    [address]  [phone] [place]  [address]  [phone]   [place]  [address]  [phone]   [place]  [address]  [phone] [place]  [address] [place] [address] [place]  [phone]   [place]  [address]  [phone]   [place]  [address] [place] [address]  [phone] [place]  [address]  [phone]     [facility] LOCATIONS  [facility]  [address]  [place] [address]  Phone: [phone]  Fax: [phone] [facility]  [address]  [place] [address]  Phone: [phone]   Fax: [phone]   [facility]  [address]  [place] [address]  Phone: [phone]  Fax: [phone] [facility]  [address]  [place] [address]  Phone: [phone]   Fax: [phone]   [facility]   [address]  [place] [address]  Phone: [phone]  Fax: [phone] * Please call any location listed to make an appointment for a casting/fitting. Your referral was sent to their central office and they will all have the order on file.

## 2025-07-10 ASSESSMENT — ANXIETY QUESTIONNAIRES
5. BEING SO RESTLESS THAT IT IS HARD TO SIT STILL: NEARLY EVERY DAY
6. BECOMING EASILY ANNOYED OR IRRITABLE: MORE THAN HALF THE DAYS
7. FEELING AFRAID AS IF SOMETHING AWFUL MIGHT HAPPEN: NEARLY EVERY DAY
GAD7 TOTAL SCORE: 20
4. TROUBLE RELAXING: NEARLY EVERY DAY
3. WORRYING TOO MUCH ABOUT DIFFERENT THINGS: NEARLY EVERY DAY
IF YOU CHECKED OFF ANY PROBLEMS ON THIS QUESTIONNAIRE, HOW DIFFICULT HAVE THESE PROBLEMS MADE IT FOR YOU TO DO YOUR WORK, TAKE CARE OF THINGS AT HOME, OR GET ALONG WITH OTHER PEOPLE: VERY DIFFICULT
2. NOT BEING ABLE TO STOP OR CONTROL WORRYING: NEARLY EVERY DAY
8. IF YOU CHECKED OFF ANY PROBLEMS, HOW DIFFICULT HAVE THESE MADE IT FOR YOU TO DO YOUR WORK, TAKE CARE OF THINGS AT HOME, OR GET ALONG WITH OTHER PEOPLE?: VERY DIFFICULT
GAD7 TOTAL SCORE: 20
1. FEELING NERVOUS, ANXIOUS, OR ON EDGE: NEARLY EVERY DAY

## 2025-07-11 PROBLEM — M79.672 LEFT FOOT PAIN: Status: ACTIVE | Noted: 2025-07-11

## 2025-07-11 PROBLEM — M25.572 SINUS TARSITIS, LEFT: Status: ACTIVE | Noted: 2025-07-11

## 2025-07-14 ENCOUNTER — MYC MEDICAL ADVICE (OUTPATIENT)
Dept: PSYCHIATRY | Facility: CLINIC | Age: 60
End: 2025-07-14
Payer: COMMERCIAL

## 2025-07-14 ENCOUNTER — VIRTUAL VISIT (OUTPATIENT)
Dept: PSYCHIATRY | Facility: CLINIC | Age: 60
End: 2025-07-14
Payer: COMMERCIAL

## 2025-07-14 DIAGNOSIS — F31.81 BIPOLAR II DISORDER (H): Primary | ICD-10-CM

## 2025-07-14 DIAGNOSIS — F41.1 GENERALIZED ANXIETY DISORDER: ICD-10-CM

## 2025-07-14 DIAGNOSIS — G47.00 INSOMNIA, UNSPECIFIED TYPE: ICD-10-CM

## 2025-07-14 DIAGNOSIS — F90.9 ATTENTION DEFICIT HYPERACTIVITY DISORDER (ADHD), UNSPECIFIED ADHD TYPE: ICD-10-CM

## 2025-07-14 PROCEDURE — 98006 SYNCH AUDIO-VIDEO EST MOD 30: CPT | Performed by: PSYCHIATRY & NEUROLOGY

## 2025-07-14 RX ORDER — OLANZAPINE 5 MG/1
5 TABLET, FILM COATED ORAL DAILY PRN
Qty: 30 TABLET | Refills: 2 | Status: SHIPPED | OUTPATIENT
Start: 2025-07-14

## 2025-07-14 RX ORDER — OLANZAPINE 10 MG/1
10 TABLET, FILM COATED ORAL AT BEDTIME
Qty: 30 TABLET | Refills: 2 | Status: SHIPPED | OUTPATIENT
Start: 2025-07-14

## 2025-07-14 RX ORDER — ESZOPICLONE 1 MG/1
1 TABLET, FILM COATED ORAL AT BEDTIME
Qty: 7 TABLET | Refills: 0 | Status: SHIPPED | OUTPATIENT
Start: 2025-07-14

## 2025-07-14 NOTE — PROGRESS NOTES
"Telemedicine Visit: The patient's condition can be safely assessed and treated via synchronous audio and visual telemedicine encounter.      Reason for Telemedicine Visit: Patient has requested telehealth visit    Originating Site (Patient Location): Patient's home    Distant Location (provider location):  On-Site    Consent:  The patient/guardian has verbally consented to: the potential risks and benefits of telemedicine (video visit) versus in person care; bill my insurance or make self-payment for services provided; and responsibility for payment of non-covered services.     Mode of Communication:  Video Conference via [employer]    As the provider I attest to compliance with applicable laws and regulations related to telemedicine.          Outpatient Psychiatric Progress Note    Name: Marbella Hendrix   : 1965                    Primary Care Provider: Vanessa Ladd MD   Therapist: [facility]. [facility]. Attends [employer] [facility].  Thinking about [employer] support        PHQ-9 scores:      3/24/2025     1:08 PM 2025     3:18 PM 2025    10:08 AM   PHQ-9 SCORE   PHQ-9 Total Score MyChart  20 (Severe depression) 12 (Moderate depression)   PHQ-9 Total Score 6 20  12        Patient-reported       BERTHA-7 scores:      2025     3:17 PM 2025     7:14 PM 7/10/2025    11:06 AM   BERTHA-7 SCORE   Total Score 18 (severe anxiety) 17 (severe anxiety) 20 (severe anxiety)   Total Score 18  17  20        Patient-reported       Patient Identification:  Patient is a 59 year old,   White Not  or  female  who presents for return visit with me.  Patient is currently unemployed. Patient attended the phone/video session alone.  Patient prefers to be called: \"Marbella\".    Interim History:  I last saw Marbella Hendrix for outpatient psychiatry return visit on 2025. During that appointment, we:    Decrease sertraline/Zoloft to 100 mg daily for anxiety, mood.  Since moving " forward with working diagnosis of bipolar disorder, we will reduce sertraline to 100 mg daily and the chance it is worsening anxiety/bipolar symptoms.  Will likely consider tapering off at upcoming visits.  I want to avoid too many abrupt changes at once.  Continue lamotrigine titration for bipolar depression:   Take 25 mg daily for two weeks then increase to 50 mg daily for two weeks then increase to 100 mg daily until follow-up.  Continue Lunesta 2 mg at bedtime as needed for sleep. Be sure to devote at least 8 hours to sleep after taking before operating motor vehicle or other heavy or sharp equipment. Do NOT mix with alcohol. Do NOT take with Ativan or clonazepam/Klonopin.  Continue Klonopin/clonazepam 0.5-1 mg twice daily as needed for severe anxiety, fear of flying. Do NOT take with Lunesta.   Continue olanzapine 10 mg at bedtime for sleep, mood, anxiety.  Continue hydroxyzine 12.5-25 mg at bedtime as needed for sleep.   Follow up with me in 2-3 weeks.  For scheduling needs you can call [phone].  You could also get a message to the nurses by calling the aforementioned phone number.  Continue with DBT as planned.    7/14: Patient struggling a little more recently.  Patient has incredibly heightened anxiety regarding upcoming family trip out of state.  Patient very nervous to fly again in a plane.  Sleep has been affected.  Didn't fall asleep until 3:30a last night. Took hydroxyzine and olanzapine 10 mg, a lunesta at 2 mg, then an hour later took flexeril, then an hour later took a klonopin.  That is when she finally fell asleep at 3:30 in the morning.  Then got up at 6:45a when 's alarm went off.  Patient reports taking clonazepam 1 mg at 9:30 AM today.  Patient does appear quite sleepy during appointment.  Almost appeared to doze off a couple times.  Patient hopeful to get some sleep this afternoon.  Patient so anxious about upcoming trip and poor sleep that she states she has thought about taking  a bunch of pills and never waking up.  When questioned further, patient denies any intent to harm self today.  Family is protective.  Patient is also agreeable to giving her  controlled medication bottles and other medications in the home until she is feeling better.  She denies thinking about any other methods to harm herself.  She is future oriented.  Still in DBT but has struggled with attendance.  She has struggled due to not sleeping well and other worsening mental health symptoms.  No problematic drug or alcohol use.  Safety plan on file.    Per [facility], Rafaela [facility], during today's team-based visit:  No [facility] visit today      Past Psychiatric Med Trials:  Psych Meds at Intake:  Paxil 20 mg daily - started when 29 years, has been up and down on the dose, on 10 mg dose mostly, last on 20 mg this past spring   Ambien 5 mg for insomnia - used for 4 nights  Ativan 0.5 mg for flying     Past Psych Meds:  Prozac for 1-2 months maybe when 27 yo    Psychiatric ROS:  Marbella Hendrix reports mood has been: See HPI above  Anxiety has been: See HPI above  Sleep has been: See HPI above  Rachna sxs: None  Psychosis sxs: None  ADHD/ADD sxs: see HPI above  PTSD sxs: NA  PHQ9 and GAD7 scores were reviewed today if completed.   Medication side effects: See HPI above  Current stressors include: Symptoms and see HPI above  Coping mechanisms and supports include: Family and Hobbies, therapy, DBT    Current medications include:   Current Outpatient Medications   Medication Sig Dispense Refill    acetaminophen (TYLENOL) 500 MG tablet Take 500-1,000 mg by mouth every 6 hours as needed for mild pain.      atorvastatin (LIPITOR) 20 MG tablet Take 1 tablet (20 mg) by mouth daily. 90 tablet 0    calcium carbonate (OS-ARA) 500 MG tablet Take 1 tablet by mouth daily.      cholecalciferol (VITAMIN D3) 25 mcg (1000 units) capsule Take 1 capsule (25 mcg) by mouth daily. 90 capsule 2    clonazePAM (KLONOPIN) 1 MG tablet Take 0.5-1  tablets (0.5-1 mg) by mouth 2 times daily as needed for anxiety, sleep or agitation. 60 tabs to last 30 days 60 tablet 1    eszopiclone (LUNESTA) 2 MG tablet Take 1 tablet (2 mg) by mouth nightly as needed for sleep. 30 tablet 2    hydrOXYzine HCl (ATARAX) 25 MG tablet Take 0.5-1 tablets (12.5-25 mg) by mouth 2 times daily as needed for anxiety. 120 tablet 1    lamoTRIgine (LAMICTAL) 100 MG tablet Take 1 tablet (100 mg) by mouth daily. 30 tablet 1    lamoTRIgine (LAMICTAL) 25 MG tablet Take 25 mg by mouth daily for 2 weeks then 50 mg daily for 2 weeks then 100 mg daily. 42 tablet 0    levothyroxine (SYNTHROID/LEVOTHROID) 125 MCG tablet Take 1 tablet (125 mcg) by mouth daily. 90 tablet 0    melatonin 5 MG tablet Take 5 mg by mouth nightly as needed for sleep.      methylPREDNISolone (MEDROL DOSEPAK) 4 MG tablet therapy pack Follow Package Directions 21 tablet 0    naproxen sodium 220 MG capsule Take 220 mg by mouth 2 times daily (with meals).      OLANZapine (ZYPREXA) 10 MG tablet Take 1 tablet (10 mg) by mouth at bedtime. 90 tablet 0    Omega-3 Fatty Acids (OMEGA-3 FISH OIL PO) Take 1 g by mouth daily      sertraline (ZOLOFT) 100 MG tablet Take 100 mg by mouth daily.      tirzepatide-Weight Management (ZEPBOUND) 2.5 MG/0.5ML prefilled pen Inject 0.5 mLs (2.5 mg) subcutaneously every 7 days. 2 mL 1    tirzepatide-Weight Management (ZEPBOUND) 5 MG/0.5ML prefilled pen Inject 0.5 mLs (5 mg) subcutaneously every 7 days. 2 mL 2     No current facility-administered medications for this visit.       The [place] Prescription Monitoring Program has been reviewed and there are no concerns about diversionary activity for controlled substances at this time.   06/13/2025 05/13/2025 1 Clonazepam 1 Mg Tablet 60.00 30 Al Bau [id number] Juan ([id number]) 0/1 4.00 [place]   06/05/2025 06/04/2025 1 Eszopiclone 2 Mg Tablet 30.00 30 Al Bau [id number] Juan ([id number]) 0/2 0.66 [place]   04/10/2025 04/10/2025 1 Lorazepam 0.5 Mg Tablet 20.00  5 Al u [id number] Sup (7778) 0/0 2.00 [place]   2025 1 Eszopiclone 2 Mg Tablet 60.00 60 Al u [id number] Juan (7932) 0/0 0.66 [place]   2024 1 Eszopiclone 2 Mg Tablet 30.00 30 Al u [id number] Sup (7778) 0/2 0.66 [place]     Past Medical/Surgical History:  Past Medical History:   Diagnosis Date    Arthritis     My mom has it, my grandmother had it I have it    Diabetes (H)     My mom and brother have type II    Heavy menstrual period     Leiomyoma of uterus     s/p myomectomy    Mental disorder     anxiety    PONV (postoperative nausea and vomiting)     Surgical complication after  2002    Thyroid disease     Hypothroidism, 2nd half of     Uncomplicated asthma     My son has it, since he was born    Vesico-ureteral reflux     s/p repair      has a past medical history of Arthritis, Diabetes (H), Heavy menstrual period, Leiomyoma of uterus, Mental disorder, PONV (postoperative nausea and vomiting), Surgical complication (after  2002), Thyroid disease, Uncomplicated asthma, and Vesico-ureteral reflux.    She has no past medical history of Cancer (H), Cerebral infarction (H), Congestive heart failure (H), COPD (chronic obstructive pulmonary disease) (H), Depressive disorder, Heart disease, History of blood transfusion, or Hypertension.    Social History:  Reviewed. No changes to social history except as noted above in HPI.    Vital Signs:   None. This is phone/video visit.     Labs:  Most recent laboratory results reviewed:  Recent Labs   Lab Test 25  0805 10/04/24  0740   CHOL 164 152   HDL 40* 32*   * 97   TRIG 94 117     TSH   Date Value Ref Range Status   2025 1.08 0.30 - 4.20 uIU/mL Final   2022 1.49 0.40 - 4.00 mU/L Final   2021 0.78 0.40 - 4.00 mU/L Final        Lab Results   Component Value Date    A1C 5.6 2025    A1C 5.8 10/04/2024    A1C 5.9 2024    A1C 5.7 2022    A1C 5.7  06/23/2021    A1C 5.6 06/12/2019    A1C 5.5 06/27/2018    A1C 5.5 08/02/2017      Last Comprehensive Metabolic Panel:  Sodium   Date Value Ref Range Status   10/04/2024 142 135 - 145 mmol/L Final   10/22/2012 141 133 - 144 mmol/L Final     Potassium   Date Value Ref Range Status   10/04/2024 5.0 3.4 - 5.3 mmol/L Final   07/08/2022 4.4 3.4 - 5.3 mmol/L Final   10/22/2012 3.8 3.4 - 5.3 mmol/L Final     Chloride   Date Value Ref Range Status   10/04/2024 106 98 - 107 mmol/L Final   07/08/2022 108 94 - 109 mmol/L Final   10/22/2012 105 94 - 109 mmol/L Final     Carbon Dioxide   Date Value Ref Range Status   10/22/2012 25 20 - 32 mmol/L Final     Carbon Dioxide (CO2)   Date Value Ref Range Status   10/04/2024 24 22 - 29 mmol/L Final   07/08/2022 20 20 - 32 mmol/L Final     Anion Gap   Date Value Ref Range Status   10/04/2024 12 7 - 15 mmol/L Final   07/08/2022 10 3 - 14 mmol/L Final   10/22/2012 11 6 - 17 mmol/L Final     Glucose   Date Value Ref Range Status   10/04/2024 96 70 - 99 mg/dL Final   07/08/2022 93 70 - 99 mg/dL Final   07/01/2020 88 70 - 99 mg/dL Final     Comment:     Fasting specimen     Urea Nitrogen   Date Value Ref Range Status   10/04/2024 8.6 6.0 - 20.0 mg/dL Final   07/08/2022 18 7 - 30 mg/dL Final   10/22/2012 25 (H) 5 - 24 mg/dL Final     Creatinine   Date Value Ref Range Status   10/04/2024 0.84 0.51 - 0.95 mg/dL Final   08/28/2015 0.65 0.52 - 1.04 mg/dL Final     GFR Estimate   Date Value Ref Range Status   10/04/2024 81 >60 mL/min/1.73m2 Final     Comment:     eGFR calculated using 2021 CKD-EPI equation.   08/28/2015 >90  Non  GFR Calc   >60 mL/min/1.7m2 Final     Calcium   Date Value Ref Range Status   10/04/2024 9.7 8.8 - 10.4 mg/dL Final     Comment:     Reference intervals for this test were updated on 7/16/2024 to reflect our healthy population more accurately. There may be differences in the flagging of prior results with similar values performed with this method. Those  "prior results can be interpreted in the context of the updated reference intervals.   10/22/2012 9.2 8.5 - 10.4 mg/dL Final     Bilirubin Total   Date Value Ref Range Status   10/04/2024 0.3 <=1.2 mg/dL Final     Alkaline Phosphatase   Date Value Ref Range Status   10/04/2024 47 40 - 150 U/L Final     ALT   Date Value Ref Range Status   10/04/2024 25 0 - 50 U/L Final   11/20/2012 18.0 0.0 - 50.0 U/L Final     AST   Date Value Ref Range Status   10/04/2024 23 0 - 45 U/L Final   11/20/2012 25.0 0.0 - 45.0 U/L Final     Lab Results   Component Value Date    WBC 5.2 06/15/2023    WBC 6.6 02/04/2020     Lab Results   Component Value Date    RBC 4.43 06/15/2023    RBC 4.47 02/04/2020     Lab Results   Component Value Date    HGB 12.5 06/15/2023    HGB 12.0 04/21/2021     Lab Results   Component Value Date    HCT 37.4 06/15/2023    HCT 38.1 02/04/2020     No components found for: \"MCT\"  Lab Results   Component Value Date    MCV 84 06/15/2023    MCV 85 02/04/2020     Lab Results   Component Value Date    MCH 28.2 06/15/2023    MCH 28.2 02/04/2020     Lab Results   Component Value Date    MCHC 33.4 06/15/2023    MCHC 33.1 02/04/2020     Lab Results   Component Value Date    RDW 13.5 06/15/2023    RDW 14.0 02/04/2020     Lab Results   Component Value Date     06/15/2023     04/21/2021        Review of Systems:  10 systems (general, cardiovascular, respiratory, eyes, ENT, endocrine, GI, , M/S, neurological) were reviewed. Most pertinent finding(s) is/are: Some chronic pain. The remaining systems are all unremarkable.    Mental Status Examination (limited as this is by phone/video):  Appearance: Awake, tired appearing, appears stated age, no acute distress, well-groomed   Attitude:  cooperative, pleasant  Motor: No notable psychomotor agitation at all, not restless or fidgety today  Oriented to:  person, place, time, and situation  Attention Span and Concentration: Intact  Speech: Clear, coherent, not pressured " or rapid at all -actually a little slowed and soft in volume  Language: intact  Mood: Depressed, anxious  Affect: Flat  Associations:  no loose associations  Thought Process: linear and goal directed  Thought Content:  no evidence of acute suicidality or homicidal ideation, no evidence of psychotic thought, no auditory hallucinations present and no visual hallucinations present  Recent and Remote Memory:  Intact to interview. Not formally assessed.   Fund of Knowledge: appropriate  Insight: Fair to good  Judgment:  intact, adequate for safety  Impulse Control:  intact    Suicide Risk Assessment:  Marbella Hendrix has history of passive thoughts of death but no acute suicidality or plan or intent to harm self.  See safety plan in chart.  Family is protective.  Also see thorough assessment by [facility] during previous appointments.  Based on all available evidence including the factors cited above, Marbella Hendrix does not appear to be at imminent risk for self-harm, does not meet criteria for a 72-hr hold, and therefore remains appropriate for ongoing outpatient level of care.  A thorough assessment of risk factors related to suicide and self-harm have been reviewed and are noted above. The patient convincingly denies suicidality on several occasions. Local community safety resources reviewed for patient to use if needed. There was no deceit detected, and the patient presented in a manner that was believable.     DSM5 Diagnosis:  Attention-Deficit/Hyperactivity Disorder  314.01 (F90.9) Unspecified Attention -Deficit / Hyperactivity Disorder  Bipolar II Disorder, currently depressed  300.02 (F41.1) Generalized Anxiety Disorder  Insomnia, unspecified    Medical comorbidities include:   Patient Active Problem List    Diagnosis Date Noted    Left foot pain 07/11/2025     Priority: Medium    Sinus tarsitis, left 07/11/2025     Priority: Medium    Moderate obstructive sleep apnea 04/30/2025     Priority: Medium     Moderate episode of recurrent major depressive disorder (H) 03/20/2025     Priority: Medium    Elevated fasting glucose 05/01/2024     Priority: Medium    Anxiety 07/19/2023     Priority: Medium    Adjustment disorder with anxious mood 06/27/2021     Priority: Medium    Family history of diabetes mellitus 06/27/2021     Priority: Medium    CMC DJD(carpometacarpal degenerative joint disease), localized primary, left 11/18/2020     Priority: Medium    Dermatochalasis of both upper eyelids 11/17/2020     Priority: Medium     Added automatically from request for surgery [id number]      Involutional ptosis, acquired, bilateral 11/17/2020     Priority: Medium     Added automatically from request for surgery [id number]      Weakness of right hip 07/09/2020     Priority: Medium    Primary osteoarthritis of right hip 12/02/2019     Priority: Medium     Added automatically from request for surgery [id number]      Morbid obesity (H) 06/20/2018     Priority: Medium    Status post hysterectomy 08/02/2017     Priority: Medium    Sensation of plugged ear on both sides 07/12/2017     Priority: Medium    Acute post-operative pain 08/28/2015     Priority: Medium    Preventative health care 10/30/2012     Priority: Medium     Last pap NIL 2011; mammogram nl 2011; lipids abnl, needs annual f/u      Generalized anxiety disorder 09/26/2012     Priority: Medium     H/o panic attacks; currently controlled with paxil      Hyperlipidemia LDL goal <130 09/26/2012     Priority: Medium     Behavioral measures - avoiding statins while trying to conceive; not a candidate for red yeast rice (interactions with thyroid meds)      Acquired hypothyroidism 09/26/2012     Priority: Medium    Overweight 09/26/2012     Priority: Medium     On exercise plan, has been in weight watchers  Problem list name updated by automated process. Provider to review         Psychosocial & Contextual Factors: see HPI above    Assessment:  From Intake, 7/17/2023:  Marbella Lawton  Simeon is a 57-year-old female with past psychiatric history including anxiety, ADHD, depression, remote polysubstance abuse (in remission for decades) who presents today for psychiatric evaluation.  Patient recently with increased psychosocial stressors and acutely worsening anxiety and insomnia.  Patient most recently on Paxil and Ambien to manage symptoms.  Ambien used sparingly and did help break severe insomnia cycle recently.  Patient currently taking 10 mg of Paxil and has only ever been up to 20 mg historically.  Patient may be interested in a trial with something different than Paxil.  We also discussed further optimizing Paxil to 30 or 40 mg daily for more therapeutic trial before replacing the medication.  We did discuss Lexapro and Effexor-XR as possible alternatives.  Patient would like to discuss these options with her  who is an internist and her psychotherapist.  In the meantime, we discussed a trial with clonidine to help with sleep, anxiety, and ADHD symptoms.  Discussed risks and benefits of therapy.  Patient would like to move forward with a clonidine trial to help at bedtime as needed for sleep and a small dose during the day as needed for anxiety.  No acute safety concerns today.  No acute suicidality.  No problematic drug or alcohol use.     8/22/2023:  Patient with ongoing significant anxiety and mood related struggles.  Clonidine has not been helpful.  We will transition patient off paroxetine and onto venlafaxine.  We will continue to consider escitalopram as an option if venlafaxine ineffective or poorly tolerated.  Could also consider use of fluoxetine if paroxetine is difficult to discontinue.  Discussed DBT therapy as a potential option to help improve symptoms.  Resources sent in [facility] message.  No acute suicidality.  No acute safety concerns.  No problematic drug or alcohol use.    10/3/2023:  Patient doing okay with transition to venlafaxine ER.  Has had some mild  dizziness and headaches.  We will continue to optimize venlafaxine ER and patient will watch for worsening headaches and dizziness.  Her symptoms could be related to discontinuation of paroxetine.  Patient still struggling significantly in the evenings and with insomnia.  We will trial quetiapine at bedtime as needed for sleep.  Discussed risks and benefits of therapy including watching for any abnormal involuntary movements.  If patient continues on the medication, we will make sure to get updated lipid panel and fasting glucose.  Could also consider mirtazapine as an option.  No acute safety concerns.  No acute suicidality.  No problematic drug or alcohol use.    11/14/2023:  Patient continuing to work with sleep medicine with pending at home sleep study results.  Quetiapine has been helpful for sleep at just 12.5 mg at bedtime.  Does cause some significant cognitive clouding during the day.  Patient does feel benefits outweigh risks but is willing to trial olanzapine to see if gets similar benefit without such heavy cognitive effects.  Venlafaxine has been helpful for mood and anxiety symptoms.  Still could consider mirtazapine as an option for sleep as needed.  No acute safety concerns.  No SI.  No problematic drug or alcohol use.    1/2/2024:  Patient currently doing quite well on olanzapine.  Finds it more helpful and better tolerated than quetiapine.  Discussed risks and benefits and side effects to watch out for.  Mild constipation-encouraged to utilize MiraLAX and/or Dulcolax.  No acute safety concerns.  No SI.  No problematic drug or alcohol use.  No abnormal involuntary movements.    4/8/2024:  Apart from some of the metabolic effects of olanzapine, patient otherwise quite stable.  Patient opts to try to address metabolic side effects of olanzapine with metformin.  Discussed there is evidence to support use of metformin to help treat and also to help decrease risk for metabolic syndrome from the  medication use.  Discussed risks and benefits of metformin use.  Due to stability of mental health symptoms, psychiatric care be returned back to primary care provider.  Also encouraged primary care provider to continue to monitor metabolic status and need for metformin.  Patient continues to watch diet and stay active.  Updated labs ordered for primary care provider for baseline since starting metformin XR.  Last liver enzymes looked okay when checked in June 2023.  Patient also denies any abnormal involuntary movements.  No acute safety concerns.  No SI.  No problematic drug or alcohol use.    9/11/2024 (New episode of care initiated today after bounce-back):  Patient struggling more significantly with anxiety and insomnia.  Even up to 10 mg of olanzapine was not very helpful.  Will discontinue olanzapine and trial mirtazapine.  Ambien is effective and so patient instructed to take before bedtime nightly for 1-2 weeks to allow for reregulation of sleep schedule.  Could consider further optimization of Effexor-XR in the future as well.  No acute safety concerns.  No acute suicidality, although does have some passive fleeting thoughts with no plan or intent to harm self.  No problematic drug or alcohol use.  Psychotherapy encouraged.    9/24/2024:  Patient with severely worsening anxiety.  Started clonazepam scheduled since last visit.  This will be continued since to started yesterday.  Venlafaxine will be increased to 225 mg daily.  Patient scheduled for intake for PHP/IOP tomorrow.  Patient should not be traveling in her current condition and also to allow time for intensive outpatient programming.  Letter was provided due to their upcoming travel plans.  No acute safety concerns.  No acute suicidality.  No problematic drug or alcohol use.    10/02/2024:  Patient doing a little bit better on decreased venlafaxine and daily clonazepam.  No changes today.  Patient will continue to monitor mood/depression symptoms   Hopefully will be able to start intensive outpatient programming soon.  No acute safety concerns.  No acute suicidality.  No problematic drug or alcohol use.    10/29/2024:  Overall continuing to feel improvement of symptoms.  Taking clonazepam 1.5 mg at bedtime for sleep.  Worried about not being able to have clonazepam and not sleeping again.  For now clonazepam will be continued.  Patient is agreeable to starting buspirone to see if it might further improve anxiety to at some point reduce reliance on clonazepam.  Encouraged to continue in IOP.  No acute safety concerns.  No SI.  No problematic drug or alcohol use.    12/10/2024:  Patient overall doing quite well.  He feels back to a baseline.  Still some ongoing anxiety but much more manageable and sleeping well.  Using olanzapine, hydroxyzine, and as needed Lunesta at bedtime to help with sleep.  Tolerating well but does have some increased appetite and weight gain from olanzapine.  Patient has not yet started metformin that was sent to help mitigate metabolic effects from olanzapine.  Patient also with slightly elevated hemoglobin A1c.  Patient will now start metformin.  May be a good candidate for GLP-1 injections, like tirzepatide.  No acute safety concerns.  No SI.  No problematic drug or alcohol use.    1/7/2025:  Patient appearing hypomanic today.  Discussed suspicions for bipolar disorder with both patient and .  Patient with history of episodes as an adolescent/young adult while abusing stimulants that seem consistent with manic episodes.   has noted episodes that seem consistent with mixed episodes of bipolar disorder.  Patient with most recent suspected mixed episode.  Now suspicions for hypomania after coming off clonazepam and with stressor leading to lack of sleep.  We will increase olanzapine but may need to consider decreasing or discontinuing venlafaxine.  No acute safety concerns.  No SI.  No problematic drug or alcohol  use.    1/21/2025:  Patient overall with some appropriately sustained elevation in mood.  Patient does not seem as elevated as last visit.  No signs of depression.  Less psychomotor agitation.  No longer with pressured speech.  Getting roughly 6 hours of sleep a night.  Discussed continuing to monitor sleep closely.  If patient with signs or symptoms of mixed episode or hypomania again in the future, we may need to consider decreasing, or finding an alternative to, the venlafaxine.  Patient will continue on olanzapine 10 mg at bedtime.  Could consider lamotrigine in the future if mood were to crash while on venlafaxine, or if venlafaxine again becomes too activating despite presence of olanzapine.  We will get updated fasting labs in about 1 month.  No acute safety concerns.  No suicidality.  No problematic drug or alcohol use.  Will continue to monitor weight.  No noted abnormal involuntary movements today on camera.  None noted by patient.    3/4/2025:  Patient overall feeling relatively stable.  No signs of garrett today.  No major depression.  Has been taking 20 mg of olanzapine at bedtime with increased appetite, weight gain, evening fatigue and sedation.  Discussed decreasing to 15 mg at bedtime for a few days at least before decreasing to 10 mg at bedtime.  Could still consider slightly reducing venlafaxine in the future, particularly if sleep trouble is still an issue when tapering down olanzapine.  Discussed I would advocate for patient to start GLP-1 injection, Zepbound, if primary care provider felt appropriate and was agreeable to prescribing.  No acute safety concerns.  No SI.  No problematic drug or alcohol use.  Fasting labs ordered but not yet drawn.  Of note, patient will start treating sleep apnea-picks up CPAP tomorrow.    4/22/2025:  Patient overall doing okay.  We will continue to reduce dose of venlafaxine ER/Effexor-XR in the case it could be too activating for patient and worsening anxiety and  sleep issues.  Continuing to monitor for bipolar spectrum illness.  Could consider replacing venlafaxine with low-dose fluoxetine or sertraline.  For now we will try away from noradrenergic medication.  Currently on olanzapine 10 mg and sleeping okay at bedtime.  Recent fasting labs unremarkable.  Patient hopeful to start tirzepatide soon.  No longer using Lunesta.  No acute safety concerns.  No SI.  No problematic drug or alcohol use.    6/4/2025:  Overall continuing to struggle pretty significantly.  High anxiety and ruminations.  Patient did not appear manic or hypomanic today.  Transitioning off venlafaxine.  We will continue to optimize sertraline while monitoring for signs of over-activation or garrett.  Encouraged to utilize Klonopin as needed within the bounds of the prescription.  Patient is sleeping okay when using Lunesta and olanzapine 10 mg nightly.  Starts DBT tomorrow.  I was able to connect with patient's therapist today.  Diagnostic clarity still a little unclear-severe/neurotic anxiety versus bipolar disorder versus axis II pathology versus ADHD/neurodivergence vs multiple etiologies.  No acute suicidality.  No problematic drug or alcohol use.  Patient will be seen back in 3 weeks.    6/27/2025:  Patient pretty significantly depressed.  This crash has occurred following hypomanic episode.  Discussed today that we will be moving forward with a working diagnosis of bipolar disorder.  Lamotrigine titration started between visits for bipolar depression.  We will reduce sertraline to 100 mg daily to reduce any possible activating effects that it is having in the context of bipolar disorder.  May continue to taper off based on response to lamotrigine.  Patient with metabolic effects from olanzapine.  Discussed we could consider transitioning olanzapine to Abilify at future visit.  Could also consider lithium as an option as well.  Briefly discussed ECT is also a treatment option for bipolar depression  symptoms.  Patient would like to move forward with lamotrigine titration.  Tolerating well so far with no negative side effects.  Encouraged to continue DBT.  Patient will be seen back in 2-3 weeks.  No acute suicidality.  No acute safety concerns.  No problematic drug or alcohol use.    7/14/2025:  Patient has continued to struggle.  Very high anxiety due to upcoming trip out of state.  Patient will continue to discuss what is best with her  as the trip continues to approach.  We will continue lamotrigine titration.  We will taper completely off sertraline.  Discussed importance of sleep.  Patient not sleeping well last few nights, particularly as this trip gets closer.  Discussed increasing olanzapine and Lunesta as needed to ensure good sleep.  Patient with passive thoughts of death today. Made a provocative statement about wanting to take all her meds and just go to sleep forever. Pt denied any intent to harm herself today.  Patient agreeable to taking a nap since only got a few hours of sleep last night.  Patient also agreeable to give  her bottles of medication and also verbalized permission and desire for me to call  and let me know about the safety plan.  Voicemail left for patient's  to review [employer] message with patient tonight.  Patient's  has previously sent messages from patient [place] Tamale Factoryhart and patient has been agreeable to allowing access to [employer] to her .  Patient appreciates the support of her  and has been open with him.  Patient reminded about the [facility] unit at [facility] in the case her symptoms worsen and if she has more active suicidal thoughts.  Patient denied any other methods to harm herself other than overdose.  Family is protective.  Continues in DBT, however, attendance has been more difficult with not sleeping as well.  Discussed I would like to see patient back in 1 week.  Letter sent to [employer] to give to DBT team for the  appointment.  No problematic drug or alcohol use.    Of note, patient also with recent prescription for steroid, methylprednisolone for plantar fasciitis.  Message sent to patient regarding steroid use and bipolar disorder.  Will continue to monitor.    Medication side effects and alternatives were reviewed. Health promotion activities recommended and reviewed today. All questions addressed. Education and counseling completed regarding risks and benefits of medications and psychotherapy options. Recommend therapy for additional support.     Treatment Plan:  Decrease sertraline/Zoloft:  Take 50 mg daily for 3-5 days then trial discontinuation (discussed that sertraline, and similar medications, can worsen bipolar disorder).   Continue lamotrigine titration for bipolar depression:   Take 100 mg daily for two weeks then increase to 150 mg daily for two weeks then increase to 200 mg daily as tolerated.   Increase Lunesta to 2-3 mg at bedtime as needed for sleep. Be sure to devote at least 8 hours to sleep after taking before operating motor vehicle or other heavy or sharp equipment. Do NOT mix with alcohol. Do NOT take with Ativan or clonazepam/Klonopin.  Continue Klonopin/clonazepam 0.5-1 mg twice daily as needed for severe anxiety, fear of flying. Do NOT take with Lunesta.   Increase olanzapine to 10-15 mg at bedtime for sleep, mood, anxiety.  Can take additional 5 mg during day as needed for racing thoughts, agitation.   Don't exceed 20 mg total daily dose.   Continue hydroxyzine 12.5-25 mg at bedtime as needed for sleep.   Discussed safety planning   Have Wally hold onto all Rx medications and over the counter medications.   Follow up with me in 1 weeks.  For scheduling needs you can call [phone].  You could also get a message to the nurses by calling the aforementioned phone number.  Continue with DBT as planned.  Continue all other cares per primary care provider.   Continue all other medications as reviewed  per electronic medical record today.   Safety plan reviewed. To the Emergency Department as needed or call after hours crisis line at [phone] or [phone]. [place] Crisis Text Line. Text [place] to 648659 or Suicide LifeLine Chat: suicidepreventionlifeline.org/chat  Follow up with primary care provider as planned or for acute medical concerns.  MyChart may be used to communicate with your provider, but this is not intended to be used for emergencies.    Have previously discussed Lamictal (lamotrigine) can cause serious rashes including Cha-Davey syndrome which may requiring hospitalization and discontinuation of treatment. If any signs of a rash occur, please see your Primary Care Provider or a dermatologist immediately.      Have previously discussed risks for neuroleptic medication use (olanzapine/Zyprexa) including but not limited to possibility for movement disorders such as restlessness, abnormal and involuntary movements that could be lasting even after stopping the medication, muscle stiffness.  Also discussed risks for weight gain and cholesterol and blood sugar abnormalities and the need for monitoring.    Risks of benzodiazepine (Ativan, Xanax, Klonopin, Valium, etc) use including, but not limited to, sedation, tolerance, risk for addiction/dependence. Do not drink alcohol while taking benzodiazepines due to risk of trouble breathing and potential death. Do not drive or operate heavy machinery until it is known how the drug affects you. Discuss with physician or pharmacist before ever taking a benzodiazepine with a narcotic/opioid pain medication.     Administrative Billing:   Phone Call/Video Duration: 25 Minutes  Start: 10:29a  Stop: 10:54a    Patient Status:  Patient is a continuous care patient.     The longitudinal plan of care for the diagnosis(es)/condition(s) as documented were addressed during this visit. Due to the added complexity in care, I will continue to support Marbella in the  subsequent management and with ongoing continuity of care.    Signed:   Christi Lewis DO  [facility] Psychiat[facility]    Disclaimer: This note consists of symbols derived from keyboarding, dictation and/or voice recognition software. As a result, there may be errors in the script that have gone undetected. Please consider this when interpreting information found in this chart.

## 2025-07-14 NOTE — LETTER
July 14, 2025      Re: Marbella Hendrix  [address] [address]        To Whom It May Concern,     This is a request to allow Ms. Marbella Hendrix to step away from DBT group a little early for an appointment on 7/21/25 that will begin virtually at 3p. I am hopeful she can attend this appt without penalizing her at DBT since I unfortunately did not have any appt times to accommodate not missing DBT.  It is a psychiatry appt. We have been making a number of medication changes to target her worsening symptoms. I have been working with Ms. Hendrix for two years now.     Please do not hesitate to contact me to coordinate care (with a KEM).     Regards,     Christi Lewis, DO on 7/14/2025 at 4:18 PM   Electronically signed

## 2025-07-14 NOTE — NURSING NOTE
Is the patient currently in the state of [place]? YES    Current patient location: [address] [place] [address] [place] [facility] [address]    Visit mode:Video    If the visit is dropped, the patient can be reconnected by: VIDEO VISIT: Text to cell phone:   Telephone Information:   Mobile [phone]       Will anyone else be joining the visit? No  (If patient encounters technical issues they should call [phone])    Are changes needed to the allergy or medication list? No    Are refills needed on medications prescribed by this physician? Discuss with Provider    Rooming Documentation: Questionnaire(s) completed.    Reason for visit: RACHELL Hernandez

## 2025-07-15 DIAGNOSIS — G47.00 INSOMNIA, UNSPECIFIED TYPE: ICD-10-CM

## 2025-07-15 DIAGNOSIS — F31.81 BIPOLAR II DISORDER (H): ICD-10-CM

## 2025-07-15 RX ORDER — OLANZAPINE 10 MG/1
10 TABLET, FILM COATED ORAL AT BEDTIME
Qty: 30 TABLET | Refills: 2 | OUTPATIENT
Start: 2025-07-15

## 2025-07-15 RX ORDER — OLANZAPINE 5 MG/1
5 TABLET, FILM COATED ORAL DAILY PRN
Qty: 30 TABLET | Refills: 2 | OUTPATIENT
Start: 2025-07-15

## 2025-07-15 NOTE — TELEPHONE ENCOUNTER
Refills were sent for OLANZapine (ZYPREXA) 5 and 10 MG tablets yesterday, 7/14/25.     RN denied refill requests, already responded to by other means.     RN sent MyC message to patient with the above update.     Juliet Aguillon RN on 7/15/2025 at 10:44 AM

## 2025-07-17 DIAGNOSIS — E03.9 ACQUIRED HYPOTHYROIDISM: ICD-10-CM

## 2025-07-17 RX ORDER — LEVOTHYROXINE SODIUM 125 UG/1
125 TABLET ORAL DAILY
Qty: 90 TABLET | Refills: 0 | Status: SHIPPED | OUTPATIENT
Start: 2025-07-17

## 2025-07-21 ENCOUNTER — VIRTUAL VISIT (OUTPATIENT)
Dept: PSYCHIATRY | Facility: CLINIC | Age: 60
End: 2025-07-21
Payer: COMMERCIAL

## 2025-07-21 ENCOUNTER — VIRTUAL VISIT (OUTPATIENT)
Dept: BEHAVIORAL HEALTH | Facility: CLINIC | Age: 60
End: 2025-07-21
Payer: COMMERCIAL

## 2025-07-21 DIAGNOSIS — F41.1 GENERALIZED ANXIETY DISORDER: ICD-10-CM

## 2025-07-21 DIAGNOSIS — F90.9 ATTENTION DEFICIT HYPERACTIVITY DISORDER (ADHD), UNSPECIFIED ADHD TYPE: ICD-10-CM

## 2025-07-21 DIAGNOSIS — F31.81 BIPOLAR 2 DISORDER (H): ICD-10-CM

## 2025-07-21 DIAGNOSIS — F31.81 BIPOLAR II DISORDER (H): Primary | ICD-10-CM

## 2025-07-21 DIAGNOSIS — F41.1 GENERALIZED ANXIETY DISORDER: Primary | ICD-10-CM

## 2025-07-21 DIAGNOSIS — G47.00 INSOMNIA, UNSPECIFIED TYPE: ICD-10-CM

## 2025-07-21 PROCEDURE — 98006 SYNCH AUDIO-VIDEO EST MOD 30: CPT | Performed by: PSYCHIATRY & NEUROLOGY

## 2025-07-21 PROCEDURE — 1125F AMNT PAIN NOTED PAIN PRSNT: CPT | Mod: 95 | Performed by: PSYCHIATRY & NEUROLOGY

## 2025-07-21 PROCEDURE — 90832 PSYTX W PT 30 MINUTES: CPT | Mod: 95 | Performed by: COUNSELOR

## 2025-07-21 ASSESSMENT — PAIN SCALES - GENERAL: PAINLEVEL_OUTOF10: MODERATE PAIN (6)

## 2025-07-21 NOTE — PATIENT INSTRUCTIONS
Treatment Plan:  Decrease/discontinue sertraline/Zoloft:  Take 50 mg daily for 3-5 days then trial discontinuation (discussed that sertraline, and similar medications, can worsen bipolar disorder).   Continue lamotrigine titration for bipolar depression:   Take 100 mg daily for two weeks then increase to 150 mg daily for two weeks then increase to 200 mg daily as tolerated.   Continue Lunesta to 2-3 mg at bedtime as needed for sleep. Be sure to devote at least 8 hours to sleep after taking before operating motor vehicle or other heavy or sharp equipment. Do NOT mix with alcohol. Do NOT take with Ativan or clonazepam/Klonopin.  Continue Klonopin/clonazepam 0.5-1 mg twice daily as needed for severe anxiety, fear of flying. Do NOT take with Lunesta.   Continue olanzapine/Zyprexa to 10-15 mg at bedtime for sleep, mood, anxiety.  Can take additional 5 mg during day as needed for racing thoughts, agitation.   Don't exceed 20 mg total daily dose.   Continue hydroxyzine 12.5-25 mg at bedtime as needed for sleep.   Ok to trial stopping  Discussed safety planning again:  Have Wally hold onto all Rx medications and over the counter medications, but especially controlled substances.   Follow up with me in 4-6 weeks.  For scheduling needs you can call [phone].  You could also get a message to the nurses by calling the aforementioned phone number.  Continue with DBT as planned.  Continue all other cares per primary care provider.   Continue all other medications as reviewed per electronic medical record today.   Safety plan reviewed. To the Emergency Department as needed or call after hours crisis line at [phone] or [phone]. [place] Crisis Text Line. Text [place] to 665431 or Suicide LifeLine Chat: suicidepreventionlifeline.org/chat  Follow up with primary care provider as planned or for acute medical concerns.  MyChart may be used to communicate with your provider, but this is not intended to be used for  emergencies.    Have previously discussed Lamictal (lamotrigine) can cause serious rashes including Cha-Davey syndrome which may requiring hospitalization and discontinuation of treatment. If any signs of a rash occur, please see your Primary Care Provider or a dermatologist immediately.      Have previously discussed risks for neuroleptic medication use (olanzapine/Zyprexa) including but not limited to possibility for movement disorders such as restlessness, abnormal and involuntary movements that could be lasting even after stopping the medication, muscle stiffness.  Also discussed risks for weight gain and cholesterol and blood sugar abnormalities and the need for monitoring.    Risks of benzodiazepine (Ativan, Xanax, Klonopin, Valium, etc) use including, but not limited to, sedation, tolerance, risk for addiction/dependence. Do not drink alcohol while taking benzodiazepines due to risk of trouble breathing and potential death. Do not drive or operate heavy machinery until it is known how the drug affects you. Discuss with physician or pharmacist before ever taking a benzodiazepine with a narcotic/opioid pain medication.     Patient Education   Collaborative Care Psychiatry Service  What to Expect  Here's what to expect from your Collaborative Care Psychiatry Service (CCPS).   About CCPS  CCPS means 2 people work together to help you get better. You'll meet with a behavioral health clinician and a psychiatric doctor. A behavioral health clinician helps people with mental health problems by talking with them. A psychiatric doctor helps people by giving them medicine.  How it works  At every visit, you'll see the behavioral health clinician (BHC) first. They'll talk with you about how you're doing and teach you how to feel better.   Then you'll see the psychiatric doctor. This doctor can help you deal with troubling thoughts and feelings by giving you medicine. They'll make sure you know the plan for your  "care.   CCPS usually takes 3 to 6 visits. If you need more visits, we may have you start seeing a different psychiatric doctor for ongoing care.  If you have any questions or concerns, we'll be glad to talk with you.  About visits  Be open  At your visits, please talk openly about your problems. It may feel hard, but it's the best way for us to help you.  Cancelling visits  If you can't come to your visit, please call us right away at [phone]. If you don't cancel at least 24 hours (1 full day) before your visit, that's \"late cancellation.\"  Being late to visits  Being very late is the same as not showing up. You will be a \"no show\" if:  Your appointment starts with a C, and you're more than 15 minutes late for a 30-minute (half hour) visit. This will also cancel your appointment with the psychiatric doctor.  Your appointment is with a psychiatric doctor only, and you're more than 15 minutes late for a 30-minute (half hour) visit.  Your appointment is with a psychiatric doctor only, and you're more than 30 minutes late for a 60-minute (full hour) visit.  If you cancel late or don't show up 2 times within 6 months, we may end your care.   Getting help between visits  If you need help between visits, you can call us Monday to Friday from 8 a.m. to 4:30 p.m. at [phone].  Emergency care  Call 911 or go to the nearest emergency department if your life or someone else's life is in danger.  Call 988 anytime to reach the national Suicide and Crisis hotline.  Medicine refills  To refill your medicine, call your pharmacy. You can also call [facility]'s Behavioral Access at [phone], Monday to Friday, 8 a.m. to 4:30 p.m. It can take 1 to 3 business days to get a refill.   Forms, letters, and tests  You may have papers to fill out, like FMLA, short-term disability, and workability. We can help you with these forms at your visits, but you must have an appointment. You may need more than 1 visit for " this, to be in an intensive therapy program, or both.  Before we can give you medicine for ADHD, we may refer you to get tested for it or confirm it another way.  We may not be able to give you an emotional support animal letter.  We don't do mental health checks ordered by the court.   We don't do mental health testing, but we can refer you to get tested.   Thank you for choosing us for your care.  For informational purposes only. Not to replace the advice of your health care provider. Copyright   2022 [facility]. All rights reserved. [employer] [id number] - Rev 11/24.

## 2025-07-21 NOTE — PROGRESS NOTES
[facility] Psychiatry Services [facility]  7/21/25      Behavioral Health Clinician Progress Note    Patient Name: Marbella Hendrix           Service Type:  Individual      Service Location:   [facility] / Email (patient reached)     Session Start Time: 3pm  Session End Time: 320pm      Session Length: 16 - 37      Attendees: Client     Service Modality:  Video Visit:      Provider verified identity through the following two step process.  Patient provided:  Patient is known previously to provider    Telemedicine Visit: The patient's condition can be safely assessed and treated via synchronous audio and visual telemedicine encounter.      Reason for Telemedicine Visit: Services only offered telehealth    Originating Site (Patient Location): Patient's home    Distant Site (Provider Location): Provider Remote Setting- Home Office    Consent:  The patient/guardian has verbally consented to: the potential risks and benefits of telemedicine (video visit) versus in person care; bill my insurance or make self-payment for services provided; and responsibility for payment of non-covered services.     Patient would like the video invitation sent by:  My Chart    Mode of Communication:  Video Conference via [facility]    Distant Location (Provider):  Off-site    As the provider I attest to compliance with applicable laws and regulations related to telemedicine.    Visit Activities (Refresh list every visit): [facility] Only    Diagnostic Assessment Date: 9/25/24 Bao Elliott LP  Treatment Plan Review Date: 7/21/25  See Flowsheets for today's PHQ-9 and BERTHA-7 results  Previous PHQ-9:       3/24/2025     1:08 PM 5/5/2025     3:18 PM 6/27/2025    10:08 AM   PHQ-9 SCORE   PHQ-9 Total Score MyChart  20 (Severe depression) 12 (Moderate depression)   PHQ-9 Total Score 6 20  12        Patient-reported     Previous BERTHA-7:       5/5/2025     3:17 PM 5/30/2025     7:14 PM 7/10/2025    11:06 AM   BERTHA-7 SCORE   Total Score 18  "(severe anxiety) 17 (severe anxiety) 20 (severe anxiety)   Total Score 18  17  20        Patient-reported       DATA  Extended Session (60+ minutes): No  Interactive Complexity: No  Crisis: No  Merged with [facility] Patient: No    Treatment Objective(s) Addressed in This Session:  Target Behavior(s): disease management/lifestyle changes reduce sx of anxiety    Anxiety: will experience a reduction in anxiety      Current Stressors / Issues:  MH update:  Flying tomorrow.  Kids are driving into town to fly. Plans for PRN and distraction list.   Did pros and cons at DBT last week.  Stresses:  vacation tomorrow to [place] for a week  Appetite: n/a  Sleep: Better, Klonopin nightly.  Outpatient Provider updates: DBT. [facility].  SI/SIB/HI:  Improved.  Reports minimal passive fleeting thoughts at best.  \"Haven't even thought of them\". Safety plan on file  BRAD:  Denies  Side effects/compliance:  Interventions:  [facility] attempted to engage in discussion about DBT and anxiety mgmt skills.  Pt notes that she has trouble learning DBT skills in group and rarely applies them at home outside of distraction skills  Most important:  anxiety around flying tomorrow    6/27  MH update:  No PRN then last couple days.  Took today.  Anxious.  Racing thoughts.  Mood down.  Stresses:  has to maybe go to the store today.  Appetite: n/a  Sleep: just last night didn't sleep well.  Another day last week.   Outpatient Provider updates: DBT.  [facility]  SI/SIB/HI:  Reduced.  Suicidal ideation without plan or intent.  Denies previous attempts.  Safety plan on file  BRAD:  Denies  Side effects/compliance:  Interventions:  [facility] engaged in discussion ACT skills to apply to suicidal ideation.  [facility] discussed permission/ compassion with PRN use /resting.  Productivity narrative.   Most important:  High anxiety.  Mood down.  Racing thoughts.  Bouts of not sleeping well.    6/4  MH update:  \"everything is making me anxious\".  Taking  Klonopin daily.  A month ago " "increase in sx  Stresses:n/a  Appetite: reduced  Sleep: sleeping too little, broken.  Taking lunesta to sleep last night  Outpatient Provider updates: On going [facility]care group [facility].  Recs' for DBT.  [facility]iland at [facility] psychology   SI/SIB/HI:  passive suicidal ideation, denies plan/intent.  Denies previous attempt. Safety plan on file.  BRAD: Denies  Side effects/compliance:  Interventions:  C directly supported and engaged in teaching and walking through TIP skills  Most important:  Starting DBT tomorrow at [facility].  Worries about \"being an addict\" with taking Klonpin.    4/22   update:  flew since last appt.  Had to have  sit with her.  Got sick and restless prior to flights.  Wasn't sleeping- was taking lunesta for 2 weeks, denies garrett.  Restless energy.  Back to baseline anxiety.  Generally kind of down.  Stresses:  flights are over, vacation in July  Appetite: n/a  Sleep: EVER.  Stopped lunestra.  Mostly okay.   Outpatient Provider updates: IOP step down groups. [facility]. Attends [facility].  Thinking about [facility] support groups.  SI/SIB/HI:  Denies current. Notes passive ideation during fearful flight.  Denies plan/intent.  Safety plan on file  BRAD:  Denies  Side effects/compliance:  Interventions:  [facility] engaged in discussion about flight and anxiety mgmt.  Flipping the script, ACT, using distraction, reassurance etc.  Most important:  Back to baseline anxiety.  Mood a bit down?    3/4   update:  Anxiety of flying.  Racing thoughts.  Don't fly until next week.  medication conf.   No longer euphoric.  Not depressed.  Feeling good overall, disappointed that euphoria can't remain.   had 3 stents put in.    Stresses: flight  Appetite:  increased hunger   Sleep: EVER, sleep study done, recs of CPAP-getting tomorrow.  Up to 8 hours.  Outpatient Provider updates: [facility] step down groups. [facility]. Attends Maria Guadalupe.  Thinking about [facility] support " "groups.  SI/SIB/HI:  Denies current.  Intrusive thoughts at times. Denies any plan or intent.  BRAD:  Denies  Side effects/compliance:  Interventions:  [facility] engaged in discussion of flight anxiety and skills  Most important:  Ran out of effexor for a period and reduced dose but went back up .  Zyprexa is making her very hungry - weight gain and impulsive hunger.     1/7  MH update:  daily affirmations, exercise, meditations.  Concerns that  had heart attack EMS was called-he is okay.  Denies garrett concerns- pt presents with some concerns of non goal directed activities, elevated mood, decreased sleep, and slightly pressured speech. Cleaning house out project.  Son is home for winter break. Anxiety and grief gone.  Stresses:  presenting for [employer] tomorrow for future networking and job.   Appetite: n/a  Sleep:  not sleeping great.  Outpatient Provider updates: Completed IOP transitioning to step down unit.  [facility]. Attends [facility] Gu[facility].  Thinking about [facility] support groups.  SI/SIB/HI:  Denies current!  BRAD:  Denies  Side effects/compliance:  Interventions:  [facility] engaged in discussing change of sx and on going maintaince of improvement of mood  Most important:  \"feels fucking great\".      12/10  MH update:  Feeling much better. Mildly anxious, back to baseline.  Functioning, getting tasks and ADL's done.  Stresses:  son is returning for 4 weeks for the holdays.  Appetite:  putting weight back on from Zyprexa  Sleep: Back to baseline.    Outpatient Provider updates: Nearing IOP completion next week.  Going to do step down program.  [facility]. Attends Maria Guadalupe.  Thinking about [facility] support groups.  SI/SIB/HI:  Fleeting and passive ideation  BRAD:  Denies  Side effects/compliance:  Interventions:  [facility] engaged in discussion about support options  Most important:  Forgot about metformin. Putting weight back on.  MH doing really well.     10/29  MH update:  Feeling better. " " Processing grief of cat, son being at college.  Discussion of protection of feelings.  Sadness of son returning college.   Stresses:  looking at jobs, not applying  Appetite: n/a  Sleep: not sleeping only 1-2 a week instead of daily.  Outpatient Provider updates: Started IOP; [facility]  SI/SIB/HI:  Passive suicidal ideation, no plan/intent.  Fleeting. No previous attempts.  Safety plan on file.  BRAD:  Denies  Side effects/compliance:  Interventions:  [facility] engaged discussed grief and loss/modeling of emotions  Most important:  Very fearful of meds being taken away    10/2  MH update: back to working out.  Sadness, overwhelmed.  Feeling alienated.  Maybe going to family dinner.  Watch services online tomorrow.  Wasn't invited to a friends house as before.  Going to future services to build connection.  Anxiety is better.  Klonopin really helps.  More sadness, change of season.  Wants to work, but recognizes she can't due to need to do program.  Stresses: as above  Appetite: n/a  Sleep: sleeping 10-5, full night   Outpatient Provider updates: waitlisted for IOP maybe 2-3 weeks yet.  [facility]  SI/SIB/HI:  Passive suicidal ideation, no plan/intent.  Fleeting. No previous attempts.  Safety plan reviewed.  Future and goal oriented.  BRAD:  Denies  Side effects/compliance:  Interventions:  [facility] discussed sense of building connection and loss along with areas for self care  Most important:  Sleeping better, fearful of Klonopin being taken away.  Anxiety better.  More depression.       update:  Klonopin has been helpful, started yesterday.  Slept a whole night last night.  Napped today.  Doesn't have the energy to exercise.  Panic was worsening.  Tried to see a friend up at the cabin, came home early- couldn't sleep in same room, dog overbearing, friend kept trying to \"fix her\".  Feels unsafe driving with the panic.  Feeling down, sadness.   Stresses:  Mom  in April.  " Youngest son moved away to college ([place]).  Cat -spent significant money and time trying to save him.  Loss of control, feels like she is always worried about his safety.  Other son, is really struggling with life functioning/ASD- tried being an .  Appetite: n/a  Sleep: Slept last night  Outpatient Provider updates: [facility]. DA update  for PHP/IOP  SI/SIB/HI:  Passive suicidal ideation, no plan/intent.  Fleeting. No previous attempts.  Safety plan reviewed.  Future and goal oriented.  BRAD:  Denies  Side effects/compliance:  Interventions:  [facility] engaged in discussion about levels of care  Most important:  Klonopin helps!      MH update:  Depression and anxiety worse.  Panic attacks regularly- taking Vistaril makes her fatigued.  Went to [facility], felt helpful.  Considering going back for support.  Stresses:  Mom  in April.  Youngest son moved away to college ([place]).  Cat -spent significant money and time trying to save him.  Loss of control, feels like she is always worried about his safety. Applying for jobs.  Other son, is really struggling with life functioning/ASD- tried being an .  Appetite:   Sleep: 2 nights a week, Zyprexa works 6-7 hours.  Other nights, only getting 4 hours with Ambien, Melatonin, Zyprexa.  About a month like this.  Outpatient Provider updates: Doing acupuncture. [facility].  SI/SIB/HI: Passive suicidal ideation, no plan/intent.  Fleeting. No previous attempts.  Safety plan reviewed.  Future and goal oriented.  BRAD:  Denies  Side effects/compliance:  Interventions:   [facility] reviewed safety plan and emergency services as needed.  Pt notes understanding and awareness.    Most important:  Not sleeping, not functioning.  Sx much worse.  Passive SI returned.      MH update:  Sx feel stable.  No acute depression/anxiety/panic.  Stresses:  Lots of stress with kiddos/spouse  Appetite: Weight gain-med side  "constantly hungry  Sleep: Denies  Outpatient Provider updates:   [facility]- taking a break.  Got a list of new therapist  SI/SIB/HI: Denies  BRAD: Denies  Side effects/compliance: N/a  Interventions:  [facility] offered space and validation for any concerns  Most important:  Feels stable.    1/2  MH update: Things are going well.  On the 5th med for anxiety.  Really like it.  Anxiety feels manageable.  Freak out daily but not every moment.  Denies any panic attacks.  Feeling behind the \"8 ball\".  [place] due to feeling uncomfortable from constipation.  Denies any overwhelming feelings of depression, hopelessness, worthlessness, etc. .  Stresses:  Holidays were fun, sad its over.  Got to see a friend from out of town.  Arthritic hand worse in the cold notes she needs to be wearing her hand brace.  Going to [employer], just completed prior to appt.   Unemployed hasn't been looking due to transportation of child.  Would like to work.  Appetite: Denies  Sleep: Feeling pleased.  Sleep much improved. Having dreams.    Outpatient Provider updates: Therapist once a month [facility], appt next week.    SI/SIB/HI: Denies  BRAD: Denies  Preg: n/a  Side effects/compliance: constipated, uncomfortable and bloated.  Interventions: [facility] supported on going anxiety reduction skill use  Most important: Constipation side effects     Progress on Treatment Objective(s) / Homework:  Satisfactory progress - ACTION (Actively working towards change); Intervened by reinforcing change plan / affirming steps taken    Motivational Interviewing    MI Intervention: Co-Developed Goal: reduce on going sx of anxiety, Expressed Empathy/Understanding, Open-ended questions, and Reflections: simple and complex     Change Talk Expressed by the Patient: Desire to change Ability to change Activation Taking steps    Provider Response to Change Talk: A - Affirmed patient's thoughts, decisions, or attempts at behavior change and R - Reflected patient's change " talk    Assessments completed prior to visit:    The following assessments were completed by patient for this visit:  N/a    Care Plan review completed: Yes    Medication Review:  Changes to psychiatric medications, see updated Medication List in EPIC.     Medication Compliance:  Yes    Changes in Health Issues:   None reported    Chemical Use Review:   Substance Use: Chemical use reviewed, no active concerns identified      Tobacco Use: No current tobacco use.      Assessment: Current Emotional / Mental Status (status of significant symptoms):  Risk status (Self / Other harm or suicidal ideation)  Patient has had a history of suicidal ideation: reports a hx of ideation without specific planning/intent/action back in 2022  Patient denies current fears or concerns for personal safety.  Patient denies current or recent suicidal ideation or behaviors.  Patient denies current or recent homicidal ideation or behaviors.  Patient denies current or recent self injurious behavior or ideation.  Patient denies other safety concerns.  A safety and risk management plan has been developed including: Patient consented to co-developed safety plan.  A safety and risk management plan was completed.  Patient agreed to use safety plan should any safety concerns arise.  A copy was given to the patient.    Appearance:   Appropriate   Eye Contact:   Good   Psychomotor Behavior: Hyperactive   Attitude:   Cooperative   Orientation:   All  Speech   Rate / Production: Normal    Volume:  Normal   Mood:    Elevated  Normal   Affect:    Appropriate   Thought Content:  Clear   Thought Form:  Coherent  Logical   Insight:    Good     Diagnoses:  1. Generalized anxiety disorder    2. Attention deficit hyperactivity disorder (ADHD), unspecified ADHD type    3. Bipolar 2 disorder (H)          Collateral Reports Completed:  Communicated with: Dr Lewis    Plan: (Homework, other):  Patient was given information about behavioral services and encouraged to  "schedule a follow up appointment with the clinic [facility] as needed.  She was also given information about mental health symptoms and treatment options .  CD Recommendations: No indications of CD issues.     Rafaela Hassan, [facility]        Individual Treatment Plan    Patient's Name: Marbella Hendrix   YOB: 1965  Date of Creation: 9/11/24  Date Treatment Plan Last Reviewed/Revised: 7/21/25    DSM5 Diagnoses:   1. Generalized anxiety disorder    2. Attention deficit hyperactivity disorder (ADHD), unspecified ADHD type    3. Bipolar 2 disorder (H)        Psychosocial / Contextual Factors: Relationship Concerns, Occupational Issues, and Interpersonal Concerns  PROMIS (reviewed every 90 days):   The following assessments were completed by patient for this visit:  PROMIS 10-Global Health (only subscores and total score):       4/2/2024     7:21 AM 9/7/2024    12:17 PM 9/23/2024    11:37 AM 10/11/2024     1:01 PM 12/10/2024    10:54 AM 4/18/2025    12:47 PM 4/22/2025     5:19 PM   PROMIS-10 Scores Only   Global Mental Health Score 13    13 9    9 8    8 7 13 12  12    Global Physical Health Score 14    14 12    12 12    12 13 16 15  14    PROMIS TOTAL - SUBSCORES 27    27 21    21 20    20 20 29 27  26        Patient-reported        Referral / Collaboration:  Referral to another professional/service is not indicated at this time..    Anticipated number of session for this episode of care: 6-9 sessions  Anticipation frequency of session: Monthly  Anticipated Duration of each session: 16-37 minutes  Treatment plan will be reviewed in 90 days or when goals have been changed.       MeasurableTreatment Goal(s) related to diagnosis / functional impairment(s)  Goal 1: Patient will reduce sx of anxiety   \"I will know I've met my goal when I can sleep and clearly think through my thoughts to use my skills.\"     Objective #A (Patient Action)    Patient will identify three distraction and diversion activities and use those " activities to decrease level of anxiety    Status: Continued - Date(s):12/10/24, 4/22/25, 7/21/25    Intervention(s)  C will provide support through CBT, MI, Acceptance and Commitment Therapy, Dialectic Behavioral Therapy and problem solving model to explore and overcome barriers.    Goal 2: Patient will manage concerns of safety    I will know I've met my goal when I can reduce suicidal ideation .      Objective #A (Patient Action)    Patient will use previously developed safety plan on file.  Status: Cont - Date: 9/11/24 , 12/10/24, 4/22/25, 7/21/25    Intervention(s)  Therapist will provide support through CBT, MI, Acceptance and Commitment Therapy, Dialectic Behavioral Therapy and problem solving model to explore and overcome barriers.      Patient has reviewed and agreed to the above plan.    Written by  Rafaela Hassan, LPCC, [facility]

## 2025-07-21 NOTE — PROGRESS NOTES
"Telemedicine Visit: The patient's condition can be safely assessed and treated via synchronous audio and visual telemedicine encounter.      Reason for Telemedicine Visit: Patient has requested telehealth visit    Originating Site (Patient Location): Patient's home    Distant Location (provider location):  Off-Site    Consent:  The patient/guardian has verbally consented to: the potential risks and benefits of telemedicine (video visit) versus in person care; bill my insurance or make self-payment for services provided; and responsibility for payment of non-covered services.     Mode of Communication:  Video Conference via [employer]    As the provider I attest to compliance with applicable laws and regulations related to telemedicine.          Outpatient Psychiatric Progress Note    Name: Marbella Hendrix   : 1965                    Primary Care Provider: Vanessa Ladd MD   Therapist: [facility]. [facility]. Attends [employer].  Thinking about [employer] support        PHQ-9 scores:      3/24/2025     1:08 PM 2025     3:18 PM 2025    10:08 AM   PHQ-9 SCORE   PHQ-9 Total Score MyChart  20 (Severe depression) 12 (Moderate depression)   PHQ-9 Total Score 6 20  12        Patient-reported       BERTHA-7 scores:      2025     3:17 PM 2025     7:14 PM 7/10/2025    11:06 AM   BERTHA-7 SCORE   Total Score 18 (severe anxiety) 17 (severe anxiety) 20 (severe anxiety)   Total Score 18  17  20        Patient-reported       Patient Identification:  Patient is a 59 year old,   White Not  or  female  who presents for return visit with me.  Patient is currently unemployed. Patient attended the phone/video session alone.  Patient prefers to be called: \"Marbella\".    Interim History:  I last saw Marbella Hendrix for outpatient psychiatry return visit on 2025. During that appointment, we:    Decrease sertraline/Zoloft:  Take 50 mg daily for 3-5 days then trial " discontinuation (discussed that sertraline, and similar medications, can worsen bipolar disorder).   Continue lamotrigine titration for bipolar depression:   Take 100 mg daily for two weeks then increase to 150 mg daily for two weeks then increase to 200 mg daily as tolerated.   Increase Lunesta to 2-3 mg at bedtime as needed for sleep. Be sure to devote at least 8 hours to sleep after taking before operating motor vehicle or other heavy or sharp equipment. Do NOT mix with alcohol. Do NOT take with Ativan or clonazepam/Klonopin.  Continue Klonopin/clonazepam 0.5-1 mg twice daily as needed for severe anxiety, fear of flying. Do NOT take with Lunesta.   Increase olanzapine to 10-15 mg at bedtime for sleep, mood, anxiety.  Can take additional 5 mg during day as needed for racing thoughts, agitation.   Don't exceed 20 mg total daily dose.   Continue hydroxyzine 12.5-25 mg at bedtime as needed for sleep.   Discussed safety planning   Have Wally hold onto all Rx medications and over the counter medications.   Follow up with me in 1 weeks.  For scheduling needs you can call [phone].  You could also get a message to the nurses by calling the aforementioned phone number.  Continue with DBT as planned.    7/21: Patient's trip to [place] is tomorrow.  Patient will go with family.  Patient plans on attending family trip.  Very anxious about flying.  Plans to utilize clonazepam.  Sleeping better-has been taking half a clonazepam tablet at night around 8 PM in order to get sleepy for bedtime.  Tolerating well with no major negative side effects.  Has not needed to use Lunesta.  Continues on olanzapine.  Has been notices improvements in patient.  Tolerating lamotrigine titration well.  No acute safety concerns today.  No problematic drug or alcohol use. See [facility] note below for additional details.     Per [facility], [facility], during today's team-based visit:  MH update:  Flying tomorrow.  Kids are driving into town to  "fly. Plans for PRN and distraction list.   Did pros and cons at DBT last week.  Stresses:  vacation tomorrow to [place] for a week  Appetite: n/a  Sleep: Better, Klonopin nightly.  Outpatient Provider updates: DBT. [facility].  SI/SIB/HI:  Improved.  Reports minimal passive fleeting thoughts at best.  \"Haven't even thought of them\". Safety plan on file  BRAD:  Denies  Side effects/compliance:  Interventions:  [facility] attempted to engage in discussion about DBT and anxiety mgmt skills.  Pt notes that she has trouble learning DBT skills in group and rarely applies them at home outside of distraction skills  Most important:  anxiety around flying tomorrow     Past Psychiatric Med Trials:  Psych Meds at Intake:  Paxil 20 mg daily - started when 29 years, has been up and down on the dose, on 10 mg dose mostly, last on 20 mg this past spring   Ambien 5 mg for insomnia - used for 4 nights  Ativan 0.5 mg for flying     Past Psych Meds:  Prozac for 1-2 months maybe when 29 yo    Psychiatric ROS:  Marbella Hendrix reports mood has been: See HPI above  Anxiety has been: See HPI above  Sleep has been: See HPI above  Rachna sxs: None  Psychosis sxs: None  ADHD/ADD sxs: see HPI above  PTSD sxs: NA  PHQ9 and GAD7 scores were reviewed today if completed.   Medication side effects: See HPI above  Current stressors include: Symptoms and see HPI above  Coping mechanisms and supports include: Family and Hobbies, therapy, DBT    Current medications include:   Current Outpatient Medications   Medication Sig Dispense Refill    acetaminophen (TYLENOL) 500 MG tablet Take 500-1,000 mg by mouth every 6 hours as needed for mild pain.      atorvastatin (LIPITOR) 20 MG tablet Take 1 tablet (20 mg) by mouth daily. 90 tablet 0    calcium carbonate (OS-ARA) 500 MG tablet Take 1 tablet by mouth daily.      cholecalciferol (VITAMIN D3) 25 mcg (1000 units) capsule Take 1 capsule (25 mcg) by mouth daily. 90 capsule 2    clonazePAM (KLONOPIN) 1 MG " tablet Take 0.5-1 tablets (0.5-1 mg) by mouth 2 times daily as needed for anxiety, sleep or agitation. 60 tabs to last 30 days 60 tablet 1    eszopiclone (LUNESTA) 1 MG tablet Take 1 tablet (1 mg) by mouth at bedtime. Take WITH 2 mg tab for total dose 3 mg at bedtime for sleep. 7 tablet 0    eszopiclone (LUNESTA) 2 MG tablet Take 1 tablet (2 mg) by mouth nightly as needed for sleep. 30 tablet 2    hydrOXYzine HCl (ATARAX) 25 MG tablet Take 0.5-1 tablets (12.5-25 mg) by mouth 2 times daily as needed for anxiety. 120 tablet 1    lamoTRIgine (LAMICTAL) 100 MG tablet Take 1 tablet (100 mg) by mouth daily. 30 tablet 1    levothyroxine (SYNTHROID/LEVOTHROID) 125 MCG tablet TAKE ONE TABLET BY MOUTH ONCE DAILY 90 tablet 0    melatonin 5 MG tablet Take 5 mg by mouth nightly as needed for sleep.      methylPREDNISolone (MEDROL DOSEPAK) 4 MG tablet therapy pack Follow Package Directions 21 tablet 0    naproxen sodium 220 MG capsule Take 220 mg by mouth 2 times daily (with meals).      OLANZapine (ZYPREXA) 10 MG tablet Take 1 tablet (10 mg) by mouth at bedtime. 30 tablet 2    OLANZapine (ZYPREXA) 5 MG tablet Take 1 tablet (5 mg) by mouth daily as needed (anxiety, agitation, sleep). OK to take WITH 10 mg tab at bedtime. 30 tablet 2    Omega-3 Fatty Acids (OMEGA-3 FISH OIL PO) Take 1 g by mouth daily       No current facility-administered medications for this visit.       The [place] Prescription Monitoring Program has been reviewed and there are no concerns about diversionary activity for controlled substances at this time.   06/13/2025 05/13/2025 1 Clonazepam 1 Mg Tablet 60.00 30 Al Bau [id number] Juan ([id number]) 0/1 4.00 [place]   06/05/2025 06/04/2025 1 Eszopiclone 2 Mg Tablet 30.00 30 Al Bau [id number] Juan ([id number]) 0/2 0.66 [place]   04/10/2025 04/10/2025 1 Lorazepam 0.5 Mg Tablet 20.00 5 Al Bau [id number] Sup ([id number]) 0/0 2.00 [place]   01/16/2025 11/22/2024 1 Eszopiclone 2 Mg Tablet 60.00 60 Al Banner [id number] Juan  ([id number]) 0/0 0.66 [place]   2024 1 Eszopiclone 2 Mg Tablet 30.00 30 Al Domu [id number] Sup ([id number]) 0/2 0.66 [place]     Past Medical/Surgical History:  Past Medical History:   Diagnosis Date    Arthritis     My mom has it, my grandmother had it I have it    Diabetes (H)     My mom and brother have type II    Heavy menstrual period     Leiomyoma of uterus     s/p myomectomy    Mental disorder     anxiety    PONV (postoperative nausea and vomiting)     Surgical complication after  2002    Thyroid disease     Hypothroidism, 2nd half of     Uncomplicated asthma     My son has it, since he was born    Vesico-ureteral reflux     s/p repair      has a past medical history of Arthritis, Diabetes (H), Heavy menstrual period, Leiomyoma of uterus, Mental disorder, PONV (postoperative nausea and vomiting), Surgical complication (after  2002), Thyroid disease, Uncomplicated asthma, and Vesico-ureteral reflux.    She has no past medical history of Cancer (H), Cerebral infarction (H), Congestive heart failure (H), COPD (chronic obstructive pulmonary disease) (H), Depressive disorder, Heart disease, History of blood transfusion, or Hypertension.    Social History:  Reviewed. No changes to social history except as noted above in HPI.    Vital Signs:   None. This is phone/video visit.     Labs:  Most recent laboratory results reviewed:  Recent Labs   Lab Test 25  0805 10/04/24  0740   CHOL 164 152   HDL 40* 32*   * 97   TRIG 94 117     TSH   Date Value Ref Range Status   2025 1.08 0.30 - 4.20 uIU/mL Final   2022 1.49 0.40 - 4.00 mU/L Final   2021 0.78 0.40 - 4.00 mU/L Final        Lab Results   Component Value Date    A1C 5.6 2025    A1C 5.8 10/04/2024    A1C 5.9 2024    A1C 5.7 2022    A1C 5.7 2021    A1C 5.6 2019    A1C 5.5 2018    A1C 5.5 2017      Last Comprehensive Metabolic Panel:  Sodium   Date  Value Ref Range Status   10/04/2024 142 135 - 145 mmol/L Final   10/22/2012 141 133 - 144 mmol/L Final     Potassium   Date Value Ref Range Status   10/04/2024 5.0 3.4 - 5.3 mmol/L Final   07/08/2022 4.4 3.4 - 5.3 mmol/L Final   10/22/2012 3.8 3.4 - 5.3 mmol/L Final     Chloride   Date Value Ref Range Status   10/04/2024 106 98 - 107 mmol/L Final   07/08/2022 108 94 - 109 mmol/L Final   10/22/2012 105 94 - 109 mmol/L Final     Carbon Dioxide   Date Value Ref Range Status   10/22/2012 25 20 - 32 mmol/L Final     Carbon Dioxide (CO2)   Date Value Ref Range Status   10/04/2024 24 22 - 29 mmol/L Final   07/08/2022 20 20 - 32 mmol/L Final     Anion Gap   Date Value Ref Range Status   10/04/2024 12 7 - 15 mmol/L Final   07/08/2022 10 3 - 14 mmol/L Final   10/22/2012 11 6 - 17 mmol/L Final     Glucose   Date Value Ref Range Status   10/04/2024 96 70 - 99 mg/dL Final   07/08/2022 93 70 - 99 mg/dL Final   07/01/2020 88 70 - 99 mg/dL Final     Comment:     Fasting specimen     Urea Nitrogen   Date Value Ref Range Status   10/04/2024 8.6 6.0 - 20.0 mg/dL Final   07/08/2022 18 7 - 30 mg/dL Final   10/22/2012 25 (H) 5 - 24 mg/dL Final     Creatinine   Date Value Ref Range Status   10/04/2024 0.84 0.51 - 0.95 mg/dL Final   08/28/2015 0.65 0.52 - 1.04 mg/dL Final     GFR Estimate   Date Value Ref Range Status   10/04/2024 81 >60 mL/min/1.73m2 Final     Comment:     eGFR calculated using 2021 CKD-EPI equation.   08/28/2015 >90  Non  GFR Calc   >60 mL/min/1.7m2 Final     Calcium   Date Value Ref Range Status   10/04/2024 9.7 8.8 - 10.4 mg/dL Final     Comment:     Reference intervals for this test were updated on 7/16/2024 to reflect our healthy population more accurately. There may be differences in the flagging of prior results with similar values performed with this method. Those prior results can be interpreted in the context of the updated reference intervals.   10/22/2012 9.2 8.5 - 10.4 mg/dL Final  "    Bilirubin Total   Date Value Ref Range Status   10/04/2024 0.3 <=1.2 mg/dL Final     Alkaline Phosphatase   Date Value Ref Range Status   10/04/2024 47 40 - 150 U/L Final     ALT   Date Value Ref Range Status   10/04/2024 25 0 - 50 U/L Final   11/20/2012 18.0 0.0 - 50.0 U/L Final     AST   Date Value Ref Range Status   10/04/2024 23 0 - 45 U/L Final   11/20/2012 25.0 0.0 - 45.0 U/L Final     Lab Results   Component Value Date    WBC 5.2 06/15/2023    WBC 6.6 02/04/2020     Lab Results   Component Value Date    RBC 4.43 06/15/2023    RBC 4.47 02/04/2020     Lab Results   Component Value Date    HGB 12.5 06/15/2023    HGB 12.0 04/21/2021     Lab Results   Component Value Date    HCT 37.4 06/15/2023    HCT 38.1 02/04/2020     No components found for: \"MCT\"  Lab Results   Component Value Date    MCV 84 06/15/2023    MCV 85 02/04/2020     Lab Results   Component Value Date    MCH 28.2 06/15/2023    MCH 28.2 02/04/2020     Lab Results   Component Value Date    MCHC 33.4 06/15/2023    MCHC 33.1 02/04/2020     Lab Results   Component Value Date    RDW 13.5 06/15/2023    RDW 14.0 02/04/2020     Lab Results   Component Value Date     06/15/2023     04/21/2021        Review of Systems:  10 systems (general, cardiovascular, respiratory, eyes, ENT, endocrine, GI, , M/S, neurological) were reviewed. Most pertinent finding(s) is/are: Some chronic pain. The remaining systems are all unremarkable.    Mental Status Examination (limited as this is by phone/video):  Appearance: Awake, alert, appears stated age, no acute distress, well-groomed   Attitude:  cooperative, pleasant  Motor: No notable psychomotor agitation at all, not restless or fidgety today  Oriented to:  person, place, time, and situation  Attention Span and Concentration: Intact  Speech: Clear, coherent, not pressured or rapid at all -actually a little slowed and soft in volume  Language: intact  Mood: anxious  Affect: Mood congruent  Associations:  " no loose associations  Thought Process: linear and goal directed  Thought Content:  no evidence of acute suicidality or homicidal ideation, no evidence of psychotic thought, no auditory hallucinations present and no visual hallucinations present  Recent and Remote Memory:  Intact to interview. Not formally assessed.   Fund of Knowledge: appropriate  Insight: Fair to good  Judgment:  intact, adequate for safety  Impulse Control:  intact    Suicide Risk Assessment:  Marbella Hendrix has history of passive thoughts of death but no acute suicidality or plan or intent to harm self.  See safety plan in chart.  Family is protective.  Also see thorough assessment by [facility] [place] during previous appointments.  Based on all available evidence including the factors cited above, Marbella Hendrix does not appear to be at imminent risk for self-harm, does not meet criteria for a 72-hr hold, and therefore remains appropriate for ongoing outpatient level of care.  A thorough assessment of risk factors related to suicide and self-harm have been reviewed and are noted above. The patient convincingly denies suicidality on several occasions. Local community safety resources reviewed for patient to use if needed. There was no deceit detected, and the patient presented in a manner that was believable.     DSM5 Diagnosis:  Attention-Deficit/Hyperactivity Disorder  314.01 (F90.9) Unspecified Attention -Deficit / Hyperactivity Disorder  Bipolar II Disorder, currently depressed  300.02 (F41.1) Generalized Anxiety Disorder  Insomnia, unspecified    Medical comorbidities include:   Patient Active Problem List    Diagnosis Date Noted    Left foot pain 07/11/2025     Priority: Medium    Sinus tarsitis, left 07/11/2025     Priority: Medium    Moderate obstructive sleep apnea 04/30/2025     Priority: Medium    Moderate episode of recurrent major depressive disorder (H) 03/20/2025     Priority: Medium    Elevated fasting glucose 05/01/2024      Priority: Medium    Anxiety 07/19/2023     Priority: Medium    Adjustment disorder with anxious mood 06/27/2021     Priority: Medium    Family history of diabetes mellitus 06/27/2021     Priority: Medium    CMC DJD(carpometacarpal degenerative joint disease), localized primary, left 11/18/2020     Priority: Medium    Dermatochalasis of both upper eyelids 11/17/2020     Priority: Medium     Added automatically from request for surgery [id number]      Involutional ptosis, acquired, bilateral 11/17/2020     Priority: Medium     Added automatically from request for surgery [id number]      Weakness of right hip 07/09/2020     Priority: Medium    Primary osteoarthritis of right hip 12/02/2019     Priority: Medium     Added automatically from request for surgery [id number]      Morbid obesity (H) 06/20/2018     Priority: Medium    Status post hysterectomy 08/02/2017     Priority: Medium    Sensation of plugged ear on both sides 07/12/2017     Priority: Medium    Acute post-operative pain 08/28/2015     Priority: Medium    Preventative health care 10/30/2012     Priority: Medium     Last pap NIL 2011; mammogram nl 2011; lipids abnl, needs annual f/u      Generalized anxiety disorder 09/26/2012     Priority: Medium     H/o panic attacks; currently controlled with paxil      Hyperlipidemia LDL goal <130 09/26/2012     Priority: Medium     Behavioral measures - avoiding statins while trying to conceive; not a candidate for red yeast rice (interactions with thyroid meds)      Acquired hypothyroidism 09/26/2012     Priority: Medium    Overweight 09/26/2012     Priority: Medium     On exercise plan, has been in weight watchers  Problem list name updated by automated process. Provider to review         Psychosocial & Contextual Factors: see HPI above    Assessment:  From Intake, 7/17/2023:  Marbella Hendrix is a 57-year-old female with past psychiatric history including anxiety, ADHD, depression, remote polysubstance abuse (in remission  for decades) who presents today for psychiatric evaluation.  Patient recently with increased psychosocial stressors and acutely worsening anxiety and insomnia.  Patient most recently on Paxil and Ambien to manage symptoms.  Ambien used sparingly and did help break severe insomnia cycle recently.  Patient currently taking 10 mg of Paxil and has only ever been up to 20 mg historically.  Patient may be interested in a trial with something different than Paxil.  We also discussed further optimizing Paxil to 30 or 40 mg daily for more therapeutic trial before replacing the medication.  We did discuss Lexapro and Effexor-XR as possible alternatives.  Patient would like to discuss these options with her  who is an internist and her psychotherapist.  In the meantime, we discussed a trial with clonidine to help with sleep, anxiety, and ADHD symptoms.  Discussed risks and benefits of therapy.  Patient would like to move forward with a clonidine trial to help at bedtime as needed for sleep and a small dose during the day as needed for anxiety.  No acute safety concerns today.  No acute suicidality.  No problematic drug or alcohol use.     8/22/2023:  Patient with ongoing significant anxiety and mood related struggles.  Clonidine has not been helpful.  We will transition patient off paroxetine and onto venlafaxine.  We will continue to consider escitalopram as an option if venlafaxine ineffective or poorly tolerated.  Could also consider use of fluoxetine if paroxetine is difficult to discontinue.  Discussed DBT therapy as a potential option to help improve symptoms.  Resources sent in [employer] message.  No acute suicidality.  No acute safety concerns.  No problematic drug or alcohol use.    10/3/2023:  Patient doing okay with transition to venlafaxine ER.  Has had some mild dizziness and headaches.  We will continue to optimize venlafaxine ER and patient will watch for worsening headaches and dizziness.  Her symptoms  could be related to discontinuation of paroxetine.  Patient still struggling significantly in the evenings and with insomnia.  We will trial quetiapine at bedtime as needed for sleep.  Discussed risks and benefits of therapy including watching for any abnormal involuntary movements.  If patient continues on the medication, we will make sure to get updated lipid panel and fasting glucose.  Could also consider mirtazapine as an option.  No acute safety concerns.  No acute suicidality.  No problematic drug or alcohol use.    11/14/2023:  Patient continuing to work with sleep medicine with pending at home sleep study results.  Quetiapine has been helpful for sleep at just 12.5 mg at bedtime.  Does cause some significant cognitive clouding during the day.  Patient does feel benefits outweigh risks but is willing to trial olanzapine to see if gets similar benefit without such heavy cognitive effects.  Venlafaxine has been helpful for mood and anxiety symptoms.  Still could consider mirtazapine as an option for sleep as needed.  No acute safety concerns.  No SI.  No problematic drug or alcohol use.    1/2/2024:  Patient currently doing quite well on olanzapine.  Finds it more helpful and better tolerated than quetiapine.  Discussed risks and benefits and side effects to watch out for.  Mild constipation-encouraged to utilize MiraLAX and/or Dulcolax.  No acute safety concerns.  No SI.  No problematic drug or alcohol use.  No abnormal involuntary movements.    4/8/2024:  Apart from some of the metabolic effects of olanzapine, patient otherwise quite stable.  Patient opts to try to address metabolic side effects of olanzapine with metformin.  Discussed there is evidence to support use of metformin to help treat and also to help decrease risk for metabolic syndrome from the medication use.  Discussed risks and benefits of metformin use.  Due to stability of mental health symptoms, psychiatric care be returned back to primary  care provider.  Also encouraged primary care provider to continue to monitor metabolic status and need for metformin.  Patient continues to watch diet and stay active.  Updated labs ordered for primary care provider for baseline since starting metformin XR.  Last liver enzymes looked okay when checked in June 2023.  Patient also denies any abnormal involuntary movements.  No acute safety concerns.  No SI.  No problematic drug or alcohol use.    9/11/2024 (New episode of care initiated today after bounce-back):  Patient struggling more significantly with anxiety and insomnia.  Even up to 10 mg of olanzapine was not very helpful.  Will discontinue olanzapine and trial mirtazapine.  Ambien is effective and so patient instructed to take before bedtime nightly for 1-2 weeks to allow for reregulation of sleep schedule.  Could consider further optimization of Effexor-XR in the future as well.  No acute safety concerns.  No acute suicidality, although does have some passive fleeting thoughts with no plan or intent to harm self.  No problematic drug or alcohol use.  Psychotherapy encouraged.    9/24/2024:  Patient with severely worsening anxiety.  Started clonazepam scheduled since last visit.  This will be continued since to started yesterday.  Venlafaxine will be increased to 225 mg daily.  Patient scheduled for intake for PHP/IOP tomorrow.  Patient should not be traveling in her current condition and also to allow time for intensive outpatient programming.  Letter was provided due to their upcoming travel plans.  No acute safety concerns.  No acute suicidality.  No problematic drug or alcohol use.    10/02/2024:  Patient doing a little bit better on decreased venlafaxine and daily clonazepam.  No changes today.  Patient will continue to monitor mood/depression symptoms  Hopefully will be able to start intensive outpatient programming soon.  No acute safety concerns.  No acute suicidality.  No problematic drug or alcohol  use.    10/29/2024:  Overall continuing to feel improvement of symptoms.  Taking clonazepam 1.5 mg at bedtime for sleep.  Worried about not being able to have clonazepam and not sleeping again.  For now clonazepam will be continued.  Patient is agreeable to starting buspirone to see if it might further improve anxiety to at some point reduce reliance on clonazepam.  Encouraged to continue in IOP.  No acute safety concerns.  No SI.  No problematic drug or alcohol use.    12/10/2024:  Patient overall doing quite well.  He feels back to a baseline.  Still some ongoing anxiety but much more manageable and sleeping well.  Using olanzapine, hydroxyzine, and as needed Lunesta at bedtime to help with sleep.  Tolerating well but does have some increased appetite and weight gain from olanzapine.  Patient has not yet started metformin that was sent to help mitigate metabolic effects from olanzapine.  Patient also with slightly elevated hemoglobin A1c.  Patient will now start metformin.  May be a good candidate for GLP-1 injections, like tirzepatide.  No acute safety concerns.  No SI.  No problematic drug or alcohol use.    1/7/2025:  Patient appearing hypomanic today.  Discussed suspicions for bipolar disorder with both patient and .  Patient with history of episodes as an adolescent/young adult while abusing stimulants that seem consistent with manic episodes.   has noted episodes that seem consistent with mixed episodes of bipolar disorder.  Patient with most recent suspected mixed episode.  Now suspicions for hypomania after coming off clonazepam and with stressor leading to lack of sleep.  We will increase olanzapine but may need to consider decreasing or discontinuing venlafaxine.  No acute safety concerns.  No SI.  No problematic drug or alcohol use.    1/21/2025:  Patient overall with some appropriately sustained elevation in mood.  Patient does not seem as elevated as last visit.  No signs of depression.   Less psychomotor agitation.  No longer with pressured speech.  Getting roughly 6 hours of sleep a night.  Discussed continuing to monitor sleep closely.  If patient with signs or symptoms of mixed episode or hypomania again in the future, we may need to consider decreasing, or finding an alternative to, the venlafaxine.  Patient will continue on olanzapine 10 mg at bedtime.  Could consider lamotrigine in the future if mood were to crash while on venlafaxine, or if venlafaxine again becomes too activating despite presence of olanzapine.  We will get updated fasting labs in about 1 month.  No acute safety concerns.  No suicidality.  No problematic drug or alcohol use.  Will continue to monitor weight.  No noted abnormal involuntary movements today on camera.  None noted by patient.    3/4/2025:  Patient overall feeling relatively stable.  No signs of garrett today.  No major depression.  Has been taking 20 mg of olanzapine at bedtime with increased appetite, weight gain, evening fatigue and sedation.  Discussed decreasing to 15 mg at bedtime for a few days at least before decreasing to 10 mg at bedtime.  Could still consider slightly reducing venlafaxine in the future, particularly if sleep trouble is still an issue when tapering down olanzapine.  Discussed I would advocate for patient to start GLP-1 injection, Zepbound, if primary care provider felt appropriate and was agreeable to prescribing.  No acute safety concerns.  No SI.  No problematic drug or alcohol use.  Fasting labs ordered but not yet drawn.  Of note, patient will start treating sleep apnea-picks up CPAP tomorrow.    4/22/2025:  Patient overall doing okay.  We will continue to reduce dose of venlafaxine ER/Effexor-XR in the case it could be too activating for patient and worsening anxiety and sleep issues.  Continuing to monitor for bipolar spectrum illness.  Could consider replacing venlafaxine with low-dose fluoxetine or sertraline.  For now we will try  away from noradrenergic medication.  Currently on olanzapine 10 mg and sleeping okay at bedtime.  Recent fasting labs unremarkable.  Patient hopeful to start tirzepatide soon.  No longer using Lunesta.  No acute safety concerns.  No SI.  No problematic drug or alcohol use.    6/4/2025:  Overall continuing to struggle pretty significantly.  High anxiety and ruminations.  Patient did not appear manic or hypomanic today.  Transitioning off venlafaxine.  We will continue to optimize sertraline while monitoring for signs of over-activation or garrett.  Encouraged to utilize Klonopin as needed within the bounds of the prescription.  Patient is sleeping okay when using Lunesta and olanzapine 10 mg nightly.  Starts DBT tomorrow.  I was able to connect with patient's therapist today.  Diagnostic clarity still a little unclear-severe/neurotic anxiety versus bipolar disorder versus axis II pathology versus ADHD/neurodivergence vs multiple etiologies.  No acute suicidality.  No problematic drug or alcohol use.  Patient will be seen back in 3 weeks.    6/27/2025:  Patient pretty significantly depressed.  This crash has occurred following hypomanic episode.  Discussed today that we will be moving forward with a working diagnosis of bipolar disorder.  Lamotrigine titration started between visits for bipolar depression.  We will reduce sertraline to 100 mg daily to reduce any possible activating effects that it is having in the context of bipolar disorder.  May continue to taper off based on response to lamotrigine.  Patient with metabolic effects from olanzapine.  Discussed we could consider transitioning olanzapine to Abilify at future visit.  Could also consider lithium as an option as well.  Briefly discussed ECT is also a treatment option for bipolar depression symptoms.  Patient would like to move forward with lamotrigine titration.  Tolerating well so far with no negative side effects.  Encouraged to continue DBT.  Patient  will be seen back in 2-3 weeks.  No acute suicidality.  No acute safety concerns.  No problematic drug or alcohol use.    7/14/2025:  Patient has continued to struggle.  Very high anxiety due to upcoming trip out of state.  Patient will continue to discuss what is best with her  as the trip continues to approach.  We will continue lamotrigine titration.  We will taper completely off sertraline.  Discussed importance of sleep.  Patient not sleeping well last few nights, particularly as this trip gets closer.  Discussed increasing olanzapine and Lunesta as needed to ensure good sleep.  Patient with passive thoughts of death today. Made a provocative statement about wanting to take all her meds and just go to sleep forever. Pt denied any intent to harm herself today.  Patient agreeable to taking a nap since only got a few hours of sleep last night.  Patient also agreeable to give  her bottles of medication and also verbalized permission and desire for me to call  and let me know about the safety plan.  Voicemail left for patient's  to review [employer] message with patient tonight.  Patient's  has previously sent messages from patient Earnestjud and patient has been agreeable to allowing access to [employer] to her .  Patient appreciates the support of her  and has been open with him.  Patient reminded about the [facility] unit at [facility] in the case her symptoms worsen and if she has more active suicidal thoughts.  Patient denied any other methods to harm herself other than overdose.  Family is protective.  Continues in DBT, however, attendance has been more difficult with not sleeping as well.  Discussed I would like to see patient back in 1 week.  Letter sent to [employer] to give to DBT team for the appointment.  No problematic drug or alcohol use.    Of note, patient also with recent prescription for steroid, methylprednisolone for plantar fasciitis.  Message sent to  patient regarding steroid use and bipolar disorder.  Will continue to monitor.    7/21/2025:  Pt with slight improvement in anxiety and sleep since last visit.  Still quite high since traveling to [place] tomorrow.  Patient with high travel anxiety, especially airplane anxiety.  Patient will utilize clonazepam to help with anxiety.  Discussed reducing and discontinuing sertraline upon return from vacation.  Patient will continue with lamotrigine titration.  Utilizing clonazepam more often and finding it helpful and well-tolerated.  Could possibly look at discontinuing hydroxyzine and Lunesta if clonazepam continues to be helpful in order to reduce polypharmacy.  Discussed safety planning with both patient and  today regarding recommendation to lock all medications up until patient consistently feeling better and sleeping well.  No suicidality today.  No problematic drug or alcohol use.  Ongoing DBT encouraged.    Medication side effects and alternatives were reviewed. Health promotion activities recommended and reviewed today. All questions addressed. Education and counseling completed regarding risks and benefits of medications and psychotherapy options. Recommend therapy for additional support.     Treatment Plan:  Decrease/discontinue sertraline/Zoloft:  Take 50 mg daily for 3-5 days then trial discontinuation (discussed that sertraline, and similar medications, can worsen bipolar disorder).   Continue lamotrigine titration for bipolar depression:   Take 100 mg daily for two weeks then increase to 150 mg daily for two weeks then increase to 200 mg daily as tolerated.   Continue Lunesta to 2-3 mg at bedtime as needed for sleep. Be sure to devote at least 8 hours to sleep after taking before operating motor vehicle or other heavy or sharp equipment. Do NOT mix with alcohol. Do NOT take with Ativan or clonazepam/Klonopin.  Continue Klonopin/clonazepam 0.5-1 mg twice daily as needed for severe anxiety, fear  of flying. Do NOT take with Lunesta.   Continue olanzapine/Zyprexa to 10-15 mg at bedtime for sleep, mood, anxiety.  Can take additional 5 mg during day as needed for racing thoughts, agitation.   Don't exceed 20 mg total daily dose.   Continue hydroxyzine 12.5-25 mg at bedtime as needed for sleep.   Ok to trial stopping  Discussed safety planning again:  Have Wally hold onto all Rx medications and over the counter medications, but especially controlled substances.   Follow up with me in 4-6 weeks.  For scheduling needs you can call [phone].  You could also get a message to the nurses by calling the aforementioned phone number.  Continue with DBT as planned.  Continue all other cares per primary care provider.   Continue all other medications as reviewed per electronic medical record today.   Safety plan reviewed. To the Emergency Department as needed or call after hours crisis line at [phone] or [phone]. [place] Crisis Text Line. Text [place] to 398638 or Suicide LifeLine Chat: suicidepreventionlifeline.org/chat  Follow up with primary care provider as planned or for acute medical concerns.  Fit Steps may be used to communicate with your provider, but this is not intended to be used for emergencies.    Have previously discussed Lamictal (lamotrigine) can cause serious rashes including Cha-Davey syndrome which may requiring hospitalization and discontinuation of treatment. If any signs of a rash occur, please see your Primary Care Provider or a dermatologist immediately.      Have previously discussed risks for neuroleptic medication use (olanzapine/Zyprexa) including but not limited to possibility for movement disorders such as restlessness, abnormal and involuntary movements that could be lasting even after stopping the medication, muscle stiffness.  Also discussed risks for weight gain and cholesterol and blood sugar abnormalities and the need for monitoring.    Risks of benzodiazepine (Ativan,  Xanax, Klonopin, Valium, etc) use including, but not limited to, sedation, tolerance, risk for addiction/dependence. Do not drink alcohol while taking benzodiazepines due to risk of trouble breathing and potential death. Do not drive or operate heavy machinery until it is known how the drug affects you. Discuss with physician or pharmacist before ever taking a benzodiazepine with a narcotic/opioid pain medication.     Administrative Billing:   Phone Call/Video Duration: 23 Minutes  Start: 3:35p  Stop: 3:58p    Patient Status:  Patient is a continuous care patient.     The longitudinal plan of care for the diagnosis(es)/condition(s) as documented were addressed during this visit. Due to the added complexity in care, I will continue to support Mar[place]ella in the subsequent management and with ongoing continuity of care.    Signed:   Christi Lewis DO  [facility]    Disclaimer: This note consists of symbols derived from keyboarding, dictation and/or voice recognition software. As a result, there may be errors in the script that have gone undetected. Please consider this when interpreting information found in this chart.

## 2025-07-21 NOTE — NURSING NOTE
Is the patient currently in the state of [place]? YES    Current patient location: [address] [place] [address] [facility] [address]    Visit mode:Video    If the visit is dropped, the patient can be reconnected by: VIDEO VISIT: Text to cell phone:   Telephone Information:   Mobile [phone]       Will anyone else be joining the visit? No  (If patient encounters technical issues they should call [phone])    Are changes needed to the allergy or medication list? Pt stated no changes to allergies and Pt stated no med changes    Are refills needed on medications prescribed by this physician? No    Rooming Documentation: Questionnaire(s) completed.    Reason for visit: RECHECK     Dinora Finch, VVF

## 2025-07-21 NOTE — PROGRESS NOTES
"Virtual Visit Details    Type of service:  Video Visit     Originating Location (pt. Location): {video visit patient location:319787::\"Home\"}  {PROVIDER LOCATION On-site should be selected for visits conducted from your clinic location or adjoining [facility] hospital, academic office, or other nearby [facility] building. Off-site should be selected for all other provider locations, including home:733069}  Distant Location (provider location):  {virtual location provider:739325}  Platform used for Video Visit: {Virtual Visit Platforms:661903::\"[employer]\"}                " ACP

## 2025-07-22 ENCOUNTER — MYC MEDICAL ADVICE (OUTPATIENT)
Dept: PSYCHIATRY | Facility: CLINIC | Age: 60
End: 2025-07-22
Payer: COMMERCIAL

## 2025-07-22 DIAGNOSIS — F39 MOOD DISORDER: ICD-10-CM

## 2025-07-22 RX ORDER — LAMOTRIGINE 100 MG/1
TABLET ORAL
Qty: 173 TABLET | Refills: 0 | Status: SHIPPED | OUTPATIENT
Start: 2025-07-22 | End: 2025-10-20

## 2025-07-22 NOTE — TELEPHONE ENCOUNTER
Patient is requesting that the Lamotrigine 200 mg be sent to the pharmacy now even though she doesn't need it yet.     It's a mail order pharmacy.     Visit from 7/21/25: Decrease/discontinue sertraline/Zoloft:  Take 50 mg daily for 3-5 days then trial discontinuation (discussed that sertraline, and similar medications, can worsen bipolar disorder).   Continue lamotrigine titration for bipolar depression:   Take 100 mg daily for two weeks then increase to 150 mg daily for two weeks then increase to 200 mg daily as tolerated.     Aura Khan RN on 7/22/2025 at 9:16 AM

## 2025-07-25 SDOH — HEALTH STABILITY: PHYSICAL HEALTH: ON AVERAGE, HOW MANY MINUTES DO YOU ENGAGE IN EXERCISE AT THIS LEVEL?: 60 MIN

## 2025-07-25 SDOH — HEALTH STABILITY: PHYSICAL HEALTH: ON AVERAGE, HOW MANY DAYS PER WEEK DO YOU ENGAGE IN MODERATE TO STRENUOUS EXERCISE (LIKE A BRISK WALK)?: 7 DAYS

## 2025-07-25 ASSESSMENT — SOCIAL DETERMINANTS OF HEALTH (SDOH): HOW OFTEN DO YOU GET TOGETHER WITH FRIENDS OR RELATIVES?: ONCE A WEEK

## 2025-07-30 ENCOUNTER — OFFICE VISIT (OUTPATIENT)
Dept: FAMILY MEDICINE | Facility: CLINIC | Age: 60
End: 2025-07-30
Payer: COMMERCIAL

## 2025-07-30 VITALS
HEART RATE: 69 BPM | BODY MASS INDEX: 32.27 KG/M2 | HEIGHT: 64 IN | DIASTOLIC BLOOD PRESSURE: 71 MMHG | SYSTOLIC BLOOD PRESSURE: 109 MMHG | OXYGEN SATURATION: 96 % | TEMPERATURE: 97.3 F | RESPIRATION RATE: 16 BRPM | WEIGHT: 189 LBS

## 2025-07-30 DIAGNOSIS — E78.5 HYPERLIPIDEMIA LDL GOAL <130: ICD-10-CM

## 2025-07-30 DIAGNOSIS — Z00.00 ENCOUNTER FOR ROUTINE ADULT HEALTH EXAMINATION WITHOUT ABNORMAL FINDINGS: Primary | ICD-10-CM

## 2025-07-30 DIAGNOSIS — R73.01 ELEVATED FASTING GLUCOSE: ICD-10-CM

## 2025-07-30 DIAGNOSIS — E03.9 ACQUIRED HYPOTHYROIDISM: ICD-10-CM

## 2025-07-30 PROCEDURE — 90746 HEPB VACCINE 3 DOSE ADULT IM: CPT | Performed by: FAMILY MEDICINE

## 2025-07-30 PROCEDURE — 90677 PCV20 VACCINE IM: CPT | Performed by: FAMILY MEDICINE

## 2025-07-30 PROCEDURE — 90472 IMMUNIZATION ADMIN EACH ADD: CPT | Performed by: FAMILY MEDICINE

## 2025-07-30 PROCEDURE — 1125F AMNT PAIN NOTED PAIN PRSNT: CPT | Performed by: FAMILY MEDICINE

## 2025-07-30 PROCEDURE — 3074F SYST BP LT 130 MM HG: CPT | Performed by: FAMILY MEDICINE

## 2025-07-30 PROCEDURE — 99396 PREV VISIT EST AGE 40-64: CPT | Mod: 25 | Performed by: FAMILY MEDICINE

## 2025-07-30 PROCEDURE — 90471 IMMUNIZATION ADMIN: CPT | Performed by: FAMILY MEDICINE

## 2025-07-30 PROCEDURE — 3078F DIAST BP <80 MM HG: CPT | Performed by: FAMILY MEDICINE

## 2025-07-30 RX ORDER — ATORVASTATIN CALCIUM 20 MG/1
20 TABLET, FILM COATED ORAL DAILY
Qty: 90 TABLET | Refills: 4 | Status: SHIPPED | OUTPATIENT
Start: 2025-07-30

## 2025-07-30 RX ORDER — LEVOTHYROXINE SODIUM 125 UG/1
125 TABLET ORAL DAILY
Qty: 90 TABLET | Refills: 4 | Status: SHIPPED | OUTPATIENT
Start: 2025-07-30

## 2025-07-30 ASSESSMENT — PAIN SCALES - GENERAL: PAINLEVEL_OUTOF10: SEVERE PAIN (10)

## 2025-07-30 ASSESSMENT — PATIENT HEALTH QUESTIONNAIRE - PHQ9
SUM OF ALL RESPONSES TO PHQ QUESTIONS 1-9: 9
10. IF YOU CHECKED OFF ANY PROBLEMS, HOW DIFFICULT HAVE THESE PROBLEMS MADE IT FOR YOU TO DO YOUR WORK, TAKE CARE OF THINGS AT HOME, OR GET ALONG WITH OTHER PEOPLE: SOMEWHAT DIFFICULT
SUM OF ALL RESPONSES TO PHQ QUESTIONS 1-9: 9

## 2025-07-30 NOTE — PROGRESS NOTES
"Preventive Care Visit  [facility]  Vanessa Ladd MD, Family Medicine  Jul 30, 2025      Assessment & Plan     Encounter for routine adult health examination without abnormal findings  Discussed diet,calcium,exercise.Went over self breast exam.Thin prep was NOT done.Eyes and teeth UTD.No immunizations needed today.See orders below for tests ordered and screening needed.    - REVIEW OF HEALTH MAINTENANCE PROTOCOL ORDERS  - Pneumococcal 20 Valent Conjugate (PCV20)    Acquired hypothyroidism  Refilled recent TSh checked in April was normal   - levothyroxine (SYNTHROID/LEVOTHROID) 125 MCG tablet; Take 1 tablet (125 mcg) by mouth daily.    Hyperlipidemia LDL goal <130  She also had lipid panel checked in April and was normal , so will continue taking her lipitor at the current dose , will plan to recheck again in October , refilled it for now   - atorvastatin (LIPITOR) 20 MG tablet; Take 1 tablet (20 mg) by mouth daily.  - Lipid panel reflex to direct LDL Fasting; Future  - Comprehensive metabolic panel (BMP + Alb, Alk Phos, ALT, AST, Total. Bili, TP); Future    Elevated fasting glucose  Will screen in October or 6 months form the past refill   - Hemoglobin A1c; Future  Patient has been advised of split billing requirements and indicates understanding: Yes    BMI  Estimated body mass index is 32.7 kg/m  as calculated from the following:    Height as of this encounter: 1.619 m (5' 3.75\").    Weight as of this encounter: 85.7 kg (189 lb).       Depression Screening Follow Up        7/30/2025    12:02 PM   PHQ   PHQ-9 Total Score 9    Q9: Thoughts of better off dead/self-harm past 2 weeks Several days   F/U: Thoughts of suicide or self-harm Yes   F/U: Self harm-plan No   F/U: Self-harm action No   F/U: Safety concerns No       Patient-reported           Follow Up Actions Taken  Referred patient back to mental health provider    Discussed the following ways the patient can remain in a safe environment:  " be around others  Counseling  Appropriate preventive services were addressed with this patient via screening, questionnaire, or discussion as appropriate for fall prevention, nutrition, physical activity, Tobacco-use cessation, social engagement, weight loss and cognition.  Checklist reviewing preventive services available has been given to the patient.  Reviewed patient's diet, addressing concerns and/or questions.   She is at risk for psychosocial distress and has been provided with information to reduce risk.   Reviewed preventive health counseling, as reflected in patient instructions       Regular exercise       Healthy diet/nutrition       Vision screening       Hearing screening    Pt is seeing psychiatry for her mental health care and medication prescriptions     Adam Tyson is a 59 year old, presenting for the following:  Physical        7/30/2025     1:14 PM   Additional Questions   Roomed by Trav DALAL        HPI       Advance Care Planning            7/25/2025   General Health   How would you rate your overall physical health? Good   Feel stress (tense, anxious, or unable to sleep) Very much   (!) STRESS CONCERN      7/25/2025   Nutrition   Three or more servings of calcium each day? (!) NO   Diet: Carbohydrate counting    Other   If other, please elaborate: Limited dairy. No red meat.   How many servings of fruit and vegetables per day? (!) 2-3   How many sweetened beverages each day? 0-1       Multiple values from one day are sorted in reverse-chronological order         7/25/2025   Exercise   Days per week of moderate/strenous exercise 7 days   Average minutes spent exercising at this level 60 min         7/25/2025   Social Factors   Frequency of gathering with friends or relatives Once a week   Worry food won't last until get money to buy more No   Food not last or not have enough money for food? No   Do you have housing? (Housing is defined as stable permanent housing and does not include staying  "outside in a car, in a tent, in an abandoned building, in an overnight shelter, or couch-surfing.) Yes   Are you worried about losing your housing? No   Lack of transportation? No   Unable to get utilities (heat,electricity)? No         2025   Fall Risk   Fallen 2 or more times in the past year? No   Trouble with walking or balance? No          2025   Dental   Dentist two times every year? Yes       Today's PHQ-9 Score:       2025    12:02 PM   PHQ-9 SCORE   PHQ-9 Total Score MyChart 9 (Mild depression)   PHQ-9 Total Score 9        Patient-reported         2025   Substance Use   Alcohol more than 3/day or more than 7/wk Not Applicable   Do you use any other substances recreationally? No     Social History     Tobacco Use    Smoking status: Former     Current packs/day: 0.00     Types: Cigarettes     Quit date: 1990     Years since quittin.6     Passive exposure: Never    Smokeless tobacco: Never   Vaping Use    Vaping status: Never Used   Substance Use Topics    Alcohol use: Not Currently     Comment: One or two drinks a month    Drug use: Not Currently     Types: Cocaine, Marijuana, Methamphetamines, Opiates, Amphetamines, Barbiturates, \"Crack\" cocaine, Codeine, Hashish, Hydrocodone, LSD, Morphine, Opium, Oxycodone     Comment: nothing since 2024   LAST FHS-7 RESULTS   1st degree relative breast or ovarian cancer No   Any relative bilateral breast cancer No   Any male have breast cancer No   Any ONE woman have BOTH breast AND ovarian cancer No   Any woman with breast cancer before 50yrs No   2 or more relatives with breast AND/OR ovarian cancer No   2 or more relatives with breast AND/OR bowel cancer No        Mammogram Screening - Mammogram every 1-2 years updated in Health Maintenance based on mutual decision making        2025   STI Screening   New sexual partner(s) since last STI/HIV test? No     History of abnormal Pap smear: Status post hysterectomy with " removal of cervix and no history of CIN2 or greater or cervical cancer. Health Maintenance and Surgical History updated.        2014    12:00 AM 4/3/2012    12:00 AM 2011    12:00 AM   PAP / HPV   PAP (Historical)   NIL    PAP-ABSTRACT See Scanned Document  See Scanned Document       ASCVD Risk   The 10-year ASCVD risk score (Arsalan HUERTA, et al., 2019) is: 2.6%    Values used to calculate the score:      Age: 59 years      Sex: Female      Is Non- : No      Diabetic: No      Tobacco smoker: No      Systolic Blood Pressure: 120 mmHg      Is BP treated: No      HDL Cholesterol: 40 mg/dL      Total Cholesterol: 164 mg/dL           Reviewed and updated as needed this visit by Provider                    Past Medical History:   Diagnosis Date    Arthritis     My mom has it, my grandmother had it I have it    Diabetes (H)     My mom and brother have type II    Heavy menstrual period     Leiomyoma of uterus     s/p myomectomy    Mental disorder     anxiety    PONV (postoperative nausea and vomiting)     Surgical complication after   sept    Thyroid disease     Hypothroidism, 2nd half of     Uncomplicated asthma     My son has it, since he was born    Vesico-ureteral reflux     s/p repair     Past Surgical History:   Procedure Laterality Date    ABDOMEN SURGERY      exploratory after C section for sepsis     SECTION      x 2    HYSTERECTOMY RADICAL  2015    HYSTERECTOMY SUPRACERVICAL N/A 2015    Procedure: HYSTERECTOMY SUPRACERVICAL;  Surgeon: Kelle Wasserman MD;  Location: [facility] OR    LUMPECTOMY BREAST      MYOMECTOMY UTERUS      ORTHOPEDIC SURGERY  2020    3-18-1, Nancy date R hip replacement, March L meniscus repair    REPAIR PTOSIS Right     reconstruction of tear duct    REPAIR PTOSIS BILATERAL Bilateral 2020    Procedure: BILATERAL PTOSIS REPAIR, IRRIGATION OF LACRIMAL SYSTEM BILATERAL;  Surgeon: Pako Sosa  MD;  Location:  OR    SOFT TISSUE SURGERY  2021    L meniscus tear repair     Lab work is in process  Labs reviewed in EPIC  BP Readings from Last 3 Encounters:   25 109/71   25 120/72   24 134/87    Wt Readings from Last 3 Encounters:   25 85.7 kg (189 lb)   25 90.7 kg (200 lb)   25 90.7 kg (200 lb)                  Patient Active Problem List   Diagnosis    Generalized anxiety disorder    Hyperlipidemia LDL goal <130    Acquired hypothyroidism    Overweight    Preventative health care    Acute post-operative pain    Status post hysterectomy    Morbid obesity (H)    Sensation of plugged ear on both sides    Primary osteoarthritis of right hip    Weakness of right hip    Dermatochalasis of both upper eyelids    Involutional ptosis, acquired, bilateral    CMC DJD(carpometacarpal degenerative joint disease), localized primary, left    Adjustment disorder with anxious mood    Family history of diabetes mellitus    Anxiety    Elevated fasting glucose    Moderate episode of recurrent major depressive disorder (H)    Moderate obstructive sleep apnea    Left foot pain    Sinus tarsitis, left     Past Surgical History:   Procedure Laterality Date    ABDOMEN SURGERY      exploratory after C section for sepsis     SECTION      x 2    HYSTERECTOMY RADICAL  2015    HYSTERECTOMY SUPRACERVICAL N/A 2015    Procedure: HYSTERECTOMY SUPRACERVICAL;  Surgeon: Kelle Wasserman MD;  Location:  OR    LUMPECTOMY BREAST      MYOMECTOMY UTERUS      ORTHOPEDIC SURGERY  2020    3-18-1, Nancy date R hip replacement, March L meniscus repair    REPAIR PTOSIS Right     reconstruction of tear duct    REPAIR PTOSIS BILATERAL Bilateral 2020    Procedure: BILATERAL PTOSIS REPAIR, IRRIGATION OF LACRIMAL SYSTEM BILATERAL;  Surgeon: Pako Sosa MD;  Location:  OR    SOFT TISSUE SURGERY  2021    L meniscus tear repair       Social History     Tobacco  Use    Smoking status: Former     Current packs/day: 0.00     Types: Cigarettes     Quit date: 1990     Years since quittin.6     Passive exposure: Never    Smokeless tobacco: Never   Substance Use Topics    Alcohol use: Not Currently     Comment: One or two drinks a month     Family History   Problem Relation Age of Onset    Depression Mother     Diabetes Mother     Ulcerative Colitis Mother     Lipids Mother         hypertriglyceridemia    Anxiety Disorder Mother     Osteoporosis Mother     Mental Illness Mother     High cholesterol Father     Cancer Father         CLL    Blood Disease Father         CLL    Glaucoma Father     Macular Degeneration Father     Breast Cancer Maternal Grandmother         in 80s    Cancer Maternal Grandmother     Osteoporosis Maternal Grandmother     Diabetes Brother     Anxiety Disorder Brother     Anxiety Disorder Sister          Current Outpatient Medications   Medication Sig Dispense Refill    acetaminophen (TYLENOL) 500 MG tablet Take 500-1,000 mg by mouth every 6 hours as needed for mild pain.      atorvastatin (LIPITOR) 20 MG tablet Take 1 tablet (20 mg) by mouth daily. 90 tablet 4    calcium carbonate (OS-ARA) 500 MG tablet Take 1 tablet by mouth daily.      cholecalciferol (VITAMIN D3) 25 mcg (1000 units) capsule Take 1 capsule (25 mcg) by mouth daily. 90 capsule 2    clonazePAM (KLONOPIN) 1 MG tablet Take 0.5-1 tablets (0.5-1 mg) by mouth 2 times daily as needed for anxiety, sleep or agitation. 60 tabs to last 30 days 60 tablet 1    eszopiclone (LUNESTA) 1 MG tablet Take 1 tablet (1 mg) by mouth at bedtime. Take WITH 2 mg tab for total dose 3 mg at bedtime for sleep. 7 tablet 0    eszopiclone (LUNESTA) 2 MG tablet Take 1 tablet (2 mg) by mouth nightly as needed for sleep. 30 tablet 2    hydrOXYzine HCl (ATARAX) 25 MG tablet Take 0.5-1 tablets (12.5-25 mg) by mouth 2 times daily as needed for anxiety. 120 tablet 1    lamoTRIgine (LAMICTAL) 100 MG tablet Take 1.5  "tablets (150 mg) by mouth daily for 14 days, THEN 1 tablet (100 mg) 2 times daily. 173 tablet 0    levothyroxine (SYNTHROID/LEVOTHROID) 125 MCG tablet Take 1 tablet (125 mcg) by mouth daily. 90 tablet 4    melatonin 5 MG tablet Take 5 mg by mouth nightly as needed for sleep.      naproxen sodium 220 MG capsule Take 220 mg by mouth 2 times daily (with meals).      OLANZapine (ZYPREXA) 10 MG tablet Take 1 tablet (10 mg) by mouth at bedtime. 30 tablet 2    OLANZapine (ZYPREXA) 5 MG tablet Take 1 tablet (5 mg) by mouth daily as needed (anxiety, agitation, sleep). OK to take WITH 10 mg tab at bedtime. 30 tablet 2    Omega-3 Fatty Acids (OMEGA-3 FISH OIL PO) Take 1 g by mouth daily       Allergies   Allergen Reactions    Erythromycin Itching     Itching and swelling of lids    Itching, swelling of lids    Metformin Other (See Comments)     Aphthous Ulcer     Seasonal Allergies Itching and Other (See Comments)     Recent Labs   Lab Test 04/08/25  0805 10/04/24  0740 04/23/24  0738 06/15/23  0721   A1C 5.6 5.8* 5.9*  --    * 97 121* 101*   HDL 40* 32* 34* 38*   TRIG 94 117 172* 69   ALT  --  25 33 19   CR  --  0.84 0.76 0.71   GFRESTIMATED  --  81 >90 >90   POTASSIUM  --  5.0 4.6 4.1   TSH 1.08  --  1.69 2.43               Review of Systems  Constitutional, HEENT, cardiovascular, pulmonary, GI, , musculoskeletal, neuro, skin, endocrine and psych systems are negative, except as otherwise noted.     Objective    Exam  LMP  (LMP Unknown)    Estimated body mass index is 34.33 kg/m  as calculated from the following:    Height as of 6/27/25: 1.626 m (5' 4\").    Weight as of 7/8/25: 90.7 kg (200 lb).    Physical Exam  GENERAL: alert and no distress  EYES: Eyes grossly normal to inspection, PERRL and conjunctivae and sclerae normal  HENT: ear canals and TM's normal, nose and mouth without ulcers or lesions  NECK: no adenopathy, no asymmetry, masses, or scars  RESP: lungs clear to auscultation - no rales, rhonchi or " wheezes  BREAST: normal without masses, tenderness or nipple discharge and no palpable axillary masses or adenopathy  CV: regular rate and rhythm, normal S1 S2, no S3 or S4, no murmur, click or rub, no peripheral edema  ABDOMEN: soft, nontender, no hepatosplenomegaly, no masses and bowel sounds normal  MS: no gross musculoskeletal defects noted, no edema  SKIN: no suspicious lesions or rashes  NEURO: Normal strength and tone, mentation intact and speech normal  PSYCH: mentation appears normal, affect normal/bright        Signed Electronically by: Vanessa Ladd MD    Answers submitted by the patient for this visit:  Patient Health Questionnaire (Submitted on 7/30/2025)  If you checked off any problems, how difficult have these problems made it for you to do your work, take care of things at home, or get along with other people?: Somewhat difficult  PHQ9 TOTAL SCORE: 9

## 2025-08-04 ENCOUNTER — MYC MEDICAL ADVICE (OUTPATIENT)
Dept: PSYCHIATRY | Facility: CLINIC | Age: 60
End: 2025-08-04
Payer: COMMERCIAL

## 2025-08-04 DIAGNOSIS — F41.1 GENERALIZED ANXIETY DISORDER: ICD-10-CM

## 2025-08-04 DIAGNOSIS — G47.00 INSOMNIA, UNSPECIFIED TYPE: ICD-10-CM

## 2025-08-12 RX ORDER — HYDROXYZINE HYDROCHLORIDE 25 MG/1
12.5-25 TABLET, FILM COATED ORAL 2 TIMES DAILY PRN
Qty: 120 TABLET | Refills: 1 | Status: SHIPPED | OUTPATIENT
Start: 2025-08-12

## 2025-08-29 ASSESSMENT — ANXIETY QUESTIONNAIRES
3. WORRYING TOO MUCH ABOUT DIFFERENT THINGS: NEARLY EVERY DAY
8. IF YOU CHECKED OFF ANY PROBLEMS, HOW DIFFICULT HAVE THESE MADE IT FOR YOU TO DO YOUR WORK, TAKE CARE OF THINGS AT HOME, OR GET ALONG WITH OTHER PEOPLE?: VERY DIFFICULT
7. FEELING AFRAID AS IF SOMETHING AWFUL MIGHT HAPPEN: NEARLY EVERY DAY
GAD7 TOTAL SCORE: 19
1. FEELING NERVOUS, ANXIOUS, OR ON EDGE: NEARLY EVERY DAY
8. IF YOU CHECKED OFF ANY PROBLEMS, HOW DIFFICULT HAVE THESE MADE IT FOR YOU TO DO YOUR WORK, TAKE CARE OF THINGS AT HOME, OR GET ALONG WITH OTHER PEOPLE?: VERY DIFFICULT
1. FEELING NERVOUS, ANXIOUS, OR ON EDGE: NEARLY EVERY DAY
5. BEING SO RESTLESS THAT IT IS HARD TO SIT STILL: NEARLY EVERY DAY
4. TROUBLE RELAXING: NEARLY EVERY DAY
7. FEELING AFRAID AS IF SOMETHING AWFUL MIGHT HAPPEN: NEARLY EVERY DAY
2. NOT BEING ABLE TO STOP OR CONTROL WORRYING: NEARLY EVERY DAY
GAD7 TOTAL SCORE: 19
GAD7 TOTAL SCORE: 19
IF YOU CHECKED OFF ANY PROBLEMS ON THIS QUESTIONNAIRE, HOW DIFFICULT HAVE THESE PROBLEMS MADE IT FOR YOU TO DO YOUR WORK, TAKE CARE OF THINGS AT HOME, OR GET ALONG WITH OTHER PEOPLE: VERY DIFFICULT
GAD7 TOTAL SCORE: 19
5. BEING SO RESTLESS THAT IT IS HARD TO SIT STILL: NEARLY EVERY DAY
IF YOU CHECKED OFF ANY PROBLEMS ON THIS QUESTIONNAIRE, HOW DIFFICULT HAVE THESE PROBLEMS MADE IT FOR YOU TO DO YOUR WORK, TAKE CARE OF THINGS AT HOME, OR GET ALONG WITH OTHER PEOPLE: VERY DIFFICULT
4. TROUBLE RELAXING: NEARLY EVERY DAY
6. BECOMING EASILY ANNOYED OR IRRITABLE: SEVERAL DAYS
3. WORRYING TOO MUCH ABOUT DIFFERENT THINGS: NEARLY EVERY DAY
2. NOT BEING ABLE TO STOP OR CONTROL WORRYING: NEARLY EVERY DAY
6. BECOMING EASILY ANNOYED OR IRRITABLE: SEVERAL DAYS

## 2025-09-03 ENCOUNTER — THERAPY VISIT (OUTPATIENT)
Age: 60
End: 2025-09-03
Payer: COMMERCIAL

## 2025-09-03 ENCOUNTER — VIRTUAL VISIT (OUTPATIENT)
Dept: PSYCHIATRY | Facility: CLINIC | Age: 60
End: 2025-09-03
Payer: COMMERCIAL

## 2025-09-03 ENCOUNTER — VIRTUAL VISIT (OUTPATIENT)
Dept: BEHAVIORAL HEALTH | Facility: CLINIC | Age: 60
End: 2025-09-03
Payer: COMMERCIAL

## 2025-09-03 DIAGNOSIS — F31.81 BIPOLAR II DISORDER (H): Primary | ICD-10-CM

## 2025-09-03 DIAGNOSIS — F41.1 GENERALIZED ANXIETY DISORDER: ICD-10-CM

## 2025-09-03 DIAGNOSIS — F39 MOOD DISORDER: ICD-10-CM

## 2025-09-03 DIAGNOSIS — M25.572 SINUS TARSITIS, LEFT: ICD-10-CM

## 2025-09-03 DIAGNOSIS — G47.00 INSOMNIA, UNSPECIFIED TYPE: ICD-10-CM

## 2025-09-03 DIAGNOSIS — M79.672 LEFT FOOT PAIN: Primary | ICD-10-CM

## 2025-09-03 DIAGNOSIS — G47.09 OTHER INSOMNIA: ICD-10-CM

## 2025-09-03 DIAGNOSIS — F90.9 ATTENTION DEFICIT HYPERACTIVITY DISORDER (ADHD), UNSPECIFIED ADHD TYPE: ICD-10-CM

## 2025-09-03 DIAGNOSIS — F31.81 BIPOLAR 2 DISORDER (H): Primary | ICD-10-CM

## 2025-09-03 PROCEDURE — 97140 MANUAL THERAPY 1/> REGIONS: CPT | Mod: GP | Performed by: PHYSICAL THERAPIST

## 2025-09-03 PROCEDURE — 97110 THERAPEUTIC EXERCISES: CPT | Mod: GP | Performed by: PHYSICAL THERAPIST

## 2025-09-03 PROCEDURE — 90832 PSYTX W PT 30 MINUTES: CPT | Mod: 95 | Performed by: COUNSELOR

## 2025-09-03 ASSESSMENT — PATIENT HEALTH QUESTIONNAIRE - PHQ9
10. IF YOU CHECKED OFF ANY PROBLEMS, HOW DIFFICULT HAVE THESE PROBLEMS MADE IT FOR YOU TO DO YOUR WORK, TAKE CARE OF THINGS AT HOME, OR GET ALONG WITH OTHER PEOPLE: SOMEWHAT DIFFICULT
SUM OF ALL RESPONSES TO PHQ QUESTIONS 1-9: 15
SUM OF ALL RESPONSES TO PHQ QUESTIONS 1-9: 15

## 2025-09-03 ASSESSMENT — PAIN SCALES - GENERAL: PAINLEVEL_OUTOF10: NO PAIN (0)

## 2025-09-04 RX ORDER — OLANZAPINE 10 MG/1
10 TABLET, FILM COATED ORAL AT BEDTIME
Qty: 30 TABLET | Refills: 2 | Status: SHIPPED | OUTPATIENT
Start: 2025-09-04

## 2025-09-04 RX ORDER — LAMOTRIGINE 100 MG/1
100 TABLET ORAL 2 TIMES DAILY
Qty: 180 TABLET | Refills: 1 | Status: SHIPPED | OUTPATIENT
Start: 2025-09-04

## 2025-09-04 RX ORDER — OLANZAPINE 5 MG/1
5 TABLET, FILM COATED ORAL DAILY PRN
Qty: 30 TABLET | Refills: 2 | Status: SHIPPED | OUTPATIENT
Start: 2025-09-04

## 2025-09-04 RX ORDER — CLONAZEPAM 1 MG/1
1 TABLET ORAL 2 TIMES DAILY
Qty: 60 TABLET | Refills: 2 | Status: SHIPPED | OUTPATIENT
Start: 2025-09-04

## (undated) DEVICE — GLOVE PROTEXIS W/NEU-THERA 7.5  2D73TE75

## (undated) DEVICE — LINEN TOWEL PACK X5 5464

## (undated) DEVICE — SOL NACL 0.9% IRRIG 1000ML BOTTLE 2F7124

## (undated) DEVICE — SU PLAIN 6-0 G-1DA 18" 770G

## (undated) DEVICE — GLOVE PROTEXIS MICRO 6.0  2D73PM60

## (undated) DEVICE — PACK OCULOPLATIC SEN15OCFSD

## (undated) DEVICE — EYE PREP BETADINE 5% SOLUTION 30ML 0065-0411-30

## (undated) DEVICE — ESU EYE HIGH TEMP 65410-183

## (undated) DEVICE — SOL WATER IRRIG 1000ML BOTTLE 2F7114

## (undated) DEVICE — SU MERSILENE 5-0 S-24 18" 1764G

## (undated) RX ORDER — HYDROMORPHONE HYDROCHLORIDE 1 MG/ML
INJECTION, SOLUTION INTRAMUSCULAR; INTRAVENOUS; SUBCUTANEOUS
Status: DISPENSED
Start: 2020-12-18

## (undated) RX ORDER — TRIAMCINOLONE ACETONIDE 40 MG/ML
INJECTION, SUSPENSION INTRA-ARTICULAR; INTRAMUSCULAR
Status: DISPENSED
Start: 2019-11-04

## (undated) RX ORDER — BUPIVACAINE HYDROCHLORIDE 2.5 MG/ML
INJECTION, SOLUTION EPIDURAL; INFILTRATION; INTRACAUDAL
Status: DISPENSED
Start: 2019-11-04

## (undated) RX ORDER — OXYCODONE HYDROCHLORIDE 5 MG/1
TABLET ORAL
Status: DISPENSED
Start: 2020-12-18

## (undated) RX ORDER — FENTANYL CITRATE 50 UG/ML
INJECTION, SOLUTION INTRAMUSCULAR; INTRAVENOUS
Status: DISPENSED
Start: 2020-12-18

## (undated) RX ORDER — LIDOCAINE HYDROCHLORIDE 10 MG/ML
INJECTION, SOLUTION EPIDURAL; INFILTRATION; INTRACAUDAL; PERINEURAL
Status: DISPENSED
Start: 2019-11-04

## (undated) RX ORDER — FENTANYL CITRATE 0.05 MG/ML
INJECTION, SOLUTION INTRAMUSCULAR; INTRAVENOUS
Status: DISPENSED
Start: 2020-12-18

## (undated) RX ORDER — ONDANSETRON 2 MG/ML
INJECTION INTRAMUSCULAR; INTRAVENOUS
Status: DISPENSED
Start: 2020-12-18